# Patient Record
Sex: MALE | Race: WHITE | NOT HISPANIC OR LATINO | Employment: OTHER | URBAN - METROPOLITAN AREA
[De-identification: names, ages, dates, MRNs, and addresses within clinical notes are randomized per-mention and may not be internally consistent; named-entity substitution may affect disease eponyms.]

---

## 2017-01-10 ENCOUNTER — ALLSCRIPTS OFFICE VISIT (OUTPATIENT)
Dept: OTHER | Facility: OTHER | Age: 82
End: 2017-01-10

## 2017-01-10 DIAGNOSIS — M54.16 RADICULOPATHY OF LUMBAR REGION: ICD-10-CM

## 2017-01-12 ENCOUNTER — APPOINTMENT (OUTPATIENT)
Dept: PHYSICAL THERAPY | Facility: CLINIC | Age: 82
End: 2017-01-12
Payer: COMMERCIAL

## 2017-01-12 ENCOUNTER — GENERIC CONVERSION - ENCOUNTER (OUTPATIENT)
Dept: OTHER | Facility: OTHER | Age: 82
End: 2017-01-12

## 2017-01-12 DIAGNOSIS — M54.16 RADICULOPATHY OF LUMBAR REGION: ICD-10-CM

## 2017-01-12 PROCEDURE — G8978 MOBILITY CURRENT STATUS: HCPCS

## 2017-01-12 PROCEDURE — 97161 PT EVAL LOW COMPLEX 20 MIN: CPT

## 2017-01-12 PROCEDURE — G8979 MOBILITY GOAL STATUS: HCPCS

## 2017-01-17 ENCOUNTER — APPOINTMENT (OUTPATIENT)
Dept: PHYSICAL THERAPY | Facility: CLINIC | Age: 82
End: 2017-01-17
Payer: COMMERCIAL

## 2017-01-17 PROCEDURE — 97110 THERAPEUTIC EXERCISES: CPT

## 2017-01-17 PROCEDURE — 97140 MANUAL THERAPY 1/> REGIONS: CPT

## 2017-01-17 PROCEDURE — G8979 MOBILITY GOAL STATUS: HCPCS

## 2017-01-19 ENCOUNTER — APPOINTMENT (OUTPATIENT)
Dept: PHYSICAL THERAPY | Facility: CLINIC | Age: 82
End: 2017-01-19
Payer: COMMERCIAL

## 2017-01-19 PROCEDURE — 97140 MANUAL THERAPY 1/> REGIONS: CPT

## 2017-01-19 PROCEDURE — 97110 THERAPEUTIC EXERCISES: CPT

## 2017-03-10 ENCOUNTER — GENERIC CONVERSION - ENCOUNTER (OUTPATIENT)
Dept: OTHER | Facility: OTHER | Age: 82
End: 2017-03-10

## 2017-04-03 ENCOUNTER — GENERIC CONVERSION - ENCOUNTER (OUTPATIENT)
Dept: OTHER | Facility: OTHER | Age: 82
End: 2017-04-03

## 2017-04-07 ENCOUNTER — GENERIC CONVERSION - ENCOUNTER (OUTPATIENT)
Dept: OTHER | Facility: OTHER | Age: 82
End: 2017-04-07

## 2017-04-07 ENCOUNTER — TRANSCRIBE ORDERS (OUTPATIENT)
Dept: ADMINISTRATIVE | Facility: HOSPITAL | Age: 82
End: 2017-04-07

## 2017-04-07 DIAGNOSIS — K91.5 POSTCHOLECYSTECTOMY SYNDROME: Primary | ICD-10-CM

## 2017-04-19 ENCOUNTER — HOSPITAL ENCOUNTER (OUTPATIENT)
Dept: RADIOLOGY | Facility: HOSPITAL | Age: 82
Discharge: HOME | End: 2017-04-19
Attending: INTERNAL MEDICINE
Payer: COMMERCIAL

## 2017-04-19 DIAGNOSIS — K91.5 POSTCHOLECYSTECTOMY SYNDROME: ICD-10-CM

## 2017-04-19 PROCEDURE — 76705 ECHO EXAM OF ABDOMEN: CPT

## 2017-04-20 ENCOUNTER — HOSPITAL ENCOUNTER (OUTPATIENT)
Dept: NON INVASIVE DIAGNOSTICS | Facility: CLINIC | Age: 82
Discharge: HOME | End: 2017-04-20
Payer: COMMERCIAL

## 2017-04-20 DIAGNOSIS — K91.5 POSTCHOLECYSTECTOMY SYNDROME: ICD-10-CM

## 2017-04-20 PROCEDURE — 93975 VASCULAR STUDY: CPT

## 2017-04-25 ENCOUNTER — ALLSCRIPTS OFFICE VISIT (OUTPATIENT)
Dept: OTHER | Facility: OTHER | Age: 82
End: 2017-04-25

## 2017-05-19 ENCOUNTER — GENERIC CONVERSION - ENCOUNTER (OUTPATIENT)
Dept: OTHER | Facility: OTHER | Age: 82
End: 2017-05-19

## 2017-07-25 ENCOUNTER — ALLSCRIPTS OFFICE VISIT (OUTPATIENT)
Dept: OTHER | Facility: OTHER | Age: 82
End: 2017-07-25

## 2017-08-18 ENCOUNTER — GENERIC CONVERSION - ENCOUNTER (OUTPATIENT)
Dept: OTHER | Facility: OTHER | Age: 82
End: 2017-08-18

## 2017-10-02 ENCOUNTER — GENERIC CONVERSION - ENCOUNTER (OUTPATIENT)
Dept: OTHER | Facility: OTHER | Age: 82
End: 2017-10-02

## 2017-10-24 ENCOUNTER — ALLSCRIPTS OFFICE VISIT (OUTPATIENT)
Dept: OTHER | Facility: OTHER | Age: 82
End: 2017-10-24

## 2017-10-25 NOTE — PROGRESS NOTES
Assessment  1  Primary osteoarthritis of left knee (715 16) (M17 12)    Plan  Primary osteoarthritis of left knee    · Follow-up visit in 3 months Evaluation and Treatment  Follow-up  Status: Hold For -  Scheduling  Requested for: [id number]    Discussion/Summary    80-year-old male here for routine follow-up for left knee osteoarthritis, doing well   using knee brace on an as-needed basis for symptom reliefwithout assistive device as toleratedon an as-needed basis, will make appt for 3 months in case  Chief Complaint  1  Knee Pain    History of Present Illness  HPI: 80-year-old male with history of left knee osteoarthritis, previously seen in clinic  Approximately 6 months ago he was aspirated and injected for left knee pain  Since that time he has been doing well in ambulation without assistive device  He was seen 3 months prior clinic and given hinged knee brace for support  Uses the knee brace on a as needed basis and is tolerating well  He reports no major issues or concerns today  Active Problems  1  Allergic rhinitis (477 9) (J30 9)   2  Anemia (285 9) (D64 9)   3  Benign essential hypertension (401 1) (I10)   4  Cataract (366 9) (H26 9)   5  Diarrhea (787 91) (R19 7)   6  Esophageal reflux (530 81) (K21 9)   7  Glaucoma (365 9) (H40 9)   8  Gluteal pain (729 1) (M79 1)   9  Hamstring strain (843 8) (S76 319A)   10  Headache (784 0) (R51)   11  Hiatal hernia (553 3) (K44 9)   12  Hyperlipidemia (272 4) (E78 5)   13  Hypertension (401 9) (I10)   14  Joint pain, knee (719 46) (M25 569)   15  Knee effusion, left (719 06) (M25 462)   16  Left lumbar radiculopathy (724 4) (M54 16)   17  Need for prophylactic vaccination and inoculation against influenza (V04 81) (Z23)   18  Neuralgia (729 2) (M79 2)   19  Peripheral vascular disease (443 9) (I73 9)   20  Preoperative evaluation of a medical condition to rule out surgical contraindications (TAR    required) (V72 83) (Z01 818)   21   Primary osteoarthritis of left knee (715 16) (M17 12)   22  Shingles (053 9) (B02 9)   23  Upper respiratory infection (465 9) (J06 9)    Past Medical History   · History of Carpal tunnel syndrome, unspecified laterality (354 0) (G56 00)   · History of Carpal tunnel syndrome, unspecified laterality (354 0) (G56 00)   · History of anemia (V12 3) (Z86 2)   · History of gastroesophageal reflux (GERD) (V12 79) (Z87 19)   · History of glaucoma (V12 49) (Z86 69)   · History of glaucoma (V12 49) (Z86 69)   · History of hypertension (V12 59) (Z86 79)   · History of peripheral vascular disease (V12 59) (Z86 79)   · History of Intervertebral Disc Degeneration (722 6)   · History of Palpitations (785 1) (R00 2)    The active problems and past medical history were reviewed and updated today  Surgical History   · History of Cataract Surgery   · History of Hernia Repair   · History of Neuroplasty Decompression Median Nerve At Carpal Tunnel    The surgical history was reviewed and updated today  Family History  Mother    · Family history of Glaucoma  Father    · Family history of Cancer   · Family history of cardiac disorder (V17 49) (Z82 49)   · Family history of Glaucoma  Family History    · Family history of Alzheimer's disease (V17 2) (Z82 0)   · Family history of myocardial infarction (V17 3) (Z82 49)    The family history was reviewed and updated today  Social History   · Alcohol Use (History)   · 4 BEERS QD   · Denied: History of Caffeine use   · Former smoker (V15 82) (W44 571)   · History of Former smoker (V15 82) (Y96 484)   · Marital History - Currently    · No drug use   · Occupation: Retired   · Sleep Pattern 'Morning Person'  The social history was reviewed and updated today  Current Meds   1  Align Oral Capsule Recorded   2  AmLODIPine Besylate 10 MG Oral Tablet; take 1 tablet every day; Therapy: 18RKD6510 to (Evaluate:90Ryd0619)  Requested for: 18Fey1659; Last   Rx:20Usp6744 Ordered   3   Aspirin 81 MG TABS; TAKE 1 TABLET DAILY; Therapy: (Elissa Camejo) to Recorded   4  Azopt 1 % Ophthalmic Suspension Recorded   5  BL Vitamin E 400 UNIT CAPS; Take 1 tablet daily; Therapy: (Recorded:26Dmp5791) to Recorded   6  Centrum Silver Oral Tablet; TAKE 1 TABLET DAILY; Therapy: (Recorded:21Jul2015) to Recorded   7  Co Q 10 100 MG Oral Capsule; Take as directed Recorded   8  Fish Oil CAPS; TAKE TWO TABLETS DAILY; Therapy: (Elissa Camejo) to Recorded   9  Garlic Oil 7868 MG Oral Capsule; Take 1 tablet daily; Therapy: (Recorded:32Unm8708) to Recorded   10  Lumigan 0 01 % Ophthalmic Solution; INSTILL 1 DROP INTO BOTH EYES ONCE DAILY    IN THE EVENING Recorded   11  Melatonin 3 MG Oral Capsule; Therapy: (Recorded:34Ubz6392) to Recorded   12  Metoprolol Succinate ER 25 MG Oral Tablet Extended Release 24 Hour; Take 1 tablet    daily Recorded   13  Omeprazole 20 MG Oral Capsule Delayed Release; TAKE ONE CAPSULE BY MOUTH    EVERY OTHER DAY; Therapy: 00SAJ5521 to (Evaluate:16Mar2017)  Requested for: 21Mar2016; Last    Rx:21Mar2016 Ordered   14  Vitamin B-12 1000 MCG Oral Tablet; 1 TAB DAILY; Therapy: (Recorded:57Jzu2718) to Recorded   15  Vitamin D3 2000 UNIT Oral Capsule; take 1 capsule daily; Therapy: (Recorded:08Upj4182) to Recorded    The medication list was reviewed and updated today  Allergies  1  Lipitor TABS   2   Zetia TABS    Vitals  Signs   Heart Rate: 64  Respiration: 18  Systolic: 185  Diastolic: 79  Weight: 846 lb 6 08 oz    Physical Exam    Constitutional - General appearance: Normal    Musculoskeletal - Gait and station: Normal -- Digits and nails: Normal -- Muscle strength/tone: Normal    Cardiovascular - Pulses: Normal -- Examination of extremities for edema and/or varicosities: Normal    Skin - Skin and subcutaneous tissue: Normal    Neurologic - Sensation: Normal    Psychiatric - Orientation to person, place, and time: Normal -- Mood and affect: Normal    Eyes   Conjunctiva and lids: Normal     Pupils and irises: Normal     Free Text - Left knee with no major effusion, no warmth or erythemato varus and valgus stress, able to perform knee flexion to approximately 130Â°, extension full to nearly 0Â°joint line tenderness a  Results/Data    Views: Previous left knee x-rays reviewed, show degenerative joint changes and osteophyte formation of left knee, worse on medial compartment of the left knee  Findings:      Attending Note  59-year-old male who presents at this time secondary to left knee DJD  Patient told well  No new swelling  Patient has been wearing a knee brace which has given him comfort  No numbness, tingling, fevers, chills  Plan is as follows:    Weightbearing as tolerated  Hinged knee brace for comfort  Follow-up in 3 months on as-needed basis  Patient seen and examined  Agree with resident assessment and plan  Discussed treatment plan with patient, and patient  is in agreement with treatment plan  Thank you        Future Appointments    Date/Time Provider Specialty Site   01/23/2018 10:10 AM STEPHY Richardson  Orthopedic Surgery [address]     Signatures   Electronically signed by :  Veronique Nam MD; Oct 24 2017 11:32AM EST                       (Author)    Electronically signed by : STEPHY Marvin ; Oct 24 2017  4:03PM EST                       (Author)

## 2017-10-31 ENCOUNTER — GENERIC CONVERSION - ENCOUNTER (OUTPATIENT)
Dept: OTHER | Facility: OTHER | Age: 82
End: 2017-10-31

## 2017-10-31 ENCOUNTER — ALLSCRIPTS OFFICE VISIT (OUTPATIENT)
Dept: OTHER | Facility: OTHER | Age: 82
End: 2017-10-31

## 2017-11-01 ENCOUNTER — LAB CONVERSION - ENCOUNTER (OUTPATIENT)
Dept: OTHER | Facility: OTHER | Age: 82
End: 2017-11-01

## 2017-11-01 LAB
A/G RATIO (HISTORICAL): 1.4 (CALC) (ref 1–2.5)
ALBUMIN SERPL BCP-MCNC: 4.1 G/DL (ref 3.6–5.1)
ALP SERPL-CCNC: 45 U/L (ref 40–115)
ALT SERPL W P-5'-P-CCNC: 17 U/L (ref 9–46)
AST SERPL W P-5'-P-CCNC: 18 U/L (ref 10–35)
BASOPHILS # BLD AUTO: 0.5 %
BASOPHILS # BLD AUTO: 30 CELLS/UL (ref 0–200)
BILIRUB SERPL-MCNC: 0.5 MG/DL (ref 0.2–1.2)
BUN SERPL-MCNC: 19 MG/DL (ref 7–25)
BUN/CREA RATIO (HISTORICAL): 17 (CALC) (ref 6–22)
C-REACTIVE PROTEIN (HISTORICAL): 1 MG/L
CALCIUM SERPL-MCNC: 9.7 MG/DL (ref 8.6–10.3)
CHLORIDE SERPL-SCNC: 103 MMOL/L (ref 98–110)
CO2 SERPL-SCNC: 28 MMOL/L (ref 20–31)
CREAT SERPL-MCNC: 1.14 MG/DL (ref 0.7–1.11)
DEPRECATED RDW RBC AUTO: 12.4 % (ref 11–15)
EGFR AFRICAN AMERICAN (HISTORICAL): 67 ML/MIN/1.73M2
EGFR-AMERICAN CALC (HISTORICAL): 58 ML/MIN/1.73M2
EOSINOPHIL # BLD AUTO: 192 CELLS/UL (ref 15–500)
EOSINOPHIL # BLD AUTO: 3.2 %
ERYTHROCYTE SEDIMENTATION RATE (HISTORICAL): 33 MM/H
GAMMA GLOBULIN (HISTORICAL): 2.9 G/DL (CALC) (ref 1.9–3.7)
GLUCOSE (HISTORICAL): 97 MG/DL (ref 65–99)
HCT VFR BLD AUTO: 35.5 % (ref 38.5–50)
HGB BLD-MCNC: 12 G/DL (ref 13.2–17.1)
LYME 18 KD IGG (HISTORICAL): REACTIVE
LYME 23 KD IGG (HISTORICAL): ABNORMAL
LYME 23 KD IGM (HISTORICAL): ABNORMAL
LYME 28 KD IGG (HISTORICAL): ABNORMAL
LYME 30 KD IGG (HISTORICAL): ABNORMAL
LYME 39 KD IGG (HISTORICAL): ABNORMAL
LYME 39 KD IGM (HISTORICAL): ABNORMAL
LYME 41 KD IGG (HISTORICAL): REACTIVE
LYME 41 KD IGM (HISTORICAL): ABNORMAL
LYME 45 KD IGG (HISTORICAL): REACTIVE
LYME 58 KD IGG (HISTORICAL): ABNORMAL
LYME 66 KD IGG (HISTORICAL): ABNORMAL
LYME 93 KD IGG (HISTORICAL): ABNORMAL
LYME IGG (HISTORICAL): NEGATIVE
LYME IGM (HISTORICAL): NEGATIVE
LYMPHOCYTES # BLD AUTO: 1134 CELLS/UL (ref 850–3900)
LYMPHOCYTES # BLD AUTO: 18.9 %
MCH RBC QN AUTO: 32.4 PG (ref 27–33)
MCHC RBC AUTO-ENTMCNC: 33.8 G/DL (ref 32–36)
MCV RBC AUTO: 95.9 FL (ref 80–100)
MONOCYTES # BLD AUTO: 630 CELLS/UL (ref 200–950)
MONOCYTES (HISTORICAL): 10.5 %
NEUTROPHILS # BLD AUTO: 4014 CELLS/UL (ref 1500–7800)
NEUTROPHILS # BLD AUTO: 66.9 %
PLATELET # BLD AUTO: 223 THOUSAND/UL (ref 140–400)
PMV BLD AUTO: 11.6 FL (ref 7.5–12.5)
POTASSIUM SERPL-SCNC: 4.6 MMOL/L (ref 3.5–5.3)
RBC # BLD AUTO: 3.7 MILLION/UL (ref 4.2–5.8)
SODIUM SERPL-SCNC: 138 MMOL/L (ref 135–146)
TOTAL PROTEIN (HISTORICAL): 7 G/DL (ref 6.1–8.1)
TSH SERPL DL<=0.05 MIU/L-ACNC: 2.34 MIU/L (ref 0.4–4.5)
WBC # BLD AUTO: 6 THOUSAND/UL (ref 3.8–10.8)

## 2017-11-02 ENCOUNTER — GENERIC CONVERSION - ENCOUNTER (OUTPATIENT)
Dept: OTHER | Facility: OTHER | Age: 82
End: 2017-11-02

## 2017-12-05 ENCOUNTER — GENERIC CONVERSION - ENCOUNTER (OUTPATIENT)
Dept: FAMILY MEDICINE CLINIC | Facility: CLINIC | Age: 82
End: 2017-12-05

## 2017-12-22 ENCOUNTER — GENERIC CONVERSION - ENCOUNTER (OUTPATIENT)
Dept: FAMILY MEDICINE CLINIC | Facility: CLINIC | Age: 82
End: 2017-12-22

## 2018-01-05 ENCOUNTER — APPOINTMENT (EMERGENCY)
Dept: RADIOLOGY | Facility: HOSPITAL | Age: 83
End: 2018-01-05
Payer: COMMERCIAL

## 2018-01-05 ENCOUNTER — HOSPITAL ENCOUNTER (EMERGENCY)
Facility: HOSPITAL | Age: 83
Discharge: HOME | End: 2018-01-05
Attending: EMERGENCY MEDICINE | Admitting: EMERGENCY MEDICINE
Payer: COMMERCIAL

## 2018-01-05 VITALS
HEIGHT: 68 IN | WEIGHT: 175 LBS | DIASTOLIC BLOOD PRESSURE: 63 MMHG | SYSTOLIC BLOOD PRESSURE: 139 MMHG | RESPIRATION RATE: 12 BRPM | BODY MASS INDEX: 26.52 KG/M2 | HEART RATE: 78 BPM | TEMPERATURE: 96.1 F | OXYGEN SATURATION: 96 %

## 2018-01-05 DIAGNOSIS — K52.9 GASTROENTERITIS: Primary | ICD-10-CM

## 2018-01-05 LAB
ALBUMIN SERPL BCP-MCNC: 3.4 G/DL (ref 3.5–5)
ALP SERPL-CCNC: 43 U/L (ref 46–116)
ALT SERPL W P-5'-P-CCNC: 24 U/L (ref 12–78)
ANION GAP SERPL CALCULATED.3IONS-SCNC: 9 MMOL/L (ref 4–13)
AST SERPL W P-5'-P-CCNC: 19 U/L (ref 5–45)
ATRIAL RATE: 62 BPM
BACTERIA UR QL AUTO: ABNORMAL /HPF
BASOPHILS # BLD AUTO: 0.02 THOUSANDS/ΜL (ref 0–0.1)
BASOPHILS NFR BLD AUTO: 0 % (ref 0–1)
BILIRUB SERPL-MCNC: 0.34 MG/DL (ref 0.2–1)
BILIRUB UR QL STRIP: NEGATIVE
BUN SERPL-MCNC: 22 MG/DL (ref 5–25)
CALCIUM SERPL-MCNC: 8.6 MG/DL (ref 8.3–10.1)
CHLORIDE SERPL-SCNC: 106 MMOL/L (ref 100–108)
CLARITY UR: CLEAR
CLARITY, POC: CLEAR
CO2 SERPL-SCNC: 25 MMOL/L (ref 21–32)
COLOR UR: YELLOW
COLOR, POC: YELLOW
CREAT SERPL-MCNC: 1.05 MG/DL (ref 0.6–1.3)
EOSINOPHIL # BLD AUTO: 0.23 THOUSAND/ΜL (ref 0–0.61)
EOSINOPHIL NFR BLD AUTO: 2 % (ref 0–6)
ERYTHROCYTE [DISTWIDTH] IN BLOOD BY AUTOMATED COUNT: 13.1 % (ref 11.6–15.1)
GFR SERPL CREATININE-BSD FRML MDRD: 64 ML/MIN/1.73SQ M
GLUCOSE SERPL-MCNC: 93 MG/DL (ref 65–140)
GLUCOSE UR STRIP-MCNC: NEGATIVE MG/DL
HCT VFR BLD AUTO: 35.3 % (ref 36.5–49.3)
HGB BLD-MCNC: 11.9 G/DL (ref 12–17)
HGB UR QL STRIP.AUTO: NEGATIVE
HYALINE CASTS #/AREA URNS LPF: ABNORMAL /LPF
KETONES UR STRIP-MCNC: ABNORMAL MG/DL
LEUKOCYTE ESTERASE UR QL STRIP: NEGATIVE
LIPASE SERPL-CCNC: 208 U/L (ref 73–393)
LYMPHOCYTES # BLD AUTO: 1.07 THOUSANDS/ΜL (ref 0.6–4.47)
LYMPHOCYTES NFR BLD AUTO: 7 % (ref 14–44)
MCH RBC QN AUTO: 32.7 PG (ref 26.8–34.3)
MCHC RBC AUTO-ENTMCNC: 33.7 G/DL (ref 31.4–37.4)
MCV RBC AUTO: 97 FL (ref 82–98)
MONOCYTES # BLD AUTO: 1.09 THOUSAND/ΜL (ref 0.17–1.22)
MONOCYTES NFR BLD AUTO: 7 % (ref 4–12)
NEUTROPHILS # BLD AUTO: 13.07 THOUSANDS/ΜL (ref 1.85–7.62)
NEUTS SEG NFR BLD AUTO: 84 % (ref 43–75)
NITRITE UR QL STRIP: NEGATIVE
NON-SQ EPI CELLS URNS QL MICRO: ABNORMAL /HPF
NRBC BLD AUTO-RTO: 0 /100 WBCS
P AXIS: 51 DEGREES
PH UR STRIP.AUTO: 5.5 [PH] (ref 4.5–8)
PLATELET # BLD AUTO: 214 THOUSANDS/UL (ref 149–390)
PMV BLD AUTO: 10.9 FL (ref 8.9–12.7)
POTASSIUM SERPL-SCNC: 3.9 MMOL/L (ref 3.5–5.3)
PR INTERVAL: 216 MS
PROT SERPL-MCNC: 7 G/DL (ref 6.4–8.2)
PROT UR STRIP-MCNC: ABNORMAL MG/DL
QRS AXIS: 9 DEGREES
QRSD INTERVAL: 86 MS
QT INTERVAL: 400 MS
QTC INTERVAL: 406 MS
RBC # BLD AUTO: 3.64 MILLION/UL (ref 3.88–5.62)
RBC #/AREA URNS AUTO: ABNORMAL /HPF
SODIUM SERPL-SCNC: 140 MMOL/L (ref 136–145)
SP GR UR STRIP.AUTO: 1.01 (ref 1–1.03)
T WAVE AXIS: 41 DEGREES
UROBILINOGEN UR QL STRIP.AUTO: 0.2 E.U./DL
VENTRICULAR RATE: 62 BPM
WBC # BLD AUTO: 15.54 THOUSAND/UL (ref 4.31–10.16)
WBC #/AREA URNS AUTO: ABNORMAL /HPF

## 2018-01-05 PROCEDURE — 85025 COMPLETE CBC W/AUTO DIFF WBC: CPT | Performed by: EMERGENCY MEDICINE

## 2018-01-05 PROCEDURE — 80053 COMPREHEN METABOLIC PANEL: CPT | Performed by: EMERGENCY MEDICINE

## 2018-01-05 PROCEDURE — 96361 HYDRATE IV INFUSION ADD-ON: CPT

## 2018-01-05 PROCEDURE — 93005 ELECTROCARDIOGRAM TRACING: CPT

## 2018-01-05 PROCEDURE — 99285 EMERGENCY DEPT VISIT HI MDM: CPT

## 2018-01-05 PROCEDURE — 83690 ASSAY OF LIPASE: CPT | Performed by: EMERGENCY MEDICINE

## 2018-01-05 PROCEDURE — 74177 CT ABD & PELVIS W/CONTRAST: CPT

## 2018-01-05 PROCEDURE — 36415 COLL VENOUS BLD VENIPUNCTURE: CPT | Performed by: EMERGENCY MEDICINE

## 2018-01-05 PROCEDURE — 81002 URINALYSIS NONAUTO W/O SCOPE: CPT | Performed by: EMERGENCY MEDICINE

## 2018-01-05 PROCEDURE — 81001 URINALYSIS AUTO W/SCOPE: CPT

## 2018-01-05 PROCEDURE — 96360 HYDRATION IV INFUSION INIT: CPT

## 2018-01-05 RX ORDER — BRINZOLAMIDE 10 MG/ML
1 SUSPENSION/ DROPS OPHTHALMIC 2 TIMES DAILY
COMMUNITY
End: 2019-03-21

## 2018-01-05 RX ORDER — LATANOPROST 50 UG/ML
1 SOLUTION/ DROPS OPHTHALMIC
COMMUNITY

## 2018-01-05 RX ORDER — ACETAMINOPHEN 325 MG/1
650 TABLET ORAL ONCE
Status: COMPLETED | OUTPATIENT
Start: 2018-01-05 | End: 2018-01-05

## 2018-01-05 RX ADMIN — IOHEXOL 100 ML: 350 INJECTION, SOLUTION INTRAVENOUS at 20:24

## 2018-01-05 RX ADMIN — ACETAMINOPHEN 650 MG: 325 TABLET, FILM COATED ORAL at 20:01

## 2018-01-05 RX ADMIN — SODIUM CHLORIDE 1000 ML: 0.9 INJECTION, SOLUTION INTRAVENOUS at 20:02

## 2018-01-06 NOTE — ED PROVIDER NOTES
History  Chief Complaint   Patient presents with    Weakness - Generalized     Patient reports generalized weakness, nausea, vomited, and diarrhea x1    80year-old male presenting from home for evaluation of nausea and diarrhea  Patient reports that symptoms started just prior to arrival   He reports that he had just prepare dinner and had not yet eaten  He reports that the last time he ate was some walnuts for lunch  He reports he did not have much of an appetite for lunch  He reports that he suddenly felt nauseous and had 1 episode of vomiting and approximately 3 episodes of nonbloody loose stools  Patient denies any associated abdominal pain  Nobody else sick at home  No recent travel or antibiotic use  Patient denies any fevers, chills, lightheadedness, dizziness, headache, CP, SOB, urinary complaints  Only abdominal surgery was a cholecystectomy  A/P:  63-year-old male with vomiting and diarrhea, will check lab work to assess for metabolic derangements/liver function/renal function, CTA/P to assess for intra-abdominal pathology, UA to rule UTI, patient declines any pain or nausea medications at this time            Prior to Admission Medications   Prescriptions Last Dose Informant Patient Reported? Taking? Garlic 8284 MG CAPS   Yes Yes   Sig: Take 1 tablet by mouth daily  Vitamin D, Cholecalciferol, 1000 UNITS CAPS   Yes Yes   Sig: Take 1,000 tablets by mouth daily  amLODIPine (NORVASC) 10 mg tablet   Yes Yes   Sig: Take 10 mg by mouth  aspirin 81 MG tablet   Yes Yes   Sig: Take 81 mg by mouth daily  brinzolamide (AZOPT) 1 % ophthalmic suspension   Yes Yes   Sig: Administer 1 drop to the right eye 2 (two) times a day   co-enzyme Q-10 30 MG capsule   Yes Yes   Sig: Take 100 mg by mouth daily     fish oil 1,000 mg   Yes Yes   Sig: Take 1,000 mg by mouth    latanoprost (XALATAN) 0 005 % ophthalmic solution   Yes Yes   Sig: Administer 1 drop to both eyes daily at bedtime   metoprolol succinate (TOPROL-XL) 25 mg 24 hr tablet   Yes Yes   Sig: Take 25 mg by mouth    multivitamin-iron-minerals-folic acid (CENTRUM) chewable tablet   Yes Yes   Sig: Chew 1 tablet daily  vitamin E, tocopherol, 400 units capsule   Yes Yes   Sig: Take 400 Units by mouth daily  Facility-Administered Medications: None       Past Medical History:   Diagnosis Date    Hypertension        Past Surgical History:   Procedure Laterality Date    LA LAP,CHOLECYSTECTOMY N/A 9/14/2016    Procedure: LAPAROSCOPIC CHOLECYSTECTOMY ;  Surgeon: Yris Moser MD;  Location: [facility];  Service: General       History reviewed  No pertinent family history  I have reviewed and agree with the history as documented  Social History   Substance Use Topics    Smoking status: Never Smoker    Smokeless tobacco: Never Used    Alcohol use Yes      Comment: occasional        Review of Systems   Constitutional: Negative for chills and fever  HENT: Negative for ear pain, rhinorrhea and sore throat  Respiratory: Negative for cough and shortness of breath  Cardiovascular: Negative for chest pain and leg swelling  Gastrointestinal: Positive for diarrhea and nausea  Negative for abdominal pain, constipation and vomiting  Genitourinary: Negative for dysuria, frequency, hematuria and urgency  Musculoskeletal: Negative for back pain, myalgias and neck pain  Skin: Negative for color change and rash  Allergic/Immunologic: Negative for immunocompromised state  Neurological: Negative for dizziness, weakness, light-headedness, numbness and headaches  Hematological: Negative for adenopathy  Does not bruise/bleed easily  Psychiatric/Behavioral: Negative for agitation and confusion  All other systems reviewed and are negative        Physical Exam  ED Triage Vitals   Temperature Pulse Respirations Blood Pressure SpO2   01/05/18 1828 01/05/18 1821 01/05/18 1821 01/05/18 1821 01/05/18 1821   (!) 96 1 °F (35 6 °C) 61 18 123/77 100 %      Temp Source Heart Rate Source Patient Position - Orthostatic VS BP Location FiO2 (%)   01/05/18 1821 01/05/18 1821 01/05/18 1821 01/05/18 1821 --   Oral Monitor Lying Right arm       Pain Score       01/05/18 1821       No Pain           Orthostatic Vital Signs  Vitals:    01/05/18 1821 01/05/18 2005 01/05/18 2030 01/05/18 2130   BP: 123/77 143/62 135/63 139/63   Pulse: 61 71 78 78   Patient Position - Orthostatic VS: Lying Sitting         Physical Exam   Constitutional: He is oriented to person, place, and time  He appears well-developed and well-nourished  HENT:   Head: Normocephalic and atraumatic  Nose: Nose normal    Mouth/Throat: Oropharynx is clear and moist    Eyes: Conjunctivae and EOM are normal    Neck: Normal range of motion  Neck supple  Cardiovascular: Normal rate, regular rhythm, normal heart sounds and intact distal pulses  Pulmonary/Chest: Effort normal and breath sounds normal  No stridor  No respiratory distress  He has no wheezes  He has no rales  He exhibits no tenderness  Abdominal: Soft  He exhibits no distension  There is no tenderness  There is no rebound and no guarding  No back/CVA ttp   Musculoskeletal: He exhibits no edema or deformity  Neurological: He is alert and oriented to person, place, and time  He exhibits normal muscle tone  Coordination normal    Skin: Skin is warm and dry  No rash noted  Psychiatric: He has a normal mood and affect  Judgment and thought content normal    Nursing note and vitals reviewed        ED Medications  Medications   sodium chloride 0 9 % bolus 1,000 mL (0 mL Intravenous Stopped 1/5/18 2146)   acetaminophen (TYLENOL) tablet 650 mg (650 mg Oral Given 1/5/18 2001)   iohexol (OMNIPAQUE) 350 MG/ML injection (MULTI-DOSE) 100 mL (100 mL Intravenous Given 1/5/18 2024)       Diagnostic Studies  Results Reviewed     Procedure Component Value Units Date/Time    Urine Microscopic [[id number]]  (Abnormal) Collected:  01/05/18 2144    Lab Status:  Final result Specimen:  Urine from Urine, Clean Catch Updated:  01/05/18 2158     RBC, UA None Seen /hpf      WBC, UA None Seen /hpf      Epithelial Cells None Seen /hpf      Bacteria, UA None Seen /hpf      Hyaline Casts, UA 5-10 (A) /lpf     POCT urinalysis dipstick [[id number]]  (Normal) Resulted:  01/05/18 2143    Lab Status:  Final result Updated:  01/05/18 2143     Color, UA yellow     Clarity, UA clear    ED Urine Macroscopic [[id number]]  (Abnormal) Collected:  01/05/18 2144    Lab Status:  Final result Specimen:  Urine Updated:  01/05/18 2142     Color, UA Yellow     Clarity, UA Clear     pH, UA 5 5     Leukocytes, UA Negative     Nitrite, UA Negative     Protein, UA Trace (A) mg/dl      Glucose, UA Negative mg/dl      Ketones, UA Trace (A) mg/dl      Urobilinogen, UA 0 2 E U /dl      Bilirubin, UA Negative     Blood, UA Negative     Specific Gravity, UA 1 010    Narrative:       CLINITEK RESULT    Comprehensive metabolic panel [[id number]]  (Abnormal) Collected:  01/05/18 1856    Lab Status:  Final result Specimen:  Blood from Arm, Right Updated:  01/05/18 1934     Sodium 140 mmol/L      Potassium 3 9 mmol/L      Chloride 106 mmol/L      CO2 25 mmol/L      Anion Gap 9 mmol/L      BUN 22 mg/dL      Creatinine 1 05 mg/dL      Glucose 93 mg/dL      Calcium 8 6 mg/dL      AST 19 U/L      ALT 24 U/L      Alkaline Phosphatase 43 (L) U/L      Total Protein 7 0 g/dL      Albumin 3 4 (L) g/dL      Total Bilirubin 0 34 mg/dL      eGFR 64 ml/min/1 73sq m     Narrative:         National Kidney Disease Education Program recommendations are as follows:  GFR calculation is accurate only with a steady state creatinine  Chronic Kidney disease less than 60 ml/min/1 73 sq  meters  Kidney failure less than 15 ml/min/1 73 sq  meters      Lipase [[id number]]  (Normal) Collected:  01/05/18 1856    Lab Status:  Final result Specimen:  Blood from Arm, Right Updated:  01/05/18 1934     Lipase 208 u/L     CBC and differential [[id number]] (Abnormal) Collected:  01/05/18 1856    Lab Status:  Final result Specimen:  Blood from Arm, Right Updated:  01/05/18 1909     WBC 15 54 (H) Thousand/uL      RBC 3 64 (L) Million/uL      Hemoglobin 11 9 (L) g/dL      Hematocrit 35 3 (L) %      MCV 97 fL      MCH 32 7 pg      MCHC 33 7 g/dL      RDW 13 1 %      MPV 10 9 fL      Platelets 989 Thousands/uL      nRBC 0 /100 WBCs      Neutrophils Relative 84 (H) %      Lymphocytes Relative 7 (L) %      Monocytes Relative 7 %      Eosinophils Relative 2 %      Basophils Relative 0 %      Neutrophils Absolute 13 07 (H) Thousands/µL      Lymphocytes Absolute 1 07 Thousands/µL      Monocytes Absolute 1 09 Thousand/µL      Eosinophils Absolute 0 23 Thousand/µL      Basophils Absolute 0 02 Thousands/µL                  CT abdomen pelvis with contrast   ED Interpretation by Simone Weldon DO (01/05 2046)   CT ABDOMEN AND PELVIS WITH IV CONTRAST      INDICATION:  Abdominal pain      COMPARISON: Ultrasound dated April 19, 2017      TECHNIQUE:  CT examination of the abdomen and pelvis was performed      Reformatted images were created in axial, sagittal, and coronal planes         Radiation dose length product (DLP) for this visit:  450 4 mGy-cm    This examination, like all CT scans performed in the [facility], was performed utilizing techniques to minimize radiation dose exposure, including the use of iterative    reconstruction and automated exposure control  IV Contrast:  100 mL of iohexol (OMNIPAQUE)  350       Enteric Contrast:  Enteric contrast was not administered  FINDINGS:      ABDOMEN      LOWER CHEST:  Atelectatic changes both lung bases  No consolidation or significant effusion  Mild cardiomegaly  Coronary atherosclerosis  Aortic atherosclerosis  LIVER/BILIARY TREE:  Unremarkable  GALLBLADDER:  Removed      SPLEEN:  Unremarkable  PANCREAS:  Unremarkable  ADRENAL GLANDS:  Unremarkable        KIDNEYS/URETERS:  1 2 cm left renal cyst       STOMACH AND BOWEL:  Limited evaluation of GI tract without oral contrast  Mild distal esophagitis  Stomach unremarkable  Limited evaluation of GI tract without oral contrast  Small bowel appears average caliber  Duodenal diverticulum noted  Large bowel    is mostly collapsed  Colonic diverticulosis without CT evidence of colitis or diverticulitis  APPENDIX:  No findings to suggest appendicitis  ABDOMINOPELVIC CAVITY:  No ascites or free intraperitoneal air  No lymphadenopathy  VESSELS:  Unremarkable for patient's age  PELVIS      REPRODUCTIVE ORGANS:  Unremarkable for patient's age  URINARY BLADDER:  Unremarkable  ABDOMINAL WALL/INGUINAL REGIONS:  Tiny fat-containing umbilical hernia  OSSEOUS STRUCTURES:  Multilevel degenerative disc disease throughout the lumbar spine  Multilevel neural foraminal narrowing due to osteophytes and facet hypertrophy  Impression       Limited evaluation of GI tract without oral contrast  Suspect mild distal esophagitis  No explanation for patient's symptoms  Final Result by Mia Lundberg DO (01/05 2043)   Limited evaluation of GI tract without oral contrast  Suspect mild distal esophagitis  No explanation for patient's symptoms  Workstation performed: [id number]               Procedures  ECG 12 Lead Documentation  Date/Time: 1/5/2018 7:46 PM  Performed by: Catalina Kraus  Authorized by: Johnny Segal     Patient location:  ED  Previous ECG:     Previous ECG:  Compared to current    Comparison ECG info:  9/2/16  Rate:     ECG rate:  62  Rhythm:     Rhythm: sinus rhythm    Ectopy:     Ectopy: none    QRS:     QRS axis:  Normal  Conduction:     Conduction: normal    ST segments:     ST segments:  Non-specific  T waves:     T waves: flattening      Flattening:  V2 and aVL          Phone Consults  ED Phone Contact    ED Course  ED Course as of Jan 06 1912 Fri Jan 05, 2018   2141 Pt sleeping comfortably   He did not have any episodes of vomiting and diarrhea during ED stay  Discussed results with family, strict return precautions discussed            Identification of Seniors at [facility] Most Recent Value   (ISAR) Identification of Seniors at Risk   Before the illness or injury that brought you to the Emergency, did you need someone to help you on a regular basis? 1 Filed at: 01/05/2018 1828   In the last 24 hours, have you needed more help than usual?  0 Filed at: 01/05/2018 4017   Have you been hospitalized for one or more nights during the past 6 months? 0 Filed at: 01/05/2018 1828   In general, do you see well?  0 Filed at: 01/05/2018 1828   In general, do you have serious problems with your memory? 0 Filed at: 01/05/2018 1828   Do you take more than three different medications every day? 1 Filed at: 01/05/2018 1828   ISAR Score  2 Filed at: 01/05/2018 1828                          MDM  Number of Diagnoses or Management Options  Gastroenteritis:   Diagnosis management comments: 79 yo M with episode of vomiting and diarrhea, no recurrent episodes during ED course  Unremarkable ED workup  Discharged to home with strict return precautions, PCP f/u  Amount and/or Complexity of Data Reviewed  Clinical lab tests: ordered and reviewed  Tests in the radiology section of CPT®: ordered and reviewed  Tests in the medicine section of CPT®: ordered and reviewed      CritCare Time    Disposition  Final diagnoses:   Gastroenteritis     Time reflects when diagnosis was documented in both MDM as applicable and the Disposition within this note     Time User Action Codes Description Comment    1/5/2018  9:13 PM Brii CAIN Add [K52 9] Gastroenteritis       ED Disposition     ED Disposition Condition Comment    Discharge  Qasim Mullins discharge to home/self care      Condition at discharge: Stable        Follow-up Information     Follow up With Specialties Details Why Emanuel Pedroza MD Family Medicine [address]  [phone]          Return to ED if you have any new/worsening symptoms        Discharge Medication List as of 1/5/2018  9:14 PM      CONTINUE these medications which have NOT CHANGED    Details   amLODIPine (NORVASC) 10 mg tablet Take 10 mg by mouth , Until Discontinued, Historical Med      aspirin 81 MG tablet Take 81 mg by mouth daily  , Until Discontinued, Historical Med      brinzolamide (AZOPT) 1 % ophthalmic suspension Administer 1 drop to the right eye 2 (two) times a day, Historical Med      co-enzyme Q-10 30 MG capsule Take 100 mg by mouth daily  , Until Discontinued, Historical Med      fish oil 1,000 mg Take 1,000 mg by mouth , Until Discontinued, Historical Med      Garlic 0858 MG CAPS Take 1 tablet by mouth daily  , Until Discontinued, Historical Med      latanoprost (XALATAN) 0 005 % ophthalmic solution Administer 1 drop to both eyes daily at bedtime, Historical Med      metoprolol succinate (TOPROL-XL) 25 mg 24 hr tablet Take 25 mg by mouth , Until Discontinued, Historical Med      multivitamin-iron-minerals-folic acid (CENTRUM) chewable tablet Chew 1 tablet daily  , Until Discontinued, Historical Med      Vitamin D, Cholecalciferol, 1000 UNITS CAPS Take 1,000 tablets by mouth daily  , Until Discontinued, Historical Med      vitamin E, tocopherol, 400 units capsule Take 400 Units by mouth daily  , Until Discontinued, Historical Med           No discharge procedures on file  ED Provider  Attending physically available and evaluated Shanthi Reddy I managed the patient along with the ED Attending      Electronically Signed by         Cole Cosby DO  Resident  01/06/18 8376

## 2018-01-06 NOTE — DISCHARGE INSTRUCTIONS
Enteritis   WHAT YOU NEED TO KNOW:   Enteritis is inflammation of the small intestine  It may be caused by eating foods or drinking liquids contaminated with a virus, bacteria, or parasites  It may also be caused by certain medicines, damage from radiation, and medical conditions such as Crohn disease  DISCHARGE INSTRUCTIONS:   Seek care immediately if:   · You cannot stop vomiting  · You have not urinated for 12 hours  Contact your healthcare provider if:   · You have a fever over 101 5  · You have blood or mucus in your bowel movements  · You continue to vomit or have diarrhea for more than 3 days, even after treatment  · You have a dry mouth and eyes, you are urinating less than usual, and you feel dizzy when you stand up  · Your mouth or eyes are dry  You are not urinating as much or as often  · You are losing weight without trying  · You have questions or concerns about your condition or care  Medicines:   · Medicines  may be given to fight an infection caused by bacteria or a parasite  You may also need medicines to slow or stop your diarrhea or vomiting  Do not take these medicines unless your healthcare provider say it is okay  Other medicines may be needed to treat medical conditions that are causing enteritis  · Take your medicine as directed  Contact your healthcare provider if you think your medicine is not helping or if you have side effects  Tell him of her if you are allergic to any medicine  Keep a list of the medicines, vitamins, and herbs you take  Include the amounts, and when and why you take them  Bring the list or the pill bottles to follow-up visits  Carry your medicine list with you in case of an emergency  Manage enteritis:   · Eat foods that help to decrease symptoms  Limit or avoid foods and liquids that are high in sugar, fat, and fiber to help relieve diarrhea  It may be helpful to avoid lactose   Lactose is a type of sugar that is found in milk products  You may be able to tolerate soups, broths, well-cooked vegetables, canned fruit, and baked or broiled meats  Ask your dietitian or healthcare provider if you should follow a special diet  You may need to avoid other foods if you have certain medical conditions such as celiac disease  · Drink liquids as directed  Ask how much liquid to drink each day and which liquids are best for you  It is important to prevent or treat dehydration  Even if you have been vomiting, suck on ice chips or take small sips of clear liquids often  Slowly increase the amount of clear liquids you drink  If you become dehydrated, you may need IV liquids  · Drink an oral rehydration solution (ORS) as directed  An ORS contains water, salts, and sugar that are needed to replace lost body fluids  Ask what kind of ORS to use, how much to drink, and where to get it  Prevent enteritis:  Enteritis that is caused by bacteria, parasites, or viruses can be prevented  The following may help to prevent this type of enteritis:  · Wash your hands often  Use soap and water  Wash your hands after you use the bathroom, change a child's diapers, or sneeze  Wash your hands before you prepare or eat food  · Clean surfaces and do laundry often  Wash your clothes and towels separately from the rest of the laundry  Clean surfaces in your home with antibacterial  or bleach  · Clean food thoroughly and cook safely  Wash raw vegetables before you cook  Cook meat, fish, and eggs fully  Do not use the same dishes for raw meat as you do for other foods  Refrigerate any leftover food immediately  · Be aware when you camp or travel  Drink only clean water  Do not drink from rivers or lakes unless you purify or boil the water first  When you travel, drink bottled water and do not add ice  Do not eat fruit that has not been peeled  Do not eat raw fish or meat that is not fully cooked    Follow up with your healthcare provider as directed:  Write down your questions so you remember to ask them during your visits  © 2017 [address] Information is for End User's use only and may not be sold, redistributed or otherwise used for commercial purposes  All illustrations and images included in CareNotes® are the copyrighted property of A D [employer] M [employer] Inc  or Avi Jensen  The above information is an  only  It is not intended as medical advice for individual conditions or treatments  Talk to your doctor, nurse or pharmacist before following any medical regimen to see if it is safe and effective for you

## 2018-01-06 NOTE — ED ATTENDING ATTESTATION
Janet Maldonado MD, saw and evaluated the patient  I have discussed the patient with the resident/non-physician practitioner and agree with the resident's/non-physician practitioner's findings, Plan of Care, and MDM as documented in the resident's/non-physician practitioner's note, except where noted  All available labs and Radiology studies were reviewed  At this point I agree with the current assessment done in the Emergency Department  I have conducted an independent evaluation of this patient a history and physical is as follows:      Critical Care Time  CritCare Time    Procedures     81 yo male with n/v/d started around dinner time  Pt appeared diaphoretic and blacked out per family  Pt ate walnuts at lunch  No fever, no cp, no sob, no urinary complaints  pmh choley, htn  Pt currently feels better  Vss, afebrile, lungs cta, rrr, abdomen soft nontender, no neuro deficits  Cardiac workup, ct a/p, urine, ivf

## 2018-01-06 NOTE — ED NOTES
Encouraged the pt to void to expedite dispo  Pt and family aware        Mammie Numbers, RN  01/05/18 2183

## 2018-01-08 ENCOUNTER — GENERIC CONVERSION - ENCOUNTER (OUTPATIENT)
Dept: OTHER | Facility: OTHER | Age: 83
End: 2018-01-08

## 2018-01-11 NOTE — RESULT NOTES
Message  Spoke with patient's daughterElvis  Blood work stable  Lyme negative  He continues with a dull headache  She will monitor for a few more days since stopping melatonin  She will also check with ophthalmology regarding if headaches may be caused by eye drops  If headaches persist she will follow up with neurology  Blood pressures have been better, 127/73 this morning  114 systolic yesterday  She will keep BP log and send it in 2 weeks  She will call with any questions or concerns        Signatures   Electronically signed by : MARGARITA Tomas; Nov 2 2017 10:33AM EST                       (Author)

## 2018-01-13 VITALS
SYSTOLIC BLOOD PRESSURE: 171 MMHG | HEART RATE: 65 BPM | RESPIRATION RATE: 20 BRPM | WEIGHT: 170 LBS | DIASTOLIC BLOOD PRESSURE: 63 MMHG | BODY MASS INDEX: 25.85 KG/M2

## 2018-01-13 VITALS
DIASTOLIC BLOOD PRESSURE: 79 MMHG | WEIGHT: 171.38 LBS | BODY MASS INDEX: 26.06 KG/M2 | HEART RATE: 64 BPM | RESPIRATION RATE: 18 BRPM | SYSTOLIC BLOOD PRESSURE: 149 MMHG

## 2018-01-14 VITALS
DIASTOLIC BLOOD PRESSURE: 71 MMHG | BODY MASS INDEX: 26.99 KG/M2 | HEART RATE: 76 BPM | RESPIRATION RATE: 18 BRPM | WEIGHT: 177.5 LBS | SYSTOLIC BLOOD PRESSURE: 137 MMHG

## 2018-01-15 VITALS
HEART RATE: 67 BPM | RESPIRATION RATE: 18 BRPM | DIASTOLIC BLOOD PRESSURE: 71 MMHG | BODY MASS INDEX: 25.89 KG/M2 | WEIGHT: 170.25 LBS | SYSTOLIC BLOOD PRESSURE: 139 MMHG

## 2018-01-22 VITALS
RESPIRATION RATE: 16 BRPM | HEART RATE: 74 BPM | DIASTOLIC BLOOD PRESSURE: 60 MMHG | SYSTOLIC BLOOD PRESSURE: 140 MMHG | TEMPERATURE: 96.3 F | HEIGHT: 66 IN | BODY MASS INDEX: 27.32 KG/M2 | WEIGHT: 170 LBS

## 2018-01-23 ENCOUNTER — ALLSCRIPTS OFFICE VISIT (OUTPATIENT)
Dept: OTHER | Facility: OTHER | Age: 83
End: 2018-01-23

## 2018-01-24 VITALS
RESPIRATION RATE: 16 BRPM | SYSTOLIC BLOOD PRESSURE: 120 MMHG | DIASTOLIC BLOOD PRESSURE: 80 MMHG | HEIGHT: 66 IN | HEART RATE: 60 BPM | TEMPERATURE: 96.9 F | BODY MASS INDEX: 27.72 KG/M2 | WEIGHT: 172.5 LBS

## 2018-01-24 NOTE — PROGRESS NOTES
Assessment   1  Joint pain, knee (719 46) (M25 569)  2  Primary osteoarthritis of left knee (715 16) (M17 12)1      1 Amended By: Mary Ann Dempsey; Jan 23 2018 5:08 PM EST    Plan  Joint pain, knee    · Follow-up visit in 4 Months Evaluation and Treatment  Follow-up  Status: Hold For -  Scheduling  Requested for: [id number]    Discussion/Summary   Left knee DJD with resolved symptoms  Plan is as follows:    Weightbearing as tolerated    Hinged knee brace    Continue VMO strengthening exercises    Follow-up p r n  all in 4 months    Discussed treatment plan with patient and patient's daughter and both in agreement with treatment plan  Thank you        Chief Complaint   1  Knee Pain    History of Present Illness  HPI: [de-identified] year old male presents at this time for follow-up  Patient has been treated for his left knee DJD with injections and VMO strengthening  Patient states that he is asymptomatic  He is accompanied by his daughter  Denies any numbness tingling fevers chills  He would like new hinged knee brace since this does give him support1        1 Amended By: Mary Ann Dempsey; Jan 23 2018 5:06 PM EST    Review of Systems    ROS reviewed  1        1 Amended By: Mary Ann Dempsey; Jan 23 2018 5:06 PM EST    Active Problems   1  Allergic rhinitis (477 9) (J30 9)  2  Anemia (285 9) (D64 9)  3  Benign essential hypertension (401 1) (I10)  4  Esophageal reflux (530 81) (K21 9)  5  Glaucoma (365 9) (H40 9)  6  Headache (784 0) (R51)  7  Hiatal hernia (553 3) (K44 9)  8  Hyperlipidemia (272 4) (E78 5)  9  Left lumbar radiculopathy (724 4) (M54 16)  10  Neuralgia (729 2) (M79 2)  11  Peripheral vascular disease (443 9) (I73 9)  12   Primary osteoarthritis of left knee (715 16) (M17 12)    Past Medical History    · History of Carpal tunnel syndrome, unspecified laterality (354 0) (G56 00)   · History of Carpal tunnel syndrome, unspecified laterality (354 0) (G56 00)   · History of Gluteal pain (729 1) (M79 1)   · History of Hamstring strain (843 8) (S76 319A)   · History of anemia (V12 3) (Z86 2)   · History of cataract (V12 49) (Z86 69)   · History of diarrhea (V12 79) (I73 284)   · History of gastroesophageal reflux (GERD) (V12 79) (Z87 19)   · History of glaucoma (V12 49) (Z86 69)   · History of glaucoma (V12 49) (Z86 69)   · History of herpes zoster (V12 09) (Z86 19)   · History of hypertension (V12 59) (Z86 79)   · History of hypertension (V12 59) (Z86 79)   · History of influenza vaccination (V49 89) (Z92 29)   · History of peripheral vascular disease (V12 59) (Z86 79)   · History of upper respiratory infection (V12 09) (Z87 09)   · History of Intervertebral Disc Degeneration (722 6)   · History of Knee effusion, left (719 06) (M25 462)   · History of Palpitations (785 1) (R00 2)   · History of Preoperative evaluation of a medical condition to rule out surgical  contraindications (TAR required) (V72 83) (Z01 818)    The active problems and past medical history were reviewed and updated today  1        1 Amended By: Mary Ann Dempsey; Jan 23 2018 5:07 PM EST    Surgical History    · History of Cataract Surgery   · History of Hernia Repair   · History of Neuroplasty Decompression Median Nerve At Carpal Tunnel    The surgical history was reviewed and updated today  1        1 Amended By: Mary Ann Dempsey; Jan 23 2018 5:07 PM EST    Family History  Mother    · Family history of Glaucoma  Father    · Family history of Cancer   · Family history of cardiac disorder (V17 49) (Z82 49)   · Family history of Glaucoma  Family History    · Family history of Alzheimer's disease (V17 2) (Z82 0)   · Family history of myocardial infarction (V17 3) (Z82 49)    The family history was reviewed and updated today   1        1 Amended By: Mary Ann Dempsey; Jan 23 2018 5:07 PM EST    Social History    · Alcohol Use (History)   · 4 BEERS QD   · Denied: History of Caffeine use   · Former smoker (V15 82) (Z87 891)   · History of Former smoker (V15 82) (Z87 891)   · Marital History - Currently    · No drug use   · Occupation: Retired   · Sleep Pattern 'Morning Person'  The social history was reviewed and updated today  1        1 Amended By: Kathryn Mcmanus; Jan 23 2018 5:07 PM EST    Current Meds  1  Acetaminophen PM Ex St  MG Oral Tablet; Therapy: 14NVR1855 to Recorded  2  Align Oral Capsule; Take one cap by mouth daily; Therapy: (Recorded:31Oct2017) to Recorded  3  AmLODIPine Besylate 10 MG Oral Tablet; take 1 tablet every day; Therapy: 79XTN5616 to (Evaluate:27Jan2018)  Requested for: 54Jid9236; Last   Rx:52Kur7150 Ordered  4  Aspirin 81 MG TABS; TAKE 1 TABLET DAILY; Therapy: (Recorded:10Wlk6403) to Recorded  5  Azopt 1 % Ophthalmic Suspension Recorded  6  BL Vitamin E 400 UNIT CAPS; Take 1 tablet daily; Therapy: (Recorded:97Dpt5725) to Recorded  7  Centrum Silver Oral Tablet; TAKE 1 TABLET DAILY; Therapy: (Recorded:67Cjo5735) to Recorded  8  Co Q 10 100 MG Oral Capsule; Take as directed Recorded  9  Fish Oil CAPS; TAKE TWO TABLETS DAILY; Therapy: (Recorded:54Tsa4284) to Recorded  10  Garlic Oil 1793 MG Oral Capsule; Take 1 tablet daily; Therapy: (Recorded:93Kgm0662) to Recorded  11  Lumigan 0 01 % Ophthalmic Solution; INSTILL 1 DROP INTO BOTH EYES ONCE DAILY    IN THE EVENING Recorded  12  Metoprolol Succinate ER 25 MG Oral Tablet Extended Release 24 Hour; Take 1 tablet    daily Recorded  13  Vitamin B-12 1000 MCG Oral Tablet; 1 TAB DAILY; Therapy: (Recorded:46Rmx7022) to Recorded  14  Vitamin D3 2000 UNIT Oral Capsule; take 1 capsule daily; Therapy: (Recorded:63Wjy9236) to Recorded    The medication list was reviewed and updated today  1        1 Amended By: Kathryn Mcmanus; Jan 23 2018 5:07 PM EST    Allergies   1  Lipitor TABS  2   Zetia TABS    Vitals  Signs    Heart Rate: 63  Respiration: 18  Systolic: 593  Diastolic: 79  Weight: 623 lb 2 08 oz    Physical Exam   Bilateral knees: No abrasions, no open wounds, no medial lateral joint line tenderness, negative patellar grind test, full range of motion, stable to coronal sagittal plane stress, neurologically and vascularly intact distally     Constitutional -1  General appearance: Normal1   Musculoskeletal -1  Gait and station: Normal1   Digits and nails: Normal1   Muscle strength/tone: Normal1   Lower extremity compartments: Normal1   Cardiovascular -1  Pulses: Normal1   Examination of extremities for edema and/or varicosities: Normal1   Skin -1  Skin and subcutaneous tissue: Normal1   Neurologic -1  Sensation: Normal1   Psychiatric -1  Orientation to person, place, and time: Normal1    Mood and affect: Normal1         1 Amended By: Jayce Valdes; Jan 23 2018 5:08 PM EST    Signatures   Electronically signed by : STEPHY Zelaya ; Jan 23 2018  5:09PM EST                       (Author)

## 2018-01-25 ENCOUNTER — TELEPHONE (OUTPATIENT)
Dept: OBGYN CLINIC | Facility: HOSPITAL | Age: 83
End: 2018-01-25

## 2018-01-25 NOTE — TELEPHONE ENCOUNTER
Dr Keven Kaur patient  Rigoberto Dao calling to find out if she can come in and  a new knee brace for the patient, size medium   Call back number [phone]

## 2018-02-10 DIAGNOSIS — I10 ESSENTIAL HYPERTENSION: Primary | ICD-10-CM

## 2018-02-12 RX ORDER — AMLODIPINE BESYLATE 10 MG/1
TABLET ORAL
Qty: 30 TABLET | Refills: 5 | Status: SHIPPED | OUTPATIENT
Start: 2018-02-12 | End: 2019-08-30 | Stop reason: ALTCHOICE

## 2018-02-21 RX ORDER — OCTISALATE, AVOBENZONE, HOMOSALATE, AND OCTOCRYLENE 29.4; 29.4; 49; 25.48 MG/ML; MG/ML; MG/ML; MG/ML
LOTION TOPICAL
COMMUNITY
End: 2018-09-18

## 2018-02-21 RX ORDER — BRINZOLAMIDE 10 MG/ML
SUSPENSION/ DROPS OPHTHALMIC
COMMUNITY
End: 2018-03-12

## 2018-02-21 RX ORDER — BRIMONIDINE TARTRATE 0.1 %
DROPS OPHTHALMIC (EYE)
Refills: 3 | COMMUNITY
Start: 2017-12-12 | End: 2018-03-12

## 2018-02-21 RX ORDER — ACETAMINOPHEN 160 MG
1 TABLET,DISINTEGRATING ORAL DAILY
COMMUNITY
End: 2019-04-11 | Stop reason: HOSPADM

## 2018-02-21 RX ORDER — METOPROLOL SUCCINATE 25 MG/1
1 TABLET, EXTENDED RELEASE ORAL DAILY
COMMUNITY
End: 2018-03-12

## 2018-02-21 RX ORDER — VITAMIN E 268 MG
1 CAPSULE ORAL DAILY
COMMUNITY
End: 2018-03-19

## 2018-02-21 RX ORDER — ACETAMINOPHEN,DIPHENHYDRAMINE HCL 500; 25 MG/1; MG/1
TABLET, FILM COATED ORAL
COMMUNITY
Start: 2018-01-09 | End: 2019-04-11 | Stop reason: HOSPADM

## 2018-02-21 RX ORDER — LANOLIN ALCOHOL/MO/W.PET/CERES
1 CREAM (GRAM) TOPICAL DAILY
COMMUNITY
End: 2018-08-31

## 2018-02-21 RX ORDER — AMLODIPINE BESYLATE 10 MG/1
1 TABLET ORAL DAILY
COMMUNITY
Start: 2011-05-12 | End: 2018-03-12

## 2018-02-21 RX ORDER — OMEPRAZOLE 20 MG/1
1 CAPSULE, DELAYED RELEASE ORAL EVERY OTHER DAY
COMMUNITY
Start: 2013-05-16 | End: 2019-08-30 | Stop reason: ALTCHOICE

## 2018-02-23 ENCOUNTER — OFFICE VISIT (OUTPATIENT)
Dept: FAMILY MEDICINE CLINIC | Facility: CLINIC | Age: 83
End: 2018-02-23
Payer: COMMERCIAL

## 2018-02-23 VITALS
RESPIRATION RATE: 16 BRPM | DIASTOLIC BLOOD PRESSURE: 70 MMHG | HEART RATE: 70 BPM | SYSTOLIC BLOOD PRESSURE: 136 MMHG | BODY MASS INDEX: 27.64 KG/M2 | WEIGHT: 172 LBS | HEIGHT: 66 IN | TEMPERATURE: 96.9 F

## 2018-02-23 DIAGNOSIS — Z01.818 PREOP EXAMINATION: Primary | ICD-10-CM

## 2018-02-23 DIAGNOSIS — Z01.818 PREOPERATIVE EVALUATION OF A MEDICAL CONDITION TO RULE OUT SURGICAL CONTRAINDICATIONS (TAR REQUIRED): ICD-10-CM

## 2018-02-23 PROBLEM — M54.16 LEFT LUMBAR RADICULOPATHY: Status: ACTIVE | Noted: 2017-01-10

## 2018-02-23 LAB
SL AMB  POCT GLUCOSE, UA: NEGATIVE
SL AMB LEUKOCYTE ESTERASE,UA: NEGATIVE
SL AMB POCT BILIRUBIN,UA: NEGATIVE
SL AMB POCT BLOOD,UA: NEGATIVE
SL AMB POCT CLARITY,UA: NORMAL
SL AMB POCT COLOR,UA: YELLOW
SL AMB POCT KETONES,UA: NEGATIVE
SL AMB POCT NITRITE,UA: NEGATIVE
SL AMB POCT PH,UA: 5
SL AMB POCT SPECIFIC GRAVITY,UA: 1.01
SL AMB POCT URINE PROTEIN: NEGATIVE
SL AMB POCT UROBILINOGEN: NEGATIVE

## 2018-02-23 PROCEDURE — 81002 URINALYSIS NONAUTO W/O SCOPE: CPT | Performed by: FAMILY MEDICINE

## 2018-02-23 PROCEDURE — G0439 PPPS, SUBSEQ VISIT: HCPCS | Performed by: FAMILY MEDICINE

## 2018-02-23 PROCEDURE — 99213 OFFICE O/P EST LOW 20 MIN: CPT | Performed by: FAMILY MEDICINE

## 2018-02-23 NOTE — PROGRESS NOTES
FAMILY PRACTICE OFFICE VISIT       NAME: Sanchez Foster  AGE: 80 y o  SEX: male       : 7/15/1931        MRN: [id number]    DATE: 2018  TIME: 10:44 AM    Assessment and Plan     Problem List Items Addressed This Visit     Preoperative evaluation of a medical condition to rule out surgical contraindications (TAR required)      Other Visit Diagnoses     Preop examination    -  Primary    Relevant Orders    POCT urine dip (Completed)        Patient appears to be in stable health  He will be medically cleared for upcoming left sided carpal tunnel syndrome surgery as described  There are no Patient Instructions on file for this visit  Chief Complaint     Chief Complaint   Patient presents with    Medicare Wellness Visit     initial    Follow-up       History of Present Illness     Patient is accompanied by his daughter-in-law who states patient is scheduled for carpal tunnel syndrome surgery Thursday of next week by Dr Billie Gonzalez  He denies any recent illness  He still sees his cardiologist and is due to have cardiac testing and carotid artery testing in 2018 by Dr Marietta Dobbs  His daughter-in-law states that he had recent lipid profile performed by Cardiology as well as a recent EKG performed in their office in 2018  He still sees his gastroenterologist Livan Pan who performed a CMP and a CBC in 2017  Review of Systems   Review of Systems   Constitutional: Negative  HENT: Negative  Respiratory: Negative  Cardiovascular: Negative  Gastrointestinal: Negative  Genitourinary: Negative      Musculoskeletal:        As per HPI       Active Problem List     Patient Active Problem List   Diagnosis    Cholecystitis with cholelithiasis    Allergic rhinitis    Anemia    Benign essential hypertension    Esophageal reflux    Glaucoma    Hiatal hernia    Hyperlipidemia    Joint pain, knee    Left lumbar radiculopathy    Peripheral vascular disease ([address] )    Preoperative evaluation of a medical condition to rule out surgical contraindications (TAR required)       Past Medical History:  Past Medical History:   Diagnosis Date    Anemia     Carpal tunnel syndrome, unspecified upper limb     Cataract     last assessed: 8/18/2012    GERD (gastroesophageal reflux disease)     Glaucoma     Hypertension     last assessed: 8/23/2012    Intervertebral disc disease     PVD (peripheral vascular disease) (HCC)        Past Surgical History:  Past Surgical History:   Procedure Laterality Date    CATARACT EXTRACTION      HERNIA REPAIR      NEUROPLASTY / TRANSPOSITION MEDIAN NERVE AT CARPAL TUNNEL      VT LAP,CHOLECYSTECTOMY N/A 9/14/2016    Procedure: LAPAROSCOPIC CHOLECYSTECTOMY ;  Surgeon: Carlos Manuel Martins MD;  Location: [facility];  Service: General       Family History:  Family History   Problem Relation Age of Onset    Glaucoma Mother     Cancer Father     Heart disease Father    Earna Leona Glaucoma Father     Alzheimer's disease Family     Heart attack Family        Social History:  Social History     Social History    Marital status: /Civil Union     Spouse name: N/A    Number of children: N/A    Years of education: N/A     Occupational History    retired      Social History Main Topics    Smoking status: Never Smoker    Smokeless tobacco: Never Used    Alcohol use Yes      Comment: occasional  4 beers a daily    Drug use: No    Sexual activity: Not on file     Other Topics Concern    Not on file     Social History Narrative    Morning person       Objective     Vitals:    02/23/18 0952   BP: 136/70   Pulse: 70   Resp: 16   Temp: (!) 96 9 °F (36 1 °C)     Wt Readings from Last 3 Encounters:   02/23/18 78 kg (172 lb)   01/08/18 78 2 kg (172 lb 8 oz)   01/05/18 79 4 kg (175 lb)       Physical Exam   Constitutional: He is oriented to person, place, and time  He appears well-developed and well-nourished     HENT:   Right Ear: External ear normal    Left Ear: External ear normal    Nose: Nose normal    Mouth/Throat: Oropharynx is clear and moist    Tympanic membranes normal  Pharynx clear   Eyes: Conjunctivae and EOM are normal  Pupils are equal, round, and reactive to light  Neck: Normal range of motion  Neck supple  No thyromegaly present  Cardiovascular: Normal rate, regular rhythm and normal heart sounds  No murmur heard  Pulmonary/Chest: Effort normal and breath sounds normal    Abdominal: Soft  Bowel sounds are normal  There is no tenderness  NO hepatospenomegaly   Musculoskeletal: Normal range of motion  He exhibits no edema  Lymphadenopathy:     He has no cervical adenopathy  Neurological: He is alert and oriented to person, place, and time  Skin:   NO RASHES   Psychiatric: He has a normal mood and affect  Nursing note and vitals reviewed        Pertinent Laboratory/Diagnostic Studies:  Lab Results   Component Value Date    GLUCOSE 93 01/05/2018    BUN 22 01/05/2018    CREATININE 1 05 01/05/2018    CALCIUM 8 6 01/05/2018     01/05/2018    K 3 9 01/05/2018    CO2 25 01/05/2018     01/05/2018     Lab Results   Component Value Date    ALT 24 01/05/2018    AST 19 01/05/2018    ALKPHOS 43 (L) 01/05/2018    BILITOT 0 34 01/05/2018       Lab Results   Component Value Date    WBC 15 54 (H) 01/05/2018    HGB 11 9 (L) 01/05/2018    HCT 35 3 (L) 01/05/2018    MCV 97 01/05/2018     01/05/2018       No results found for: TSH    No results found for: CHOL  No results found for: TRIG  No results found for: HDL  No results found for: LDLCALC  No results found for: HGBA1C    Results for orders placed or performed in visit on 02/23/18   POCT urine dip   Result Value Ref Range    LEUKOCYTE ESTERASE,UA negative      NITRITE,UA negative     SL AMB POCT UROBILINOGEN negative     SL AMB POCT URINE PROTEIN negative      PH,UA 5 0      BLOOD,UA negative      SPECIFIC GRAVITY,UA 1 015      KETONES,UA negative      BILIRUBIN,UA negative     GLUCOSE, UA negative      COLOR,UA yellow      CLARITY,UA transparent        Orders Placed This Encounter   Procedures    POCT urine dip       ALLERGIES:  Allergies   Allergen Reactions    Atorvastatin Other (See Comments)    Zetia [Ezetimibe] Other (See Comments)     myalgia       Current Medications     Current Outpatient Prescriptions   Medication Sig Dispense Refill    amLODIPine (NORVASC) 10 mg tablet Take 1 tablet by mouth daily      diphenhydrAMINE-acetaminophen (ACETAMINOPHEN PM EX ST)  MG TABS Take by mouth      omeprazole (PriLOSEC) 20 mg delayed release capsule Take 1 capsule by mouth every other day      ALPHAGAN P 0 1 % INSTILL 1 DROP INTO RIGHT EYE 2 TIMES DAILY  3    amLODIPine (NORVASC) 10 mg tablet TAKE 1 TABLET EVERY DAY 30 tablet 5    aspirin 81 MG tablet Take 81 mg by mouth daily   aspirin 81 MG tablet Take 1 tablet by mouth daily      bimatoprost (LUMIGAN) 0 01 % ophthalmic drops Apply 1 drop to eye Daily      brinzolamide (AZOPT) 1 % ophthalmic suspension Administer 1 drop to the right eye 2 (two) times a day      brinzolamide (AZOPT) 1 % ophthalmic suspension Apply to eye      Cholecalciferol (VITAMIN D3) 2000 units capsule Take 1 capsule by mouth daily      co-enzyme Q-10 30 MG capsule Take 100 mg by mouth daily   Coenzyme Q10 (CO Q 10) 100 MG CAPS Take by mouth      cyanocobalamin (VITAMIN B-12) 1,000 mcg tablet Take 1 tablet by mouth daily      fish oil 1,000 mg Take 1,000 mg by mouth   Garlic 1086 MG CAPS Take 1 tablet by mouth daily   Garlic Oil 1634 MG CAPS Take 1 tablet by mouth daily      latanoprost (XALATAN) 0 005 % ophthalmic solution Administer 1 drop to both eyes daily at bedtime      Melatonin 3 MG CAPS Take 1 capsule by mouth      metoprolol succinate (TOPROL-XL) 25 mg 24 hr tablet Take 25 mg by mouth        metoprolol succinate (TOPROL-XL) 25 mg 24 hr tablet Take 1 tablet by mouth daily      Multiple Vitamins-Minerals (CENTRUM SILVER 50+MEN PO) Take 1 tablet by mouth daily      multivitamin-iron-minerals-folic acid (CENTRUM) chewable tablet Chew 1 tablet daily   Omega-3 Fatty Acids (FISH OIL) 645 MG CAPS Take 2 tablets by mouth daily      Probiotic Product (ALIGN) 4 MG CAPS Take by mouth      Vitamin D, Cholecalciferol, 1000 UNITS CAPS Take 1,000 tablets by mouth daily   vitamin E, tocopherol, (RA VITAMIN E BLEND) 400 units capsule Take 1 tablet by mouth daily      vitamin E, tocopherol, 400 units capsule Take 400 Units by mouth daily  No current facility-administered medications for this visit            Health Maintenance     Health Maintenance   Topic Date Due    SLP PLAN OF CARE  07/15/1931    Depression Screening PHQ-9  07/15/1943    Fall Risk  07/15/1996    PNEUMOCOCCAL POLYSACCHARIDE VACCINE AGE 65 AND OVER  07/15/1996    GLAUCOMA SCREENING 67+ YR  07/15/1998    DTaP,Tdap,and Td Vaccines (2 - Tdap) 11/29/2003    INFLUENZA VACCINE  09/01/2017     Immunization History   Administered Date(s) Administered    Influenza Split High Dose Preservative Free IM 09/23/2014, 12/17/2015    Influenza TIV (IM) 10/24/2003, 11/08/2005, 11/13/2007, 10/16/2008, 12/16/2009, 11/10/2010, 11/18/2011, 09/24/2012, 09/25/2013    Td (adult), adsorbed 81/16/9314       Gui Davalos MD

## 2018-02-23 NOTE — PROGRESS NOTES
HPI:  Chris Michaels is a 80 y o  male here for his Initial Wellness Visit  MMSE 22  The patient is accompanied by his daughter along  We did discuss the lack of pneumonia vaccine documentation  The patient does not recall ever having pneumonia vaccination  However, they would like to postpone any vaccinations at this time until after he receives his carpal tunnel surgery    Patient Active Problem List   Diagnosis    Cholecystitis with cholelithiasis    Allergic rhinitis    Anemia    Benign essential hypertension    Esophageal reflux    Glaucoma    Hiatal hernia    Hyperlipidemia    Joint pain, knee    Left lumbar radiculopathy    Peripheral vascular disease ([address] )    Preoperative evaluation of a medical condition to rule out surgical contraindications (TAR required)     Past Medical History:   Diagnosis Date    Anemia     Carpal tunnel syndrome, unspecified upper limb     Cataract     last assessed: 8/18/2012    GERD (gastroesophageal reflux disease)     Glaucoma     Hypertension     last assessed: 8/23/2012    Intervertebral disc disease     [facility]VD [facility]peripheral vascular disease) ([facility])      Past Surgical History:   Procedure Laterality Date    CATARACT EXTRACTION      HERNIA REPAIR      NEUROPLASTY / TRANSPOSITION MEDIAN NERVE AT CARPAL TUNNEL      NC LAP,CHOLECYSTECTOMY N/A 9/14/2016    Procedure: LAPAROSCOPIC CHOLECYSTECTOMY ;  Surgeon: Daria Wright MD;  Location: [facility];  Service: General     Family History   Problem Relation Age of Onset    Glaucoma Mother     Cancer Father     Heart disease Father    Patrick Hernandez Glaucoma Father     Alzheimer's disease Family     Heart attack Family      History   Smoking Status    Never Smoker   Smokeless Tobacco    Never Used     History   Alcohol Use    Yes     Comment: occasional  4 beers a daily      History   Drug Use No     /70 (BP Location: Left arm, Patient Position: Sitting, Cuff Size: Large)   Pulse 70   Temp (!) 96 9 °F (36 1 °C) (Tympanic)   Resp 16   Ht 5' 5 5" (1 664 m)   Wt 78 kg (172 lb)   BMI 28 19 kg/m²       Current Outpatient Prescriptions   Medication Sig Dispense Refill    amLODIPine (NORVASC) 10 mg tablet Take 1 tablet by mouth daily      diphenhydrAMINE-acetaminophen (ACETAMINOPHEN PM EX ST)  MG TABS Take by mouth      omeprazole (PriLOSEC) 20 mg delayed release capsule Take 1 capsule by mouth every other day      ALPHAGAN P 0 1 % INSTILL 1 DROP INTO RIGHT EYE 2 TIMES DAILY  3    amLODIPine (NORVASC) 10 mg tablet TAKE 1 TABLET EVERY DAY 30 tablet 5    aspirin 81 MG tablet Take 81 mg by mouth daily   aspirin 81 MG tablet Take 1 tablet by mouth daily      bimatoprost (LUMIGAN) 0 01 % ophthalmic drops Apply 1 drop to eye Daily      brinzolamide (AZOPT) 1 % ophthalmic suspension Administer 1 drop to the right eye 2 (two) times a day      brinzolamide (AZOPT) 1 % ophthalmic suspension Apply to eye      Cholecalciferol (VITAMIN D3) 2000 units capsule Take 1 capsule by mouth daily      co-enzyme Q-10 30 MG capsule Take 100 mg by mouth daily   Coenzyme Q10 (CO Q 10) 100 MG CAPS Take by mouth      cyanocobalamin (VITAMIN B-12) 1,000 mcg tablet Take 1 tablet by mouth daily      fish oil 1,000 mg Take 1,000 mg by mouth   Garlic 0781 MG CAPS Take 1 tablet by mouth daily   Garlic Oil 9271 MG CAPS Take 1 tablet by mouth daily      latanoprost (XALATAN) 0 005 % ophthalmic solution Administer 1 drop to both eyes daily at bedtime      Melatonin 3 MG CAPS Take 1 capsule by mouth      metoprolol succinate (TOPROL-XL) 25 mg 24 hr tablet Take 25 mg by mouth   metoprolol succinate (TOPROL-XL) 25 mg 24 hr tablet Take 1 tablet by mouth daily      Multiple Vitamins-Minerals (CENTRUM SILVER 50+MEN PO) Take 1 tablet by mouth daily      multivitamin-iron-minerals-folic acid (CENTRUM) chewable tablet Chew 1 tablet daily        Omega-3 Fatty Acids (FISH OIL) 645 MG CAPS Take 2 tablets by mouth daily      Probiotic Product (ALIGN) 4 MG CAPS Take by mouth      Vitamin D, Cholecalciferol, 1000 UNITS CAPS Take 1,000 tablets by mouth daily   vitamin E, tocopherol, (RA VITAMIN E BLEND) 400 units capsule Take 1 tablet by mouth daily      vitamin E, tocopherol, 400 units capsule Take 400 Units by mouth daily  No current facility-administered medications for this visit  Allergies   Allergen Reactions    Atorvastatin Other (See Comments)    Zetia [Ezetimibe] Other (See Comments)     myalgia     Immunization History   Administered Date(s) Administered    Influenza Split High Dose Preservative Free IM 09/23/2014, 12/17/2015    Influenza TIV (IM) 10/24/2003, 11/08/2005, 11/13/2007, 10/16/2008, 12/16/2009, 11/10/2010, 11/18/2011, 09/24/2012, 09/25/2013    Td (adult), adsorbed 11/01/2003       Patient Care Team:  Molly Albright MD as PCP - MD Carlito August MD Kaleta Rhein, MD    Medicare Screening Tests and Risk Assessments:  AWV Clinical     ISAR:   Previous hospitalizations?:  Yes   How many hospitalizations have you had in the last year?:  1-2   Additional Comments:  stomach virus January 5th, 2018       Once in a Lifetime Medicare Screening:   EKG performed?:  No    AAA screening performed? (if performed, please add date to Health Maintenance):  No   Date performed:  2/23/18 Results:  neg       Medicare Screening Tests and Risk Assessment:   AAA Risk Assessment    Age over 72 (males only):  Yes    Osteoporosis Risk Assessment    :  Yes    Age over 48:   Yes     Alcohol use:  Yes   HIV Risk Assessment    None indicated:  Yes        Drug and Alcohol Use:   Tobacco use    Cigarettes:  former smoker    Quit date:  1/1/1975   Smokeless:  never used smokeless tobacco    Tobacco use duration    Tobacco Cessation Readiness    Alcohol use    Alcohol use:  occasional use    Concern about alcohol use:  No    Alcohol Treatment Readiness   Illicit Drug Use    Drug use:  never Drug type:  no sedative use       Diet & Exercise:   Diet   What is your diet?:  Regular   How many servings a day of the following:   Fruits and Vegetables:  0 Meat:  1-2   Whole Grains:  2 Simple Carbs:  1   Dairy:  2 Soda:  0   Coffee:  2 Tea:  0   Exercise    Do you currently exercise?:  yes    Frequency:  daily    Type of exercise:  walking       Cognitive Impairment Screening:   Depression screening preformed:  Yes     PHQ-9 Depression scale score:  1   Depression screening results:  no significant symptoms   Cognitive Impairment Screening    Do you have difficulty learning or retaining new information?:  Yes Do you have difficulty handling new tasks?:  Yes   Do you have difficulty with reasoning?:  No Do you have difficulty with spatial ability and orientation?:  No   Do you have difficulty with language?:  No Do you have difficulty with behavior?:  No       Functional Ability/Level of Safety:   Hearing    Hearing difficulties:  Yes Bilateral:  significantly decreased   Hearing aid:  No    Hearing Impairment Assessment    Hearing status:  Hard of hearing   Current Activities    Status:  unlimited ADL's, unlimited IADL's, unlimited social activities, unlimited driving   Help needed with the folllowing:    Using the phone:  No Transportation:  No   Shopping:  No Preparing Meals:  No   Doing Housework:  No Doing Laundry:  No   Managing Medications:  No Managing Money:  No   ADL    Feeding:  Independant   Oral hygiene and Facial grooming:  Independant   Bathing:  Independant   Upper Body Dressing:  Independant   Lower Body Dressing:  Independant   Toileting:  Independant   Bed Mobility:  Independant   Fall Risk   Have you fallen in the last 12 months?:  No    Do you have any chronic conditions that may contribute to a fall?:  Neuopathy    Injury History   Polypharmacy:  Yes Antidepressant Use:  No   Sedative Use:  No Antihypertensive Use:  Yes   Previous Fall:  No    Deconditioning:  No Visual Impairment:  No Cogitive Impairment:  Yes Mmobility Impairment:  No   Postural Hypotension:  No Urinary Incontinence:  No       Home Safety:   Are there hazards in your environment?:  No   Home Safety Risk Factors   Unfamilar with surroundings:  No Uneven floors:  No   Stairs or handrail saftey risk:  No Loose rugs:  No   Household clutter:  No Poor household lighting:  No   No grab bars in bathroom:  No Further evaluation needed:  No       Advanced Directives:   Advanced Directives    Living Will:  Yes Durable POA for healthcare: Yes   Advanced directive:  Yes    Patient's End of Life Decisions        Urinary Incontinence:   Do you have urinary incontinence?:  No        Glaucoma:            Provider Screening     Preventative Screening/Counseling:   Cardiovascular Screening/Counseling:   (Labs Q5 years, EKG optional one-time)         Diabetes Screening/Counseling:   (2 tests/year if Pre-Diabetes or 1 test/year if no Diabetes)         Colorectal Cancer Screening/Counseling:   (FOBT Q1 yr; Flex Sig Q4 yrs or Q10 yrs after Screening Colonoscopy; Screening Colonoscpy Q2 yrs High Risk or Q10 yrs Low Risk; Barium Enema Q2 yrs High Risk or Q4 yrs Low Risk)         Prostate Cancer Screening/Counseling:   (Annual)          Breast Cancer Screening/Counseling:   (Baseline Age 28 - 43; Annual Age 36+)         Cervical Cancer Screening/Counseling:   (Annual for High Risk or Childbearing Age with Abnormal Pap in Last 3 yrs; Every 2 all others)         Osteoporosis Screening/Counseling:   (Every 2 Yrs if at risk or more if medically necessary)         AAA Screening/Counseling:   (Once per Lifetime with risk factors)     Age over 72 (males only):  Yes          Glaucoma Screening/Counseling:   (Annual)         HIV Screening/Counseling:   (Voluntary; Once annually for high risk OR 3 times for Pregnancy at diagnosis of IUP; 3rd trimester; and at Labor         Hepatitis C Screening:             Immunizations:        Other Preventative Couseling (Non-Medicare Wellness Visit Required):       Referrals (Non-Medicare Wellness Visit Required):       Medical Equipment/Suppliers:           No exam data present    Physical Exam :  Physical Exam    Reviewed Updated [facility] Prior Wellness Visits:   Last Medicare wellness visit information was reviewed, patient interviewed , no change since last AWVno  Last Medicare wellness visit information was reviewed, patient interviewed and updates made to the record today no    Assessment and Plan:  1  Preop examination  POCT urine dip   2   Preoperative evaluation of a medical condition to rule out surgical contraindications (TAR required)         Health Maintenance Due   Topic Date Due    SLP PLAN OF CARE  [birth year]31    Depression Screening PHQ-9  07/15/1943    Fall Risk  07/15/1996    PNEUMOCOCCAL POLYSACCHARIDE VACCINE AGE 65 AND OVER  07/15/1996    GLAUCOMA SCREENING 67+ YR  07/15/1998    DTaP,Tdap,and Td Vaccines (2 - Tdap) 11/29/2003    INFLUENZA VACCINE  09/01/2017

## 2018-03-12 ENCOUNTER — APPOINTMENT (EMERGENCY)
Dept: RADIOLOGY | Facility: HOSPITAL | Age: 83
End: 2018-03-12
Payer: COMMERCIAL

## 2018-03-12 ENCOUNTER — HOSPITAL ENCOUNTER (OUTPATIENT)
Facility: HOSPITAL | Age: 83
Setting detail: OBSERVATION
Discharge: HOME | End: 2018-03-13
Attending: EMERGENCY MEDICINE | Admitting: HOSPITALIST
Payer: COMMERCIAL

## 2018-03-12 DIAGNOSIS — R55 SYNCOPE: Primary | ICD-10-CM

## 2018-03-12 DIAGNOSIS — R61 DIAPHORESIS: ICD-10-CM

## 2018-03-12 PROBLEM — S09.90XA HEAD INJURY DUE TO TRAUMA: Status: ACTIVE | Noted: 2018-03-12

## 2018-03-12 PROBLEM — N17.9 AKI (ACUTE KIDNEY INJURY) (HCC): Status: ACTIVE | Noted: 2018-03-12

## 2018-03-12 PROBLEM — I48.0 PAF (PAROXYSMAL ATRIAL FIBRILLATION) (HCC): Chronic | Status: ACTIVE | Noted: 2018-03-12

## 2018-03-12 PROBLEM — I48.0 PAF (PAROXYSMAL ATRIAL FIBRILLATION) (HCC): Status: ACTIVE | Noted: 2018-03-12

## 2018-03-12 LAB
ALBUMIN SERPL BCP-MCNC: 3.1 G/DL (ref 3.5–5)
ALP SERPL-CCNC: 46 U/L (ref 46–116)
ALT SERPL W P-5'-P-CCNC: 21 U/L (ref 12–78)
ANION GAP SERPL CALCULATED.3IONS-SCNC: 8 MMOL/L (ref 4–13)
AST SERPL W P-5'-P-CCNC: 22 U/L (ref 5–45)
ATRIAL RATE: 72 BPM
BASOPHILS # BLD AUTO: 0.02 THOUSANDS/ΜL (ref 0–0.1)
BASOPHILS NFR BLD AUTO: 0 % (ref 0–1)
BILIRUB SERPL-MCNC: 0.43 MG/DL (ref 0.2–1)
BUN SERPL-MCNC: 18 MG/DL (ref 5–25)
CALCIUM SERPL-MCNC: 8.5 MG/DL (ref 8.3–10.1)
CHLORIDE SERPL-SCNC: 102 MMOL/L (ref 100–108)
CO2 SERPL-SCNC: 27 MMOL/L (ref 21–32)
CREAT SERPL-MCNC: 1.39 MG/DL (ref 0.6–1.3)
EOSINOPHIL # BLD AUTO: 0.32 THOUSAND/ΜL (ref 0–0.61)
EOSINOPHIL NFR BLD AUTO: 4 % (ref 0–6)
ERYTHROCYTE [DISTWIDTH] IN BLOOD BY AUTOMATED COUNT: 12.8 % (ref 11.6–15.1)
GFR SERPL CREATININE-BSD FRML MDRD: 46 ML/MIN/1.73SQ M
GLUCOSE SERPL-MCNC: 130 MG/DL (ref 65–140)
HCT VFR BLD AUTO: 32.7 % (ref 36.5–49.3)
HGB BLD-MCNC: 11.2 G/DL (ref 12–17)
LYMPHOCYTES # BLD AUTO: 1.85 THOUSANDS/ΜL (ref 0.6–4.47)
LYMPHOCYTES NFR BLD AUTO: 25 % (ref 14–44)
MCH RBC QN AUTO: 32.7 PG (ref 26.8–34.3)
MCHC RBC AUTO-ENTMCNC: 34.3 G/DL (ref 31.4–37.4)
MCV RBC AUTO: 96 FL (ref 82–98)
MONOCYTES # BLD AUTO: 0.62 THOUSAND/ΜL (ref 0.17–1.22)
MONOCYTES NFR BLD AUTO: 8 % (ref 4–12)
NEUTROPHILS # BLD AUTO: 4.73 THOUSANDS/ΜL (ref 1.85–7.62)
NEUTS SEG NFR BLD AUTO: 63 % (ref 43–75)
NRBC BLD AUTO-RTO: 0 /100 WBCS
P AXIS: 65 DEGREES
PLATELET # BLD AUTO: 175 THOUSANDS/UL (ref 149–390)
PMV BLD AUTO: 11.6 FL (ref 8.9–12.7)
POTASSIUM SERPL-SCNC: 3.9 MMOL/L (ref 3.5–5.3)
PR INTERVAL: 248 MS
PROT SERPL-MCNC: 6.7 G/DL (ref 6.4–8.2)
QRS AXIS: 11 DEGREES
QRSD INTERVAL: 72 MS
QT INTERVAL: 382 MS
QTC INTERVAL: 406 MS
RBC # BLD AUTO: 3.42 MILLION/UL (ref 3.88–5.62)
SODIUM SERPL-SCNC: 137 MMOL/L (ref 136–145)
T WAVE AXIS: 15 DEGREES
TROPONIN I SERPL-MCNC: <0.02 NG/ML
VENTRICULAR RATE: 68 BPM
WBC # BLD AUTO: 7.56 THOUSAND/UL (ref 4.31–10.16)

## 2018-03-12 PROCEDURE — 36415 COLL VENOUS BLD VENIPUNCTURE: CPT

## 2018-03-12 PROCEDURE — 85049 AUTOMATED PLATELET COUNT: CPT | Performed by: HOSPITALIST

## 2018-03-12 PROCEDURE — 71046 X-RAY EXAM CHEST 2 VIEWS: CPT

## 2018-03-12 PROCEDURE — 99285 EMERGENCY DEPT VISIT HI MDM: CPT

## 2018-03-12 PROCEDURE — 84484 ASSAY OF TROPONIN QUANT: CPT | Performed by: HOSPITALIST

## 2018-03-12 PROCEDURE — 99220 PR INITIAL OBSERVATION CARE/DAY 70 MINUTES: CPT | Performed by: HOSPITALIST

## 2018-03-12 PROCEDURE — 85025 COMPLETE CBC W/AUTO DIFF WBC: CPT | Performed by: EMERGENCY MEDICINE

## 2018-03-12 PROCEDURE — 84484 ASSAY OF TROPONIN QUANT: CPT | Performed by: EMERGENCY MEDICINE

## 2018-03-12 PROCEDURE — 93005 ELECTROCARDIOGRAM TRACING: CPT

## 2018-03-12 PROCEDURE — 73502 X-RAY EXAM HIP UNI 2-3 VIEWS: CPT

## 2018-03-12 PROCEDURE — 90471 IMMUNIZATION ADMIN: CPT

## 2018-03-12 PROCEDURE — 80053 COMPREHEN METABOLIC PANEL: CPT | Performed by: EMERGENCY MEDICINE

## 2018-03-12 PROCEDURE — 93010 ELECTROCARDIOGRAM REPORT: CPT | Performed by: INTERNAL MEDICINE

## 2018-03-12 PROCEDURE — 70450 CT HEAD/BRAIN W/O DYE: CPT

## 2018-03-12 PROCEDURE — 90715 TDAP VACCINE 7 YRS/> IM: CPT | Performed by: EMERGENCY MEDICINE

## 2018-03-12 RX ORDER — ONDANSETRON 2 MG/ML
4 INJECTION INTRAMUSCULAR; INTRAVENOUS EVERY 6 HOURS PRN
Status: DISCONTINUED | OUTPATIENT
Start: 2018-03-12 | End: 2018-03-13 | Stop reason: HOSPADM

## 2018-03-12 RX ORDER — SODIUM CHLORIDE 9 MG/ML
75 INJECTION, SOLUTION INTRAVENOUS CONTINUOUS
Status: DISCONTINUED | OUTPATIENT
Start: 2018-03-12 | End: 2018-03-13

## 2018-03-12 RX ORDER — AMLODIPINE BESYLATE 10 MG/1
10 TABLET ORAL DAILY
Status: DISCONTINUED | OUTPATIENT
Start: 2018-03-13 | End: 2018-03-13 | Stop reason: HOSPADM

## 2018-03-12 RX ORDER — PANTOPRAZOLE SODIUM 20 MG/1
20 TABLET, DELAYED RELEASE ORAL
Status: DISCONTINUED | OUTPATIENT
Start: 2018-03-13 | End: 2018-03-13 | Stop reason: HOSPADM

## 2018-03-12 RX ORDER — SACCHAROMYCES BOULARDII 250 MG
250 CAPSULE ORAL 2 TIMES DAILY
Status: DISCONTINUED | OUTPATIENT
Start: 2018-03-13 | End: 2018-03-13 | Stop reason: HOSPADM

## 2018-03-12 RX ORDER — CHLORAL HYDRATE 500 MG
1000 CAPSULE ORAL DAILY
Status: DISCONTINUED | OUTPATIENT
Start: 2018-03-13 | End: 2018-03-13 | Stop reason: HOSPADM

## 2018-03-12 RX ORDER — CHOLECALCIFEROL (VITAMIN D3) 125 MCG
100 CAPSULE ORAL DAILY
Status: DISCONTINUED | OUTPATIENT
Start: 2018-03-13 | End: 2018-03-13 | Stop reason: HOSPADM

## 2018-03-12 RX ORDER — ASPIRIN 81 MG/1
81 TABLET, CHEWABLE ORAL DAILY
Status: DISCONTINUED | OUTPATIENT
Start: 2018-03-13 | End: 2018-03-13 | Stop reason: HOSPADM

## 2018-03-12 RX ORDER — LIDOCAINE HYDROCHLORIDE AND EPINEPHRINE 10; 10 MG/ML; UG/ML
5 INJECTION, SOLUTION INFILTRATION; PERINEURAL ONCE
Status: COMPLETED | OUTPATIENT
Start: 2018-03-12 | End: 2018-03-12

## 2018-03-12 RX ORDER — VITAMIN E 268 MG
400 CAPSULE ORAL DAILY
Status: DISCONTINUED | OUTPATIENT
Start: 2018-03-13 | End: 2018-03-13 | Stop reason: HOSPADM

## 2018-03-12 RX ORDER — SENNOSIDES 8.6 MG
1 TABLET ORAL DAILY
Status: DISCONTINUED | OUTPATIENT
Start: 2018-03-13 | End: 2018-03-13 | Stop reason: HOSPADM

## 2018-03-12 RX ORDER — MELATONIN
1000 DAILY
Status: DISCONTINUED | OUTPATIENT
Start: 2018-03-13 | End: 2018-03-13 | Stop reason: HOSPADM

## 2018-03-12 RX ORDER — LANOLIN ALCOHOL/MO/W.PET/CERES
3 CREAM (GRAM) TOPICAL
Status: DISCONTINUED | OUTPATIENT
Start: 2018-03-12 | End: 2018-03-13 | Stop reason: HOSPADM

## 2018-03-12 RX ORDER — ACETAMINOPHEN 325 MG/1
650 TABLET ORAL ONCE
Status: COMPLETED | OUTPATIENT
Start: 2018-03-12 | End: 2018-03-12

## 2018-03-12 RX ORDER — LATANOPROST 50 UG/ML
1 SOLUTION/ DROPS OPHTHALMIC
Status: DISCONTINUED | OUTPATIENT
Start: 2018-03-12 | End: 2018-03-13 | Stop reason: HOSPADM

## 2018-03-12 RX ORDER — ACETAMINOPHEN 325 MG/1
650 TABLET ORAL EVERY 6 HOURS PRN
Status: DISCONTINUED | OUTPATIENT
Start: 2018-03-12 | End: 2018-03-13 | Stop reason: HOSPADM

## 2018-03-12 RX ORDER — DOCUSATE SODIUM 100 MG/1
100 CAPSULE, LIQUID FILLED ORAL 2 TIMES DAILY
Status: DISCONTINUED | OUTPATIENT
Start: 2018-03-13 | End: 2018-03-13 | Stop reason: HOSPADM

## 2018-03-12 RX ORDER — CHOLECALCIFEROL (VITAMIN D3) 125 MCG
1000 CAPSULE ORAL DAILY
Status: DISCONTINUED | OUTPATIENT
Start: 2018-03-13 | End: 2018-03-13 | Stop reason: HOSPADM

## 2018-03-12 RX ORDER — METOPROLOL SUCCINATE 25 MG/1
25 TABLET, EXTENDED RELEASE ORAL DAILY
Status: DISCONTINUED | OUTPATIENT
Start: 2018-03-13 | End: 2018-03-13 | Stop reason: HOSPADM

## 2018-03-12 RX ORDER — BRINZOLAMIDE 10 MG/ML
1 SUSPENSION/ DROPS OPHTHALMIC 2 TIMES DAILY
Status: DISCONTINUED | OUTPATIENT
Start: 2018-03-13 | End: 2018-03-13 | Stop reason: HOSPADM

## 2018-03-12 RX ADMIN — TETANUS TOXOID, REDUCED DIPHTHERIA TOXOID AND ACELLULAR PERTUSSIS VACCINE, ADSORBED 0.5 ML: 5; 2.5; 8; 8; 2.5 SUSPENSION INTRAMUSCULAR at 22:00

## 2018-03-12 RX ADMIN — LIDOCAINE HYDROCHLORIDE,EPINEPHRINE BITARTRATE 5 ML: 10; .01 INJECTION, SOLUTION INFILTRATION; PERINEURAL at 21:23

## 2018-03-12 RX ADMIN — ACETAMINOPHEN 650 MG: 325 TABLET, FILM COATED ORAL at 21:23

## 2018-03-13 ENCOUNTER — APPOINTMENT (OUTPATIENT)
Dept: NON INVASIVE DIAGNOSTICS | Facility: HOSPITAL | Age: 83
End: 2018-03-13
Payer: COMMERCIAL

## 2018-03-13 VITALS
TEMPERATURE: 97.8 F | SYSTOLIC BLOOD PRESSURE: 124 MMHG | OXYGEN SATURATION: 95 % | BODY MASS INDEX: 26.07 KG/M2 | HEIGHT: 68 IN | HEART RATE: 68 BPM | RESPIRATION RATE: 18 BRPM | WEIGHT: 172 LBS | DIASTOLIC BLOOD PRESSURE: 63 MMHG

## 2018-03-13 LAB
ANION GAP SERPL CALCULATED.3IONS-SCNC: 7 MMOL/L (ref 4–13)
BUN SERPL-MCNC: 18 MG/DL (ref 5–25)
CALCIUM SERPL-MCNC: 8.1 MG/DL (ref 8.3–10.1)
CHLORIDE SERPL-SCNC: 106 MMOL/L (ref 100–108)
CO2 SERPL-SCNC: 24 MMOL/L (ref 21–32)
CREAT SERPL-MCNC: 1.14 MG/DL (ref 0.6–1.3)
GFR SERPL CREATININE-BSD FRML MDRD: 58 ML/MIN/1.73SQ M
GLUCOSE SERPL-MCNC: 90 MG/DL (ref 65–140)
PLATELET # BLD AUTO: 187 THOUSANDS/UL (ref 149–390)
PMV BLD AUTO: 11.3 FL (ref 8.9–12.7)
POTASSIUM SERPL-SCNC: 4.1 MMOL/L (ref 3.5–5.3)
SODIUM SERPL-SCNC: 137 MMOL/L (ref 136–145)
TROPONIN I SERPL-MCNC: <0.02 NG/ML
TROPONIN I SERPL-MCNC: <0.02 NG/ML

## 2018-03-13 PROCEDURE — 80048 BASIC METABOLIC PNL TOTAL CA: CPT | Performed by: HOSPITALIST

## 2018-03-13 PROCEDURE — 93880 EXTRACRANIAL BILAT STUDY: CPT | Performed by: SURGERY

## 2018-03-13 PROCEDURE — 93306 TTE W/DOPPLER COMPLETE: CPT

## 2018-03-13 PROCEDURE — 84484 ASSAY OF TROPONIN QUANT: CPT | Performed by: HOSPITALIST

## 2018-03-13 PROCEDURE — 93880 EXTRACRANIAL BILAT STUDY: CPT

## 2018-03-13 PROCEDURE — 99217 PR OBSERVATION CARE DISCHARGE MANAGEMENT: CPT | Performed by: NURSE PRACTITIONER

## 2018-03-13 PROCEDURE — 93306 TTE W/DOPPLER COMPLETE: CPT | Performed by: INTERNAL MEDICINE

## 2018-03-13 RX ORDER — METOPROLOL SUCCINATE 25 MG/1
TABLET, EXTENDED RELEASE ORAL
Status: DISCONTINUED
Start: 2018-03-13 | End: 2018-03-13 | Stop reason: HOSPADM

## 2018-03-13 RX ORDER — PANTOPRAZOLE SODIUM 20 MG/1
TABLET, DELAYED RELEASE ORAL
Status: DISCONTINUED
Start: 2018-03-13 | End: 2018-03-13 | Stop reason: HOSPADM

## 2018-03-13 RX ORDER — ASPIRIN 81 MG/1
TABLET ORAL
Status: DISCONTINUED
Start: 2018-03-13 | End: 2018-03-13 | Stop reason: WASHOUT

## 2018-03-13 RX ORDER — ASPIRIN 81 MG/1
TABLET, CHEWABLE ORAL
Status: DISCONTINUED
Start: 2018-03-13 | End: 2018-03-13 | Stop reason: HOSPADM

## 2018-03-13 RX ORDER — CHLORAL HYDRATE 500 MG
CAPSULE ORAL
Status: DISCONTINUED
Start: 2018-03-13 | End: 2018-03-13 | Stop reason: HOSPADM

## 2018-03-13 RX ORDER — AMLODIPINE BESYLATE 10 MG/1
TABLET ORAL
Status: DISCONTINUED
Start: 2018-03-13 | End: 2018-03-13 | Stop reason: HOSPADM

## 2018-03-13 RX ORDER — DOCUSATE SODIUM 100 MG/1
CAPSULE, LIQUID FILLED ORAL
Status: DISCONTINUED
Start: 2018-03-13 | End: 2018-03-13 | Stop reason: HOSPADM

## 2018-03-13 RX ORDER — SENNOSIDES 8.6 MG
TABLET ORAL
Status: DISCONTINUED
Start: 2018-03-13 | End: 2018-03-13 | Stop reason: HOSPADM

## 2018-03-13 RX ORDER — MELATONIN
Status: DISCONTINUED
Start: 2018-03-13 | End: 2018-03-13 | Stop reason: HOSPADM

## 2018-03-13 RX ADMIN — VITAMIN E CAP 400 UNIT 400 UNITS: 400 CAP at 09:07

## 2018-03-13 RX ADMIN — ENOXAPARIN SODIUM 40 MG: 40 INJECTION SUBCUTANEOUS at 09:08

## 2018-03-13 RX ADMIN — BRINZOLAMIDE 1 DROP: 10 SUSPENSION/ DROPS OPHTHALMIC at 08:58

## 2018-03-13 RX ADMIN — CYANOCOBALAMIN TAB 500 MCG 1000 MCG: 500 TAB at 09:07

## 2018-03-13 RX ADMIN — AMLODIPINE BESYLATE 10 MG: 10 TABLET ORAL at 09:07

## 2018-03-13 RX ADMIN — Medication 250 MG: at 09:07

## 2018-03-13 RX ADMIN — Medication 1 TABLET: at 09:07

## 2018-03-13 RX ADMIN — SODIUM CHLORIDE 75 ML/HR: 0.9 INJECTION, SOLUTION INTRAVENOUS at 00:41

## 2018-03-13 RX ADMIN — PANTOPRAZOLE SODIUM 20 MG: 20 TABLET, DELAYED RELEASE ORAL at 05:40

## 2018-03-13 RX ADMIN — LATANOPROST 1 DROP: 50 SOLUTION OPHTHALMIC at 00:39

## 2018-03-13 RX ADMIN — VITAMIN D, TAB 1000IU (100/BT) 1000 UNITS: 25 TAB at 09:07

## 2018-03-13 RX ADMIN — MELATONIN TAB 3 MG 3 MG: 3 TAB at 00:40

## 2018-03-13 RX ADMIN — Medication 1000 MG: at 09:07

## 2018-03-13 RX ADMIN — METOPROLOL SUCCINATE 25 MG: 25 TABLET, EXTENDED RELEASE ORAL at 09:07

## 2018-03-13 RX ADMIN — Medication 100 MG: at 09:07

## 2018-03-13 RX ADMIN — SENNOSIDES 8.6 MG: 8.6 TABLET, FILM COATED ORAL at 09:07

## 2018-03-13 RX ADMIN — DOCUSATE SODIUM 100 MG: 100 CAPSULE, LIQUID FILLED ORAL at 09:07

## 2018-03-13 RX ADMIN — ASPIRIN 81 MG 81 MG: 81 TABLET ORAL at 09:09

## 2018-03-13 NOTE — ED PROVIDER NOTES
History  Chief Complaint   Patient presents with    Syncope     Per EMS, patient was on his way to use the bathroom when he became dizzy, lightheaded, and diaphoretic  Patient does not remember falling, but was found on the floor by wife  Positive head strike with small lac noted on the back of head  Bleeding currently controlled at this time  59-year-old gentleman with history of AFib on 81 mg aspirin presents to the emergency department after a syncopal episode at home  Patient was getting up to go to the bathroom after feeling diaphoretic on the couch, took approximately 2 steps when his vision went black and he lost consciousness waking up on the ground  Patient's family states he struck the back of his head most likely on the floor as he has a large approximately 6 cm laceration of the occiput  Patient denies any prodrome of symptoms and felt his normal self before becoming lightheaded, he does not remember the event  Currently patient is not complaining of any lightheadedness and states he has a mild headache from the fall but otherwise feels fine  Patient denies any focal neurologic deficits denies any recent illnesses, fevers, chills, chest change, shortness of breath, nausea or vomiting  Of note, patient had a similar episode in January when he had a gastroenteritis and was severely dehydrated however he did not fall at that time and did not lose consciousness  Patient began complaining of sciatic nerve pain to family, reassess patient and had anterior hip pain to deep palpation, will also obtain hip x-rays to evaluate  History provided by:  Patient, spouse and relative   used: No    Syncope   Episode history:  Single  Most recent episode:   Today  Progression:  Resolved  Chronicity:  New  Context: standing up    Witnessed: no    Relieved by:  Lying down  Worsened by:  Posture  Associated symptoms: headaches    Associated symptoms: no chest pain, no fever, no nausea, no shortness of breath, no vomiting and no weakness        Prior to Admission Medications   Prescriptions Last Dose Informant Patient Reported? Taking? Cholecalciferol (VITAMIN D3) 2000 units capsule   Yes Yes   Sig: Take 1 capsule by mouth daily   Coenzyme Q10 (CO Q 10) 100 MG CAPS   Yes Yes   Sig: Take by mouth   Garlic 7634 MG CAPS   Yes Yes   Sig: Take 1 tablet by mouth daily  Melatonin 3 MG CAPS   Yes No   Sig: Take 1 capsule by mouth   Omega-3 Fatty Acids (FISH OIL) 645 MG CAPS   Yes Yes   Sig: Take 2 tablets by mouth daily   Probiotic Product (ALIGN) 4 MG CAPS   Yes Yes   Sig: Take by mouth   Vitamin D, Cholecalciferol, 1000 UNITS CAPS   Yes Yes   Sig: Take 1,000 tablets by mouth daily  amLODIPine (NORVASC) 10 mg tablet   No Yes   Sig: TAKE 1 TABLET EVERY DAY   aspirin 81 MG tablet   Yes Yes   Sig: Take 81 mg by mouth daily  brinzolamide (AZOPT) 1 % ophthalmic suspension   Yes Yes   Sig: Administer 1 drop to the right eye 2 (two) times a day   co-enzyme Q-10 30 MG capsule   Yes Yes   Sig: Take 100 mg by mouth daily  cyanocobalamin (VITAMIN B-12) 1,000 mcg tablet   Yes Yes   Sig: Take 1 tablet by mouth daily   diphenhydrAMINE-acetaminophen (ACETAMINOPHEN PM EX ST)  MG TABS   Yes Yes   Sig: Take by mouth   fish oil 1,000 mg   Yes Yes   Sig: Take 1,000 mg by mouth    latanoprost (XALATAN) 0 005 % ophthalmic solution   Yes Yes   Sig: Administer 1 drop to both eyes daily at bedtime   metoprolol succinate (TOPROL-XL) 25 mg 24 hr tablet   Yes Yes   Sig: Take 25 mg by mouth    multivitamin-iron-minerals-folic acid (CENTRUM) chewable tablet   Yes Yes   Sig: Chew 1 tablet daily  omeprazole (PriLOSEC) 20 mg delayed release capsule   Yes Yes   Sig: Take 1 capsule by mouth every other day   vitamin E, tocopherol, (RA VITAMIN E BLEND) 400 units capsule   Yes Yes   Sig: Take 1 tablet by mouth daily   vitamin E, tocopherol, 400 units capsule   Yes Yes   Sig: Take 400 Units by mouth daily  Facility-Administered Medications: None       Past Medical History:   Diagnosis Date    A-fib ([facility] )     Anemia     Carpal tunnel syndrome, unspecified upper limb     Cataract     last assessed: 8/18/2012    GERD (gastroesophageal reflux disease)     Glaucoma     Hypertension     last assessed: 8/23/2012    Intervertebral disc disease     PVD (peripheral vascular disease) ([facility] )        Past Surgical History:   Procedure Laterality Date    CATARACT EXTRACTION      HERNIA REPAIR      NEUROPLASTY / TRANSPOSITION MEDIAN NERVE AT CARPAL TUNNEL      [place] LAP,CHOLECYSTECTOMY N/A 9/14/2016    Procedure: LAPAROSCOPIC CHOLECYSTECTOMY ;  Surgeon: Ana María Sebastian MD;  Location: [facility];  Service: General       Family History   Problem Relation Age of Onset    Glaucoma Mother     Cancer Father     Heart disease Father     Glaucoma Father     Alzheimer's disease Family     Heart attack Family      I have reviewed and agree with the history as documented  Social History   Substance Use Topics    Smoking status: Never Smoker    Smokeless tobacco: Never Used    Alcohol use Yes      Comment: occasional  4 beers a daily        Review of Systems   Constitutional: Negative for chills, fatigue and fever  HENT: Negative for sore throat  Eyes: Negative for visual disturbance  Respiratory: Negative for shortness of breath  Cardiovascular: Positive for syncope  Negative for chest pain  Gastrointestinal: Negative for abdominal pain, blood in stool, constipation, diarrhea, nausea and vomiting  Genitourinary: Negative for difficulty urinating, dysuria and hematuria  Musculoskeletal: Negative for arthralgias  Skin: Positive for wound  Negative for rash  Neurological: Positive for headaches  Negative for syncope and weakness  Hematological: Negative for adenopathy  Psychiatric/Behavioral: Negative for agitation and behavioral problems  All other systems reviewed and are negative        Physical Exam  ED Triage Vitals [03/12/18 2015]   Temperature Pulse Respirations Blood Pressure SpO2   97 8 °F (36 6 °C) 74 18 122/60 98 %      Temp Source Heart Rate Source Patient Position - Orthostatic VS BP Location FiO2 (%)   Tympanic Monitor Lying Right arm --      Pain Score       5           Orthostatic Vital Signs  Vitals:    03/12/18 2015 03/12/18 2045 03/12/18 2130 03/12/18 2255   BP: 122/60 130/63 136/63 121/57   Pulse: 74 72 78 79   Patient Position - Orthostatic VS: Lying Lying Sitting        Physical Exam   Constitutional: He is oriented to person, place, and time  He appears well-developed and well-nourished  HENT:   Head: Normocephalic and atraumatic  Eyes: Conjunctivae and EOM are normal  No scleral icterus  Neck: Normal range of motion  Neck supple  Cardiovascular: Normal rate and regular rhythm  No murmur heard  Pulmonary/Chest: Effort normal and breath sounds normal    Abdominal: Soft  Bowel sounds are normal  There is no tenderness  Musculoskeletal: Normal range of motion  Neurological: He is alert and oriented to person, place, and time  Skin: Skin is warm and dry  Psychiatric: He has a normal mood and affect  His behavior is normal    Nursing note and vitals reviewed        ED Medications  Medications   amLODIPine (NORVASC) tablet 10 mg (not administered)   aspirin chewable tablet 81 mg (not administered)   brinzolamide (AZOPT) 1 % ophthalmic suspension 1 drop (not administered)   cholecalciferol (VITAMIN D3) tablet 1,000 Units (not administered)   co-enzyme Q-10 capsule 100 mg (not administered)   cyanocobalamin (VITAMIN B-12) tablet 1,000 mcg (not administered)   fish oil capsule 1,000 mg (not administered)   latanoprost (XALATAN) 0 005 % ophthalmic solution 1 drop (not administered)   melatonin tablet 3 mg (not administered)   metoprolol succinate (TOPROL-XL) 24 hr tablet 25 mg (not administered)   multivitamin-minerals (CENTRUM) tablet 1 tablet (not administered)   pantoprazole (PROTONIX) EC tablet 20 mg (not administered)   saccharomyces boulardii (FLORASTOR) capsule 250 mg (not administered)   vitamin E (tocopherol) capsule 400 Units (not administered)   sodium chloride 0 9 % infusion (not administered)   docusate sodium (COLACE) capsule 100 mg (not administered)   senna (SENOKOT) tablet 8 6 mg (not administered)   ondansetron (ZOFRAN) injection 4 mg (not administered)   enoxaparin (LOVENOX) subcutaneous injection 40 mg (not administered)   acetaminophen (TYLENOL) tablet 650 mg (not administered)   lidocaine-epinephrine (XYLOCAINE/EPINEPHRINE) 1 %-1:100,000 injection 5 mL (5 mL Infiltration Given 3/12/18 2123)   acetaminophen (TYLENOL) tablet 650 mg (650 mg Oral Given 3/12/18 2123)   tetanus-diphtheria-acellular pertussis (BOOSTRIX) IM injection 0 5 mL (0 5 mL Intramuscular Given 3/12/18 2200)       Diagnostic Studies  Results Reviewed     Procedure Component Value Units Date/Time    Troponin I [[id number]]     Lab Status:  No result Specimen:  Blood     Platelet count [[id number]]     Lab Status:  No result Specimen:  Blood     Troponin I [[id number]]  (Normal) Collected:  03/12/18 2021    Lab Status:  Final result Specimen:  Blood from Arm, Right Updated:  03/12/18 2052     Troponin I <0 02 ng/mL     Narrative:         [employer] Chemistry analyzer 99% cutoff is > 0 04 ng/mL in network labs    o cTnI 99% cutoff is useful only when applied to patients in the clinical setting of myocardial ischemia  o cTnI 99% cutoff should be interpreted in the context of clinical history, ECG findings and possibly cardiac imaging to establish correct diagnosis  o cTnI 99% cutoff may be suggestive but clearly not indicative of a coronary event without the clinical setting of myocardial ischemia      Comprehensive metabolic panel [[id number]]  (Abnormal) Collected:  03/12/18 2021    Lab Status:  Final result Specimen:  Blood from Arm, Right Updated:  03/12/18 2051     Sodium 137 mmol/L      Potassium 3 9 mmol/L Chloride 102 mmol/L      CO2 27 mmol/L      Anion Gap 8 mmol/L      BUN 18 mg/dL      Creatinine 1 39 (H) mg/dL      Glucose 130 mg/dL      Calcium 8 5 mg/dL      AST 22 U/L      ALT 21 U/L      Alkaline Phosphatase 46 U/L      Total Protein 6 7 g/dL      Albumin 3 1 (L) g/dL      Total Bilirubin 0 43 mg/dL      eGFR 46 ml/min/1 73sq m     Narrative:         National Kidney Disease Education Program recommendations are as follows:  GFR calculation is accurate only with a steady state creatinine  Chronic Kidney disease less than 60 ml/min/1 73 sq  meters  Kidney failure less than 15 ml/min/1 73 sq  meters  CBC and differential [[id number]]  (Abnormal) Collected:  03/12/18 2021    Lab Status:  Final result Specimen:  Blood from Arm, Right Updated:  03/12/18 2035     WBC 7 56 Thousand/uL      RBC 3 42 (L) Million/uL      Hemoglobin 11 2 (L) g/dL      Hematocrit 32 7 (L) %      MCV 96 fL      MCH 32 7 pg      MCHC 34 3 g/dL      RDW 12 8 %      MPV 11 6 fL      Platelets 912 Thousands/uL      nRBC 0 /100 WBCs      Neutrophils Relative 63 %      Lymphocytes Relative 25 %      Monocytes Relative 8 %      Eosinophils Relative 4 %      Basophils Relative 0 %      Neutrophils Absolute 4 73 Thousands/µL      Lymphocytes Absolute 1 85 Thousands/µL      Monocytes Absolute 0 62 Thousand/µL      Eosinophils Absolute 0 32 Thousand/µL      Basophils Absolute 0 02 Thousands/µL                  XR hip/pelv 2-3 vws left if performed   ED Interpretation by Sonia Givens MD (03/12 2131)   Normal hip x-ray by my read  Final Result by Patricia Chávez MD (03/12 2154)      No acute osseous abnormality  Workstation performed: [id number]         X-ray chest 2 views   Final Result by Patricia Chávez MD (03/12 2153)      No acute cardiopulmonary disease  Workstation performed: [id number]         CT head without contrast   Final Result by Sophia Perez MD (03/12 2128)      No acute intracranial abnormality  Workstation performed: [id number]               Procedures  ECG 12 Lead Documentation  Date/Time: 3/12/2018 10:51 PM  Performed by: Deni Bal  Authorized by: Jimbo Carlson     ECG reviewed by me, the ED Provider: yes    Patient location:  ED  Previous ECG:     Previous ECG:  Compared to current    Similarity:  No change    Comparison to cardiac monitor: Yes    Interpretation:     Interpretation: abnormal    Rate:     ECG rate:  68    ECG rate assessment: normal    Rhythm:     Rhythm: sinus rhythm    Ectopy:     Ectopy: PVCs      PVCs:  Infrequent  QRS:     QRS axis:  Normal  Conduction:     Conduction: normal    ST segments:     ST segments:  Normal  T waves:     T waves: normal            Phone Consults  ED Phone Contact    ED Course  ED Course as of Mar 12 2336   Mon Mar 12, 2018   2230 Creatinine: (!) 1 39       Identification of Seniors at [facility] Recent Value   (ISAR) Identification of Seniors at Risk   Before the illness or injury that brought you to the Emergency, did you need someone to help you on a regular basis? 0 Filed at: 03/12/2018 2020   In the last 24 hours, have you needed more help than usual?  No data   Have you been hospitalized for one or more nights during the past 6 months? 1 Filed at: 03/12/2018 2020   In general, do you see well?  0 Filed at: 03/12/2018 2020   In general, do you have serious problems with your memory? 0 Filed at: 03/12/2018 2020   Do you take more than three different medications every day? 1 Filed at: 03/12/2018 2020   ISAR Score  2 Filed at: 03/12/2018 2020                          MDM  Number of Diagnoses or Management Options  Diaphoresis: new and requires workup  Syncope: new and requires workup  Diagnosis management comments: 14-year-old male presents after syncopal episode at home, will order cardiac workup including CBC, CMP, troponin, EKG, chest x-ray, CT head  Will staple patient's scalp laceration    Will admit patient to  IM for further workup of his initial diaphoresis prior to syncope  Amount and/or Complexity of Data Reviewed  Clinical lab tests: ordered and reviewed  Tests in the radiology section of CPT®: ordered and reviewed  Tests in the medicine section of CPT®: ordered and reviewed  Obtain history from someone other than the patient: yes  Review and summarize past medical records: yes  Independent visualization of images, tracings, or specimens: yes      CritCare Time    Disposition  Final diagnoses:   Syncope   Diaphoresis     Time reflects when diagnosis was documented in both MDM as applicable and the Disposition within this note     Time User Action Codes Description Comment    3/12/2018 10:36 PM Johnanna Dubin Add [R55] Syncope     3/12/2018 10:36 PM Jose, [address] [place] [address]       ED Disposition     ED Disposition Condition Comment    Admit  Case was discussed with Dr Augusto Lacey and the patient's admission status was agreed to be Admission Status: observation status to the service of Dr Augusto Lacey   Follow-up Information    None       Current Discharge Medication List      CONTINUE these medications which have NOT CHANGED    Details   amLODIPine (NORVASC) 10 mg tablet TAKE 1 TABLET EVERY DAY  Qty: 30 tablet, Refills: 5    Associated Diagnoses: Essential hypertension      aspirin 81 MG tablet Take 81 mg by mouth daily  brinzolamide (AZOPT) 1 % ophthalmic suspension Administer 1 drop to the right eye 2 (two) times a day      !! Cholecalciferol (VITAMIN D3) 2000 units capsule Take 1 capsule by mouth daily      !! co-enzyme Q-10 30 MG capsule Take 100 mg by mouth daily  !! Coenzyme Q10 (CO Q 10) 100 MG CAPS Take by mouth      cyanocobalamin (VITAMIN B-12) 1,000 mcg tablet Take 1 tablet by mouth daily      diphenhydrAMINE-acetaminophen (ACETAMINOPHEN PM EX ST)  MG TABS Take by mouth      !! fish oil 1,000 mg Take 1,000 mg by mouth        Garlic 5505 MG CAPS Take 1 tablet by mouth daily  latanoprost (XALATAN) 0 005 % ophthalmic solution Administer 1 drop to both eyes daily at bedtime      metoprolol succinate (TOPROL-XL) 25 mg 24 hr tablet Take 25 mg by mouth       multivitamin-iron-minerals-folic acid (CENTRUM) chewable tablet Chew 1 tablet daily  !! Omega-3 Fatty Acids (FISH OIL) 645 MG CAPS Take 2 tablets by mouth daily      omeprazole (PriLOSEC) 20 mg delayed release capsule Take 1 capsule by mouth every other day      Probiotic Product (ALIGN) 4 MG CAPS Take by mouth      !! Vitamin D, Cholecalciferol, 1000 UNITS CAPS Take 1,000 tablets by mouth daily  !! vitamin E, tocopherol, (RA VITAMIN E BLEND) 400 units capsule Take 1 tablet by mouth daily      !! vitamin E, tocopherol, 400 units capsule Take 400 Units by mouth daily  Melatonin 3 MG CAPS Take 1 capsule by mouth       !! - Potential duplicate medications found  Please discuss with provider  No discharge procedures on file  ED Provider  Attending physically available and evaluated Nataliia Haney I managed the patient along with the ED Attending      Electronically Signed by         Stephanie Varner MD  03/12/18 2247

## 2018-03-13 NOTE — ASSESSMENT & PLAN NOTE
With preceding diaphoresis occurred upon standing and walking, most likely vasovagal, orthostatic  Will rule out arrhythmia given history of AFib  Check orthostatic vital signs  Provide gentle IV hydration overnight  Obtain echocardiogram  Fall precaution  PT OT evaluation

## 2018-03-13 NOTE — CASE MANAGEMENT
Thank you,  [address] [facility] in the [facility] by [facility] ER for 2017  Network Utilization Review Department  Phone: [phone]; Fax [phone]  ATTENTION: The Network Utilization Review Department is now centralized for our 7 Facilities  Make a note that we have a new phone and fax numbers for our Department  Please call with any questions or concerns to [phone] and carefully follow the prompts so that you are directed to the right person  All voicemails are confidential  Fax any determinations, approvals, denials, and requests for initial or continue stay review clinical to [phone]  Due to HIGH CALL volume, it would be easier if you could please send faxed requests to expedite your requests and in part, help us provide discharge notifications faster     ==========================================================================    Initial Clinical Review    Admission: Date/Time/Statement: 03/12/2018 @ 2231  Orders Placed This Encounter   Procedures    Place in Observation (expected length of stay for this patient is less than two midnights)     tele     Standing Status:   Standing     Number of Occurrences:   1     Order Specific Question:   Admitting Physician     Answer:   Dami Whitten     Order Specific Question:   Level of Care     Answer:   Med Surg [16]         ED: Date/Time/Mode of Arrival:   ED Arrival Information     Expected Arrival Acuity Means of Arrival Escorted By Service Admission Type    - 3/12/2018 20:11 Emergent Ambulance [address] Complaint    SYNCOPE          Chief Complaint:   Chief Complaint   Patient presents with    Syncope     Per EMS, patient was on his way to use the bathroom when he became dizzy, lightheaded, and diaphoretic  Patient does not remember falling, but was found on the floor by wife   Positive head strike with small lac noted on the back of head   Bleeding currently controlled at this time  History of Illness:   Carlos Campos is a 80 y o   pleasant male  with history of PAF on NOLAN, hypertension who presented for evaluation of syncopal episode tonight  As per patient he was sitting on the  felt extreme sweating, black spots in the eyes, stood up and on his way to the bathroom lost consciousness striking his head against ground,  he was found by the wife on the floor  Upon arrival at patient found approximately 6 cm laceration occipital area CT of the head was negative for acute intracranial pathology the imaging study did show bone fracture    Laceration was repaired by ER fellow and patient admitted on an observation basis for syncope workup    ED Vital Signs:   ED Triage Vitals [03/12/18 2015]   Temperature Pulse Respirations Blood Pressure SpO2   97 8 °F (36 6 °C) 74 18 122/60 98 %      Temp Source Heart Rate Source Patient Position - Orthostatic VS BP Location FiO2 (%)   Tympanic Monitor Lying Right arm --      Pain Score       5        Wt Readings from Last 1 Encounters:   03/12/18 78 kg (172 lb)       Abnormal Labs/Diagnostic Test Results:   WBC 7 56  Hgl 11 2 / Hct 32 7  Platelets 334    SODIUM mmol/L 137   POTASSIUM mmol/L 3 9   CHLORIDE mmol/L 102   CO2 mmol/L 27   BUN mg/dL 18   CREATININE mg/dL 1 39*   CALCIUM mg/dL 8 5   TOTAL PROTEIN g/dL 6 7   BILIRUBIN TOTAL mg/dL 0 43   ALK PHOS U/L 46   ALT U/L 21   AST U/L 22   GLUCOSE RANDOM mg/dL 130     Chest X: no acute cardiopulmonary disease    CT head:   No acute intracranial abnormality          ECG:  Sinus 1st degree AV block    ED Treatment:   Medication Administration from 03/12/2018 2011 to 03/12/2018 9741    Date/Time Order Dose Route Action Action by Comments   03/12/2018 2123 lidocaine-epinephrine (XYLOCAINE/EPINEPHRINE) 1 %-1:100,000 injection 5 mL 5 mL Infiltration Given Patt Shah RN Given to Dr Leighton Zepeda for administration   03/12/2018 2123 acetaminophen (TYLENOL) tablet 650 mg 650 mg Oral Given Nasrin Wray RN    03/12/2018 2200 tetanus-diphtheria-acellular pertussis (BOOSTRIX) IM injection 0 5 mL 0 5 mL Intramuscular Given Nasrin Wray RN           Past Medical/Surgical History:    Active Ambulatory Problems     Diagnosis Date Noted    Cholecystitis with cholelithiasis 09/14/2016    Allergic rhinitis 05/30/2013    Anemia 08/18/2012    Benign essential hypertension 08/09/2012    Esophageal reflux 08/18/2012    Glaucoma 08/18/2012    Hiatal hernia 08/18/2012    Hyperlipidemia 08/18/2012    Joint pain, knee 09/23/2014    Left lumbar radiculopathy 01/10/2017    Peripheral vascular disease ([place] [address] ) 08/18/2012    Preoperative evaluation of a medical condition to rule out surgical contraindications (TAR required) 02/23/2018     Past Medical History:   Diagnosis Date    A-fib ([place] [address] )     Anemia     Carpal tunnel syndrome, unspecified upper limb     Cataract     GERD (gastroesophageal reflux disease)     Glaucoma     Hypertension     Intervertebral disc disease     PVD (peripheral vascular disease) ([facility])        Admitting Diagnosis: Diaphoresis [R61]  Syncope [R55]    Age/Sex: 80 y o  male    Assessment/Plan:   * Syncope   Assessment & Plan     With preceding diaphoresis occurred upon standing and walking, most likely vasovagal, orthostatic  Will rule out arrhythmia given history of AFib  Check orthostatic vital signs  Provide gentle IV hydration overnight  Obtain echocardiogram  Fall precaution  PT OT evaluation             Head injury due to trauma   Assessment & Plan     6 cm occipital laceration repair by ER fellow  Hemoglobin stable  Management as above          Benign essential hypertension   Assessment & Plan     Monitor blood pressure ,continue home Rx          FRANCO (acute kidney injury) ([place] [address] )   Assessment & Plan     Provide IV fluids, repeat in a m    avoid renal culprits          PAF (paroxysmal atrial fibrillation) ([facility])   Assessment & Plan     Continue aspirin and heart rate control Rx                VTE Prophylaxis: Enoxaparin (Lovenox)  / lyndon sequential compression device   Anticipated Length of Stay:  Patient will be admitted on an Observation basis with an anticipated length of stay of  <2 midnights     Justification for Hospital Stay:  Syncope workup, IV hydration       Admission Orders:  Scheduled Meds:   Current Facility-Administered Medications:  acetaminophen 650 mg Oral Q6H PRN   amLODIPine 10 mg Oral Daily   aspirin 81 mg Oral Daily   brinzolamide 1 drop Right Eye BID   cholecalciferol 1,000 Units Oral Daily   co-enzyme Q-10 100 mg Oral Daily   cyanocobalamin 1,000 mcg Oral Daily   docusate sodium 100 mg Oral BID   enoxaparin 40 mg Subcutaneous Daily   fish oil 1,000 mg Oral Daily   latanoprost 1 drop Both Eyes HS   melatonin 3 mg Oral HS   metoprolol succinate 25 mg Oral Daily   multivitamin-minerals 1 tablet Oral Daily   ondansetron 4 mg Intravenous Q6H PRN   pantoprazole 20 mg Oral Early Morning   saccharomyces boulardii 250 mg Oral BID   senna 1 tablet Oral Daily   vitamin E (tocopherol) 400 Units Oral Daily     Fall precautions  Up with assistance  ECHO: pending  Carotid study: pending  TELM    03/13/18 0936  Discharge patient Once     Discharge Disposition: Home/Self Care    Expected Discharge Time: Morning    Expected Discharge Date: 03/13/18

## 2018-03-13 NOTE — ASSESSMENT & PLAN NOTE
· Pre renal causes and likely the reason for his syncope  · Creatinine is back to baseline after IV fluids

## 2018-03-13 NOTE — H&P
H&P- Sanchez Foster 7/15/1931, 80 y o  male MRN: [id number]    Unit/Bed#: 2 213-02 Encounter: [id number]    Primary Care Provider: Tulio Sanchez MD   Date and time admitted to hospital: 3/12/2018  8:12 PM        * Syncope   Assessment & Plan    With preceding diaphoresis occurred upon standing and walking, most likely vasovagal, orthostatic  Will rule out arrhythmia given history of AFib  Check orthostatic vital signs  Provide gentle IV hydration overnight  Obtain echocardiogram  Fall precaution  PT OT evaluation          Head injury due to trauma   Assessment & Plan    6 cm occipital laceration repair by ER fellow  Hemoglobin stable  Management as above        Benign essential hypertension   Assessment & Plan    Monitor blood pressure ,continue home Rx        FRANCO (acute kidney injury) ([address] )   Assessment & Plan    Provide IV fluids, repeat in a m    avoid renal culprits        PAF (paroxysmal atrial fibrillation) ([facility])   Assessment & Plan    Continue aspirin and heart rate control Rx            VTE Prophylaxis: Enoxaparin (Lovenox)  / sequential compression device   Code Status: full  POLST: There is no POLST form on file for this patient (pre-hospital)  Anticipated Length of Stay:  Patient will be admitted on an Observation basis with an anticipated length of stay of  <2 midnights  Justification for Hospital Stay:  Syncope workup, IV hydration    Total Time for Visit, including Counseling / Coordination of Care: 45 minutes  Greater than 50% of this total time spent on direct patient counseling and coordination of care  Chief Complaint:   Syncopal episode    History of Present Illness:    Sanchez Foster is a 80 y o   pleasant male  with history of PAF on NOLAN, hypertension who presented for evaluation of syncopal episode tonight    As per patient he was sitting on the  felt extreme sweating, black spots in the eyes, stood up and on his way to the bathroom lost consciousness striking his head against ground,  he was found by the wife on the floor  Upon arrival at patient found approximately 6 cm laceration occipital area CT of the head was negative for acute intracranial pathology the imaging study did show bone fracture  Laceration was repaired by ER fellow and patient admitted on an observation basis for syncope workup  Upon my assessment patient complained of being thirsty he denies any other associated symptoms besides the 1st time syncope  No chest pain shortness of breath headache nausea vomiting diarrhea abdominal pain continued to neurologic deficit fever chills  He also denies seizure activity, urinary or bowel incontinence post LOC episodes  1st troponin negative EKG showed sinus rhythm with first-degree AV block  Hemoglobin stable, slightly elevated creatinine compared to the baseline noted  Review of Systems:    Review of Systems   Constitutional: Positive for diaphoresis  Neurological: Positive for syncope  Past Medical and Surgical History:     Past Medical History:   Diagnosis Date    A[facility]fib ([facility] )     Anemia     Carpal tunnel syndrome, unspecified upper limb     Cataract     last assessed: 8/18/2012    GERD (gastroesophageal reflux disease)     Glaucoma     Hypertension     last assessed: 8/23/2012    Intervertebral disc disease     PVD (peripheral vascular disease) ([facility] )        Past Surgical History:   Procedure Laterality Date    CATARACT EXTRACTION      HERNIA REPAIR      NEUROPLASTY / TRANSPOSITION MEDIAN NERVE AT CARPAL TUNNEL      ND LAP,CHOLECYSTECTOMY N/A 9/14/2016    Procedure: LAPAROSCOPIC CHOLECYSTECTOMY ;  Surgeon: Ana María Sebastian MD;  Location: [facility];  Service: General       Meds/Allergies:    Prior to Admission medications    Medication Sig Start Date End Date Taking? Authorizing Provider   amLODIPine (NORVASC) 10 mg tablet TAKE 1 TABLET EVERY DAY 1/22/72  Yes Tulio Sanchez MD   aspirin 81 MG tablet Take 81 mg by mouth daily     Yes Historical Provider, MD   brinzolamide (AZOPT) 1 % ophthalmic suspension Administer 1 drop to the right eye 2 (two) times a day   Yes Historical Provider, MD   Cholecalciferol (VITAMIN D3) 2000 units capsule Take 1 capsule by mouth daily   Yes Historical Provider, MD   co-enzyme Q-10 30 MG capsule Take 100 mg by mouth daily  Yes Historical Provider, MD   Coenzyme Q10 (CO Q 10) 100 MG CAPS Take by mouth   Yes Historical Provider, MD   cyanocobalamin (VITAMIN B-12) 1,000 mcg tablet Take 1 tablet by mouth daily   Yes Historical Provider, MD   diphenhydrAMINE-acetaminophen (ACETAMINOPHEN PM EX ST)  MG TABS Take by mouth 1/9/18  Yes Historical Provider, MD   fish oil 1,000 mg Take 1,000 mg by mouth  Yes Historical Provider, MD   Garlic 2034 MG CAPS Take 1 tablet by mouth daily  Yes Historical Provider, MD   latanoprost (XALATAN) 0 005 % ophthalmic solution Administer 1 drop to both eyes daily at bedtime   Yes Historical Provider, MD   metoprolol succinate (TOPROL-XL) 25 mg 24 hr tablet Take 25 mg by mouth  Yes Historical Provider, MD   multivitamin-iron-minerals-folic acid (CENTRUM) chewable tablet Chew 1 tablet daily  Yes Historical Provider, MD   Omega-3 Fatty Acids (FISH OIL) 645 MG CAPS Take 2 tablets by mouth daily   Yes Historical Provider, MD   omeprazole (PriLOSEC) 20 mg delayed release capsule Take 1 capsule by mouth every other day 5/16/13  Yes Historical Provider, MD   Probiotic Product (ALIGN) 4 MG CAPS Take by mouth   Yes Historical Provider, MD   Vitamin D, Cholecalciferol, 1000 UNITS CAPS Take 1,000 tablets by mouth daily  Yes Historical Provider, MD   vitamin E, tocopherol, (RA VITAMIN E BLEND) 400 units capsule Take 1 tablet by mouth daily   Yes Historical Provider, MD   vitamin E, tocopherol, 400 units capsule Take 400 Units by mouth daily     Yes Historical Provider, MD   Melatonin 3 MG CAPS Take 1 capsule by mouth    Historical Provider, MD   ALPHAGAN P 0 1 % INSTILL 1 DROP INTO RIGHT EYE 2 TIMES DAILY 12/12/17 3/12/18  Historical Provider, MD   amLODIPine (NORVASC) 10 mg tablet Take 1 tablet by mouth daily 5/12/11 3/12/18  Historical Provider, MD   aspirin 81 MG tablet Take 1 tablet by mouth daily  3/12/18  Historical Provider, MD   bimatoprost (LUMIGAN) 0 01 % ophthalmic drops Apply 1 drop to eye Daily  3/12/18  Historical Provider, MD   brinzolamide (AZOPT) 1 % ophthalmic suspension Apply to eye  3/12/18  Historical Provider, MD   Garlic Oil 3496 MG CAPS Take 1 tablet by mouth daily  3/12/18  Historical Provider, MD   metoprolol succinate (TOPROL-XL) 25 mg 24 hr tablet Take 1 tablet by mouth daily  3/12/18  Historical Provider, MD   Multiple Vitamins-Minerals (CENTRUM SILVER 50+MEN PO) Take 1 tablet by mouth daily  3/12/18  Historical Provider, MD     I have reviewed home medications with a medical source (PCP, Pharmacy, other)  Allergies: Allergies   Allergen Reactions    Atorvastatin Other (See Comments)    Zetia [Ezetimibe] Other (See Comments)     myalgia       Social History:     Marital Status: /Civil Union   Occupation: none  Patient Pre-hospital Living Situation: home  Patient Pre-hospital Level of Mobility: reg  Patient Pre-hospital Diet Restrictions: reg  Substance Use History:   History   Alcohol Use    Yes     Comment: occasional  4 beers a daily     History   Smoking Status    Former Smoker   Smokeless Tobacco    Never Used     Comment: quit 30 years ago      History   Drug Use No       Family History:    non-contributory    Physical Exam:     Vitals:   Blood Pressure: 159/73 (03/12/18 2302)  Pulse: 79 (03/12/18 2300)  Temperature: 97 7 °F (36 5 °C) (03/12/18 2300)  Temp Source: Oral (03/12/18 2300)  Respirations: 18 (03/12/18 2300)  Height: 5' 8" (172 7 cm) (03/12/18 2300)  Weight - Scale: 78 kg (172 lb) (03/12/18 2300)  SpO2: 95 % (03/12/18 2300)    Physical Exam   Constitutional: He appears well-nourished     HENT:   Approximately 6 cm repaired laceration on the occipital area  Dry oral mucosa    Eyes: Pupils are equal, round, and reactive to light  Neck: Normal range of motion  Cardiovascular: Regular rhythm  Pulmonary/Chest: No respiratory distress  He has no wheezes  Abdominal: Soft  Musculoskeletal: He exhibits no edema  Neurological: He is alert  No cranial nerve deficit  Skin: Skin is warm  Psychiatric: He has a normal mood and affect  Additional Data:     Lab Results: I have personally reviewed pertinent reports  Results from last 7 days  Lab Units 03/12/18  2349 03/12/18 2021   WBC Thousand/uL  --  7 56   HEMOGLOBIN g/dL  --  11 2*   HEMATOCRIT %  --  32 7*   PLATELETS Thousands/uL 187 175   NEUTROS PCT %  --  63   LYMPHS PCT %  --  25   MONOS PCT %  --  8   EOS PCT %  --  4       Results from last 7 days  Lab Units 03/12/18 2021   SODIUM mmol/L 137   POTASSIUM mmol/L 3 9   CHLORIDE mmol/L 102   CO2 mmol/L 27   BUN mg/dL 18   CREATININE mg/dL 1 39*   CALCIUM mg/dL 8 5   TOTAL PROTEIN g/dL 6 7   BILIRUBIN TOTAL mg/dL 0 43   ALK PHOS U/L 46   ALT U/L 21   AST U/L 22   GLUCOSE RANDOM mg/dL 130           Imaging: I have personally reviewed pertinent reports  XR hip/pelv 2-3 vws left if performed   ED Interpretation by Mirza Carpenter MD (03/12 2131)   Normal hip x-ray by my read  Final Result by Mariia Doll MD (03/12 2154)      No acute osseous abnormality  Workstation performed: [id number]         X-ray chest 2 views   Final Result by Mariia Doll MD (03/12 2153)      No acute cardiopulmonary disease  Workstation performed: [id number]         CT head without contrast   Final Result by Adelina Leigh MD (03/12 2128)      No acute intracranial abnormality                    Workstation performed: [id number]             EKG, Pathology, and Other Studies Reviewed on Admission:   · EKG:  Sinus first-degree AV block    Allscripts / Epic Records Reviewed: Yes     ** Please Note: This note has been constructed using a voice recognition system   **

## 2018-03-13 NOTE — ASSESSMENT & PLAN NOTE
· Patient states he had similar episodes in the past from dehydration in this felt the same  · Orthostatics negative  · Carotid and echocardiogram could have been performed as an outpatient however are both being done at this time  · Would advise the patient follow up with his primary care provider to discuss these results

## 2018-03-13 NOTE — ED ATTENDING ATTESTATION
Sally Pacheco MD, saw and evaluated the patient  I have discussed the patient with the resident/non-physician practitioner and agree with the resident's/non-physician practitioner's findings, Plan of Care, and MDM as documented in the resident's/non-physician practitioner's note, except where noted  All available labs and Radiology studies were reviewed  At this point I agree with the current assessment done in the Emergency Department  I have conducted an independent evaluation of this patient including a focused history and a physical exam     66-year-old male presenting to the emergency department for evaluation after syncopal event and head injury  Patient had just gone to a standing position had taken several steps when he became lightheaded, fell backwards struck his head  No significant prolonged loss of consciousness, and no current complaints  Patient has a history of atrial fibrillation but does not take anticoagulation  The patient is resting comfortably on a stretcher in no acute respiratory distress  The patient appears nontoxic  HEENT reveals moist mucous membranes  Head is significant for 6 centimeter posterior occipital laceration  Conjunctiva and sclera are normal  Neck is nontender and supple with full range of motion to flexion, extension, lateral rotation  No meningismus appreciated  No masses are appreciated  Lungs are clear to auscultation bilaterally without any wheezes, rales or rhonchi  Heart is irregularly irregular without any murmurs, rubs or gallops  Abdomen is soft and nontender without any rebound or guarding  Extremities appear grossly normal without any significant arthropathy  Patient is awake, alert, and oriented x3  The patient has normal interaction  Motor is 5 out of 5  CT head for trauma  Cardiac evaluation for syncope  Admission for syncope      Labs Reviewed   COMPREHENSIVE METABOLIC PANEL - Abnormal        Result Value Ref Range Status    Sodium 137 136 - 145 mmol/L Final    Potassium 3 9  3 5 - 5 3 mmol/L Final    Chloride 102  100 - 108 mmol/L Final    CO2 27  21 - 32 mmol/L Final    Anion Gap 8  4 - 13 mmol/L Final    BUN 18  5 - 25 mg/dL Final    Creatinine 1 39 (*) 0 60 - 1 30 mg/dL Final    Comment: Standardized to IDMS reference method    Glucose 130  65 - 140 mg/dL Final    Comment:   If the patient is fasting, the ADA then defines impaired fasting glucose as > 100 mg/dL and diabetes as > or equal to 123 mg/dL  Specimen collection should occur prior to Sulfasalazine administration due to the potential for falsely depressed results  Specimen collection should occur prior to Sulfapyridine administration due to the potential for falsely elevated results  Calcium 8 5  8 3 - 10 1 mg/dL Final    AST 22  5 - 45 U/L Final    Comment:   Specimen collection should occur prior to Sulfasalazine administration due to the potential for falsely depressed results  ALT 21  12 - 78 U/L Final    Comment:   Specimen collection should occur prior to Sulfasalazine and/or Sulfapyridine administration due to the potential for falsely depressed results  Alkaline Phosphatase 46  46 - 116 U/L Final    Total Protein 6 7  6 4 - 8 2 g/dL Final    Albumin 3 1 (*) 3 5 - 5 0 g/dL Final    Total Bilirubin 0 43  0 20 - 1 00 mg/dL Final    eGFR 46  ml/min/1 73sq m Final    Narrative:     National Kidney Disease Education Program recommendations are as follows:  GFR calculation is accurate only with a steady state creatinine  Chronic Kidney disease less than 60 ml/min/1 73 sq  meters  Kidney failure less than 15 ml/min/1 73 sq  meters     CBC AND DIFFERENTIAL - Abnormal     WBC 7 56  4 31 - 10 16 Thousand/uL Final    RBC 3 42 (*) 3 88 - 5 62 Million/uL Final    Hemoglobin 11 2 (*) 12 0 - 17 0 g/dL Final    Hematocrit 32 7 (*) 36 5 - 49 3 % Final    MCV 96  82 - 98 fL Final    MCH 32 7  26 8 - 34 3 pg Final    MCHC 34 3  31 4 - 37 4 g/dL Final    RDW 12 8  11 6 - 15 1 % Final MPV 11 6  8 9 - 12 7 fL Final    Platelets 564  060 - 390 Thousands/uL Final    nRBC 0  /100 WBCs Final    Neutrophils Relative 63  43 - 75 % Final    Lymphocytes Relative 25  14 - 44 % Final    Monocytes Relative 8  4 - 12 % Final    Eosinophils Relative 4  0 - 6 % Final    Basophils Relative 0  0 - 1 % Final    Neutrophils Absolute 4 73  1 85 - 7 62 Thousands/µL Final    Lymphocytes Absolute 1 85  0 60 - 4 47 Thousands/µL Final    Monocytes Absolute 0 62  0 17 - 1 22 Thousand/µL Final    Eosinophils Absolute 0 32  0 00 - 0 61 Thousand/µL Final    Basophils Absolute 0 02  0 00 - 0 10 Thousands/µL Final   TROPONIN I - Normal    Troponin I <0 02  <=0 04 ng/mL Final    Narrative:     [employer] Chemistry analyzer 99% cutoff is > 0 04 ng/mL in network labs    o cTnI 99% cutoff is useful only when applied to patients in the clinical setting of myocardial ischemia  o cTnI 99% cutoff should be interpreted in the context of clinical history, ECG findings and possibly cardiac imaging to establish correct diagnosis  o cTnI 99% cutoff may be suggestive but clearly not indicative of a coronary event without the clinical setting of myocardial ischemia  LIGHT BLUE TOP     XR hip/pelv 2-3 vws left if performed   ED Interpretation   Normal hip x-ray by my read  CT head without contrast   Final Result      No acute intracranial abnormality  Workstation performed: [id number]         X-ray chest 2 views    (Results Pending)     Last tetanus from his primary care physician's note was November of 2003  Boostrix administration tonight  Laceration repair with staples

## 2018-03-13 NOTE — ED NOTES
Dr Hargrove Public at the bedside for patient evaluation at this time     Roselia Torres RN  03/12/18 2029

## 2018-03-13 NOTE — SOCIAL WORK
CM met with patient, wife and 2 daughters, explained CM role with introduction  Patient lives with wife in [facility] with 0STE and full flight of stairs up to second floor bedroom, where patient resides  Patient independent PTA, including driving  And walking long distances daily  Patient has a walker, ambulates without a device  PCPis DR Bebe Miller  Patient has  Prescription plan and uses [facility] in [place]  Patient has a living will, unsure of location of paperwork  Patient for discharge home today, is anxious to leave  Patient denies any MH or drug and alcohol treatment  Emergency contacts are patient's wife and daughters Vamshi Sequeira, wife [phone], Mateo Johan, daughter [phone], Jackelyn Oppenheim, daughter [phone]  Patient cleared for discharge, no discharge needs at this time

## 2018-03-13 NOTE — DISCHARGE SUMMARY
Discharge- Cresencio Espino 7/15/1931, 80 y o  male MRN: [id number]    Unit/Bed#: [facility]ony [facility] 213-02 Encounter: [id number]    Primary Care Provider: Rigo Whipple MD   Date and time admitted to hospital: 3/12/2018  8:12 PM        * Syncope   Assessment & Plan    · Patient states he had similar episodes in the past from dehydration in this felt the same  · Orthostatics negative  · Carotid and echocardiogram could have been performed as an outpatient however are both being done at this time  · Would advise the patient follow up with his primary care provider to discuss these results  Head injury due to trauma   Assessment & Plan    · Sutured by the emergency department  · Patient should follow up with Fiona Daley within 1 week to have the sutures removed  FRANCO (acute kidney injury) [facility])   Assessment & Plan    · Pre renal causes and likely the reason for his syncope  · Creatinine is back to baseline after IV fluids  PAF (paroxysmal atrial fibrillation) ([facility])   Assessment & Plan    · Continue aspirin and heart rate control Rx  · Follow-up as an outpatient                 Resolved Problems  Date Reviewed: 3/13/2018    None          Consultations During Hospital Stay:  · none    Procedures Performed:     · Carotid dopplers: pending at discharge   · Echocardiogram: results pending at the time time of discharge     Significant Findings / Test Results:     · none    Incidental Findings:   · none     Test Results Pending at Discharge (will require follow up): · See above      Outpatient Tests Requested:  · none    Complications:  None    Reason for Admission:  Syncope    Hospital Course:     Cresencio Espino is a 80 y o  male patient who originally presented to the hospital on 3/12/2018 due to passing out while sitting on the couch  Patient states that he had a sudden onset of feeling dizzy and sweaty and then went to get up to go the bathroom and passed out    He did hit his head and his wife found him   He was brought into the hospital   He had a near syncopal episode approximately 1-2 months ago and is being followed by Cardiology  His workup in the hospital has not showed any etiology for the syncope other than dehydration  The patient admits that he only drinks ice tea and no water  This could be part of why he had those symptoms  Carotid Dopplers and echocardiogram were done and are being performed this morning and Dr Mariana Cook will follow up on those results  Please see above list of diagnoses and related plan for additional information  Condition at Discharge: stable     Discharge Day Visit / Exam:     Subjective:  Offers no complaints of chest pain or shortness of breath  Wants to go home  No nausea vomiting diarrhea constipation  Has been out of bed with help and no dizziness  No overnight events reported by the nurse  Vitals: Blood Pressure: 124/63 (03/13/18 0807)  Pulse: 68 (03/13/18 0807)  Temperature: 97 8 °F (36 6 °C) (03/13/18 0807)  Temp Source: Oral (03/13/18 0807)  Respirations: 18 (03/13/18 0807)  Height: 5' 8" (172 7 cm) (03/12/18 2300)  Weight - Scale: 78 kg (172 lb) (03/12/18 2300)  SpO2: 95 % (03/13/18 0807)  Exam:   Physical Exam   Constitutional: He is oriented to person, place, and time  He appears well-nourished  No distress  HENT:   Head: Normocephalic  Eyes: Pupils are equal, round, and reactive to light  Neck: Normal range of motion  Neck supple  Cardiovascular: Normal rate, regular rhythm and normal heart sounds  Pulmonary/Chest: Effort normal and breath sounds normal  No respiratory distress  Abdominal: Soft  Bowel sounds are normal    Musculoskeletal: Normal range of motion  He exhibits no edema  Neurological: He is alert and oriented to person, place, and time  Skin: Skin is warm and dry  Psychiatric: He has a normal mood and affect  His behavior is normal  Thought content normal    Nursing note reviewed         Discussion with Family:  Discussed with daughter in detail and hand off given to Dr pollock patient's cardiologist    Discharge instructions/Information to patient and family:   See after visit summary for information provided to patient and family  Provisions for Follow-Up Care:  See after visit summary for information related to follow-up care and any pertinent home health orders  Disposition:     Home    For Discharges to [address] [facility]:   · Not Applicable to this Patient - Not Applicable to this Patient    Planned Readmission:  Not anticipated     Discharge Statement:  I spent 40 minutes discharging the patient  This time was spent on the day of discharge  I had direct contact with the patient on the day of discharge  Greater than 50% of the total time was spent examining patient, answering all patient questions, arranging and discussing plan of care with patient as well as directly providing post-discharge instructions  Additional time then spent on discharge activities  Discharge Medications:  See after visit summary for reconciled discharge medications provided to patient and family        ** Please Note: This note has been constructed using a voice recognition system **

## 2018-03-13 NOTE — ASSESSMENT & PLAN NOTE
· Sutured by the emergency department  · Patient should follow up with Gustavo Ruiz within 1 week to have the sutures removed

## 2018-03-14 ENCOUNTER — TRANSITIONAL CARE MANAGEMENT (OUTPATIENT)
Dept: FAMILY MEDICINE CLINIC | Facility: CLINIC | Age: 83
End: 2018-03-14

## 2018-03-16 NOTE — ED PROCEDURE NOTE
Procedure  Lac Repair  Date/Time: 3/12/2018 2:00 PM  Performed by: Omer Mathews  Authorized by: Naomi Schmidt   Consent: Verbal consent obtained    Consent given by: patient  Patient understanding: patient states understanding of the procedure being performed  Patient consent: the patient's understanding of the procedure matches consent given  Procedure consent: procedure consent matches procedure scheduled  Relevant documents: relevant documents present and verified  Test results: test results available and properly labeled  Site marked: the operative site was marked  Patient identity confirmed: verbally with patient  Body area: head/neck  Location details: scalp  Foreign bodies: no foreign bodies  Tendon involvement: none  Nerve involvement: none  Vascular damage: no  Anesthesia: local infiltration    Anesthesia:  Local Anesthetic: lidocaine 1% with epinephrine    Sedation:  Patient sedated: no    Wound Dehiscence:  Superficial Wound Dehiscence: simple closure      Procedure Details:  Irrigation solution: saline  Irrigation method: jet lavage  Amount of cleaning: standard  Debridement: none  Skin closure: staples  Number of sutures: 5  Technique: simple  Approximation: close  Approximation difficulty: simple  Patient tolerance: Patient tolerated the procedure well with no immediate complications                       Magaly Saleem MD  03/16/18 [address] [place], [place] [address]tex Gunderson MD  03/20/18 [id number]

## 2018-03-19 ENCOUNTER — OFFICE VISIT (OUTPATIENT)
Dept: FAMILY MEDICINE CLINIC | Facility: CLINIC | Age: 83
End: 2018-03-19
Payer: COMMERCIAL

## 2018-03-19 VITALS
HEIGHT: 68 IN | WEIGHT: 173.6 LBS | TEMPERATURE: 96.9 F | SYSTOLIC BLOOD PRESSURE: 120 MMHG | DIASTOLIC BLOOD PRESSURE: 70 MMHG | HEART RATE: 60 BPM | RESPIRATION RATE: 14 BRPM | BODY MASS INDEX: 26.31 KG/M2

## 2018-03-19 DIAGNOSIS — I10 BENIGN ESSENTIAL HYPERTENSION: Chronic | ICD-10-CM

## 2018-03-19 DIAGNOSIS — R55 SYNCOPE, UNSPECIFIED SYNCOPE TYPE: Primary | ICD-10-CM

## 2018-03-19 DIAGNOSIS — S01.01XA LACERATION OF SCALP, INITIAL ENCOUNTER: ICD-10-CM

## 2018-03-19 PROCEDURE — 99496 TRANSJ CARE MGMT HIGH F2F 7D: CPT | Performed by: FAMILY MEDICINE

## 2018-03-19 NOTE — ASSESSMENT & PLAN NOTE
Hypertension  Patient blood pressure is stable at this time and he will continue with current regimen of medication    He will follow up with his cardiologist next week

## 2018-03-19 NOTE — PROGRESS NOTES
FAMILY PRACTI OFFICE VISIT       NAME: Samanta Benavides  AGE: 80 y o  SEX: male       : 7/15/1931        MRN: [id number]    DATE: 3/19/2018  TIME: 11:49 AM    Assessment and Plan     Problem List Items Addressed This Visit     Benign essential hypertension (Chronic)     Hypertension  Patient blood pressure is stable at this time and he will continue with current regimen of medication  He will follow up with his cardiologist next week         Syncope - Primary     Syncope  Patient appears to be back to his baseline status  He will make a conscious effort to stay well hydrated and decrease his caffeinated beverages  Patient will call if he has recurrence of syncope         Scalp laceration        5 staples were removed from the patient's scalp laceration  He will keep the area clean and dry  No further treatment needed  There are no Patient Instructions on file for this visit  Chief Complaint     Chief Complaint   Patient presents with    Transition of Care Management     Patient is here for a followup from the hospital for syncope and dehydration  History of Present Illness     Patient states he feels he is back to his baseline  He denies any chest pain or shortness of breath  He did receive a laceration on his head that required 5 staples to close laceration  He did receive a tetanus booster shot during hospitalization  Patient scheduled see his cardiologist next week  He has been trying to increase his water intake up to 4 bottles per day as well as decreasing his previous consumption of caffeinated ice tea and 1 beer per day  I reviewed his echocardiogram and carotid Doppler study from recent hospitalization  Samanta Benavides is a 80 y o  male patient who originally presented to the hospital on 3/12/2018 due to passing out while sitting on the couch  Patient states that he had a sudden onset of feeling dizzy and sweaty and then went to get up to go the bathroom and passed out    He did hit his head and his wife found him  He was brought into the hospital   He had a near syncopal episode approximately 1-2 months ago and is being followed by Cardiology  His workup in the hospital has not showed any etiology for the syncope other than dehydration  The patient admits that he only drinks ice tea and no water  This could be part of why he had those symptoms  Carotid Dopplers and echocardiogram were done and are being performed this morning and Dr Vicky Al will follow up on those results        Review of Systems   Review of Systems   Constitutional: Negative  HENT: Negative  Respiratory: Negative  Cardiovascular: Negative  Gastrointestinal: Negative  Genitourinary: Negative  Musculoskeletal: Negative  Skin:        As per HPI   Neurological: Negative  Psychiatric/Behavioral: Negative          Active Problem List     Patient Active Problem List   Diagnosis    Cholecystitis with cholelithiasis    Allergic rhinitis    Anemia    Benign essential hypertension    Esophageal reflux    Glaucoma    Hiatal hernia    Hyperlipidemia    Joint pain, knee    Left lumbar radiculopathy    Peripheral vascular disease ([facility] )    Preoperative evaluation of a medical condition to rule out surgical contraindications (TAR required)    Syncope    Head injury due to trauma    FRANCO (acute kidney injury) ([facility]    PAF (paroxysmal atrial fibrillation) ([facility] )    Scalp laceration       Past Medical History:  Past Medical History:   Diagnosis Date    A-fib ([facility]     Anemia     Carpal tunnel syndrome, unspecified upper limb     Cataract     last assessed: 8/18/2012    GERD (gastroesophageal reflux disease)     Glaucoma     Hypertension     last assessed: 8/23/2012    Intervertebral disc disease     PVD (peripheral vascular disease) ([facility] )        Past Surgical History:  Past Surgical History:   Procedure Laterality Date    CATARACT EXTRACTION      HERNIA REPAIR      NEUROPLASTY / TRANSPOSITION MEDIAN NERVE AT CARPAL TUNNEL      MO LAP,CHOLECYSTECTOMY N/A 9/14/2016    Procedure: LAPAROSCOPIC CHOLECYSTECTOMY ;  Surgeon: Deann Xavier MD;  Location: [facility];  Service: General       Family History:  Family History   Problem Relation Age of Onset    Glaucoma Mother     Cancer Father     Heart disease Father    [employer] Glaucoma Father     Alzheimer's disease Family     Heart attack Family        Social History:  Social History     Social History    Marital status: /Civil Union     Spouse name: N/A    Number of children: N/A    Years of education: N/A     Occupational History    retired      Social History Main Topics    Smoking status: Former Smoker    Smokeless tobacco: Never Used      Comment: quit 30 years ago     Alcohol use Yes      Comment: occasional  4 beers a daily    Drug use: No    Sexual activity: Not on file     Other Topics Concern    Not on file     Social History Narrative    Morning person       Objective     Vitals:    03/19/18 0921   BP: 120/70   Pulse:    Resp:    Temp:      Wt Readings from Last 3 Encounters:   03/19/18 78 7 kg (173 lb 9 6 oz)   03/12/18 78 kg (172 lb)   02/23/18 78 kg (172 lb)       Physical Exam   Constitutional: He is oriented to person, place, and time  No distress  HENT:   Mouth/Throat: Oropharynx is clear and moist  No oropharyngeal exudate  Tympanic membranes within normal limits bilaterally   Neck:   No carotid bruits   Cardiovascular:   Regular rate and rhythm with 1/6 RAMYA along L sternal border   Pulmonary/Chest:   Lungs are clear to auscultation without wheezes,rales, or rhonchi   Abdominal:   Abdomen is soft, nontender with positive bowel sounds  There is no rebound or guarding  No masses palpated   Musculoskeletal: He exhibits no edema  Lymphadenopathy:     He has no cervical adenopathy  Neurological: He is alert and oriented to person, place, and time  No cranial nerve deficit     Skin:   The patient has a linear laceration on top of his scalp that has 5 staples in place  There is no discharge or obvious signs of infection   Psychiatric: He has a normal mood and affect   His behavior is normal  Judgment and thought content normal        Pertinent Laboratory/Diagnostic Studies:  Lab Results   Component Value Date    GLUCOSE 90 03/13/2018    BUN 18 03/13/2018    CREATININE 1 14 03/13/2018    CALCIUM 8 1 (L) 03/13/2018     03/13/2018    K 4 1 03/13/2018    CO2 24 03/13/2018     03/13/2018     Lab Results   Component Value Date    ALT 21 03/12/2018    AST 22 03/12/2018    ALKPHOS 46 03/12/2018    BILITOT 0 43 03/12/2018       Lab Results   Component Value Date    WBC 7 56 03/12/2018    HGB 11 2 (L) 03/12/2018    HCT 32 7 (L) 03/12/2018    MCV 96 03/12/2018     03/12/2018       No results found for: TSH    No results found for: CHOL  No results found for: TRIG  No results found for: HDL  No results found for: LDLCALC  No results found for: HGBA1C    Results for orders placed or performed during the hospital encounter of 03/12/18   Comprehensive metabolic panel   Result Value Ref Range    Sodium 137 136 - 145 mmol/L    Potassium 3 9 3 5 - 5 3 mmol/L    Chloride 102 100 - 108 mmol/L    CO2 27 21 - 32 mmol/L    Anion Gap 8 4 - 13 mmol/L    BUN 18 5 - 25 mg/dL    Creatinine 1 39 (H) 0 60 - 1 30 mg/dL    Glucose 130 65 - 140 mg/dL    Calcium 8 5 8 3 - 10 1 mg/dL    AST 22 5 - 45 U/L    ALT 21 12 - 78 U/L    Alkaline Phosphatase 46 46 - 116 U/L    Total Protein 6 7 6 4 - 8 2 g/dL    Albumin 3 1 (L) 3 5 - 5 0 g/dL    Total Bilirubin 0 43 0 20 - 1 00 mg/dL    eGFR 46 ml/min/1 73sq m   CBC and differential   Result Value Ref Range    WBC 7 56 4 31 - 10 16 Thousand/uL    RBC 3 42 (L) 3 88 - 5 62 Million/uL    Hemoglobin 11 2 (L) 12 0 - 17 0 g/dL    Hematocrit 32 7 (L) 36 5 - 49 3 %    MCV 96 82 - 98 fL    MCH 32 7 26 8 - 34 3 pg    MCHC 34 3 31 4 - 37 4 g/dL    RDW 12 8 11 6 - 15 1 %    MPV 11 6 8 9 - 12 7 fL Platelets 630 343 - 799 Thousands/uL    nRBC 0 /100 WBCs    Neutrophils Relative 63 43 - 75 %    Lymphocytes Relative 25 14 - 44 %    Monocytes Relative 8 4 - 12 %    Eosinophils Relative 4 0 - 6 %    Basophils Relative 0 0 - 1 %    Neutrophils Absolute 4 73 1 85 - 7 62 Thousands/µL    Lymphocytes Absolute 1 85 0 60 - 4 47 Thousands/µL    Monocytes Absolute 0 62 0 17 - 1 22 Thousand/µL    Eosinophils Absolute 0 32 0 00 - 0 61 Thousand/µL    Basophils Absolute 0 02 0 00 - 0 10 Thousands/µL   Troponin I   Result Value Ref Range    Troponin I <0 02 <=0 04 ng/mL   Troponin I   Result Value Ref Range    Troponin I <0 02 <=0 04 ng/mL   Platelet count   Result Value Ref Range    Platelets 315 433 - 964 Thousands/uL    MPV 11 3 8 9 - 12 7 fL   Troponin I   Result Value Ref Range    Troponin I <0 02 <=0 04 ng/mL   Basic metabolic panel   Result Value Ref Range    Sodium 137 136 - 145 mmol/L    Potassium 4 1 3 5 - 5 3 mmol/L    Chloride 106 100 - 108 mmol/L    CO2 24 21 - 32 mmol/L    Anion Gap 7 4 - 13 mmol/L    BUN 18 5 - 25 mg/dL    Creatinine 1 14 0 60 - 1 30 mg/dL    Glucose 90 65 - 140 mg/dL    Calcium 8 1 (L) 8 3 - 10 1 mg/dL    eGFR 58 ml/min/1 73sq m   ECG 12 lead   Result Value Ref Range    Ventricular Rate 68 BPM    Atrial Rate 72 BPM    AK Interval 248 ms    QRSD Interval 72 ms    QT Interval 382 ms    QTC Interval 406 ms    P Axis 65 degrees    QRS Axis 11 degrees    T Wave Axis 15 degrees       No orders of the defined types were placed in this encounter  ALLERGIES:  Allergies   Allergen Reactions    Atorvastatin Other (See Comments)    Zetia [Ezetimibe] Other (See Comments)     myalgia       Current Medications     Current Outpatient Prescriptions   Medication Sig Dispense Refill    amLODIPine (NORVASC) 10 mg tablet TAKE 1 TABLET EVERY DAY 30 tablet 5    aspirin 81 MG tablet Take 81 mg by mouth daily        brinzolamide (AZOPT) 1 % ophthalmic suspension Administer 1 drop to the right eye 2 (two) times a day      Cholecalciferol (VITAMIN D3) 2000 units capsule Take 1 capsule by mouth daily      Coenzyme Q10 (CO Q 10) 100 MG CAPS Take by mouth      cyanocobalamin (VITAMIN B-12) 1,000 mcg tablet Take 1 tablet by mouth daily      diphenhydrAMINE-acetaminophen (ACETAMINOPHEN PM EX ST)  MG TABS Take by mouth      fish oil 1,000 mg Take 1,000 mg by mouth   Garlic 3757 MG CAPS Take 1 tablet by mouth daily   latanoprost (XALATAN) 0 005 % ophthalmic solution Administer 1 drop to both eyes daily at bedtime      Melatonin 3 MG CAPS Take 1 capsule by mouth      metoprolol succinate (TOPROL-XL) 25 mg 24 hr tablet Take 25 mg by mouth   multivitamin-iron-minerals-folic acid (CENTRUM) chewable tablet Chew 1 tablet daily   Omega-3 Fatty Acids (FISH OIL) 645 MG CAPS Take 2 tablets by mouth daily      omeprazole (PriLOSEC) 20 mg delayed release capsule Take 1 capsule by mouth every other day      Probiotic Product (ALIGN) 4 MG CAPS Take by mouth      Vitamin D, Cholecalciferol, 1000 UNITS CAPS Take 1,000 tablets by mouth daily   vitamin E, tocopherol, 400 units capsule Take 400 Units by mouth daily  No current facility-administered medications for this visit            Health Maintenance     Health Maintenance   Topic Date Due    SLP PLAN OF CARE  07/15/1931    Depression Screening PHQ-9  07/15/1943    Fall Risk  07/15/1996    PNEUMOCOCCAL POLYSACCHARIDE VACCINE AGE 72 AND OVER  07/15/1996    GLAUCOMA SCREENING 67+ YR  07/15/1998    INFLUENZA VACCINE  09/01/2017    DTaP,Tdap,and Td Vaccines (2 - Td) 03/12/2028     Immunization History   Administered Date(s) Administered    Influenza 09/23/2014    Influenza Split High Dose Preservative Free IM 09/23/2014, 12/17/2015    Influenza TIV (IM) 10/24/2003, 11/08/2005, 11/13/2007, 10/16/2008, 12/16/2009, 11/10/2010, 11/18/2011, 09/24/2012, 09/25/2013    Td (adult), adsorbed 11/01/2003    Tdap 45/94/3932       Lola Smith MD

## 2018-03-19 NOTE — ASSESSMENT & PLAN NOTE
Syncope  Patient appears to be back to his baseline status  He will make a conscious effort to stay well hydrated and decrease his caffeinated beverages    Patient will call if he has recurrence of syncope

## 2018-08-31 ENCOUNTER — HOSPITAL ENCOUNTER (OUTPATIENT)
Dept: RADIOLOGY | Facility: HOSPITAL | Age: 83
Discharge: HOME | End: 2018-08-31
Payer: COMMERCIAL

## 2018-08-31 ENCOUNTER — TRANSCRIBE ORDERS (OUTPATIENT)
Dept: RADIOLOGY | Facility: HOSPITAL | Age: 83
End: 2018-08-31

## 2018-08-31 ENCOUNTER — OFFICE VISIT (OUTPATIENT)
Dept: FAMILY MEDICINE CLINIC | Facility: CLINIC | Age: 83
End: 2018-08-31
Payer: COMMERCIAL

## 2018-08-31 VITALS
HEART RATE: 60 BPM | HEIGHT: 68 IN | TEMPERATURE: 97.2 F | BODY MASS INDEX: 25.91 KG/M2 | WEIGHT: 171 LBS | SYSTOLIC BLOOD PRESSURE: 160 MMHG | RESPIRATION RATE: 16 BRPM | DIASTOLIC BLOOD PRESSURE: 90 MMHG

## 2018-08-31 DIAGNOSIS — M25.531 RIGHT WRIST PAIN: ICD-10-CM

## 2018-08-31 DIAGNOSIS — R60.9 PERIPHERAL EDEMA: ICD-10-CM

## 2018-08-31 DIAGNOSIS — I10 BENIGN ESSENTIAL HYPERTENSION: Chronic | ICD-10-CM

## 2018-08-31 DIAGNOSIS — M25.531 RIGHT WRIST PAIN: Primary | ICD-10-CM

## 2018-08-31 PROBLEM — R60.0 PERIPHERAL EDEMA: Status: ACTIVE | Noted: 2018-08-31

## 2018-08-31 PROCEDURE — 3725F SCREEN DEPRESSION PERFORMED: CPT | Performed by: FAMILY MEDICINE

## 2018-08-31 PROCEDURE — 73110 X-RAY EXAM OF WRIST: CPT

## 2018-08-31 PROCEDURE — 99214 OFFICE O/P EST MOD 30 MIN: CPT | Performed by: FAMILY MEDICINE

## 2018-08-31 RX ORDER — BRIMONIDINE TARTRATE 0.1 %
DROPS OPHTHALMIC (EYE)
COMMUNITY
Start: 2018-08-19 | End: 2018-11-28

## 2018-08-31 NOTE — PROGRESS NOTES
FAMILY PRACTICE OFFICE VISIT       NAME: Malorie Choi  AGE: 80 y o  SEX: male       : 7/15/1931        MRN: [id number]    DATE: 2018  TIME: 5:21 PM    Assessment and Plan     Problem List Items Addressed This Visit     Benign essential hypertension (Chronic)    Right wrist pain - Primary    Relevant Orders    XR wrist 3+ vw right    Peripheral edema       Patient will drop off his blood pressure machine so that we may verify that it is functioning properly  He will continue with current regimen of medications and follow up with his cardiologist in the next 10 days  Patient is aware that if headache should worsen at any point he may go to emergency room for further evaluation  The patient's peripheral edema may be related to increasing mid ED or possibly intake of sodium in his foods  Amlodipine could also be another factor causing symptoms however since his cardiopulmonary exam was essentially normal he will wait to get opinion from his cardiologist to see if he needs adjustment in medications  He was given a prescription to obtain x-ray of his right wrist for further evaluation  He may take Tylenol or Advil as needed and may apply warm compresses to the affected area at for 15-20 minutes daily as needed      There are no Patient Instructions on file for this visit  Chief Complaint     Chief Complaint   Patient presents with    Swelling     Patient is here for swelling in his right wrist x 1 month   Headache     Patient is here for a headache x 1 day   Claudication     Patient states when he walks if feels a little spongie  History of Present Illness     Patient is accompanied by his daughter  He states he has had 2 week history of right wrist discomfort  He denies any acute trauma  Symptoms are worse with riding his lawn tractor due to the vibrations of tractor  He also reports unrelated increase in leg swelling around his ankles    He denies any chest pain or shortness of breath  Home blood pressures appear to be stable in the 120 over 70s range however in the office he is currently running a systolic of 754  He does complain of mild headache  Patient has a history of some episodic headaches that have been evaluated with no etiology  He currently takes amlodipine 5 mg b i d  as well as his metoprolol 25 mg daily in the morning  He is scheduled to see his cardiologist in the next 10 days as well as his orthopedist for chronic left knee pain in the next 2 weeks        Review of Systems   Review of Systems   Constitutional: Negative  HENT: Negative  Respiratory: Negative  Cardiovascular: Negative  Gastrointestinal: Negative  Musculoskeletal:        As per HPI   Neurological: Positive for headaches  Psychiatric/Behavioral: Negative          Active Problem List     Patient Active Problem List   Diagnosis    Cholecystitis with cholelithiasis    Allergic rhinitis    Anemia    Benign essential hypertension    Esophageal reflux    Glaucoma    Hiatal hernia    Hyperlipidemia    Joint pain, knee    Left lumbar radiculopathy    [facility]    Preoperative evaluation of a medical condition to rule out surgical contraindications (TAR required)    Syncope    Head injury due to trauma    FRANCO [facility]acute kidney injury) [facility]    [facility]    Scalp laceration    Right wrist pain    Peripheral edema       Past Medical History:  Past Medical History:   Diagnosis Date    [facility]     Anemia     Carpal tunnel syndrome, unspecified upper limb     Cataract     last assessed: 8/18/2012    GERD (gastroesophageal reflux disease)     Glaucoma     Hypertension     last assessed: 8/23/2012    Intervertebral disc disease     PVD [facility]peripheral vascular disease) [facility]        Past Surgical History:  Past Surgical History:   Procedure Laterality Date    CATARACT EXTRACTION      HERNIA REPAIR      NEUROPLASTY / TRANSPOSITION MEDIAN NERVE AT CARPAL TUNNEL      [place] LAP,CHOLECYSTECTOMY N/A 9/14/2016    Procedure: LAPAROSCOPIC CHOLECYSTECTOMY ;  Surgeon: Su Roca MD;  Location: [facility];  Service: General       Family History:  Family History   Problem Relation Age of Onset    Glaucoma Mother     Cancer Father     Heart disease Father    [de-identified] Glaucoma Father     Alzheimer's disease Family     Heart attack Family        Social History:  Social History     Social History    Marital status: /Civil Union     Spouse name: N/A    Number of children: N/A    Years of education: N/A     Occupational History    retired      Social History Main Topics    Smoking status: Former Smoker    Smokeless tobacco: Never Used      Comment: quit 30 years ago     Alcohol use Yes      Comment: occasional  4 beers a daily    Drug use: No    Sexual activity: Not on file     Other Topics Concern    Not on file     Social History Narrative    Morning person       Objective     Vitals:    08/31/18 1720   BP: 160/90   Pulse:    Resp:    Temp:      Wt Readings from Last 3 Encounters:   08/31/18 77 6 kg (171 lb)   03/19/18 78 7 kg (173 lb 9 6 oz)   03/12/18 78 kg (172 lb)       Physical Exam   Constitutional: He is oriented to person, place, and time  No distress  Neck:   No carotid bruits   Cardiovascular:   Regular rate and rhythm with no murmurs   Pulmonary/Chest:   Lungs are clear to auscultation without wheezes,rales, or rhonchi   Musculoskeletal:   Trace pedal edema bilaterally  Negative Homans sign  Patient has enlargement of joint of his right wrist however there is no noted swelling  He has no tenderness to palpation but does have increased discomfort along his forearm with extremes of flexion and extension at his wrist   Muscle strength 5/5  Plus two radial pulse  Lymphadenopathy:     He has no cervical adenopathy  Neurological: He is alert and oriented to person, place, and time  No cranial nerve deficit  Pertinent Laboratory/Diagnostic Studies:  Lab Results   Component Value Date    BUN 18 03/13/2018    CREATININE 1 14 03/13/2018    CALCIUM 8 1 (L) 03/13/2018     03/13/2018    K 4 1 03/13/2018    CO2 24 03/13/2018     03/13/2018     Lab Results   Component Value Date    ALT 21 03/12/2018    AST 22 03/12/2018    ALKPHOS 46 03/12/2018    BILITOT 0 5 10/31/2017       Lab Results   Component Value Date    WBC 7 56 03/12/2018    HGB 11 2 (L) 03/12/2018    HCT 32 7 (L) 03/12/2018    MCV 96 03/12/2018     03/12/2018       No results found for: TSH    No results found for: CHOL  No results found for: TRIG  No results found for: HDL  No results found for: LDLCALC  No results found for: HGBA1C    Results for orders placed or performed during the hospital encounter of 03/12/18   Comprehensive metabolic panel   Result Value Ref Range    Sodium 137 136 - 145 mmol/L    Potassium 3 9 3 5 - 5 3 mmol/L    Chloride 102 100 - 108 mmol/L    CO2 27 21 - 32 mmol/L    ANION GAP 8 4 - 13 mmol/L    BUN 18 5 - 25 mg/dL    Creatinine 1 39 (H) 0 60 - 1 30 mg/dL    Glucose 130 65 - 140 mg/dL    Calcium 8 5 8 3 - 10 1 mg/dL    AST 22 5 - 45 U/L    ALT 21 12 - 78 U/L    Alkaline Phosphatase 46 46 - 116 U/L    Total Protein 6 7 6 4 - 8 2 g/dL    Albumin 3 1 (L) 3 5 - 5 0 g/dL    Total Bilirubin 0 43 0 20 - 1 00 mg/dL    eGFR 46 ml/min/1 73sq m   CBC and differential   Result Value Ref Range    WBC 7 56 4 31 - 10 16 Thousand/uL    RBC 3 42 (L) 3 88 - 5 62 Million/uL    Hemoglobin 11 2 (L) 12 0 - 17 0 g/dL    Hematocrit 32 7 (L) 36 5 - 49 3 %    MCV 96 82 - 98 fL    MCH 32 7 26 8 - 34 3 pg    MCHC 34 3 31 4 - 37 4 g/dL    RDW 12 8 11 6 - 15 1 %    MPV 11 6 8 9 - 12 7 fL    Platelets 733 114 - 472 Thousands/uL    nRBC 0 /100 WBCs    Neutrophils Relative 63 43 - 75 %    Lymphocytes Relative 25 14 - 44 %    Monocytes Relative 8 4 - 12 %    Eosinophils Relative 4 0 - 6 %    Basophils Relative 0 0 - 1 %    Neutrophils Absolute 4  73 1 85 - 7 62 Thousands/µL    Lymphocytes Absolute 1 85 0 60 - 4 47 Thousands/µL    Monocytes Absolute 0 62 0 17 - 1 22 Thousand/µL    Eosinophils Absolute 0 32 0 00 - 0 61 Thousand/µL    Basophils Absolute 0 02 0 00 - 0 10 Thousands/µL   Troponin I   Result Value Ref Range    Troponin I <0 02 <=0 04 ng/mL   Troponin I   Result Value Ref Range    Troponin I <0 02 <=0 04 ng/mL   Platelet count   Result Value Ref Range    Platelets 670 855 - 988 Thousands/uL    MPV 11 3 8 9 - 12 7 fL   Troponin I   Result Value Ref Range    Troponin I <0 02 <=0 04 ng/mL   Basic metabolic panel   Result Value Ref Range    Sodium 137 136 - 145 mmol/L    Potassium 4 1 3 5 - 5 3 mmol/L    Chloride 106 100 - 108 mmol/L    CO2 24 21 - 32 mmol/L    ANION GAP 7 4 - 13 mmol/L    BUN 18 5 - 25 mg/dL    Creatinine 1 14 0 60 - 1 30 mg/dL    Glucose 90 65 - 140 mg/dL    Calcium 8 1 (L) 8 3 - 10 1 mg/dL    eGFR 58 ml/min/1 73sq m   ECG 12 lead   Result Value Ref Range    Ventricular Rate 68 BPM    Atrial Rate 72 BPM    CT Interval 248 ms    QRSD Interval 72 ms    QT Interval 382 ms    QTC Interval 406 ms    P Axis 65 degrees    QRS Axis 11 degrees    T Wave Axis 15 degrees       Orders Placed This Encounter   Procedures    XR wrist 3+ vw right       ALLERGIES:  Allergies   Allergen Reactions    Atorvastatin Other (See Comments)    Zetia [Ezetimibe] Other (See Comments)     myalgia       Current Medications     Current Outpatient Prescriptions   Medication Sig Dispense Refill    ALPHAGAN P 0 1 %       amLODIPine (NORVASC) 10 mg tablet TAKE 1 TABLET EVERY DAY 30 tablet 5    aspirin 81 MG tablet Take 81 mg by mouth daily        brinzolamide (AZOPT) 1 % ophthalmic suspension Administer 1 drop to the right eye 2 (two) times a day      Cholecalciferol (VITAMIN D3) 2000 units capsule Take 1 capsule by mouth daily      Coenzyme Q10 (CO Q 10) 100 MG CAPS Take by mouth      diphenhydrAMINE-acetaminophen (ACETAMINOPHEN PM EX ST)  MG TABS Take by mouth      fish oil 1,000 mg Take 1,000 mg by mouth   Garlic 7283 MG CAPS Take 1 tablet by mouth daily   latanoprost (XALATAN) 0 005 % ophthalmic solution Administer 1 drop to both eyes daily at bedtime      metoprolol succinate (TOPROL-XL) 25 mg 24 hr tablet Take 25 mg by mouth   multivitamin-iron-minerals-folic acid (CENTRUM) chewable tablet Chew 1 tablet daily   Omega-3 Fatty Acids (FISH OIL) 645 MG CAPS Take 2 tablets by mouth daily      omeprazole (PriLOSEC) 20 mg delayed release capsule Take 1 capsule by mouth every other day      Probiotic Product (ALIGN) 4 MG CAPS Take by mouth      Vitamin D, Cholecalciferol, 1000 UNITS CAPS Take 1,000 tablets by mouth daily   vitamin E, tocopherol, 400 units capsule Take 400 Units by mouth daily  No current facility-administered medications for this visit            Health Maintenance     Health Maintenance   Topic Date Due    Depression Screening PHQ  07/15/1931    SLP PLAN OF CARE  07/15/1931    Pneumococcal PPSV23/PCV13 65+ Years / Low and Medium Risk (1 of 2 - PCV13) 07/15/1996    INFLUENZA VACCINE  09/01/2018    Fall Risk  02/23/2019    DTaP,Tdap,and Td Vaccines (2 - Td) 03/12/2028     Immunization History   Administered Date(s) Administered    Influenza 09/23/2014    Influenza Split High Dose Preservative Free IM 09/23/2014, 12/17/2015    Influenza TIV (IM) 10/24/2003, 11/08/2005, 11/13/2007, 10/16/2008, 12/16/2009, 11/10/2010, 11/18/2011, 09/24/2012, 09/25/2013    Td (adult), adsorbed 11/01/2003    Tdap 90/28/6549       Payal Bedolla MD

## 2018-09-05 ENCOUNTER — TELEPHONE (OUTPATIENT)
Dept: FAMILY MEDICINE CLINIC | Facility: CLINIC | Age: 83
End: 2018-09-05

## 2018-09-05 NOTE — TELEPHONE ENCOUNTER
----- Message from Maryana Reyes MD sent at 5/5/9954 12:20 PM EDT -----  Please call patient's daughter, Viki Murrell  Patient's x-ray shows severe arthritis of right wrist however no fractures were noted  More than likely will have often on discomfort due to arthritis  He may apply warm compresses to the wrist if needed for 15-20 minutes 2-3 times daily    If discomfort becomes a more severe or more persistent than we would recommend seeing an orthopedist

## 2018-09-18 ENCOUNTER — OFFICE VISIT (OUTPATIENT)
Dept: OBGYN CLINIC | Facility: HOSPITAL | Age: 83
End: 2018-09-18
Payer: COMMERCIAL

## 2018-09-18 VITALS
HEIGHT: 68 IN | DIASTOLIC BLOOD PRESSURE: 70 MMHG | SYSTOLIC BLOOD PRESSURE: 160 MMHG | WEIGHT: 172.2 LBS | HEART RATE: 73 BPM | BODY MASS INDEX: 26.1 KG/M2

## 2018-09-18 DIAGNOSIS — M18.11 ARTHRITIS OF CARPOMETACARPAL (CMC) JOINT OF RIGHT THUMB: Primary | ICD-10-CM

## 2018-09-18 PROCEDURE — 99213 OFFICE O/P EST LOW 20 MIN: CPT | Performed by: ORTHOPAEDIC SURGERY

## 2018-09-18 PROCEDURE — 20600 DRAIN/INJ JOINT/BURSA W/O US: CPT | Performed by: ORTHOPAEDIC SURGERY

## 2018-09-18 RX ORDER — METHYLPREDNISOLONE ACETATE 40 MG/ML
1 INJECTION, SUSPENSION INTRA-ARTICULAR; INTRALESIONAL; INTRAMUSCULAR; SOFT TISSUE
Status: COMPLETED | OUTPATIENT
Start: 2018-09-18 | End: 2018-09-18

## 2018-09-18 RX ORDER — BUPIVACAINE HYDROCHLORIDE 2.5 MG/ML
1 INJECTION, SOLUTION EPIDURAL; INFILTRATION; INTRACAUDAL
Status: COMPLETED | OUTPATIENT
Start: 2018-09-18 | End: 2018-09-18

## 2018-09-18 RX ADMIN — METHYLPREDNISOLONE ACETATE 1 ML: 40 INJECTION, SUSPENSION INTRA-ARTICULAR; INTRALESIONAL; INTRAMUSCULAR; SOFT TISSUE at 11:55

## 2018-09-18 RX ADMIN — BUPIVACAINE HYDROCHLORIDE 1 ML: 2.5 INJECTION, SOLUTION EPIDURAL; INFILTRATION; INTRACAUDAL at 11:55

## 2018-09-18 NOTE — PROGRESS NOTES
ASSESSMENT/PLAN:    Assessment:   Right thumb CMC joint arthritis  Plan: We weightbearing as tolerated right hand  Comfort cool brace to be worn at at night and as needed  Cortisone injection of the right thumb CMC joint as outlined above  Follow-up p r n  General Discussions:     Aia 16 Arthritis: The anatomy and physiology of carpometacarpal joint arthritis was discussed with the patient today in the office  Deterioration of the articular cartilage eventually leads to hypermobility at the thumb Aia 16 joint, resulting in joint subluxation, osteophyte formation, cystic changes within the trapezium and base of the first metacarpal, as well as subchondral sclerosis  Eventually, pain, limited mobility, and compensatory hyperextension at the metacarpophalangeal joint may develop  While normal activity and usage of the thumb joint may provide a painful experience to the patient, this typically does not result in damage to the thumb or hand  Treatment options include resting thumb spica splints to decreased joint edema, pain, and inflammation  Therapy exercises to strengthen the thenar musculature may relieve pain, but do not alter the overall continued development of osteoarthritis  Oral medications, topical medications, corticosteroid injections may decrease pain and increase overall function  Eventually, approximately 5% of patients may require surgical intervention  _____________________________________________________  CHIEF COMPLAINT:  Chief Complaint   Patient presents with    Right Wrist - Follow-up         SUBJECTIVE:  George Quiroga is a 80y o  year old male who presents 1 month history of base of thumb pain  Pain is not associated with any numbness or tingling  Pain is made worse with activity including mowing the lawn  Pain does radiate proximally to the level of the wrist  No previous injury to the right wrist   He is right-hand dominant     Patient has not had any previous injections to the thumb CMC joint  Pain is worse at night compared to the day  PAST MEDICAL HISTORY:  Past Medical History:   Diagnosis Date    [facility] )     Anemia     Carpal tunnel syndrome, unspecified upper limb     Cataract     last assessed: 8/18/2012    GERD (gastroesophageal reflux disease)     Glaucoma     Hypertension     last assessed: 8/23/2012    Intervertebral disc disease     PVD (peripheral vascular disease) ([facility] )        PAST SURGICAL HISTORY:  Past Surgical History:   Procedure Laterality Date    CATARACT EXTRACTION      HERNIA REPAIR      NEUROPLASTY / TRANSPOSITION MEDIAN NERVE AT CARPAL TUNNEL      [place] LAP,CHOLECYSTECTOMY N/A 9/14/2016    Procedure: LAPAROSCOPIC CHOLECYSTECTOMY ;  Surgeon: Su Roca MD;  Location: [facility];  Service: General       FAMILY HISTORY:  Family History   Problem Relation Age of Onset    Glaucoma Mother     Cancer Father     Heart disease Father    Elwyn Serge Glaucoma Father     Alzheimer's disease Family     Heart attack Family        SOCIAL HISTORY:  Social History   Substance Use Topics    Smoking status: Former Smoker    Smokeless tobacco: Never Used      Comment: quit 30 years ago     Alcohol use Yes      Comment: occasional  4 beers a daily       MEDICATIONS:    Current Outpatient Prescriptions:     ALPHAGAN P 0 1 %, , Disp: , Rfl:     amLODIPine (NORVASC) 10 mg tablet, TAKE 1 TABLET EVERY DAY, Disp: 30 tablet, Rfl: 5    aspirin 81 MG tablet, Take 81 mg by mouth daily  , Disp: , Rfl:     brinzolamide (AZOPT) 1 % ophthalmic suspension, Administer 1 drop to the right eye 2 (two) times a day, Disp: , Rfl:     Cholecalciferol (VITAMIN D3) 2000 units capsule, Take 1 capsule by mouth daily, Disp: , Rfl:     Coenzyme Q10 (CO Q 10) 100 MG CAPS, Take by mouth, Disp: , Rfl:     diphenhydrAMINE-acetaminophen (ACETAMINOPHEN PM EX ST)  MG TABS, Take by mouth, Disp: , Rfl:     fish oil 1,000 mg, Take 1,000 mg by mouth , Disp: , Rfl:    Garlic 1598 MG CAPS, Take 1 tablet by mouth daily  , Disp: , Rfl:     latanoprost (XALATAN) 0 005 % ophthalmic solution, Administer 1 drop to both eyes daily at bedtime, Disp: , Rfl:     metoprolol succinate (TOPROL-XL) 25 mg 24 hr tablet, Take 25 mg by mouth , Disp: , Rfl:     multivitamin-iron-minerals-folic acid (CENTRUM) chewable tablet, Chew 1 tablet daily  , Disp: , Rfl:     Omega-3 Fatty Acids (FISH OIL) 645 MG CAPS, Take 2 tablets by mouth daily, Disp: , Rfl:     omeprazole (PriLOSEC) 20 mg delayed release capsule, Take 1 capsule by mouth every other day, Disp: , Rfl:     Vitamin D, Cholecalciferol, 1000 UNITS CAPS, Take 1,000 tablets by mouth daily  , Disp: , Rfl:     vitamin E, tocopherol, 400 units capsule, Take 400 Units by mouth daily  , Disp: , Rfl:     ALLERGIES:  Allergies   Allergen Reactions    Atorvastatin Other (See Comments)    Zetia [Ezetimibe] Other (See Comments)     myalgia       REVIEW OF SYSTEMS:  Pertinent items are noted in HPI  A comprehensive review of systems was negative      LABS:  HgA1c: No results found for: HGBA1C  BMP:   Lab Results   Component Value Date    CALCIUM 8 1 (L) 03/13/2018     03/13/2018    K 4 1 03/13/2018    CO2 24 03/13/2018     03/13/2018    BUN 18 03/13/2018    CREATININE 1 14 03/13/2018       _____________________________________________________  PHYSICAL EXAMINATION:  General: well developed and well nourished, alert, oriented times 3 and appears comfortable  Psychiatric: Normal  HEENT: Trachea Midline, No torticollis  Cardiovascular: No discernable arrhythmia  Pulmonary: No wheezing or stridor  Skin: No masses, erthema, lacerations, fluctation, ulcerations  Neurovascular: Sensation Intact to the Median, Ulnar, Radial Nerve, Motor Intact to the Median, Ulnar, Radial Nerve and Pulses Intact    MUSCULOSKELETAL EXAMINATION:  Right hand:  Skin is intact  Negative Tinel's test  No tenderness to palpation over the 1st dorsal extensor compartment  Tenderness to palpation over the thumb CMC joint  Positive grind test   Motor and sensation is intact distally  Extremity is warm well perfused  Left hand:  Volar wrist incision is well healed with no surrounding erythema  Negative Tinel's test  No tenderness to palpation over the Aia 16 joint  Motor and sensation intact distally  Extremity is warm well perfused  ____________________________________________________    STUDIES REVIEWED:  X-rays right wrist reviewed personally today in PACS revealing right thumb CMC joint arthritis and radiocarpal arthritis      PROCEDURES PERFORMED:  Small joint arthrocentesis  Date/Time: 9/18/2018 11:55 AM  Consent given by: patient  Site marked: site marked  Supporting Documentation  Indications: pain   Procedure Details  Location: thumb - R thumb CMC  Needle size: 25 G  Ultrasound guidance: no  Approach: volar  Medications administered: 1 mL bupivacaine (PF) 0 25 %; 1 mL methylPREDNISolone acetate 40 mg/mL    Patient tolerance: patient tolerated the procedure well with no immediate complications  Dressing:  Sterile dressing applied

## 2018-11-28 ENCOUNTER — OFFICE VISIT (OUTPATIENT)
Dept: FAMILY MEDICINE CLINIC | Facility: CLINIC | Age: 83
End: 2018-11-28
Payer: COMMERCIAL

## 2018-11-28 VITALS
BODY MASS INDEX: 26.1 KG/M2 | TEMPERATURE: 96.5 F | RESPIRATION RATE: 16 BRPM | DIASTOLIC BLOOD PRESSURE: 62 MMHG | HEIGHT: 68 IN | WEIGHT: 172.2 LBS | SYSTOLIC BLOOD PRESSURE: 130 MMHG | HEART RATE: 76 BPM

## 2018-11-28 DIAGNOSIS — J06.9 UPPER RESPIRATORY TRACT INFECTION, UNSPECIFIED TYPE: Primary | ICD-10-CM

## 2018-11-28 DIAGNOSIS — R20.2 PARESTHESIA: ICD-10-CM

## 2018-11-28 PROCEDURE — 1036F TOBACCO NON-USER: CPT | Performed by: FAMILY MEDICINE

## 2018-11-28 PROCEDURE — 99213 OFFICE O/P EST LOW 20 MIN: CPT | Performed by: FAMILY MEDICINE

## 2018-11-28 PROCEDURE — 1160F RVW MEDS BY RX/DR IN RCRD: CPT | Performed by: FAMILY MEDICINE

## 2018-11-28 RX ORDER — AZITHROMYCIN 250 MG/1
TABLET, FILM COATED ORAL
Qty: 6 TABLET | Refills: 0 | Status: SHIPPED | OUTPATIENT
Start: 2018-11-28 | End: 2018-12-02

## 2018-11-28 NOTE — PROGRESS NOTES
FAMILY PRACTICE OFFICE VISIT       NAME: Junie Centeno  AGE: 80 y o  SEX: male       : 7/15/1931        MRN: [id number]    DATE: 2018  TIME: 11:50 AM    Assessment and Plan     Problem List Items Addressed This Visit     Paresthesia    Relevant Orders    CBC    Comprehensive metabolic panel    TSH, 3rd generation    Hemoglobin A1C    Vitamin D 25 hydroxy    Vitamin B12    Upper respiratory tract infection - Primary    Relevant Medications    azithromycin (ZITHROMAX) 250 mg tablet       Patient given prescription for Zithromax Z-Silas to take as directed for 5 days for suspected upper respiratory infection  Patient will call if symptoms persist after medication completed    I had a long discussion with the patient and his daughter regarding possible peripheral neuropathy  We will check blood work as ordered  Patient does take a multivitamin as well as vitamin-D over-the-counter  Patient's symptoms do not warrant trial of medication at this time      There are no Patient Instructions on file for this visit  Chief Complaint     Chief Complaint   Patient presents with    Cough    Sore Throat    Foot Injury     Pt states that when he walks he feels like he is walking on water        History of Present Illness     Patient describes 4 day history of scratchy throat and feeling mildly fatigued  He denies any fevers or significant coughing  He has not taken any over-the-counter medication    Patient also reports unrelated several months to years history of intermittent bilateral heel paresthesia  He describes feeling his heels spongy or walking on pillows  He denies any sharp pains  Review of Systems   Review of Systems   Constitutional: Positive for fatigue  Negative for fever  HENT: Positive for congestion and sore throat  Respiratory: Negative  Cardiovascular: Negative  Gastrointestinal: Negative  Genitourinary: Negative  Musculoskeletal: Negative  Skin: Negative  Active Problem List     Patient Active Problem List   Diagnosis    Cholecystitis with cholelithiasis    Allergic rhinitis    Anemia    Benign essential hypertension    Esophageal reflux    Glaucoma    Hiatal hernia    Hyperlipidemia    Joint pain, knee    Left lumbar radiculopathy    Peripheral vascular disease ([place] [address] )    Preoperative evaluation of a medical condition to rule out surgical contraindications (TAR required)    Syncope    Head injury due to trauma    [facility] )    [facility]ox[facility] fibrillation[facility]    Scalp laceration    Right wrist pain    Peripheral edema    Arthritis of carpometacarpal (CMC) joint of right thumb    Paresthesia    Upper respiratory tract infection       Past Medical History:  Past Medical History:   Diagnosis Date    A-fib ([place] [address] )     Anemia     Carpal tunnel syndrome, unspecified upper limb     Cataract     last assessed: 8/18/2012    GERD (gastroesophageal reflux disease)     Glaucoma     Hypertension     last assessed: 8/23/2012    Intervertebral disc disease     PVD (peripheral vascular disease) ([place] [address] )        Past Surgical History:  Past Surgical History:   Procedure Laterality Date    CATARACT EXTRACTION      HERNIA REPAIR      NEUROPLASTY / TRANSPOSITION MEDIAN NERVE AT CARPAL TUNNEL      [place] LAP,CHOLECYSTECTOMY N/A 9/14/2016    Procedure: LAPAROSCOPIC CHOLECYSTECTOMY ;  Surgeon: Norma Chappell MD;  Location: [facility];  Service: General       Family History:  Family History   Problem Relation Age of Onset    Glaucoma Mother     Cancer Father     Heart disease Father    Rojelio Gelacio Glaucoma Father     Alzheimer's disease Family     Heart attack Family        Social History:  Social History     Social History    Marital status: /Civil Union     Spouse name: N/A    Number of children: N/A    Years of education: N/A     Occupational History    retired      Social History Main Topics    Smoking status: Former Smoker    Smokeless tobacco: Never Used      Comment: quit 30 years ago     Alcohol use Yes      Comment: occasional  4 beers a daily    Drug use: No    Sexual activity: Not on file     Other Topics Concern    Not on file     Social History Narrative    Morning person       Objective     Vitals:    11/28/18 1119   BP: 130/62   Pulse: 76   Resp: 16   Temp: (!) 96 5 °F (35 8 °C)     Wt Readings from Last 3 Encounters:   11/28/18 78 1 kg (172 lb 3 2 oz)   09/18/18 78 1 kg (172 lb 3 2 oz)   08/31/18 77 6 kg (171 lb)       Physical Exam   Constitutional: He is oriented to person, place, and time  No distress  HENT:   Mouth/Throat: Oropharynx is clear and moist  No oropharyngeal exudate  Cardiovascular: Normal rate, regular rhythm and normal heart sounds  No murmur heard  Pulmonary/Chest: Effort normal and breath sounds normal  No respiratory distress  He has no wheezes  He has no rales  Musculoskeletal: He exhibits no edema  Pt with + DP/PT pulses, chronic onychomycosis, -edema, no tenderness to palpation of foot/heel  Sensation intact to light touch   Lymphadenopathy:     He has no cervical adenopathy  Neurological: He is alert and oriented to person, place, and time  No cranial nerve deficit         Pertinent Laboratory/Diagnostic Studies:  Lab Results   Component Value Date    BUN 18 03/13/2018    CREATININE 1 14 03/13/2018    CALCIUM 8 1 (L) 03/13/2018     10/31/2017    K 4 1 03/13/2018    CO2 24 03/13/2018     03/13/2018     Lab Results   Component Value Date    ALT 21 03/12/2018    AST 22 03/12/2018    ALKPHOS 46 03/12/2018    BILITOT 0 5 10/31/2017       Lab Results   Component Value Date    WBC 7 56 03/12/2018    HGB 11 2 (L) 03/12/2018    HCT 32 7 (L) 03/12/2018    MCV 96 03/12/2018     03/12/2018       No results found for: TSH    No results found for: CHOL  No results found for: TRIG  No results found for: HDL  No results found for: LDLCALC  No results found for: HGBA1C    Results for orders placed or performed during the hospital encounter of 03/12/18   Comprehensive metabolic panel   Result Value Ref Range    Sodium 137 136 - 145 mmol/L    Potassium 3 9 3 5 - 5 3 mmol/L    Chloride 102 100 - 108 mmol/L    CO2 27 21 - 32 mmol/L    ANION GAP 8 4 - 13 mmol/L    BUN 18 5 - 25 mg/dL    Creatinine 1 39 (H) 0 60 - 1 30 mg/dL    Glucose 130 65 - 140 mg/dL    Calcium 8 5 8 3 - 10 1 mg/dL    AST 22 5 - 45 U/L    ALT 21 12 - 78 U/L    Alkaline Phosphatase 46 46 - 116 U/L    Total Protein 6 7 6 4 - 8 2 g/dL    Albumin 3 1 (L) 3 5 - 5 0 g/dL    Total Bilirubin 0 43 0 20 - 1 00 mg/dL    eGFR 46 ml/min/1 73sq m   CBC and differential   Result Value Ref Range    WBC 7 56 4 31 - 10 16 Thousand/uL    RBC 3 42 (L) 3 88 - 5 62 Million/uL    Hemoglobin 11 2 (L) 12 0 - 17 0 g/dL    Hematocrit 32 7 (L) 36 5 - 49 3 %    MCV 96 82 - 98 fL    MCH 32 7 26 8 - 34 3 pg    MCHC 34 3 31 4 - 37 4 g/dL    RDW 12 8 11 6 - 15 1 %    MPV 11 6 8 9 - 12 7 fL    Platelets 785 330 - 222 Thousands/uL    nRBC 0 /100 WBCs    Neutrophils Relative 63 43 - 75 %    Lymphocytes Relative 25 14 - 44 %    Monocytes Relative 8 4 - 12 %    Eosinophils Relative 4 0 - 6 %    Basophils Relative 0 0 - 1 %    Neutrophils Absolute 4 73 1 85 - 7 62 Thousands/µL    Lymphocytes Absolute 1 85 0 60 - 4 47 Thousands/µL    Monocytes Absolute 0 62 0 17 - 1 22 Thousand/µL    Eosinophils Absolute 0 32 0 00 - 0 61 Thousand/µL    Basophils Absolute 0 02 0 00 - 0 10 Thousands/µL   Troponin I   Result Value Ref Range    Troponin I <0 02 <=0 04 ng/mL   Troponin I   Result Value Ref Range    Troponin I <0 02 <=0 04 ng/mL   Platelet count   Result Value Ref Range    Platelets 548 218 - 596 Thousands/uL    MPV 11 3 8 9 - 12 7 fL   Troponin I   Result Value Ref Range    Troponin I <0 02 <=0 04 ng/mL   Basic metabolic panel   Result Value Ref Range    Sodium 137 136 - 145 mmol/L    Potassium 4 1 3 5 - 5 3 mmol/L    Chloride 106 100 - 108 mmol/L CO2 24 21 - 32 mmol/L    ANION GAP 7 4 - 13 mmol/L    BUN 18 5 - 25 mg/dL    Creatinine 1 14 0 60 - 1 30 mg/dL    Glucose 90 65 - 140 mg/dL    Calcium 8 1 (L) 8 3 - 10 1 mg/dL    eGFR 58 ml/min/1 73sq m   ECG 12 lead   Result Value Ref Range    Ventricular Rate 68 BPM    Atrial Rate 72 BPM    SC Interval 248 ms    QRSD Interval 72 ms    QT Interval 382 ms    QTC Interval 406 ms    P Axis 65 degrees    QRS Axis 11 degrees    T Wave Axis 15 degrees       Orders Placed This Encounter   Procedures    CBC    Comprehensive metabolic panel    TSH, 3rd generation    Hemoglobin A1C    Vitamin D 25 hydroxy    Vitamin B12       ALLERGIES:  Allergies   Allergen Reactions    Atorvastatin Other (See Comments)    Zetia [Ezetimibe] Other (See Comments)     myalgia       Current Medications     Current Outpatient Prescriptions   Medication Sig Dispense Refill    amLODIPine (NORVASC) 10 mg tablet TAKE 1 TABLET EVERY DAY 30 tablet 5    aspirin 81 MG tablet Take 81 mg by mouth daily   brinzolamide (AZOPT) 1 % ophthalmic suspension Administer 1 drop to the right eye 2 (two) times a day      Cholecalciferol (VITAMIN D3) 2000 units capsule Take 1 capsule by mouth daily      Coenzyme Q10 (CO Q 10) 100 MG CAPS Take by mouth      diphenhydrAMINE-acetaminophen (ACETAMINOPHEN PM EX ST)  MG TABS Take by mouth      fish oil 1,000 mg Take 1,000 mg by mouth   Garlic 4330 MG CAPS Take 1 tablet by mouth daily   latanoprost (XALATAN) 0 005 % ophthalmic solution Administer 1 drop to both eyes daily at bedtime      metoprolol succinate (TOPROL-XL) 25 mg 24 hr tablet Take 25 mg by mouth daily        multivitamin-iron-minerals-folic acid (CENTRUM) chewable tablet Chew 1 tablet daily   omeprazole (PriLOSEC) 20 mg delayed release capsule Take 1 capsule by mouth as needed        vitamin E, tocopherol, 400 units capsule Take 400 Units by mouth daily        azithromycin (ZITHROMAX) 250 mg tablet Take 2 tablets today then 1 tablet daily x 4 days 6 tablet 0     No current facility-administered medications for this visit            Health Maintenance     Health Maintenance   Topic Date Due    SLP PLAN OF CARE  07/15/1931    Pneumococcal PPSV23/PCV13 65+ Years / Low and Medium Risk (1 of 2 - PCV13) 07/15/1996    INFLUENZA VACCINE  07/01/2018    Fall Risk  02/23/2019    Depression Screening PHQ  08/31/2019    DTaP,Tdap,and Td Vaccines (2 - Td) 03/12/2028     Immunization History   Administered Date(s) Administered    Influenza 09/23/2014    Influenza Split High Dose Preservative Free IM 09/23/2014, 12/17/2015    Influenza TIV (IM) 10/24/2003, 11/08/2005, 11/13/2007, 10/16/2008, 12/16/2009, 11/10/2010, 11/18/2011, 09/24/2012, 09/25/2013    Td (adult), adsorbed 11/01/2003    Tdap 02/85/5995       Danielle Morris MD

## 2018-12-04 ENCOUNTER — TELEPHONE (OUTPATIENT)
Dept: FAMILY MEDICINE CLINIC | Facility: CLINIC | Age: 83
End: 2018-12-04

## 2018-12-04 LAB
25(OH)D3 SERPL-MCNC: 34 NG/ML (ref 30–100)
ALBUMIN SERPL-MCNC: 4 G/DL (ref 3.6–5.1)
ALBUMIN/GLOB SERPL: 1.6 (CALC) (ref 1–2.5)
ALP SERPL-CCNC: 43 U/L (ref 40–115)
ALT SERPL-CCNC: 13 U/L (ref 9–46)
AST SERPL-CCNC: 18 U/L (ref 10–35)
BASOPHILS # BLD AUTO: 32 CELLS/UL (ref 0–200)
BASOPHILS NFR BLD AUTO: 0.6 %
BILIRUB SERPL-MCNC: 0.5 MG/DL (ref 0.2–1.2)
BUN SERPL-MCNC: 19 MG/DL (ref 7–25)
BUN/CREAT SERPL: 15 (CALC) (ref 6–22)
CALCIUM SERPL-MCNC: 9.1 MG/DL (ref 8.6–10.3)
CHLORIDE SERPL-SCNC: 104 MMOL/L (ref 98–110)
CO2 SERPL-SCNC: 27 MMOL/L (ref 20–32)
CREAT SERPL-MCNC: 1.3 MG/DL (ref 0.7–1.11)
EOSINOPHIL # BLD AUTO: 339 CELLS/UL (ref 15–500)
EOSINOPHIL NFR BLD AUTO: 6.4 %
ERYTHROCYTE [DISTWIDTH] IN BLOOD BY AUTOMATED COUNT: 12.5 % (ref 11–15)
GLOBULIN SER CALC-MCNC: 2.5 G/DL (CALC) (ref 1.9–3.7)
GLUCOSE SERPL-MCNC: 94 MG/DL (ref 65–99)
HBA1C MFR BLD: 5.4 % OF TOTAL HGB
HCT VFR BLD AUTO: 33.7 % (ref 38.5–50)
HGB BLD-MCNC: 11.4 G/DL (ref 13.2–17.1)
LYMPHOCYTES # BLD AUTO: 1452 CELLS/UL (ref 850–3900)
LYMPHOCYTES NFR BLD AUTO: 27.4 %
MCH RBC QN AUTO: 32.7 PG (ref 27–33)
MCHC RBC AUTO-ENTMCNC: 33.8 G/DL (ref 32–36)
MCV RBC AUTO: 96.6 FL (ref 80–100)
MONOCYTES # BLD AUTO: 477 CELLS/UL (ref 200–950)
MONOCYTES NFR BLD AUTO: 9 %
NEUTROPHILS # BLD AUTO: 3000 CELLS/UL (ref 1500–7800)
NEUTROPHILS NFR BLD AUTO: 56.6 %
PLATELET # BLD AUTO: 226 THOUSAND/UL (ref 140–400)
PMV BLD REES-ECKER: 11.6 FL (ref 7.5–12.5)
POTASSIUM SERPL-SCNC: 4.5 MMOL/L (ref 3.5–5.3)
PROT SERPL-MCNC: 6.5 G/DL (ref 6.1–8.1)
RBC # BLD AUTO: 3.49 MILLION/UL (ref 4.2–5.8)
SL AMB EGFR AFRICAN AMERICAN: 57 ML/MIN/1.73M2
SL AMB EGFR NON AFRICAN AMERICAN: 49 ML/MIN/1.73M2
SODIUM SERPL-SCNC: 137 MMOL/L (ref 135–146)
TSH SERPL-ACNC: 3.89 MIU/L (ref 0.4–4.5)
VIT B12 SERPL-MCNC: 467 PG/ML (ref 200–1100)
WBC # BLD AUTO: 5.3 THOUSAND/UL (ref 3.8–10.8)

## 2018-12-04 NOTE — TELEPHONE ENCOUNTER
----- Message from Lester Devlin MD sent at 97/7/5035 12:12 PM EST -----  All recent bw stable for pt with no significant changes from previous bw

## 2019-03-21 ENCOUNTER — OFFICE VISIT (OUTPATIENT)
Dept: OBGYN CLINIC | Facility: HOSPITAL | Age: 84
End: 2019-03-21
Payer: COMMERCIAL

## 2019-03-21 VITALS
DIASTOLIC BLOOD PRESSURE: 73 MMHG | HEART RATE: 51 BPM | WEIGHT: 173 LBS | SYSTOLIC BLOOD PRESSURE: 143 MMHG | BODY MASS INDEX: 26.3 KG/M2

## 2019-03-21 DIAGNOSIS — R22.32 MASS OF HAND, LEFT: ICD-10-CM

## 2019-03-21 DIAGNOSIS — M18.11 ARTHRITIS OF CARPOMETACARPAL (CMC) JOINT OF RIGHT THUMB: ICD-10-CM

## 2019-03-21 DIAGNOSIS — R22.32 MASS OF LEFT HAND: Primary | ICD-10-CM

## 2019-03-21 PROCEDURE — 99213 OFFICE O/P EST LOW 20 MIN: CPT | Performed by: ORTHOPAEDIC SURGERY

## 2019-03-21 PROCEDURE — 20600 DRAIN/INJ JOINT/BURSA W/O US: CPT | Performed by: ORTHOPAEDIC SURGERY

## 2019-03-21 RX ORDER — BUPIVACAINE HYDROCHLORIDE 2.5 MG/ML
0.5 INJECTION, SOLUTION INFILTRATION; PERINEURAL
Status: COMPLETED | OUTPATIENT
Start: 2019-03-21 | End: 2019-03-21

## 2019-03-21 RX ORDER — METHYLPREDNISOLONE ACETATE 40 MG/ML
0.5 INJECTION, SUSPENSION INTRA-ARTICULAR; INTRALESIONAL; INTRAMUSCULAR; SOFT TISSUE
Status: COMPLETED | OUTPATIENT
Start: 2019-03-21 | End: 2019-03-21

## 2019-03-21 RX ORDER — DORZOLAMIDE HYDROCHLORIDE AND TIMOLOL MALEATE 20; 5 MG/ML; MG/ML
1 SOLUTION/ DROPS OPHTHALMIC 2 TIMES DAILY
COMMUNITY

## 2019-03-21 RX ADMIN — BUPIVACAINE HYDROCHLORIDE 0.5 ML: 2.5 INJECTION, SOLUTION INFILTRATION; PERINEURAL at 15:34

## 2019-03-21 RX ADMIN — METHYLPREDNISOLONE ACETATE 0.5 ML: 40 INJECTION, SUSPENSION INTRA-ARTICULAR; INTRALESIONAL; INTRAMUSCULAR; SOFT TISSUE at 15:34

## 2019-03-21 NOTE — PROGRESS NOTES
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    Plan:     Cortisone injection administered today to right thumb CMC joint for symptom relief    Will order MRI left hand with contrast to evaluate left hand mass for possible AV malformation    Discussed treatment plan with patient and patient's daughter and both on agreement treatment plan  Thank you    _____________________________________________________  CHIEF COMPLAINT:  Chief Complaint   Patient presents with    Right Wrist - Follow-up         SUBJECTIVE:  Sarath Yo is a 80y o  year old male who presents for follow up regarding ongoing right thumb CMC arthritis as well as evaluation for a left hand mass on the dorsum of his left hand  The right thumb CMC joint was injected most recently at his prior visit 3 months ago and received temporizing relief from that  His pain in the right thumb is related mostly activity  The left hand mass enlarges with increased activity of the left hand as well occasionally becomes painful enlarged  Currently, is not excessively swollen or tender however there is venous vasculature surrounding the region  Denies any numbness or tingling    PAST MEDICAL HISTORY:  Past Medical History:   Diagnosis Date    [facility] )     Anemia     Carpal tunnel syndrome, unspecified upper limb     Cataract     last assessed: 8/18/2012    GERD (gastroesophageal reflux disease)     Glaucoma     Hypertension     last assessed: 8/23/2012    Intervertebral disc disease     PVD (peripheral vascular disease) ([facility] )        PAST SURGICAL HISTORY:  Past Surgical History:   Procedure Laterality Date    CATARACT EXTRACTION      HERNIA REPAIR      NEUROPLASTY / TRANSPOSITION MEDIAN NERVE AT CARPAL TUNNEL      [place] LAP,CHOLECYSTECTOMY N/A 9/14/2016    Procedure: LAPAROSCOPIC CHOLECYSTECTOMY ;  Surgeon: Joseluis Sparrow MD;  Location[facility];  Service: General       FAMILY HISTORY:  Family History   Problem Relation Age of Onset    Glaucoma Mother     Cancer Father     Heart disease Father    [facility] Glaucoma Father     Alzheimer's disease Family     Heart attack Family        SOCIAL HISTORY:  Social History     Tobacco Use    Smoking status: Former Smoker    Smokeless tobacco: Never Used    Tobacco comment: quit 30 years ago    Substance Use Topics    Alcohol use: Yes     Comment: occasional  4 beers a daily    Drug use: No       MEDICATIONS:    Current Outpatient Medications:     amLODIPine (NORVASC) 10 mg tablet, TAKE 1 TABLET EVERY DAY, Disp: 30 tablet, Rfl: 5    aspirin 81 MG tablet, Take 81 mg by mouth daily  , Disp: , Rfl:     Cholecalciferol (VITAMIN D3) 2000 units capsule, Take 1 capsule by mouth daily, Disp: , Rfl:     Coenzyme Q10 (CO Q 10) 100 MG CAPS, Take by mouth, Disp: , Rfl:     diphenhydrAMINE-acetaminophen (ACETAMINOPHEN PM EX ST)  MG TABS, Take by mouth, Disp: , Rfl:     dorzolamide-timolol (COSOPT) 22 3-6 8 MG/ML ophthalmic solution, 1 drop 2 (two) times a day, Disp: , Rfl:     fish oil 1,000 mg, Take 1,000 mg by mouth , Disp: , Rfl:     Garlic 4876 MG CAPS, Take 1 tablet by mouth daily  , Disp: , Rfl:     latanoprost (XALATAN) 0 005 % ophthalmic solution, Administer 1 drop to both eyes daily at bedtime, Disp: , Rfl:     metoprolol succinate (TOPROL-XL) 25 mg 24 hr tablet, Take 25 mg by mouth daily  , Disp: , Rfl:     multivitamin-iron-minerals-folic acid (CENTRUM) chewable tablet, Chew 1 tablet daily  , Disp: , Rfl:     vitamin E, tocopherol, 400 units capsule, Take 400 Units by mouth daily  , Disp: , Rfl:     omeprazole (PriLOSEC) 20 mg delayed release capsule, Take 1 capsule by mouth as needed  , Disp: , Rfl:     ALLERGIES:  Allergies   Allergen Reactions    Atorvastatin Other (See Comments)    Zetia [Ezetimibe] Other (See Comments)     myalgia       REVIEW OF SYSTEMS:  Pertinent items are noted in HPI    A comprehensive review of systems was negative      LABS:  HgA1c:   Lab Results   Component Value Date    HGBA1C 5 4 12/03/2018     BMP:   Lab Results   Component Value Date    CALCIUM 9 1 12/03/2018     10/31/2017    K 4 5 12/03/2018    CO2 27 12/03/2018     12/03/2018    BUN 19 12/03/2018    CREATININE 1 14 03/13/2018           _____________________________________________________  PHYSICAL EXAMINATION:  General: well developed and well nourished, alert, oriented times 3 and appears comfortable  Psychiatric: Normal  HEENT: Trachea Midline, No torticollis  Cardiovascular: No discernable arrhythmia  Pulmonary: No wheezing or stridor  Skin: No masses, erthema, lacerations, fluctation, ulcerations      MUSCULOSKELETAL EXAMINATION:  Left hand:  -mass approximately 2 cm x 1 cm present on dorsum of left hand, distal to the wrist flexion crease, venous vasculature surrounding, nontender to palpation, slightly mobile without discrete fluid pocket  -motor and sensory grossly intact  -skin fully intact, no evidence of drainage, erythema, fluctuance    Right hand:  -tenderness elicited over CMC joint, positive grind  -no significant hyper extension at thumb IP joint  -normal  strength  -motor sensory grossly intact  -digits warm well perfused    _____________________________________________________  STUDIES REVIEWED:  No studies for review      PROCEDURES PERFORMED:  Hand/upper extremity injection  Date/Time: 3/21/2019 3:34 PM  Site marked: site marked  Timeout: Immediately prior to procedure a time out was called to verify the correct patient, procedure, equipment, support staff and site/side marked as required   Supporting Documentation  Indications: pain   Procedure Details  Condition comment: Thumb CMC joint   Needle size: 25 G  Approach: dorsal  Medications administered: 0 5 mL bupivacaine 0 25 %; 0 5 mL methylPREDNISolone acetate 40 mg/mL    Patient tolerance: patient tolerated the procedure well with no immediate complications  Dressing: Sterile dressing applied

## 2019-04-03 ENCOUNTER — TELEPHONE (OUTPATIENT)
Dept: OBGYN CLINIC | Facility: HOSPITAL | Age: 84
End: 2019-04-03

## 2019-04-05 ENCOUNTER — OFFICE VISIT (OUTPATIENT)
Dept: FAMILY MEDICINE CLINIC | Facility: CLINIC | Age: 84
End: 2019-04-05
Payer: COMMERCIAL

## 2019-04-05 ENCOUNTER — HOSPITAL ENCOUNTER (INPATIENT)
Facility: HOSPITAL | Age: 84
LOS: 5 days | Discharge: HOME | DRG: 872 | End: 2019-04-11
Attending: EMERGENCY MEDICINE | Admitting: HOSPITALIST
Payer: COMMERCIAL

## 2019-04-05 ENCOUNTER — APPOINTMENT (EMERGENCY)
Dept: RADIOLOGY | Facility: HOSPITAL | Age: 84
DRG: 872 | End: 2019-04-05
Payer: COMMERCIAL

## 2019-04-05 VITALS
HEIGHT: 68 IN | HEART RATE: 80 BPM | BODY MASS INDEX: 26.22 KG/M2 | SYSTOLIC BLOOD PRESSURE: 150 MMHG | TEMPERATURE: 101.7 F | WEIGHT: 173 LBS | DIASTOLIC BLOOD PRESSURE: 90 MMHG | RESPIRATION RATE: 16 BRPM

## 2019-04-05 DIAGNOSIS — R50.9 FEVER, UNSPECIFIED FEVER CAUSE: Primary | ICD-10-CM

## 2019-04-05 DIAGNOSIS — A41.9 SEPSIS (HCC): Primary | ICD-10-CM

## 2019-04-05 DIAGNOSIS — Z28.21 VACCINATION REFUSED BY PATIENT: ICD-10-CM

## 2019-04-05 DIAGNOSIS — R63.0 DECREASED APPETITE: ICD-10-CM

## 2019-04-05 DIAGNOSIS — K52.9 ENTERITIS: ICD-10-CM

## 2019-04-05 DIAGNOSIS — R50.9 FEVER: ICD-10-CM

## 2019-04-05 DIAGNOSIS — R25.1 SHAKING: ICD-10-CM

## 2019-04-05 DIAGNOSIS — E87.1 HYPONATREMIA: ICD-10-CM

## 2019-04-05 DIAGNOSIS — R26.89 BALANCE PROBLEM: ICD-10-CM

## 2019-04-05 PROBLEM — R94.5 ABNORMAL LIVER FUNCTION: Status: ACTIVE | Noted: 2019-04-05

## 2019-04-05 PROBLEM — E86.0 DEHYDRATION: Status: ACTIVE | Noted: 2019-04-05

## 2019-04-05 LAB
ALBUMIN SERPL BCP-MCNC: 3.3 G/DL (ref 3.5–5)
ALP SERPL-CCNC: 93 U/L (ref 46–116)
ALT SERPL W P-5'-P-CCNC: 168 U/L (ref 12–78)
ANION GAP SERPL CALCULATED.3IONS-SCNC: 5 MMOL/L (ref 4–13)
AST SERPL W P-5'-P-CCNC: 184 U/L (ref 5–45)
BACTERIA UR QL AUTO: ABNORMAL /HPF
BASOPHILS # BLD AUTO: 0.01 THOUSANDS/ΜL (ref 0–0.1)
BASOPHILS NFR BLD AUTO: 0 % (ref 0–1)
BILIRUB SERPL-MCNC: 0.73 MG/DL (ref 0.2–1)
BILIRUB UR QL STRIP: NEGATIVE
BUN SERPL-MCNC: 20 MG/DL (ref 5–25)
CALCIUM SERPL-MCNC: 8.7 MG/DL (ref 8.3–10.1)
CHLORIDE SERPL-SCNC: 98 MMOL/L (ref 100–108)
CLARITY UR: CLEAR
CO2 SERPL-SCNC: 25 MMOL/L (ref 21–32)
COLOR UR: YELLOW
COLOR, POC: NORMAL
CREAT SERPL-MCNC: 1.33 MG/DL (ref 0.6–1.3)
EOSINOPHIL # BLD AUTO: 0.03 THOUSAND/ΜL (ref 0–0.61)
EOSINOPHIL NFR BLD AUTO: 1 % (ref 0–6)
ERYTHROCYTE [DISTWIDTH] IN BLOOD BY AUTOMATED COUNT: 12.3 % (ref 11.6–15.1)
GFR SERPL CREATININE-BSD FRML MDRD: 48 ML/MIN/1.73SQ M
GLUCOSE SERPL-MCNC: 103 MG/DL (ref 65–140)
GLUCOSE UR STRIP-MCNC: NEGATIVE MG/DL
HCT VFR BLD AUTO: 34.1 % (ref 36.5–49.3)
HGB BLD-MCNC: 11.4 G/DL (ref 12–17)
HGB UR QL STRIP.AUTO: ABNORMAL
HYALINE CASTS #/AREA URNS LPF: ABNORMAL /LPF
IMM GRANULOCYTES # BLD AUTO: 0.05 THOUSAND/UL (ref 0–0.2)
IMM GRANULOCYTES NFR BLD AUTO: 1 % (ref 0–2)
KETONES UR STRIP-MCNC: NEGATIVE MG/DL
L PNEUMO1 AG UR QL IA.RAPID: NEGATIVE
LACTATE SERPL-SCNC: 1.2 MMOL/L (ref 0.5–2)
LEUKOCYTE ESTERASE UR QL STRIP: NEGATIVE
LIPASE SERPL-CCNC: 229 U/L (ref 73–393)
LYMPHOCYTES # BLD AUTO: 0.4 THOUSANDS/ΜL (ref 0.6–4.47)
LYMPHOCYTES NFR BLD AUTO: 9 % (ref 14–44)
MCH RBC QN AUTO: 32.9 PG (ref 26.8–34.3)
MCHC RBC AUTO-ENTMCNC: 33.4 G/DL (ref 31.4–37.4)
MCV RBC AUTO: 99 FL (ref 82–98)
MONOCYTES # BLD AUTO: 0.38 THOUSAND/ΜL (ref 0.17–1.22)
MONOCYTES NFR BLD AUTO: 8 % (ref 4–12)
NEUTROPHILS # BLD AUTO: 3.64 THOUSANDS/ΜL (ref 1.85–7.62)
NEUTS SEG NFR BLD AUTO: 81 % (ref 43–75)
NITRITE UR QL STRIP: NEGATIVE
NON-SQ EPI CELLS URNS QL MICRO: ABNORMAL /HPF
NRBC BLD AUTO-RTO: 0 /100 WBCS
OSMOLALITY UR: 557 MMOL/KG
PH UR STRIP.AUTO: 5.5 [PH] (ref 4.5–8)
PLATELET # BLD AUTO: 138 THOUSANDS/UL (ref 149–390)
PMV BLD AUTO: 11.9 FL (ref 8.9–12.7)
POTASSIUM SERPL-SCNC: 5.2 MMOL/L (ref 3.5–5.3)
PROT SERPL-MCNC: 7.7 G/DL (ref 6.4–8.2)
PROT UR STRIP-MCNC: ABNORMAL MG/DL
RBC # BLD AUTO: 3.46 MILLION/UL (ref 3.88–5.62)
RBC #/AREA URNS AUTO: ABNORMAL /HPF
SODIUM 24H UR-SCNC: 116 MOL/L
SODIUM SERPL-SCNC: 128 MMOL/L (ref 136–145)
SP GR UR STRIP.AUTO: 1.02 (ref 1–1.03)
UROBILINOGEN UR QL STRIP.AUTO: 0.2 E.U./DL
WBC # BLD AUTO: 4.51 THOUSAND/UL (ref 4.31–10.16)
WBC #/AREA URNS AUTO: ABNORMAL /HPF

## 2019-04-05 PROCEDURE — 74177 CT ABD & PELVIS W/CONTRAST: CPT

## 2019-04-05 PROCEDURE — 85025 COMPLETE CBC W/AUTO DIFF WBC: CPT | Performed by: EMERGENCY MEDICINE

## 2019-04-05 PROCEDURE — 83935 ASSAY OF URINE OSMOLALITY: CPT | Performed by: INTERNAL MEDICINE

## 2019-04-05 PROCEDURE — 99285 EMERGENCY DEPT VISIT HI MDM: CPT

## 2019-04-05 PROCEDURE — 83605 ASSAY OF LACTIC ACID: CPT | Performed by: EMERGENCY MEDICINE

## 2019-04-05 PROCEDURE — 80053 COMPREHEN METABOLIC PANEL: CPT | Performed by: EMERGENCY MEDICINE

## 2019-04-05 PROCEDURE — 71046 X-RAY EXAM CHEST 2 VIEWS: CPT

## 2019-04-05 PROCEDURE — 96361 HYDRATE IV INFUSION ADD-ON: CPT

## 2019-04-05 PROCEDURE — 99285 EMERGENCY DEPT VISIT HI MDM: CPT | Performed by: EMERGENCY MEDICINE

## 2019-04-05 PROCEDURE — 1101F PT FALLS ASSESS-DOCD LE1/YR: CPT | Performed by: FAMILY MEDICINE

## 2019-04-05 PROCEDURE — 83930 ASSAY OF BLOOD OSMOLALITY: CPT | Performed by: INTERNAL MEDICINE

## 2019-04-05 PROCEDURE — 93005 ELECTROCARDIOGRAM TRACING: CPT

## 2019-04-05 PROCEDURE — 84300 ASSAY OF URINE SODIUM: CPT | Performed by: INTERNAL MEDICINE

## 2019-04-05 PROCEDURE — 83690 ASSAY OF LIPASE: CPT | Performed by: EMERGENCY MEDICINE

## 2019-04-05 PROCEDURE — 1111F DSCHRG MED/CURRENT MED MERGE: CPT | Performed by: FAMILY MEDICINE

## 2019-04-05 PROCEDURE — 87449 NOS EACH ORGANISM AG IA: CPT | Performed by: EMERGENCY MEDICINE

## 2019-04-05 PROCEDURE — 87040 BLOOD CULTURE FOR BACTERIA: CPT | Performed by: EMERGENCY MEDICINE

## 2019-04-05 PROCEDURE — 99213 OFFICE O/P EST LOW 20 MIN: CPT | Performed by: FAMILY MEDICINE

## 2019-04-05 PROCEDURE — 81001 URINALYSIS AUTO W/SCOPE: CPT

## 2019-04-05 PROCEDURE — 36415 COLL VENOUS BLD VENIPUNCTURE: CPT | Performed by: EMERGENCY MEDICINE

## 2019-04-05 PROCEDURE — 99220 PR INITIAL OBSERVATION CARE/DAY 70 MINUTES: CPT | Performed by: INTERNAL MEDICINE

## 2019-04-05 PROCEDURE — 96374 THER/PROPH/DIAG INJ IV PUSH: CPT

## 2019-04-05 RX ORDER — DORZOLAMIDE HYDROCHLORIDE AND TIMOLOL MALEATE 20; 5 MG/ML; MG/ML
1 SOLUTION/ DROPS OPHTHALMIC 2 TIMES DAILY
Status: DISCONTINUED | OUTPATIENT
Start: 2019-04-06 | End: 2019-04-11 | Stop reason: HOSPADM

## 2019-04-05 RX ORDER — DORZOLAMIDE HCL 20 MG/ML
1 SOLUTION/ DROPS OPHTHALMIC 2 TIMES DAILY
COMMUNITY
End: 2019-04-15

## 2019-04-05 RX ORDER — ONDANSETRON 2 MG/ML
4 INJECTION INTRAMUSCULAR; INTRAVENOUS ONCE
Status: COMPLETED | OUTPATIENT
Start: 2019-04-05 | End: 2019-04-05

## 2019-04-05 RX ORDER — AMLODIPINE BESYLATE 5 MG/1
5 TABLET ORAL DAILY
COMMUNITY
End: 2019-04-11 | Stop reason: HOSPADM

## 2019-04-05 RX ORDER — AMLODIPINE BESYLATE 5 MG/1
5 TABLET ORAL DAILY
Status: DISCONTINUED | OUTPATIENT
Start: 2019-04-06 | End: 2019-04-11 | Stop reason: HOSPADM

## 2019-04-05 RX ORDER — ASPIRIN 81 MG/1
81 TABLET, CHEWABLE ORAL DAILY
Status: DISCONTINUED | OUTPATIENT
Start: 2019-04-06 | End: 2019-04-11 | Stop reason: HOSPADM

## 2019-04-05 RX ORDER — METOPROLOL SUCCINATE 25 MG/1
25 TABLET, EXTENDED RELEASE ORAL DAILY
Status: DISCONTINUED | OUTPATIENT
Start: 2019-04-06 | End: 2019-04-11 | Stop reason: HOSPADM

## 2019-04-05 RX ORDER — ACETAMINOPHEN 325 MG/1
650 TABLET ORAL ONCE
Status: COMPLETED | OUTPATIENT
Start: 2019-04-05 | End: 2019-04-05

## 2019-04-05 RX ORDER — LATANOPROST 50 UG/ML
1 SOLUTION/ DROPS OPHTHALMIC
Status: DISCONTINUED | OUTPATIENT
Start: 2019-04-05 | End: 2019-04-11 | Stop reason: HOSPADM

## 2019-04-05 RX ORDER — PANTOPRAZOLE SODIUM 20 MG/1
20 TABLET, DELAYED RELEASE ORAL
Status: DISCONTINUED | OUTPATIENT
Start: 2019-04-06 | End: 2019-04-11 | Stop reason: HOSPADM

## 2019-04-05 RX ORDER — SODIUM CHLORIDE 9 MG/ML
75 INJECTION, SOLUTION INTRAVENOUS CONTINUOUS
Status: DISCONTINUED | OUTPATIENT
Start: 2019-04-05 | End: 2019-04-07

## 2019-04-05 RX ORDER — ONDANSETRON 2 MG/ML
4 INJECTION INTRAMUSCULAR; INTRAVENOUS EVERY 6 HOURS PRN
Status: DISCONTINUED | OUTPATIENT
Start: 2019-04-05 | End: 2019-04-11 | Stop reason: HOSPADM

## 2019-04-05 RX ADMIN — IODIXANOL 100 ML: 320 INJECTION, SOLUTION INTRAVASCULAR at 17:57

## 2019-04-05 RX ADMIN — LATANOPROST 1 DROP: 50 SOLUTION OPHTHALMIC at 22:28

## 2019-04-05 RX ADMIN — SODIUM CHLORIDE 1000 ML: 0.9 INJECTION, SOLUTION INTRAVENOUS at 17:00

## 2019-04-05 RX ADMIN — ONDANSETRON 4 MG: 2 INJECTION INTRAMUSCULAR; INTRAVENOUS at 17:36

## 2019-04-05 RX ADMIN — CEFTRIAXONE SODIUM 1000 MG: 10 INJECTION, POWDER, FOR SOLUTION INTRAVENOUS at 20:02

## 2019-04-05 RX ADMIN — SODIUM CHLORIDE 75 ML/HR: 0.9 INJECTION, SOLUTION INTRAVENOUS at 20:41

## 2019-04-05 RX ADMIN — ACETAMINOPHEN 650 MG: 325 TABLET ORAL at 18:13

## 2019-04-06 PROBLEM — A41.9 SEPSIS (HCC): Status: ACTIVE | Noted: 2019-04-06

## 2019-04-06 LAB
ALBUMIN SERPL BCP-MCNC: 2.7 G/DL (ref 3.5–5)
ALP SERPL-CCNC: 72 U/L (ref 46–116)
ALT SERPL W P-5'-P-CCNC: 148 U/L (ref 12–78)
ANION GAP SERPL CALCULATED.3IONS-SCNC: 5 MMOL/L (ref 4–13)
AST SERPL W P-5'-P-CCNC: 128 U/L (ref 5–45)
BASOPHILS # BLD AUTO: 0.01 THOUSANDS/ΜL (ref 0–0.1)
BASOPHILS NFR BLD AUTO: 0 % (ref 0–1)
BILIRUB SERPL-MCNC: 0.39 MG/DL (ref 0.2–1)
BUN SERPL-MCNC: 22 MG/DL (ref 5–25)
CALCIUM SERPL-MCNC: 7.3 MG/DL (ref 8.3–10.1)
CHLORIDE SERPL-SCNC: 103 MMOL/L (ref 100–108)
CO2 SERPL-SCNC: 24 MMOL/L (ref 21–32)
CREAT SERPL-MCNC: 1.31 MG/DL (ref 0.6–1.3)
EOSINOPHIL # BLD AUTO: 0.01 THOUSAND/ΜL (ref 0–0.61)
EOSINOPHIL NFR BLD AUTO: 0 % (ref 0–6)
ERYTHROCYTE [DISTWIDTH] IN BLOOD BY AUTOMATED COUNT: 12.3 % (ref 11.6–15.1)
GFR SERPL CREATININE-BSD FRML MDRD: 49 ML/MIN/1.73SQ M
GLUCOSE SERPL-MCNC: 109 MG/DL (ref 65–140)
HBV SURFACE AG SER QL: NORMAL
HCT VFR BLD AUTO: 27.6 % (ref 36.5–49.3)
HCV AB SER QL: NORMAL
HGB BLD-MCNC: 9 G/DL (ref 12–17)
IMM GRANULOCYTES # BLD AUTO: 0.03 THOUSAND/UL (ref 0–0.2)
IMM GRANULOCYTES NFR BLD AUTO: 1 % (ref 0–2)
LACTATE SERPL-SCNC: 1.5 MMOL/L (ref 0.5–2)
LACTATE SERPL-SCNC: 3.3 MMOL/L (ref 0.5–2)
LYMPHOCYTES # BLD AUTO: 0.29 THOUSANDS/ΜL (ref 0.6–4.47)
LYMPHOCYTES NFR BLD AUTO: 7 % (ref 14–44)
MCH RBC QN AUTO: 32.1 PG (ref 26.8–34.3)
MCHC RBC AUTO-ENTMCNC: 32.6 G/DL (ref 31.4–37.4)
MCV RBC AUTO: 99 FL (ref 82–98)
MONOCYTES # BLD AUTO: 0.41 THOUSAND/ΜL (ref 0.17–1.22)
MONOCYTES NFR BLD AUTO: 9 % (ref 4–12)
NEUTROPHILS # BLD AUTO: 3.63 THOUSANDS/ΜL (ref 1.85–7.62)
NEUTS SEG NFR BLD AUTO: 83 % (ref 43–75)
NRBC BLD AUTO-RTO: 0 /100 WBCS
OSMOLALITY UR/SERPL-RTO: 281 MMOL/KG (ref 282–298)
PLATELET # BLD AUTO: 112 THOUSANDS/UL (ref 149–390)
PMV BLD AUTO: 11.5 FL (ref 8.9–12.7)
POTASSIUM SERPL-SCNC: 4.2 MMOL/L (ref 3.5–5.3)
PROCALCITONIN SERPL-MCNC: 7.42 NG/ML
PROT SERPL-MCNC: 5.9 G/DL (ref 6.4–8.2)
RBC # BLD AUTO: 2.8 MILLION/UL (ref 3.88–5.62)
SODIUM SERPL-SCNC: 132 MMOL/L (ref 136–145)
WBC # BLD AUTO: 4.38 THOUSAND/UL (ref 4.31–10.16)

## 2019-04-06 PROCEDURE — 80053 COMPREHEN METABOLIC PANEL: CPT | Performed by: INTERNAL MEDICINE

## 2019-04-06 PROCEDURE — 86803 HEPATITIS C AB TEST: CPT | Performed by: INTERNAL MEDICINE

## 2019-04-06 PROCEDURE — 85025 COMPLETE CBC W/AUTO DIFF WBC: CPT | Performed by: INTERNAL MEDICINE

## 2019-04-06 PROCEDURE — 83605 ASSAY OF LACTIC ACID: CPT | Performed by: HOSPITALIST

## 2019-04-06 PROCEDURE — 84145 PROCALCITONIN (PCT): CPT | Performed by: HOSPITALIST

## 2019-04-06 PROCEDURE — 87340 HEPATITIS B SURFACE AG IA: CPT | Performed by: INTERNAL MEDICINE

## 2019-04-06 PROCEDURE — 99232 SBSQ HOSP IP/OBS MODERATE 35: CPT | Performed by: HOSPITALIST

## 2019-04-06 RX ORDER — ACETAMINOPHEN 325 MG/1
650 TABLET ORAL ONCE
Status: DISCONTINUED | OUTPATIENT
Start: 2019-04-06 | End: 2019-04-11 | Stop reason: HOSPADM

## 2019-04-06 RX ORDER — ACETAMINOPHEN 325 MG/1
650 TABLET ORAL EVERY 6 HOURS PRN
Status: DISCONTINUED | OUTPATIENT
Start: 2019-04-06 | End: 2019-04-11 | Stop reason: HOSPADM

## 2019-04-06 RX ORDER — LANOLIN ALCOHOL/MO/W.PET/CERES
3 CREAM (GRAM) TOPICAL
Status: DISCONTINUED | OUTPATIENT
Start: 2019-04-06 | End: 2019-04-11 | Stop reason: HOSPADM

## 2019-04-06 RX ORDER — ACETAMINOPHEN 650 MG/1
650 SUPPOSITORY RECTAL ONCE
Status: COMPLETED | OUTPATIENT
Start: 2019-04-06 | End: 2019-04-06

## 2019-04-06 RX ADMIN — SODIUM CHLORIDE 500 ML: 0.9 INJECTION, SOLUTION INTRAVENOUS at 01:05

## 2019-04-06 RX ADMIN — AMPICILLIN AND SULBACTAM 3 G: 100; 50 INJECTION, POWDER, FOR SOLUTION INTRAVENOUS at 23:43

## 2019-04-06 RX ADMIN — DORZOLAMIDE HYDROCHLORIDE AND TIMOLOL MALEATE 1 DROP: 20; 5 SOLUTION/ DROPS OPHTHALMIC at 08:11

## 2019-04-06 RX ADMIN — ASPIRIN 81 MG 81 MG: 81 TABLET ORAL at 08:11

## 2019-04-06 RX ADMIN — AMPICILLIN AND SULBACTAM 3 G: 100; 50 INJECTION, POWDER, FOR SOLUTION INTRAVENOUS at 17:58

## 2019-04-06 RX ADMIN — PANTOPRAZOLE SODIUM 20 MG: 20 TABLET, DELAYED RELEASE ORAL at 05:37

## 2019-04-06 RX ADMIN — ACETAMINOPHEN 650 MG: 325 TABLET ORAL at 11:52

## 2019-04-06 RX ADMIN — AMPICILLIN SODIUM AND SULBACTAM SODIUM 1.5 G: 1; .5 INJECTION, POWDER, FOR SOLUTION INTRAMUSCULAR; INTRAVENOUS at 13:01

## 2019-04-06 RX ADMIN — ENOXAPARIN SODIUM 30 MG: 30 INJECTION SUBCUTANEOUS at 08:11

## 2019-04-06 RX ADMIN — SODIUM CHLORIDE 1000 ML: 0.9 INJECTION, SOLUTION INTRAVENOUS at 13:30

## 2019-04-06 RX ADMIN — MELATONIN 3 MG: at 23:58

## 2019-04-06 RX ADMIN — METOPROLOL SUCCINATE 25 MG: 25 TABLET, EXTENDED RELEASE ORAL at 08:10

## 2019-04-06 RX ADMIN — ACETAMINOPHEN 650 MG: 650 SUPPOSITORY RECTAL at 01:13

## 2019-04-06 RX ADMIN — ACETAMINOPHEN 650 MG: 325 TABLET ORAL at 23:58

## 2019-04-06 RX ADMIN — SODIUM CHLORIDE 75 ML/HR: 0.9 INJECTION, SOLUTION INTRAVENOUS at 17:10

## 2019-04-06 RX ADMIN — LATANOPROST 1 DROP: 50 SOLUTION OPHTHALMIC at 21:07

## 2019-04-06 RX ADMIN — ACETAMINOPHEN 650 MG: 325 TABLET ORAL at 17:58

## 2019-04-06 RX ADMIN — ONDANSETRON 4 MG: 2 INJECTION INTRAMUSCULAR; INTRAVENOUS at 01:05

## 2019-04-06 RX ADMIN — AMLODIPINE BESYLATE 5 MG: 5 TABLET ORAL at 08:11

## 2019-04-06 RX ADMIN — DORZOLAMIDE HYDROCHLORIDE AND TIMOLOL MALEATE 1 DROP: 20; 5 SOLUTION/ DROPS OPHTHALMIC at 17:58

## 2019-04-07 ENCOUNTER — APPOINTMENT (INPATIENT)
Dept: RADIOLOGY | Facility: HOSPITAL | Age: 84
DRG: 872 | End: 2019-04-07
Payer: COMMERCIAL

## 2019-04-07 LAB
ALBUMIN SERPL BCP-MCNC: 2.4 G/DL (ref 3.5–5)
ALP SERPL-CCNC: 91 U/L (ref 46–116)
ALT SERPL W P-5'-P-CCNC: 127 U/L (ref 12–78)
ANION GAP SERPL CALCULATED.3IONS-SCNC: 4 MMOL/L (ref 4–13)
AST SERPL W P-5'-P-CCNC: 96 U/L (ref 5–45)
BASOPHILS # BLD AUTO: 0.01 THOUSANDS/ΜL (ref 0–0.1)
BASOPHILS NFR BLD AUTO: 0 % (ref 0–1)
BILIRUB SERPL-MCNC: 0.43 MG/DL (ref 0.2–1)
BUN SERPL-MCNC: 20 MG/DL (ref 5–25)
CALCIUM SERPL-MCNC: 7.2 MG/DL (ref 8.3–10.1)
CHLORIDE SERPL-SCNC: 108 MMOL/L (ref 100–108)
CO2 SERPL-SCNC: 23 MMOL/L (ref 21–32)
CREAT SERPL-MCNC: 1.31 MG/DL (ref 0.6–1.3)
EOSINOPHIL # BLD AUTO: 0.04 THOUSAND/ΜL (ref 0–0.61)
EOSINOPHIL NFR BLD AUTO: 1 % (ref 0–6)
ERYTHROCYTE [DISTWIDTH] IN BLOOD BY AUTOMATED COUNT: 12.7 % (ref 11.6–15.1)
FLUAV AG SPEC QL: NOT DETECTED
FLUBV AG SPEC QL: NOT DETECTED
GFR SERPL CREATININE-BSD FRML MDRD: 49 ML/MIN/1.73SQ M
GLUCOSE SERPL-MCNC: 104 MG/DL (ref 65–140)
HCT VFR BLD AUTO: 26.9 % (ref 36.5–49.3)
HGB BLD-MCNC: 8.7 G/DL (ref 12–17)
IMM GRANULOCYTES # BLD AUTO: 0.02 THOUSAND/UL (ref 0–0.2)
IMM GRANULOCYTES NFR BLD AUTO: 1 % (ref 0–2)
LYMPHOCYTES # BLD AUTO: 0.5 THOUSANDS/ΜL (ref 0.6–4.47)
LYMPHOCYTES NFR BLD AUTO: 15 % (ref 14–44)
MCH RBC QN AUTO: 32.3 PG (ref 26.8–34.3)
MCHC RBC AUTO-ENTMCNC: 32.3 G/DL (ref 31.4–37.4)
MCV RBC AUTO: 100 FL (ref 82–98)
MONOCYTES # BLD AUTO: 0.35 THOUSAND/ΜL (ref 0.17–1.22)
MONOCYTES NFR BLD AUTO: 11 % (ref 4–12)
NEUTROPHILS # BLD AUTO: 2.39 THOUSANDS/ΜL (ref 1.85–7.62)
NEUTS SEG NFR BLD AUTO: 72 % (ref 43–75)
NRBC BLD AUTO-RTO: 0 /100 WBCS
PLATELET # BLD AUTO: 103 THOUSANDS/UL (ref 149–390)
PMV BLD AUTO: 12.2 FL (ref 8.9–12.7)
POTASSIUM SERPL-SCNC: 4.1 MMOL/L (ref 3.5–5.3)
PROCALCITONIN SERPL-MCNC: 7.43 NG/ML
PROT SERPL-MCNC: 5.7 G/DL (ref 6.4–8.2)
RBC # BLD AUTO: 2.69 MILLION/UL (ref 3.88–5.62)
RSV B RNA SPEC QL NAA+PROBE: NOT DETECTED
SODIUM SERPL-SCNC: 135 MMOL/L (ref 136–145)
WBC # BLD AUTO: 3.31 THOUSAND/UL (ref 4.31–10.16)

## 2019-04-07 PROCEDURE — 84145 PROCALCITONIN (PCT): CPT | Performed by: HOSPITALIST

## 2019-04-07 PROCEDURE — 71045 X-RAY EXAM CHEST 1 VIEW: CPT

## 2019-04-07 PROCEDURE — 80053 COMPREHEN METABOLIC PANEL: CPT | Performed by: HOSPITALIST

## 2019-04-07 PROCEDURE — 99232 SBSQ HOSP IP/OBS MODERATE 35: CPT | Performed by: HOSPITALIST

## 2019-04-07 PROCEDURE — 87631 RESP VIRUS 3-5 TARGETS: CPT | Performed by: INTERNAL MEDICINE

## 2019-04-07 PROCEDURE — 85025 COMPLETE CBC W/AUTO DIFF WBC: CPT | Performed by: HOSPITALIST

## 2019-04-07 PROCEDURE — 99223 1ST HOSP IP/OBS HIGH 75: CPT | Performed by: INTERNAL MEDICINE

## 2019-04-07 PROCEDURE — 76705 ECHO EXAM OF ABDOMEN: CPT

## 2019-04-07 RX ORDER — DOCUSATE SODIUM 100 MG/1
100 CAPSULE, LIQUID FILLED ORAL 2 TIMES DAILY
Status: DISCONTINUED | OUTPATIENT
Start: 2019-04-07 | End: 2019-04-11

## 2019-04-07 RX ORDER — DIPHENHYDRAMINE HCL 25 MG
12.5 TABLET ORAL
Status: DISCONTINUED | OUTPATIENT
Start: 2019-04-07 | End: 2019-04-11 | Stop reason: HOSPADM

## 2019-04-07 RX ORDER — ACETAMINOPHEN 650 MG/1
650 SUPPOSITORY RECTAL ONCE
Status: COMPLETED | OUTPATIENT
Start: 2019-04-07 | End: 2019-04-07

## 2019-04-07 RX ADMIN — SODIUM CHLORIDE 75 ML/HR: 0.9 INJECTION, SOLUTION INTRAVENOUS at 05:41

## 2019-04-07 RX ADMIN — PANTOPRAZOLE SODIUM 20 MG: 20 TABLET, DELAYED RELEASE ORAL at 05:40

## 2019-04-07 RX ADMIN — ACETAMINOPHEN 650 MG: 325 TABLET ORAL at 12:28

## 2019-04-07 RX ADMIN — METOPROLOL SUCCINATE 25 MG: 25 TABLET, EXTENDED RELEASE ORAL at 08:58

## 2019-04-07 RX ADMIN — ACETAMINOPHEN 650 MG: 325 TABLET ORAL at 23:26

## 2019-04-07 RX ADMIN — LATANOPROST 1 DROP: 50 SOLUTION OPHTHALMIC at 21:36

## 2019-04-07 RX ADMIN — ASPIRIN 81 MG 81 MG: 81 TABLET ORAL at 08:58

## 2019-04-07 RX ADMIN — ACETAMINOPHEN 650 MG: 650 SUPPOSITORY RECTAL at 14:23

## 2019-04-07 RX ADMIN — AMLODIPINE BESYLATE 5 MG: 5 TABLET ORAL at 08:58

## 2019-04-07 RX ADMIN — DOCUSATE SODIUM 100 MG: 100 CAPSULE, LIQUID FILLED ORAL at 17:20

## 2019-04-07 RX ADMIN — DORZOLAMIDE HYDROCHLORIDE AND TIMOLOL MALEATE 1 DROP: 20; 5 SOLUTION/ DROPS OPHTHALMIC at 08:59

## 2019-04-07 RX ADMIN — ENOXAPARIN SODIUM 30 MG: 30 INJECTION SUBCUTANEOUS at 08:58

## 2019-04-07 RX ADMIN — DORZOLAMIDE HYDROCHLORIDE AND TIMOLOL MALEATE 1 DROP: 20; 5 SOLUTION/ DROPS OPHTHALMIC at 17:20

## 2019-04-07 RX ADMIN — MELATONIN 3 MG: at 21:36

## 2019-04-07 RX ADMIN — AMPICILLIN AND SULBACTAM 3 G: 100; 50 INJECTION, POWDER, FOR SOLUTION INTRAVENOUS at 05:40

## 2019-04-07 RX ADMIN — ONDANSETRON 4 MG: 2 INJECTION INTRAMUSCULAR; INTRAVENOUS at 17:20

## 2019-04-08 LAB
% PARASITEMIA: 0
ALBUMIN SERPL BCP-MCNC: 2.5 G/DL (ref 3.5–5)
ALP SERPL-CCNC: 98 U/L (ref 46–116)
ALT SERPL W P-5'-P-CCNC: 103 U/L (ref 12–78)
ANION GAP SERPL CALCULATED.3IONS-SCNC: 4 MMOL/L (ref 4–13)
AST SERPL W P-5'-P-CCNC: 58 U/L (ref 5–45)
BASOPHILS # BLD AUTO: 0.02 THOUSANDS/ΜL (ref 0–0.1)
BASOPHILS NFR BLD AUTO: 1 % (ref 0–1)
BILIRUB SERPL-MCNC: 0.58 MG/DL (ref 0.2–1)
BUN SERPL-MCNC: 17 MG/DL (ref 5–25)
CALCIUM SERPL-MCNC: 7.7 MG/DL (ref 8.3–10.1)
CHLORIDE SERPL-SCNC: 107 MMOL/L (ref 100–108)
CO2 SERPL-SCNC: 24 MMOL/L (ref 21–32)
CREAT SERPL-MCNC: 1.25 MG/DL (ref 0.6–1.3)
EOSINOPHIL # BLD AUTO: 0.05 THOUSAND/ΜL (ref 0–0.61)
EOSINOPHIL NFR BLD AUTO: 1 % (ref 0–6)
ERYTHROCYTE [DISTWIDTH] IN BLOOD BY AUTOMATED COUNT: 12.6 % (ref 11.6–15.1)
GFR SERPL CREATININE-BSD FRML MDRD: 51 ML/MIN/1.73SQ M
GLUCOSE SERPL-MCNC: 104 MG/DL (ref 65–140)
HCT VFR BLD AUTO: 28.2 % (ref 36.5–49.3)
HGB BLD-MCNC: 9 G/DL (ref 12–17)
IMM GRANULOCYTES # BLD AUTO: 0.03 THOUSAND/UL (ref 0–0.2)
IMM GRANULOCYTES NFR BLD AUTO: 1 % (ref 0–2)
LYMPHOCYTES # BLD AUTO: 0.56 THOUSANDS/ΜL (ref 0.6–4.47)
LYMPHOCYTES NFR BLD AUTO: 13 % (ref 14–44)
MCH RBC QN AUTO: 31.8 PG (ref 26.8–34.3)
MCHC RBC AUTO-ENTMCNC: 31.9 G/DL (ref 31.4–37.4)
MCV RBC AUTO: 100 FL (ref 82–98)
MONOCYTES # BLD AUTO: 0.48 THOUSAND/ΜL (ref 0.17–1.22)
MONOCYTES NFR BLD AUTO: 11 % (ref 4–12)
NEUTROPHILS # BLD AUTO: 3.29 THOUSANDS/ΜL (ref 1.85–7.62)
NEUTS SEG NFR BLD AUTO: 73 % (ref 43–75)
NRBC BLD AUTO-RTO: 0 /100 WBCS
PARASITE BLD: NO
PATHOLOGIST INTERPRETATION: NORMAL
PLATELET # BLD AUTO: 113 THOUSANDS/UL (ref 149–390)
PMV BLD AUTO: 11.9 FL (ref 8.9–12.7)
POTASSIUM SERPL-SCNC: 3.9 MMOL/L (ref 3.5–5.3)
PROCALCITONIN SERPL-MCNC: 5.34 NG/ML
PROT SERPL-MCNC: 5.9 G/DL (ref 6.4–8.2)
RBC # BLD AUTO: 2.83 MILLION/UL (ref 3.88–5.62)
SODIUM SERPL-SCNC: 135 MMOL/L (ref 136–145)
WBC # BLD AUTO: 4.43 THOUSAND/UL (ref 4.31–10.16)

## 2019-04-08 PROCEDURE — G8979 MOBILITY GOAL STATUS: HCPCS

## 2019-04-08 PROCEDURE — 85025 COMPLETE CBC W/AUTO DIFF WBC: CPT | Performed by: HOSPITALIST

## 2019-04-08 PROCEDURE — 99232 SBSQ HOSP IP/OBS MODERATE 35: CPT | Performed by: INTERNAL MEDICINE

## 2019-04-08 PROCEDURE — 84145 PROCALCITONIN (PCT): CPT | Performed by: HOSPITALIST

## 2019-04-08 PROCEDURE — 99232 SBSQ HOSP IP/OBS MODERATE 35: CPT | Performed by: FAMILY MEDICINE

## 2019-04-08 PROCEDURE — 87633 RESP VIRUS 12-25 TARGETS: CPT | Performed by: INTERNAL MEDICINE

## 2019-04-08 PROCEDURE — 97163 PT EVAL HIGH COMPLEX 45 MIN: CPT

## 2019-04-08 PROCEDURE — G8987 SELF CARE CURRENT STATUS: HCPCS

## 2019-04-08 PROCEDURE — 97167 OT EVAL HIGH COMPLEX 60 MIN: CPT

## 2019-04-08 PROCEDURE — 80053 COMPREHEN METABOLIC PANEL: CPT | Performed by: HOSPITALIST

## 2019-04-08 PROCEDURE — 87207 SMEAR SPECIAL STAIN: CPT | Performed by: INTERNAL MEDICINE

## 2019-04-08 PROCEDURE — G8978 MOBILITY CURRENT STATUS: HCPCS

## 2019-04-08 PROCEDURE — G8988 SELF CARE GOAL STATUS: HCPCS

## 2019-04-08 RX ADMIN — DOCUSATE SODIUM 100 MG: 100 CAPSULE, LIQUID FILLED ORAL at 17:57

## 2019-04-08 RX ADMIN — ACETAMINOPHEN 650 MG: 325 TABLET ORAL at 17:57

## 2019-04-08 RX ADMIN — MELATONIN 3 MG: at 21:38

## 2019-04-08 RX ADMIN — ENOXAPARIN SODIUM 30 MG: 30 INJECTION SUBCUTANEOUS at 08:37

## 2019-04-08 RX ADMIN — LATANOPROST 1 DROP: 50 SOLUTION OPHTHALMIC at 21:39

## 2019-04-08 RX ADMIN — DIPHENHYDRAMINE HCL 12.5 MG: 25 TABLET ORAL at 21:38

## 2019-04-08 RX ADMIN — ACETAMINOPHEN 650 MG: 325 TABLET ORAL at 08:37

## 2019-04-08 RX ADMIN — AMLODIPINE BESYLATE 5 MG: 5 TABLET ORAL at 08:37

## 2019-04-08 RX ADMIN — METOPROLOL SUCCINATE 25 MG: 25 TABLET, EXTENDED RELEASE ORAL at 08:37

## 2019-04-08 RX ADMIN — DORZOLAMIDE HYDROCHLORIDE AND TIMOLOL MALEATE 1 DROP: 20; 5 SOLUTION/ DROPS OPHTHALMIC at 17:57

## 2019-04-08 RX ADMIN — PANTOPRAZOLE SODIUM 20 MG: 20 TABLET, DELAYED RELEASE ORAL at 07:00

## 2019-04-08 RX ADMIN — ASPIRIN 81 MG 81 MG: 81 TABLET ORAL at 08:37

## 2019-04-08 RX ADMIN — DOCUSATE SODIUM 100 MG: 100 CAPSULE, LIQUID FILLED ORAL at 08:37

## 2019-04-08 RX ADMIN — DORZOLAMIDE HYDROCHLORIDE AND TIMOLOL MALEATE 1 DROP: 20; 5 SOLUTION/ DROPS OPHTHALMIC at 08:38

## 2019-04-09 ENCOUNTER — TELEPHONE (OUTPATIENT)
Dept: OBGYN CLINIC | Facility: HOSPITAL | Age: 84
End: 2019-04-09

## 2019-04-09 LAB
ADENOVIRUS: NOT DETECTED
ALBUMIN SERPL BCP-MCNC: 2.4 G/DL (ref 3.5–5)
ALP SERPL-CCNC: 105 U/L (ref 46–116)
ALT SERPL W P-5'-P-CCNC: 83 U/L (ref 12–78)
ANION GAP SERPL CALCULATED.3IONS-SCNC: 3 MMOL/L (ref 4–13)
AST SERPL W P-5'-P-CCNC: 39 U/L (ref 5–45)
ATRIAL RATE: 81 BPM
BASOPHILS # BLD AUTO: 0.01 THOUSANDS/ΜL (ref 0–0.1)
BASOPHILS NFR BLD AUTO: 0 % (ref 0–1)
BILIRUB SERPL-MCNC: 0.59 MG/DL (ref 0.2–1)
BUN SERPL-MCNC: 16 MG/DL (ref 5–25)
C PNEUM DNA SPEC QL NAA+PROBE: NOT DETECTED
CALCIUM SERPL-MCNC: 7.9 MG/DL (ref 8.3–10.1)
CHLORIDE SERPL-SCNC: 106 MMOL/L (ref 100–108)
CO2 SERPL-SCNC: 25 MMOL/L (ref 21–32)
CREAT SERPL-MCNC: 1.12 MG/DL (ref 0.6–1.3)
EOSINOPHIL # BLD AUTO: 0.14 THOUSAND/ΜL (ref 0–0.61)
EOSINOPHIL NFR BLD AUTO: 3 % (ref 0–6)
ERYTHROCYTE [DISTWIDTH] IN BLOOD BY AUTOMATED COUNT: 12.6 % (ref 11.6–15.1)
FLUAV H1 RNA SPEC QL NAA+PROBE: NOT DETECTED
FLUAV H3 RNA SPEC QL NAA+PROBE: NOT DETECTED
FLUAV RNA SPEC QL NAA+PROBE: NOT DETECTED
FLUBV RNA SPEC QL NAA+PROBE: NOT DETECTED
GFR SERPL CREATININE-BSD FRML MDRD: 59 ML/MIN/1.73SQ M
GLUCOSE SERPL-MCNC: 93 MG/DL (ref 65–140)
HBOV DNA SPEC QL NAA+PROBE: NOT DETECTED
HCOV 229E RNA SPEC QL NAA+PROBE: NOT DETECTED
HCOV HKU1 RNA SPEC QL NAA+PROBE: NOT DETECTED
HCOV NL63 RNA SPEC QL NAA+PROBE: NOT DETECTED
HCOV OC43 RNA SPEC QL NAA+PROBE: NOT DETECTED
HCT VFR BLD AUTO: 28.2 % (ref 36.5–49.3)
HGB BLD-MCNC: 9.1 G/DL (ref 12–17)
HPIV1 RNA SPEC QL NAA+PROBE: NOT DETECTED
HPIV2 RNA SPEC QL NAA+PROBE: NOT DETECTED
HPIV3 RNA SPEC QL NAA+PROBE: NOT DETECTED
HPIV4 RNA SPEC QL NAA+PROBE: NOT DETECTED
IMM GRANULOCYTES # BLD AUTO: 0.01 THOUSAND/UL (ref 0–0.2)
IMM GRANULOCYTES NFR BLD AUTO: 0 % (ref 0–2)
LYMPHOCYTES # BLD AUTO: 0.55 THOUSANDS/ΜL (ref 0.6–4.47)
LYMPHOCYTES NFR BLD AUTO: 11 % (ref 14–44)
M PNEUMO DNA SPEC QL NAA+PROBE: NOT DETECTED
MCH RBC QN AUTO: 31.9 PG (ref 26.8–34.3)
MCHC RBC AUTO-ENTMCNC: 32.3 G/DL (ref 31.4–37.4)
MCV RBC AUTO: 99 FL (ref 82–98)
METAPNEUMOVIRUS: NOT DETECTED
MONOCYTES # BLD AUTO: 0.48 THOUSAND/ΜL (ref 0.17–1.22)
MONOCYTES NFR BLD AUTO: 10 % (ref 4–12)
NEUTROPHILS # BLD AUTO: 3.64 THOUSANDS/ΜL (ref 1.85–7.62)
NEUTS SEG NFR BLD AUTO: 76 % (ref 43–75)
NRBC BLD AUTO-RTO: 0 /100 WBCS
P AXIS: 48 DEGREES
PLATELET # BLD AUTO: 122 THOUSANDS/UL (ref 149–390)
PMV BLD AUTO: 11.8 FL (ref 8.9–12.7)
POTASSIUM SERPL-SCNC: 3.8 MMOL/L (ref 3.5–5.3)
PR INTERVAL: 200 MS
PROCALCITONIN SERPL-MCNC: 3.22 NG/ML
PROT SERPL-MCNC: 5.9 G/DL (ref 6.4–8.2)
QRS AXIS: 7 DEGREES
QRSD INTERVAL: 88 MS
QT INTERVAL: 350 MS
QTC INTERVAL: 406 MS
RBC # BLD AUTO: 2.85 MILLION/UL (ref 3.88–5.62)
RHINOVIRUS RNA SPEC QL NAA+PROBE: NOT DETECTED
RSV A RNA SPEC QL NAA+PROBE: NOT DETECTED
RSV B RNA SPEC QL NAA+PROBE: NOT DETECTED
SODIUM SERPL-SCNC: 134 MMOL/L (ref 136–145)
T WAVE AXIS: 60 DEGREES
VENTRICULAR RATE: 81 BPM
WBC # BLD AUTO: 4.83 THOUSAND/UL (ref 4.31–10.16)

## 2019-04-09 PROCEDURE — 99232 SBSQ HOSP IP/OBS MODERATE 35: CPT | Performed by: INTERNAL MEDICINE

## 2019-04-09 PROCEDURE — 97530 THERAPEUTIC ACTIVITIES: CPT

## 2019-04-09 PROCEDURE — 84145 PROCALCITONIN (PCT): CPT | Performed by: INTERNAL MEDICINE

## 2019-04-09 PROCEDURE — 93010 ELECTROCARDIOGRAM REPORT: CPT | Performed by: INTERNAL MEDICINE

## 2019-04-09 PROCEDURE — 85025 COMPLETE CBC W/AUTO DIFF WBC: CPT | Performed by: INTERNAL MEDICINE

## 2019-04-09 PROCEDURE — 97116 GAIT TRAINING THERAPY: CPT

## 2019-04-09 PROCEDURE — 80053 COMPREHEN METABOLIC PANEL: CPT | Performed by: INTERNAL MEDICINE

## 2019-04-09 RX ORDER — ACETAMINOPHEN 325 MG/1
325 TABLET ORAL EVERY 8 HOURS SCHEDULED
Status: DISCONTINUED | OUTPATIENT
Start: 2019-04-09 | End: 2019-04-11 | Stop reason: HOSPADM

## 2019-04-09 RX ADMIN — ENOXAPARIN SODIUM 30 MG: 30 INJECTION SUBCUTANEOUS at 08:03

## 2019-04-09 RX ADMIN — DOCUSATE SODIUM 100 MG: 100 CAPSULE, LIQUID FILLED ORAL at 08:02

## 2019-04-09 RX ADMIN — DORZOLAMIDE HYDROCHLORIDE AND TIMOLOL MALEATE 1 DROP: 20; 5 SOLUTION/ DROPS OPHTHALMIC at 08:04

## 2019-04-09 RX ADMIN — ASPIRIN 81 MG 81 MG: 81 TABLET ORAL at 08:03

## 2019-04-09 RX ADMIN — AMLODIPINE BESYLATE 5 MG: 5 TABLET ORAL at 08:03

## 2019-04-09 RX ADMIN — ACETAMINOPHEN 325 MG: 325 TABLET ORAL at 16:21

## 2019-04-09 RX ADMIN — ACETAMINOPHEN 325 MG: 325 TABLET ORAL at 21:44

## 2019-04-09 RX ADMIN — METOPROLOL SUCCINATE 25 MG: 25 TABLET, EXTENDED RELEASE ORAL at 08:02

## 2019-04-09 RX ADMIN — LATANOPROST 1 DROP: 50 SOLUTION OPHTHALMIC at 21:45

## 2019-04-09 RX ADMIN — ACETAMINOPHEN 650 MG: 325 TABLET ORAL at 16:21

## 2019-04-09 RX ADMIN — DOCUSATE SODIUM 100 MG: 100 CAPSULE, LIQUID FILLED ORAL at 17:32

## 2019-04-09 RX ADMIN — DORZOLAMIDE HYDROCHLORIDE AND TIMOLOL MALEATE 1 DROP: 20; 5 SOLUTION/ DROPS OPHTHALMIC at 17:32

## 2019-04-09 RX ADMIN — MELATONIN 3 MG: at 21:44

## 2019-04-09 RX ADMIN — PANTOPRAZOLE SODIUM 20 MG: 20 TABLET, DELAYED RELEASE ORAL at 05:15

## 2019-04-10 LAB
BACTERIA BLD CULT: NORMAL
BACTERIA BLD CULT: NORMAL
PROCALCITONIN SERPL-MCNC: 2.13 NG/ML

## 2019-04-10 PROCEDURE — 97535 SELF CARE MNGMENT TRAINING: CPT

## 2019-04-10 PROCEDURE — 84145 PROCALCITONIN (PCT): CPT | Performed by: INTERNAL MEDICINE

## 2019-04-10 PROCEDURE — 99232 SBSQ HOSP IP/OBS MODERATE 35: CPT | Performed by: INTERNAL MEDICINE

## 2019-04-10 RX ADMIN — LATANOPROST 1 DROP: 50 SOLUTION OPHTHALMIC at 22:42

## 2019-04-10 RX ADMIN — DORZOLAMIDE HYDROCHLORIDE AND TIMOLOL MALEATE 1 DROP: 20; 5 SOLUTION/ DROPS OPHTHALMIC at 08:54

## 2019-04-10 RX ADMIN — METOPROLOL SUCCINATE 25 MG: 25 TABLET, EXTENDED RELEASE ORAL at 08:52

## 2019-04-10 RX ADMIN — ASPIRIN 81 MG 81 MG: 81 TABLET ORAL at 08:52

## 2019-04-10 RX ADMIN — PANTOPRAZOLE SODIUM 20 MG: 20 TABLET, DELAYED RELEASE ORAL at 05:27

## 2019-04-10 RX ADMIN — ACETAMINOPHEN 650 MG: 325 TABLET ORAL at 19:26

## 2019-04-10 RX ADMIN — ACETAMINOPHEN 325 MG: 325 TABLET ORAL at 13:52

## 2019-04-10 RX ADMIN — ACETAMINOPHEN 325 MG: 325 TABLET ORAL at 05:27

## 2019-04-10 RX ADMIN — ENOXAPARIN SODIUM 30 MG: 30 INJECTION SUBCUTANEOUS at 08:52

## 2019-04-10 RX ADMIN — DORZOLAMIDE HYDROCHLORIDE AND TIMOLOL MALEATE 1 DROP: 20; 5 SOLUTION/ DROPS OPHTHALMIC at 17:07

## 2019-04-10 RX ADMIN — MELATONIN 3 MG: at 22:42

## 2019-04-10 RX ADMIN — AMLODIPINE BESYLATE 5 MG: 5 TABLET ORAL at 08:52

## 2019-04-10 RX ADMIN — ACETAMINOPHEN 325 MG: 325 TABLET ORAL at 22:42

## 2019-04-11 VITALS
SYSTOLIC BLOOD PRESSURE: 129 MMHG | HEIGHT: 68 IN | RESPIRATION RATE: 18 BRPM | WEIGHT: 189.6 LBS | HEART RATE: 99 BPM | BODY MASS INDEX: 28.73 KG/M2 | DIASTOLIC BLOOD PRESSURE: 71 MMHG | OXYGEN SATURATION: 96 % | TEMPERATURE: 98.6 F

## 2019-04-11 LAB
ANION GAP SERPL CALCULATED.3IONS-SCNC: 3 MMOL/L (ref 4–13)
BUN SERPL-MCNC: 17 MG/DL (ref 5–25)
CALCIUM SERPL-MCNC: 8.2 MG/DL (ref 8.3–10.1)
CHLORIDE SERPL-SCNC: 108 MMOL/L (ref 100–108)
CO2 SERPL-SCNC: 27 MMOL/L (ref 21–32)
CREAT SERPL-MCNC: 1.17 MG/DL (ref 0.6–1.3)
GFR SERPL CREATININE-BSD FRML MDRD: 56 ML/MIN/1.73SQ M
GLUCOSE SERPL-MCNC: 113 MG/DL (ref 65–140)
POTASSIUM SERPL-SCNC: 3.8 MMOL/L (ref 3.5–5.3)
PROCALCITONIN SERPL-MCNC: 1.25 NG/ML
SODIUM SERPL-SCNC: 138 MMOL/L (ref 136–145)

## 2019-04-11 PROCEDURE — 84145 PROCALCITONIN (PCT): CPT | Performed by: INTERNAL MEDICINE

## 2019-04-11 PROCEDURE — 80048 BASIC METABOLIC PNL TOTAL CA: CPT | Performed by: INTERNAL MEDICINE

## 2019-04-11 PROCEDURE — 99232 SBSQ HOSP IP/OBS MODERATE 35: CPT | Performed by: INTERNAL MEDICINE

## 2019-04-11 PROCEDURE — 99238 HOSP IP/OBS DSCHRG MGMT 30/<: CPT | Performed by: INTERNAL MEDICINE

## 2019-04-11 RX ORDER — LANOLIN ALCOHOL/MO/W.PET/CERES
3 CREAM (GRAM) TOPICAL
Qty: 30 TABLET | Refills: 0 | Status: SHIPPED | OUTPATIENT
Start: 2019-04-11 | End: 2019-04-15

## 2019-04-11 RX ADMIN — DORZOLAMIDE HYDROCHLORIDE AND TIMOLOL MALEATE 1 DROP: 20; 5 SOLUTION/ DROPS OPHTHALMIC at 08:05

## 2019-04-11 RX ADMIN — ACETAMINOPHEN 325 MG: 325 TABLET ORAL at 06:06

## 2019-04-11 RX ADMIN — METOPROLOL SUCCINATE 25 MG: 25 TABLET, EXTENDED RELEASE ORAL at 08:05

## 2019-04-11 RX ADMIN — ASPIRIN 81 MG 81 MG: 81 TABLET ORAL at 08:05

## 2019-04-11 RX ADMIN — PANTOPRAZOLE SODIUM 20 MG: 20 TABLET, DELAYED RELEASE ORAL at 06:07

## 2019-04-11 RX ADMIN — ENOXAPARIN SODIUM 30 MG: 30 INJECTION SUBCUTANEOUS at 08:05

## 2019-04-11 RX ADMIN — AMLODIPINE BESYLATE 5 MG: 5 TABLET ORAL at 08:05

## 2019-04-12 ENCOUNTER — TRANSITIONAL CARE MANAGEMENT (OUTPATIENT)
Dept: FAMILY MEDICINE CLINIC | Facility: CLINIC | Age: 84
End: 2019-04-12

## 2019-04-15 ENCOUNTER — OFFICE VISIT (OUTPATIENT)
Dept: FAMILY MEDICINE CLINIC | Facility: CLINIC | Age: 84
End: 2019-04-15
Payer: COMMERCIAL

## 2019-04-15 VITALS
SYSTOLIC BLOOD PRESSURE: 126 MMHG | RESPIRATION RATE: 14 BRPM | TEMPERATURE: 99.1 F | DIASTOLIC BLOOD PRESSURE: 68 MMHG | HEIGHT: 68 IN | WEIGHT: 175.2 LBS | BODY MASS INDEX: 26.55 KG/M2 | HEART RATE: 96 BPM

## 2019-04-15 DIAGNOSIS — D64.9 ANEMIA, UNSPECIFIED TYPE: ICD-10-CM

## 2019-04-15 DIAGNOSIS — I10 BENIGN ESSENTIAL HYPERTENSION: Chronic | ICD-10-CM

## 2019-04-15 DIAGNOSIS — R60.9 PERIPHERAL EDEMA: ICD-10-CM

## 2019-04-15 DIAGNOSIS — M25.50 ARTHRALGIA, UNSPECIFIED JOINT: Primary | ICD-10-CM

## 2019-04-15 DIAGNOSIS — A41.9 SEPSIS, DUE TO UNSPECIFIED ORGANISM: ICD-10-CM

## 2019-04-15 DIAGNOSIS — F41.9 ANXIETY: ICD-10-CM

## 2019-04-15 DIAGNOSIS — K21.9 GASTROESOPHAGEAL REFLUX DISEASE WITHOUT ESOPHAGITIS: ICD-10-CM

## 2019-04-15 DIAGNOSIS — R53.83 OTHER FATIGUE: ICD-10-CM

## 2019-04-15 DIAGNOSIS — G47.09 OTHER INSOMNIA: ICD-10-CM

## 2019-04-15 PROBLEM — J06.9 UPPER RESPIRATORY TRACT INFECTION: Status: RESOLVED | Noted: 2018-11-28 | Resolved: 2019-04-15

## 2019-04-15 PROCEDURE — 99496 TRANSJ CARE MGMT HIGH F2F 7D: CPT | Performed by: FAMILY MEDICINE

## 2019-04-15 RX ORDER — LORAZEPAM 0.5 MG/1
0.5 TABLET ORAL EVERY 8 HOURS PRN
Qty: 30 TABLET | Refills: 2 | Status: SHIPPED | OUTPATIENT
Start: 2019-04-15 | End: 2019-07-12

## 2019-04-15 RX ORDER — GABAPENTIN 100 MG/1
CAPSULE ORAL
Qty: 90 CAPSULE | Refills: 3 | Status: SHIPPED | OUTPATIENT
Start: 2019-04-15 | End: 2019-07-06 | Stop reason: SDUPTHER

## 2019-04-23 ENCOUNTER — TELEPHONE (OUTPATIENT)
Dept: OBGYN CLINIC | Facility: HOSPITAL | Age: 84
End: 2019-04-23

## 2019-04-24 ENCOUNTER — OFFICE VISIT (OUTPATIENT)
Dept: FAMILY MEDICINE CLINIC | Facility: CLINIC | Age: 84
End: 2019-04-24
Payer: COMMERCIAL

## 2019-04-24 VITALS
TEMPERATURE: 97 F | HEART RATE: 80 BPM | WEIGHT: 175.2 LBS | DIASTOLIC BLOOD PRESSURE: 70 MMHG | SYSTOLIC BLOOD PRESSURE: 130 MMHG | HEIGHT: 68 IN | BODY MASS INDEX: 26.55 KG/M2 | RESPIRATION RATE: 16 BRPM

## 2019-04-24 DIAGNOSIS — M17.12 PRIMARY OSTEOARTHRITIS OF LEFT KNEE: Primary | ICD-10-CM

## 2019-04-24 DIAGNOSIS — G47.09 OTHER INSOMNIA: ICD-10-CM

## 2019-04-24 DIAGNOSIS — D64.9 ANEMIA, UNSPECIFIED TYPE: ICD-10-CM

## 2019-04-24 DIAGNOSIS — I10 BENIGN ESSENTIAL HYPERTENSION: Primary | Chronic | ICD-10-CM

## 2019-04-24 DIAGNOSIS — F41.9 ANXIETY: ICD-10-CM

## 2019-04-24 PROBLEM — K52.9 ENTERITIS: Status: RESOLVED | Noted: 2019-04-05 | Resolved: 2019-04-24

## 2019-04-24 PROBLEM — G56.00 CARPAL TUNNEL SYNDROME: Status: ACTIVE | Noted: 2019-04-24

## 2019-04-24 PROBLEM — S01.01XA SCALP LACERATION: Status: RESOLVED | Noted: 2018-03-19 | Resolved: 2019-04-24

## 2019-04-24 PROCEDURE — 99214 OFFICE O/P EST MOD 30 MIN: CPT | Performed by: FAMILY MEDICINE

## 2019-04-25 LAB
ALBUMIN SERPL-MCNC: 3.5 G/DL (ref 3.6–5.1)
ALBUMIN/GLOB SERPL: 1.3 (CALC) (ref 1–2.5)
ALP SERPL-CCNC: 72 U/L (ref 40–115)
ALT SERPL-CCNC: 14 U/L (ref 9–46)
AST SERPL-CCNC: 11 U/L (ref 10–35)
BASOPHILS # BLD AUTO: 29 CELLS/UL (ref 0–200)
BASOPHILS NFR BLD AUTO: 0.5 %
BILIRUB SERPL-MCNC: 0.3 MG/DL (ref 0.2–1.2)
BUN SERPL-MCNC: 19 MG/DL (ref 7–25)
BUN/CREAT SERPL: 17 (CALC) (ref 6–22)
CALCIUM SERPL-MCNC: 8.8 MG/DL (ref 8.6–10.3)
CHLORIDE SERPL-SCNC: 105 MMOL/L (ref 98–110)
CHOLEST SERPL-MCNC: 177 MG/DL
CHOLEST/HDLC SERPL: 3.5 (CALC)
CO2 SERPL-SCNC: 27 MMOL/L (ref 20–32)
CREAT SERPL-MCNC: 1.13 MG/DL (ref 0.7–1.11)
EOSINOPHIL # BLD AUTO: 191 CELLS/UL (ref 15–500)
EOSINOPHIL NFR BLD AUTO: 3.3 %
ERYTHROCYTE [DISTWIDTH] IN BLOOD BY AUTOMATED COUNT: 12.2 % (ref 11–15)
GLOBULIN SER CALC-MCNC: 2.8 G/DL (CALC) (ref 1.9–3.7)
GLUCOSE SERPL-MCNC: 89 MG/DL (ref 65–99)
HCT VFR BLD AUTO: 30.3 % (ref 38.5–50)
HDLC SERPL-MCNC: 50 MG/DL
HGB BLD-MCNC: 9.9 G/DL (ref 13.2–17.1)
LDLC SERPL CALC-MCNC: 108 MG/DL (CALC)
LYMPHOCYTES # BLD AUTO: 1392 CELLS/UL (ref 850–3900)
LYMPHOCYTES NFR BLD AUTO: 24 %
MCH RBC QN AUTO: 32 PG (ref 27–33)
MCHC RBC AUTO-ENTMCNC: 32.7 G/DL (ref 32–36)
MCV RBC AUTO: 98.1 FL (ref 80–100)
MONOCYTES # BLD AUTO: 644 CELLS/UL (ref 200–950)
MONOCYTES NFR BLD AUTO: 11.1 %
NEUTROPHILS # BLD AUTO: 3544 CELLS/UL (ref 1500–7800)
NEUTROPHILS NFR BLD AUTO: 61.1 %
NONHDLC SERPL-MCNC: 127 MG/DL (CALC)
PLATELET # BLD AUTO: 383 THOUSAND/UL (ref 140–400)
PMV BLD REES-ECKER: 11.2 FL (ref 7.5–12.5)
POTASSIUM SERPL-SCNC: 4.6 MMOL/L (ref 3.5–5.3)
PROT SERPL-MCNC: 6.3 G/DL (ref 6.1–8.1)
RBC # BLD AUTO: 3.09 MILLION/UL (ref 4.2–5.8)
SL AMB EGFR AFRICAN AMERICAN: 67 ML/MIN/1.73M2
SL AMB EGFR NON AFRICAN AMERICAN: 58 ML/MIN/1.73M2
SODIUM SERPL-SCNC: 136 MMOL/L (ref 135–146)
TRIGL SERPL-MCNC: 94 MG/DL
TSH SERPL-ACNC: 3.6 MIU/L (ref 0.4–4.5)
WBC # BLD AUTO: 5.8 THOUSAND/UL (ref 3.8–10.8)

## 2019-04-29 ENCOUNTER — TELEPHONE (OUTPATIENT)
Dept: FAMILY MEDICINE CLINIC | Facility: CLINIC | Age: 84
End: 2019-04-29

## 2019-04-29 DIAGNOSIS — D64.9 ANEMIA, UNSPECIFIED TYPE: Primary | ICD-10-CM

## 2019-05-01 LAB
FERRITIN SERPL-MCNC: 58 NG/ML (ref 20–380)
FOLATE SERPL-MCNC: >24 NG/ML
IRON SERPL-MCNC: 109 MCG/DL (ref 50–180)
VIT B12 SERPL-MCNC: 504 PG/ML (ref 200–1100)

## 2019-05-03 ENCOUNTER — TELEPHONE (OUTPATIENT)
Dept: FAMILY MEDICINE CLINIC | Facility: CLINIC | Age: 84
End: 2019-05-03

## 2019-05-13 ENCOUNTER — TELEPHONE (OUTPATIENT)
Dept: OBGYN CLINIC | Facility: HOSPITAL | Age: 84
End: 2019-05-13

## 2019-06-04 ENCOUNTER — TELEPHONE (OUTPATIENT)
Dept: FAMILY MEDICINE CLINIC | Facility: CLINIC | Age: 84
End: 2019-06-04

## 2019-06-05 DIAGNOSIS — D64.9 ANEMIA, UNSPECIFIED TYPE: Primary | ICD-10-CM

## 2019-06-07 LAB
BASOPHILS # BLD AUTO: 29 CELLS/UL (ref 0–200)
BASOPHILS NFR BLD AUTO: 0.5 %
EOSINOPHIL # BLD AUTO: 485 CELLS/UL (ref 15–500)
EOSINOPHIL NFR BLD AUTO: 8.5 %
ERYTHROCYTE [DISTWIDTH] IN BLOOD BY AUTOMATED COUNT: 12.9 % (ref 11–15)
HCT VFR BLD AUTO: 33.7 % (ref 38.5–50)
HGB BLD-MCNC: 11.1 G/DL (ref 13.2–17.1)
LYMPHOCYTES # BLD AUTO: 1060 CELLS/UL (ref 850–3900)
LYMPHOCYTES NFR BLD AUTO: 18.6 %
MCH RBC QN AUTO: 31.8 PG (ref 27–33)
MCHC RBC AUTO-ENTMCNC: 32.9 G/DL (ref 32–36)
MCV RBC AUTO: 96.6 FL (ref 80–100)
MONOCYTES # BLD AUTO: 684 CELLS/UL (ref 200–950)
MONOCYTES NFR BLD AUTO: 12 %
NEUTROPHILS # BLD AUTO: 3443 CELLS/UL (ref 1500–7800)
NEUTROPHILS NFR BLD AUTO: 60.4 %
PLATELET # BLD AUTO: 231 THOUSAND/UL (ref 140–400)
PMV BLD REES-ECKER: 11.5 FL (ref 7.5–12.5)
RBC # BLD AUTO: 3.49 MILLION/UL (ref 4.2–5.8)
WBC # BLD AUTO: 5.7 THOUSAND/UL (ref 3.8–10.8)

## 2019-06-11 ENCOUNTER — OFFICE VISIT (OUTPATIENT)
Dept: OBGYN CLINIC | Facility: HOSPITAL | Age: 84
End: 2019-06-11
Payer: COMMERCIAL

## 2019-06-11 ENCOUNTER — HOSPITAL ENCOUNTER (OUTPATIENT)
Dept: RADIOLOGY | Facility: HOSPITAL | Age: 84
Discharge: HOME | End: 2019-06-11
Attending: ORTHOPAEDIC SURGERY
Payer: COMMERCIAL

## 2019-06-11 VITALS
DIASTOLIC BLOOD PRESSURE: 87 MMHG | WEIGHT: 174 LBS | BODY MASS INDEX: 26.46 KG/M2 | SYSTOLIC BLOOD PRESSURE: 151 MMHG | HEART RATE: 87 BPM

## 2019-06-11 DIAGNOSIS — M25.511 ACUTE PAIN OF RIGHT SHOULDER: ICD-10-CM

## 2019-06-11 DIAGNOSIS — M25.511 ACUTE PAIN OF RIGHT SHOULDER: Primary | ICD-10-CM

## 2019-06-11 DIAGNOSIS — B02.29 POST HERPETIC NEURALGIA: ICD-10-CM

## 2019-06-11 PROCEDURE — 73030 X-RAY EXAM OF SHOULDER: CPT

## 2019-06-11 PROCEDURE — 99213 OFFICE O/P EST LOW 20 MIN: CPT | Performed by: ORTHOPAEDIC SURGERY

## 2019-06-14 ENCOUNTER — TELEPHONE (OUTPATIENT)
Dept: FAMILY MEDICINE CLINIC | Facility: CLINIC | Age: 84
End: 2019-06-14

## 2019-07-06 DIAGNOSIS — M25.50 ARTHRALGIA, UNSPECIFIED JOINT: ICD-10-CM

## 2019-07-08 RX ORDER — GABAPENTIN 100 MG/1
CAPSULE ORAL
Qty: 270 CAPSULE | Refills: 1 | Status: SHIPPED | OUTPATIENT
Start: 2019-07-08 | End: 2019-07-12

## 2019-07-11 RX ORDER — LANOLIN ALCOHOL/MO/W.PET/CERES
CREAM (GRAM) TOPICAL
COMMUNITY
Start: 2012-11-12 | End: 2019-08-30 | Stop reason: ALTCHOICE

## 2019-07-12 ENCOUNTER — OFFICE VISIT (OUTPATIENT)
Dept: FAMILY MEDICINE CLINIC | Facility: CLINIC | Age: 84
End: 2019-07-12
Payer: COMMERCIAL

## 2019-07-12 ENCOUNTER — TELEPHONE (OUTPATIENT)
Dept: FAMILY MEDICINE CLINIC | Facility: CLINIC | Age: 84
End: 2019-07-12

## 2019-07-12 VITALS
HEART RATE: 86 BPM | BODY MASS INDEX: 26.13 KG/M2 | OXYGEN SATURATION: 97 % | TEMPERATURE: 97.2 F | HEIGHT: 68 IN | SYSTOLIC BLOOD PRESSURE: 100 MMHG | DIASTOLIC BLOOD PRESSURE: 60 MMHG | WEIGHT: 172.4 LBS | RESPIRATION RATE: 16 BRPM

## 2019-07-12 DIAGNOSIS — I10 ESSENTIAL HYPERTENSION: ICD-10-CM

## 2019-07-12 DIAGNOSIS — R06.02 SOB (SHORTNESS OF BREATH): Primary | ICD-10-CM

## 2019-07-12 DIAGNOSIS — R06.02 SHORTNESS OF BREATH: ICD-10-CM

## 2019-07-12 PROCEDURE — 1125F AMNT PAIN NOTED PAIN PRSNT: CPT | Performed by: FAMILY MEDICINE

## 2019-07-12 PROCEDURE — 1036F TOBACCO NON-USER: CPT | Performed by: FAMILY MEDICINE

## 2019-07-12 PROCEDURE — 1160F RVW MEDS BY RX/DR IN RCRD: CPT | Performed by: FAMILY MEDICINE

## 2019-07-12 PROCEDURE — G0439 PPPS, SUBSEQ VISIT: HCPCS | Performed by: FAMILY MEDICINE

## 2019-07-12 PROCEDURE — 99214 OFFICE O/P EST MOD 30 MIN: CPT | Performed by: FAMILY MEDICINE

## 2019-07-12 PROCEDURE — 1170F FXNL STATUS ASSESSED: CPT | Performed by: FAMILY MEDICINE

## 2019-07-12 RX ORDER — BUDESONIDE AND FORMOTEROL FUMARATE DIHYDRATE 160; 4.5 UG/1; UG/1
2 AEROSOL RESPIRATORY (INHALATION) 2 TIMES DAILY
Qty: 1 INHALER | Refills: 2 | Status: SHIPPED | OUTPATIENT
Start: 2019-07-12 | End: 2019-08-03 | Stop reason: SDUPTHER

## 2019-07-12 NOTE — ASSESSMENT & PLAN NOTE
Shortness of breath  Had a long discussion with the patient and his daughter regarding etiology of shortness of breath  He was given an inhaler for Dulera 100/5 mcg to use 2 inhalations twice daily for possible reactive airway disease  He will also obtain pulmonary function testing for evaluation  Patient did have chest x-ray in April 2019    If lung testing is within normal limits patient will contact his cardiologist to consider cardiac stress test for further investigation

## 2019-07-12 NOTE — PATIENT INSTRUCTIONS
Obesity   AMBULATORY CARE:   Obesity  is when your body mass index (BMI) is greater than 30  Your healthcare provider will use your height and weight to measure your BMI  The risks of obesity include  many health problems, such as injuries or physical disability  You may need tests to check for the following:  · Diabetes     · High blood pressure or high cholesterol     · Heart disease     · Gallbladder or liver disease     · Cancer of the colon, breast, prostate, liver, or kidney     · Sleep apnea     · Arthritis or gout  Seek care immediately if:   · You have a severe headache, confusion, or difficulty speaking  · You have weakness on one side of your body  · You have chest pain, sweating, or shortness of breath  Contact your healthcare provider if:   · You have symptoms of gallbladder or liver disease, such as pain in your upper abdomen  · You have knee or hip pain and discomfort while walking  · You have symptoms of diabetes, such as intense hunger and thirst, and frequent urination  · You have symptoms of sleep apnea, such as snoring or daytime sleepiness  · You have questions or concerns about your condition or care  Treatment for obesity  focuses on helping you lose weight to improve your health  Even a small decrease in BMI can reduce the risk for many health problems  Your healthcare provider will help you set a weight-loss goal   · Lifestyle changes  are the first step in treating obesity  These include making healthy food choices and getting regular physical activity  Your healthcare provider may suggest a weight-loss program that involves coaching, education, and therapy  · Medicine  may help you lose weight when it is used with a healthy diet and physical activity  · Surgery  can help you lose weight if you are very obese and have other health problems  There are several types of weight-loss surgery  Ask your healthcare provider for more information    Be successful losing weight:   · Set small, realistic goals  An example of a small goal is to walk for 20 minutes 5 days a week  Anther goal is to lose 5% of your body weight  · Tell friends, family members, and coworkers about your goals  and ask for their support  Ask a friend to lose weight with you, or join a weight-loss support group  · Identify foods or triggers that may cause you to overeat , and find ways to avoid them  Remove tempting high-calorie foods from your home and workplace  Place a bowl of fresh fruit on your kitchen counter  If stress causes you to eat, then find other ways to cope with stress  · Keep a diary to track what you eat and drink  Also write down how many minutes of physical activity you do each day  Weigh yourself once a week and record it in your diary  Eating changes: You will need to eat 500 to 1,000 fewer calories each day than you currently eat to lose 1 to 2 pounds a week  The following changes will help you cut calories:  · Eat smaller portions  Use small plates, no larger than 9 inches in diameter  Fill your plate half full of fruits and vegetables  Measure your food using measuring cups until you know what a serving size looks like  · Eat 3 meals and 1 or 2 snacks each day  Plan your meals in advance  [place] and eat at home most of the time  Eat slowly  · Eat fruits and vegetables at every meal   They are low in calories and high in fiber, which makes you feel full  Do not add butter, margarine, or cream sauce to vegetables  Use herbs to season steamed vegetables  · Eat less fat and fewer fried foods  Eat more baked or grilled chicken and fish  These protein sources are lower in calories and fat than red meat  Limit fast food  Dress your salads with olive oil and vinegar instead of bottled dressing  · Limit the amount of sugar you eat  Do not drink sugary beverages  Limit alcohol  Activity changes:  Physical activity is good for your body in many ways   It helps you burn calories and build strong muscles  It decreases stress and depression, and improves your mood  It can also help you sleep better  Talk to your healthcare provider before you begin an exercise program   · Exercise for at least 30 minutes 5 days a week  Start slowly  Set aside time each day for physical activity that you enjoy and that is convenient for you  It is best to do both weight training and an activity that increases your heart rate, such as walking, bicycling, or swimming  · Find ways to be more active  Do yard work and housecleaning  Walk up the stairs instead of using elevators  Spend your leisure time going to events that require walking, such as outdoor festivals or fairs  This extra physical activity can help you lose weight and keep it off  Follow up with your healthcare provider as directed: You may need to meet with a dietitian  Write down your questions so you remember to ask them during your visits  © 2017 [address] Information is for End User's use only and may not be sold, redistributed or otherwise used for commercial purposes  All illustrations and images included in CareNotes® are the copyrighted property of [employer]  or Avi Jensen  The above information is an  only  It is not intended as medical advice for individual conditions or treatments  Talk to your doctor, nurse or pharmacist before following any medical regimen to see if it is safe and effective for you  Urinary Incontinence   WHAT YOU NEED TO KNOW:   What is urinary incontinence? Urinary incontinence (UI) is when you lose control of your bladder  What causes UI? UI occurs because your bladder cannot store or empty urine properly  The following are the most common types of UI:  · Stress incontinence  is when you leak urine due to increased bladder pressure  This may happen when you cough, sneeze, or exercise       · Urge incontinence  is when you feel the need to urinate right away and leak urine accidentally  · Mixed incontinence  is when you have both stress and urge UI  What are the signs and symptoms of UI?   · You feel like your bladder does not empty completely when you urinate  · You urinate often and need to urinate immediately  · You leak urine when you sleep, or you wake up with the urge to urinate  · You leak urine when you cough, sneeze, exercise, or laugh  How is UI diagnosed? Your healthcare provider will ask how often you leak urine and whether you have stress or urge symptoms  Tell him which medicines you take, how often you urinate, and how much liquid you drink each day  You may need any of the following tests:  · Urine tests  may show infection or kidney function  · A pelvic exam  may be done to check for blockages  A pelvic exam will also show if your bladder, uterus, or other organs have moved out of place  · An x-ray, ultrasound, or CT  may show problems with parts of your urinary system  You may be given contrast liquid to help your organs show up better in the pictures  Tell the healthcare provider if you have ever had an allergic reaction to contrast liquid  Do not enter the MRI room with anything metal  Metal can cause serious injury  Tell the healthcare provider if you have any metal in or on your body  · A bladder scan  will show how much urine is left in your bladder after you urinate  You will be asked to urinate and then healthcare providers will use a small ultrasound machine to check the urine left in your bladder  · Cystometry  is used to check the function of your urinary system  Your healthcare provider checks the pressure in your bladder while filling it with fluid  Your bladder pressure may also be tested when your bladder is full and while you urinate  How is UI treated? · Medicines  can help strengthen your bladder control      · Electrical stimulation  is used to send a small amount of electrical energy to your pelvic floor muscles  This helps control your bladder function  Electrodes may be placed outside your body or in your rectum  For women, the electrodes may be placed in the vagina  · A bulking agent  may be injected into the wall of your urethra to make it thicker  This helps keep your urethra closed and decreases urine leakage  · Devices  such as a clamp, pessary, or tampon may help stop urine leaks  Ask your healthcare provider for more information about these and other devices  · Surgery  may be needed if other treatments do not work  Several types of surgery can help improve your bladder control  Ask your healthcare provider for more information about the surgery you may need  How can I manage my symptoms? · Do pelvic muscle exercises often  Your pelvic muscles help you stop urinating  Squeeze these muscles tight for 5 seconds, then relax for 5 seconds  Gradually work up to squeezing for 10 seconds  Do 3 sets of 15 repetitions a day, or as directed  This will help strengthen your pelvic muscles and improve bladder control  · A catheter  may be used to help empty your bladder  A catheter is a tiny, plastic tube that is put into your bladder to drain your urine  Your healthcare provider may tell you to use a catheter to prevent your bladder from getting too full and leaking urine  · Keep a UI record  Write down how often you leak urine and how much you leak  Make a note of what you were doing when you leaked urine  · Train your bladder  Go to the bathroom at set times, such as every 2 hours, even if you do not feel the urge to go  You can also try to hold your urine when you feel the urge to go  For example, hold your urine for 5 minutes when you feel the urge to go  As that becomes easier, hold your urine for 10 minutes  · Drink liquids as directed  Ask your healthcare provider how much liquid to drink each day and which liquids are best for you   You may need to limit the amount of liquid you drink to help control your urine leakage  Limit or do not have drinks that contain caffeine or alcohol  Do not drink any liquid right before you go to bed  · Prevent constipation  Eat a variety of high-fiber foods  Good examples are high-fiber cereals, beans, vegetables, and whole-grain breads  Prune juice may help make your bowel movement softer  Walking is the best way to trigger your intestines to have a bowel movement  · Exercise regularly and maintain a healthy weight  Ask your healthcare provider how much you should weigh and about the best exercise plan for you  Weight loss and exercise will decrease pressure on your bladder and help you control your leakage  Ask him to help you create a weight loss plan if you are overweight  When should I seek immediate care? · You have severe pain  · You are confused or cannot think clearly  When should I contact my healthcare provider? · You have a fever  · You see blood in your urine  · You have pain when you urinate  · You have new or worse pain, even after treatment  · Your mouth feels dry or you have vision changes  · Your urine is cloudy or smells bad  · You have questions or concerns about your condition or care  CARE AGREEMENT:   You have the right to help plan your care  Learn about your health condition and how it may be treated  Discuss treatment options with your caregivers to decide what care you want to receive  You always have the right to refuse treatment  The above information is an  only  It is not intended as medical advice for individual conditions or treatments  Talk to your doctor, nurse or pharmacist before following any medical regimen to see if it is safe and effective for you  © 2017 [address] Information is for End User's use only and may not be sold, redistributed or otherwise used for commercial purposes   All illustrations and images included in CareNotes® are the copyrighted property of [employer]  or Avi Jensen  Cigarette Smoking and Your Health   AMBULATORY CARE:   Risks to your health if you smoke:  Nicotine and other chemicals found in tobacco damage every cell in your body  Even if you are a light smoker, you have an increased risk for cancer, heart disease, and lung disease  If you are pregnant or have diabetes, smoking increases your risk for complications  Benefits to your health if you stop smoking:   · You decrease respiratory symptoms such as coughing, wheezing, and shortness of breath  · You reduce your risk for cancers of the lung, mouth, throat, kidney, bladder, pancreas, stomach, and cervix  If you already have cancer, you increase the benefits of chemotherapy  You also reduce your risk for cancer returning or a second cancer from developing  · You reduce your risk for heart disease, blood clots, heart attack, and stroke  · You reduce your risk for lung infections, and diseases such as pneumonia, asthma, chronic bronchitis, and emphysema  · Your circulation improves  More oxygen can be delivered to your body  If you have diabetes, you lower your risk for complications, such as kidney, artery, and eye diseases  You also lower your risk for nerve damage  Nerve damage can lead to amputations, poor vision, and blindness  · You improve your body's ability to heal and to fight infections  Benefits to the health of others if you stop smoking:  Tobacco is harmful to nonsmokers who breathe in your secondhand smoke  The following are ways the health of others around you may improve when you stop smoking:  · You lower the risks for lung cancer and heart disease in nonsmoking adults  · If you are pregnant, you lower the risk for miscarriage, early delivery, low birth weight, and stillbirth  You also lower your baby's risk for SIDS, obesity, developmental delay, and neurobehavioral problems, such as ADHD  · If you have children, you lower their risk for ear infections, colds, pneumonia, bronchitis, and asthma  For more information and support to stop smoking:   · Smokefree  gov  Phone: 9- 301 - 675-3991  Web Address: www smokefr[employer]  Follow up with your healthcare provider as directed:  Write down your questions so you remember to ask them during your visits  © 2017 [address] Information is for End User's use only and may not be sold, redistributed or otherwise used for commercial purposes  All illustrations and images included in CareNotes® are the copyrighted property of [employer]  or Avi Jensen  The above information is an  only  It is not intended as medical advice for individual conditions or treatments  Talk to your doctor, nurse or pharmacist before following any medical regimen to see if it is safe and effective for you  Fall Prevention   WHAT YOU NEED TO KNOW:   What is fall prevention? Fall prevention includes ways to make your home and other areas safer  It also includes ways you can move more carefully to prevent a fall  What increases my risk for falls? · Lack of vitamin D    · Not getting enough sleep each night    · Trouble walking or keeping your balance, or foot problems    · Health conditions that cause changes in your blood pressure, vision, or muscle strength and coordination    · Medicines that make you dizzy, weak, or sleepy    · Problems seeing clearly    · Shoes that have high heels or are not supportive    · Tripping hazards, such as items left on the floor, no handrails on the stairs, or broken steps  How can I help protect myself from falls? · Stand or sit up slowly  This may help you keep your balance and prevent falls  If you need to get up during the night, sit up first  Be sure you are fully awake before you stand  Turn on the light before you start walking  Go slowly in case you are still sleepy   Make sure you will not trip over any pets sleeping in the bedroom  · Use assistive devices as directed  Your healthcare provider may suggest that you use a cane or walker to help you keep your balance  You may need to have grab bars put in your bathroom near the toilet or in the shower  · Wear shoes that fit well and have soles that   Wear shoes both inside and outside  Use slippers with good   Do not wear shoes with high heels  · Wear a personal alarm  This is a device that allows you to call 911 if you fall and need help  Ask your healthcare provider for more information  · Stay active  Exercise can help strengthen your muscles and improve your balance  Your healthcare provider may recommend water aerobics or walking  He or she may also recommend physical therapy to improve your coordination  Never start an exercise program without talking to your healthcare provider first      · Manage medical conditions  Keep all appointments with your healthcare providers  Visit your eye doctor as directed  How can I make my home safer? · Add items to prevent falls in the bathroom  Put nonslip strips on your bath or shower floor to prevent you from slipping  Use a bath mat if you do not have carpet in the bathroom  This will prevent you from falling when you step out of the bath or shower  Use a shower seat so you do not need to stand while you shower  Sit on the toilet or a chair in your bathroom to dry yourself and put on clothing  This will prevent you from losing your balance from drying or dressing yourself while you are standing  · Keep paths clear  Remove books, shoes, and other objects from walkways and stairs  Place cords for telephones and lamps out of the way so that you do not need to walk over them  Tape them down if you cannot move them  Remove small rugs  If you cannot remove a rug, secure it with double-sided tape  This will prevent you from tripping  · Install bright lights in your home  Use night lights to help light paths to the bathroom or kitchen  Always turn on the light before you start walking  · Keep items you use often on shelves within reach  Do not use a step stool to help you reach an item  · Paint or place reflective tape on the edges of your stairs  This will help you see the stairs better  Call 911 or have someone else call if:   · You have fallen and are unconscious  · You have fallen and cannot move part of your body  Contact your healthcare provider if:   · You have fallen and have pain or a headache  · You have questions or concerns about your condition or care  CARE AGREEMENT:   You have the right to help plan your care  Learn about your health condition and how it may be treated  Discuss treatment options with your caregivers to decide what care you want to receive  You always have the right to refuse treatment  The above information is an  only  It is not intended as medical advice for individual conditions or treatments  Talk to your doctor, nurse or pharmacist before following any medical regimen to see if it is safe and effective for you  © 2017 [address] [facility] Information is for End User's use only and may not be sold, redistributed or otherwise used for commercial purposes  All illustrations and images included in CareNotes® are the copyrighted property of [employer]  or Avi Jensen  Advance Directives   WHAT YOU NEED TO KNOW:   What are advance directives? Advance directives are legal documents that state your wishes and plans for medical care  These plans are made ahead of time in case you lose your ability to make decisions for yourself  Advance directives can apply to any medical decision, such as the treatments you want, and if you want to donate organs  What are the types of advance directives? There are many types of advance directives, and each state has rules about how to use them   You may choose a combination of any of the following:  · Living will: This is a written record of the treatment you want  You can also choose which treatments you do not want, which to limit, and which to stop at a certain time  This includes surgery, medicine, IV fluid, and tube feedings  · Durable power of  for healthcare [facility]): This is a written record that states who you want to make healthcare choices for you when you are unable to make them for yourself  This person, called a proxy, is usually a family member or a friend  You may choose more than 1 proxy  · Do not resuscitate (DNR) order:  A DNR order is used in case your heart stops beating or you stop breathing  It is a request not to have certain forms of treatment, such as CPR  A DNR order may be included in other types of advance directives  · Medical directive: This covers the care that you want if you are in a coma, near death, or unable to make decisions for yourself  You can list the treatments you want for each condition  Treatment may include pain medicine, surgery, blood transfusions, dialysis, IV or tube feedings, and a ventilator (breathing machine)  · Values history: This document has questions about your views, beliefs, and how you feel and think about life  This information can help others choose the care that you would choose  Why are advance directives important? An advance directive helps you control your care  Although spoken wishes may be used, it is better to have your wishes written down  Spoken wishes can be misunderstood, or not followed  Treatments may be given even if you do not want them  An advance directive may make it easier for your family to make difficult choices about your care  How do I decide what to put in my advance directives? · Make informed decisions:  Make sure you fully understand treatments or care you may receive   Think about the benefits and problems your decisions could cause for you or your family  Talk to healthcare providers if you have concerns or questions before you write down your wishes  You may also want to talk with your Zoroastrianism or , or a   Check your state laws to make sure that what you put in your advance directive is legal      · Sign all forms:  Sign and date your advance directive when you have finished  You may also need 2 witnesses to sign the forms  Witnesses cannot be your doctor or his staff, your spouse, heirs or beneficiaries, people you owe money to, or your chosen proxy  Talk to your family, proxy, and healthcare providers about your advance directive  Give each person a copy, and keep one for yourself in a place you can get to easily  Do not keep it hidden or locked away  · Review and revise your plans: You can revise your advance directive at any time, as long as you are able to make decisions  Review your plan every year, and when there are changes in your life, or your health  When you make changes, let your family, proxy, and healthcare providers know  Give each a new copy  Where can I find more information? · [employer]  [address] [place] , [address]  Phone: 2- 678 - [phone]  Phone: 3- 930 - [phone]  Web Address: http://www  aafp org  · [address] [facility])  [address] [facility], [address]  Phone: 6- [phone]  Phone: [phone]  Web Address: Dejon perez  [facility] AGREEMENT:   You have the right to help plan your care  To help with this plan, you must learn about your health condition and treatment options  You must also learn about advance directives and how they are used  Work with your healthcare providers to decide what care will be used to treat you  You always have the right to refuse treatment  The above information is an  only   It is not intended as medical advice for individual conditions or treatments  Talk to your doctor, nurse or pharmacist before following any medical regimen to see if it is safe and effective for you  © 2017 [address] Information is for End User's use only and may not be sold, redistributed or otherwise used for commercial purposes  All illustrations and images included in CareNotes® are the copyrighted property of [employer]  or Avi Jensen

## 2019-07-12 NOTE — PROGRESS NOTES
Assessment and Plan:     Problem List Items Addressed This Visit        Cardiovascular and Mediastinum    HTN (hypertension)       Other    Shortness of breath     Shortness of breath  Had a long discussion with the patient and his daughter regarding etiology of shortness of breath  He was given an inhaler for Dulera 100/5 mcg to use 2 inhalations twice daily for possible reactive airway disease  He will also obtain pulmonary function testing for evaluation  Patient did have chest x-ray in April 2019    If lung testing is within normal limits patient will contact his cardiologist to consider cardiac stress test for further investigation           Other Visit Diagnoses     SOB (shortness of breath)    -  Primary    Relevant Medications    mometasone-formoterol (DULERA) 100-5 MCG/ACT inhaler    Other Relevant Orders    Complete pulmonary function test         History of Present Illness:     Patient presents for Medicare Annual Wellness visit    Patient Care Team:  Kvng Hylton MD as PCP - MD Faisal Corrales MD Cheryl Bush, MD     Problem List:     Patient Active Problem List   Diagnosis    Cholecystitis with cholelithiasis    Allergic rhinitis    Anemia    Benign essential hypertension    Esophageal reflux    Glaucoma    Hiatal hernia    Hyperlipidemia    Joint pain, knee    Left lumbar radiculopathy    Peripheral vascular disease ([address]    Preoperative evaluation of a medical condition to rule out surgical contraindications (TAR required)    Syncope    Head injury due to trauma    FRANCO (acute kidney injury) ([address]    PAF (paroxysmal atrial fibrillation) ([address]    Right wrist pain    Peripheral edema    Arthritis of carpometacarpal (CMC) joint of right thumb    Paresthesia    Dehydration    Hyponatremia    Abnormal liver function    Sepsis ([address] )    Other insomnia    Anxiety    Carpal tunnel syndrome    S/P ablation of ventricular arrhythmia    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[facility]    HTN (hypertension)    Shortness of breath      Past Medical and Surgical History:     Past Medical History:   Diagnosis Date    A-fib ([facility]     Anemia     Carpal tunnel syndrome, unspecified upper limb     Cataract     last assessed: 8/18/2012    GERD (gastroesophageal reflux disease)     Glaucoma     Hypertension     last assessed: 8/23/2012    Intervertebral disc disease     PVD (peripheral vascular disease) (HCC)      Past Surgical History:   Procedure Laterality Date    CATARACT EXTRACTION      HERNIA REPAIR      NEUROPLASTY / TRANSPOSITION MEDIAN NERVE AT CARPAL TUNNEL      [place] LAP,CHOLECYSTECTOMY N/A 9/14/2016    Procedure: LAPAROSCOPIC CHOLECYSTECTOMY ;  Surgeon: Jeremie Carrasco MD;  Location: [facility];  Service: General      Family History:     Family History   Problem Relation Age of Onset    Glaucoma Mother     Cancer Father     Heart disease Father    Ayah Luz Glaucoma Father     Alzheimer's disease Family     Heart attack Family       Social History:     Social History     Tobacco Use   Smoking Status Former Smoker   Smokeless Tobacco Never Used   Tobacco Comment    quit 30 years ago      Social History     Substance and Sexual Activity   Alcohol Use Not Currently     Social History     Substance and Sexual Activity   Drug Use No      Medications and Allergies:     Current Outpatient Medications   Medication Sig Dispense Refill    amLODIPine (NORVASC) 10 mg tablet TAKE 1 TABLET EVERY DAY 30 tablet 5    aspirin (ASPIRIN 81) 81 mg chewable tablet aspirin 81 mg tablet   Take by oral route   aspirin 81 MG tablet Take 81 mg by mouth daily        cholecalciferol (VITAMIN D3) 1,000 units tablet Take 2,000 Units by mouth daily       Coenzyme Q10 (CO Q 10) 100 MG CAPS Take 1 capsule by mouth daily      cyanocobalamin (VITAMIN B-12) 1,000 mcg tablet Take by mouth      dorzolamide-timolol (COSOPT) 22 3-6 8 MG/ML ophthalmic solution 1 drop 2 (two) times a day      fish oil 1,000 mg Take 1,000 mg by mouth   Garlic 1274 MG CAPS Take 1 tablet by mouth daily   latanoprost (XALATAN) 0 005 % ophthalmic solution Administer 1 drop to both eyes daily at bedtime      metoprolol succinate (TOPROL-XL) 25 mg 24 hr tablet Take 25 mg by mouth daily        multivitamin-iron-minerals-folic acid (CENTRUM) chewable tablet Chew 1 tablet daily   Omega-3 Fatty Acids (FISH OIL) 1,000 mg Fish Oil      omeprazole (PriLOSEC) 20 mg delayed release capsule Take 1 capsule by mouth every other day       Probiotic Product (ALIGN PO) Align      vitamin E, tocopherol, 400 units capsule Take 400 Units by mouth daily   mometasone-formoterol (DULERA) 100-5 MCG/ACT inhaler Inhale 2 puffs 2 (two) times a day Rinse mouth after use  1 Inhaler 1     No current facility-administered medications for this visit  Allergies   Allergen Reactions    Atorvastatin GI Intolerance    Zetia [Ezetimibe] Other (See Comments)     myalgia      Immunizations:     Immunization History   Administered Date(s) Administered    INFLUENZA 09/23/2014    Influenza Split High Dose Preservative Free IM 09/23/2014, 12/17/2015    Influenza TIV (IM) 10/24/2003, 11/08/2005, 11/13/2007, 10/16/2008, 12/16/2009, 11/10/2010, 11/18/2011, 09/24/2012, 09/25/2013    Td (adult), adsorbed 11/01/2003    Tdap 03/12/2018      Medicare Screening Tests and Risk Assessments:     Warden Cardoza is here for his Subsequent Wellness visit  Health Risk Assessment:  Patient rates overall health as fair  Patient feels that their physical health rating is Slightly worse  Eyesight was rated as Same  Hearing was rated as Same  Patient feels that their emotional and mental health rating is Same  Pain experienced by patient in the last 7 days has been Some  Patient's pain rating has been 2/10  Emotional/Mental Health:  Patient has been feeling nervous/anxious      PHQ-9 Depression Screening:    Frequency of the following problems over the past two weeks:      1  Little interest or pleasure in doing things: 1 - several days      2  Feeling down, depressed, or hopeless: 3 - nearly every day      3  Trouble falling or staying asleep, or sleeping too much: 3 - nearly every day      4  Feeling tired or having little energy: 3 - nearly every day      5  Poor appetite or overeatin - not at all      6  Feeling bad about yourself - or that you are a failure or have let yourself or your family down: 0 - not at all      7  Trouble concentrating on things, such as reading the newspaper or watching television: 0 - not at all      8  Moving or speaking so slowly that other people could have noticed  Or the opposite - being so fidgety or restless that you have been moving around a lot more than usual: 0 - not at all      9  Thoughts that you would be better off dead, or of hurting yourself in some way: 0 - not at all  PHQ-2 Score: 4  PHQ-9 Score: 10    Broken Bones/Falls: Fall Risk Assessment:    In the past year, patient has experienced: No history of falling in past year          Bladder/Bowel:  Patient has not leaked urine accidently in the last six months  Patient reports no loss of bowel control  Immunizations:  Patient has not had a flu vaccination within the last year  Patient has not received a pneumonia shot  Patient has not received a shingles shot  Patient has received tetanus/diphtheria shot  Date of tetanus/diphtheria shot: 3/12/2018    Home Safety:  Patient does not have trouble with stairs inside or outside of their home  Patient currently reports that there are no safety hazards present in home, working smoke alarms, working carbon monoxide detectors        Preventative Screenings:   prostate cancer screen performed, 2009  colon cancer screen completed, 2010  cholesterol screen completed, 2019  glaucoma eye exam completed,     Nutrition:  Current diet: Regular, No Added Salt and Frequent junk food with servings of the following:    Medications:  Patient is currently taking over-the-counter supplements  List of OTC medications includes: Vitamin E, Probiotic, Fish Oil, MVI  Patient is able to manage medications  Lifestyle Choices:  Patient reports no tobacco use  Patient has smoked or used tobacco in the past   Patient has stopped his tobacco use  Patient reports alcohol use  Alcohol use per week: 1-2  Patient drives a vehicle  Patient wears seat belt  Current level of exercise of physical activity described by patient as: active  Activities of Daily Living:  Can get out of bed by his or her self, able to dress self, able to make own meals, able to do own shopping, able to bathe self, can do own laundry/housekeeping, can manage own money, pay bills and track expenses    Previous Hospitalizations:  Hospitalization or ED visit in past 12 months  Number of hospitalizations within the last year: 1-2        Advanced Directives:  Patient has decided on a power of   Patient has spoken to designated power of   Patient has completed advanced directive          Preventative Screening/Counseling:      Cardiovascular:      General: Screening Current          Diabetes:      General: Screening Current          Colorectal Cancer:      General: Screening Not Indicated          Prostate Cancer:      General: Screening Not Indicated          Advanced Directives:   Patient has living will for healthcare,     Immunizations:      Influenza: Risks & Benefits Discussed and Influenza UTD This Year      Shingrix: Patient Declines

## 2019-07-12 NOTE — TELEPHONE ENCOUNTER
Patient's RX for Jewell Jakob is not covered by his insurance, there are 2 different inhalers that are covered by insurance:    Symbicort or Advair    Please send one to [facility] in [place]

## 2019-07-23 ENCOUNTER — HOSPITAL ENCOUNTER (OUTPATIENT)
Dept: PULMONOLOGY | Facility: HOSPITAL | Age: 84
Discharge: HOME | End: 2019-07-23
Payer: COMMERCIAL

## 2019-07-23 DIAGNOSIS — R06.02 SOB (SHORTNESS OF BREATH): ICD-10-CM

## 2019-07-23 PROCEDURE — 94729 DIFFUSING CAPACITY: CPT

## 2019-07-23 PROCEDURE — 94729 DIFFUSING CAPACITY: CPT | Performed by: INTERNAL MEDICINE

## 2019-07-23 PROCEDURE — 94726 PLETHYSMOGRAPHY LUNG VOLUMES: CPT

## 2019-07-23 PROCEDURE — 94060 EVALUATION OF WHEEZING: CPT

## 2019-07-23 PROCEDURE — 94726 PLETHYSMOGRAPHY LUNG VOLUMES: CPT | Performed by: INTERNAL MEDICINE

## 2019-07-23 PROCEDURE — 94060 EVALUATION OF WHEEZING: CPT | Performed by: INTERNAL MEDICINE

## 2019-07-23 PROCEDURE — 94760 N-INVAS EAR/PLS OXIMETRY 1: CPT

## 2019-07-23 RX ORDER — ALBUTEROL SULFATE 2.5 MG/3ML
2.5 SOLUTION RESPIRATORY (INHALATION) ONCE
Status: COMPLETED | OUTPATIENT
Start: 2019-07-23 | End: 2019-07-23

## 2019-07-23 RX ADMIN — ALBUTEROL SULFATE 2.5 MG: 2.5 SOLUTION RESPIRATORY (INHALATION) at 08:55

## 2019-08-03 DIAGNOSIS — R06.02 SOB (SHORTNESS OF BREATH): ICD-10-CM

## 2019-08-05 ENCOUNTER — TELEPHONE (OUTPATIENT)
Dept: FAMILY MEDICINE CLINIC | Facility: CLINIC | Age: 84
End: 2019-08-05

## 2019-08-05 RX ORDER — BUDESONIDE AND FORMOTEROL FUMARATE DIHYDRATE 160; 4.5 UG/1; UG/1
AEROSOL RESPIRATORY (INHALATION)
Qty: 10.2 INHALER | Refills: 2 | Status: SHIPPED | OUTPATIENT
Start: 2019-08-05 | End: 2019-08-30 | Stop reason: SINTOL

## 2019-08-05 NOTE — TELEPHONE ENCOUNTER
Patient's daughter is calling to get the results of his pulmonary function test that was done @ 2 weeks ago  States she spoke to someone last week who said someone would be calling but they still haven't heard from anyone yet

## 2019-08-06 ENCOUNTER — TELEPHONE (OUTPATIENT)
Dept: FAMILY MEDICINE CLINIC | Facility: CLINIC | Age: 84
End: 2019-08-06

## 2019-08-06 NOTE — TELEPHONE ENCOUNTER
----- Message from Maryana Reyes MD sent at 9/2/5020  8:35 PM EDT -----  Please call patient's daughter  Pulmonary function testing was unremarkable    If patient does not feel improvement with Symbicort inhaler he does not need to continue using at this time

## 2019-08-30 ENCOUNTER — TELEPHONE (OUTPATIENT)
Dept: CARDIOLOGY CLINIC | Facility: CLINIC | Age: 84
End: 2019-08-30

## 2019-08-30 ENCOUNTER — CONSULT (OUTPATIENT)
Dept: CARDIOLOGY CLINIC | Facility: CLINIC | Age: 84
End: 2019-08-30
Payer: COMMERCIAL

## 2019-08-30 VITALS
SYSTOLIC BLOOD PRESSURE: 140 MMHG | HEART RATE: 76 BPM | HEIGHT: 68 IN | WEIGHT: 171 LBS | DIASTOLIC BLOOD PRESSURE: 68 MMHG | BODY MASS INDEX: 25.91 KG/M2

## 2019-08-30 DIAGNOSIS — I48.0 PAROXYSMAL ATRIAL FIBRILLATION (HCC): Primary | ICD-10-CM

## 2019-08-30 DIAGNOSIS — I48.91 ATRIAL FIBRILLATION, UNSPECIFIED TYPE (HCC): Primary | ICD-10-CM

## 2019-08-30 PROCEDURE — 99204 OFFICE O/P NEW MOD 45 MIN: CPT | Performed by: INTERNAL MEDICINE

## 2019-08-30 NOTE — TELEPHONE ENCOUNTER
Pt is scheduled on 09/26/19 for a ADRIANNE/CV with [facility] at [facility]  Pt was in office for instructions  Can I get pre auth please and thank you

## 2019-08-30 NOTE — PROGRESS NOTES
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 Arrington in 2013, with recovered EF who was recently diagnosed with atrial fibrillation  He was hospitalized in April 2019 for fever and leukocytosis thought to be from a viral infection  Ever since the hospitalization he has been feeling fatigued  He was seen by his cardiologist Dr Kellie Lawson who performed pharmacologic nuclear stress test showing no ischemia and EF of 51%  However during the stress test he was noted to be in atrial fibrillation  Last echocardiogram in March 2019 showed normal LV systolic function, mild diastolic dysfunction, mild aortic and mitral sclerosis  Left atrium was mildly enlarged at 4 5 cm  He continues to feel fatigue and dyspnea on exertion  He denies ever having any cardioversion for his atrial ablation  He otherwise has been taking his Eliquis and metoprolol regularly  He otherwise denies any chest pain, lightheadedness, dizziness, swelling in lower extremities, orthopnea, PND or syncope episode      Past Medical History:  Past Medical History:   Diagnosis Date    [facility]     Anemia     Carpal tunnel syndrome, unspecified upper limb     Cataract     last assessed: 8/18/2012    GERD (gastroesophageal reflux disease)     Glaucoma     Hypertension     last assessed: 8/23/2012    Intervertebral disc disease     PVD [facility]peripheral vascular disease) ([facility])        Medications:      Current Outpatient Medications:     cholecalciferol (VITAMIN D3) 1,000 units tablet, Take 2,000 Units by mouth daily , Disp: , Rfl:     dorzolamide-timolol (COSOPT) 22 3-6 8 MG/ML ophthalmic solution, 1 drop 2 (two) times a day, Disp: , Rfl:     fish oil 1,000 mg, Take 1,000 mg by mouth , Disp: , Rfl:     Garlic 6269 MG CAPS, Take 1 tablet by mouth daily  , Disp: , Rfl:     latanoprost (XALATAN) 0 005 % ophthalmic solution, Administer 1 drop to both eyes daily at bedtime, Disp: , Rfl:     metoprolol succinate (TOPROL-XL) 25 mg 24 hr tablet, Take 25 mg by mouth daily  , Disp: , Rfl:     multivitamin-iron-minerals-folic acid (CENTRUM) chewable tablet, Chew 1 tablet daily  , Disp: , Rfl:     Omega-3 Fatty Acids (FISH OIL) 1,000 mg, Fish Oil, Disp: , Rfl:     Probiotic Product (ALIGN PO), Align, Disp: , Rfl:     vitamin E, tocopherol, 400 units capsule, Take 400 Units by mouth daily  , Disp: , Rfl:      Family History   Problem Relation Age of Onset    Glaucoma Mother     Cancer Father     Heart disease Father     Glaucoma Father     Alzheimer's disease Family     Heart attack Family      Social History     Socioeconomic History    Marital status: /Civil Union     Spouse name: Not on file    Number of children: Not on file    Years of education: Not on file    Highest education level: Not on file   Occupational History    Occupation: retired   Social Needs    Financial resource strain: Not on file    Food insecurity:     Worry: Not on file     Inability: Not on file   Classana needs:     Medical: Not on file     Non-medical: Not on file   Tobacco Use    Smoking status: Former Smoker    Smokeless tobacco: Never Used    Tobacco comment: quit 30 years ago    Substance and Sexual Activity    Alcohol use: Not Currently    Drug use: No    Sexual activity: Not on file   Lifestyle    Physical activity:     Days per week: Not on file     Minutes per session: Not on file    Stress: Not on file   Relationships    Social connections:     Talks on phone: Not on file     Gets together: Not on file     Attends [employer] service: Not on file     Active member of club or organization: Not on file Attends meetings of clubs or organizations: Not on file     Relationship status: Not on file    Intimate partner violence:     Fear of current or ex partner: Not on file     Emotionally abused: Not on file     Physically abused: Not on file     Forced sexual activity: Not on file   Other Topics Concern    Not on file   Social History Narrative    Morning person     Social History     Tobacco Use   Smoking Status Former Smoker   Smokeless Tobacco Never Used   Tobacco Comment    quit 30 years ago      Social History     Substance and Sexual Activity   Alcohol Use Not Currently       ROS     Objective:     Vitals: Blood pressure 140/68, height 5' 8" (1 727 m), weight 77 6 kg (171 lb)  , Body mass index is 26 kg/m²  ,        Physical Exam:    GEN: Mahesh Chamberlain appears well, alert and oriented x 3, pleasant and cooperative   HEENT: pupils equal, round, and reactive to light; extraocular muscles intact  NECK: supple, no carotid bruits   HEART:  Irregularly irregular rhythm, normal S1 and S2, no murmurs, clicks, gallops or rubs   LUNGS: clear to auscultation bilaterally; no wheezes, rales, or rhonchi   ABDOMEN: normal bowel sounds, soft, no tenderness, no distention  EXTREMITIES: peripheral pulses normal; no clubbing, cyanosis, or edema  NEURO: no focal findings   SKIN: normal without suspicious lesions on exposed skin      Labs & Results:  Below is the patient's most recent value for Albumin, ALT, AST, BUN, Calcium, Chloride, Cholesterol, CO2, Creatinine, GFR, Glucose, HDL, Hematocrit, Hemoglobin, Hemoglobin A1C, LDL, Magnesium, Phosphorus, Platelets, Potassium, PSA, Sodium, Triglycerides, and WBC     Lab Results   Component Value Date    ALT 14 04/24/2019    AST 11 04/24/2019    BUN 19 04/24/2019    CALCIUM 8 8 04/24/2019     04/24/2019    CO2 27 04/24/2019    CREATININE 1 13 (H) 04/24/2019    HDL 50 04/24/2019    HCT 33 7 (L) 06/07/2019    HGB 11 1 (L) 06/07/2019    HGBA1C 5 4 12/03/2018     06/07/2019 K 4 6 2019    PSA 1 5 2009     10/31/2017    TRIG 94 2019    WBC 5 7 2019     Note: for a comprehensive list of the patient's lab results, access the Results Review activity  Cardiac testing:     I personally reviewed the ECG performed in the clinic on 19  It reveals atrial fibrillation with ventricular rate of 76 beats per minute, occasional PVC  QRS of 88 millisecond  Echocardiograms:  Results for orders placed during the hospital encounter of 18   Echo complete with contrast if indicated    Narrative [address]  [facility], [address] [facility]  [phone]    Transthoracic Echocardiogram  2D, M-mode, Doppler, and Color Doppler    Study date:  13-Mar-2018    Patient: Molly John  MR number: [id number]  Account number: [de-identified]  : 15-Jul-1931  Age: 80 years  Gender: Male  Status: Outpatient  Location: Bedside  Height: 68 in  Weight: 171 6 lb  BP: 124/ 63 mmHg    Indications: Syncope    Diagnoses: R55  - Syncope and collapse    Sonographer:  [address]  Primary Physician:  Adi Bernstein MD  Referring Physician:  Rosalio Dewitt MD  Group:  [facility]  Interpreting Physician:  Romayne Holter, MD    SUMMARY    LEFT VENTRICLE:  Systolic function was normal  Ejection fraction was estimated to be 60 %  There were no regional wall motion abnormalities  Doppler parameters were consistent with abnormal left ventricular relaxation (grade 1 diastolic dysfunction)  MITRAL VALVE:  There was mild regurgitation  TRICUSPID VALVE:  There was mild regurgitation  PULMONIC VALVE:  There was mild regurgitation  HISTORY: PRIOR HISTORY: PAF, HTN, syncope, FRANCO, HLD, anemia    PROCEDURE: The procedure was performed at the bedside  This was a routine study  The transthoracic approach was used  The study included complete 2D imaging, M-mode, complete spectral Doppler, and color Doppler   The heart rate was 65 bpm,  at the start of the study  Image quality was adequate  LEFT VENTRICLE: Size was normal  Systolic function was normal  Ejection fraction was estimated to be 60 %  There were no regional wall motion abnormalities  Wall thickness was normal  DOPPLER: There was an increased relative contribution  of atrial contraction to ventricular filling  Doppler parameters were consistent with abnormal left ventricular relaxation (grade 1 diastolic dysfunction)  VENTRICULAR SEPTUM: There was sigmoid septal appearance  RIGHT VENTRICLE: The size was normal  Systolic function was normal  Wall thickness was normal     LEFT ATRIUM: Size was normal     RIGHT ATRIUM: Size was normal     MITRAL VALVE: Valve structure was normal  There was normal leaflet separation  DOPPLER: The transmitral velocity was within the normal range  There was no evidence for stenosis  There was mild regurgitation  AORTIC VALVE: The valve was trileaflet  Leaflets exhibited normal thickness and normal cuspal separation  DOPPLER: Transaortic velocity was within the normal range  There was no evidence for stenosis  There was trace regurgitation  TRICUSPID VALVE: The valve structure was normal  There was normal leaflet separation  DOPPLER: The transtricuspid velocity was within the normal range  There was no evidence for stenosis  There was mild regurgitation  Pulmonary artery  systolic pressure was within the normal range  Estimated peak PA pressure was 29 mmHg  PULMONIC VALVE: Leaflets exhibited normal thickness, no calcification, and normal cuspal separation  DOPPLER: The transpulmonic velocity was within the normal range  There was mild regurgitation  PERICARDIUM: There was no pericardial effusion  The pericardium was normal in appearance  AORTA: The root exhibited normal size  SYSTEMIC VEINS: IVC: The inferior vena cava was normal in size   Respirophasic changes were normal     SYSTEM MEASUREMENT TABLES    2D  %FS: 29 95 %  Ao Diam: 3 63 cm  EF(Teich): 57 29 %  IVSd: 1 38 cm  LA Diam: 3 46 cm  LVEF MOD A4C: 57 23 %  LVIDd: 4 49 cm  LVIDs: 3 15 cm  LVPWd: 1 08 cm    CW  TR Vmax: 2 53 m/s  TR maxP 68 mmHg    PW  E/E': 10 61  MV E/A Ratio: 0 66    [facility]    Prepared and electronically signed by    Maikol Giron MD  Signed 13-Mar-2018 12:06:25       No results found for this or any previous visit  Catheterizations:   No results found for this or any previous visit  Stress Tests:   pharmacologic stress test  Showed no ischemia  EF of 51    Holter monitor:   101  Sinus rhythm with first-degree AV delay  Heart rate 54 beats to 121 beats  SVT of 5 beats at 107 beats per minute  PVC burden 0 2%    ASSESSMENT:  1  Atrial fibrillation  -diagnosed at recent stress test  -symptomatic with fatigue and dyspnea on exertion  2  Hypertension  3  Peripheral vascular disease  4  PVC induced cardiomyopathy s/p PVC ablation by Dr Cathi almonte at [address] [place] [address] in     SUMMARY:  In summary, Mr Dayday Tate is a  80 y o    man with history of GERD, hypertension, peripheral vascular disease, 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 by Dr Cathi Gay in , with recovered EF who was recently diagnosed with atrial fibrillation  He has been having symptomatic atrial fibrillation rhythm past several months  He has not be admitted cardioverted because it was just recently diagnosed at a stress test   He is currently rate controlled  He is on metoprolol however the dose was reduced in March due to bradycardia  We discussed the management option for atrial fibrillation in detail including AV raffi blocking agents, antiarrhythmic medications, and ablation therapy  The risks and benefits of each options were discussed in detail with the patient and his daughter    After answering all the questions they decided to proceed with cardioversion  As this is his 1st episode of atrial fibrillation and onset within past several months, pursuing ADRIANNE/cardioversion to restore normal rhythm is a reasonable option  If the normal rhythm does not hold then will attempt to add antiarrhythmic therapy  He has structurally normal heart  He is a candidate for flecainide  However office will make arrangement for him to get ADRIANNE/cardioversion  In the meantime he will continue his anticoagulation and beta-blocker  He is welcome to call our office for any questions  Thank you for allowing me to participate in the care of this patient  Please do not hesitate to contact me for any questions  PLAN:  1  Schedule ADRIANNE/cardioversion  2  Continue beta-blocker and the coagulation

## 2019-08-31 LAB
ALBUMIN SERPL-MCNC: 4 G/DL (ref 3.6–5.1)
ALBUMIN/GLOB SERPL: 1.4 (CALC) (ref 1–2.5)
ALP SERPL-CCNC: 40 U/L (ref 40–115)
ALT SERPL-CCNC: 19 U/L (ref 9–46)
AST SERPL-CCNC: 21 U/L (ref 10–35)
BASOPHILS # BLD AUTO: 29 CELLS/UL (ref 0–200)
BASOPHILS NFR BLD AUTO: 0.5 %
BILIRUB SERPL-MCNC: 0.8 MG/DL (ref 0.2–1.2)
BUN SERPL-MCNC: 23 MG/DL (ref 7–25)
BUN/CREAT SERPL: 18 (CALC) (ref 6–22)
CALCIUM SERPL-MCNC: 9.3 MG/DL (ref 8.6–10.3)
CHLORIDE SERPL-SCNC: 105 MMOL/L (ref 98–110)
CO2 SERPL-SCNC: 24 MMOL/L (ref 20–32)
CREAT SERPL-MCNC: 1.28 MG/DL (ref 0.7–1.11)
EOSINOPHIL # BLD AUTO: 274 CELLS/UL (ref 15–500)
EOSINOPHIL NFR BLD AUTO: 4.8 %
ERYTHROCYTE [DISTWIDTH] IN BLOOD BY AUTOMATED COUNT: 12.9 % (ref 11–15)
GLOBULIN SER CALC-MCNC: 2.8 G/DL (CALC) (ref 1.9–3.7)
GLUCOSE SERPL-MCNC: 91 MG/DL (ref 65–139)
HCT VFR BLD AUTO: 37 % (ref 38.5–50)
HGB BLD-MCNC: 12 G/DL (ref 13.2–17.1)
LYMPHOCYTES # BLD AUTO: 1340 CELLS/UL (ref 850–3900)
LYMPHOCYTES NFR BLD AUTO: 23.5 %
MCH RBC QN AUTO: 31.5 PG (ref 27–33)
MCHC RBC AUTO-ENTMCNC: 32.4 G/DL (ref 32–36)
MCV RBC AUTO: 97.1 FL (ref 80–100)
MONOCYTES # BLD AUTO: 621 CELLS/UL (ref 200–950)
MONOCYTES NFR BLD AUTO: 10.9 %
NEUTROPHILS # BLD AUTO: 3437 CELLS/UL (ref 1500–7800)
NEUTROPHILS NFR BLD AUTO: 60.3 %
PLATELET # BLD AUTO: 185 THOUSAND/UL (ref 140–400)
PMV BLD REES-ECKER: 12.1 FL (ref 7.5–12.5)
POTASSIUM SERPL-SCNC: 4.4 MMOL/L (ref 3.5–5.3)
PROT SERPL-MCNC: 6.8 G/DL (ref 6.1–8.1)
RBC # BLD AUTO: 3.81 MILLION/UL (ref 4.2–5.8)
SL AMB EGFR AFRICAN AMERICAN: 58 ML/MIN/1.73M2
SL AMB EGFR NON AFRICAN AMERICAN: 50 ML/MIN/1.73M2
SODIUM SERPL-SCNC: 137 MMOL/L (ref 135–146)
WBC # BLD AUTO: 5.7 THOUSAND/UL (ref 3.8–10.8)

## 2019-09-09 DIAGNOSIS — I48.0 PAROXYSMAL ATRIAL FIBRILLATION (HCC): Primary | ICD-10-CM

## 2019-09-10 NOTE — TELEPHONE ENCOUNTER
He does not need auth for his ADRIANNE [id number] or Cardioversion [id number] on 9/26/19 at [facility] at [place]  Ref# [id number]

## 2019-09-11 ENCOUNTER — TELEPHONE (OUTPATIENT)
Dept: CARDIOLOGY CLINIC | Facility: CLINIC | Age: 84
End: 2019-09-11

## 2019-09-11 NOTE — TELEPHONE ENCOUNTER
Pt asking if safe from a cardiac perspective to take 2 Tylenol PM or do you have any other recommendations for something OTC for insomnia  Recommended discussing with PCP, but wanted your input d/t his recent cardiac history  Stated Dr Chloe Glass prescribed Celexa 10mg, but told family it will take 3 weeks before its full effect

## 2019-09-13 NOTE — TELEPHONE ENCOUNTER
Called pt's daughter, informed celexa can be started after CV  Pt hasn't been sleeping so I think she wanted to start it sooner but didn't know if it would affect the CV

## 2019-09-13 NOTE — TELEPHONE ENCOUNTER
Ok  There is interaction with blood thinner - makes it effect stronger  That does not mean they cannot take it  But have to be cautious about falling and trauma  If they want to take it then its fine  If they want to try Tylenol PM, it is also fine

## 2019-09-13 NOTE — TELEPHONE ENCOUNTER
Pt's daughter Vance Has called, stated that she has not started her father on the celexa yet because she wanted to make sure that there were no side effects with the blood thinner  She wanted to know if the celexa would interfere or if there were side effects to be concerned of with the cardioversion he is scheduled for on 09/26

## 2019-09-17 NOTE — TELEPHONE ENCOUNTER
Called pt's daughter, informed her as per Dr Rere France can make the effects of the blood thinner stronger so he would need to be more cautious with falls and trauma  Daughter has been holding off on the celexa and tried having her dad take 2 tylenol PM in the evening which seems to helping

## 2019-09-29 ENCOUNTER — TELEPHONE (OUTPATIENT)
Dept: INPATIENT UNIT | Facility: HOSPITAL | Age: 84
End: 2019-09-29

## 2019-09-30 ENCOUNTER — ANESTHESIA (OUTPATIENT)
Dept: NON INVASIVE DIAGNOSTICS | Facility: HOSPITAL | Age: 84
End: 2019-09-30
Payer: COMMERCIAL

## 2019-09-30 ENCOUNTER — HOSPITAL ENCOUNTER (OUTPATIENT)
Dept: NON INVASIVE DIAGNOSTICS | Facility: HOSPITAL | Age: 84
Discharge: HOME | End: 2019-09-30
Attending: INTERNAL MEDICINE | Admitting: INTERNAL MEDICINE
Payer: COMMERCIAL

## 2019-09-30 ENCOUNTER — APPOINTMENT (OUTPATIENT)
Dept: NON INVASIVE DIAGNOSTICS | Facility: HOSPITAL | Age: 84
End: 2019-09-30
Payer: COMMERCIAL

## 2019-09-30 ENCOUNTER — ANESTHESIA EVENT (OUTPATIENT)
Dept: NON INVASIVE DIAGNOSTICS | Facility: HOSPITAL | Age: 84
End: 2019-09-30
Payer: COMMERCIAL

## 2019-09-30 ENCOUNTER — HOSPITAL ENCOUNTER (OUTPATIENT)
Dept: NON INVASIVE DIAGNOSTICS | Facility: HOSPITAL | Age: 84
Discharge: HOME | End: 2019-09-30
Attending: INTERNAL MEDICINE
Payer: COMMERCIAL

## 2019-09-30 VITALS
HEIGHT: 68 IN | RESPIRATION RATE: 18 BRPM | OXYGEN SATURATION: 98 % | WEIGHT: 170 LBS | HEART RATE: 57 BPM | BODY MASS INDEX: 25.76 KG/M2 | SYSTOLIC BLOOD PRESSURE: 138 MMHG | DIASTOLIC BLOOD PRESSURE: 71 MMHG

## 2019-09-30 VITALS
SYSTOLIC BLOOD PRESSURE: 125 MMHG | DIASTOLIC BLOOD PRESSURE: 59 MMHG | OXYGEN SATURATION: 99 % | HEART RATE: 55 BPM | RESPIRATION RATE: 18 BRPM

## 2019-09-30 DIAGNOSIS — I48.0 PAROXYSMAL ATRIAL FIBRILLATION (HCC): ICD-10-CM

## 2019-09-30 LAB
ALBUMIN SERPL BCP-MCNC: 3.4 G/DL (ref 3.5–5)
ALP SERPL-CCNC: 42 U/L (ref 46–116)
ALT SERPL W P-5'-P-CCNC: 21 U/L (ref 12–78)
ANION GAP SERPL CALCULATED.3IONS-SCNC: 4 MMOL/L (ref 4–13)
APTT PPP: 32 SECONDS (ref 23–37)
AST SERPL W P-5'-P-CCNC: 17 U/L (ref 5–45)
ATRIAL RATE: 100 BPM
ATRIAL RATE: 62 BPM
ATRIAL RATE: 64 BPM
BASOPHILS # BLD AUTO: 0.03 THOUSANDS/ΜL (ref 0–0.1)
BASOPHILS NFR BLD AUTO: 1 % (ref 0–1)
BILIRUB SERPL-MCNC: 0.5 MG/DL (ref 0.2–1)
BUN SERPL-MCNC: 23 MG/DL (ref 5–25)
CALCIUM SERPL-MCNC: 8.7 MG/DL (ref 8.3–10.1)
CHLORIDE SERPL-SCNC: 109 MMOL/L (ref 100–108)
CO2 SERPL-SCNC: 26 MMOL/L (ref 21–32)
CREAT SERPL-MCNC: 1.29 MG/DL (ref 0.6–1.3)
EOSINOPHIL # BLD AUTO: 0.25 THOUSAND/ΜL (ref 0–0.61)
EOSINOPHIL NFR BLD AUTO: 5 % (ref 0–6)
ERYTHROCYTE [DISTWIDTH] IN BLOOD BY AUTOMATED COUNT: 13.5 % (ref 11.6–15.1)
GFR SERPL CREATININE-BSD FRML MDRD: 49 ML/MIN/1.73SQ M
GLUCOSE P FAST SERPL-MCNC: 93 MG/DL (ref 65–99)
GLUCOSE SERPL-MCNC: 93 MG/DL (ref 65–140)
HCT VFR BLD AUTO: 35.5 % (ref 36.5–49.3)
HGB BLD-MCNC: 11.4 G/DL (ref 12–17)
IMM GRANULOCYTES # BLD AUTO: 0.01 THOUSAND/UL (ref 0–0.2)
IMM GRANULOCYTES NFR BLD AUTO: 0 % (ref 0–2)
INR PPP: 1.24 (ref 0.84–1.19)
LYMPHOCYTES # BLD AUTO: 1.22 THOUSANDS/ΜL (ref 0.6–4.47)
LYMPHOCYTES NFR BLD AUTO: 22 % (ref 14–44)
MAGNESIUM SERPL-MCNC: 2.1 MG/DL (ref 1.6–2.6)
MCH RBC QN AUTO: 31.8 PG (ref 26.8–34.3)
MCHC RBC AUTO-ENTMCNC: 32.1 G/DL (ref 31.4–37.4)
MCV RBC AUTO: 99 FL (ref 82–98)
MONOCYTES # BLD AUTO: 0.54 THOUSAND/ΜL (ref 0.17–1.22)
MONOCYTES NFR BLD AUTO: 10 % (ref 4–12)
NEUTROPHILS # BLD AUTO: 3.39 THOUSANDS/ΜL (ref 1.85–7.62)
NEUTS SEG NFR BLD AUTO: 62 % (ref 43–75)
NRBC BLD AUTO-RTO: 0 /100 WBCS
P AXIS: 54 DEGREES
P AXIS: 58 DEGREES
PLATELET # BLD AUTO: 191 THOUSANDS/UL (ref 149–390)
PMV BLD AUTO: 11.7 FL (ref 8.9–12.7)
POTASSIUM SERPL-SCNC: 4.3 MMOL/L (ref 3.5–5.3)
PR INTERVAL: 244 MS
PR INTERVAL: 248 MS
PROT SERPL-MCNC: 7.1 G/DL (ref 6.4–8.2)
PROTHROMBIN TIME: 15.2 SECONDS (ref 11.6–14.5)
QRS AXIS: -5 DEGREES
QRS AXIS: -7 DEGREES
QRS AXIS: 1 DEGREES
QRSD INTERVAL: 90 MS
QT INTERVAL: 360 MS
QT INTERVAL: 416 MS
QT INTERVAL: 418 MS
QTC INTERVAL: 415 MS
QTC INTERVAL: 422 MS
QTC INTERVAL: 431 MS
RBC # BLD AUTO: 3.59 MILLION/UL (ref 3.88–5.62)
SODIUM SERPL-SCNC: 139 MMOL/L (ref 136–145)
T WAVE AXIS: 12 DEGREES
T WAVE AXIS: 12 DEGREES
T WAVE AXIS: 27 DEGREES
VENTRICULAR RATE: 62 BPM
VENTRICULAR RATE: 64 BPM
VENTRICULAR RATE: 80 BPM
WBC # BLD AUTO: 5.44 THOUSAND/UL (ref 4.31–10.16)

## 2019-09-30 PROCEDURE — 92960 CARDIOVERSION ELECTRIC EXT: CPT | Performed by: INTERNAL MEDICINE

## 2019-09-30 PROCEDURE — 93320 DOPPLER ECHO COMPLETE: CPT | Performed by: INTERNAL MEDICINE

## 2019-09-30 PROCEDURE — 85730 THROMBOPLASTIN TIME PARTIAL: CPT | Performed by: INTERNAL MEDICINE

## 2019-09-30 PROCEDURE — 93005 ELECTROCARDIOGRAM TRACING: CPT

## 2019-09-30 PROCEDURE — 93010 ELECTROCARDIOGRAM REPORT: CPT | Performed by: INTERNAL MEDICINE

## 2019-09-30 PROCEDURE — 93312 ECHO TRANSESOPHAGEAL: CPT | Performed by: INTERNAL MEDICINE

## 2019-09-30 PROCEDURE — 92960 CARDIOVERSION ELECTRIC EXT: CPT

## 2019-09-30 PROCEDURE — 85610 PROTHROMBIN TIME: CPT | Performed by: INTERNAL MEDICINE

## 2019-09-30 PROCEDURE — 80053 COMPREHEN METABOLIC PANEL: CPT | Performed by: INTERNAL MEDICINE

## 2019-09-30 PROCEDURE — 93312 ECHO TRANSESOPHAGEAL: CPT

## 2019-09-30 PROCEDURE — 85025 COMPLETE CBC W/AUTO DIFF WBC: CPT | Performed by: INTERNAL MEDICINE

## 2019-09-30 PROCEDURE — NC001 PR NO CHARGE: Performed by: INTERNAL MEDICINE

## 2019-09-30 PROCEDURE — 83735 ASSAY OF MAGNESIUM: CPT | Performed by: INTERNAL MEDICINE

## 2019-09-30 PROCEDURE — 93325 DOPPLER ECHO COLOR FLOW MAPG: CPT | Performed by: INTERNAL MEDICINE

## 2019-09-30 RX ORDER — LIDOCAINE HYDROCHLORIDE 10 MG/ML
INJECTION, SOLUTION EPIDURAL; INFILTRATION; INTRACAUDAL; PERINEURAL AS NEEDED
Status: DISCONTINUED | OUTPATIENT
Start: 2019-09-30 | End: 2019-09-30 | Stop reason: SURG

## 2019-09-30 RX ORDER — SODIUM CHLORIDE 9 MG/ML
INJECTION, SOLUTION INTRAVENOUS CONTINUOUS PRN
Status: DISCONTINUED | OUTPATIENT
Start: 2019-09-30 | End: 2019-09-30 | Stop reason: SURG

## 2019-09-30 RX ORDER — PROPOFOL 10 MG/ML
INJECTION, EMULSION INTRAVENOUS AS NEEDED
Status: DISCONTINUED | OUTPATIENT
Start: 2019-09-30 | End: 2019-09-30 | Stop reason: SURG

## 2019-09-30 RX ADMIN — PROPOFOL 30 MG: 10 INJECTION, EMULSION INTRAVENOUS at 11:56

## 2019-09-30 RX ADMIN — PHENYLEPHRINE HYDROCHLORIDE 200 MCG: 10 INJECTION INTRAVENOUS at 11:55

## 2019-09-30 RX ADMIN — SODIUM CHLORIDE: 0.9 INJECTION, SOLUTION INTRAVENOUS at 11:27

## 2019-09-30 RX ADMIN — PROPOFOL 30 MG: 10 INJECTION, EMULSION INTRAVENOUS at 11:59

## 2019-09-30 RX ADMIN — PROPOFOL 30 MG: 10 INJECTION, EMULSION INTRAVENOUS at 11:44

## 2019-09-30 RX ADMIN — LIDOCAINE HYDROCHLORIDE 50 MG: 10 INJECTION, SOLUTION EPIDURAL; INFILTRATION; INTRACAUDAL; PERINEURAL at 11:41

## 2019-09-30 RX ADMIN — PHENYLEPHRINE HYDROCHLORIDE 100 MCG: 10 INJECTION INTRAVENOUS at 11:50

## 2019-09-30 RX ADMIN — PROPOFOL 70 MG: 10 INJECTION, EMULSION INTRAVENOUS at 11:41

## 2019-09-30 RX ADMIN — PROPOFOL 30 MG: 10 INJECTION, EMULSION INTRAVENOUS at 11:52

## 2019-09-30 RX ADMIN — PROPOFOL 30 MG: 10 INJECTION, EMULSION INTRAVENOUS at 11:47

## 2019-09-30 NOTE — H&P
H&P Exam - Cardiology   Aleksandr Ruth 80 y o  male MRN: [id number]  Unit/Bed#: [facility] 05-01 Encounter: [id number]    Assessment/Plan     Assessment/plan:    1  Atrial fibrillation     -plan for ADRIANNE/DCCV today   -patient informed of risks and benefits of procedure and wishes to proceed  -patient denies any missed doses of Eliquis and took his AM dose today    History of Present Illness   HPI:  Aleksandr Ruth is a 80 y o  male who presents today for planned ADRIANNE/DCCV in the setting of recently diagnosed atrial fibrillation  He follows in the outpatient setting with Dr Emilie Thomason and Dr Rosana Mcdonald who saw the patient and recommended ADRIANNE/DCCV for treatment of his atrial fibrillation    -The patient has no complaints today  Review of Systems   Constitutional: Negative for diaphoresis, fever and unexpected weight change  HENT: Negative for trouble swallowing and voice change  1 cracked tooth on right upper side  Eyes: Negative for pain and redness  Respiratory: Negative for chest tightness and shortness of breath  Cardiovascular: Negative for chest pain, palpitations and leg swelling  Gastrointestinal: Negative for abdominal pain, anal bleeding, blood in stool, constipation, diarrhea and nausea  Genitourinary: Negative for dysuria  Musculoskeletal: Negative for neck pain and neck stiffness  Skin: Negative for rash  Neurological: Negative for dizziness, syncope, light-headedness and headaches  Psychiatric/Behavioral: Negative for agitation and confusion  All other systems reviewed and are negative        Historical Information   Past Medical History:   Diagnosis Date    A-fib ([place] [address] )     Anemia     Carpal tunnel syndrome, unspecified upper limb     Cataract     last assessed: 8/18/2012    GERD (gastroesophageal reflux disease)     Glaucoma     Hypertension     last assessed: 8/23/2012    Intervertebral disc disease     PVD [facility]peripheral vascular disease) ([facility] )      Past Surgical History: Procedure Laterality Date    CATARACT EXTRACTION      HERNIA REPAIR      NEUROPLASTY / TRANSPOSITION MEDIAN NERVE AT CARPAL TUNNEL      UT LAP,CHOLECYSTECTOMY N/A 9/14/2016    Procedure: LAPAROSCOPIC CHOLECYSTECTOMY ;  Surgeon: Koby Paniagua MD;  Location: [facility];  Service: General     Social History   Social History     Substance and Sexual Activity   Alcohol Use Not Currently     Social History     Substance and Sexual Activity   Drug Use No     Social History     Tobacco Use   Smoking Status Former Smoker   Smokeless Tobacco Never Used   Tobacco Comment    quit 30 years ago      Family History:   Family History   Problem Relation Age of Onset    Glaucoma Mother     Cancer Father     Heart disease Father    [facility] Glaucoma Father     Alzheimer's disease Family     Heart attack Family        Meds/Allergies   all medications and allergies reviewed  Allergies   Allergen Reactions    Atorvastatin GI Intolerance    Zetia [Ezetimibe] Other (See Comments)     myalgia       Objective   Vitals: Blood pressure 159/76, pulse 77, resp  rate 18, height 5' 8" (1 727 m), weight 77 1 kg (170 lb), SpO2 98 %  Orthostatic Blood Pressures      Most Recent Value   Blood Pressure  159/76 filed at 09/30/2019 0940   Patient Position - Orthostatic VS  Lying filed at 09/30/2019 0940          No intake or output data in the 24 hours ending 09/30/19 1207    Invasive Devices     Peripheral Intravenous Line            Peripheral IV 09/30/19 Left Forearm less than 1 day                Physical Exam   Constitutional: He is oriented to person, place, and time  He appears well-developed and well-nourished  No distress  HENT:   Head: Normocephalic and atraumatic    1 cracked tooth right upper maxilla prior to procedure   Eyes: Right eye exhibits no discharge  Left eye exhibits no discharge  Neck: No JVD present  No tracheal deviation present  Cardiovascular: Exam reveals no friction rub     Irregularly irregular rate and rhythm Pulmonary/Chest: Effort normal and breath sounds normal  No respiratory distress  He has no wheezes  Abdominal: Soft  Bowel sounds are normal  He exhibits no mass  There is no tenderness  Musculoskeletal: He exhibits edema (trace B/L lower extremity edema)  He exhibits no tenderness  Neurological: He is alert and oriented to person, place, and time  Skin: Skin is warm and dry  He is not diaphoretic  Psychiatric: He has a normal mood and affect  Lab Results: I have personally reviewed pertinent lab results  Imaging: I have personally reviewed pertinent reports      EKG: rate controlled atrial fibrillation  VTE Prophylaxis: Eliquis    Code Status: Full Code  Advance Directive and Living Will:      Power of :    POLST:

## 2019-09-30 NOTE — ANESTHESIA POSTPROCEDURE EVALUATION
Post-Op Assessment Note    CV Status:  Stable  Pain Score: 0    Pain management: adequate     Mental Status:  Alert and awake   Hydration Status:  Euvolemic   PONV Controlled:  Controlled   Airway Patency:  Patent   Post Op Vitals Reviewed: Yes      Staff: CRNA           BP   122/63   Temp      Pulse  59   Resp   16   SpO2   99

## 2019-09-30 NOTE — ANESTHESIA POSTPROCEDURE EVALUATION
Post-Op Assessment Note    CV Status:  Stable  Pain Score: 0    Pain management: adequate     Mental Status:  Lethargic   Hydration Status:  Euvolemic   PONV Controlled:  Controlled   Airway Patency:  Patent   Post Op Vitals Reviewed:  Yes              BP   122/63   Temp      Pulse 59   Resp   16   SpO2   99

## 2019-09-30 NOTE — DISCHARGE INSTRUCTIONS
Transesophageal Echocardiogram   WHAT YOU NEED TO KNOW:   A transesophageal echocardiogram (ADRIANNE) is a procedure used to check for problems with your heart  It will also show any problems in the blood vessels near your heart  Sound waves are sent to the heart through a tube inserted into your throat  The sound waves show the structure and function of your heart through pictures on a monitor  DISCHARGE INSTRUCTIONS:   Rest:  Rest until you are fully awake  You may be sleepy for a while after your procedure  Do not eat or drink until you are able to swallow normally:  Start with soft foods, such as oatmeal, yogurt, or applesauce  Once you can eat soft foods easily, you may begin to eat solid foods  Follow up with your healthcare provider as directed:  Write down your questions so you remember to ask them during your visits  Contact your healthcare provider if:   · You have a fever or chills  · You taste blood in your mouth  · You have a severe sore throat or difficulty swallowing  · You have questions or concerns about your condition or care  Seek care immediately or call 911 if:   · You have any of the following signs of a heart attack:      ¨ Squeezing, pressure, or pain in your chest that lasts longer than 5 minutes or returns    ¨ Discomfort or pain in your back, neck, jaw, stomach, or arm     ¨ Trouble breathing    ¨ Nausea or vomiting    ¨ Lightheadedness or a sudden cold sweat, especially with chest pain or trouble breathing    © 2017 [address] [place] [address] Street is for End User's use only and may not be sold, redistributed or otherwise used for commercial purposes  All illustrations and images included in CareNotes® are the copyrighted property of [employer]  or Avi Jensen  The above information is an  only  It is not intended as medical advice for individual conditions or treatments   Talk to your doctor, nurse or pharmacist before following any medical regimen to see if it is safe and effective for you  Cardioversion   WHAT YOU SHOULD KNOW:   Cardioversion is a procedure to correct arrhythmias, which is when your heart beats too fast or irregularly  Arrhythmias may prevent your body from getting the blood and oxygen it needs  Cardioversion delivers a shock of electricity to your heart to help it return to its normal rhythm  INSTRUCTIONS:   Medicines:   · Anticoagulants    are a type of blood thinner medicine that helps prevent clots  Clots can cause strokes, heart attacks, and death  These medicines may cause you to bleed or bruise more easily  ¨ Watch for bleeding from your gums or nose  Watch for blood in your urine and bowel movements  Use a soft washcloth and a soft toothbrush  If you shave, use an electric razor  Avoid activities that can cause bruising or bleeding  ¨ Tell your healthcare provider about all medicines you take because many medicines cannot be used with anticoagulants  Do not start or stop any medicines unless your healthcare provider tells you to  Tell your dentist and other healthcare providers that you take anticoagulants  Wear a bracelet or necklace that says you take this medicine  ¨ You will need regular blood tests so your healthcare provider can decide how much medicine you need  Take anticoagulants exactly as directed  Tell your healthcare provider right away if you forget to take the medicine, or if you take too much  ¨ If you take warfarin, some foods can change how your blood clots  Do not make major changes to your diet while you take warfarin  Warfarin works best when you eat about the same amount of vitamin K every day  Vitamin K is found in green leafy vegetables, broccoli, grapes, and other foods  Ask for more information about what to eat when you take warfarin  · Heart medicine: This medicine is given to strengthen or regulate your heartbeat  · Take your medicine as directed    Call your healthcare provider if you think your medicine is not helping or if you have side effects  Tell him if you are allergic to any medicine  Keep a list of the medicines, vitamins, and herbs you take  Include the amounts, and when and why you take them  Bring the list or the pill bottles to follow-up visits  Carry your medicine list with you in case of an emergency  Follow up with your cardiologist as directed:  Write down your questions so you remember to ask them during your visits  Contact your cardiologist if:   · You have a fever  · You have new or worsening weakness or tiredness  · You have questions or concerns about your condition or care  Return to the emergency department if:   · You feel like your heart is fluttering or jumping in your chest     · You feel lightheaded or you have fainted  · You have chest pain when you take a deep breath or cough  You may cough up blood  · You have discomfort in your chest that feels like squeezing, pressure, fullness, or pain  · You have pain or discomfort in your back, neck, jaw, stomach, or arm  · You have weakness or numbness in part of your body  · You have sudden trouble breathing  · You become confused or have difficulty speaking  · You have dizziness, a severe headache, or vision loss  © 2014 [address] is for End User's use only and may not be sold, redistributed or otherwise used for commercial purposes  All illustrations and images included in CareNotes® are the copyrighted property of A D A M [employer] Inc  or Avi Jensen  The above information is an  only  It is not intended as medical advice for individual conditions or treatments  Talk to your doctor, nurse or pharmacist before following any medical regimen to see if it is safe and effective for you

## 2019-09-30 NOTE — ANESTHESIA PREPROCEDURE EVALUATION
Review of Systems/Medical History  Patient summary reviewed  Chart reviewed  No history of anesthetic complications     Cardiovascular  EKG reviewed, Exercise tolerance (METS): <4,  Hyperlipidemia, Hypertension controlled, Dysrhythmias (will have cardioversion for AFib; patient on Eliquis) , atrial fibrillation,   Comment: HPL   PVD  Carotid stenosis (R  Internal carotid < 50%, L  Internal carotid 50-69%)    ,  Pulmonary  Shortness of breath (Does not require any oxygen; secondary to URI),        GI/Hepatic    GERD well controlled,  Hiatal hernia,        Negative  ROS        Endo/Other  Negative endo/other ROS   Comment: Cataract  Glaucoma    GYN       Hematology   Musculoskeletal    Arthritis     Neurology  Negative neurology ROS   Headaches,    Psychology           Physical Exam    Airway    Mallampati score: I  TM Distance: >3 FB  Neck ROM: full     Dental   Comment: Chipped right upper side tooth, as marked,     Cardiovascular  Rhythm: regular, Rate: normal, Cardiovascular exam normal    Pulmonary  Pulmonary exam normal Breath sounds clear to auscultation,     Other Findings        Anesthesia Plan  ASA Score- 3     Anesthesia Type- IV sedation with anesthesia with ASA Monitors  Additional Monitors:   Airway Plan:         Plan Factors-    Induction- intravenous  Postoperative Plan-     Informed Consent- Anesthetic plan and risks discussed with patient  I personally reviewed this patient with the CRNA  Discussed and agreed on the Anesthesia Plan with the CRNA  Jeff Vinson

## 2019-09-30 NOTE — PROGRESS NOTES
Patient s/p ADRIANNE/CV  Pads placed A&L  200J provided  Patient converted to sinus rhythm  12 lead EKG included in patient's chart  Plan to call report to accepting RN and have transport escort patient back to room

## 2019-10-24 ENCOUNTER — OFFICE VISIT (OUTPATIENT)
Dept: CARDIOLOGY CLINIC | Facility: CLINIC | Age: 84
End: 2019-10-24
Payer: COMMERCIAL

## 2019-10-24 VITALS
BODY MASS INDEX: 26.27 KG/M2 | HEART RATE: 60 BPM | HEIGHT: 68 IN | DIASTOLIC BLOOD PRESSURE: 72 MMHG | WEIGHT: 173.3 LBS | SYSTOLIC BLOOD PRESSURE: 128 MMHG

## 2019-10-24 DIAGNOSIS — I48.0 PAF (PAROXYSMAL ATRIAL FIBRILLATION) (HCC): Primary | Chronic | ICD-10-CM

## 2019-10-24 PROCEDURE — 93000 ELECTROCARDIOGRAM COMPLETE: CPT | Performed by: INTERNAL MEDICINE

## 2019-10-24 PROCEDURE — 99203 OFFICE O/P NEW LOW 30 MIN: CPT | Performed by: INTERNAL MEDICINE

## 2019-10-24 RX ORDER — ACETAMINOPHEN,DIPHENHYDRAMINE HCL 500; 25 MG/1; MG/1
2 TABLET, FILM COATED ORAL
COMMUNITY

## 2019-10-24 RX ORDER — AMLODIPINE BESYLATE 2.5 MG/1
2.5 TABLET ORAL 2 TIMES DAILY
COMMUNITY

## 2019-10-24 NOTE — PROGRESS NOTES
Cardiology Follow Up    Jose Nichols  7/15/1931  [id number]  [address] [facility]  9 [facility] [id number] [phone]    Interval History:   He now presents for follow-up visit after having cardioversion done on September 30, 2019  He has been having infrequent episodes of fluttering  He denies any other significant symptoms  His daughter reports that patient was having high blood pressure and he was started on amlodipine 5 mg daily  However she gave him only as needed basis  Several days ago he had an episode of headache and his blood pressure was elevated to 182/102  She gave him amlodipine 5 mg which brought the pressure down to 169/104  She has been giving him amlodipine 5 mg daily for the past 3 days  Today's blood pressure was 128/72 which she states is too low for him  He is also taking Tylenol p m  For sleep but still has sleepless nights  He is otherwise has been taking his Eliquis and metoprolol regularly  He otherwise denies any chest pain, lightheadedness, dizziness, swelling in lower extremities, orthopnea, PND or syncope episode  HPI:  Mr Jose Nichols is a 80 y o    man with history of GERD, hypertension, peripheral vascular disease, 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, with recovered EF who was recently diagnosed with atrial fibrillation  He was hospitalized in April 2019 for fever and leukocytosis thought to be from a viral infection  Ever since the hospitalization he had been feeling fatigued  He was seen by his cardiologist Dr Joseph Johnson who performed pharmacologic nuclear stress test showing no ischemia and EF of 51%  However during the stress test he was noted to be in atrial fibrillation  Last echocardiogram in March 2019 showed normal LV systolic function, mild diastolic dysfunction, mild aortic and mitral sclerosis    Left atrium was mildly enlarged at 4 5 cm  He continued to feel fatigue and dyspnea on exertion  He denied ever having any cardioversion for his atrial ablation    After seen in the office on 8/30/2019, he opted to have ADRIANNE/cardioversion         Patient Active Problem List   Diagnosis    Cholecystitis with cholelithiasis    Allergic rhinitis    Anemia    Benign essential hypertension    Esophageal reflux    Glaucoma    Hiatal hernia    Hyperlipidemia    Joint pain, knee    Left lumbar radiculopathy    Peripheral vascular disease ([facility] )    Preoperative evaluation of a medical condition to rule out surgical contraindications (TAR required)    Syncope    Head injury due to trauma    [facility]    PAF (paroxysmal atrial fibrillation) (HCC)    Right wrist pain    Peripheral edema    Arthritis of carpometacarpal (CMC) joint of right thumb    Paresthesia    Dehydration    Hyponatremia    Abnormal liver function    Sepsis ([facility] )    Other insomnia    Anxiety    Carpal tunnel syndrome    S/P ablation of ventricular arrhythmia    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[facility] )    HTN (hypertension)    Shortness of breath     Past Medical History:   Diagnosis Date    A-fib ([place] [address] )     Anemia     Carpal tunnel syndrome, unspecified upper limb     Cataract     last assessed: 8/18/2012    GERD (gastroesophageal reflux disease)     Glaucoma     Hypertension     last assessed: 8/23/2012    Intervertebral disc disease     PVD (peripheral vascular disease) ([place] [address] )      Social History     Socioeconomic History    Marital status: /Civil Union     Spouse name: Not on file    Number of children: Not on file    Years of education: Not on file    Highest education level: Not on file   Occupational History    Occupation: retired   Social Needs    Financial resource strain: Not on file    Food insecurity:     Worry: Not on file     Inability: Not on file   [employer]set [employer] needs:     Medical: Not on file     Non-medical: Not on file   Tobacco Use    Smoking status: Former Smoker    Smokeless tobacco: Never Used    Tobacco comment: quit 30 years ago    Substance and Sexual Activity    Alcohol use: Not Currently    Drug use: No    Sexual activity: Not on file   Lifestyle    Physical activity:     Days per week: Not on file     Minutes per session: Not on file    Stress: Not on file   Relationships    Social connections:     Talks on phone: Not on file     Gets together: Not on file     Attends Cheondo[employer] service: Not on file     Active member of club or organization: Not on file     Attends meetings of clubs or organizations: Not on file     Relationship status: Not on file    Intimate partner violence:     Fear of current or ex partner: Not on file     Emotionally abused: Not on file     Physically abused: Not on file     Forced sexual activity: Not on file   Other Topics Concern    Not on file   Social History Narrative    Morning person      Family History   Problem Relation Age of Onset    Glaucoma Mother     Cancer Father     Heart disease Father     Glaucoma Father     Alzheimer's disease Family     Heart attack Family      Past Surgical History:   Procedure Laterality Date    CATARACT EXTRACTION      HERNIA REPAIR      NEUROPLASTY / TRANSPOSITION MEDIAN NERVE AT CARPAL TUNNEL      MD LAP,CHOLECYSTECTOMY N/A 9/14/2016    Procedure: LAPAROSCOPIC CHOLECYSTECTOMY ;  Surgeon: Soha Mccabe MD;  Location: [facility];  Service: General       Current Outpatient Medications:     apixaban (ELIQUIS) 2 5 mg, Take by mouth 2 (two) times a day, Disp: , Rfl:     cholecalciferol (VITAMIN D3) 1,000 units tablet, Take 2,000 Units by mouth daily , Disp: , Rfl:     dorzolamide-timolol (COSOPT) 22 3-6 8 MG/ML ophthalmic solution, 1 drop 2 (two) times a day, Disp: , Rfl:     fish oil 1,000 mg, Take 1,000 mg by mouth , Disp: , Rfl:     Garlic 3614 MG CAPS, Take 1 tablet by mouth daily  , Disp: , Rfl:    latanoprost (XALATAN) 0 005 % ophthalmic solution, Administer 1 drop to both eyes daily at bedtime, Disp: , Rfl:     metoprolol succinate (TOPROL-XL) 25 mg 24 hr tablet, Take 25 mg by mouth daily  , Disp: , Rfl:     multivitamin-iron-minerals-folic acid (CENTRUM) chewable tablet, Chew 1 tablet daily  , Disp: , Rfl:     Omega-3 Fatty Acids (FISH OIL) 1,000 mg, Fish Oil, Disp: , Rfl:     Probiotic Product (ALIGN PO), Align, Disp: , Rfl:     vitamin E, tocopherol, 400 units capsule, Take 400 Units by mouth daily  , Disp: , Rfl:   Allergies   Allergen Reactions    Atorvastatin GI Intolerance    Zetia [Ezetimibe] Other (See Comments)     myalgia       Labs:  Lab Results   Component Value Date     10/31/2017    K 4 3 09/30/2019    K 4 4 08/30/2019    K 4 6 04/24/2019    K 3 8 04/11/2019    K 3 8 04/09/2019    K 4 5 12/03/2018    CO2 26 09/30/2019    CO2 24 08/30/2019    CO2 27 04/24/2019    CO2 27 04/11/2019    CO2 25 04/09/2019    CO2 27 12/03/2018    BUN 23 09/30/2019    BUN 23 08/30/2019    BUN 19 04/24/2019    BUN 17 04/11/2019    BUN 16 04/09/2019    BUN 19 12/03/2018    CREATININE 1 29 09/30/2019    CREATININE 1 28 (H) 08/30/2019    CREATININE 1 13 (H) 04/24/2019    CREATININE 1 17 04/11/2019    CREATININE 1 12 04/09/2019    CREATININE 1 14 (H) 10/31/2017    CALCIUM 8 7 09/30/2019    CALCIUM 9 3 08/30/2019    CALCIUM 8 8 04/24/2019    CALCIUM 8 2 (L) 04/11/2019    CALCIUM 7 9 (L) 04/09/2019    CALCIUM 9 1 12/03/2018     Lab Results   Component Value Date    TROPONINI <0 02 03/13/2018    TROPONINI <0 02 03/12/2018    TROPONINI <0 02 03/12/2018     Lab Results   Component Value Date    WBC 5 44 09/30/2019    WBC 5 7 08/30/2019    WBC 5 7 06/07/2019    WBC 5 8 04/24/2019    WBC 4 83 04/09/2019    WBC 4 43 04/08/2019    WBC 6 0 10/31/2017    WBC 54 10/28/2014    HGB 11 4 (L) 09/30/2019    HGB 12 0 (L) 08/30/2019    HGB 11 1 (L) 06/07/2019    HGB 9 9 (L) 04/24/2019    HGB 9 1 (L) 04/09/2019    HGB 9 0 (L) 04/08/2019    HGB 12 0 (L) 10/31/2017    HCT 35 5 (L) 09/30/2019    HCT 37 0 (L) 08/30/2019    HCT 33 7 (L) 06/07/2019    HCT 30 3 (L) 04/24/2019    HCT 28 2 (L) 04/09/2019    HCT 28 2 (L) 04/08/2019    HCT 35 5 (L) 10/31/2017    MCV 99 (H) 09/30/2019    MCV 97 1 08/30/2019    MCV 96 6 06/07/2019    MCV 98 1 04/24/2019    MCV 99 (H) 04/09/2019     (H) 04/08/2019    MCV 95 9 10/31/2017     09/30/2019     08/30/2019     06/07/2019     04/24/2019     (L) 04/09/2019     (L) 04/08/2019     10/31/2017     Lab Results   Component Value Date    TRIG 94 04/24/2019    HDL 50 04/24/2019     Imaging: No results found  Review of Systems:  Review of Systems   Constitutional: Negative for chills and fatigue  HENT: Negative  Eyes: Negative  Respiratory: Negative  Cardiovascular: Negative for chest pain, palpitations and leg swelling  Gastrointestinal: Negative  Endocrine: Negative  Genitourinary: Negative  Musculoskeletal: Positive for arthralgias  Allergic/Immunologic: Negative  Neurological: Positive for headaches  Psychiatric/Behavioral: Negative  Physical Exam:  Physical Exam   Constitutional: He is oriented to person, place, and time  He appears well-developed and well-nourished  No distress  HENT:   Head: Normocephalic and atraumatic  Mouth/Throat: No oropharyngeal exudate  Eyes: Pupils are equal, round, and reactive to light  EOM are normal  No scleral icterus  Neck: Normal range of motion  Neck supple  No JVD present  Cardiovascular: Normal rate and regular rhythm  Exam reveals no gallop and no friction rub  No murmur heard  Pulmonary/Chest: Effort normal and breath sounds normal  No stridor  No respiratory distress  He has no wheezes  Abdominal: Soft  Bowel sounds are normal  He exhibits no distension  Musculoskeletal: Normal range of motion  He exhibits no edema     Neurological: He is alert and oriented to person, place, and time  Skin: Skin is warm and dry  No rash noted  ECG performed in the office revealed sinus rhythm at 60 beats per minute  First-degree AV delay at 226 millisecond    Discussion/Summary:    1  Paroxysmal atrial fibrillation  -patient is status post cardioversion on 09/30/2019  -he is still maintaining sinus rhythm on metoprolol  -will continue metoprolol on Eliquis for now  -if he continues to have fluttering episodes then will plan for adding antiarrhythmic therapy as well as monitoring with the loop monitor    2  Hypertension  -in the office visit today his blood pressure was 128/72  -per daughter this is too low for him, normally he runs higher  -will reduce amlodipine dose to 2 5 mg daily  -continue metoprolol    3  Insomnia  -Tylenol p m  As not been helping  -patient is still having sleepless nights  - can start Celexa, though will have increased risk of bleeding which was informed to the daughter  -recommended she make sure patient does not have bleeding episodes as it may take longer to achieve hemostasis    4   Arthritis  -continue applying gel for the arthritis pain in the knees    Follow-up in 6 months

## 2019-10-24 NOTE — PATIENT INSTRUCTIONS
Take amlodipine 2 5 mg daily to prevent low blood pressures  If feels fluttering frequently then can do monitor

## 2019-12-29 ENCOUNTER — HOSPITAL ENCOUNTER (EMERGENCY)
Facility: HOSPITAL | Age: 84
Discharge: HOME | End: 2019-12-29
Attending: EMERGENCY MEDICINE | Admitting: EMERGENCY MEDICINE
Payer: COMMERCIAL

## 2019-12-29 ENCOUNTER — APPOINTMENT (EMERGENCY)
Dept: RADIOLOGY | Facility: HOSPITAL | Age: 84
End: 2019-12-29
Payer: COMMERCIAL

## 2019-12-29 VITALS
HEART RATE: 62 BPM | RESPIRATION RATE: 17 BRPM | DIASTOLIC BLOOD PRESSURE: 77 MMHG | BODY MASS INDEX: 27.94 KG/M2 | HEIGHT: 67 IN | SYSTOLIC BLOOD PRESSURE: 166 MMHG | OXYGEN SATURATION: 96 % | WEIGHT: 178 LBS | TEMPERATURE: 97.4 F

## 2019-12-29 DIAGNOSIS — R51.9 FRONTAL HEADACHE: ICD-10-CM

## 2019-12-29 DIAGNOSIS — I10 HIGH BLOOD PRESSURE: ICD-10-CM

## 2019-12-29 DIAGNOSIS — R42 LIGHTHEADEDNESS: Primary | ICD-10-CM

## 2019-12-29 LAB
ALBUMIN SERPL BCP-MCNC: 3.4 G/DL (ref 3.5–5)
ALP SERPL-CCNC: 46 U/L (ref 46–116)
ALT SERPL W P-5'-P-CCNC: 25 U/L (ref 12–78)
ANION GAP SERPL CALCULATED.3IONS-SCNC: 2 MMOL/L (ref 4–13)
AST SERPL W P-5'-P-CCNC: 16 U/L (ref 5–45)
ATRIAL RATE: 131 BPM
BASOPHILS # BLD AUTO: 0.03 THOUSANDS/ΜL (ref 0–0.1)
BASOPHILS NFR BLD AUTO: 1 % (ref 0–1)
BILIRUB SERPL-MCNC: 0.34 MG/DL (ref 0.2–1)
BUN SERPL-MCNC: 16 MG/DL (ref 5–25)
CALCIUM SERPL-MCNC: 9.5 MG/DL (ref 8.3–10.1)
CHLORIDE SERPL-SCNC: 108 MMOL/L (ref 100–108)
CO2 SERPL-SCNC: 29 MMOL/L (ref 21–32)
CREAT SERPL-MCNC: 1.3 MG/DL (ref 0.6–1.3)
EOSINOPHIL # BLD AUTO: 0.35 THOUSAND/ΜL (ref 0–0.61)
EOSINOPHIL NFR BLD AUTO: 7 % (ref 0–6)
ERYTHROCYTE [DISTWIDTH] IN BLOOD BY AUTOMATED COUNT: 12.5 % (ref 11.6–15.1)
GFR SERPL CREATININE-BSD FRML MDRD: 49 ML/MIN/1.73SQ M
GLUCOSE SERPL-MCNC: 99 MG/DL (ref 65–140)
HCT VFR BLD AUTO: 34.6 % (ref 36.5–49.3)
HGB BLD-MCNC: 11.7 G/DL (ref 12–17)
IMM GRANULOCYTES # BLD AUTO: 0.01 THOUSAND/UL (ref 0–0.2)
IMM GRANULOCYTES NFR BLD AUTO: 0 % (ref 0–2)
LYMPHOCYTES # BLD AUTO: 1.24 THOUSANDS/ΜL (ref 0.6–4.47)
LYMPHOCYTES NFR BLD AUTO: 24 % (ref 14–44)
MCH RBC QN AUTO: 33.1 PG (ref 26.8–34.3)
MCHC RBC AUTO-ENTMCNC: 33.8 G/DL (ref 31.4–37.4)
MCV RBC AUTO: 98 FL (ref 82–98)
MONOCYTES # BLD AUTO: 0.6 THOUSAND/ΜL (ref 0.17–1.22)
MONOCYTES NFR BLD AUTO: 11 % (ref 4–12)
NEUTROPHILS # BLD AUTO: 3.02 THOUSANDS/ΜL (ref 1.85–7.62)
NEUTS SEG NFR BLD AUTO: 57 % (ref 43–75)
NRBC BLD AUTO-RTO: 0 /100 WBCS
P AXIS: 92 DEGREES
PLATELET # BLD AUTO: 209 THOUSANDS/UL (ref 149–390)
PMV BLD AUTO: 11.3 FL (ref 8.9–12.7)
POTASSIUM SERPL-SCNC: 4.2 MMOL/L (ref 3.5–5.3)
PROT SERPL-MCNC: 7.4 G/DL (ref 6.4–8.2)
QRS AXIS: 29 DEGREES
QRSD INTERVAL: 86 MS
QT INTERVAL: 382 MS
QTC INTERVAL: 424 MS
RBC # BLD AUTO: 3.53 MILLION/UL (ref 3.88–5.62)
SODIUM SERPL-SCNC: 139 MMOL/L (ref 136–145)
T WAVE AXIS: 50 DEGREES
TROPONIN I SERPL-MCNC: <0.02 NG/ML
VENTRICULAR RATE: 74 BPM
WBC # BLD AUTO: 5.25 THOUSAND/UL (ref 4.31–10.16)

## 2019-12-29 PROCEDURE — 80053 COMPREHEN METABOLIC PANEL: CPT | Performed by: EMERGENCY MEDICINE

## 2019-12-29 PROCEDURE — 93010 ELECTROCARDIOGRAM REPORT: CPT | Performed by: INTERNAL MEDICINE

## 2019-12-29 PROCEDURE — 99285 EMERGENCY DEPT VISIT HI MDM: CPT | Performed by: EMERGENCY MEDICINE

## 2019-12-29 PROCEDURE — 84484 ASSAY OF TROPONIN QUANT: CPT | Performed by: EMERGENCY MEDICINE

## 2019-12-29 PROCEDURE — 85025 COMPLETE CBC W/AUTO DIFF WBC: CPT | Performed by: EMERGENCY MEDICINE

## 2019-12-29 PROCEDURE — 93005 ELECTROCARDIOGRAM TRACING: CPT

## 2019-12-29 PROCEDURE — 36415 COLL VENOUS BLD VENIPUNCTURE: CPT

## 2019-12-29 PROCEDURE — 99284 EMERGENCY DEPT VISIT MOD MDM: CPT

## 2019-12-29 PROCEDURE — 71046 X-RAY EXAM CHEST 2 VIEWS: CPT

## 2019-12-29 RX ORDER — ACETAMINOPHEN 325 MG/1
975 TABLET ORAL ONCE
Status: COMPLETED | OUTPATIENT
Start: 2019-12-29 | End: 2019-12-29

## 2019-12-29 RX ADMIN — ACETAMINOPHEN 975 MG: 325 TABLET ORAL at 08:23

## 2019-12-29 NOTE — ED ATTENDING ATTESTATION
Sahra Kramer MD, saw and evaluated the patient  All available labs and X-rays were ordered by me or the resident and have been reviewed by myself  I discussed the patient with the resident / non-physician and agree with the resident's / non-physician practitioner's findings and plan as documented in the resident's / non-physician practicitioner's note, except where noted  At this point, I agree with the current assessment done in the ED  I was present during key portions of all procedures performed unless otherwise stated  Chief Complaint   Patient presents with    Dizziness     Patient reports headache and dizziness this morning, has been hypertensive this past week  This is an 80-year-old male presenting for evaluation of primarily headache dizziness this morning  The patient has been under lot of stress lately, his wife  1 week ago  He lives at home  His 2 daughters are staying with him  She did 1 daughter has been noticing that the patient has been increasingly hypertensive last few days and so has been intermittently dosing an extra dose of 2 5 mg of amlodipine  Of note a couple months ago the patient's blood pressure was noted to be in the 100 and so his amlodipine with have down from 5 mg to 2 5  Anyways the patient has been doing okay despite this  Today about half an hour prior to arrival, he was about to go to [facility], was in the kitchen, he all of sudden felt very lightheaded, not vertiginous, he felt like is going to fall over  He sat down  He did notice a very mild frontal headache similar to what he has had in the past that he thought was related to his blood pressure  The headache is significantly improved, the lightheadedness completely resolved after 15 minutes    There were no associated stroke symptoms including change in speech difficulty expressing himself, no ambulatory dysfunction however the patient was assisted to the bathroom not because he was unsteady but because the daughter was concerned he might fall over  No bowel or bladder incontinence  No sensory deficits  No trouble expressing himself  No focal deficits at all  PMH:  - HTN  - Anemia   - Carpal tunnel syndrome  - Cataract  - PVD  - AF (s/p cardioversion)  - PVCs (s/p ablation)  PSH:  - ablation  - Altagracia  - Hernia repair  Former smoker  No alcohol/drugs  PE:  Vitals:    12/29/19 0706 12/29/19 0715 12/29/19 0730 12/29/19 0800   BP: (!) 199/82 (!) 199/82 (!) 175/81 166/77   BP Location: Right arm      Pulse: 66 70 68 62   Resp: 18 (!) 24 19 17   Temp: (!) 97 4 °F (36 3 °C)      TempSrc: Oral      SpO2: 98% 98% 98% 96%   Weight: 80 7 kg (178 lb)      Height: 5' 7" (1 702 m)      General: VSS, NAD, awake, alert  Well-nourished, well-developed  Appears stated age  Speaking normally in full sentences  Head: Normocephalic, atraumatic, nontender  Eyes: PERRL, EOM-I  No diplopia  No hyphema  No subconjunctival hemorrhages  Symmetrical lids  ENT: Atraumatic external nose and ears  MMM  No malocclusion  No stridor  Normal phonation  No drooling  Normal swallowing  Neck: Symmetric, trachea midline  No JVD  CV: RRR  +S1/S2  No murmurs or gallops  Peripheral pulses +2 throughout  No chest wall tenderness  Lungs:   Unlabored No retractions  CTAB, lungs sounds equal bilateral    No tachypnea  Abd: +BS, soft, NT/ND    MSK:   FROM   Back:   No rashes  Skin: Dry, intact  Neuro: AAOx3, GCS 15, CN II-XII grossly intact  Motor grossly intact  Normal finger to nose  Ambulates normally  EHL/FHL/PF/DF/KF/KE/HF/HE 5/5  No saddle anesthesia  M/U/R/A nerve sensation bilateral intact and equal    strength 5/5  Finger abduction/adduction/opposition intact  Suppination/Pronation intact without reproduction of pain  Good capillary refill  2+ radial pulses, bilaterally equal    BICEPS/TRICEPS 5/5  Shoulder abduction/adduction 5/5  No pronator drift  No aphasia/dysarthria  CN 2-12 intact  Normal finger to nose  No nystagmus  No facial droop  Psychiatric/Behavioral: Appropriate mood and affect   Exam: deferred  A:  - HTN  - LH, resolved  - HA, resolved  P:  - Cardiac workup  - Dispo  - 13 point ROS was performed and all are normal unless stated in the history above  - Nursing note reviewed  Vitals reviewed  - Orders placed by myself and/or advanced practitioner / resident     - Previous chart was reviewed  - No language barrier    - History obtained from patient, daughters x2  - There are no limitations to the history obtained  - Critical care time: Not applicable for this patient  Code Status: Prior  Advance Directive and Living Will:      Power of :    POLST:      Final Diagnosis:  1  Lightheadedness    2  High blood pressure    3  Frontal headache           Medications   acetaminophen (TYLENOL) tablet 975 mg (975 mg Oral Given 12/29/19 0823)     XR chest 2 views   ED Interpretation   No consolidation, effusion, or pneumothorax  Final Result      No acute cardiopulmonary disease              Workstation performed: [id number]           Orders Placed This Encounter   Procedures    ED ECG Documentation Only    XR chest 2 views    CBC and differential    Comprehensive metabolic panel    Troponin I    Continuous cardiac monitoring    Continuous pulse oximetry    EKG RESULTS    ECG 12 lead    ECG 12 lead     Labs Reviewed   CBC AND DIFFERENTIAL - Abnormal       Result Value Ref Range Status    WBC 5 25  4 31 - 10 16 Thousand/uL Final    RBC 3 53 (*) 3 88 - 5 62 Million/uL Final    Hemoglobin 11 7 (*) 12 0 - 17 0 g/dL Final    Hematocrit 34 6 (*) 36 5 - 49 3 % Final    MCV 98  82 - 98 fL Final    MCH 33 1  26 8 - 34 3 pg Final    MCHC 33 8  31 4 - 37 4 g/dL Final    RDW 12 5  11 6 - 15 1 % Final    MPV 11 3  8 9 - 12 7 fL Final    Platelets 095  369 - 390 Thousands/uL Final    nRBC 0  /100 WBCs Final    Neutrophils Relative 57  43 - 75 % Final    Immat GRANS % 0  0 - 2 % Final    Lymphocytes Relative 24  14 - 44 % Final    Monocytes Relative 11  4 - 12 % Final    Eosinophils Relative 7 (*) 0 - 6 % Final    Basophils Relative 1  0 - 1 % Final    Neutrophils Absolute 3 02  1 85 - 7 62 Thousands/µL Final    Immature Grans Absolute 0 01  0 00 - 0 20 Thousand/uL Final    Lymphocytes Absolute 1 24  0 60 - 4 47 Thousands/µL Final    Monocytes Absolute 0 60  0 17 - 1 22 Thousand/µL Final    Eosinophils Absolute 0 35  0 00 - 0 61 Thousand/µL Final    Basophils Absolute 0 03  0 00 - 0 10 Thousands/µL Final   COMPREHENSIVE METABOLIC PANEL - Abnormal    Sodium 139  136 - 145 mmol/L Final    Potassium 4 2  3 5 - 5 3 mmol/L Final    Chloride 108  100 - 108 mmol/L Final    CO2 29  21 - 32 mmol/L Final    ANION GAP 2 (*) 4 - 13 mmol/L Final    BUN 16  5 - 25 mg/dL Final    Creatinine 1 30  0 60 - 1 30 mg/dL Final    Comment: Standardized to IDMS reference method    Glucose 99  65 - 140 mg/dL Final    Comment:   If the patient is fasting, the ADA then defines impaired fasting glucose as > 100 mg/dL and diabetes as > or equal to 123 mg/dL  Specimen collection should occur prior to Sulfasalazine administration due to the potential for falsely depressed results  Specimen collection should occur prior to Sulfapyridine administration due to the potential for falsely elevated results  Calcium 9 5  8 3 - 10 1 mg/dL Final    AST 16  5 - 45 U/L Final    Comment:   Specimen collection should occur prior to Sulfasalazine administration due to the potential for falsely depressed results  ALT 25  12 - 78 U/L Final    Comment:   Specimen collection should occur prior to Sulfasalazine and/or Sulfapyridine administration due to the potential for falsely depressed results       Alkaline Phosphatase 46  46 - 116 U/L Final    Total Protein 7 4  6 4 - 8 2 g/dL Final    Albumin 3 4 (*) 3 5 - 5 0 g/dL Final    Total Bilirubin 0 34  0 20 - 1 00 mg/dL Final    eGFR 49  ml/min/1 73sq m Final    Narrative: [facility] guidelines for Chronic Kidney Disease (CKD):     Stage 1 with normal or high GFR (GFR > 90 mL/min/1 73 square meters)    Stage 2 Mild CKD (GFR = 60-89 mL/min/1 73 square meters)    Stage 3A Moderate CKD (GFR = 45-59 mL/min/1 73 square meters)    Stage 3B Moderate CKD (GFR = 30-44 mL/min/1 73 square meters)    Stage 4 Severe CKD (GFR = 15-29 mL/min/1 73 square meters)    Stage 5 End Stage CKD (GFR <15 mL/min/1 73 square meters)  Note: GFR calculation is accurate only with a steady state creatinine   TROPONIN I - Normal    Troponin I <0 02  <=0 04 ng/mL Final    Comment:   [employer] Chemistry analyzer 99% cutoff is > 0 04 ng/mL in network labs     o cTnI 99% cutoff is useful only when applied to patients in the clinical setting of myocardial ischemia   o cTnI 99% cutoff should be interpreted in the context of clinical history, ECG findings and possibly cardiac imaging to establish correct diagnosis  o cTnI 99% cutoff may be suggestive but clearly not indicative of a coronary event without the clinical setting of myocardial ischemia  Time reflects when diagnosis was documented in both MDM as applicable and the Disposition within this note     Time User Action Codes Description Comment    12/29/2019  8:18 AM Ap Cosier Add [R42] Dizziness     12/29/2019  8:18 AM Ap Cosier Remove [R42] Dizziness     12/29/2019  8:18 AM Ap Cosier Add [R42] Lightheadedness     12/29/2019  8:18 AM Ap Cosier Add [I10] High blood pressure     12/29/2019  8:56 AM Ap Cosier Add [R51] Frontal headache       ED Disposition     ED Disposition Condition Date/Time Comment    Discharge Good Sun Dec 29, 2019  8:18 AM Zachary Escobar discharge to home/self care  Follow-up Information     Follow up With Specialties Details Why Contact Info Additional Information    Pradip Pulliam MD Family Medicine Call in 1 day Please follow up with your PCP within 1 week  [address]       Raya Damon MD Cardiology Call in 1 day Please follow up with your Cardiologist regarding your blood pressure  [address] [facility] [address] [facility] Emergency Department Emergency Medicine Go to  If symptoms worsen  [address] [place]  [phone]  ED, [address], [address]   [phone]        Discharge Medication List as of 12/29/2019  8:20 AM      CONTINUE these medications which have NOT CHANGED    Details   amLODIPine (NORVASC) 2 5 mg tablet Take 2 5 mg by mouth as needed (as directed according to BP) , Historical Med      apixaban (ELIQUIS) 2 5 mg Take by mouth 2 (two) times a day, Historical Med      cholecalciferol (VITAMIN D3) 1,000 units tablet Take 2,000 Units by mouth daily , Historical Med      co-enzyme Q-10 30 MG capsule Take 30 mg by mouth daily, Historical Med      diphenhydrAMINE-acetaminophen (TYLENOL PM)  MG TABS Take 2 tablets by mouth daily at bedtime as needed for sleep, Historical Med      dorzolamide-timolol (COSOPT) 22 3-6 8 MG/ML ophthalmic solution 1 drop 2 (two) times a day, Historical Med      !! fish oil 1,000 mg Take 1,000 mg by mouth , Historical Med      Garlic 1122 MG CAPS Take 1 tablet by mouth daily  , Historical Med      latanoprost (XALATAN) 0 005 % ophthalmic solution Administer 1 drop to both eyes daily at bedtime, Historical Med      metoprolol succinate (TOPROL-XL) 25 mg 24 hr tablet Take 25 mg by mouth daily  , Historical Med      multivitamin-iron-minerals-folic acid (CENTRUM) chewable tablet Chew 1 tablet daily  , Historical Med      !! Omega-3 Fatty Acids (FISH OIL) 1,000 mg Fish Oil, Historical Med      Probiotic Product (ALIGN PO) Align, Historical Med      vitamin E, tocopherol, 400 units capsule Take 400 Units by mouth daily  , Historical Med       !! - Potential duplicate medications found   Please discuss with provider  No discharge procedures on file  Prior to Admission Medications   Prescriptions Last Dose Informant Patient Reported? Taking? Garlic 8514 MG CAPS  Family Member Yes No   Sig: Take 1 tablet by mouth daily  Omega-3 Fatty Acids (FISH OIL) 1,000 mg  Family Member Yes No   Sig: Fish Oil   Probiotic Product (ALIGN PO)  Family Member Yes No   Sig: Align   amLODIPine (NORVASC) 2 5 mg tablet  Family Member Yes Yes   Sig: Take 2 5 mg by mouth as needed (as directed according to BP)    apixaban (ELIQUIS) 2 5 mg  Family Member Yes Yes   Sig: Take by mouth 2 (two) times a day   cholecalciferol (VITAMIN D3) 1,000 units tablet  Family Member Yes No   Sig: Take 2,000 Units by mouth daily    co-enzyme Q-10 30 MG capsule  Family Member Yes No   Sig: Take 30 mg by mouth daily   diphenhydrAMINE-acetaminophen (TYLENOL PM)  MG TABS  Family Member Yes No   Sig: Take 2 tablets by mouth daily at bedtime as needed for sleep   dorzolamide-timolol (COSOPT) 22 3-6 8 MG/ML ophthalmic solution  Family Member Yes No   Si drop 2 (two) times a day   fish oil 1,000 mg  Family Member Yes No   Sig: Take 1,000 mg by mouth    latanoprost (XALATAN) 0 005 % ophthalmic solution  Family Member Yes No   Sig: Administer 1 drop to both eyes daily at bedtime   metoprolol succinate (TOPROL-XL) 25 mg 24 hr tablet  Family Member Yes Yes   Sig: Take 25 mg by mouth daily     multivitamin-iron-minerals-folic acid (CENTRUM) chewable tablet  Family Member Yes No   Sig: Chew 1 tablet daily  vitamin E, tocopherol, 400 units capsule  Family Member Yes No   Sig: Take 400 Units by mouth daily  Facility-Administered Medications: None       Portions of the record may have been created with voice recognition software  Occasional wrong word or "sound a like" substitutions may have occurred due to the inherent limitations of voice recognition software   Read the chart carefully and recognize, using context, where substitutions have occurred      Electronically signed by:  Naman Holley

## 2019-12-29 NOTE — ED PROVIDER NOTES
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 and recovery of ejection fraction, recent diagnosis of atrial fibrillation status post cardioversion on 2019, hypertension, peripheral vascular disease, and GERD who is presenting with  Per recent Cardiology note from , the patient had been doing well  Patient was initially on amlodipine at 5 mg daily but this was reduced by the cardiologist to 2 5 mg daily  He is also on metoprolol  His daughter has intermittently been giving him 1 extra 2 5 mg of amlodipine over the past several days due to elevated blood pressures at home  Per patient, he woke up this morning to go to [employer] with his daughters  He then was standing at the kitchen counter and felt lightheaded  He denies any vertiginous component to this  The patient sat down and the lightheaded sensation resolved after about 15 minutes  Patient is not lightheaded at this time  He also started with a mild frontal headache around the same time  The headache is resolving  Per patient's daughter, the patient typically will get frontal headaches when he has elevated blood pressures  The patient did take his usual daily blood pressure medications today  Otherwise, the patient has no complaints  During the episode of lightheadedness and at this time, the patient denies any diaphoresis, vision changes, speech difficulty, focal numbness or weakness, neck pain or neck stiffness, chest pain or pressure, shortness of breath, palpitations, nausea, vomiting, abdominal pain, and urinary symptoms  Per patient's daughters, the patient's wife  approximately 1 week ago  Her  was just yesterday            Prior to Admission Medications   Prescriptions Last Dose Informant Patient Reported? Taking? Garlic 5945 MG CAPS  Family Member Yes No   Sig: Take 1 tablet by mouth daily  Omega-3 Fatty Acids (FISH OIL) 1,000 mg  Family Member Yes No   Sig: Fish Oil   Probiotic Product (ALIGN PO)  Family Member Yes No   Sig: Align   amLODIPine (NORVASC) 2 5 mg tablet  Family Member Yes Yes   Sig: Take 2 5 mg by mouth as needed (as directed according to BP)    apixaban (ELIQUIS) 2 5 mg  Family Member Yes Yes   Sig: Take by mouth 2 (two) times a day   cholecalciferol (VITAMIN D3) 1,000 units tablet  Family Member Yes No   Sig: Take 2,000 Units by mouth daily    co-enzyme Q-10 30 MG capsule  Family Member Yes No   Sig: Take 30 mg by mouth daily   diphenhydrAMINE-acetaminophen (TYLENOL PM)  MG TABS  Family Member Yes No   Sig: Take 2 tablets by mouth daily at bedtime as needed for sleep   dorzolamide-timolol (COSOPT) 22 3-6 8 MG/ML ophthalmic solution  Family Member Yes No   Si drop 2 (two) times a day   fish oil 1,000 mg  Family Member Yes No   Sig: Take 1,000 mg by mouth    latanoprost (XALATAN) 0 005 % ophthalmic solution  Family Member Yes No   Sig: Administer 1 drop to both eyes daily at bedtime   metoprolol succinate (TOPROL-XL) 25 mg 24 hr tablet  Family Member Yes Yes   Sig: Take 25 mg by mouth daily     multivitamin-iron-minerals-folic acid (CENTRUM) chewable tablet  Family Member Yes No   Sig: Chew 1 tablet daily  vitamin E, tocopherol, 400 units capsule  Family Member Yes No   Sig: Take 400 Units by mouth daily        Facility-Administered Medications: None       Past Medical History:   Diagnosis Date    A-fib ([facility] )     Anemia     Carpal tunnel syndrome, unspecified upper limb     Cataract     last assessed: 2012    GERD (gastroesophageal reflux disease)     Glaucoma     Hypertension     last assessed: 2012    Intervertebral disc disease     PVD (peripheral vascular disease) ([facility] )        Past Surgical History:   Procedure Laterality Date    CATARACT EXTRACTION      HERNIA REPAIR      NEUROPLASTY / TRANSPOSITION MEDIAN NERVE AT CARPAL TUNNEL      TX LAP,CHOLECYSTECTOMY N/A 9/14/2016    Procedure: LAPAROSCOPIC CHOLECYSTECTOMY ;  Surgeon: Deidre Cespedes MD;  Location: [facility];  Service: General       Family History   Problem Relation Age of Onset    Glaucoma Mother     Cancer Father     Heart disease Father     Glaucoma Father     Alzheimer's disease Family     Heart attack Family      I have reviewed and agree with the history as documented  Social History     Tobacco Use    Smoking status: Former Smoker    Smokeless tobacco: Never Used    Tobacco comment: quit 30 years ago    Substance Use Topics    Alcohol use: Not Currently    Drug use: No        Review of Systems   Constitutional: Negative for diaphoresis, fever and unexpected weight change  HENT: Negative for congestion, rhinorrhea and sore throat  Eyes: Negative for pain, discharge and visual disturbance  Respiratory: Negative for cough, shortness of breath and wheezing  Cardiovascular: Negative for chest pain, palpitations and leg swelling  Gastrointestinal: Negative for abdominal pain, blood in stool, constipation, diarrhea, nausea and vomiting  Genitourinary: Negative for dysuria, flank pain and hematuria  Musculoskeletal: Negative for arthralgias and myalgias  Skin: Negative for rash and wound  Allergic/Immunologic: Negative for environmental allergies and food allergies  Neurological: Positive for light-headedness (resolved) and headaches (frontal)  Negative for dizziness, seizures, weakness and numbness  Hematological: Negative for adenopathy  Psychiatric/Behavioral: Negative for confusion and hallucinations         Physical Exam  ED Triage Vitals [12/29/19 0706]   Temperature Pulse Respirations Blood Pressure SpO2   (!) 97 4 °F (36 3 °C) 66 18 (!) 199/82 98 %      Temp Source Heart Rate Source Patient Position - Orthostatic VS BP Location FiO2 (%) Oral Monitor Lying Right arm --      Pain Score       5             Orthostatic Vital Signs  Vitals:    12/29/19 0706 12/29/19 0715 12/29/19 0730 12/29/19 0800   BP: (!) 199/82 (!) 199/82 (!) 175/81 166/77   Pulse: 66 70 68 62   Patient Position - Orthostatic VS: Lying          Physical Exam   Constitutional: He is oriented to person, place, and time  He appears well-developed and well-nourished  HENT:   Head: Normocephalic and atraumatic  Right Ear: External ear normal    Left Ear: External ear normal    Nose: Nose normal    Eyes: Pupils are equal, round, and reactive to light  EOM are normal    Neck: Normal range of motion  Neck supple  Cardiovascular: Normal rate, regular rhythm and normal heart sounds  No murmur heard  Pulmonary/Chest: Effort normal and breath sounds normal  No respiratory distress  He has no wheezes  He has no rales  Abdominal: Soft  Bowel sounds are normal  He exhibits no distension  There is no tenderness  There is no guarding  Musculoskeletal: Normal range of motion  He exhibits no edema or deformity  Neurological: He is alert and oriented to person, place, and time  Patient is alert and oriented to time, person, place, and situation  Speech is fluent with no aphasia or dysarthria  CN II-XII are intact  Strength is 5/5 in the upper and lower extremities bilaterally  Sensation grossly intact  No dysmetria on finger to nose testing  No pronator drift  Gait is steady, no gait ataxia  Skin: Skin is warm and dry  Capillary refill takes less than 2 seconds  He is not diaphoretic  Psychiatric: He has a normal mood and affect  His behavior is normal    Nursing note and vitals reviewed        ED Medications  Medications   acetaminophen (TYLENOL) tablet 975 mg (975 mg Oral Given 12/29/19 0823)       Diagnostic Studies  Results Reviewed     Procedure Component Value Units Date/Time    CBC and differential [[id number]]  (Abnormal) Collected:  12/29/19 0710    Lab Status:  Final result Specimen:  Blood from Arm, Left Updated:  12/29/19 0737     WBC 5 25 Thousand/uL      RBC 3 53 Million/uL      Hemoglobin 11 7 g/dL      Hematocrit 34 6 %      MCV 98 fL      MCH 33 1 pg      MCHC 33 8 g/dL      RDW 12 5 %      MPV 11 3 fL      Platelets 871 Thousands/uL      nRBC 0 /100 WBCs      Neutrophils Relative 57 %      Immat GRANS % 0 %      Lymphocytes Relative 24 %      Monocytes Relative 11 %      Eosinophils Relative 7 %      Basophils Relative 1 %      Neutrophils Absolute 3 02 Thousands/µL      Immature Grans Absolute 0 01 Thousand/uL      Lymphocytes Absolute 1 24 Thousands/µL      Monocytes Absolute 0 60 Thousand/µL      Eosinophils Absolute 0 35 Thousand/µL      Basophils Absolute 0 03 Thousands/µL     Troponin I [[id number]]  (Normal) Collected:  12/29/19 0710    Lab Status:  Final result Specimen:  Blood from Arm, Left Updated:  12/29/19 0737     Troponin I <0 02 ng/mL     Comprehensive metabolic panel [[id number]]  (Abnormal) Collected:  12/29/19 0710    Lab Status:  Final result Specimen:  Blood from Arm, Left Updated:  12/29/19 0736     Sodium 139 mmol/L      Potassium 4 2 mmol/L      Chloride 108 mmol/L      CO2 29 mmol/L      ANION GAP 2 mmol/L      BUN 16 mg/dL      Creatinine 1 30 mg/dL      Glucose 99 mg/dL      Calcium 9 5 mg/dL      AST 16 U/L      ALT 25 U/L      Alkaline Phosphatase 46 U/L      Total Protein 7 4 g/dL      Albumin 3 4 g/dL      Total Bilirubin 0 34 mg/dL      eGFR 49 ml/min/1 73sq m     Narrative:       [facility] guidelines for Chronic Kidney Disease (CKD):     Stage 1 with normal or high GFR (GFR > 90 mL/min/1 73 square meters)    Stage 2 Mild CKD (GFR = 60-89 mL/min/1 73 square meters)    Stage 3A Moderate CKD (GFR = 45-59 mL/min/1 73 square meters)    Stage 3B Moderate CKD (GFR = 30-44 mL/min/1 73 square meters)    Stage 4 Severe CKD (GFR = 15-29 mL/min/1 73 square meters)    Stage 5 End Stage CKD (GFR <15 mL/min/1 73 square meters)  Note: GFR calculation is accurate only with a steady state creatinine                 XR chest 2 views   ED Interpretation by Michael Leonard MD (12/29 0818)   No consolidation, effusion, or pneumothorax  Procedures  ECG 12 Lead Documentation Only  Date/Time: 12/29/2019 7:27 AM  Performed by: Michael Leonard MD  Authorized by: Michael Leonard MD     Patient location:  ED  Previous ECG:     Previous ECG:  Compared to current    Comparison ECG info:  September 30, 2019    Similarity:  No change    Comparison to cardiac monitor: Yes    Interpretation:     Interpretation: normal    Rate:     ECG rate:  74    ECG rate assessment: normal    Rhythm:     Rhythm: sinus rhythm    Ectopy:     Ectopy: none    QRS:     QRS axis:  Normal    QRS intervals:  Normal  Conduction:     Conduction: normal    ST segments:     ST segments:  Normal  T waves:     T waves: normal            ED Course  ED Course as of Dec 29 1814   Sun Dec 29, 2019   0742 Hemoglobin near recent baseline  Hemoglobin(!): 11 7   0742 Troponin I: <0 02   0743 Creatinine near baseline  Creatinine: 1 30   0816 Patient completely asymptomatic  Blood pressure is trending down  I updated patient and his daughter is at bedside regarding lab results  Patient and daughters comfortable with discharge at this time  Identification of Seniors at Risk      Most Recent Value   (ISAR) Identification of Seniors at Risk   Before the illness or injury that brought you to the Emergency, did you need someone to help you on a regular basis? 1 Filed at: 12/29/2019 7466   In the last 24 hours, have you needed more help than usual?  0 Filed at: 12/29/2019 6937   Have you been hospitalized for one or more nights during the past 6 months? 0 Filed at: 12/29/2019 1835   In general, do you see well?  0 Filed at: 12/29/2019 2607   In general, do you have serious problems with your memory?   0 Filed at: 12/29/2019 5124   Do you take more than three different medications every day? 1 Filed at: 12/29/2019 8608   ISAR Score  2 Filed at: 12/29/2019 0708                          MDM  Number of Diagnoses or Management Options  Frontal headache: minor  High blood pressure: established and worsening  Lightheadedness: new and requires workup  Diagnosis management comments:     As above, patient presented with transient lightheadedness which had resolved prior to arrival   He also noted mild frontal headache  Per his daughter, the patient typically has frontal headaches when his blood pressure is elevated  Patient had been having elevated blood pressures over the past week  His daughter had intermittently been giving him 5 mg of amlodipine per day as opposed to 2 5 mg per day  Patient had previously been on 5 mg per day but this was decreased due to his blood pressure running on the lower side  On examination, the patient was well appearing  He had elevated blood pressure which improved without intervention  Neurologic examination was nonfocal in the patient had a normal, steady gait with no ataxia or unsteadiness  Cardiac workup was ordered and was unremarkable  EKG did not demonstrate any evidence of ischemia or arrhythmia  Troponin was negative  Labs were at baseline for patient  Patient remained asymptomatic apart from mild frontal headache which was treated with Tylenol  Advised PCP follow-up  Discussed the results of diagnostic testing ordered during ER evaluation with the patient  I reviewed instructions for discharge as well as return precautions  Patient verbalized understanding of test results, discharge instructions, plan for follow-up, and return precautions         Amount and/or Complexity of Data Reviewed  Clinical lab tests: ordered and reviewed  Tests in the radiology section of CPT®: ordered and reviewed  Decide to obtain previous medical records or to obtain history from someone other than the patient: yes  Obtain history from someone other than the patient: yes  Review and summarize past medical records: yes  Independent visualization of images, tracings, or specimens: yes    Risk of Complications, Morbidity, and/or Mortality  Presenting problems: moderate  Diagnostic procedures: minimal  Management options: minimal    Patient Progress  Patient progress: improved        Disposition  Final diagnoses:   Lightheadedness   High blood pressure   Frontal headache     Time reflects when diagnosis was documented in both MDM as applicable and the Disposition within this note     Time User Action Codes Description Comment    12/29/2019  8:18 AM Charles Pal Add [R42] Dizziness     12/29/2019  8:18 AM Charles Pal Remove [R42] Dizziness     12/29/2019  8:18 AM Charles Pal Add [R42] Lightheadedness     12/29/2019  8:18 AM Charles Pal Add [I10] High blood pressure     12/29/2019  8:56 AM Charles Pal Add [R51] Frontal headache       ED Disposition     ED Disposition Condition Date/Time Comment    Discharge Good Sun Dec 29, 2019  8:18 AM Jessica De Los Santos discharge to home/self care  Follow-up Information     Follow up With Specialties Details Why Contact Info Additional Information    Chio Salgado MD Family Medicine Call in 1 day Please follow up with your PCP within 1 week  [facility]       Darya Rodríguez MD Cardiology Call in 1 day Please follow up with your Cardiologist regarding your blood pressure  [address] [facility] [address] [place] [address] Emergency Department Emergency Medicine Go to  If symptoms worsen   [address] [place] [address] ED, [address], [address] [phone]          Discharge Medication List as of 12/29/2019  8:20 AM      CONTINUE these medications which have NOT CHANGED    Details   amLODIPine (NORVASC) 2 5 mg tablet Take 2 5 mg by mouth as needed (as directed according to BP) , Historical Med      apixaban (ELIQUIS) 2 5 mg Take by mouth 2 (two) times a day, Historical Med      metoprolol succinate (TOPROL-XL) 25 mg 24 hr tablet Take 25 mg by mouth daily  , Historical Med      cholecalciferol (VITAMIN D3) 1,000 units tablet Take 2,000 Units by mouth daily , Historical Med      co-enzyme Q-10 30 MG capsule Take 30 mg by mouth daily, Historical Med      diphenhydrAMINE-acetaminophen (TYLENOL PM)  MG TABS Take 2 tablets by mouth daily at bedtime as needed for sleep, Historical Med      dorzolamide-timolol (COSOPT) 22 3-6 8 MG/ML ophthalmic solution 1 drop 2 (two) times a day, Historical Med      !! fish oil 1,000 mg Take 1,000 mg by mouth , Historical Med      Garlic 8495 MG CAPS Take 1 tablet by mouth daily  , Historical Med      latanoprost (XALATAN) 0 005 % ophthalmic solution Administer 1 drop to both eyes daily at bedtime, Historical Med      multivitamin-iron-minerals-folic acid (CENTRUM) chewable tablet Chew 1 tablet daily  , Historical Med      !! Omega-3 Fatty Acids (FISH OIL) 1,000 mg Fish Oil, Historical Med      Probiotic Product (ALIGN PO) Align, Historical Med      vitamin E, tocopherol, 400 units capsule Take 400 Units by mouth daily  , Historical Med       !! - Potential duplicate medications found  Please discuss with provider  No discharge procedures on file  ED Provider  Attending physically available and evaluated Aleksandr Loge  I managed the patient along with the ED Attending      Electronically Signed by         Lulu Teran MD  12/29/19 8404

## 2019-12-29 NOTE — DISCHARGE INSTRUCTIONS
Please call your PCP and Cardiologist   Follow up with your PCP within 1 week for a recheck of symptoms  If you have persistently elevated blood pressures, please call your Cardiologist for advice regarding your blood pressure medications  Return to the ER with recurrent lightheadedness, severe headache, new weakness or numbness, chest pain, shortness of breath, nausea, vomiting, abdominal pain, or any other concerning symptoms

## 2019-12-31 ENCOUNTER — TELEPHONE (OUTPATIENT)
Dept: FAMILY MEDICINE CLINIC | Facility: CLINIC | Age: 84
End: 2019-12-31

## 2019-12-31 NOTE — TELEPHONE ENCOUNTER
I don't see a problem with doing that for his blood pressure but should also get ok from cardiology whenever he is available

## 2019-12-31 NOTE — TELEPHONE ENCOUNTER
Patient's daughter [place] Parul called stating patient's cardiologist from [facility] Dr Maliha Martinez office told her to contact us due to the doctor not being in the office and wants to know if giving 2 5 mg Amlodipine extra is okay when he get's a headache in front of his head, when he experiences this she checks his blood pressure and it's high so this is when she gives the extra medication to him  Please advise Bambi Teran at [phone]

## 2020-01-02 NOTE — TELEPHONE ENCOUNTER
Patient's daughter [facility] is aware  She also would like to know if pt  Can be prescribed something to calm him because his wife just past on 12/22/19  She asks that it would be nothing to make him unsteady

## 2020-01-03 DIAGNOSIS — F41.9 ANXIETY: Primary | ICD-10-CM

## 2020-01-03 RX ORDER — ALPRAZOLAM 0.25 MG/1
TABLET ORAL
Qty: 30 TABLET | Refills: 1 | Status: SHIPPED | OUTPATIENT
Start: 2020-01-03 | End: 2020-01-06 | Stop reason: SINTOL

## 2020-01-03 NOTE — TELEPHONE ENCOUNTER
Please call daughter  I will send in for med however, anything that will calm him may also cause some drowsiness

## 2020-01-06 ENCOUNTER — OFFICE VISIT (OUTPATIENT)
Dept: FAMILY MEDICINE CLINIC | Facility: CLINIC | Age: 85
End: 2020-01-06
Payer: COMMERCIAL

## 2020-01-06 VITALS
BODY MASS INDEX: 27.72 KG/M2 | HEIGHT: 67 IN | WEIGHT: 176.6 LBS | SYSTOLIC BLOOD PRESSURE: 144 MMHG | OXYGEN SATURATION: 96 % | RESPIRATION RATE: 15 BRPM | DIASTOLIC BLOOD PRESSURE: 70 MMHG | HEART RATE: 59 BPM | TEMPERATURE: 97.5 F

## 2020-01-06 DIAGNOSIS — F43.21 GRIEVING: Primary | ICD-10-CM

## 2020-01-06 DIAGNOSIS — K52.9 GASTROENTERITIS: ICD-10-CM

## 2020-01-06 DIAGNOSIS — R51.9 FRONTAL HEADACHE: ICD-10-CM

## 2020-01-06 PROCEDURE — 1160F RVW MEDS BY RX/DR IN RCRD: CPT | Performed by: NURSE PRACTITIONER

## 2020-01-06 PROCEDURE — 99214 OFFICE O/P EST MOD 30 MIN: CPT | Performed by: NURSE PRACTITIONER

## 2020-01-06 NOTE — PROGRESS NOTES
FAMILY PRACTICE OFFICE VISIT       NAME: Rosie Jade  AGE: 80 y o  SEX: male       : 7/15/1931        MRN: [id number]      Assessment and Plan     Problem List Items Addressed This Visit     None      Visit Diagnoses     Grieving    -  Primary    Gastroenteritis        Frontal headache              1  Grieving     2  Gastroenteritis     3  Frontal headache       This 70-year-old male presents today for diarrhea over the past 2 days, and frontal headaches over the past 2 weeks  He is grieving the loss of his wife, who  2 weeks ago  His wife was sick with pancreatic cancer for 9 months, but she  suddenly at home  He is accompanied by his daughter today, who reports prior to the death of his wife, he was doing very well  Has had 2-3 episodes of diarrhea yesterday and today  Denies abdominal pain, fevers, chills, hematochezia or melena  No change in appetite  Eating his regular diet  Abdominal exam is unremarkable  Suspect a viral gastroenteritis  May trial Imodium  Push fluids  Badger diet  If diarrhea is persisting greater than 1 week, he is instructed to call  Mild right-sided intermittent frontal headache, with no other associated symptoms, except feeling foggy, not clear thinking  Headaches resolve with 325 mg of Tylenol  He has had headaches in the past   He was evaluated in the emergency room on  for headaches and lightheadedness  Lightheadedness has completely resolved  Blood work, ECG, chest x-ray in the emergency room were unremarkable  Neurological exam is unremarkable today  Suspect headaches caused by stress due to the loss of his wife recently  He will continue to use Tylenol 325 mg as needed for headaches  Advised patient and daughter to call or go to the emergency room for any increase in severity/intensity of headaches  Also advised to call if headaches are increasing in frequency or persistent    Reviewed with patient and his daughter max dose of Tylenol in 24 hours is 3000 mg  Recommend pushing fluids, drinking at least 60 glasses of water daily  Stay well hydrated  Chief Complaint     Chief Complaint   Patient presents with    Diarrhea    Headache       History of Present Illness     Jose Antonio Bunch is an 80-year-old male presenting today for headache and diarrhea  His wife  2 weeks ago  He is accompanied by his daughter, who states prior to this he was doing well  For the past 2 weeks has had a very mild headache, over his right eye and forehead region  Headaches are intermittent, come and go  Headache is resolved with 325 mg of Tylenol  He feels a little bit foggy, with not clear thinking  He is sleeping well, about 8 hours per night  Sometimes takes Tylenol p m  Wakes up 2-3 times per night to void  Has not been crying  There has been a lot of stress since the death of his wife  Two daughters are helping to take care of everything  His daughter notes, he does not talk about his feelings at all  He has been prescribed Xanax, however they have not picked this up from the pharmacy  He was evaluated in the emergency room on  for lightheadedness  States this has resolved, and has not returned since his emergency room visit  Over the past 2 days had 2-3 episodes of diarrhea each day  Denies abdominal pain  No change in appetite  Has been eating regular diet  Diarrhea is not always associated with food  He has not had to wake up to have a bowel movement  No one else in his family has been sick  Denies fevers or chills  Denies hematochezia or melena  No mucus in the stool  Has not been out to eat recently  Is not eating anything different than usual   Typically struggles with constipation, needing to eat a lot of fresh fruits, figs, and prunes to keep bowel movements regular  Typically only drinks about 3 glasses of water daily                            Review of Systems   Review of Systems   Constitutional: Negative  HENT: Negative  Eyes: Negative for visual disturbance  Respiratory: Negative  Cardiovascular: Negative  Gastrointestinal: Positive for diarrhea  Negative for abdominal distention, abdominal pain, anal bleeding, blood in stool, constipation, nausea, rectal pain and vomiting  Musculoskeletal: Negative for myalgias  Neurological: Positive for headaches  Negative for dizziness, tremors, seizures, syncope, speech difficulty, weakness, light-headedness and numbness         Active Problem List     Patient Active Problem List   Diagnosis    Cholecystitis with cholelithiasis    Allergic rhinitis    Anemia    Benign essential hypertension    Esophageal reflux    Glaucoma    Hiatal hernia    Hyperlipidemia    Joint pain, knee    Left lumbar radiculopathy    Peripheral vascular disease ([place] [address] )    Preoperative evaluation of a medical condition to rule out surgical contraindications (TAR required)    Syncope    Head injury due to trauma    FRANCO (acute kidney injury) ([place] [address] )    PAF (paroxysmal atrial fibrillation) (HCC)    Right wrist pain    Peripheral edema    Arthritis of carpometacarpal (CMC) joint of right thumb    Paresthesia    Dehydration    Hyponatremia    Abnormal liver function    Sepsis (HCC)    Other insomnia    Anxiety    Carpal tunnel syndrome    S/P ablation of ventricular arrhythmia    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[place] [address] )    HTN (hypertension)    Shortness of breath       Past Medical History:  Past Medical History:   Diagnosis Date    A-fib ([place] [address] )     Anemia     Carpal tunnel syndrome, unspecified upper limb     Cataract     last assessed: 8/18/2012    GERD (gastroesophageal reflux disease)     Glaucoma     Hypertension     last assessed: 8/23/2012    Intervertebral disc disease     PVD (peripheral vascular disease) (HCC)        Past Surgical History:  Past Surgical History:   Procedure Laterality Date    CATARACT EXTRACTION      HERNIA REPAIR      NEUROPLASTY / TRANSPOSITION MEDIAN NERVE AT CARPAL TUNNEL      [place] LAP,CHOLECYSTECTOMY N/A 9/14/2016    Procedure: LAPAROSCOPIC CHOLECYSTECTOMY ;  Surgeon: Delfin Martinez MD;  Location: [facility];  Service: General       Family History:  Family History   Problem Relation Age of Onset    Glaucoma Mother     Cancer Father     Heart disease Father    Anish Hose Glaucoma Father     Alzheimer's disease Family     Heart attack Family        Social History:  Social History     Socioeconomic History    Marital status: /Civil Union     Spouse name: Not on file    Number of children: Not on file    Years of education: Not on file    Highest education level: Not on file   Occupational History    Occupation: retired   Social Needs    Financial resource strain: Not on file    Food insecurity:     Worry: Not on file     Inability: Not on file   i[employer] needs:     Medical: Not on file     Non-medical: Not on file   Tobacco Use    Smoking status: Former Smoker    Smokeless tobacco: Never Used    Tobacco comment: quit 30 years ago    Substance and Sexual Activity    Alcohol use: Not Currently    Drug use: No    Sexual activity: Not on file   Lifestyle    Physical activity:     Days per week: Not on file     Minutes per session: Not on file    Stress: Not on file   Relationships    Social connections:     Talks on phone: Not on file     Gets together: Not on file     Attends Yarsani service: Not on file     Active member of club or organization: Not on file     Attends meetings of clubs or organizations: Not on file     Relationship status: Not on file    Intimate partner violence:     Fear of current or ex partner: Not on file     Emotionally abused: Not on file     Physically abused: Not on file     Forced sexual activity: Not on file   Other Topics Concern    Not on file   Social History Narrative    Morning person       I have reviewed the patient's medical history in detail; there are no changes to the history as noted in the electronic medical record  Objective     Vitals:    01/06/20 1115   BP: 144/70   BP Location: Right arm   Patient Position: Sitting   Cuff Size: Standard   Pulse: 59   Resp: 15   Temp: 97 5 °F (36 4 °C)   TempSrc: Tympanic   SpO2: 96%   Weight: 80 1 kg (176 lb 9 6 oz)   Height: 5' 7" (1 702 m)     Wt Readings from Last 3 Encounters:   01/06/20 80 1 kg (176 lb 9 6 oz)   12/29/19 80 7 kg (178 lb)   10/24/19 78 6 kg (173 lb 4 8 oz)     Physical Exam   Constitutional: He is oriented to person, place, and time  He appears well-developed and well-nourished  No distress  HENT:   Head: Normocephalic and atraumatic  Right Ear: Tympanic membrane normal    Left Ear: Tympanic membrane normal    Nose: Nose normal    Mouth/Throat: Uvula is midline and oropharynx is clear and moist    Eyes: Pupils are equal, round, and reactive to light  Conjunctivae and EOM are normal    Neck: Normal range of motion  Neck supple  Carotid bruit is not present  No thyromegaly present  Cardiovascular: Normal rate, regular rhythm and normal heart sounds  Pulmonary/Chest: Effort normal and breath sounds normal    Abdominal: Soft  Bowel sounds are normal  He exhibits no distension  There is no tenderness  Musculoskeletal: He exhibits no edema  Lymphadenopathy:     He has no cervical adenopathy  Neurological: He is alert and oriented to person, place, and time  No cranial nerve deficit  Skin: Skin is warm and dry  No rash noted  Psychiatric: He has a normal mood and affect  Nursing note and vitals reviewed           ALLERGIES:  Allergies   Allergen Reactions    Atorvastatin GI Intolerance    Zetia [Ezetimibe] Other (See Comments)     myalgia       Current Medications     Current Outpatient Medications   Medication Sig Dispense Refill    amLODIPine (NORVASC) 2 5 mg tablet Take 2 5 mg by mouth as needed (as directed according to BP)       apixaban (ELIQUIS) 2 5 mg Take by mouth 2 (two) times a day  cholecalciferol (VITAMIN D3) 1,000 units tablet Take 2,000 Units by mouth daily       co-enzyme Q-10 30 MG capsule Take 30 mg by mouth daily      diphenhydrAMINE-acetaminophen (TYLENOL PM)  MG TABS Take 2 tablets by mouth daily at bedtime as needed for sleep      dorzolamide-timolol (COSOPT) 22 3-6 8 MG/ML ophthalmic solution 1 drop 2 (two) times a day      Garlic 4075 MG CAPS Take 1 tablet by mouth daily   latanoprost (XALATAN) 0 005 % ophthalmic solution Administer 1 drop to both eyes daily at bedtime      metoprolol succinate (TOPROL-XL) 25 mg 24 hr tablet Take 25 mg by mouth daily        multivitamin-iron-minerals-folic acid (CENTRUM) chewable tablet Chew 1 tablet daily  No current facility-administered medications for this visit            Health Maintenance     Health Maintenance   Topic Date Due    SLP PLAN OF CARE  07/15/1931    BMI: Followup Plan  07/15/1949    Pneumococcal Vaccine: 65+ Years (1 of 2 - PCV13) 07/15/1996    Influenza Vaccine  04/05/2020 (Originally 7/1/2019)    Fall Risk  07/12/2020    Depression Screening PHQ  07/12/2020    Medicare Annual Wellness Visit (AWV)  07/12/2020    BMI: Adult  01/06/2021    DTaP,Tdap,and Td Vaccines (2 - Td) 03/12/2028    Hepatitis C Screening  Completed    Pneumococcal Vaccine: Pediatrics (0 to 5 Years) and At-Risk Patients (6 to 59 Years)  Aged Out    HIB Vaccine  Aged Out    Hepatitis B Vaccine  Aged Out    IPV Vaccine  Aged Out    Hepatitis A Vaccine  Aged Out    Meningococcal ACWY Vaccine  Aged Out    HPV Vaccine  Aged [employer] History   Administered Date(s) Administered    INFLUENZA 09/23/2014    Influenza Split High Dose Preservative Free IM 09/23/2014, 12/17/2015    Influenza TIV (IM) 10/24/2003, 11/08/2005, 11/13/2007, 10/16/2008, 12/16/2009, 11/10/2010, 11/18/2011, 09/24/2012, 09/25/2013    Td (adult), adsorbed 11/01/2003    Tdap 03/12/2018       MARGARITA Carlson

## 2020-02-13 ENCOUNTER — OFFICE VISIT (OUTPATIENT)
Dept: FAMILY MEDICINE CLINIC | Facility: CLINIC | Age: 85
End: 2020-02-13
Payer: COMMERCIAL

## 2020-02-13 VITALS
OXYGEN SATURATION: 95 % | SYSTOLIC BLOOD PRESSURE: 150 MMHG | TEMPERATURE: 97.2 F | BODY MASS INDEX: 28.88 KG/M2 | HEIGHT: 67 IN | WEIGHT: 184 LBS | RESPIRATION RATE: 18 BRPM | DIASTOLIC BLOOD PRESSURE: 70 MMHG | HEART RATE: 64 BPM

## 2020-02-13 DIAGNOSIS — J11.1 INFLUENZA: Primary | ICD-10-CM

## 2020-02-13 PROCEDURE — 3078F DIAST BP <80 MM HG: CPT | Performed by: FAMILY MEDICINE

## 2020-02-13 PROCEDURE — 3077F SYST BP >= 140 MM HG: CPT | Performed by: FAMILY MEDICINE

## 2020-02-13 PROCEDURE — 3008F BODY MASS INDEX DOCD: CPT | Performed by: FAMILY MEDICINE

## 2020-02-13 PROCEDURE — 1036F TOBACCO NON-USER: CPT | Performed by: FAMILY MEDICINE

## 2020-02-13 PROCEDURE — 1160F RVW MEDS BY RX/DR IN RCRD: CPT | Performed by: FAMILY MEDICINE

## 2020-02-13 PROCEDURE — 99213 OFFICE O/P EST LOW 20 MIN: CPT | Performed by: FAMILY MEDICINE

## 2020-02-13 PROCEDURE — 87631 RESP VIRUS 3-5 TARGETS: CPT | Performed by: FAMILY MEDICINE

## 2020-02-13 RX ORDER — OSELTAMIVIR PHOSPHATE 75 MG/1
75 CAPSULE ORAL EVERY 12 HOURS SCHEDULED
Qty: 10 CAPSULE | Refills: 0 | Status: SHIPPED | OUTPATIENT
Start: 2020-02-13 | End: 2020-02-18

## 2020-02-13 RX ORDER — OMEPRAZOLE 20 MG/1
20 CAPSULE, DELAYED RELEASE ORAL DAILY
COMMUNITY
End: 2021-10-06

## 2020-02-13 NOTE — PROGRESS NOTES
FAMILY PRACTICE OFFICE VISIT       NAME: Elissa Leavitt  AGE: 80 y o  SEX: male       : 7/15/1931        MRN: [id number]    DATE: 2020  TIME: 11:03 AM    Assessment and Plan     Problem List Items Addressed This Visit        Respiratory    Influenza - Primary     Influenza  Patient with flu type symptoms  He was given prescription for Tamiflu 75 mg 1 b i d  He will also have a nasal swab for flu test   We will make further recommendations pending results of test   Patient may also use over-the-counter Tylenol and Robitussin DM  as needed         Relevant Medications    oseltamivir (TAMIFLU) 75 mg capsule    Other Relevant Orders    Influenza A/B and RSV PCR              Chief Complaint     Chief Complaint   Patient presents with    Cold Like Symptoms     cough sore, throat, achey x 2 days  possible flu exposure       History of Present Illness     Patient states symptoms began yesterday with dry cough, body aches, and scratchy throat  He denies any documented fevers  He does not obtain an annual flu vaccine  His son along did have a flu approximately 2 weeks ago  Review of Systems   Review of Systems   Constitutional: Positive for fatigue  HENT: Positive for congestion and sore throat  Respiratory: Positive for cough  Cardiovascular: Negative  Gastrointestinal: Negative  Genitourinary: Negative  Musculoskeletal: Positive for arthralgias  Neurological: Negative  Psychiatric/Behavioral: Negative          Active Problem List     Patient Active Problem List   Diagnosis    Cholecystitis with cholelithiasis    Allergic rhinitis    Anemia    Benign essential hypertension    Esophageal reflux    Glaucoma    Hiatal hernia    Hyperlipidemia    Joint pain, knee    Left lumbar radiculopathy    Peripheral vascular disease ([address] )    Preoperative evaluation of a medical condition to rule out surgical contraindications (TAR required)    Syncope    Head injury due to trauma  FRANCO (acute kidney injury) ([facility] )    PAF (paroxysmal atrial fibrillation) (HCC)    Right wrist pain    Peripheral edema    Arthritis of carpometacarpal (CMC) joint of right thumb    Paresthesia    Dehydration    Hyponatremia    Abnormal liver function    Sepsis (HCC)    Other insomnia    Anxiety    Carpal tunnel syndrome    S/P ablation of ventricular arrhythmia    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[place] [address] )    HTN (hypertension)    Shortness of breath    Influenza       Past Medical History:  Past Medical History:   Diagnosis Date    A-fib ([place] [address] )     Anemia     Carpal tunnel syndrome, unspecified upper limb     Cataract     last assessed: 8/18/2012    GERD (gastroesophageal reflux disease)     Glaucoma     Hypertension     last assessed: 8/23/2012    Intervertebral disc disease     PVD (peripheral vascular disease) ([facility])        Past Surgical History:  Past Surgical History:   Procedure Laterality Date    CATARACT EXTRACTION      HERNIA REPAIR      NEUROPLASTY / TRANSPOSITION MEDIAN NERVE AT CARPAL TUNNEL      [place] LAP,CHOLECYSTECTOMY N/A 9/14/2016    Procedure: LAPAROSCOPIC CHOLECYSTECTOMY ;  Surgeon: Sally Razo MD;  [facility];  Service: General       Family History:  Family History   Problem Relation Age of Onset    Glaucoma Mother     Cancer Father     Heart disease Father    Kell Lima Glaucoma Father     Alzheimer's disease Family     Heart attack Family        Social History:  Social History     Socioeconomic History    Marital status: /Civil Union     Spouse name: Not on file    Number of children: Not on file    Years of education: Not on file    Highest education level: Not on file   Occupational History    Occupation: retired   Social Needs    Financial resource strain: Not on file    Food insecurity:     Worry: Not on file     Inability: Not on file   [place] needs:     Medical: Not on file     Non-medical: Not on file   Tobacco Use    Smoking status: Former Smoker    Smokeless tobacco: Never Used    Tobacco comment: quit 30 years ago    Substance and Sexual Activity    Alcohol use: Not Currently    Drug use: No    Sexual activity: Not on file   Lifestyle    Physical activity:     Days per week: Not on file     Minutes per session: Not on file    Stress: Not on file   Relationships    Social connections:     Talks on phone: Not on file     Gets together: Not on file     Attends Scientologist service: Not on file     Active member of club or organization: Not on file     Attends meetings of clubs or organizations: Not on file     Relationship status: Not on file    Intimate partner violence:     Fear of current or ex partner: Not on file     Emotionally abused: Not on file     Physically abused: Not on file     Forced sexual activity: Not on file   Other Topics Concern    Not on file   Social History Narrative    Morning person       Objective     Vitals:    02/13/20 1031   BP: 150/70   Pulse: 64   Resp: 18   Temp: (!) 97 2 °F (36 2 °C)   SpO2: 95%     Wt Readings from Last 3 Encounters:   02/13/20 83 5 kg (184 lb)   01/06/20 80 1 kg (176 lb 9 6 oz)   12/29/19 80 7 kg (178 lb)       Physical Exam   Constitutional: He is oriented to person, place, and time  No distress  HENT:   Right Ear: External ear normal    Left Ear: External ear normal    Mouth/Throat: Oropharynx is clear and moist  No oropharyngeal exudate  Eyes: Pupils are equal, round, and reactive to light  Conjunctivae and EOM are normal  Right eye exhibits no discharge  Left eye exhibits no discharge  Cardiovascular: Normal rate, regular rhythm and normal heart sounds  No murmur heard  Pulmonary/Chest: Effort normal and breath sounds normal  No respiratory distress  He has no wheezes  He has no rales  Musculoskeletal: He exhibits no edema  Lymphadenopathy:     He has no cervical adenopathy  Neurological: He is alert and oriented to person, place, and time  No cranial nerve deficit  Skin: Skin is dry  No rash noted  No erythema  No pallor  Psychiatric: He has a normal mood and affect   His behavior is normal  Judgment and thought content normal        Pertinent Laboratory/Diagnostic Studies:  Lab Results   Component Value Date    BUN 16 12/29/2019    CREATININE 1 30 12/29/2019    CALCIUM 9 5 12/29/2019     10/31/2017    K 4 2 12/29/2019    CO2 29 12/29/2019     12/29/2019     Lab Results   Component Value Date    ALT 25 12/29/2019    AST 16 12/29/2019    ALKPHOS 46 12/29/2019    BILITOT 0 5 10/31/2017       Lab Results   Component Value Date    WBC 5 25 12/29/2019    HGB 11 7 (L) 12/29/2019    HCT 34 6 (L) 12/29/2019    MCV 98 12/29/2019     12/29/2019       Lab Results   Component Value Date    TSH 3 60 04/24/2019       No results found for: CHOL  Lab Results   Component Value Date    TRIG 94 04/24/2019     Lab Results   Component Value Date    HDL 50 04/24/2019     No results found for: [facility]  Lab Results   Component Value Date    HGBA1C 5 4 12/03/2018       Results for orders placed or performed during the hospital encounter of 12/29/19   CBC and differential   Result Value Ref Range    WBC 5 25 4 31 - 10 16 Thousand/uL    RBC 3 53 (L) 3 88 - 5 62 Million/uL    Hemoglobin 11 7 (L) 12 0 - 17 0 g/dL    Hematocrit 34 6 (L) 36 5 - 49 3 %    MCV 98 82 - 98 fL    MCH 33 1 26 8 - 34 3 pg    MCHC 33 8 31 4 - 37 4 g/dL    RDW 12 5 11 6 - 15 1 %    MPV 11 3 8 9 - 12 7 fL    Platelets 127 359 - 497 Thousands/uL    nRBC 0 /100 WBCs    Neutrophils Relative 57 43 - 75 %    Immat GRANS % 0 0 - 2 %    Lymphocytes Relative 24 14 - 44 %    Monocytes Relative 11 4 - 12 %    Eosinophils Relative 7 (H) 0 - 6 %    Basophils Relative 1 0 - 1 %    Neutrophils Absolute 3 02 1 85 - 7 62 Thousands/µL    Immature Grans Absolute 0 01 0 00 - 0 20 Thousand/uL    Lymphocytes Absolute 1 24 0 60 - 4 47 Thousands/µL    Monocytes Absolute 0 60 0 17 - 1 22 Thousand/µL    Eosinophils Absolute 0 35 0 00 - 0 61 Thousand/µL    Basophils Absolute 0 03 0 00 - 0 10 Thousands/µL   Comprehensive metabolic panel   Result Value Ref Range    Sodium 139 136 - 145 mmol/L    Potassium 4 2 3 5 - 5 3 mmol/L    Chloride 108 100 - 108 mmol/L    CO2 29 21 - 32 mmol/L    ANION GAP 2 (L) 4 - 13 mmol/L    BUN 16 5 - 25 mg/dL    Creatinine 1 30 0 60 - 1 30 mg/dL    Glucose 99 65 - 140 mg/dL    Calcium 9 5 8 3 - 10 1 mg/dL    AST 16 5 - 45 U/L    ALT 25 12 - 78 U/L    Alkaline Phosphatase 46 46 - 116 U/L    Total Protein 7 4 6 4 - 8 2 g/dL    Albumin 3 4 (L) 3 5 - 5 0 g/dL    Total Bilirubin 0 34 0 20 - 1 00 mg/dL    eGFR 49 ml/min/1 73sq m   Troponin I   Result Value Ref Range    Troponin I <0 02 <=0 04 ng/mL   ECG 12 lead   Result Value Ref Range    Ventricular Rate 74 BPM    Atrial Rate 131 BPM    DE Interval  ms    QRSD Interval 86 ms    QT Interval 382 ms    QTC Interval 424 ms    P Axis 92 degrees    QRS Axis 29 degrees    T Wave Axis 50 degrees       Orders Placed This Encounter   Procedures    Influenza A/B and RSV PCR       ALLERGIES:  Allergies   Allergen Reactions    Atorvastatin GI Intolerance    Zetia [Ezetimibe] Other (See Comments)     myalgia       Current Medications     Current Outpatient Medications   Medication Sig Dispense Refill    amLODIPine (NORVASC) 2 5 mg tablet Take 2 5 mg by mouth as needed (as directed according to BP)       apixaban (ELIQUIS) 2 5 mg Take by mouth 2 (two) times a day      cholecalciferol (VITAMIN D3) 1,000 units tablet Take 2,000 Units by mouth daily       co-enzyme Q-10 30 MG capsule Take 30 mg by mouth daily      diphenhydrAMINE-acetaminophen (TYLENOL PM)  MG TABS Take 2 tablets by mouth daily at bedtime as needed for sleep      dorzolamide-timolol (COSOPT) 22 3-6 8 MG/ML ophthalmic solution 1 drop 2 (two) times a day      latanoprost (XALATAN) 0 005 % ophthalmic solution Administer 1 drop to both eyes daily at bedtime      metoprolol succinate (TOPROL-XL) 25 mg 24 hr tablet Take 25 mg by mouth daily        multivitamin-iron-minerals-folic acid (CENTRUM) chewable tablet Chew 1 tablet daily   omeprazole (PriLOSEC) 20 mg delayed release capsule Take 20 mg by mouth daily      oseltamivir (TAMIFLU) 75 mg capsule Take 1 capsule (75 mg total) by mouth every 12 (twelve) hours for 5 days 10 capsule 0     No current facility-administered medications for this visit            Health Maintenance     Health Maintenance   Topic Date Due    SLP PLAN OF CARE  07/15/1931    BMI: Followup Plan  07/15/1949    Pneumococcal Vaccine: 65+ Years (1 of 2 - PCV13) 07/15/1996    CRC Screening: Colonoscopy  12/08/2015    Influenza Vaccine  04/05/2020 (Originally 7/1/2019)    Fall Risk  07/12/2020    Depression Screening PHQ  07/12/2020    Medicare Annual Wellness Visit (AWV)  07/12/2020    BMI: Adult  02/13/2021    DTaP,Tdap,and Td Vaccines (2 - Td) 03/12/2028    Hepatitis C Screening  Completed    Pneumococcal Vaccine: Pediatrics (0 to 5 Years) and At-Risk Patients (6 to 59 Years)  Aged Out    HIB Vaccine  Aged Out    Hepatitis B Vaccine  Aged Out    IPV Vaccine  Aged Out    Hepatitis A Vaccine  Aged Out    Meningococcal ACWY Vaccine  Aged Out    HPV Vaccine  Aged [employer] History   Administered Date(s) Administered    INFLUENZA 09/23/2014    Influenza Split High Dose Preservative Free IM 09/23/2014, 12/17/2015    Influenza TIV (IM) 10/24/2003, 11/08/2005, 11/13/2007, 10/16/2008, 12/16/2009, 11/10/2010, 11/18/2011, 09/24/2012, 09/25/2013    Td (adult), adsorbed 11/01/2003    Tdap 34/69/9545       Aiden Smith MD

## 2020-02-13 NOTE — ASSESSMENT & PLAN NOTE
Influenza  Patient with flu type symptoms  He was given prescription for Tamiflu 75 mg 1 b i d    He will also have a nasal swab for flu test   We will make further recommendations pending results of test   Patient may also use over-the-counter Tylenol and Robitussin DM  as needed

## 2020-02-14 LAB
FLUAV RNA NPH QL NAA+PROBE: NORMAL
FLUBV RNA NPH QL NAA+PROBE: NORMAL
RSV RNA NPH QL NAA+PROBE: NORMAL

## 2020-02-17 ENCOUNTER — TELEPHONE (OUTPATIENT)
Dept: FAMILY MEDICINE CLINIC | Facility: CLINIC | Age: 85
End: 2020-02-17

## 2020-02-17 NOTE — TELEPHONE ENCOUNTER
----- Message from Hernan Camacho MD sent at 3/33/7775  6:57 AM EST -----  Had please call daughter  Patient's flu test was negative  If symptoms are persisting at this time we may consider adding different medication

## 2020-02-18 DIAGNOSIS — R06.02 SOB (SHORTNESS OF BREATH): Primary | ICD-10-CM

## 2020-02-18 RX ORDER — AZITHROMYCIN 250 MG/1
TABLET, FILM COATED ORAL
Qty: 6 TABLET | Refills: 0 | Status: SHIPPED | OUTPATIENT
Start: 2020-02-18 | End: 2020-02-23

## 2020-02-18 NOTE — TELEPHONE ENCOUNTER
Pt's daughter called in stating they are on their second bottle of Robitussin,and it is not helping, flu test was negative, but patient is still having the same symptoms he was seen for last week, she would like to know if there is another medication that he can try   Please call back @ [phone]

## 2020-03-03 ENCOUNTER — OFFICE VISIT (OUTPATIENT)
Dept: CARDIOLOGY CLINIC | Facility: CLINIC | Age: 85
End: 2020-03-03
Payer: COMMERCIAL

## 2020-03-03 VITALS
HEART RATE: 67 BPM | WEIGHT: 182 LBS | BODY MASS INDEX: 28.56 KG/M2 | HEIGHT: 67 IN | SYSTOLIC BLOOD PRESSURE: 188 MMHG | DIASTOLIC BLOOD PRESSURE: 90 MMHG

## 2020-03-03 DIAGNOSIS — I48.0 PAF (PAROXYSMAL ATRIAL FIBRILLATION) (HCC): Primary | Chronic | ICD-10-CM

## 2020-03-03 PROCEDURE — 93000 ELECTROCARDIOGRAM COMPLETE: CPT | Performed by: INTERNAL MEDICINE

## 2020-03-03 PROCEDURE — 99203 OFFICE O/P NEW LOW 30 MIN: CPT | Performed by: INTERNAL MEDICINE

## 2020-03-03 NOTE — PATIENT INSTRUCTIONS
1  Take amlodipine 5 mg daily     2  Check BP if you are still having headaches  3  Call us if readings are still elevated  Follow-up in one year

## 2020-03-03 NOTE — PROGRESS NOTES
Cardiology Follow Up    Stephanie Dent  7/15/1931  [id number]  [address] [facility] [place] [address] [address] [phone]    Interval history:   His wife passed away in Dec 2019  He had an ER visit for frontal headache and lightheadedness  This was thought to be from grieving  No changes were made in the medications  Daughter called Dr Niko Pak office who recommended 5 mg amlodipine dose  But afterwards BP was lower in 120s  Patient was asymptomatic, however the dose was reduced to 2 5 mg daily  He has been taking Tylenol PM and has not started Celexa  His BP remains elevated with amlodipine 2 5 mg dose  He otherwise denies any chest pain, lightheadedness, dizziness, swelling in lower extremities, orthopnea, PND or syncope episode  Previous History:     Mr Stephanie Dent is a 80 y o    man with history of GERD, hypertension, peripheral vascular disease, 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 by Dr Pravin Lockwood in 2013, with recovered EF who was recently diagnosed with atrial fibrillation  He was hospitalized in April 2019 for fever and leukocytosis thought to be from a viral infection  Ever since the hospitalization he had been feeling fatigued  He was seen by his cardiologist Dr Zbigniew Coppola who performed pharmacologic nuclear stress test showing no ischemia and EF of 51%  However during the stress test he was noted to be in atrial fibrillation  Last echocardiogram in March 2019 showed normal LV systolic function, mild diastolic dysfunction, mild aortic and mitral sclerosis  Left atrium was mildly enlarged at 4 5 cm  After seen in the office on 8/30/2019, he opted to have ADRIANNE/cardioversion       He presented for follow-up visit after having cardioversion done on September 30, 2019  He had been having infrequent episodes of fluttering  He denied any other significant symptoms    His daughter reported that patient was having high blood pressure and he was started on amlodipine 5 mg daily  However she gave him only as needed basis  Several days prior to the appointment, he had an episode of headache and his blood pressure was elevated to 182/102  She gave him amlodipine 5 mg which brought the pressure down to 169/104  She had been giving him amlodipine 5 mg daily for the past 3 days  Office blood pressure was 128/72 which she stated was too low for him  Amlodopine was therefore reduced to 2 5 mg daily  He was taking Tylenol p m  For sleep but still has sleepless nights  He was cleared to start Celexa       Patient Active Problem List   Diagnosis    Cholecystitis with cholelithiasis    Allergic rhinitis    Anemia    Benign essential hypertension    Esophageal reflux    Glaucoma    Hiatal hernia    Hyperlipidemia    Joint pain, knee    Left lumbar radiculopathy    Peripheral vascular disease [facility]    Preoperative evaluation of a medical condition to rule out surgical contraindications (TAR required)    Syncope    Head injury due to trauma    FRANCO (acute kidney injury) ([facility] [address] )    PAF (paroxysmal atrial fibrillation) (HCC)    Right wrist pain    Peripheral edema    Arthritis of carpometacarpal (CMC) joint of right thumb    Paresthesia    Dehydration    Hyponatremia    Abnormal liver function    Sepsis (HCC)    Other insomnia    Anxiety    Carpal tunnel syndrome    S/P ablation of ventricular arrhythmia    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[address] )    HTN (hypertension)    Shortness of breath    Influenza     Past Medical History:   Diagnosis Date    A-fib ([facility] )     Anemia     Carpal tunnel syndrome, unspecified upper limb     Cataract     last assessed: 8/18/2012    GERD (gastroesophageal reflux disease)     Glaucoma     Hypertension     last assessed: 8/23/2012    Intervertebral disc disease     PVD (peripheral vascular disease) ([facility] [address] )      Social History Socioeconomic History    Marital status: /Civil Union     Spouse name: Not on file    Number of children: Not on file    Years of education: Not on file    Highest education level: Not on file   Occupational History    Occupation: retired   Social Needs    Financial resource strain: Not on file    Food insecurity:     Worry: Not on file     Inability: Not on file   [employer] needs:     Medical: Not on file     Non-medical: Not on file   Tobacco Use    Smoking status: Former Smoker    Smokeless tobacco: Never Used    Tobacco comment: quit 30 years ago    Substance and Sexual Activity    Alcohol use: Not Currently    Drug use: No    Sexual activity: Not on file   Lifestyle    Physical activity:     Days per week: Not on file     Minutes per session: Not on file    Stress: Not on file   Relationships    Social connections:     Talks on phone: Not on file     Gets together: Not on file     Attends Yarsani service: Not on file     Active member of club or organization: Not on file     Attends meetings of clubs or organizations: Not on file     Relationship status: Not on file    Intimate partner violence:     Fear of current or ex partner: Not on file     Emotionally abused: Not on file     Physically abused: Not on file     Forced sexual activity: Not on file   Other Topics Concern    Not on file   Social History Narrative    Morning person      Family History   Problem Relation Age of Onset    Glaucoma Mother     Cancer Father     Heart disease Father     Glaucoma Father     Alzheimer's disease Family     Heart attack Family      Past Surgical History:   Procedure Laterality Date    CATARACT EXTRACTION      HERNIA REPAIR      NEUROPLASTY / TRANSPOSITION MEDIAN NERVE AT CARPAL TUNNEL      [place] LAP,CHOLECYSTECTOMY N/A 9/14/2016    Procedure: LAPAROSCOPIC CHOLECYSTECTOMY ;  Surgeon: Wilbur Herrera MD;  [facility];  Service: General       Current Outpatient Medications:   amLODIPine (NORVASC) 2 5 mg tablet, Take 2 5 mg by mouth as needed (as directed according to BP) , Disp: , Rfl:     apixaban (ELIQUIS) 2 5 mg, Take by mouth 2 (two) times a day, Disp: , Rfl:     cholecalciferol (VITAMIN D3) 1,000 units tablet, Take 2,000 Units by mouth daily , Disp: , Rfl:     co-enzyme Q-10 30 MG capsule, Take 30 mg by mouth daily, Disp: , Rfl:     diphenhydrAMINE-acetaminophen (TYLENOL PM)  MG TABS, Take 2 tablets by mouth daily at bedtime as needed for sleep, Disp: , Rfl:     dorzolamide-timolol (COSOPT) 22 3-6 8 MG/ML ophthalmic solution, 1 drop 2 (two) times a day, Disp: , Rfl:     latanoprost (XALATAN) 0 005 % ophthalmic solution, Administer 1 drop to both eyes daily at bedtime, Disp: , Rfl:     metoprolol succinate (TOPROL-XL) 25 mg 24 hr tablet, Take 25 mg by mouth daily  , Disp: , Rfl:     multivitamin-iron-minerals-folic acid (CENTRUM) chewable tablet, Chew 1 tablet daily  , Disp: , Rfl:     omeprazole (PriLOSEC) 20 mg delayed release capsule, Take 20 mg by mouth daily, Disp: , Rfl:   Allergies   Allergen Reactions    Atorvastatin GI Intolerance    Zetia [Ezetimibe] Other (See Comments)     myalgia       Labs:  Lab Results   Component Value Date     10/31/2017    K 4 2 12/29/2019    K 4 3 09/30/2019    K 4 4 08/30/2019    K 4 6 04/24/2019    K 3 8 04/11/2019    K 4 5 12/03/2018    CO2 29 12/29/2019    CO2 26 09/30/2019    CO2 24 08/30/2019    CO2 27 04/24/2019    CO2 27 04/11/2019    CO2 27 12/03/2018    BUN 16 12/29/2019    BUN 23 09/30/2019    BUN 23 08/30/2019    BUN 19 04/24/2019    BUN 17 04/11/2019    BUN 19 12/03/2018    CREATININE 1 30 12/29/2019    CREATININE 1 29 09/30/2019    CREATININE 1 28 (H) 08/30/2019    CREATININE 1 13 (H) 04/24/2019    CREATININE 1 17 04/11/2019    CREATININE 1 14 (H) 10/31/2017    CALCIUM 9 5 12/29/2019    CALCIUM 8 7 09/30/2019    CALCIUM 9 3 08/30/2019    CALCIUM 8 8 04/24/2019    CALCIUM 8 2 (L) 04/11/2019    CALCIUM 9 1 12/03/2018     Lab Results   Component Value Date    TROPONINI <0 02 12/29/2019    TROPONINI <0 02 03/13/2018    TROPONINI <0 02 03/12/2018     Lab Results   Component Value Date    WBC 5 25 12/29/2019    WBC 5 44 09/30/2019    WBC 5 7 08/30/2019    WBC 5 7 06/07/2019    WBC 5 8 04/24/2019    WBC 4 83 04/09/2019    WBC 6 0 10/31/2017    WBC 54 10/28/2014    HGB 11 7 (L) 12/29/2019    HGB 11 4 (L) 09/30/2019    HGB 12 0 (L) 08/30/2019    HGB 11 1 (L) 06/07/2019    HGB 9 9 (L) 04/24/2019    HGB 9 1 (L) 04/09/2019    HGB 12 0 (L) 10/31/2017    HCT 34 6 (L) 12/29/2019    HCT 35 5 (L) 09/30/2019    HCT 37 0 (L) 08/30/2019    HCT 33 7 (L) 06/07/2019    HCT 30 3 (L) 04/24/2019    HCT 28 2 (L) 04/09/2019    HCT 35 5 (L) 10/31/2017    MCV 98 12/29/2019    MCV 99 (H) 09/30/2019    MCV 97 1 08/30/2019    MCV 96 6 06/07/2019    MCV 98 1 04/24/2019    MCV 99 (H) 04/09/2019    MCV 95 9 10/31/2017     12/29/2019     09/30/2019     08/30/2019     06/07/2019     04/24/2019     (L) 04/09/2019     10/31/2017     Lab Results   Component Value Date    TRIG 94 04/24/2019    HDL 50 04/24/2019     Imaging: No results found  Review of Systems:  Review of Systems   Constitutional: Negative for chills and fatigue  HENT: Negative  Eyes: Negative  Respiratory: Negative  Cardiovascular: Negative for chest pain, palpitations and leg swelling  Gastrointestinal: Negative  Endocrine: Negative  Genitourinary: Negative  Musculoskeletal: Positive for arthralgias  Allergic/Immunologic: Negative  Neurological: Positive for headaches  Psychiatric/Behavioral: Negative  Physical Exam:  Physical Exam   Constitutional: He is oriented to person, place, and time  He appears well-developed and well-nourished  No distress  HENT:   Head: Normocephalic and atraumatic  Mouth/Throat: No oropharyngeal exudate  Eyes: Pupils are equal, round, and reactive to light   EOM are normal  No scleral icterus  Neck: Normal range of motion  Neck supple  No JVD present  Cardiovascular: Normal rate and regular rhythm  Exam reveals no gallop and no friction rub  No murmur heard  Pulmonary/Chest: Effort normal and breath sounds normal  No stridor  No respiratory distress  He has no wheezes  Abdominal: Soft  Bowel sounds are normal  He exhibits no distension  Musculoskeletal: Normal range of motion  He exhibits no edema  Neurological: He is alert and oriented to person, place, and time  Skin: Skin is warm and dry  No rash noted  ECG performed in the office revealed sinus rhythm at 67 beats per minute  First-degree AV delay at 220 millisecond, frequent PACs  Discussion/Summary:    1  Paroxysmal atrial fibrillation  -patient is status post cardioversion on 09/30/2019  -he is still maintaining sinus rhythm on metoprolol  -will continue metoprolol on Eliquis for now  -if he continues to have fluttering episodes then will plan for adding antiarrhythmic therapy as well as monitoring with the loop monitor    2  Hypertension  -in the office visit today his blood pressure was 188/90  -he reports frontal headache  -continue metoprolol  -will increase amlodipine to 5 mg daily   -If BP normalizes and no symptoms, then continue 5 mg dose  -If BP normalizes and has headache, then will need neuro/optho evaluation     3  Insomnia  -Tylenol p m  Is helping him sleep  - Not started on Celexa  4  Arthritis  -continue applying gel for the arthritis pain in the knees    Follow-up in 12 months or sooner if needed

## 2020-03-04 ENCOUNTER — APPOINTMENT (EMERGENCY)
Dept: RADIOLOGY | Facility: HOSPITAL | Age: 85
End: 2020-03-04
Payer: COMMERCIAL

## 2020-03-04 ENCOUNTER — HOSPITAL ENCOUNTER (EMERGENCY)
Facility: HOSPITAL | Age: 85
Discharge: HOME | End: 2020-03-04
Attending: EMERGENCY MEDICINE
Payer: COMMERCIAL

## 2020-03-04 VITALS
SYSTOLIC BLOOD PRESSURE: 184 MMHG | HEIGHT: 68 IN | OXYGEN SATURATION: 97 % | RESPIRATION RATE: 16 BRPM | HEART RATE: 66 BPM | DIASTOLIC BLOOD PRESSURE: 87 MMHG | TEMPERATURE: 97.9 F | WEIGHT: 182 LBS | BODY MASS INDEX: 27.58 KG/M2

## 2020-03-04 DIAGNOSIS — I10 ESSENTIAL HYPERTENSION: Primary | ICD-10-CM

## 2020-03-04 DIAGNOSIS — R51.9 HEADACHE: ICD-10-CM

## 2020-03-04 LAB
ATRIAL RATE: 66 BPM
P AXIS: 74 DEGREES
PR INTERVAL: 224 MS
QRS AXIS: 6 DEGREES
QRSD INTERVAL: 88 MS
QT INTERVAL: 384 MS
QTC INTERVAL: 402 MS
T WAVE AXIS: 56 DEGREES
VENTRICULAR RATE: 66 BPM

## 2020-03-04 PROCEDURE — 70450 CT HEAD/BRAIN W/O DYE: CPT

## 2020-03-04 PROCEDURE — 93005 ELECTROCARDIOGRAM TRACING: CPT

## 2020-03-04 PROCEDURE — 99284 EMERGENCY DEPT VISIT MOD MDM: CPT

## 2020-03-04 PROCEDURE — 99284 EMERGENCY DEPT VISIT MOD MDM: CPT | Performed by: EMERGENCY MEDICINE

## 2020-03-04 PROCEDURE — 93010 ELECTROCARDIOGRAM REPORT: CPT | Performed by: INTERNAL MEDICINE

## 2020-03-04 RX ORDER — ACETAMINOPHEN 325 MG/1
975 TABLET ORAL ONCE
Status: COMPLETED | OUTPATIENT
Start: 2020-03-04 | End: 2020-03-04

## 2020-03-04 RX ADMIN — ACETAMINOPHEN 975 MG: 325 TABLET ORAL at 10:17

## 2020-03-04 NOTE — ED PROVIDER NOTES
History  Chief Complaint   Patient presents with    High Blood Pressure     daughter reports pt had a BP of 195/95 last night with headache starting about a week ago  reports giving pt extra dose of amlodapine  also c/o left eye pain started with the headache     80year-old male history of proximal AFib on metoprolol and Eliquis, GERD, hypertension, glaucoma presenting with 3 day history of headache  Patient stated that he has all his cardiologist a month ago and his amlodipine was reduced from 5 mg to 0 5 mg daily  Patient reports that he has had a continuous aching headache behind both his eyes for the past 3 days  Not maximal in onset  Patient stated he has not taken any medications for headache  Patient states he has been taking his blood pressure is noted to be systolics of 039R to 988Q  Patient's daughter states she has been giving him increased doses of the amlodipine with 5 mg 2 days ago and 7 5 mg yesterday  They saw the cardiologist yesterday who recommended continuing with the 5 mg of amlodipine  Patient denies any symptoms  He denies any focal deficits, vision changes, dizziness or lightheadedness, chest pain, shortness of breath, abdominal pain, nausea vomiting, fever/chills, or any bladder or bowel changes  Prior to Admission Medications   Prescriptions Last Dose Informant Patient Reported? Taking?    Garlic 5943 MG CAPS  Family Member Yes No   Sig: Take 1,000 mg by mouth   amLODIPine (NORVASC) 2 5 mg tablet  Family Member Yes No   Sig: Take 2 5 mg by mouth as needed (as directed according to BP)    apixaban (ELIQUIS) 2 5 mg  Family Member Yes No   Sig: Take by mouth 2 (two) times a day   cholecalciferol (VITAMIN D3) 1,000 units tablet  Family Member Yes No   Sig: Take 2,000 Units by mouth daily    co-enzyme Q-10 30 MG capsule  Family Member Yes No   Sig: Take 30 mg by mouth daily   diphenhydrAMINE-acetaminophen (TYLENOL PM)  MG TABS  Family Member Yes No   Sig: Take 2 tablets by mouth daily at bedtime as needed for sleep   dorzolamide-timolol (COSOPT) 22 3-6 8 MG/ML ophthalmic solution  Family Member Yes No   Si drop 2 (two) times a day   latanoprost (XALATAN) 0 005 % ophthalmic solution  Family Member Yes No   Sig: Administer 1 drop to both eyes daily at bedtime   metoprolol succinate (TOPROL-XL) 25 mg 24 hr tablet  Family Member Yes No   Sig: Take 25 mg by mouth daily     multivitamin-iron-minerals-folic acid (CENTRUM) chewable tablet  Family Member Yes No   Sig: Chew 1 tablet daily  omeprazole (PriLOSEC) 20 mg delayed release capsule  Family Member Yes No   Sig: Take 20 mg by mouth daily      Facility-Administered Medications: None       Past Medical History:   Diagnosis Date    [facility] ([facility] )     Anemia     Carpal tunnel syndrome, unspecified upper limb     Cataract     last assessed: 2012    GERD (gastroesophageal reflux disease)     Glaucoma     Hypertension     last assessed: 2012    Intervertebral disc disease     PVD (peripheral vascular disease) ([facility] )        Past Surgical History:   Procedure Laterality Date    CATARACT EXTRACTION      HERNIA REPAIR      NEUROPLASTY / TRANSPOSITION MEDIAN NERVE AT CARPAL TUNNEL      [place] LAP,CHOLECYSTECTOMY N/A 2016    Procedure: LAPAROSCOPIC CHOLECYSTECTOMY ;  Surgeon: Christiane Carty MD;  [facility];  Service: General       Family History   Problem Relation Age of Onset    Glaucoma Mother     Cancer Father     Heart disease Father     Glaucoma Father     Alzheimer's disease Family     Heart attack Family      I have reviewed and agree with the history as documented      E-Cigarette/Vaping     E-Cigarette/Vaping Substances     Social History     Tobacco Use    Smoking status: Former Smoker    Smokeless tobacco: Never Used    Tobacco comment: quit 30 years ago    Substance Use Topics    Alcohol use: Not Currently    Drug use: No        Review of Systems   Constitutional: Negative for appetite change, chills, diaphoresis, fever and unexpected weight change  HENT: Negative for congestion and rhinorrhea  Eyes: Negative for photophobia and visual disturbance  Respiratory: Negative for cough, chest tightness and shortness of breath  Cardiovascular: Negative for chest pain, palpitations and leg swelling  Gastrointestinal: Negative for abdominal distention, abdominal pain, blood in stool, constipation, diarrhea, nausea and vomiting  Genitourinary: Negative for dysuria and hematuria  Musculoskeletal: Negative for back pain, joint swelling, neck pain and neck stiffness  Skin: Negative for color change, pallor, rash and wound  Neurological: Positive for headaches  Negative for dizziness, syncope, weakness and light-headedness  Psychiatric/Behavioral: Negative for agitation  All other systems reviewed and are negative  Physical Exam  ED Triage Vitals [03/04/20 0834]   Temperature Pulse Respirations Blood Pressure SpO2   97 9 °F (36 6 °C) 71 18 153/70 97 %      Temp Source Heart Rate Source Patient Position - Orthostatic VS BP Location FiO2 (%)   Oral Monitor Sitting Left arm --      Pain Score       6             Orthostatic Vital Signs  Vitals:    03/04/20 0834 03/04/20 1030   BP: 153/70 (!) 184/87   Pulse: 71 66   Patient Position - Orthostatic VS: Sitting        Physical Exam   Constitutional: He is oriented to person, place, and time  He appears well-developed and well-nourished  HENT:   Head: Normocephalic and atraumatic  Nose: Nose normal    Mouth/Throat: Oropharynx is clear and moist    Eyes: Pupils are equal, round, and reactive to light  EOM are normal  Right eye exhibits no discharge  Left eye exhibits no discharge  No vision changes   Neck: Normal range of motion  Neck supple  No JVD present  No tracheal deviation present  Cardiovascular: Normal rate, regular rhythm, normal heart sounds and intact distal pulses  Exam reveals no gallop and no friction rub     No murmur heard   Pulmonary/Chest: Effort normal and breath sounds normal  No stridor  No respiratory distress  He has no wheezes  He has no rales  He exhibits no tenderness  Abdominal: Soft  Bowel sounds are normal  He exhibits no distension  There is no tenderness  There is no rebound and no guarding  Musculoskeletal: Normal range of motion  He exhibits no edema, tenderness or deformity  Lymphadenopathy:     He has no cervical adenopathy  Neurological: He is alert and oriented to person, place, and time  No cranial nerve deficit or sensory deficit  He exhibits normal muscle tone  Coordination normal    CN 2-12 intact  Strength 5/5 throughout  Sensation intact throughout  No cerebellar signs  Skin: Skin is warm and dry  No rash noted  He is not diaphoretic  No erythema  No pallor  Psychiatric: He has a normal mood and affect  His behavior is normal  Thought content normal    Nursing note and vitals reviewed  ED Medications  Medications   acetaminophen (TYLENOL) tablet 975 mg (975 mg Oral Given 3/4/20 1017)       Diagnostic Studies  Results Reviewed     None                 CT head without contrast   Final Result by Bello Parker MD (03/04 1021)      No acute intracranial abnormality  Workstation performed: [id number]               Procedures  Procedures      ED Course           Identification of Seniors at Risk      Most Recent Value   (ISAR) Identification of Seniors at Risk   Before the illness or injury that brought you to the Emergency, did you need someone to help you on a regular basis? 0 Filed at: 03/04/2020 0851   In the last 24 hours, have you needed more help than usual?  0 Filed at: 03/04/2020 1788   Have you been hospitalized for one or more nights during the past 6 months? 1 Filed at: 03/04/2020 0851   In general, do you see well? 1 Filed at: 03/04/2020 0851   In general, do you have serious problems with your memory?   0 Filed at: 03/04/2020 2752   Do you take more than three different medications every day? 1 Filed at: 03/04/2020 0851   ISAR Score  3 Filed at: 03/04/2020 2134                          [facility]  Number of Diagnoses or Management Options  Essential hypertension:   Headache:   Diagnosis management comments: 80-year-old male history of proximal AFib on metoprolol and Eliquis, GERD, hypertension, glaucoma presenting with 3 day history of headache  Patient without concerning signs or symptoms including shortness of breath, chest pain, focal deficit, encephalopathy  Headache without visual changes and not maximal onset and not worst headache of his life, reassuring  Blood pressure increased likely related to blood pressure medication change  Plan to give medication for headache  Headache improved  Shared decision making with treating blood pressure acutely in terms of risks and benefits, will not give acute blood pressure lowering medications at this time  CT due to blood thinner in setting of several day headache  CT head negative  Headache improved after pain medication  Recommended outpatient follow-up with ophthalmology  Give instructions return precautions  Patient advised follow-up primary care provider and ophthalmology  Patient discharged         Amount and/or Complexity of Data Reviewed  Clinical lab tests: ordered and reviewed  Tests in the radiology section of CPT®: ordered and reviewed  Tests in the medicine section of CPT®: ordered and reviewed  Review and summarize past medical records: yes  Independent visualization of images, tracings, or specimens: yes    Risk of Complications, Morbidity, and/or Mortality  Presenting problems: low  Diagnostic procedures: minimal    Patient Progress  Patient progress: improved        Disposition  Final diagnoses:   Headache   Essential hypertension     Time reflects when diagnosis was documented in both MDM as applicable and the Disposition within this note     Time User Action Codes Description Comment    3/4/2020 10:36 AM Nahomi Riser Add [R51] Headache     3/4/2020 10:37 AM Nahomi Riser Add [I10] Essential hypertension     3/4/2020 10:37 AM Nahomi Riser Modify [R51] Headache     3/4/2020 10:37 AM Nahomi Riser Modify [I10] Essential hypertension       ED Disposition     ED Disposition Condition Date/Time Comment    Discharge Stable Wed Mar 4, 2020 10:36 AM Elissa Leavitt discharge to home/self care              Follow-up Information     Follow up With Specialties Details Why Contact Info Additional Information    Luz Elena Zimmer MD Family Medicine Schedule an appointment as soon as possible for a visit   [address] [address] [address] Emergency Department Emergency Medicine Go to  If symptoms worsen [address] [facility] ED, [address] [place] [address], [address]   [phone]          Discharge Medication List as of 3/4/2020 10:39 AM      CONTINUE these medications which have NOT CHANGED    Details   amLODIPine (NORVASC) 2 5 mg tablet Take 2 5 mg by mouth as needed (as directed according to BP) , Historical Med      apixaban (ELIQUIS) 2 5 mg Take by mouth 2 (two) times a day, Historical Med      cholecalciferol (VITAMIN D3) 1,000 units tablet Take 2,000 Units by mouth daily , Historical Med      co-enzyme Q-10 30 MG capsule Take 30 mg by mouth daily, Historical Med      diphenhydrAMINE-acetaminophen (TYLENOL PM)  MG TABS Take 2 tablets by mouth daily at bedtime as needed for sleep, Historical Med      dorzolamide-timolol (COSOPT) 22 3-6 8 MG/ML ophthalmic solution 1 drop 2 (two) times a day, Historical Med      Garlic 4964 MG CAPS Take 1,000 mg by mouth, Historical Med      latanoprost (XALATAN) 0 005 % ophthalmic solution Administer 1 drop to both eyes daily at bedtime, Historical Med      metoprolol succinate (TOPROL-XL) 25 mg 24 hr tablet Take 25 mg by mouth daily  , Historical Med      multivitamin-iron-minerals-folic acid (CENTRUM) chewable tablet Chew 1 tablet daily  , Historical Med      omeprazole (PriLOSEC) 20 mg delayed release capsule Take 20 mg by mouth daily, Historical Med           No discharge procedures on file  PDMP Review     None           ED Provider  Attending physically available and evaluated [place] I managed the patient along with the ED Attending      Electronically Signed by         Anju Bernal MD  03/10/20 9334

## 2020-03-04 NOTE — DISCHARGE INSTRUCTIONS
You came to emergency room today with headache and high blood pressure  We treated your headache and got a CT scan which was negative  Recommend following up with an ophthalmologist or optometrist   Please return for simply worsening headache, chest pain, shortness of breath, focal weakness, or any other concerning signs or symptoms  Please follow-up with primary care provider  Please continue taking the 5 mg amlodipine daily as prescribed  Please refer to the following documents for additional instructions and return precautions

## 2020-03-04 NOTE — ED ATTENDING ATTESTATION
3/4/2020  Eric OLIVARES DO, saw and evaluated the patient  I have discussed the patient with the resident/non-physician practitioner and agree with the resident's/non-physician practitioner's findings, Plan of Care, and MDM as documented in the resident's/non-physician practitioner's note, except where noted  All available labs and Radiology studies were reviewed  I was present for key portions of any procedure(s) performed by the resident/non-physician practitioner and I was immediately available to provide assistance  At this point I agree with the current assessment done in the Emergency Department  I have conducted an independent evaluation of this patient a history and physical is as follows:    Patient is an 66-year-old male with a history of paroxysmal atrial fibrillation on Eliquis, accompanied by his daughter  Three days ago he had the gradual onset of a mild frontal headache, now slightly worse  Tried taking some Tylenol which was not very helpful  Still no nausea, no vomiting, no diarrhea, no constipation, no blurred vision, not maximal in onset, no one else sick with similar headaches at home, no recent head or neck infections  He does have a history of hypertension, yesterday he was seen at his cardiologist's office, his Norvasc was increased to 5 mg daily  His daughter was concerned today because his blood pressure continues to be elevated and she was worried that he may have a stroke  He has no blurred vision or double vision, does have a history of glaucoma, was scheduled to see Ophthalmology today but the daughter canceled that appointment to come to the ED instead  He said he has no loss of vision, no blurred vision, the pain is behind the eyes and not really involving the eyes, although his eyes do feel bit more irritated after he puts in the eyedrops  No trauma      General:  Patient is well-appearing  Head:  Atraumatic  Eyes: PERRL, EOMI, no APD, no visual field defects  Right eye: Lids & lashes unremarkable, conjunctiva pink, sclera white, cornea not cloudy or steamy, no hyphema or hypopyon  Left eye: Lids & lashes unremarkable, conjunctiva pink, sclera white, cornea not cloudy or steamy, no hyphema or hypopyon  ENT:  Mucous membranes moist  Neck:  Supple  Cardiac S1-S2 present  Lungs clear to auscultation bilaterally  Abdomen:  Soft and nontender bowel sounds are normal  Extremities:  Atraumatic, range of motion normal  Neurologic:  Awake, fluent speech, normal comprehension, No deficit on finger to nose testing, no pronator drift, cranial nerves II through XII are intact, no facial droop, no slurred speech, normal sensation, strength 5/5 in b/l upper & lower extremities Oriented to person place time and event  Skin:  Pink warm and dry  Psychiatric:  Alert, pleasant, cooperative        ED Course  ED Course as of Mar 04 0947   Wed Mar 04, 2020   0929 Blood Pressure: 153/70     EKG performed by nursing staff prior to my assessment, interpreted by me, sinus rhythm, rate of 66, no ischemic or infarct changes, no ST segment elevation or depression, first-degree AV block, no acute change from December 29, 2019    Decision was made to image the patient's head given the fact that he is on anticoagulation  I do not have any concerns for subarachnoid hemorrhage, meningitis, temporal arteritis, acute angle closure glaucoma or carbon monoxide exposure  CT head without contrast   Final Result      No acute intracranial abnormality  Workstation performed: [id number]             While the patient has been hypertensive in the emergency department, there is no evidence of hypertensive emergency, no evidence of end-organ damage on exam or per history and I do not believe the patient requires emergency lowering of their blood pressure   I believe that it is reasonable for the patient to be discharged and have outpatient follow up for reassessment of their blood pressure, and if it is still elevated, for additional decisions regarding their management to be made by their physician  Supportive care, importance of follow-up and return precautions were discussed with patient and daughter, who expressed understanding            Critical Care Time  Procedures

## 2020-04-15 ENCOUNTER — TELEMEDICINE (OUTPATIENT)
Dept: GASTROENTEROLOGY | Facility: MEDICAL CENTER | Age: 85
End: 2020-04-15
Payer: COMMERCIAL

## 2020-04-15 DIAGNOSIS — K21.9 GASTROESOPHAGEAL REFLUX DISEASE, ESOPHAGITIS PRESENCE NOT SPECIFIED: Primary | ICD-10-CM

## 2020-04-15 PROCEDURE — G2012 BRIEF CHECK IN BY MD/QHP: HCPCS | Performed by: INTERNAL MEDICINE

## 2020-05-14 RX ORDER — INDAPAMIDE 1.25 MG/1
1.25 TABLET, FILM COATED ORAL EVERY MORNING
COMMUNITY
Start: 2020-03-31 | End: 2022-05-19

## 2020-05-14 RX ORDER — BISOPROLOL FUMARATE 5 MG/1
2.5 TABLET ORAL
COMMUNITY
Start: 2020-03-31

## 2020-05-15 ENCOUNTER — APPOINTMENT (OUTPATIENT)
Dept: RADIOLOGY | Facility: CLINIC | Age: 85
End: 2020-05-15
Payer: COMMERCIAL

## 2020-05-15 ENCOUNTER — OFFICE VISIT (OUTPATIENT)
Dept: FAMILY MEDICINE CLINIC | Facility: CLINIC | Age: 85
End: 2020-05-15
Payer: COMMERCIAL

## 2020-05-15 VITALS
WEIGHT: 182 LBS | BODY MASS INDEX: 27.58 KG/M2 | TEMPERATURE: 96.3 F | SYSTOLIC BLOOD PRESSURE: 136 MMHG | RESPIRATION RATE: 15 BRPM | DIASTOLIC BLOOD PRESSURE: 82 MMHG | HEIGHT: 68 IN

## 2020-05-15 DIAGNOSIS — M79.672 PAIN IN BOTH FEET: Primary | ICD-10-CM

## 2020-05-15 DIAGNOSIS — M79.671 PAIN IN BOTH FEET: Primary | ICD-10-CM

## 2020-05-15 DIAGNOSIS — M79.671 PAIN IN BOTH FEET: ICD-10-CM

## 2020-05-15 DIAGNOSIS — M79.672 PAIN IN BOTH FEET: ICD-10-CM

## 2020-05-15 PROCEDURE — 73630 X-RAY EXAM OF FOOT: CPT

## 2020-05-15 PROCEDURE — 3075F SYST BP GE 130 - 139MM HG: CPT | Performed by: FAMILY MEDICINE

## 2020-05-15 PROCEDURE — 3008F BODY MASS INDEX DOCD: CPT | Performed by: FAMILY MEDICINE

## 2020-05-15 PROCEDURE — 3079F DIAST BP 80-89 MM HG: CPT | Performed by: FAMILY MEDICINE

## 2020-05-15 PROCEDURE — 1160F RVW MEDS BY RX/DR IN RCRD: CPT | Performed by: FAMILY MEDICINE

## 2020-05-15 PROCEDURE — 99213 OFFICE O/P EST LOW 20 MIN: CPT | Performed by: FAMILY MEDICINE

## 2020-05-15 PROCEDURE — 1036F TOBACCO NON-USER: CPT | Performed by: FAMILY MEDICINE

## 2020-05-18 ENCOUNTER — TELEPHONE (OUTPATIENT)
Dept: FAMILY MEDICINE CLINIC | Facility: CLINIC | Age: 85
End: 2020-05-18

## 2020-05-18 DIAGNOSIS — M79.671 PAIN IN BOTH FEET: Primary | ICD-10-CM

## 2020-05-18 DIAGNOSIS — M79.672 PAIN IN BOTH FEET: Primary | ICD-10-CM

## 2020-05-18 RX ORDER — GABAPENTIN 100 MG/1
100 CAPSULE ORAL 3 TIMES DAILY
Qty: 90 CAPSULE | Refills: 1 | Status: SHIPPED | OUTPATIENT
Start: 2020-05-18 | End: 2020-06-10

## 2020-05-26 ENCOUNTER — TELEPHONE (OUTPATIENT)
Dept: FAMILY MEDICINE CLINIC | Facility: CLINIC | Age: 85
End: 2020-05-26

## 2020-06-04 ENCOUNTER — TELEPHONE (OUTPATIENT)
Dept: OBGYN CLINIC | Facility: HOSPITAL | Age: 85
End: 2020-06-04

## 2020-06-04 DIAGNOSIS — M17.12 PRIMARY OSTEOARTHRITIS OF LEFT KNEE: Primary | ICD-10-CM

## 2020-06-10 DIAGNOSIS — M79.672 PAIN IN BOTH FEET: ICD-10-CM

## 2020-06-10 DIAGNOSIS — M79.671 PAIN IN BOTH FEET: ICD-10-CM

## 2020-06-10 RX ORDER — GABAPENTIN 100 MG/1
CAPSULE ORAL
Qty: 90 CAPSULE | Refills: 1 | Status: SHIPPED | OUTPATIENT
Start: 2020-06-10 | End: 2020-06-12

## 2020-06-12 DIAGNOSIS — M79.671 PAIN IN BOTH FEET: ICD-10-CM

## 2020-06-12 DIAGNOSIS — M79.672 PAIN IN BOTH FEET: ICD-10-CM

## 2020-06-12 RX ORDER — GABAPENTIN 100 MG/1
CAPSULE ORAL
Qty: 270 CAPSULE | Refills: 1 | Status: SHIPPED | OUTPATIENT
Start: 2020-06-12 | End: 2020-06-12 | Stop reason: SDUPTHER

## 2020-06-12 RX ORDER — GABAPENTIN 100 MG/1
100 CAPSULE ORAL 3 TIMES DAILY
Qty: 270 CAPSULE | Refills: 1 | Status: SHIPPED | OUTPATIENT
Start: 2020-06-12 | End: 2021-02-18

## 2020-06-15 ENCOUNTER — TELEPHONE (OUTPATIENT)
Dept: FAMILY MEDICINE CLINIC | Facility: CLINIC | Age: 85
End: 2020-06-15

## 2020-06-16 ENCOUNTER — TELEPHONE (OUTPATIENT)
Dept: FAMILY MEDICINE CLINIC | Facility: CLINIC | Age: 85
End: 2020-06-16

## 2020-06-29 ENCOUNTER — TELEPHONE (OUTPATIENT)
Dept: FAMILY MEDICINE CLINIC | Facility: CLINIC | Age: 85
End: 2020-06-29

## 2020-07-01 ENCOUNTER — TELEPHONE (OUTPATIENT)
Dept: UROLOGY | Facility: MEDICAL CENTER | Age: 85
End: 2020-07-01

## 2020-07-01 DIAGNOSIS — R30.0 DYSURIA: Primary | ICD-10-CM

## 2020-07-01 RX ORDER — SULFAMETHOXAZOLE AND TRIMETHOPRIM 800; 160 MG/1; MG/1
1 TABLET ORAL EVERY 12 HOURS SCHEDULED
Qty: 28 TABLET | Refills: 0 | Status: SHIPPED | OUTPATIENT
Start: 2020-07-01 | End: 2020-07-15

## 2020-07-01 NOTE — TELEPHONE ENCOUNTER
What is the reason for the patients appointment?uti - pt daughter requesting appt        Do we accept the patient's insurance or is the patient Self-Pay? Medicare      Has the patient had any previous urologist(s)?no    Have patient records been requested? no     Has the patient had any outside testing done?       What is the patients location preference for an office visit? [place]        Does the patient have a personal history of cancer? no

## 2020-07-02 ENCOUNTER — TELEMEDICINE (OUTPATIENT)
Dept: UROLOGY | Facility: CLINIC | Age: 85
End: 2020-07-02
Payer: COMMERCIAL

## 2020-07-02 DIAGNOSIS — R30.0 DYSURIA: Primary | ICD-10-CM

## 2020-07-02 PROCEDURE — 1160F RVW MEDS BY RX/DR IN RCRD: CPT | Performed by: PHYSICIAN ASSISTANT

## 2020-07-02 PROCEDURE — 99203 OFFICE O/P NEW LOW 30 MIN: CPT | Performed by: PHYSICIAN ASSISTANT

## 2020-07-02 NOTE — PROGRESS NOTES
Virtual Regular Visit      Assessment/Plan:    Problem List Items Addressed This Visit     None               Reason for visit is   Chief Complaint   Patient presents with    Virtual Regular Visit        Encounter provider Lan Goldman PA-C    Provider located at [address] [place] [address] [phone] [address]      Recent Visits  No visits were found meeting these conditions  Showing recent visits within past 7 days and meeting all other requirements     Future Appointments  No visits were found meeting these conditions  Showing future appointments within next 150 days and meeting all other requirements        The patient was identified by name and date of birth  Adriana Wilkinson was informed that this is a telemedicine visit and that the visit is being conducted through [employer] and patient was informed that this is not a secure, HIPAA-complaint platform  He agrees to proceed     My office door was closed  No one else was in the room  He acknowledged consent and understanding of privacy and security of the video platform  The patient has agreed to participate and understands they can discontinue the visit at any time  Patient is aware this is a billable service  Subjective  Adriana Wilkinson is a 80 y o  male [facility]  Urine testing was performed by his primary care provider on 06/15/2020 which was negative for any abnormalities  The patient reports that a couple of weeks ago he had an onset of burning, primarily overnight  He has some mild burning throughout the day but nothing compares to his episodes of nocturia  He reports that he typically has 3 episodes of nocturia during the night  He does not suffer from frequency or urgency during the day  He does report a weak urinary stream and does not necessarily feel empty after urinating    He does not strain to urinate but does find that sometimes he has a delayed urinary stream   He reports that he is not circumcised and has no difficulty retracting his foreskin  He has not noticed any irritation of his penis  He reports that he drinks about 50 oz of water during the day  He otherwise drinks some all min milk in his coffee  He denies any issues with constipation or diarrhea  He does not eat spicy or acidic foods  He reports that he drinks water up until about 5:00 p m  He denies any pain his scrotum or perineum  He is not diabetic        HPI     Past Medical History:   Diagnosis Date    A-fib ([facility] )     Anemia     Carpal tunnel syndrome, unspecified upper limb     Cataract     last assessed: 8/18/2012    GERD (gastroesophageal reflux disease)     Glaucoma     Hypertension     last assessed: 8/23/2012    Intervertebral disc disease     PVD (peripheral vascular disease) ([place] [id number] )        Past Surgical History:   Procedure Laterality Date    CATARACT EXTRACTION      HERNIA REPAIR      NEUROPLASTY / TRANSPOSITION MEDIAN NERVE AT CARPAL TUNNEL      [place] LAP,CHOLECYSTECTOMY N/A 9/14/2016    Procedure: LAPAROSCOPIC CHOLECYSTECTOMY ;  Surgeon: Swapna Beatty MD;  Location: [facility] OR;  Service: General       Current Outpatient Medications   Medication Sig Dispense Refill    amLODIPine (NORVASC) 2 5 mg tablet Take 2 5 mg by mouth 2 (two) times a day       apixaban (ELIQUIS) 2 5 mg Take by mouth 2 (two) times a day      bisoprolol (ZEBETA) 5 mg tablet TAKE 0 5 TABLET EVERY MORNING      cholecalciferol (VITAMIN D3) 1,000 units tablet Take 2,000 Units by mouth daily       co-enzyme Q-10 30 MG capsule Take 30 mg by mouth daily      diphenhydrAMINE-acetaminophen (TYLENOL PM)  MG TABS Take 2 tablets by mouth daily at bedtime as needed for sleep      dorzolamide-timolol (COSOPT) 22 3-6 8 MG/ML ophthalmic solution 1 drop 2 (two) times a day      gabapentin (NEURONTIN) 100 mg capsule Take 1 capsule (100 mg total) by mouth 3 (three) times a day 270 capsule 1    Garlic 9479 MG CAPS Take 1,000 mg by mouth      indapamide (LOZOL) 1 25 mg tablet Take 1 25 mg by mouth every morning      latanoprost (XALATAN) 0 005 % ophthalmic solution Administer 1 drop to both eyes daily at bedtime      metoprolol succinate (TOPROL-XL) 25 mg 24 hr tablet Take 25 mg by mouth daily        multivitamin-iron-minerals-folic acid (CENTRUM) chewable tablet Chew 1 tablet daily   omeprazole (PriLOSEC) 20 mg delayed release capsule Take 20 mg by mouth daily      sulfamethoxazole-trimethoprim (BACTRIM DS) 800-160 mg per tablet Take 1 tablet by mouth every 12 (twelve) hours for 14 days 28 tablet 0     No current facility-administered medications for this visit  Allergies   Allergen Reactions    Atorvastatin GI Intolerance    Zetia [Ezetimibe] Other (See Comments)     myalgia       Review of Systems   Constitutional: Negative for chills and fever  HENT: Negative  Eyes: Negative  Respiratory: Negative for cough and shortness of breath  Cardiovascular: Negative for chest pain  Gastrointestinal: Negative for constipation and diarrhea  Endocrine: Negative  Genitourinary: Positive for difficulty urinating and dysuria  Negative for enuresis, flank pain, frequency, hematuria and urgency  Musculoskeletal: Negative  Skin: Negative  Video Exam    There were no vitals filed for this visit  Physical Exam   Constitutional: He is oriented to person, place, and time  He appears well-developed and well-nourished  No distress  HENT:   Head: Normocephalic and atraumatic  Right Ear: External ear normal    Left Ear: External ear normal    Nose: Nose normal    Eyes: Right eye exhibits no discharge  Left eye exhibits no discharge  No scleral icterus  Pulmonary/Chest: Effort normal    Neurological: He is alert and oriented to person, place, and time  Skin: No rash noted  He is not diaphoretic  No erythema  No pallor  Psychiatric: He has a normal mood and affect  His behavior is normal  Judgment and thought content normal         As a result of this visit, I have not referred the patient for further respiratory evaluation  1  Dysuria overnight  - Patient has dysuria primarily overnight  - Recent urine testing normal   - Recommended increased hydration, avoidance of bladder irritants  - He doesn't feel that he is emptying his bladder completely  - I recommend that he is seen in the office within the next couple of weeks for exam or penis and prostate as well as a PVR  I spent 10 minutes directly with the patient during this visit      Skinny acknowledges that he has consented to an online visit or consultation  He understands that the online visit is based solely on information provided by him, and that, in the absence of a face-to-face physical evaluation by the physician, the diagnosis he receives is both limited and provisional in terms of accuracy and completeness  This is not intended to replace a full medical face-to-face evaluation by the physician  Magda Evans understands and accepts these terms

## 2020-07-16 ENCOUNTER — TELEPHONE (OUTPATIENT)
Dept: FAMILY MEDICINE CLINIC | Facility: CLINIC | Age: 85
End: 2020-07-16

## 2020-07-16 NOTE — TELEPHONE ENCOUNTER
Patient's daughter called regarding the antibiotic that was given to him for his UTI was not agreeing with patient so he stopped taking it and he felt better  Can you please prescribe another medication for patient's UTI and send to [facility] in [place]  Please notify Dara Bishop at [phone]

## 2020-07-16 NOTE — TELEPHONE ENCOUNTER
Please call patient's daughter  Since urine sample we tested was normal I would hold off on any other antibiotics at this time    He should follow through with testing that was recommended by his urologist and they will decide if he would require any antibiotics in the future

## 2020-08-03 NOTE — PROGRESS NOTES
Assessment and plan:       1  BPH with lower urinary tract symptoms  - Was encouraged to continue to hydrate aggressively with water as he has been doing   - I have initiated him on finasteride and discussed dosing and side effects   - Prostate examination today was benign   - He will follow up in 6 months for symptom reassessment  Reed Kaplan PA-C      Chief Complaint     Chief Complaint   Patient presents with    Difficulty Urinating         History of Present Illness     Nevin Wagner is a 80 y o  male patient recently seen in consultation through telemedicine visit for dysuria  Prior to his consultation the patient had urine testing ordered by his primary care provider which was negative for any abnormalities  At the time of his consultation he reported onset of burning, primarily with his episodes of nocturia  He did report 3 episodes of nocturia nightly  He also reported the feeling of incomplete bladder emptying  At his consultation he was recommended to increase his hydration and avoid bladder irritants  He reports that he continues to have occasional dysuria but it has primarily resolved  His AUA symptom score today is 21  And he shows incomplete bladder emptying with a PVR of 94  Laboratory     Lab Results   Component Value Date    CREATININE 1 30 12/29/2019       Lab Results   Component Value Date    PSA 1 5 05/07/2009    PSA 2 2 05/05/2008    PSA 2 0 12/18/2006       Recent Results (from the past 1 hour(s))   POCT Measure PVR    Collection Time: 08/06/20  9:38 AM   Result Value Ref Range    POST-VOID RESIDUAL VOLUME, ML POC 94 mL         Review of Systems     Review of Systems   Constitutional: Negative for chills and fever  HENT: Negative  Eyes: Negative  Respiratory: Negative for cough and shortness of breath  Cardiovascular: Negative for chest pain  Gastrointestinal: Negative for constipation, diarrhea, nausea and vomiting  Endocrine: Negative  Genitourinary: Negative for difficulty urinating, dysuria, enuresis, flank pain, frequency, hematuria and urgency  Musculoskeletal: Negative  Skin: Negative  AUA SYMPTOM SCORE      Most Recent Value   AUA SYMPTOM SCORE   How often have you had a sensation of not emptying your bladder completely after you finished urinating? 5   How often have you had to urinate again less than two hours after you finished urinating? 5   How often have you found you stopped and started again several times when you urinate? 3   How often have you found it difficult to postpone urination? 1   How often have you had a weak urinary stream?  3   How often have you had to push or strain to begin urination? 0   How many times did you most typically get up to urinate from the time you went to bed at night until the time you got up in the morning? 4   Quality of Life: If you were to spend the rest of your life with your urinary condition just the way it is now, how would you feel about that?  3   AUA SYMPTOM SCORE  21                  Allergies     Allergies   Allergen Reactions    Atorvastatin GI Intolerance    Zetia [Ezetimibe] Other (See Comments)     myalgia       Physical Exam     Physical Exam  Vitals signs and nursing note reviewed  Constitutional:       General: He is not in acute distress  Appearance: He is well-developed  He is not diaphoretic  HENT:      Head: Normocephalic and atraumatic  Right Ear: External ear normal       Left Ear: External ear normal       Nose: Nose normal    Eyes:      General: No scleral icterus  Right eye: No discharge  Left eye: No discharge  Cardiovascular:      Rate and Rhythm: Normal rate  Pulmonary:      Effort: Pulmonary effort is normal    Genitourinary:     Comments: Digital rectal exam reveals an approximately 35-40 g prostate that is smooth, nontender, and without nodules    It is asymmetric with the left being larger than the right   Musculoskeletal:      Comments: Ambulates with a cane for assistance   Skin:     General: Skin is warm and dry  Neurological:      Mental Status: He is alert and oriented to person, place, and time  Psychiatric:         Behavior: Behavior normal          Thought Content: Thought content normal          Judgment: Judgment normal            Vital Signs     Vitals:    08/06/20 0932   BP: 126/70   BP Location: Left arm   Patient Position: Sitting   Cuff Size: Adult   Temp: 98 2 °F (36 8 °C)   Weight: 84 9 kg (187 lb 3 2 oz)   Height: 5' 8" (1 727 m)         Current Medications       Current Outpatient Medications:     amLODIPine (NORVASC) 2 5 mg tablet, Take 2 5 mg by mouth 2 (two) times a day , Disp: , Rfl:     apixaban (ELIQUIS) 2 5 mg, Take by mouth 2 (two) times a day, Disp: , Rfl:     bisoprolol (ZEBETA) 5 mg tablet, TAKE 0 5 TABLET EVERY MORNING, Disp: , Rfl:     cholecalciferol (VITAMIN D3) 1,000 units tablet, Take 2,000 Units by mouth daily , Disp: , Rfl:     co-enzyme Q-10 30 MG capsule, Take 30 mg by mouth daily, Disp: , Rfl:     diphenhydrAMINE-acetaminophen (TYLENOL PM)  MG TABS, Take 2 tablets by mouth daily at bedtime as needed for sleep, Disp: , Rfl:     dorzolamide-timolol (COSOPT) 22 3-6 8 MG/ML ophthalmic solution, 1 drop 2 (two) times a day, Disp: , Rfl:     gabapentin (NEURONTIN) 100 mg capsule, Take 1 capsule (100 mg total) by mouth 3 (three) times a day, Disp: 270 capsule, Rfl: 1    Garlic 8121 MG CAPS, Take 1,000 mg by mouth, Disp: , Rfl:     indapamide (LOZOL) 1 25 mg tablet, Take 1 25 mg by mouth every morning, Disp: , Rfl:     latanoprost (XALATAN) 0 005 % ophthalmic solution, Administer 1 drop to both eyes daily at bedtime, Disp: , Rfl:     multivitamin-iron-minerals-folic acid (CENTRUM) chewable tablet, Chew 1 tablet daily  , Disp: , Rfl:     omeprazole (PriLOSEC) 20 mg delayed release capsule, Take 20 mg by mouth daily, Disp: , Rfl:     metoprolol succinate (TOPROL-XL) 25 mg 24 hr tablet, Take 25 mg by mouth daily  , Disp: , Rfl:       Active Problems     Patient Active Problem List   Diagnosis    Cholecystitis with cholelithiasis    Allergic rhinitis    Anemia    Benign essential hypertension    GERD (gastroesophageal reflux disease)    Glaucoma    Hiatal hernia    Hyperlipidemia    Joint pain, knee    Left lumbar radiculopathy    Peripheral vascular disease ([facility] )    Preoperative evaluation of a medical condition to rule out surgical contraindications (TAR required)    Syncope    Head injury due to trauma    FRANCO (acute kidney injury) ([facility] )    PAF (paroxysmal atrial fibrillation) (HCC)    Right wrist pain    Peripheral edema    Arthritis of carpometacarpal (CMC) joint of right thumb    Paresthesia    Dehydration    Hyponatremia    Abnormal liver function    Sepsis (HCC)    Other insomnia    Anxiety    Carpal tunnel syndrome    S/P ablation of ventricular arrhythmia    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[facility] )    HTN (hypertension)    Shortness of breath    Influenza    Pain in both feet         Past Medical History     Past Medical History:   Diagnosis Date    A-fib ([facility] )     Anemia     Carpal tunnel syndrome, unspecified upper limb     Cataract     last assessed: 8/18/2012    GERD (gastroesophageal reflux disease)     Glaucoma     Hypertension     last assessed: 8/23/2012    Intervertebral disc disease     PVD (peripheral vascular disease) ([facility] )          Surgical History     Past Surgical History:   Procedure Laterality Date    CATARACT EXTRACTION      HERNIA REPAIR      NEUROPLASTY / TRANSPOSITION MEDIAN NERVE AT CARPAL TUNNEL      [place] LAP,CHOLECYSTECTOMY N/A 9/14/2016    Procedure: LAPAROSCOPIC CHOLECYSTECTOMY ;  Surgeon: Cristel Hernandez MD;  Location: [facility];  Service: General         Family History     Family History   Problem Relation Age of Onset    Glaucoma Mother     Cancer Father     Heart disease Father     Glaucoma Father     Alzheimer's disease Family     Heart attack Family          Social History     Social History       Radiology

## 2020-08-06 ENCOUNTER — OFFICE VISIT (OUTPATIENT)
Dept: UROLOGY | Facility: CLINIC | Age: 85
End: 2020-08-06
Payer: COMMERCIAL

## 2020-08-06 VITALS
WEIGHT: 187.2 LBS | SYSTOLIC BLOOD PRESSURE: 126 MMHG | TEMPERATURE: 98.2 F | HEIGHT: 68 IN | BODY MASS INDEX: 28.37 KG/M2 | DIASTOLIC BLOOD PRESSURE: 70 MMHG

## 2020-08-06 DIAGNOSIS — R30.0 DYSURIA: Primary | ICD-10-CM

## 2020-08-06 LAB — POST-VOID RESIDUAL VOLUME, ML POC: 94 ML

## 2020-08-06 PROCEDURE — 3074F SYST BP LT 130 MM HG: CPT | Performed by: PHYSICIAN ASSISTANT

## 2020-08-06 PROCEDURE — 1160F RVW MEDS BY RX/DR IN RCRD: CPT | Performed by: PHYSICIAN ASSISTANT

## 2020-08-06 PROCEDURE — 3078F DIAST BP <80 MM HG: CPT | Performed by: PHYSICIAN ASSISTANT

## 2020-08-06 PROCEDURE — 51798 US URINE CAPACITY MEASURE: CPT | Performed by: PHYSICIAN ASSISTANT

## 2020-08-06 PROCEDURE — 3008F BODY MASS INDEX DOCD: CPT | Performed by: PHYSICIAN ASSISTANT

## 2020-08-06 PROCEDURE — 99213 OFFICE O/P EST LOW 20 MIN: CPT | Performed by: PHYSICIAN ASSISTANT

## 2020-08-06 PROCEDURE — 1036F TOBACCO NON-USER: CPT | Performed by: PHYSICIAN ASSISTANT

## 2020-08-06 RX ORDER — FINASTERIDE 5 MG/1
5 TABLET, FILM COATED ORAL DAILY
Qty: 90 TABLET | Refills: 3 | Status: SHIPPED | OUTPATIENT
Start: 2020-08-06 | End: 2021-08-02 | Stop reason: SDUPTHER

## 2020-08-25 ENCOUNTER — OFFICE VISIT (OUTPATIENT)
Dept: FAMILY MEDICINE CLINIC | Facility: CLINIC | Age: 85
End: 2020-08-25
Payer: COMMERCIAL

## 2020-08-25 VITALS
OXYGEN SATURATION: 96 % | WEIGHT: 185 LBS | HEART RATE: 58 BPM | RESPIRATION RATE: 15 BRPM | HEIGHT: 68 IN | SYSTOLIC BLOOD PRESSURE: 148 MMHG | TEMPERATURE: 99.6 F | BODY MASS INDEX: 28.04 KG/M2 | DIASTOLIC BLOOD PRESSURE: 66 MMHG

## 2020-08-25 DIAGNOSIS — I10 BENIGN ESSENTIAL HYPERTENSION: Chronic | ICD-10-CM

## 2020-08-25 DIAGNOSIS — D50.9 IRON DEFICIENCY ANEMIA, UNSPECIFIED IRON DEFICIENCY ANEMIA TYPE: Primary | ICD-10-CM

## 2020-08-25 PROBLEM — S09.90XA HEAD INJURY DUE TO TRAUMA: Status: RESOLVED | Noted: 2018-03-12 | Resolved: 2020-08-25

## 2020-08-25 PROBLEM — A41.9 SEPSIS (HCC): Status: RESOLVED | Noted: 2019-04-06 | Resolved: 2020-08-25

## 2020-08-25 PROBLEM — J11.1 INFLUENZA: Status: RESOLVED | Noted: 2020-02-13 | Resolved: 2020-08-25

## 2020-08-25 PROBLEM — E86.0 DEHYDRATION: Status: RESOLVED | Noted: 2019-04-05 | Resolved: 2020-08-25

## 2020-08-25 PROBLEM — R06.02 SHORTNESS OF BREATH: Status: RESOLVED | Noted: 2019-07-12 | Resolved: 2020-08-25

## 2020-08-25 PROBLEM — Z01.818 PREOPERATIVE EVALUATION OF A MEDICAL CONDITION TO RULE OUT SURGICAL CONTRAINDICATIONS (TAR REQUIRED): Status: RESOLVED | Noted: 2018-02-23 | Resolved: 2020-08-25

## 2020-08-25 PROCEDURE — 1125F AMNT PAIN NOTED PAIN PRSNT: CPT | Performed by: FAMILY MEDICINE

## 2020-08-25 PROCEDURE — 3008F BODY MASS INDEX DOCD: CPT | Performed by: FAMILY MEDICINE

## 2020-08-25 PROCEDURE — 1036F TOBACCO NON-USER: CPT | Performed by: FAMILY MEDICINE

## 2020-08-25 PROCEDURE — 3077F SYST BP >= 140 MM HG: CPT | Performed by: FAMILY MEDICINE

## 2020-08-25 PROCEDURE — 3725F SCREEN DEPRESSION PERFORMED: CPT | Performed by: FAMILY MEDICINE

## 2020-08-25 PROCEDURE — 99213 OFFICE O/P EST LOW 20 MIN: CPT | Performed by: FAMILY MEDICINE

## 2020-08-25 PROCEDURE — G0439 PPPS, SUBSEQ VISIT: HCPCS | Performed by: FAMILY MEDICINE

## 2020-08-25 PROCEDURE — 1170F FXNL STATUS ASSESSED: CPT | Performed by: FAMILY MEDICINE

## 2020-08-25 PROCEDURE — 1160F RVW MEDS BY RX/DR IN RCRD: CPT | Performed by: FAMILY MEDICINE

## 2020-08-25 PROCEDURE — 3078F DIAST BP <80 MM HG: CPT | Performed by: FAMILY MEDICINE

## 2020-08-25 NOTE — ASSESSMENT & PLAN NOTE
Anemia  Patient will obtain blood work as ordered for further evaluation of his anemia  More than likely patient has iron deficiency    We will make further recommendations pending results of test

## 2020-08-25 NOTE — PROGRESS NOTES
Assessment and Plan:     Problem List Items Addressed This Visit        Cardiovascular and Mediastinum    Peripheral vascular disease ([address] )    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[address] )       Other    Anemia - Primary    Relevant Orders    Iron Panel (Includes Ferritin, Iron Sat%, Iron, and TIBC)    Folate           Preventive health issues were discussed with patient, and age appropriate screening tests were ordered as noted in patient's After Visit Summary  Personalized health advice and appropriate referrals for health education or preventive services given if needed, as noted in patient's After Visit Summary       History of Present Illness:     Patient presents for Medicare Annual Wellness visit    Patient Care Team:  Rigo Whipple MD as PCP - MD Sukhdeep Shankar MD Wallene Bossier, MD Ara Deter, MD (Ophthalmology)  Sourav Stevens MD (Gastroenterology)  Rain Barrios MD (Orthopedic Surgery)     Problem List:     Patient Active Problem List   Diagnosis    Cholecystitis with cholelithiasis    Allergic rhinitis    Anemia    Benign essential hypertension    GERD (gastroesophageal reflux disease)    Glaucoma    Hiatal hernia    Hyperlipidemia    Joint pain, knee    Left lumbar radiculopathy    [facility]    Preoperative evaluation of a medical condition to rule out surgical contraindications (TAR required)    Syncope    Head injury due to trauma    FRANCO (acute kidney injury) ([address] )    PAF (paroxysmal atrial fibrillation) ([address] )    Right wrist pain    Peripheral edema    Arthritis of carpometacarpal (CMC) joint of right thumb    Paresthesia    Dehydration    Hyponatremia    Abnormal liver function    Sepsis ([address] )    Other insomnia    Anxiety    Carpal tunnel syndrome    S/P ablation of ventricular arrhythmia    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[address] )    HTN (hypertension)    Shortness of breath    Influenza    Pain in both feet      Past Medical and Surgical History:     Past Medical History:   Diagnosis Date    A[facility]fib ([facility] )     Anemia     Carpal tunnel syndrome, unspecified upper limb     Cataract     last assessed: 8/18/2012    GERD (gastroesophageal reflux disease)     Glaucoma     Hypertension     last assessed: 8/23/2012    Intervertebral disc disease     PVD (peripheral vascular disease) ([facility] )      Past Surgical History:   Procedure Laterality Date    CATARACT EXTRACTION      HERNIA REPAIR      NEUROPLASTY / TRANSPOSITION MEDIAN NERVE AT CARPAL TUNNEL      [place] LAP,CHOLECYSTECTOMY N/A 9/14/2016    Procedure: LAPAROSCOPIC CHOLECYSTECTOMY ;  Surgeon: Nadia Lima MD;  Location:  MAIN OR;  Service: General      Family History:     Family History   Problem Relation Age of Onset    Glaucoma Mother     Cancer Father     Heart disease Father    Vickie Leisure Glaucoma Father     Alzheimer's disease Family     Heart attack Family       Social History:     E-Cigarette/Vaping    E-Cigarette Use Former User      E-Cigarette/Vaping Substances    Nicotine No     THC No     CBD No     Flavoring No     Other No     Unknown No      Social History     Socioeconomic History    Marital status: /Civil Union     Spouse name: None    Number of children: None    Years of education: None    Highest education level: None   Occupational History    Occupation: retired   Social Needs    Financial resource strain: None    Food insecurity     Worry: None     Inability: None    Transportation needs     Medical: None     Non-medical: None   Tobacco Use    Smoking status: Former Smoker    Smokeless tobacco: Never Used    Tobacco comment: quit 30 years ago    Substance and Sexual Activity    Alcohol use: Not Currently    Drug use: No    Sexual activity: None   Lifestyle    Physical activity     Days per week: None     Minutes per session: None    Stress: None   Relationships    Social connections     Talks on phone: None     Gets together: None Attends Restoration service: None     Active member of club or organization: None     Attends meetings of clubs or organizations: None     Relationship status: None    Intimate partner violence     Fear of current or ex partner: None     Emotionally abused: None     Physically abused: None     Forced sexual activity: None   Other Topics Concern    None   Social History Narrative    Morning person      Medications and Allergies:     Current Outpatient Medications   Medication Sig Dispense Refill    amLODIPine (NORVASC) 2 5 mg tablet Take 2 5 mg by mouth 2 (two) times a day       apixaban (ELIQUIS) 2 5 mg Take by mouth 2 (two) times a day      bisoprolol (ZEBETA) 5 mg tablet TAKE 0 5 TABLET EVERY MORNING      cholecalciferol (VITAMIN D3) 1,000 units tablet Take 2,000 Units by mouth daily       co-enzyme Q-10 30 MG capsule Take 30 mg by mouth daily      diphenhydrAMINE-acetaminophen (TYLENOL PM)  MG TABS Take 2 tablets by mouth daily at bedtime as needed for sleep      dorzolamide-timolol (COSOPT) 22 3-6 8 MG/ML ophthalmic solution 1 drop 2 (two) times a day      finasteride (PROSCAR) 5 mg tablet Take 1 tablet (5 mg total) by mouth daily 90 tablet 3    gabapentin (NEURONTIN) 100 mg capsule Take 1 capsule (100 mg total) by mouth 3 (three) times a day 270 capsule 1    Garlic 6250 MG CAPS Take 1,000 mg by mouth      indapamide (LOZOL) 1 25 mg tablet Take 1 25 mg by mouth every morning      latanoprost (XALATAN) 0 005 % ophthalmic solution Administer 1 drop to both eyes daily at bedtime      multivitamin-iron-minerals-folic acid (CENTRUM) chewable tablet Chew 1 tablet daily   omeprazole (PriLOSEC) 20 mg delayed release capsule Take 20 mg by mouth daily       No current facility-administered medications for this visit        Allergies   Allergen Reactions    Atorvastatin GI Intolerance    Zetia [Ezetimibe] Other (See Comments)     myalgia      Immunizations:     Immunization History   Administered Date(s) Administered    INFLUENZA 09/23/2014    Influenza Split High Dose Preservative Free IM 09/23/2014, 12/17/2015    Influenza TIV (IM) 10/24/2003, 11/08/2005, 11/13/2007, 10/16/2008, 12/16/2009, 11/10/2010, 11/18/2011, 09/24/2012, 09/25/2013    Td (adult), adsorbed 11/01/2003    Tdap 03/12/2018      Health Maintenance:         Topic Date Due    Hepatitis C Screening  Completed         Topic Date Due    Pneumococcal Vaccine: 65+ Years (1 of 1 - PPSV23) 07/15/1996    Influenza Vaccine  07/01/2020      Medicare Health Risk Assessment:     /66 (BP Location: Left arm, Patient Position: Sitting, Cuff Size: Standard)   Pulse 58   Temp 99 6 °F (37 6 °C) (Temporal)   Resp 15   Ht 5' 8" (1 727 m)   Wt 83 9 kg (185 lb)   SpO2 96%   BMI 28 13 kg/m²          Health Risk Assessment:   Patient rates overall health as good  Patient feels that their physical health rating is slightly worse  Eyesight was rated as same  Hearing was rated as slightly worse  Patient feels that their emotional and mental health rating is same  Pain experienced in the last 7 days has been some  Depression Screening:   PHQ-2 Score: 0      Fall Risk Screening: In the past year, patient has experienced: no history of falling in past year      Home Safety:  Patient does not have trouble with stairs inside or outside of their home  Patient has working smoke alarms and has working carbon monoxide detector  Home safety hazards include: none  Nutrition:   Current diet is Low Cholesterol and No Added Salt  Medications:   Patient is currently taking over-the-counter supplements  OTC medications include: see medication list  Patient is able to manage medications  Activities of Daily Living (ADLs)/Instrumental Activities of Daily Living (IADLs):   Walk and transfer into and out of bed and chair?: Yes  Dress and groom yourself?: Yes    Bathe or shower yourself?: Yes    Feed yourself?  Yes  Do your laundry/housekeeping?: Yes  Manage your money, pay your bills and track your expenses?: Yes  Make your own meals?: Yes    Do your own shopping?: No    Previous Hospitalizations:   Any hospitalizations or ED visits within the last 12 months?: Yes    How many hospitalizations have you had in the last year?: 1-2    Advance Care Planning:   Living will: Yes    Durable POA for healthcare:  Yes    Advanced directive: Yes      PREVENTIVE SCREENINGS      Cardiovascular Screening:    General: History Lipid Disorder and Screening Current      Diabetes Screening:     General: Screening Current      Colorectal Cancer Screening:     General: Screening Not Indicated      Prostate Cancer Screening:    General: Screening Not Indicated      Abdominal Aortic Aneurysm (AAA) Screening:    Risk factors include: tobacco use        Lung Cancer Screening:     General: Screening Not Indicated      Hepatitis C Screening:    General: Screening Current      Preventive Screening Comments: Patient refused annual flu vaccine      Kelly Ponce MD

## 2020-08-25 NOTE — PATIENT INSTRUCTIONS

## 2020-08-25 NOTE — PROGRESS NOTES
FAMILY PRACTICE OFFICE VISIT       NAME: Magda Evans  AGE: 80 y o  SEX: male       : 7/15/1931        MRN: [id number]    DATE: 2020  TIME: 10:28 AM    Assessment and Plan     Problem List Items Addressed This Visit        Cardiovascular and Mediastinum    Benign essential hypertension (Chronic)     Hypertension  Patient blood pressure is stable at this time he will continue with current regimen of medications            Other    Anemia - Primary     Anemia  Patient will obtain blood work as ordered for further evaluation of his anemia  More than likely patient has iron deficiency  We will make further recommendations pending results of test          Relevant Orders    Iron Panel (Includes Ferritin, Iron Sat%, Iron, and TIBC)    Folate              Chief Complaint     Chief Complaint   Patient presents with    Medicare Wellness Visit     BW results       History of Present Illness     Patient is accompanied by his daughter in the office today  He denies any recent illness  He recently saw his cardiologist Anastasiya Ortiz who had ordered blood test on the patient  Was noted to be slightly anemic at 10 8  Patient denies any constipation or changes in bowel movements  He also states he has been making better dietary choices with eating fruits and vegetables especially for snacks  He has gained 10 lb in the last year  Review of Systems   Review of Systems   Constitutional: Positive for unexpected weight change  Negative for fatigue and fever  Respiratory: Negative  Cardiovascular: Negative  Gastrointestinal: Negative  Musculoskeletal: Negative  Neurological: Negative  Psychiatric/Behavioral: Negative          Active Problem List     Patient Active Problem List   Diagnosis    Cholecystitis with cholelithiasis    Allergic rhinitis    Anemia    Benign essential hypertension    GERD (gastroesophageal reflux disease)    Glaucoma    Hiatal hernia    Hyperlipidemia    Joint pain, knee    Left lumbar radiculopathy    Peripheral vascular disease (HCC)    Syncope    FRANCO (acute kidney injury) (HCC)    PAF (paroxysmal atrial fibrillation) (HCC)    Right wrist pain    Peripheral edema    Arthritis of carpometacarpal (CMC) joint of right thumb    Paresthesia    Hyponatremia    Abnormal liver function    Other insomnia    Anxiety    Carpal tunnel syndrome    S/P ablation of ventricular arrhythmia    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[facility] )    Pain in both feet       Past Medical History:  Past Medical History:   Diagnosis Date    A[facility]fib ([facility] )     Anemia     Carpal tunnel syndrome, unspecified upper limb     Cataract     last assessed: 8/18/2012    GERD (gastroesophageal reflux disease)     Glaucoma     Hypertension     last assessed: 8/23/2012    Intervertebral disc disease     PVD (peripheral vascular disease) ([facility])        Past Surgical History:  Past Surgical History:   Procedure Laterality Date    CATARACT EXTRACTION      HERNIA REPAIR      NEUROPLASTY / TRANSPOSITION MEDIAN NERVE AT CARPAL TUNNEL      [place] LAP,CHOLECYSTECTOMY N/A 9/14/2016    Procedure: LAPAROSCOPIC CHOLECYSTECTOMY ;  Surgeon: Caridad Jacome MD;  Location: [facility];  Service: General       Family History:  Family History   Problem Relation Age of Onset    Glaucoma Mother     Cancer Father     Heart disease Father    Kelley Nine Glaucoma Father     Alzheimer's disease Family     Heart attack Family        Social History:  Social History     Socioeconomic History    Marital status: /Civil Union     Spouse name: Not on file    Number of children: Not on file    Years of education: Not on file    Highest education level: Not on file   Occupational History    Occupation: retired   Social Needs    Financial resource strain: Not on file    Food insecurity     Worry: Not on file     Inability: Not on file   [employer] needs     Medical: Not on file     Non-medical: Not on file   Tobacco Use    Smoking status: Former Smoker    Smokeless tobacco: Never Used    Tobacco comment: quit 30 years ago    Substance and Sexual Activity    Alcohol use: Not Currently    Drug use: No    Sexual activity: Not on file   Lifestyle    Physical activity     Days per week: Not on file     Minutes per session: Not on file    Stress: Not on file   Relationships    Social connections     Talks on phone: Not on file     Gets together: Not on file     Attends Jehovah's witness service: Not on file     Active member of club or organization: Not on file     Attends meetings of clubs or organizations: Not on file     Relationship status: Not on file    Intimate partner violence     Fear of current or ex partner: Not on file     Emotionally abused: Not on file     Physically abused: Not on file     Forced sexual activity: Not on file   Other Topics Concern    Not on file   Social History Narrative    Morning person       Objective     Vitals:    08/25/20 0932   BP: 148/66   Pulse: 58   Resp: 15   Temp: 99 6 °F (37 6 °C)   SpO2: 96%     Wt Readings from Last 3 Encounters:   08/25/20 83 9 kg (185 lb)   08/06/20 84 9 kg (187 lb 3 2 oz)   05/15/20 82 6 kg (182 lb)       Physical Exam  Constitutional:       General: He is not in acute distress  Appearance: Normal appearance  He is not ill-appearing  Cardiovascular:      Comments: Regular rate and rhythm with no murmurs  Pulmonary:      Effort: Pulmonary effort is normal       Comments: Lungs are clear to auscultation without wheezes,rales, or rhonchi  Musculoskeletal:      Right lower leg: No edema  Left lower leg: Edema present  Comments: Trace left lower extremity edema  Neurological:      General: No focal deficit present  Mental Status: He is alert and oriented to person, place, and time  Mental status is at baseline  Psychiatric:         Mood and Affect: Mood normal          Behavior: Behavior normal          Thought Content:  Thought content normal  Judgment: Judgment normal          Pertinent Laboratory/Diagnostic Studies:  Lab Results   Component Value Date    BUN 16 12/29/2019    CREATININE 1 30 12/29/2019    CALCIUM 9 5 12/29/2019     10/31/2017    K 4 2 12/29/2019    CO2 29 12/29/2019     12/29/2019     Lab Results   Component Value Date    ALT 25 12/29/2019    AST 16 12/29/2019    ALKPHOS 46 12/29/2019    BILITOT 0 5 10/31/2017       Lab Results   Component Value Date    WBC 5 25 12/29/2019    HGB 11 7 (L) 12/29/2019    HCT 34 6 (L) 12/29/2019    MCV 98 12/29/2019     12/29/2019       Lab Results   Component Value Date    TSH 3 60 04/24/2019       No results found for: CHOL  Lab Results   Component Value Date    TRIG 94 04/24/2019     Lab Results   Component Value Date    HDL 50 04/24/2019     Lab Results   Component Value Date    LDLCALC 108 (H) 04/24/2019     Lab Results   Component Value Date    HGBA1C 5 4 12/03/2018       Results for orders placed or performed in visit on 08/06/20   POCT Measure PVR   Result Value Ref Range    POST-VOID RESIDUAL VOLUME, ML POC 94 mL       Orders Placed This Encounter   Procedures    Folate       ALLERGIES:  Allergies   Allergen Reactions    Atorvastatin GI Intolerance    Zetia [Ezetimibe] Other (See Comments)     myalgia       Current Medications     Current Outpatient Medications   Medication Sig Dispense Refill    amLODIPine (NORVASC) 2 5 mg tablet Take 2 5 mg by mouth 2 (two) times a day       apixaban (ELIQUIS) 2 5 mg Take by mouth 2 (two) times a day      bisoprolol (ZEBETA) 5 mg tablet TAKE 0 5 TABLET EVERY MORNING      cholecalciferol (VITAMIN D3) 1,000 units tablet Take 2,000 Units by mouth daily       co-enzyme Q-10 30 MG capsule Take 30 mg by mouth daily      diphenhydrAMINE-acetaminophen (TYLENOL PM)  MG TABS Take 2 tablets by mouth daily at bedtime as needed for sleep      dorzolamide-timolol (COSOPT) 22 3-6 8 MG/ML ophthalmic solution 1 drop 2 (two) times a day      finasteride (PROSCAR) 5 mg tablet Take 1 tablet (5 mg total) by mouth daily 90 tablet 3    gabapentin (NEURONTIN) 100 mg capsule Take 1 capsule (100 mg total) by mouth 3 (three) times a day 270 capsule 1    Garlic 7578 MG CAPS Take 1,000 mg by mouth      indapamide (LOZOL) 1 25 mg tablet Take 1 25 mg by mouth every morning      latanoprost (XALATAN) 0 005 % ophthalmic solution Administer 1 drop to both eyes daily at bedtime      multivitamin-iron-minerals-folic acid (CENTRUM) chewable tablet Chew 1 tablet daily   omeprazole (PriLOSEC) 20 mg delayed release capsule Take 20 mg by mouth daily       No current facility-administered medications for this visit            Health Maintenance     Health Maintenance   Topic Date Due    SLP PLAN OF CARE  07/15/1931    BMI: Followup Plan  07/15/1949    Pneumococcal Vaccine: 65+ Years (1 of 1 - PPSV23) 07/15/1996    Influenza Vaccine  07/01/2020    Fall Risk  07/12/2020    Depression Screening PHQ  07/12/2020    Medicare Annual Wellness Visit (AWV)  07/12/2020    BMI: Adult  08/25/2021    DTaP,Tdap,and Td Vaccines (2 - Td) 03/12/2028    Hepatitis C Screening  Completed    HIB Vaccine  Aged Out    Hepatitis B Vaccine  Aged Out    IPV Vaccine  Aged Out    Hepatitis A Vaccine  Aged Out    Meningococcal ACWY Vaccine  Aged Out    HPV Vaccine  Aged Out     Immunization History   Administered Date(s) Administered    INFLUENZA 09/23/2014    Influenza Split High Dose Preservative Free IM 09/23/2014, 12/17/2015    Influenza TIV (IM) 10/24/2003, 11/08/2005, 11/13/2007, 10/16/2008, 12/16/2009, 11/10/2010, 11/18/2011, 09/24/2012, 09/25/2013    Td (adult), adsorbed 11/01/2003    Tdap 73/92/1436       Estephania Hughes MD

## 2020-08-26 ENCOUNTER — TELEPHONE (OUTPATIENT)
Dept: FAMILY MEDICINE CLINIC | Facility: CLINIC | Age: 85
End: 2020-08-26

## 2020-08-26 DIAGNOSIS — D50.9 IRON DEFICIENCY ANEMIA, UNSPECIFIED IRON DEFICIENCY ANEMIA TYPE: Primary | ICD-10-CM

## 2020-08-26 LAB
FERRITIN SERPL-MCNC: 69 NG/ML (ref 24–380)
FOLATE SERPL-MCNC: >24 NG/ML
IRON SATN MFR SERPL: 36 % (CALC) (ref 20–48)
IRON SERPL-MCNC: 115 MCG/DL (ref 50–180)
TIBC SERPL-MCNC: 317 MCG/DL (CALC) (ref 250–425)

## 2020-08-26 NOTE — TELEPHONE ENCOUNTER
----- Message from Marthe Krabbe, MD sent at 7/42/5517 12:30 PM EDT -----  Please call patient's daughter  Blood test for iron levels were normal   I would like patient to perform a FIT  Test on stool to check for any possibility of microscopic bleeding  She may  test in the office and  Return test to our laboratory    I will put the order in epic

## 2020-08-28 ENCOUNTER — APPOINTMENT (OUTPATIENT)
Dept: LAB | Facility: CLINIC | Age: 85
End: 2020-08-28
Payer: COMMERCIAL

## 2020-08-28 DIAGNOSIS — D50.9 IRON DEFICIENCY ANEMIA, UNSPECIFIED IRON DEFICIENCY ANEMIA TYPE: ICD-10-CM

## 2020-08-28 LAB
DATE SPECIMEN #1: NORMAL
DATE SPECIMEN #2: NORMAL
DATE SPECIMEN #3: NORMAL
HEMOCCULT SP1 STL QL: NEGATIVE
HEMOCCULT SP2 STL QL: NEGATIVE
HEMOCCULT SP3 STL QL: NEGATIVE

## 2020-08-28 PROCEDURE — 82270 OCCULT BLOOD FECES: CPT

## 2020-08-29 ENCOUNTER — TRANSCRIBE ORDERS (OUTPATIENT)
Dept: LAB | Facility: HOSPITAL | Age: 85
End: 2020-08-29

## 2020-08-29 DIAGNOSIS — D50.9 IRON DEFICIENCY ANEMIA, UNSPECIFIED IRON DEFICIENCY ANEMIA TYPE: Primary | ICD-10-CM

## 2020-08-31 ENCOUNTER — TELEPHONE (OUTPATIENT)
Dept: FAMILY MEDICINE CLINIC | Facility: CLINIC | Age: 85
End: 2020-08-31

## 2020-08-31 NOTE — TELEPHONE ENCOUNTER
Pt daughter is aware , kelsi pt daughter is asking if there is any recommendation req his anemia if there is anything she  need to do   please advice

## 2020-08-31 NOTE — TELEPHONE ENCOUNTER
----- Message from Andrew Lindsey MD sent at 8/78/8912  5:26 PM EDT -----  Please call patient's daughter    Recent stool test was negative for any bleeding

## 2020-09-02 NOTE — TELEPHONE ENCOUNTER
Patients daughter is asking if this is something you think she should address with hematology or is this fairly normal for a man of his age

## 2020-09-02 NOTE — TELEPHONE ENCOUNTER
Please call daughter  It would be difficult to know for sure if his anemia is due to his age or any other condition  If she would prefer we could wait on seeing any other specialist and repeat test in 6 weeks to see if it goes up or down

## 2020-09-02 NOTE — TELEPHONE ENCOUNTER
Please call patient's daughter  It depends how aggressive they want to be with doing further testing  They could consider seeing [facility] hematologist for evaluation  Please provide phone number and if interested I will place referral in epic

## 2020-09-15 ENCOUNTER — OFFICE VISIT (OUTPATIENT)
Dept: OBGYN CLINIC | Facility: HOSPITAL | Age: 85
End: 2020-09-15
Payer: COMMERCIAL

## 2020-09-15 VITALS
WEIGHT: 188 LBS | SYSTOLIC BLOOD PRESSURE: 161 MMHG | DIASTOLIC BLOOD PRESSURE: 77 MMHG | HEART RATE: 59 BPM | BODY MASS INDEX: 28.59 KG/M2

## 2020-09-15 DIAGNOSIS — M25.531 PAIN IN RIGHT WRIST: Primary | ICD-10-CM

## 2020-09-15 DIAGNOSIS — M25.562 ACUTE PAIN OF LEFT KNEE: ICD-10-CM

## 2020-09-15 PROCEDURE — 99213 OFFICE O/P EST LOW 20 MIN: CPT | Performed by: ORTHOPAEDIC SURGERY

## 2020-09-15 RX ORDER — BEMPEDOIC ACID 180 MG/1
1 TABLET, FILM COATED ORAL
COMMUNITY
Start: 2020-08-28 | End: 2022-06-16 | Stop reason: CLARIF

## 2020-09-15 RX ORDER — INDAPAMIDE 1.25 MG/1
TABLET, FILM COATED ORAL
COMMUNITY
Start: 2020-09-11

## 2020-09-15 NOTE — PROGRESS NOTES
80 y o male presents with his daughter for evaluation of right wrist pain and swelling  Patient states that he has had issues with right wrist pain and swelling for several years  Currently patient is complaining of minimal pain  Patient states that his symptoms are not significantly correlated with activity level  He denies any associated numbness or tingling  He states that over-the-counter analgesics is not significantly altered his swelling  He has attempted immobilization with a wrist brace but states that this made his symptoms worse  Patient has a history of right thumb CMC osteoarthritis which she states is currently asymptomatic  Overall patient states that he is generally able to tolerate the discomfort as wrist but more than anything he would like to know why he continues to have intermittent swelling      Review of Systems  Review of systems negative unless otherwise specified in HPI    Past Medical History  Past Medical History:   Diagnosis Date    [facility] ([facility] )     Anemia     Carpal tunnel syndrome, unspecified upper limb     Cataract     last assessed: 8/18/2012    GERD (gastroesophageal reflux disease)     Glaucoma     Hypertension     last assessed: 8/23/2012    Intervertebral disc disease     PVD (peripheral vascular disease) ([facility] )        Past Surgical History  Past Surgical History:   Procedure Laterality Date    CATARACT EXTRACTION      HERNIA REPAIR      NEUROPLASTY / TRANSPOSITION MEDIAN NERVE AT CARPAL TUNNEL      OR LAP,CHOLECYSTECTOMY N/A 9/14/2016    Procedure: LAPAROSCOPIC CHOLECYSTECTOMY ;  Surgeon: Fco Santiago MD;  [facility];  Service: General       Current Medications  Current Outpatient Medications on File Prior to Visit   Medication Sig Dispense Refill    amLODIPine (NORVASC) 2 5 mg tablet Take 2 5 mg by mouth 2 (two) times a day       apixaban (ELIQUIS) 2 5 mg Take by mouth 2 (two) times a day      bisoprolol (ZEBETA) 5 mg tablet TAKE 0 5 TABLET EVERY MORNING      cholecalciferol (VITAMIN D3) 1,000 units tablet Take 2,000 Units by mouth daily       co-enzyme Q-10 30 MG capsule Take 30 mg by mouth daily      diphenhydrAMINE-acetaminophen (TYLENOL PM)  MG TABS Take 2 tablets by mouth daily at bedtime as needed for sleep      dorzolamide-timolol (COSOPT) 22 3-6 8 MG/ML ophthalmic solution 1 drop 2 (two) times a day      finasteride (PROSCAR) 5 mg tablet Take 1 tablet (5 mg total) by mouth daily 90 tablet 3    gabapentin (NEURONTIN) 100 mg capsule Take 1 capsule (100 mg total) by mouth 3 (three) times a day 270 capsule 1    Garlic 8972 MG CAPS Take 1,000 mg by mouth      indapamide (LOZOL) 1 25 mg tablet Take 1 25 mg by mouth every morning      indapamide (LOZOL) 1 25 mg tablet TAKE 1 TABLET BY MOUTH EVERY DAY IN THE MORNING      latanoprost (XALATAN) 0 005 % ophthalmic solution Administer 1 drop to both eyes daily at bedtime      multivitamin-iron-minerals-folic acid (CENTRUM) chewable tablet Chew 1 tablet daily   Nexletol 180 MG TABS Take 1 tablet by mouth daily at bedtime      omeprazole (PriLOSEC) 20 mg delayed release capsule Take 20 mg by mouth daily       No current facility-administered medications on file prior to visit          Recent Labs [facility])  0   Lab Value Date/Time    HCT 34 6 (L) 12/29/2019 0710    HCT 35 5 (L) 10/31/2017 1011    HGB 11 7 (L) 12/29/2019 0710    HGB 12 0 (L) 10/31/2017 1011    WBC 5 25 12/29/2019 0710    WBC 6 0 10/31/2017 1011    INR 1 24 (H) 09/30/2019 0947    ESR 33 (H) 10/31/2017 1011    CRP 1 0 10/31/2017 1011    HGBA1C 5 4 12/03/2018 0720         Physical exam  General:  Alert orient  HEENT:  Moist mucous membranes, atraumatic, NC  Lungs:  No audible wheeze  Cardio:  No palpable arrhythmia  Abdomen:  Soft, nontender    MSK right upper extremity  Skin intact, no erythema, no ecchymosis  Mild soft tissue swelling over dorsum of right wrist  Minimal tenderness palpation over dorsal radiocarpal joint  Minimal tenderness palpation over the thumb CMC joint, negative thumb CMC grind test  Painless range of motion radiocarpal joint  Reduced flexion-extension arc radiocarpal  No palpable discrete masses  Negative Tinel's over carpal tunnel  Sensation intact to light touch in median, radial, ulnar nerve distributions  Motor intact to ain, PI and, ulnar nerve  Palpable pulses      Procedure  None     Imaging  Previously obtained radiographs of right wrist reviewed with the patient at today's visit  Reveals an narrowing of the radiocarpal joint particularly over scaphoid facet    Significant joint space narrowing and osteophyte formation over thumb CMC joint    80year-old male with persistent right wrist swelling and asymptomatic right thumb CMC arthritis  Weightbearing as tolerated right upper extremity  Continue over-the-counter analgesics as needed for symptomatic relief  Recommended compression with cold compress for persistent swelling  Ordered MRI of right wrist to better delineate the cause of persistent pain and swelling of the right wrist as patient has failed conservative managed  Patient and his daughter will discuss whether not they would like to pursue MRI or continue with conservative manner  Follow up in office as needed

## 2020-09-23 NOTE — ED NOTES
/84  HR 85  Hydralazine 10mg ivp per order for elevated BP. To Xray via [facility] with transport.   Electronically signed by Roxanne Hanks RN on 9/23/2020 at 3:17 PM Dr Kady Fernando at the bedside for lac repair at this time     Cait Rivera, RN  03/12/18 1436

## 2020-10-02 ENCOUNTER — HOSPITAL ENCOUNTER (OUTPATIENT)
Dept: RADIOLOGY | Facility: HOSPITAL | Age: 85
Discharge: HOME | End: 2020-10-02
Payer: COMMERCIAL

## 2020-10-02 DIAGNOSIS — M25.531 PAIN IN RIGHT WRIST: ICD-10-CM

## 2020-10-02 PROCEDURE — 73221 MRI JOINT UPR EXTREM W/O DYE: CPT

## 2020-10-02 PROCEDURE — G1004 CDSM NDSC: HCPCS

## 2020-10-13 ENCOUNTER — OFFICE VISIT (OUTPATIENT)
Dept: OBGYN CLINIC | Facility: HOSPITAL | Age: 85
End: 2020-10-13
Payer: COMMERCIAL

## 2020-10-13 VITALS
HEIGHT: 68 IN | BODY MASS INDEX: 28.34 KG/M2 | SYSTOLIC BLOOD PRESSURE: 146 MMHG | WEIGHT: 187 LBS | DIASTOLIC BLOOD PRESSURE: 81 MMHG | HEART RATE: 54 BPM

## 2020-10-13 DIAGNOSIS — M19.031 PRIMARY OSTEOARTHRITIS OF RIGHT WRIST: Primary | ICD-10-CM

## 2020-10-13 DIAGNOSIS — M65.9 TENOSYNOVITIS OF RIGHT HAND: ICD-10-CM

## 2020-10-13 PROCEDURE — 99213 OFFICE O/P EST LOW 20 MIN: CPT | Performed by: ORTHOPAEDIC SURGERY

## 2020-10-13 PROCEDURE — 1160F RVW MEDS BY RX/DR IN RCRD: CPT | Performed by: ORTHOPAEDIC SURGERY

## 2020-10-13 PROCEDURE — 1036F TOBACCO NON-USER: CPT | Performed by: ORTHOPAEDIC SURGERY

## 2020-10-28 ENCOUNTER — OFFICE VISIT (OUTPATIENT)
Dept: OBGYN CLINIC | Facility: CLINIC | Age: 85
End: 2020-10-28
Payer: COMMERCIAL

## 2020-10-28 VITALS — SYSTOLIC BLOOD PRESSURE: 173 MMHG | DIASTOLIC BLOOD PRESSURE: 70 MMHG | HEART RATE: 61 BPM

## 2020-10-28 DIAGNOSIS — M65.9 TENOSYNOVITIS OF HAND: ICD-10-CM

## 2020-10-28 DIAGNOSIS — M19.131 SLAC (SCAPHOLUNATE ADVANCED COLLAPSE) OF WRIST, RIGHT: Primary | ICD-10-CM

## 2020-10-28 PROCEDURE — 99214 OFFICE O/P EST MOD 30 MIN: CPT | Performed by: SURGERY

## 2020-10-28 PROCEDURE — 20605 DRAIN/INJ JOINT/BURSA W/O US: CPT | Performed by: SURGERY

## 2020-10-28 PROCEDURE — 1160F RVW MEDS BY RX/DR IN RCRD: CPT | Performed by: SURGERY

## 2020-10-28 RX ORDER — ALLOPURINOL 100 MG/1
100 TABLET ORAL DAILY
COMMUNITY
Start: 2020-10-22

## 2020-10-28 RX ORDER — TRIAMCINOLONE ACETONIDE 40 MG/ML
20 INJECTION, SUSPENSION INTRA-ARTICULAR; INTRAMUSCULAR
Status: COMPLETED | OUTPATIENT
Start: 2020-10-28 | End: 2020-10-28

## 2020-10-28 RX ORDER — LIDOCAINE HYDROCHLORIDE 10 MG/ML
0.5 INJECTION, SOLUTION INFILTRATION; PERINEURAL
Status: COMPLETED | OUTPATIENT
Start: 2020-10-28 | End: 2020-10-28

## 2020-10-28 RX ORDER — BUPIVACAINE HYDROCHLORIDE 2.5 MG/ML
0.5 INJECTION, SOLUTION INFILTRATION; PERINEURAL
Status: COMPLETED | OUTPATIENT
Start: 2020-10-28 | End: 2020-10-28

## 2020-10-28 RX ADMIN — BUPIVACAINE HYDROCHLORIDE 0.5 ML: 2.5 INJECTION, SOLUTION INFILTRATION; PERINEURAL at 10:49

## 2020-10-28 RX ADMIN — LIDOCAINE HYDROCHLORIDE 0.5 ML: 10 INJECTION, SOLUTION INFILTRATION; PERINEURAL at 10:49

## 2020-10-28 RX ADMIN — TRIAMCINOLONE ACETONIDE 20 MG: 40 INJECTION, SUSPENSION INTRA-ARTICULAR; INTRAMUSCULAR at 10:49

## 2020-11-05 ENCOUNTER — EVALUATION (OUTPATIENT)
Dept: OCCUPATIONAL THERAPY | Facility: CLINIC | Age: 85
End: 2020-11-05
Payer: COMMERCIAL

## 2020-11-05 DIAGNOSIS — M65.9 TENOSYNOVITIS OF HAND: ICD-10-CM

## 2020-11-05 DIAGNOSIS — M19.131 SLAC (SCAPHOLUNATE ADVANCED COLLAPSE) OF WRIST, RIGHT: ICD-10-CM

## 2020-11-05 PROCEDURE — 97165 OT EVAL LOW COMPLEX 30 MIN: CPT | Performed by: OCCUPATIONAL THERAPIST

## 2020-12-03 DIAGNOSIS — K21.9 GASTROESOPHAGEAL REFLUX DISEASE, UNSPECIFIED WHETHER ESOPHAGITIS PRESENT: Primary | ICD-10-CM

## 2020-12-03 RX ORDER — OMEPRAZOLE 20 MG/1
20 CAPSULE, DELAYED RELEASE ORAL DAILY PRN
Qty: 30 CAPSULE | Refills: 3 | Status: SHIPPED | OUTPATIENT
Start: 2020-12-03 | End: 2021-10-06

## 2021-01-27 DIAGNOSIS — Z23 ENCOUNTER FOR IMMUNIZATION: ICD-10-CM

## 2021-02-05 ENCOUNTER — OFFICE VISIT (OUTPATIENT)
Dept: OBGYN CLINIC | Facility: HOSPITAL | Age: 86
End: 2021-02-05
Payer: COMMERCIAL

## 2021-02-05 VITALS
BODY MASS INDEX: 28.34 KG/M2 | HEART RATE: 62 BPM | DIASTOLIC BLOOD PRESSURE: 80 MMHG | SYSTOLIC BLOOD PRESSURE: 155 MMHG | WEIGHT: 187 LBS | HEIGHT: 68 IN

## 2021-02-05 DIAGNOSIS — M19.131 SLAC (SCAPHOLUNATE ADVANCED COLLAPSE) OF WRIST, RIGHT: Primary | ICD-10-CM

## 2021-02-05 PROCEDURE — 99213 OFFICE O/P EST LOW 20 MIN: CPT | Performed by: SURGERY

## 2021-02-05 PROCEDURE — 20605 DRAIN/INJ JOINT/BURSA W/O US: CPT | Performed by: SURGERY

## 2021-02-05 RX ORDER — BUPIVACAINE HYDROCHLORIDE 2.5 MG/ML
1 INJECTION, SOLUTION INFILTRATION; PERINEURAL
Status: COMPLETED | OUTPATIENT
Start: 2021-02-05 | End: 2021-02-05

## 2021-02-05 RX ORDER — TRIAMCINOLONE ACETONIDE 40 MG/ML
40 INJECTION, SUSPENSION INTRA-ARTICULAR; INTRAMUSCULAR
Status: COMPLETED | OUTPATIENT
Start: 2021-02-05 | End: 2021-02-05

## 2021-02-05 RX ADMIN — TRIAMCINOLONE ACETONIDE 40 MG: 40 INJECTION, SUSPENSION INTRA-ARTICULAR; INTRAMUSCULAR at 14:25

## 2021-02-05 RX ADMIN — BUPIVACAINE HYDROCHLORIDE 1 ML: 2.5 INJECTION, SOLUTION INFILTRATION; PERINEURAL at 14:25

## 2021-02-05 NOTE — PROGRESS NOTES
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 of the 2nd, 3rd and 4th extensor compartment  Now sp CSI at his appointment in October, patient was provided with second CSI today  He may follow up again in 3 months for another injection if needed  The patient verbalized understanding of exam findings and treatment plan  We engaged in the shared decision-making process and treatment options were discussed at length with the patient  Surgical and conservative management discussed today along with risks and benefits  There are no diagnoses linked to this encounter  Follow Up:  No follow-ups on file  To Do Next Visit:  Re-evaluation of current issue    ____________________________________________________________________________________________________________________________________________      CHIEF COMPLAINT:  Chief Complaint   Patient presents with    Right Wrist - Pain, Swelling       SUBJECTIVE:  Luis Cerda is a 80y o  year old RHD male who presents to the office for follow up regarding SLAC wrist and extensor tendonitis  He was provided with a CSI at the last appointment which provided significant relief  He started having similar symptoms over the past few days  No new trauma, patient has not tried any other methods of relief for this issues  I have personally reviewed all the relevant PMH, PSH, SH, FH, Medications and allergies       PAST MEDICAL HISTORY:  Past Medical History:   Diagnosis Date    [facility]fib ([facility] )     Anemia     Carpal tunnel syndrome, unspecified upper limb     Cataract     last assessed: 8/18/2012    GERD (gastroesophageal reflux disease)     Glaucoma     Hypertension     last assessed: 8/23/2012    Intervertebral disc disease     PVD (peripheral vascular disease) ([facility] )        PAST SURGICAL HISTORY:  Past Surgical History:   Procedure Laterality Date    CATARACT EXTRACTION      HERNIA REPAIR      NEUROPLASTY / TRANSPOSITION MEDIAN NERVE AT CARPAL TUNNEL      [place] LAP,CHOLECYSTECTOMY N/A 9/14/2016    Procedure: LAPAROSCOPIC CHOLECYSTECTOMY ;  Surgeon: Delfin Martinez MD;  [facility];  Service: General       FAMILY HISTORY:  Family History   Problem Relation Age of Onset    Glaucoma Mother     Cancer Father     Heart disease Father    Anish Hostg Glaucoma Father     Alzheimer's disease Family     Heart attack Family        SOCIAL HISTORY:  Social History     Tobacco Use    Smoking status: Former Smoker    Smokeless tobacco: Never Used    Tobacco comment: quit 30 years ago    Substance Use Topics    Alcohol use: Not Currently    Drug use: No       MEDICATIONS:    Current Outpatient Medications:     allopurinol (ZYLOPRIM) 100 mg tablet, Take 100 mg by mouth daily, Disp: , Rfl:     amLODIPine (NORVASC) 2 5 mg tablet, Take 2 5 mg by mouth 2 (two) times a day , Disp: , Rfl:     apixaban (ELIQUIS) 2 5 mg, Take by mouth 2 (two) times a day, Disp: , Rfl:     bisoprolol (ZEBETA) 5 mg tablet, TAKE 0 5 TABLET EVERY MORNING, Disp: , Rfl:     cholecalciferol (VITAMIN D3) 1,000 units tablet, Take 2,000 Units by mouth daily , Disp: , Rfl:     co-enzyme Q-10 30 MG capsule, Take 30 mg by mouth daily, Disp: , Rfl:     diphenhydrAMINE-acetaminophen (TYLENOL PM)  MG TABS, Take 2 tablets by mouth daily at bedtime as needed for sleep, Disp: , Rfl:     dorzolamide-timolol (COSOPT) 22 3-6 8 MG/ML ophthalmic solution, 1 drop 2 (two) times a day, Disp: , Rfl:     finasteride (PROSCAR) 5 mg tablet, Take 1 tablet (5 mg total) by mouth daily, Disp: 90 tablet, Rfl: 3    gabapentin (NEURONTIN) 100 mg capsule, Take 1 capsule (100 mg total) by mouth 3 (three) times a day (Patient taking differently: Take 100 mg by mouth 3 (three) times a day Takes 2 times a day), Disp: 270 capsule, Rfl: 1    Garlic 6965 MG CAPS, Take 1,000 mg by mouth, Disp: , Rfl:     indapamide (LOZOL) 1 25 mg tablet, Take 1 25 mg by mouth every morning, Disp: , Rfl:    indapamide (LOZOL) 1 25 mg tablet, TAKE 1 TABLET BY MOUTH EVERY DAY IN THE MORNING, Disp: , Rfl:     latanoprost (XALATAN) 0 005 % ophthalmic solution, Administer 1 drop to both eyes daily at bedtime, Disp: , Rfl:     multivitamin-iron-minerals-folic acid (CENTRUM) chewable tablet, Chew 1 tablet daily  , Disp: , Rfl:     Nexletol 180 MG TABS, Take 1 tablet by mouth daily at bedtime, Disp: , Rfl:     omeprazole (PriLOSEC) 20 mg delayed release capsule, Take 20 mg by mouth daily, Disp: , Rfl:     omeprazole (PriLOSEC) 20 mg delayed release capsule, Take 1 capsule (20 mg total) by mouth daily as needed (acid reflux), Disp: 30 capsule, Rfl: 3    ALLERGIES:  Allergies   Allergen Reactions    Atorvastatin GI Intolerance     Other reaction(s): GI upset    Ezetimibe Other (See Comments)     myalgia  Other reaction(s): Other (See Comments)  Myalgia  Other reaction(s): Myalgia  myalgia       REVIEW OF SYSTEMS:  Review of Systems   Constitutional: Negative for chills, fever and unexpected weight change  HENT: Negative for hearing loss, nosebleeds and sore throat  Eyes: Negative for pain, redness and visual disturbance  Respiratory: Negative for cough, shortness of breath and wheezing  Cardiovascular: Negative for chest pain, palpitations and leg swelling  Gastrointestinal: Negative for abdominal pain, nausea and vomiting  Endocrine: Negative for polydipsia and polyuria  Genitourinary: Negative for difficulty urinating and hematuria  Musculoskeletal: Positive for arthralgias  Negative for joint swelling and myalgias  Skin: Negative for rash and wound  Neurological: Negative for dizziness, numbness and headaches  Psychiatric/Behavioral: Negative for decreased concentration, dysphoric mood and suicidal ideas  The patient is not nervous/anxious          VITALS:  Vitals:    02/05/21 1404   BP: 155/80   Pulse: 62       LABS:  HgA1c:   Lab Results   Component Value Date    HGBA1C 5 4 12/03/2018     BMP: Lab Results   Component Value Date    CALCIUM 9 5 12/29/2019     10/31/2017    K 4 2 12/29/2019    CO2 29 12/29/2019     12/29/2019    BUN 16 12/29/2019    CREATININE 1 30 12/29/2019       _____________________________________________________  PHYSICAL EXAMINATION:  General: well developed and well nourished, alert, oriented times 3 and appears comfortable  Psychiatric: Normal  HEENT: Normocephalic, Atraumatic Trachea Midline, No torticollis  Pulmonary: No audible wheezing or respiratory distress   Cardiovascular: No pitting edema, 2+ radial pulse   Skin: No masses, erythema, lacerations, fluctation, ulcerations  Neurovascular: Sensation Intact to the Median, Ulnar, Radial Nerve, Motor Intact to the Median, Ulnar, Radial Nerve and Pulses Intact  Musculoskeletal: Normal, except as noted in detailed exam and in HPI        MUSCULOSKELETAL EXAMINATION:    Right wrist:     No erythema or ecchymosis   Moderate Edema to 2nd 3rd and 4th dorsal extensor compartment   Radio carpal joint is mildly tender to palpation   2nd, 3rd and 4th dorsal compartments are mildly tender to palpation   improved wrist ROM overall   Some limitation to full MCP extension   Full composite fist   2+ radial pulse     ___________________________________________________  STUDIES REVIEWED:  none           PROCEDURES PERFORMED:  Medium joint arthrocentesis: R radiocarpal  Supporting Documentation  Indications: pain   Procedure Details  Location: wrist - R radiocarpal  Needle size: 25 G  Approach: dorsal  Medications administered: 1 mL bupivacaine 0 25 %; 40 mg triamcinolone acetonide 40 mg/mL             _____________________________________________________      Meredeth Pretty    I,:   am acting as a scribe while in the presence of the attending physician :       I,:   personally performed the services described in this documentation    as scribed in my presence :

## 2021-02-12 ENCOUNTER — OFFICE VISIT (OUTPATIENT)
Dept: UROLOGY | Facility: CLINIC | Age: 86
End: 2021-02-12
Payer: COMMERCIAL

## 2021-02-12 VITALS
DIASTOLIC BLOOD PRESSURE: 84 MMHG | HEART RATE: 63 BPM | SYSTOLIC BLOOD PRESSURE: 142 MMHG | BODY MASS INDEX: 28.34 KG/M2 | WEIGHT: 187 LBS | HEIGHT: 68 IN

## 2021-02-12 DIAGNOSIS — N40.0 BENIGN PROSTATIC HYPERPLASIA, UNSPECIFIED WHETHER LOWER URINARY TRACT SYMPTOMS PRESENT: Primary | ICD-10-CM

## 2021-02-12 LAB

## 2021-02-12 PROCEDURE — 1160F RVW MEDS BY RX/DR IN RCRD: CPT | Performed by: PHYSICIAN ASSISTANT

## 2021-02-12 PROCEDURE — 1036F TOBACCO NON-USER: CPT | Performed by: PHYSICIAN ASSISTANT

## 2021-02-12 PROCEDURE — 51798 US URINE CAPACITY MEASURE: CPT | Performed by: PHYSICIAN ASSISTANT

## 2021-02-12 PROCEDURE — 81002 URINALYSIS NONAUTO W/O SCOPE: CPT | Performed by: PHYSICIAN ASSISTANT

## 2021-02-12 PROCEDURE — 99213 OFFICE O/P EST LOW 20 MIN: CPT | Performed by: PHYSICIAN ASSISTANT

## 2021-02-12 NOTE — ASSESSMENT & PLAN NOTE
- patient currently taking finasteride and has had a improvement in his urinary symptoms  Will continue with finasteride 5 mg p o  daily  - urine dip today was negative for any leukocytes, nitrates, blood  -PVR today was 160 cc, patient states that he feels like he is emptying his bladder, prior bladder scan at last visit was normal   Discussed with patient double voiding at this time  We will  Repeat bladder scan at next appointment in 6 months   - discussed diet to limit bladder irritants

## 2021-02-12 NOTE — PROGRESS NOTES
Assessment/Plan:  Benign prostatic hyperplasia  - patient currently taking finasteride and has had a improvement in his urinary symptoms  Will continue with finasteride 5 mg p o  daily  - urine dip today was negative for any leukocytes, nitrates, blood  -PVR today was 160 cc, patient states that he feels like he is emptying his bladder, prior bladder scan at last visit was normal   Discussed with patient double voiding at this time  We will  Repeat bladder scan at next appointment in 6 months   - discussed diet to limit bladder irritants  Problem List Items Addressed This Visit        Genitourinary    Benign prostatic hyperplasia - Primary     - patient currently taking finasteride and has had a improvement in his urinary symptoms  Will continue with finasteride 5 mg p o  daily  - urine dip today was negative for any leukocytes, nitrates, blood  -PVR today was 160 cc, patient states that he feels like he is emptying his bladder, prior bladder scan at last visit was normal   Discussed with patient double voiding at this time  We will  Repeat bladder scan at next appointment in 6 months   - discussed diet to limit bladder irritants  Relevant Orders    POCT urine dip (Completed)    POCT Measure PVR (Completed)            Subjective:       Patient presented to his appointment with his daughter Viktoria Harden  is 27-year-old male with a history of benign prostatic hypertrophy with lower urinary tract symptoms  Here for 6 month follow-up for for re-evaluation of urinary symptoms  Patient was  Most recently started on finasteride  Prior to initiation of medication patient was primarily experiencing dysuria at nighttime as well as nocturia  Patient currently reports that he is still having nocturia in approximately getting up 3 times a night  However this  Is not bothersome to patient  Patient does hike treat aggressively due to tendency of dehydration    However he stops drinking his water at approximately 4:00 p m  He drinks 1 cup of coffee in the morning  He also  States drinks cranberry juice which helped with his dysuria  Daughter denies  That patient snores  He states that his dysuria that he had has resolved  He denies any urgency, frequency,  Dysuria, hematuria, flank pain or abdominal pain  The following portions of the patient's history were reviewed and updated as appropriate:   He  has a past medical history of A-fib ([facility] ), Anemia, Carpal tunnel syndrome, unspecified upper limb, Cataract, GERD (gastroesophageal reflux disease), Glaucoma, Hypertension, Intervertebral disc disease, and PVD (peripheral vascular disease) ([facility] )  He   Patient Active Problem List    Diagnosis Date Noted    Benign prostatic hyperplasia 02/12/2021    Pain in both feet 05/15/2020    Carpal tunnel syndrome 04/24/2019    Other insomnia 04/15/2019    Anxiety 04/15/2019    Hyponatremia 04/05/2019    Abnormal liver function 04/05/2019    Paresthesia 11/28/2018    Arthritis of carpometacarpal (CMC) joint of right thumb 09/18/2018    Right wrist pain 08/31/2018    Peripheral edema 08/31/2018    Syncope 03/12/2018    FRANCO (acute kidney injury) ([facility] ) 03/12/2018    PAF (paroxysmal atrial fibrillation) ([place] [id number] ) 03/12/2018    Left lumbar radiculopathy 01/10/2017    Cholecystitis with cholelithiasis 09/14/2016    Joint pain, knee 09/23/2014    Allergic rhinitis 05/30/2013    S/P ablation of ventricular arrhythmia 03/04/2013    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[facility] ) 11/12/2012    Anemia 08/18/2012    GERD (gastroesophageal reflux disease) 08/18/2012    Glaucoma 08/18/2012    Hiatal hernia 08/18/2012    Hyperlipidemia 08/18/2012    Peripheral vascular disease ([facility] ) 08/18/2012    Benign essential hypertension 08/09/2012     He  has a past surgical history that includes pr lap,cholecystectomy (N/A, 9/14/2016); Cataract extraction;  Hernia repair; and Neuroplasty / transposition median nerve at carpal tunnel  His family history includes Alzheimer's disease in his family; Cancer in his father; Glaucoma in his father and mother; Heart attack in his family; Heart disease in his father  He  reports that he has quit smoking  He has never used smokeless tobacco  He reports previous alcohol use  He reports that he does not use drugs  Current Outpatient Medications   Medication Sig Dispense Refill    allopurinol (ZYLOPRIM) 100 mg tablet Take 100 mg by mouth daily      amLODIPine (NORVASC) 2 5 mg tablet Take 2 5 mg by mouth 2 (two) times a day       apixaban (ELIQUIS) 2 5 mg Take by mouth 2 (two) times a day      bisoprolol (ZEBETA) 5 mg tablet 2 5 mg       cholecalciferol (VITAMIN D3) 1,000 units tablet Take 2,000 Units by mouth daily       co-enzyme Q-10 30 MG capsule Take 30 mg by mouth daily      diphenhydrAMINE-acetaminophen (TYLENOL PM)  MG TABS Take 2 tablets by mouth daily at bedtime as needed for sleep      dorzolamide-timolol (COSOPT) 22 3-6 8 MG/ML ophthalmic solution 1 drop 2 (two) times a day      finasteride (PROSCAR) 5 mg tablet Take 1 tablet (5 mg total) by mouth daily 90 tablet 3    gabapentin (NEURONTIN) 100 mg capsule Take 1 capsule (100 mg total) by mouth 3 (three) times a day (Patient taking differently: Take 100 mg by mouth 3 (three) times a day Takes 2 times a day) 270 capsule 1    Garlic 4831 MG CAPS Take 1,000 mg by mouth      indapamide (LOZOL) 1 25 mg tablet TAKE 1 TABLET BY MOUTH EVERY DAY IN THE MORNING      latanoprost (XALATAN) 0 005 % ophthalmic solution Administer 1 drop to both eyes daily at bedtime      multivitamin-iron-minerals-folic acid (CENTRUM) chewable tablet Chew 1 tablet daily        omeprazole (PriLOSEC) 20 mg delayed release capsule Take 1 capsule (20 mg total) by mouth daily as needed (acid reflux) 30 capsule 3    indapamide (LOZOL) 1 25 mg tablet Take 1 25 mg by mouth every morning      Nexletol 180 MG TABS Take 1 tablet by mouth daily at bedtime      omeprazole (PriLOSEC) 20 mg delayed release capsule Take 20 mg by mouth daily       No current facility-administered medications for this visit  He is allergic to atorvastatin and ezetimibe       Review of Systems   Constitutional: Negative  Negative for chills and fever  HENT: Negative  Eyes: Negative  Respiratory: Negative  Cardiovascular: Negative  Gastrointestinal: Negative  Endocrine: Negative  Genitourinary: Negative for difficulty urinating, dysuria, frequency, hematuria and urgency  Positive forNocturia   Musculoskeletal: Negative  Skin: Negative  Allergic/Immunologic: Negative  Neurological: Negative  Hematological: Negative  Psychiatric/Behavioral: Negative  Objective:      /84   Pulse 63   Ht 5' 8" (1 727 m)   Wt 84 8 kg (187 lb)   BMI 28 43 kg/m²          Physical Exam  Vitals signs reviewed  Constitutional:       General: He is not in acute distress  Appearance: Normal appearance  He is normal weight  He is not ill-appearing or toxic-appearing  HENT:      Head: Normocephalic and atraumatic  Ears:      Comments: Hard of hearing  Eyes:      General: No scleral icterus  Conjunctiva/sclera: Conjunctivae normal    Cardiovascular:      Rate and Rhythm: Normal rate  Pulses: Normal pulses  Pulmonary:      Effort: Pulmonary effort is normal    Abdominal:      General: There is no distension  Palpations: Abdomen is soft  Tenderness: There is no abdominal tenderness  There is no right CVA tenderness or left CVA tenderness  Musculoskeletal:      Comments: Kyphosis   Skin:     General: Skin is warm and dry  Neurological:      General: No focal deficit present  Mental Status: He is oriented to person, place, and time  Psychiatric:         Mood and Affect: Mood normal          Behavior: Behavior normal          Thought Content:  Thought content normal          Judgment: Judgment normal  Lon Moncada PA-C

## 2021-02-17 DIAGNOSIS — M79.671 PAIN IN BOTH FEET: ICD-10-CM

## 2021-02-17 DIAGNOSIS — M79.672 PAIN IN BOTH FEET: ICD-10-CM

## 2021-02-18 RX ORDER — GABAPENTIN 100 MG/1
CAPSULE ORAL
Qty: 270 CAPSULE | Refills: 1 | Status: SHIPPED | OUTPATIENT
Start: 2021-02-18 | End: 2021-09-20

## 2021-04-07 ENCOUNTER — IMMUNIZATIONS (OUTPATIENT)
Dept: FAMILY MEDICINE CLINIC | Facility: HOSPITAL | Age: 86
End: 2021-04-07

## 2021-04-07 DIAGNOSIS — Z23 ENCOUNTER FOR IMMUNIZATION: Primary | ICD-10-CM

## 2021-04-07 PROCEDURE — 91300 SARS-COV-2 / COVID-19 MRNA VACCINE (PFIZER-BIONTECH) 30 MCG: CPT

## 2021-04-07 PROCEDURE — 0001A SARS-COV-2 / COVID-19 MRNA VACCINE (PFIZER-BIONTECH) 30 MCG: CPT

## 2021-05-01 ENCOUNTER — IMMUNIZATIONS (OUTPATIENT)
Dept: FAMILY MEDICINE CLINIC | Facility: HOSPITAL | Age: 86
End: 2021-05-01

## 2021-05-01 DIAGNOSIS — Z23 ENCOUNTER FOR IMMUNIZATION: Primary | ICD-10-CM

## 2021-05-01 PROCEDURE — 0002A SARS-COV-2 / COVID-19 MRNA VACCINE (PFIZER-BIONTECH) 30 MCG: CPT

## 2021-05-01 PROCEDURE — 91300 SARS-COV-2 / COVID-19 MRNA VACCINE (PFIZER-BIONTECH) 30 MCG: CPT

## 2021-08-02 DIAGNOSIS — R30.0 DYSURIA: ICD-10-CM

## 2021-08-02 RX ORDER — FINASTERIDE 5 MG/1
5 TABLET, FILM COATED ORAL DAILY
Qty: 90 TABLET | Refills: 3 | Status: SHIPPED | OUTPATIENT
Start: 2021-08-02 | End: 2022-07-27 | Stop reason: SDUPTHER

## 2021-08-13 ENCOUNTER — TELEPHONE (OUTPATIENT)
Dept: OBGYN CLINIC | Facility: HOSPITAL | Age: 86
End: 2021-08-13

## 2021-08-13 NOTE — TELEPHONE ENCOUNTER
I called & lvm for Angel Minor to call us back at her earliest convenience so we could get patient rs with Dr Erendira Santiago  Call back number given, please schedule with Dr Erendira Santiago for next available, when they call back

## 2021-09-08 ENCOUNTER — OFFICE VISIT (OUTPATIENT)
Dept: OBGYN CLINIC | Facility: CLINIC | Age: 86
End: 2021-09-08
Payer: COMMERCIAL

## 2021-09-08 VITALS
BODY MASS INDEX: 28.79 KG/M2 | DIASTOLIC BLOOD PRESSURE: 74 MMHG | RESPIRATION RATE: 17 BRPM | SYSTOLIC BLOOD PRESSURE: 176 MMHG | HEIGHT: 68 IN | HEART RATE: 71 BPM | WEIGHT: 190 LBS

## 2021-09-08 DIAGNOSIS — M19.131 SLAC (SCAPHOLUNATE ADVANCED COLLAPSE) OF WRIST, RIGHT: Primary | ICD-10-CM

## 2021-09-08 PROCEDURE — 1036F TOBACCO NON-USER: CPT | Performed by: SURGERY

## 2021-09-08 PROCEDURE — 20605 DRAIN/INJ JOINT/BURSA W/O US: CPT | Performed by: SURGERY

## 2021-09-08 PROCEDURE — 99214 OFFICE O/P EST MOD 30 MIN: CPT | Performed by: SURGERY

## 2021-09-08 PROCEDURE — 1160F RVW MEDS BY RX/DR IN RCRD: CPT | Performed by: SURGERY

## 2021-09-08 RX ORDER — TRIAMCINOLONE ACETONIDE 40 MG/ML
40 INJECTION, SUSPENSION INTRA-ARTICULAR; INTRAMUSCULAR
Status: COMPLETED | OUTPATIENT
Start: 2021-09-08 | End: 2021-09-08

## 2021-09-08 RX ORDER — LIDOCAINE HYDROCHLORIDE 10 MG/ML
0.5 INJECTION, SOLUTION INFILTRATION; PERINEURAL
Status: COMPLETED | OUTPATIENT
Start: 2021-09-08 | End: 2021-09-08

## 2021-09-08 RX ORDER — BUPIVACAINE HYDROCHLORIDE 2.5 MG/ML
0.5 INJECTION, SOLUTION INFILTRATION; PERINEURAL
Status: COMPLETED | OUTPATIENT
Start: 2021-09-08 | End: 2021-09-08

## 2021-09-08 RX ORDER — TERAZOSIN 1 MG/1
2 CAPSULE ORAL
COMMUNITY
Start: 2021-07-05 | End: 2022-06-16 | Stop reason: CLARIF

## 2021-09-08 RX ADMIN — LIDOCAINE HYDROCHLORIDE 0.5 ML: 10 INJECTION, SOLUTION INFILTRATION; PERINEURAL at 13:16

## 2021-09-08 RX ADMIN — TRIAMCINOLONE ACETONIDE 40 MG: 40 INJECTION, SUSPENSION INTRA-ARTICULAR; INTRAMUSCULAR at 13:16

## 2021-09-08 RX ADMIN — BUPIVACAINE HYDROCHLORIDE 0.5 ML: 2.5 INJECTION, SOLUTION INFILTRATION; PERINEURAL at 13:16

## 2021-09-08 NOTE — PROGRESS NOTES
ASSESSMENT/PLAN:      [address] old male here for his Right SLAC wrist  Non operative treatments were discussed with the patient including another cortisone injection into the wrist  Patient wishes to proceed with the injection today, which he tolerated well  We will follow up with the patient as needed  The patient verbalized understanding of exam findings and treatment plan  We engaged in the shared decision-making process and treatment options were discussed at length with the patient  Surgical and conservative management discussed today along with risks and benefits  Diagnoses and all orders for this visit:    Slac (scapholunate advanced collapse) of wrist, right    Other orders  -     terazosin (HYTRIN) 1 mg capsule; Take 1 capsule by mouth daily at bedtime  -     Medium joint arthrocentesis: R radiocarpal        Follow Up:  Return if symptoms worsen or fail to improve  To Do Next Visit:  Re-evaluation of current issue    ____________________________________________________________________________________________________________________________________________      CHIEF COMPLAINT:  Chief Complaint   Patient presents with    Right Wrist - Pain, Follow-up       SUBJECTIVE:  Benson Metzger is a [address]  year old RHD male who presents today for follow-up for his right SLAC wrist and tenosynovitis of the 2nd, 3rd and 4th extensor compartments  At his last visit on 02/05/2021 a radiocarpal injection was administered that gave him significant relief  He notes that it started to bother him last month some time  He is accompanied with a family member today  I have personally reviewed all the relevant PMH, PSH, SH, FH, Medications and allergies       PAST MEDICAL HISTORY:  Past Medical History:   Diagnosis Date    A-fib ([facility] [address] )     Anemia     Carpal tunnel syndrome, unspecified upper limb     Cataract     last assessed: 8/18/2012    GERD (gastroesophageal reflux disease)     Glaucoma     Hypertension     last assessed: 8/23/2012    Intervertebral disc disease     PVD (peripheral vascular disease) ([facility] [address] )        PAST SURGICAL HISTORY:  Past Surgical History:   Procedure Laterality Date    CATARACT EXTRACTION      HERNIA REPAIR      NEUROPLASTY / TRANSPOSITION MEDIAN NERVE AT CARPAL TUNNEL      [place] LAP,CHOLECYSTECTOMY N/A 9/14/2016    Procedure: LAPAROSCOPIC CHOLECYSTECTOMY ;  Surgeon: Burak Almaraz MD;  Location: [facility];  Service: General       FAMILY HISTORY:  Family History   Problem Relation Age of Onset    Glaucoma Mother     Cancer Father     Heart disease Father    Myriam Celis Glaucoma Father     Alzheimer's disease Family     Heart attack Family        SOCIAL HISTORY:  Social History     Tobacco Use    Smoking status: Former Smoker    Smokeless tobacco: Never Used    Tobacco comment: quit 30 years ago    Vaping Use    Vaping Use: Former   Substance Use Topics    Alcohol use: Not Currently    Drug use: No       MEDICATIONS:    Current Outpatient Medications:     allopurinol (ZYLOPRIM) 100 mg tablet, Take 100 mg by mouth daily, Disp: , Rfl:     amLODIPine (NORVASC) 2 5 mg tablet, Take 2 5 mg by mouth 2 (two) times a day , Disp: , Rfl:     apixaban (ELIQUIS) 2 5 mg, Take by mouth 2 (two) times a day, Disp: , Rfl:     bisoprolol (ZEBETA) 5 mg tablet, 2 5 mg , Disp: , Rfl:     cholecalciferol (VITAMIN D3) 1,000 units tablet, Take 2,000 Units by mouth daily , Disp: , Rfl:     co-enzyme Q-10 30 MG capsule, Take 30 mg by mouth daily, Disp: , Rfl:     diphenhydrAMINE-acetaminophen (TYLENOL PM)  MG TABS, Take 2 tablets by mouth daily at bedtime as needed for sleep, Disp: , Rfl:     dorzolamide-timolol (COSOPT) 22 3-6 8 MG/ML ophthalmic solution, 1 drop 2 (two) times a day, Disp: , Rfl:     finasteride (PROSCAR) 5 mg tablet, Take 1 tablet (5 mg total) by mouth daily, Disp: 90 tablet, Rfl: 3    gabapentin (NEURONTIN) 100 mg capsule, TAKE 1 CAPSULE BY MOUTH THREE TIMES A DAY, Disp: 270 capsule, Rfl: 1    Garlic 8537 MG CAPS, Take 1,000 mg by mouth, Disp: , Rfl:     indapamide (LOZOL) 1 25 mg tablet, Take 1 25 mg by mouth every morning, Disp: , Rfl:     indapamide (LOZOL) 1 25 mg tablet, TAKE 1 TABLET BY MOUTH EVERY DAY IN THE MORNING, Disp: , Rfl:     latanoprost (XALATAN) 0 005 % ophthalmic solution, Administer 1 drop to both eyes daily at bedtime, Disp: , Rfl:     multivitamin-iron-minerals-folic acid (CENTRUM) chewable tablet, Chew 1 tablet daily  , Disp: , Rfl:     Nexletol 180 MG TABS, Take 1 tablet by mouth daily at bedtime, Disp: , Rfl:     omeprazole (PriLOSEC) 20 mg delayed release capsule, Take 20 mg by mouth daily, Disp: , Rfl:     omeprazole (PriLOSEC) 20 mg delayed release capsule, Take 1 capsule (20 mg total) by mouth daily as needed (acid reflux), Disp: 30 capsule, Rfl: 3    terazosin (HYTRIN) 1 mg capsule, Take 1 capsule by mouth daily at bedtime, Disp: , Rfl:     ALLERGIES:  Allergies   Allergen Reactions    Atorvastatin GI Intolerance     Other reaction(s): GI upset    Ezetimibe Other (See Comments)     myalgia  Other reaction(s): Other (See Comments)  Myalgia  Other reaction(s): Myalgia  myalgia       REVIEW OF SYSTEMS:  Review of Systems   Constitutional: Negative for chills, fever and unexpected weight change  HENT: Negative for hearing loss, nosebleeds and sore throat  Eyes: Negative for pain, redness and visual disturbance  Respiratory: Negative for cough, shortness of breath and wheezing  Cardiovascular: Negative for chest pain, palpitations and leg swelling  Gastrointestinal: Negative for abdominal pain, nausea and vomiting  Endocrine: Negative for polydipsia and polyuria  Genitourinary: Negative for dysuria and hematuria  Skin: Negative for rash and wound  Neurological: Negative for dizziness, light-headedness and headaches  Psychiatric/Behavioral: Negative for decreased concentration, dysphoric mood and suicidal ideas   The patient is not nervous/anxious  VITALS:  Vitals:    09/08/21 1239   BP: (!) 176/74   Pulse: 71   Resp: 17       LABS:  HgA1c:   Lab Results   Component Value Date    HGBA1C 5 4 12/03/2018     BMP:   Lab Results   Component Value Date    CALCIUM 9 5 12/29/2019     10/31/2017    K 4 2 12/29/2019    CO2 29 12/29/2019     12/29/2019    BUN 16 12/29/2019    CREATININE 1 30 12/29/2019       _____________________________________________________  PHYSICAL EXAMINATION:  General: well developed and well nourished, alert, oriented times 3 and appears comfortable  Psychiatric: Normal  HEENT: Normocephalic, Atraumatic Trachea Midline, No torticollis  Pulmonary: No audible wheezing or respiratory distress   Cardiovascular: No pitting edema, 2+ radial pulse   Abdominal/GI: abdomen non tender, non distended   Skin: No masses, erythema, lacerations, fluctation, ulcerations  Neurovascular: Sensation Intact to the Median, Ulnar, Radial Nerve, Motor Intact to the Median, Ulnar, Radial Nerve and Pulses Intact  Musculoskeletal: Normal, except as noted in detailed exam and in HPI  MUSCULOSKELETAL EXAMINATION:    Right wrist:      No erythema or ecchymosis   Moderate Edema to 2nd 3rd and 4th dorsal extensor compartment   Radio carpal joint is mildly tender to palpation   2nd, 3rd and 4th dorsal compartments are mildly tender to palpation   improved wrist ROM overall   Some limitation to full MCP extension   Full composite fist   2+ radial pulse  ___________________________________________________  STUDIES REVIEWED:  No images reviewed at today's visit        PROCEDURES PERFORMED:  Medium joint arthrocentesis: R radiocarpal  Universal Protocol:  Consent: Verbal consent obtained    Risks and benefits: risks, benefits and alternatives were discussed  Consent given by: patient  Time out: Immediately prior to procedure a "time out" was called to verify the correct patient, procedure, equipment, support staff and site/side marked as required    Timeout called at: 9/8/2021 1:15 PM   Patient understanding: patient states understanding of the procedure being performed  Site marked: the operative site was marked  Patient identity confirmed: verbally with patient    Supporting Documentation  Indications: pain   Procedure Details  Location: wrist - R radiocarpal  Needle size: 25 G  Ultrasound guidance: no  Approach: dorsal  Medications administered: 0 5 mL bupivacaine 0 25 %; 0 5 mL lidocaine 1 %; 40 mg triamcinolone acetonide 40 mg/mL    Patient tolerance: patient tolerated the procedure well with no immediate complications  Dressing:  Sterile dressing applied        _____________________________________________________      Scribe Attestation    I,:  Mag Spaulding am acting as a scribe while in the presence of the attending physician :       I,:  Sharon Garvin MD personally performed the services described in this documentation    as scribed in my presence :

## 2021-09-18 DIAGNOSIS — M79.671 PAIN IN BOTH FEET: ICD-10-CM

## 2021-09-18 DIAGNOSIS — M79.672 PAIN IN BOTH FEET: ICD-10-CM

## 2021-09-20 RX ORDER — GABAPENTIN 100 MG/1
CAPSULE ORAL
Qty: 270 CAPSULE | Refills: 1 | Status: SHIPPED | OUTPATIENT
Start: 2021-09-20 | End: 2022-01-17 | Stop reason: SDUPTHER

## 2021-09-20 NOTE — TELEPHONE ENCOUNTER
Requested Prescriptions     Pending Prescriptions Disp Refills    gabapentin (NEURONTIN) 100 mg capsule [Pharmacy Med Name: GABAPENTIN 100 MG CAPSULE] 270 capsule 1     Sig: TAKE 1 CAPSULE BY MOUTH THREE TIMES A DAY       Please review and advise

## 2021-10-06 ENCOUNTER — OFFICE VISIT (OUTPATIENT)
Dept: GASTROENTEROLOGY | Facility: CLINIC | Age: 86
End: 2021-10-06
Payer: COMMERCIAL

## 2021-10-06 VITALS
SYSTOLIC BLOOD PRESSURE: 120 MMHG | BODY MASS INDEX: 29.1 KG/M2 | TEMPERATURE: 98 F | HEIGHT: 68 IN | DIASTOLIC BLOOD PRESSURE: 80 MMHG | HEART RATE: 68 BPM | OXYGEN SATURATION: 97 % | WEIGHT: 192 LBS

## 2021-10-06 DIAGNOSIS — K21.9 GASTROESOPHAGEAL REFLUX DISEASE, UNSPECIFIED WHETHER ESOPHAGITIS PRESENT: ICD-10-CM

## 2021-10-06 DIAGNOSIS — R11.2 NAUSEA AND VOMITING IN ADULT: ICD-10-CM

## 2021-10-06 DIAGNOSIS — R19.7 DIARRHEA, UNSPECIFIED TYPE: ICD-10-CM

## 2021-10-06 DIAGNOSIS — R10.10 PAIN OF UPPER ABDOMEN: Primary | ICD-10-CM

## 2021-10-06 PROCEDURE — 1160F RVW MEDS BY RX/DR IN RCRD: CPT | Performed by: PHYSICIAN ASSISTANT

## 2021-10-06 PROCEDURE — 1036F TOBACCO NON-USER: CPT | Performed by: PHYSICIAN ASSISTANT

## 2021-10-06 PROCEDURE — 99214 OFFICE O/P EST MOD 30 MIN: CPT | Performed by: PHYSICIAN ASSISTANT

## 2021-10-06 RX ORDER — OMEPRAZOLE 40 MG/1
40 CAPSULE, DELAYED RELEASE ORAL DAILY
Qty: 30 CAPSULE | Refills: 3 | Status: SHIPPED | OUTPATIENT
Start: 2021-10-06 | End: 2022-06-16 | Stop reason: CLARIF

## 2021-10-06 RX ORDER — ACETAMINOPHEN AND CODEINE PHOSPHATE 300; 30 MG/1; MG/1
TABLET ORAL
COMMUNITY
End: 2022-05-20

## 2021-10-06 RX ORDER — ONDANSETRON 4 MG/1
4 TABLET, FILM COATED ORAL EVERY 8 HOURS PRN
Qty: 20 TABLET | Refills: 0 | Status: SHIPPED | OUTPATIENT
Start: 2021-10-06 | End: 2022-03-09 | Stop reason: SDUPTHER

## 2021-10-14 ENCOUNTER — TELEPHONE (OUTPATIENT)
Dept: GASTROENTEROLOGY | Facility: CLINIC | Age: 86
End: 2021-10-14

## 2021-11-30 ENCOUNTER — NURSE TRIAGE (OUTPATIENT)
Dept: OTHER | Facility: OTHER | Age: 86
End: 2021-11-30

## 2021-12-06 ENCOUNTER — TELEPHONE (OUTPATIENT)
Dept: GASTROENTEROLOGY | Facility: CLINIC | Age: 86
End: 2021-12-06

## 2021-12-08 DIAGNOSIS — K21.9 GASTROESOPHAGEAL REFLUX DISEASE, UNSPECIFIED WHETHER ESOPHAGITIS PRESENT: ICD-10-CM

## 2021-12-08 RX ORDER — OMEPRAZOLE 20 MG/1
CAPSULE, DELAYED RELEASE ORAL
Qty: 90 CAPSULE | Refills: 1 | Status: SHIPPED | OUTPATIENT
Start: 2021-12-08 | End: 2022-06-03

## 2021-12-09 ENCOUNTER — HOSPITAL ENCOUNTER (EMERGENCY)
Facility: HOSPITAL | Age: 86
Discharge: HOME | End: 2021-12-09
Attending: EMERGENCY MEDICINE | Admitting: EMERGENCY MEDICINE
Payer: COMMERCIAL

## 2021-12-09 VITALS
DIASTOLIC BLOOD PRESSURE: 72 MMHG | OXYGEN SATURATION: 96 % | BODY MASS INDEX: 29.1 KG/M2 | TEMPERATURE: 97.4 F | WEIGHT: 192 LBS | HEART RATE: 67 BPM | HEIGHT: 68 IN | RESPIRATION RATE: 19 BRPM | SYSTOLIC BLOOD PRESSURE: 166 MMHG

## 2021-12-09 DIAGNOSIS — R19.7 ACUTE DIARRHEA: Primary | ICD-10-CM

## 2021-12-09 DIAGNOSIS — R51.9 HEADACHE: ICD-10-CM

## 2021-12-09 LAB
ALBUMIN SERPL BCP-MCNC: 3.4 G/DL (ref 3.5–5)
ALP SERPL-CCNC: 57 U/L (ref 46–116)
ALT SERPL W P-5'-P-CCNC: 25 U/L (ref 12–78)
ANION GAP SERPL CALCULATED.3IONS-SCNC: 9 MMOL/L (ref 4–13)
AST SERPL W P-5'-P-CCNC: 17 U/L (ref 5–45)
BASOPHILS # BLD AUTO: 0.02 THOUSANDS/ΜL (ref 0–0.1)
BASOPHILS NFR BLD AUTO: 0 % (ref 0–1)
BILIRUB SERPL-MCNC: 0.4 MG/DL (ref 0.2–1)
BUN SERPL-MCNC: 15 MG/DL (ref 5–25)
CALCIUM ALBUM COR SERPL-MCNC: 8.9 MG/DL (ref 8.3–10.1)
CALCIUM SERPL-MCNC: 8.4 MG/DL (ref 8.3–10.1)
CHLORIDE SERPL-SCNC: 97 MMOL/L (ref 100–108)
CO2 SERPL-SCNC: 29 MMOL/L (ref 21–32)
CREAT SERPL-MCNC: 1.2 MG/DL (ref 0.6–1.3)
EOSINOPHIL # BLD AUTO: 0.45 THOUSAND/ΜL (ref 0–0.61)
EOSINOPHIL NFR BLD AUTO: 7 % (ref 0–6)
ERYTHROCYTE [DISTWIDTH] IN BLOOD BY AUTOMATED COUNT: 12.7 % (ref 11.6–15.1)
FLUAV RNA RESP QL NAA+PROBE: NEGATIVE
FLUBV RNA RESP QL NAA+PROBE: NEGATIVE
GFR SERPL CREATININE-BSD FRML MDRD: 53 ML/MIN/1.73SQ M
GLUCOSE SERPL-MCNC: 104 MG/DL (ref 65–140)
HCT VFR BLD AUTO: 31.8 % (ref 36.5–49.3)
HGB BLD-MCNC: 10.6 G/DL (ref 12–17)
IMM GRANULOCYTES # BLD AUTO: 0.02 THOUSAND/UL (ref 0–0.2)
IMM GRANULOCYTES NFR BLD AUTO: 0 % (ref 0–2)
LACTATE SERPL-SCNC: 0.7 MMOL/L (ref 0.5–2)
LIPASE SERPL-CCNC: 140 U/L (ref 73–393)
LYMPHOCYTES # BLD AUTO: 0.6 THOUSANDS/ΜL (ref 0.6–4.47)
LYMPHOCYTES NFR BLD AUTO: 9 % (ref 14–44)
MCH RBC QN AUTO: 32.7 PG (ref 26.8–34.3)
MCHC RBC AUTO-ENTMCNC: 33.3 G/DL (ref 31.4–37.4)
MCV RBC AUTO: 98 FL (ref 82–98)
MONOCYTES # BLD AUTO: 0.74 THOUSAND/ΜL (ref 0.17–1.22)
MONOCYTES NFR BLD AUTO: 11 % (ref 4–12)
NEUTROPHILS # BLD AUTO: 4.65 THOUSANDS/ΜL (ref 1.85–7.62)
NEUTS SEG NFR BLD AUTO: 73 % (ref 43–75)
NRBC BLD AUTO-RTO: 0 /100 WBCS
PLATELET # BLD AUTO: 214 THOUSANDS/UL (ref 149–390)
PMV BLD AUTO: 11.1 FL (ref 8.9–12.7)
POTASSIUM SERPL-SCNC: 3.8 MMOL/L (ref 3.5–5.3)
PROT SERPL-MCNC: 7.5 G/DL (ref 6.4–8.2)
RBC # BLD AUTO: 3.24 MILLION/UL (ref 3.88–5.62)
RSV RNA RESP QL NAA+PROBE: NEGATIVE
SARS-COV-2 RNA RESP QL NAA+PROBE: NEGATIVE
SODIUM SERPL-SCNC: 135 MMOL/L (ref 136–145)
WBC # BLD AUTO: 6.48 THOUSAND/UL (ref 4.31–10.16)

## 2021-12-09 PROCEDURE — 83690 ASSAY OF LIPASE: CPT | Performed by: EMERGENCY MEDICINE

## 2021-12-09 PROCEDURE — 85025 COMPLETE CBC W/AUTO DIFF WBC: CPT | Performed by: EMERGENCY MEDICINE

## 2021-12-09 PROCEDURE — 83605 ASSAY OF LACTIC ACID: CPT | Performed by: EMERGENCY MEDICINE

## 2021-12-09 PROCEDURE — 93005 ELECTROCARDIOGRAM TRACING: CPT

## 2021-12-09 PROCEDURE — 99284 EMERGENCY DEPT VISIT MOD MDM: CPT

## 2021-12-09 PROCEDURE — 0241U HB NFCT DS VIR RESP RNA 4 TRGT: CPT | Performed by: EMERGENCY MEDICINE

## 2021-12-09 PROCEDURE — 80053 COMPREHEN METABOLIC PANEL: CPT | Performed by: EMERGENCY MEDICINE

## 2021-12-09 PROCEDURE — 87505 NFCT AGENT DETECTION GI: CPT | Performed by: EMERGENCY MEDICINE

## 2021-12-09 PROCEDURE — 36415 COLL VENOUS BLD VENIPUNCTURE: CPT | Performed by: EMERGENCY MEDICINE

## 2021-12-09 PROCEDURE — 99284 EMERGENCY DEPT VISIT MOD MDM: CPT | Performed by: EMERGENCY MEDICINE

## 2021-12-09 RX ORDER — ACETAMINOPHEN 325 MG/1
975 TABLET ORAL ONCE
Status: COMPLETED | OUTPATIENT
Start: 2021-12-09 | End: 2021-12-09

## 2021-12-09 RX ADMIN — ACETAMINOPHEN 975 MG: 325 TABLET, FILM COATED ORAL at 18:21

## 2021-12-10 LAB
C DIFF TOX GENS STL QL NAA+PROBE: NEGATIVE
CAMPYLOBACTER DNA SPEC NAA+PROBE: NORMAL
SALMONELLA DNA SPEC QL NAA+PROBE: NORMAL
SHIGA TOXIN STX GENE SPEC NAA+PROBE: NORMAL
SHIGELLA DNA SPEC QL NAA+PROBE: NORMAL

## 2021-12-11 LAB
ATRIAL RATE: 70 BPM
P AXIS: 2 DEGREES
PR INTERVAL: 240 MS
QRS AXIS: -3 DEGREES
QRSD INTERVAL: 90 MS
QT INTERVAL: 376 MS
QTC INTERVAL: 406 MS
T WAVE AXIS: -26 DEGREES
VENTRICULAR RATE: 70 BPM

## 2021-12-11 PROCEDURE — 93010 ELECTROCARDIOGRAM REPORT: CPT | Performed by: INTERNAL MEDICINE

## 2021-12-15 ENCOUNTER — NURSE TRIAGE (OUTPATIENT)
Dept: OTHER | Facility: OTHER | Age: 86
End: 2021-12-15

## 2021-12-27 ENCOUNTER — NURSE TRIAGE (OUTPATIENT)
Dept: OTHER | Facility: OTHER | Age: 86
End: 2021-12-27

## 2022-01-04 ENCOUNTER — TRANSITIONAL CARE MANAGEMENT (OUTPATIENT)
Dept: FAMILY MEDICINE CLINIC | Facility: CLINIC | Age: 87
End: 2022-01-04

## 2022-01-05 ENCOUNTER — RA CDI HCC (OUTPATIENT)
Dept: OTHER | Facility: HOSPITAL | Age: 87
End: 2022-01-05

## 2022-01-05 NOTE — PROGRESS NOTES
[place] [phone]  coding opportunities          Number of diagnosis code(s) already on the problem list added to FYI fla                  Number of suggestions NOT actually used: 4     Patients insurance company: [address]  (Medicare Advantage and Commercial)     Visit status: Patient arrived for their scheduled appointment        [place] [id number]  coding opportunities        Please review/recertify the BPA diagnoses for     Number of diagnosis code(s) already on the problem list added to FYI fla                     Patients insurance company: [address]  (Medicare Advantage and Commercial)

## 2022-01-06 ENCOUNTER — TELEMEDICINE (OUTPATIENT)
Dept: FAMILY MEDICINE CLINIC | Facility: CLINIC | Age: 87
End: 2022-01-06
Payer: COMMERCIAL

## 2022-01-06 VITALS
WEIGHT: 192 LBS | HEIGHT: 68 IN | DIASTOLIC BLOOD PRESSURE: 81 MMHG | HEART RATE: 65 BPM | SYSTOLIC BLOOD PRESSURE: 130 MMHG | TEMPERATURE: 97.4 F | BODY MASS INDEX: 29.1 KG/M2

## 2022-01-06 DIAGNOSIS — I10 BENIGN ESSENTIAL HYPERTENSION: Primary | Chronic | ICD-10-CM

## 2022-01-06 DIAGNOSIS — R26.2 AMBULATORY DYSFUNCTION: ICD-10-CM

## 2022-01-06 DIAGNOSIS — R19.7 DIARRHEA, UNSPECIFIED TYPE: ICD-10-CM

## 2022-01-06 PROCEDURE — 99495 TRANSJ CARE MGMT MOD F2F 14D: CPT | Performed by: FAMILY MEDICINE

## 2022-01-06 RX ORDER — LOPERAMIDE HYDROCHLORIDE 2 MG/1
CAPSULE ORAL
COMMUNITY
Start: 2022-01-03 | End: 2022-01-25 | Stop reason: SDUPTHER

## 2022-01-06 RX ORDER — CYCLOSPORINE 0.5 MG/ML
EMULSION OPHTHALMIC
COMMUNITY
Start: 2021-11-20 | End: 2022-05-20

## 2022-01-06 RX ORDER — AMLODIPINE BESYLATE 5 MG/1
TABLET ORAL
COMMUNITY
Start: 2022-01-03 | End: 2022-06-16 | Stop reason: CLARIF

## 2022-01-06 RX ORDER — DICYCLOMINE HCL 20 MG
TABLET ORAL
COMMUNITY
Start: 2022-01-03 | End: 2022-06-16 | Stop reason: CLARIF

## 2022-01-06 RX ORDER — BRIMONIDINE TARTRATE 2 MG/ML
SOLUTION/ DROPS OPHTHALMIC
COMMUNITY
Start: 2021-11-09

## 2022-01-06 NOTE — ASSESSMENT & PLAN NOTE
Hypertension  Patient blood pressure at home today was 130/81 on current regimen of medications    I would recommend continuing amlodipine 10 mg daily and holding off on any diuretics unless otherwise instructed by his cardiologist

## 2022-01-06 NOTE — ASSESSMENT & PLAN NOTE
Diarrhea  Patient was intolerant of Questran but will start dicyclomine 20 mg 3-4 times daily and follow-up with gastroenterologist tomorrow for further recommendations

## 2022-01-06 NOTE — PROGRESS NOTES
Virtual Regular Visit    Verification of patient location:    Patient is located in the following state in which I hold an active license [place]      Assessment/Plan:    Problem List Items Addressed This Visit        Cardiovascular and Mediastinum    Benign essential hypertension - Primary (Chronic)     Hypertension  Patient blood pressure at home today was 130/81 on current regimen of medications  I would recommend continuing amlodipine 10 mg daily and holding off on any diuretics unless otherwise instructed by his cardiologist          Relevant Medications    amLODIPine (NORVASC) 5 mg tablet       Other    Diarrhea     Diarrhea  Patient was intolerant of Questran but will start dicyclomine 20 mg 3-4 times daily and follow-up with gastroenterologist tomorrow for further recommendations  Ambulatory dysfunction     Ambulatory dysfunction  Patient with deconditioning and ambulatory dysfunction due to recent hospital stay with significant diarrhea which caused overall weakness and would benefit from home physical and occupational therapy                   TCM Call (since 12/26/2021)     Date and time call was made  1/4/2022  1:48 PM    Hospital care reviewed  Records reviewed        Patient was hospitialized at  [facility]        Date of Admission  12/27/21    Date of discharge  01/03/22    Diagnosis  FRANCO, Diarrhea (DNSA: 01/16/22)     Disposition  Home    Were the patients medications reviewed and updated  No    Current Symptoms  --  diarrhea      TCM Call (since 12/26/2021)     Post hospital issues  Reduced activity    Should patient be enrolled in anticoag monitoring? No    Scheduled for follow up?   Yes    Did you obtain your prescribed medications  Yes    Do you need help managing your prescriptions or medications  No    Is transportation to your appointment needed  No    I have advised the patient to call PCP with any new or worsening symptoms  Sher Conrad, [address] [place] [address] Alone    Support System  Family    Are you recieving any outpatient services  No    Are you recieving home care services  No    Interperter language line needed  No    Counseling  Family    Counseling topics  Importance of RX compliance    Comments  Apt scheduled for 1/6th at 9:30 am              Reason for visit is   Chief Complaint   Patient presents with    Transition of Care Management    Diarrhea    Virtual Regular Visit        Encounter provider Sharon Ruiz MD    Provider located at [address] [place] [phone]      Recent Visits  Date Type Provider Dept   01/24/22 Telephone Sharon Ruiz MD [id number] recent visits within past 7 days and meeting all other requirements  Future Appointments  No visits were found meeting these conditions  Showing future appointments within next 150 days and meeting all other requirements       The patient was identified by name and date of birth  Sophia Chávez was informed that this is a telemedicine visit and that the visit is being conducted through [address] [place] [address] [facility] [employer] and patient was informed that this is a secure, HIPAA-compliant platform  He agrees to proceed     My office door was closed  No one else was in the room  He acknowledged consent and understanding of privacy and security of the video platform  The patient has agreed to participate and understands they can discontinue the visit at any time  Patient is aware this is a billable service  Subjective  Sophia Chávez is a 80 y o  male       I reviewed the patient's discharge summary from his latest hospitalization  His daughter is the primary source of information  He continues to have at times to loose bowel movements daily despite use of Imodium q i d   Patient was intolerant to Questran it was recommended from hospital   He is scheduled to have GI visit tomorrow for follow-up office visit    Symptoms are present after patient eats any types of food  He has minimal to no caffeine intake  There is no travel history  Patient had extensive bowel tests that were negative  Patient is consuming 48 oz of water or watered down Gatorade    During hospital stay patient was recommended to discontinue any diuretics and his amlodipine was increased to 10 mg daily  Patient's daughter states he has had increase in peripheral edema but denies any leg pains or shortness of breath  Patient is awaiting call from his cardiologist in regards to follow-up for his medications    I reviewed and agree with his discharge instructions whereby it was recommended for patient to have home physical therapy and occupational therapy due to his deconditioning during hospital stay  Patient is homebound  Diarrhea   Pertinent negatives include no fever          Past Medical History:   Diagnosis Date    [facility]fib ([facility] [address] )     Anemia     Carpal tunnel syndrome, unspecified upper limb     Cataract     last assessed: 8/18/2012    GERD (gastroesophageal reflux disease)     Glaucoma     Hypertension     last assessed: 8/23/2012    Intervertebral disc disease     [facility]per[facility]) ([facility])        Past Surgical History:   Procedure Laterality Date    CATARACT EXTRACTION      HERNIA REPAIR      NEUROPLASTY / TRANSPOSITION MEDIAN NERVE AT CARPAL TUNNEL      [place] LAP,CHOLECYSTECTOMY N/A 9/14/2016    Procedure: LAPAROSCOPIC CHOLECYSTECTOMY ;  Surgeon: Dipika Walden MD;  Location: [facility];  Service: General       Current Outpatient Medications   Medication Sig Dispense Refill    acetaminophen-codeine (TYLENOL with CODEINE #3) 300-30 MG per tablet acetaminophen 300 mg-codeine 30 mg tablet      allopurinol (ZYLOPRIM) 100 mg tablet Take 100 mg by mouth daily      amLODIPine (NORVASC) 5 mg tablet       apixaban (ELIQUIS) 2 5 mg Take by mouth 2 (two) times a day      bisoprolol (ZEBETA) 5 mg tablet 2 5 mg       brimonidine tartrate 0 2 % ophthalmic solution  cholecalciferol (VITAMIN D3) 1,000 units tablet Take 2,000 Units by mouth daily       co-enzyme Q-10 30 MG capsule Take 100 mg by mouth daily        diphenhydrAMINE-acetaminophen (TYLENOL PM)  MG TABS Take 2 tablets by mouth daily at bedtime as needed for sleep      dorzolamide-timolol (COSOPT) 22 3-6 8 MG/ML ophthalmic solution 1 drop 2 (two) times a day      finasteride (PROSCAR) 5 mg tablet Take 1 tablet (5 mg total) by mouth daily 90 tablet 3    Garlic 5420 MG CAPS Take 1,000 mg by mouth      latanoprost (XALATAN) 0 005 % ophthalmic solution Administer 1 drop to both eyes daily at bedtime      multivitamin-iron-minerals-folic acid (CENTRUM) chewable tablet Chew 1 tablet daily        omeprazole (PriLOSEC) 20 mg delayed release capsule TAKE 1 CAPSULE BY MOUTH EVERY DAY AS NEEDED FOR ACID REFLUX 90 capsule 1    ondansetron (ZOFRAN) 4 mg tablet Take 1 tablet (4 mg total) by mouth every 8 (eight) hours as needed for nausea or vomiting 20 tablet 0    Restasis 0 05 % ophthalmic emulsion       terazosin (HYTRIN) 1 mg capsule Take 2 mg by mouth daily at bedtime       amLODIPine (NORVASC) 2 5 mg tablet Take 2 5 mg by mouth 2 (two) times a day  (Patient not taking: Reported on 1/6/2022 )      dicyclomine (BENTYL) 20 mg tablet       gabapentin (NEURONTIN) 100 mg capsule Take 1 capsule (100 mg total) by mouth 2 (two) times a day 60 capsule 5    indapamide (LOZOL) 1 25 mg tablet Take 1 25 mg by mouth every morning (Patient not taking: Reported on 1/6/2022 )      indapamide (LOZOL) 1 25 mg tablet TAKE 1 TABLET BY MOUTH EVERY DAY IN THE MORNING (Patient not taking: Reported on 1/6/2022)      loperamide (IMODIUM) 2 mg capsule Take 1 capsule (2 mg total) by mouth as needed for diarrhea 30 capsule 5    Nexletol 180 MG TABS Take 1 tablet by mouth daily at bedtime         omeprazole (PriLOSEC) 40 MG capsule Take 1 capsule (40 mg total) by mouth daily (Patient not taking: Reported on 1/6/2022 ) 30 capsule 3     No current facility-administered medications for this visit  Allergies   Allergen Reactions    Bempedoic Acid Other (See Comments)     Gout    Atorvastatin GI Intolerance     Other reaction(s): GI upset    Ezetimibe Other (See Comments)     myalgia  Other reaction(s): Other (See Comments)  Myalgia  Other reaction(s): Myalgia  myalgia    Statins Rash       Review of Systems   Constitutional: Positive for fatigue  Negative for fever  Respiratory: Negative  Cardiovascular: Positive for leg swelling  Negative for chest pain and palpitations  Gastrointestinal: Positive for diarrhea  Genitourinary: Negative  Video Exam    Vitals:    01/06/22 0935   BP: 130/81   Pulse: 65   Temp: (!) 97 4 °F (36 3 °C)   Weight: 87 1 kg (192 lb)   Height: 5' 8" (1 727 m)       Physical Exam  Constitutional:       General: He is not in acute distress  Pulmonary:      Effort: No respiratory distress  Neurological:      General: No focal deficit present  Psychiatric:         Mood and Affect: Mood normal           I spent 15 minutes directly with the patient during this visit    VIRTUAL VISIT Tamika Mathis  verbally agrees to participate in [employer]  Pt is aware that [employer] could be limited without vital signs or the ability to perform a full hands-on physical exam  Jose Mullins understands he or the provider may request at any time to terminate the video visit and request the patient to seek care or treatment in person

## 2022-01-07 ENCOUNTER — OFFICE VISIT (OUTPATIENT)
Dept: GASTROENTEROLOGY | Facility: CLINIC | Age: 87
End: 2022-01-07
Payer: COMMERCIAL

## 2022-01-07 VITALS
BODY MASS INDEX: 29.98 KG/M2 | WEIGHT: 197.8 LBS | SYSTOLIC BLOOD PRESSURE: 122 MMHG | TEMPERATURE: 98.8 F | HEIGHT: 68 IN | DIASTOLIC BLOOD PRESSURE: 54 MMHG

## 2022-01-07 DIAGNOSIS — K58.0 IRRITABLE BOWEL SYNDROME WITH DIARRHEA: Primary | ICD-10-CM

## 2022-01-07 PROCEDURE — 1036F TOBACCO NON-USER: CPT | Performed by: PHYSICIAN ASSISTANT

## 2022-01-07 PROCEDURE — 1160F RVW MEDS BY RX/DR IN RCRD: CPT | Performed by: PHYSICIAN ASSISTANT

## 2022-01-07 PROCEDURE — 99214 OFFICE O/P EST MOD 30 MIN: CPT | Performed by: PHYSICIAN ASSISTANT

## 2022-01-07 NOTE — PROGRESS NOTES
[facility]ut[facility] Castellanos [facility] - Outpatient Follow-up Note  Adriana Wilkinson 80 y o  male MRN: [id number]  Encounter: [id number]      Assessment and Plan    1  Abdominal pain  2  Diarrhea  The patient had had ongoing complaints of abdominal pain and diarrhea since September  The symptoms waxed and waned in intensity  Since initially seen he has undergone testing which is all been negative including infectious stool studies, celiac serologies, fecal elastace, thyroid serologies  He is status post cholecystectomy  He has tried Zofran, Bentyl, Imodium, fiber, Questran  He was recently admitted and during that admit he did also have trouble with dysphagia  He underwent upper endoscopy which revealed a tortuous esophagus and a nonobstructing Schatzki's ring which was dilated  Thankfully, the patient is currently not having any complaints of abdominal pain and he is having 1 formed stool daily  - daughter will inquire if duodenal and stomach biopsies were obtained on EGD  - continue omeprazole daily  - continue Zofran, dicyclomine, and imodium as needed  - provided the patient with a low FODMAP diet to help avoid foods that can trigger his symptoms to return  - if the patient's symptoms return and are severe can consider colonoscopy with random colon biopsies however the patient and his daughter would like this to be last resort given his age which I agree with    Follow-up as needed      ______________________________________________________________________    History of Present Illness  Adriana Wilkinson is a 80 y o  male here for follow up evaluation of abdominal pain and diarrhea  The patient was seen in October and at that time he complains of an upset stomach and loose stools 3-4 times per day  He was on omeprazole which he thought was helping him  Since that time he has undergone negative infectious stool studies, celiac serologies, fecal elastace, and thyroid serologies    He is status post cholecystectomy  He has tried Zofran, Bentyl, Imodium, fiber, and Questran  He did have a recent hospitalization secondary to some worsening in his symptoms as well as dysphagia  He did undergo an upper endoscopy which revealed a torturous esophagus and a nonobstructing Schatzki's ring in the lower esophagus which was dilated  There was talk of a colonoscopy verses a sigmoid os copy however neither were performed  Currently he is on omeprazole, Zofran, Bentyl, and Imodium without any complaints of stomach upset  He is also having about 1 formed stool daily  He denies any blood in his stool, weight loss, or nighttime symptoms  Review of Systems   Constitutional: Negative for activity change, appetite change, chills, fatigue, fever and unexpected weight change  Gastrointestinal: Negative for abdominal distention, abdominal pain, anal bleeding, blood in stool, constipation, diarrhea, nausea, rectal pain and vomiting         Past Medical History  Past Medical History:   Diagnosis Date    [facility]fib ([facility] )     Anemia     Carpal tunnel syndrome, unspecified upper limb     Cataract     last assessed: 8/18/2012    GERD (gastroesophageal reflux disease)     Glaucoma     Hypertension     last assessed: 8/23/2012    Intervertebral disc disease     PVD [facility]peripheral vascular disease) ([facility])        Past Social history  Past Surgical History:   Procedure Laterality Date    CATARACT EXTRACTION      HERNIA REPAIR      NEUROPLASTY / TRANSPOSITION MEDIAN NERVE AT CARPAL TUNNEL      [place] LAP,CHOLECYSTECTOMY N/A 9/14/2016    Procedure: LAPAROSCOPIC CHOLECYSTECTOMY ;  Surgeon: Daniel Maravilla MD;  [facility]: [facility];  Service: General     Social History     Socioeconomic History    Marital status: /Civil Union     Spouse name: Not on file    Number of children: Not on file    Years of education: Not on file    Highest education level: Not on file   Occupational History    Occupation: retired   Tobacco Use    Smoking status: Former Smoker    Smokeless tobacco: Never Used    Tobacco comment: quit 30 years ago    Vaping Use    Vaping Use: Former   Substance and Sexual Activity    Alcohol use: Not Currently    Drug use: No    Sexual activity: Not on file   Other Topics Concern    Not on file   Social History Narrative    Morning person     Social Determinants of Health     Financial Resource Strain: Not on file   Food Insecurity: Not on file   Transportation Needs: Not on file   Physical Activity: Not on file   Stress: Not on file   Social Connections: Not on file   Intimate Partner Violence: Not on file   Housing Stability: Not on file     Social History     Substance and Sexual Activity   Alcohol Use Not Currently     Social History     Substance and Sexual Activity   Drug Use No     Social History     Tobacco Use   Smoking Status Former Smoker   Smokeless Tobacco Never Used   Tobacco Comment    quit 30 years ago        Past Family History  Family History   Problem Relation Age of Onset    Glaucoma Mother     Cancer Father     Heart disease Father     Glaucoma Father     Alzheimer's disease Family     Heart attack Family        Current Medications  Current Outpatient Medications   Medication Sig Dispense Refill    acetaminophen-codeine (TYLENOL with CODEINE #3) 300-30 MG per tablet acetaminophen 300 mg-codeine 30 mg tablet      allopurinol (ZYLOPRIM) 100 mg tablet Take 100 mg by mouth daily      amLODIPine (NORVASC) 2 5 mg tablet Take 2 5 mg by mouth 2 (two) times a day  (Patient not taking: Reported on 1/6/2022 )      amLODIPine (NORVASC) 5 mg tablet       apixaban (ELIQUIS) 2 5 mg Take by mouth 2 (two) times a day      bisoprolol (ZEBETA) 5 mg tablet 2 5 mg       brimonidine tartrate 0 2 % ophthalmic solution       cholecalciferol (VITAMIN D3) 1,000 units tablet Take 2,000 Units by mouth daily       co-enzyme Q-10 30 MG capsule Take 100 mg by mouth daily        dicyclomine (BENTYL) 20 mg tablet  diphenhydrAMINE-acetaminophen (TYLENOL PM)  MG TABS Take 2 tablets by mouth daily at bedtime as needed for sleep      dorzolamide-timolol (COSOPT) 22 3-6 8 MG/ML ophthalmic solution 1 drop 2 (two) times a day      finasteride (PROSCAR) 5 mg tablet Take 1 tablet (5 mg total) by mouth daily 90 tablet 3    gabapentin (NEURONTIN) 100 mg capsule TAKE 1 CAPSULE BY MOUTH THREE TIMES A DAY (Patient taking differently: 2 (two) times a day  ) 270 capsule 1    Garlic 8100 MG CAPS Take 1,000 mg by mouth      indapamide (LOZOL) 1 25 mg tablet Take 1 25 mg by mouth every morning (Patient not taking: Reported on 1/6/2022 )      indapamide (LOZOL) 1 25 mg tablet TAKE 1 TABLET BY MOUTH EVERY DAY IN THE MORNING (Patient not taking: Reported on 1/6/2022)      latanoprost (XALATAN) 0 005 % ophthalmic solution Administer 1 drop to both eyes daily at bedtime      loperamide (IMODIUM) 2 mg capsule       multivitamin-iron-minerals-folic acid (CENTRUM) chewable tablet Chew 1 tablet daily   Nexletol 180 MG TABS Take 1 tablet by mouth daily at bedtime (Patient not taking: Reported on 1/6/2022 )      omeprazole (PriLOSEC) 20 mg delayed release capsule TAKE 1 CAPSULE BY MOUTH EVERY DAY AS NEEDED FOR ACID REFLUX 90 capsule 1    omeprazole (PriLOSEC) 40 MG capsule Take 1 capsule (40 mg total) by mouth daily (Patient not taking: Reported on 1/6/2022 ) 30 capsule 3    ondansetron (ZOFRAN) 4 mg tablet Take 1 tablet (4 mg total) by mouth every 8 (eight) hours as needed for nausea or vomiting 20 tablet 0    Restasis 0 05 % ophthalmic emulsion       terazosin (HYTRIN) 1 mg capsule Take 2 mg by mouth daily at bedtime        No current facility-administered medications for this visit  Allergies  Allergies   Allergen Reactions    Bempedoic Acid Other (See Comments)     Gout    Atorvastatin GI Intolerance     Other reaction(s): GI upset    Ezetimibe Other (See Comments)     myalgia  Other reaction(s):  Other (See Comments)  Myalgia  Other reaction(s): Myalgia  myalgia    Statins Rash         The following portions of the patient's history were reviewed and updated as appropriate: allergies, current medications, past medical history, past social history, past surgical history and problem list       Vitals  There were no vitals filed for this visit  Physical Exam  Constitutional   General appearance: Patient is seated and in no acute distress, well appearing and well nourished  Head and Face   Head and face: Normal     Eyes   Conjunctiva and lids: No erythema, swelling or discharge  Anicteric  Ears, Nose, Mouth, and Throat   Hearing: Normal     Neck: Supple, trachea midline  Pulmonary   Respiratory effort: No increased work of breathing or signs of respiratory distress  Lungs: Clear to ascultation, no wheezes, rhonchi, or rales  Cardiovascular   Heart: Regular rate and rhythm, no murmurs gallops or rubs   Examination of extremities for edema and/or varicosities: Normal     Abdomen   Abdomen: Soft, non-tender, no masses, no organomegaly  Normal bowel sounds  Musculoskeletal   Gait and station: Normal     Skin   Skin and subcutaneous tissue: Warm, dry, and intact  No visible jaundice, lesions or rashes  Psychiatric   Judgment and insight: Normal  Recent and remote memory:  Normal  Mood and affect: Normal       Results  No visits with results within 1 Day(s) from this visit     Latest known visit with results is:   Admission on 12/09/2021, Discharged on 12/09/2021   Component Date Value    SARS-CoV-2 12/09/2021 Negative     INFLUENZA A PCR 12/09/2021 Negative     INFLUENZA B PCR 12/09/2021 Negative     RSV PCR 12/09/2021 Negative     WBC 12/09/2021 6 48     RBC 12/09/2021 3 24*    Hemoglobin 12/09/2021 10 6*    Hematocrit 12/09/2021 31 8*    MCV 12/09/2021 98     MCH 12/09/2021 32 7     MCHC 12/09/2021 33 3     RDW 12/09/2021 12 7     MPV 12/09/2021 11 1     Platelets 90/91/0907 214     nRBC 12/09/2021 0     Neutrophils Relative 12/09/2021 73     Immat GRANS % 12/09/2021 0     Lymphocytes Relative 12/09/2021 9*    Monocytes Relative 12/09/2021 11     Eosinophils Relative 12/09/2021 7*    Basophils Relative 12/09/2021 0     Neutrophils Absolute 12/09/2021 4 65     Immature Grans Absolute 12/09/2021 0 02     Lymphocytes Absolute 12/09/2021 0 60     Monocytes Absolute 12/09/2021 0 74     Eosinophils Absolute 12/09/2021 0 45     Basophils Absolute 12/09/2021 0 02     Sodium 12/09/2021 135*    Potassium 12/09/2021 3 8     Chloride 12/09/2021 97*    CO2 12/09/2021 29     ANION GAP 12/09/2021 9     BUN 12/09/2021 15     Creatinine 12/09/2021 1 20     Glucose 12/09/2021 104     Calcium 12/09/2021 8 4     Corrected Calcium 12/09/2021 8 9     AST 12/09/2021 17     ALT 12/09/2021 25     Alkaline Phosphatase 12/09/2021 57     Total Protein 12/09/2021 7 5     Albumin 12/09/2021 3 4*    Total Bilirubin 12/09/2021 0 40     eGFR 12/09/2021 53     Lipase 12/09/2021 140     LACTIC ACID 12/09/2021 0 7     C difficile toxin by PCR 12/09/2021 Negative     Salmonella sp PCR 12/09/2021 None Detected     Shigella sp/Enteroinvasi* 12/09/2021 None Detected     Campylobacter sp (jejuni* 12/09/2021 None Detected     Shiga toxin 1/Shiga toxi* 12/09/2021 None Detected     Ventricular Rate 12/09/2021 70     Atrial Rate 12/09/2021 70     OK Interval 12/09/2021 240     QRSD Interval 12/09/2021 90     QT Interval 12/09/2021 376     QTC Interval 12/09/2021 406     P Axis 12/09/2021 2     QRS Axis 12/09/2021 -3     T Wave Axis 12/09/2021 -26        Radiology Results  No results found  Orders  No orders of the defined types were placed in this encounter

## 2022-01-12 ENCOUNTER — TELEPHONE (OUTPATIENT)
Dept: FAMILY MEDICINE CLINIC | Facility: CLINIC | Age: 87
End: 2022-01-12

## 2022-01-12 NOTE — TELEPHONE ENCOUNTER
Patient's daughter called, stated they would like an order places for home health for patient, they will be using [facility]

## 2022-01-13 NOTE — TELEPHONE ENCOUNTER
Spoke to patient's daughter , stated that he needs to be elevated for in home OT & physical therapy

## 2022-01-13 NOTE — TELEPHONE ENCOUNTER
Do they mean home health aide? What does he need help with at home?  Just FYI for them ,home health aides are not usually covered by any insurances unless there is a need for skilled nursing

## 2022-01-17 DIAGNOSIS — M79.672 PAIN IN BOTH FEET: ICD-10-CM

## 2022-01-17 DIAGNOSIS — M79.671 PAIN IN BOTH FEET: ICD-10-CM

## 2022-01-17 RX ORDER — GABAPENTIN 100 MG/1
100 CAPSULE ORAL 2 TIMES DAILY
Qty: 60 CAPSULE | Refills: 5 | Status: SHIPPED | OUTPATIENT
Start: 2022-01-17 | End: 2022-07-12

## 2022-01-21 DIAGNOSIS — M25.569 ARTHRALGIA OF KNEE, UNSPECIFIED LATERALITY: ICD-10-CM

## 2022-01-21 DIAGNOSIS — M79.671 PAIN IN BOTH FEET: ICD-10-CM

## 2022-01-21 DIAGNOSIS — M79.672 PAIN IN BOTH FEET: ICD-10-CM

## 2022-01-21 DIAGNOSIS — R20.2 PARESTHESIA: ICD-10-CM

## 2022-01-21 DIAGNOSIS — M54.16 LEFT LUMBAR RADICULOPATHY: Primary | ICD-10-CM

## 2022-01-24 ENCOUNTER — TELEPHONE (OUTPATIENT)
Dept: FAMILY MEDICINE CLINIC | Facility: CLINIC | Age: 87
End: 2022-01-24

## 2022-01-24 NOTE — TELEPHONE ENCOUNTER
Dr Peña Kin note from 1/6th does not contain any notes supporting home PT services for pt  Please addend note or document on this task why pt needs home PT  Please see below  Thanks,    ________________________________________  Bradleytosin Carrizales sent to Geeta Jeronimo MA  They will re-eval tomorrow   Also, the visit note from 1/6 does not address the need for P T  - there would need to be documentation with corresponding diagnoses included in the face to face visit note

## 2022-01-26 PROBLEM — R26.2 AMBULATORY DYSFUNCTION: Status: ACTIVE | Noted: 2022-01-26

## 2022-01-26 NOTE — ASSESSMENT & PLAN NOTE
Ambulatory dysfunction    Patient with deconditioning and ambulatory dysfunction due to recent hospital stay with significant diarrhea which caused overall weakness and would benefit from home physical and occupational therapy

## 2022-01-26 NOTE — TELEPHONE ENCOUNTER
Spoke w/ physician  Pt was admitted to [facility] on 12/27/21 through 01/03/2022  Patient did have PT eval while in hospital and was recommended to have outpt PT  Notes are in Encounter tab-date: 12/27/21  Pt was seen on 01/06/22 for TCM  Documentation for PT are in TCM note (telemedicine)- 01/06/2022

## 2022-02-07 DIAGNOSIS — R19.7 DIARRHEA, UNSPECIFIED TYPE: ICD-10-CM

## 2022-02-07 RX ORDER — LOPERAMIDE HYDROCHLORIDE 2 MG/1
2 CAPSULE ORAL AS NEEDED
Qty: 60 CAPSULE | Refills: 5 | Status: SHIPPED | OUTPATIENT
Start: 2022-02-07 | End: 2022-02-08 | Stop reason: SDUPTHER

## 2022-02-08 DIAGNOSIS — R19.7 DIARRHEA, UNSPECIFIED TYPE: ICD-10-CM

## 2022-02-08 RX ORDER — LOPERAMIDE HYDROCHLORIDE 2 MG/1
2 CAPSULE ORAL 2 TIMES DAILY PRN
Qty: 60 CAPSULE | Refills: 5 | Status: SHIPPED | OUTPATIENT
Start: 2022-02-08 | End: 2022-02-28 | Stop reason: SDUPTHER

## 2022-02-28 DIAGNOSIS — R19.7 DIARRHEA, UNSPECIFIED TYPE: Primary | ICD-10-CM

## 2022-02-28 DIAGNOSIS — R19.7 DIARRHEA, UNSPECIFIED TYPE: ICD-10-CM

## 2022-02-28 RX ORDER — LOPERAMIDE HYDROCHLORIDE 2 MG/1
2 CAPSULE ORAL 4 TIMES DAILY PRN
Qty: 60 CAPSULE | Refills: 2 | Status: SHIPPED | OUTPATIENT
Start: 2022-02-28 | End: 2022-04-12 | Stop reason: SDUPTHER

## 2022-02-28 RX ORDER — LOPERAMIDE HYDROCHLORIDE 2 MG/1
2 CAPSULE ORAL 2 TIMES DAILY PRN
Qty: 60 CAPSULE | Refills: 5 | Status: SHIPPED | OUTPATIENT
Start: 2022-02-28 | End: 2022-04-12

## 2022-03-09 DIAGNOSIS — R11.2 NAUSEA AND VOMITING IN ADULT: ICD-10-CM

## 2022-03-09 RX ORDER — ONDANSETRON 4 MG/1
4 TABLET, FILM COATED ORAL EVERY 8 HOURS PRN
Qty: 30 TABLET | Refills: 2 | Status: SHIPPED | OUTPATIENT
Start: 2022-03-09 | End: 2022-07-04

## 2022-03-30 ENCOUNTER — OFFICE VISIT (OUTPATIENT)
Dept: OBGYN CLINIC | Facility: CLINIC | Age: 87
End: 2022-03-30
Payer: COMMERCIAL

## 2022-03-30 VITALS
HEIGHT: 68 IN | SYSTOLIC BLOOD PRESSURE: 135 MMHG | RESPIRATION RATE: 17 BRPM | BODY MASS INDEX: 29.98 KG/M2 | DIASTOLIC BLOOD PRESSURE: 72 MMHG | HEART RATE: 71 BPM | WEIGHT: 197.8 LBS

## 2022-03-30 DIAGNOSIS — M19.131 SLAC (SCAPHOLUNATE ADVANCED COLLAPSE) OF WRIST, RIGHT: Primary | ICD-10-CM

## 2022-03-30 PROCEDURE — 1036F TOBACCO NON-USER: CPT | Performed by: SURGERY

## 2022-03-30 PROCEDURE — 1160F RVW MEDS BY RX/DR IN RCRD: CPT | Performed by: SURGERY

## 2022-03-30 PROCEDURE — 20605 DRAIN/INJ JOINT/BURSA W/O US: CPT | Performed by: SURGERY

## 2022-03-30 PROCEDURE — 99214 OFFICE O/P EST MOD 30 MIN: CPT | Performed by: SURGERY

## 2022-03-30 RX ORDER — BUPIVACAINE HYDROCHLORIDE 2.5 MG/ML
0.5 INJECTION, SOLUTION INFILTRATION; PERINEURAL
Status: COMPLETED | OUTPATIENT
Start: 2022-03-30 | End: 2022-03-30

## 2022-03-30 RX ORDER — LIDOCAINE HYDROCHLORIDE 10 MG/ML
0.5 INJECTION, SOLUTION INFILTRATION; PERINEURAL
Status: COMPLETED | OUTPATIENT
Start: 2022-03-30 | End: 2022-03-30

## 2022-03-30 RX ORDER — TRIAMCINOLONE ACETONIDE 40 MG/ML
20 INJECTION, SUSPENSION INTRA-ARTICULAR; INTRAMUSCULAR
Status: COMPLETED | OUTPATIENT
Start: 2022-03-30 | End: 2022-03-30

## 2022-03-30 RX ADMIN — BUPIVACAINE HYDROCHLORIDE 0.5 ML: 2.5 INJECTION, SOLUTION INFILTRATION; PERINEURAL at 09:46

## 2022-03-30 RX ADMIN — LIDOCAINE HYDROCHLORIDE 0.5 ML: 10 INJECTION, SOLUTION INFILTRATION; PERINEURAL at 09:46

## 2022-03-30 RX ADMIN — TRIAMCINOLONE ACETONIDE 20 MG: 40 INJECTION, SUSPENSION INTRA-ARTICULAR; INTRAMUSCULAR at 09:46

## 2022-03-30 NOTE — PROGRESS NOTES
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  He continues to have significant relief with the cortisone injection and wished to proceed with another today  He tolerated the injection well  Will start to feel better in 7- 10 days  Take some Tylenol as needed for any discomfort  We will follow up as needed  The patient verbalized understanding of exam findings and treatment plan  We engaged in the shared decision-making process and treatment options were discussed at length with the patient  Surgical and conservative management discussed today along with risks and benefits  Diagnoses and all orders for this visit:    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, right    Other orders  -     Medium joint arthrocentesis          Follow Up:  Return if symptoms worsen or fail to improve  To Do Next Visit:  Re-evaluation of current issue    ____________________________________________________________________________________________________________________________________________      CHIEF COMPLAINT:  Chief Complaint   Patient presents with    Right Wrist - Follow-up, Pain       SUBJECTIVE:  Munira Finn is a 80y o  year old RHD male who presents today for follow-up for his right SLAC wrist   Patient continues to treat non operatively with  A cortisone injections  His last injection was performed on 09/08/2021  He is accompanied with his daughter today who notes that he possibly turned wrong in bed causing some increased right wrist pain  He does note that he has pain that goes up into the forearm at times  I have personally reviewed all the relevant PMH, PSH, SH, FH, Medications and allergies       PAST MEDICAL HISTORY:  Past Medical History:   Diagnosis Date    [facility]fib ([facility] )     Anemia     Carpal tunnel syndrome, unspecified upper limb     Cataract     last assessed: 8/18/2012    GERD (gastroesophageal reflux disease)     Glaucoma     Hypertension     last assessed: 8/23/2012    Intervertebral disc disease     [facility] disease) [facility]        PAST SURGICAL HISTORY:  Past Surgical History:   Procedure Laterality Date    CATARACT EXTRACTION      HERNIA REPAIR      NEUROPLASTY / TRANSPOSITION MEDIAN NERVE AT CARPAL TUNNEL      [place] LAP,CHOLECYSTECTOMY N/A 9/14/2016    Procedure: LAPAROSCOPIC CHOLECYSTECTOMY ;  Surgeon: Alejandra Walker MD;  [facility];  Service: General       FAMILY HISTORY:  Family History   Problem Relation Age of Onset    Glaucoma Mother     Cancer Father     Heart disease Father    [facility] Glaucoma Father     Alzheimer's disease Family     Heart attack Family        SOCIAL HISTORY:  Social History     Tobacco Use    Smoking status: Former Smoker    Smokeless tobacco: Never Used    Tobacco comment: quit 30 years ago    Vaping Use    Vaping Use: Former   Substance Use Topics    Alcohol use: Not Currently    Drug use: No       MEDICATIONS:    Current Outpatient Medications:     acetaminophen-codeine (TYLENOL with CODEINE #3) 300-30 MG per tablet, acetaminophen 300 mg-codeine 30 mg tablet, Disp: , Rfl:     allopurinol (ZYLOPRIM) 100 mg tablet, Take 100 mg by mouth daily, Disp: , Rfl:     amLODIPine (NORVASC) 5 mg tablet, , Disp: , Rfl:     apixaban (ELIQUIS) 2 5 mg, Take by mouth 2 (two) times a day, Disp: , Rfl:     bisoprolol (ZEBETA) 5 mg tablet, 2 5 mg , Disp: , Rfl:     brimonidine tartrate 0 2 % ophthalmic solution, , Disp: , Rfl:     cholecalciferol (VITAMIN D3) 1,000 units tablet, Take 2,000 Units by mouth daily , Disp: , Rfl:     co-enzyme Q-10 30 MG capsule, Take 100 mg by mouth daily  , Disp: , Rfl:     dicyclomine (BENTYL) 20 mg tablet, , Disp: , Rfl:     diphenhydrAMINE-acetaminophen (TYLENOL PM)  MG TABS, Take 2 tablets by mouth daily at bedtime as needed for sleep, Disp: , Rfl:     dorzolamide-timolol (COSOPT) 22 3-6 8 MG/ML ophthalmic solution, 1 drop 2 (two) times a day, Disp: , Rfl:    finasteride (PROSCAR) 5 mg tablet, Take 1 tablet (5 mg total) by mouth daily, Disp: 90 tablet, Rfl: 3    gabapentin (NEURONTIN) 100 mg capsule, Take 1 capsule (100 mg total) by mouth 2 (two) times a day, Disp: 60 capsule, Rfl: 5    Garlic 7936 MG CAPS, Take 1,000 mg by mouth, Disp: , Rfl:     latanoprost (XALATAN) 0 005 % ophthalmic solution, Administer 1 drop to both eyes daily at bedtime, Disp: , Rfl:     loperamide (IMODIUM) 2 mg capsule, Take 1 capsule (2 mg total) by mouth 4 (four) times a day as needed for diarrhea, Disp: 60 capsule, Rfl: 2    multivitamin-iron-minerals-folic acid (CENTRUM) chewable tablet, Chew 1 tablet daily  , Disp: , Rfl:     Nexletol 180 MG TABS, Take 1 tablet by mouth daily at bedtime   , Disp: , Rfl:     omeprazole (PriLOSEC) 20 mg delayed release capsule, TAKE 1 CAPSULE BY MOUTH EVERY DAY AS NEEDED FOR ACID REFLUX, Disp: 90 capsule, Rfl: 1    ondansetron (ZOFRAN) 4 mg tablet, Take 1 tablet (4 mg total) by mouth every 8 (eight) hours as needed for nausea or vomiting, Disp: 30 tablet, Rfl: 2    Restasis 0 05 % ophthalmic emulsion, , Disp: , Rfl:     terazosin (HYTRIN) 1 mg capsule, Take 2 mg by mouth daily at bedtime , Disp: , Rfl:     amLODIPine (NORVASC) 2 5 mg tablet, Take 2 5 mg by mouth 2 (two) times a day  (Patient not taking: Reported on 1/6/2022 ), Disp: , Rfl:     colestipol (COLESTID) 1 g tablet, Take 2 tablets (2 g total) by mouth 2 (two) times a day (Patient not taking: Reported on 3/30/2022 ), Disp: 60 tablet, Rfl: 3    indapamide (LOZOL) 1 25 mg tablet, Take 1 25 mg by mouth every morning (Patient not taking: Reported on 1/6/2022 ), Disp: , Rfl:     indapamide (LOZOL) 1 25 mg tablet, TAKE 1 TABLET BY MOUTH EVERY DAY IN THE MORNING (Patient not taking: Reported on 1/6/2022), Disp: , Rfl:     loperamide (IMODIUM) 2 mg capsule, Take 1 capsule (2 mg total) by mouth 2 (two) times a day as needed for diarrhea (Patient not taking: Reported on 3/30/2022 ), Disp: 60 capsule, Rfl: 5    omeprazole (PriLOSEC) 40 MG capsule, Take 1 capsule (40 mg total) by mouth daily (Patient not taking: Reported on 1/6/2022 ), Disp: 30 capsule, Rfl: 3    ALLERGIES:  Allergies   Allergen Reactions    Bempedoic Acid Other (See Comments)     Gout    Atorvastatin GI Intolerance     Other reaction(s): GI upset    Ezetimibe Other (See Comments)     myalgia  Other reaction(s): Other (See Comments)  Myalgia  Other reaction(s): Myalgia  myalgia    Statins Rash       REVIEW OF SYSTEMS:  Review of Systems   Constitutional: Negative for chills, fever and unexpected weight change  HENT: Negative for hearing loss, nosebleeds and sore throat  Eyes: Negative for pain, redness and visual disturbance  Respiratory: Negative for cough, shortness of breath and wheezing  Cardiovascular: Negative for chest pain, palpitations and leg swelling  Gastrointestinal: Negative for abdominal pain, nausea and vomiting  Endocrine: Negative for polydipsia and polyuria  Genitourinary: Negative for dysuria and hematuria  Musculoskeletal: Positive for arthralgias  Skin: Negative for rash and wound  Neurological: Negative for dizziness, light-headedness and headaches  Psychiatric/Behavioral: Negative for decreased concentration, dysphoric mood and suicidal ideas  The patient is not nervous/anxious          VITALS:  Vitals:    03/30/22 0907   BP: 135/72   Pulse: 71   Resp: 17       LABS:  HgA1c:   Lab Results   Component Value Date    HGBA1C 5 4 12/03/2018     BMP:   Lab Results   Component Value Date    CALCIUM 8 4 12/09/2021     10/31/2017    K 3 8 12/09/2021    CO2 29 12/09/2021    CL 97 (L) 12/09/2021    BUN 15 12/09/2021    CREATININE 1 20 12/09/2021       _____________________________________________________  PHYSICAL EXAMINATION:  General: well developed and well nourished, alert, oriented times 3 and appears comfortable  Psychiatric: Normal  HEENT: Normocephalic, Atraumatic Trachea Midline, No torticollis  Pulmonary: No audible wheezing or respiratory distress   Cardiovascular: No pitting edema, 2+ radial pulse   Abdominal/GI: abdomen non tender, non distended   Skin: No masses, erythema, lacerations, fluctation, ulcerations  Neurovascular: Sensation Intact to the Median, Ulnar, Radial Nerve, Motor Intact to the Median, Ulnar, Radial Nerve and Pulses Intact  Musculoskeletal: Normal, except as noted in detailed exam and in HPI  MUSCULOSKELETAL EXAMINATION:  Right wrist:      No erythema or ecchymosis   Moderate Edema to 2nd 3rd and 4th dorsal extensor compartment   Radio carpal joint is mildly tender to palpation   2nd, 3rd and 4th dorsal compartments are mildly tender to palpation   improved wrist ROM overall   Some limitation to full MCP extension   Full composite fist   2+ radial pulse    ___________________________________________________  STUDIES REVIEWED:  No images reviewed at today's visit        PROCEDURES PERFORMED:  Medium joint arthrocentesis: R radiocarpal  Universal Protocol:  Consent: Verbal consent obtained  Risks and benefits: risks, benefits and alternatives were discussed  Consent given by: patient  Time out: Immediately prior to procedure a "time out" was called to verify the correct patient, procedure, equipment, support staff and site/side marked as required    Timeout called at: 3/30/2022 9:44 AM   Patient understanding: patient states understanding of the procedure being performed  Site marked: the operative site was marked  Patient identity confirmed: verbally with patient    Supporting Documentation  Indications: pain   Procedure Details  Location: wrist - R radiocarpal  Preparation: Patient was prepped and draped in the usual sterile fashion  Needle size: 25 G  Ultrasound guidance: no  Approach: dorsal  Medications administered: 0 5 mL bupivacaine 0 25 %; 0 5 mL lidocaine 1 %; 20 mg triamcinolone acetonide 40 mg/mL    Patient tolerance: patient tolerated the procedure well with no immediate complications  Dressing:  Sterile dressing applied        ____________________________________________________      June Rosen    I,:  Anshu Faulkner am acting as a scribe while in the presence of the attending physician :       I,:  Quita Sanders MD personally performed the services described in this documentation    as scribed in my presence :

## 2022-04-12 DIAGNOSIS — R19.7 DIARRHEA, UNSPECIFIED TYPE: ICD-10-CM

## 2022-04-12 RX ORDER — LOPERAMIDE HYDROCHLORIDE 2 MG/1
2 CAPSULE ORAL 4 TIMES DAILY PRN
Qty: 60 CAPSULE | Refills: 5 | Status: SHIPPED | OUTPATIENT
Start: 2022-04-12

## 2022-05-20 ENCOUNTER — OFFICE VISIT (OUTPATIENT)
Dept: FAMILY MEDICINE CLINIC | Facility: CLINIC | Age: 87
End: 2022-05-20
Payer: COMMERCIAL

## 2022-05-20 VITALS
WEIGHT: 196.25 LBS | DIASTOLIC BLOOD PRESSURE: 70 MMHG | BODY MASS INDEX: 29.74 KG/M2 | HEART RATE: 59 BPM | SYSTOLIC BLOOD PRESSURE: 150 MMHG | TEMPERATURE: 98 F | HEIGHT: 68 IN | RESPIRATION RATE: 16 BRPM | OXYGEN SATURATION: 97 %

## 2022-05-20 DIAGNOSIS — J30.9 ALLERGIC RHINITIS, UNSPECIFIED SEASONALITY, UNSPECIFIED TRIGGER: ICD-10-CM

## 2022-05-20 DIAGNOSIS — I10 BENIGN ESSENTIAL HYPERTENSION: Primary | Chronic | ICD-10-CM

## 2022-05-20 DIAGNOSIS — G47.00 INSOMNIA, UNSPECIFIED TYPE: ICD-10-CM

## 2022-05-20 PROCEDURE — 1160F RVW MEDS BY RX/DR IN RCRD: CPT | Performed by: FAMILY MEDICINE

## 2022-05-20 PROCEDURE — G0439 PPPS, SUBSEQ VISIT: HCPCS | Performed by: FAMILY MEDICINE

## 2022-05-20 PROCEDURE — 1125F AMNT PAIN NOTED PAIN PRSNT: CPT | Performed by: FAMILY MEDICINE

## 2022-05-20 PROCEDURE — 99214 OFFICE O/P EST MOD 30 MIN: CPT | Performed by: FAMILY MEDICINE

## 2022-05-20 PROCEDURE — 1036F TOBACCO NON-USER: CPT | Performed by: FAMILY MEDICINE

## 2022-05-20 PROCEDURE — 3288F FALL RISK ASSESSMENT DOCD: CPT | Performed by: FAMILY MEDICINE

## 2022-05-20 PROCEDURE — 1170F FXNL STATUS ASSESSED: CPT | Performed by: FAMILY MEDICINE

## 2022-05-20 RX ORDER — TERAZOSIN 2 MG/1
2 CAPSULE ORAL EVERY EVENING
COMMUNITY
Start: 2022-04-13 | End: 2022-07-20

## 2022-05-20 RX ORDER — TRAZODONE HYDROCHLORIDE 50 MG/1
50 TABLET ORAL
Qty: 30 TABLET | Refills: 5 | Status: SHIPPED | OUTPATIENT
Start: 2022-05-20 | End: 2022-06-16 | Stop reason: CLARIF

## 2022-05-20 NOTE — PROGRESS NOTES
FAMILY PRACTICE OFFICE VISIT       NAME: Drew Reed  AGE: 80 y o  SEX: male       : 7/15/1931        MRN: [id number]    DATE: 2022  TIME: 3:11 PM    Assessment and Plan     Problem List Items Addressed This Visit        Respiratory    Allergic rhinitis     Allergic rhinitis  Patient with history of allergic rhinitis which may be increasing his current cough  If symptoms worsen over the next week he may try over-the-counter Claritin 10 mg once daily for possible allergy symptoms related to his cough              Cardiovascular and Mediastinum    Benign essential hypertension - Primary (Chronic)     Hypertension  Patient blood pressure is stable at this time he will continue current regimen of medications           Relevant Medications    terazosin (HYTRIN) 2 mg capsule       Other    Insomnia     Insomnia  I had a long discussion with the patient and his daughter  We will initiate trazodone 50 mg 1 at bedtime  Patient will call if symptoms persist without improvement over the next 2-3 weeks  We may consider titrating medication however if he develops any unusual side effects such as increased bowel movements we may consider medications such as Remeron           Relevant Medications    traZODone (DESYREL) 50 mg tablet              Chief Complaint     Chief Complaint   Patient presents with    Medicare Wellness Visit    Cough     X days        History of Present Illness     Patient in the office to review chronic medical conditions  He is accompanied by his daughter  They state that earlier this week patient had slight exacerbation of his coughing however of the past 48 hours symptoms have improved  Patient does have chronic intermittent wheezing since his 2019 hospitalization  Patient's biggest concern is of poor sleep throughout the night  He can often fall asleep around 9:00 p m  but often awakens at 1:00 a m  and cannot fall back to sleep    Patient ends up taking short naps throughout the day due to being sleepy  Patient had been trying melatonin which initially helped slightly but patient developed increase in his chronic intermittent diarrhea condition  Patient has been under the care of gastroenterologist with no specific etiology discovered  He does use Imodium as needed  Review of Systems   Review of Systems   Constitutional: Positive for fatigue  Negative for fever  Respiratory: Positive for shortness of breath and wheezing  As per HPI   Cardiovascular: Negative  Gastrointestinal: Negative  Musculoskeletal: Negative  Psychiatric/Behavioral: Positive for sleep disturbance         Active Problem List     Patient Active Problem List   Diagnosis    Cholecystitis with cholelithiasis    Allergic rhinitis    Anemia    Benign essential hypertension    GERD (gastroesophageal reflux disease)    Glaucoma    Hiatal hernia    Hyperlipidemia    Joint pain, knee    Left lumbar radiculopathy    Peripheral vascular disease ([facility] )    Syncope    FRANCO (acute kidney injury) ([facility] )    [facility]    Right wrist pain    Peripheral edema    Arthritis of carpometacarpal (CMC) joint of right thumb    Paresthesia    Hyponatremia    Abnormal liver function    Other insomnia    Anxiety    Carpal tunnel syndrome    S/P ablation of ventricular arrhythmia    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[facility] )    Pain in both feet    Benign prostatic hyperplasia    Diarrhea    Irritable bowel syndrome with diarrhea    Ambulatory dysfunction    Insomnia       Past Medical History:  Past Medical History:   Diagnosis Date    A-fib ([place] [address] )     Anemia     Carpal tunnel syndrome, unspecified upper limb     Cataract     last assessed: 8/18/2012    GERD (gastroesophageal reflux disease)     Glaucoma     Hypertension     last assessed: 8/23/2012    Intervertebral disc disease     PVD (peripheral vascular disease) ([facility] )        Past Surgical History:  Past Surgical History:   Procedure Laterality Date    CATARACT EXTRACTION      HERNIA REPAIR      NEUROPLASTY / TRANSPOSITION MEDIAN NERVE AT CARPAL TUNNEL      NV LAP,CHOLECYSTECTOMY N/A 9/14/2016    Procedure: LAPAROSCOPIC CHOLECYSTECTOMY ;  Surgeon: Eric Koyanagi, MD;  Location: [facility];  Service: General       Family History:  Family History   Problem Relation Age of Onset    Glaucoma Mother     Cancer Father     Heart disease Father    [place] Glaucoma Father     Alzheimer's disease Family     Heart attack Family        Social History:  Social History     Socioeconomic History    Marital status:      Spouse name: Not on file    Number of children: Not on file    Years of education: Not on file    Highest education level: Not on file   Occupational History    Occupation: retired   Tobacco Use    Smoking status: Former Smoker    Smokeless tobacco: Never Used    Tobacco comment: quit 30 years ago    Vaping Use    Vaping Use: Former   Substance and Sexual Activity    Alcohol use: Not Currently    Drug use: No    Sexual activity: Not Currently   Other Topics Concern    Not on file   Social History Narrative    Morning person     Social Determinants of Health     Financial Resource Strain: Not on file   Food Insecurity: Not on file   Transportation Needs: Not on file   Physical Activity: Not on file   Stress: Not on file   Social Connections: Not on file   Intimate Partner Violence: Not on file   Housing Stability: Not on file       Objective     Vitals:    05/20/22 1429   BP: 150/70   Pulse: 59   Resp: 16   Temp: 98 °F (36 7 °C)   SpO2: 97%     Wt Readings from Last 3 Encounters:   05/20/22 89 kg (196 lb 4 oz)   03/30/22 89 7 kg (197 lb 12 8 oz)   01/07/22 89 7 kg (197 lb 12 8 oz)       Physical Exam  Constitutional:       General: He is not in acute distress  Appearance: Normal appearance  He is not ill-appearing     HENT:      Right Ear: Tympanic membrane, ear canal and external ear normal  There is no impacted cerumen  Left Ear: Tympanic membrane, ear canal and external ear normal  There is no impacted cerumen  Eyes:      General:         Right eye: No discharge  Left eye: No discharge  Extraocular Movements: Extraocular movements intact  Conjunctiva/sclera: Conjunctivae normal       Pupils: Pupils are equal, round, and reactive to light  Cardiovascular:      Rate and Rhythm: Normal rate and regular rhythm  Heart sounds: Normal heart sounds  No murmur heard  Pulmonary:      Effort: Pulmonary effort is normal  No respiratory distress  Breath sounds: Wheezing present  No rhonchi or rales  Comments: Patient with a few expiratory wheezes scattered bilaterally  Neurological:      General: No focal deficit present  Mental Status: He is alert and oriented to person, place, and time  Mental status is at baseline  Cranial Nerves: No cranial nerve deficit  Psychiatric:         Mood and Affect: Mood normal          Behavior: Behavior normal          Thought Content:  Thought content normal          Judgment: Judgment normal          Pertinent Laboratory/Diagnostic Studies:  Lab Results   Component Value Date    BUN 15 12/09/2021    CREATININE 1 20 12/09/2021    CALCIUM 8 4 12/09/2021     10/31/2017    K 3 8 12/09/2021    CO2 29 12/09/2021    CL 97 (L) 12/09/2021     Lab Results   Component Value Date    ALT 25 12/09/2021    AST 17 12/09/2021    ALKPHOS 57 12/09/2021    BILITOT 0 5 10/31/2017       Lab Results   Component Value Date    WBC 6 48 12/09/2021    HGB 10 6 (L) 12/09/2021    HCT 31 8 (L) 12/09/2021    MCV 98 12/09/2021     12/09/2021       Lab Results   Component Value Date    TSH 3 60 04/24/2019       No results found for: CHOL  Lab Results   Component Value Date    TRIG 94 04/24/2019     Lab Results   Component Value Date    HDL 50 04/24/2019     Lab Results   Component Value Date    LDLCALC 108 (H) 04/24/2019     Lab Results Component Value Date    HGBA1C 5 4 12/03/2018       Results for orders placed or performed during the hospital encounter of 12/09/21   COVID19, Influenza A/B, RSV PCR, [facility]- 2 hours STAT    Specimen: Nasopharyngeal Swab; Nares   Result Value Ref Range    SARS-CoV-2 Negative Negative    INFLUENZA A PCR Negative Negative    INFLUENZA B PCR Negative Negative    RSV PCR Negative Negative   Clostridium difficile toxin by PCR with EIA    Specimen: Per Rectum; Stool   Result Value Ref Range    C difficile toxin by PCR Negative Negative   Stool Enteric Bacterial Panel by PCR    Specimen: Rectum; Stool   Result Value Ref Range    Salmonella sp PCR None Detected None Detected    Shigella sp/Enteroinvasive E  coli (EIEC) PCR None Detected None Detected    Campylobacter sp (jejuni and coli) PCR None Detected None Detected    Shiga toxin 1/Shiga toxin 2 genes PCR None Detected None Detected   CBC and differential   Result Value Ref Range    WBC 6 48 4 31 - 10 16 Thousand/uL    RBC 3 24 (L) 3 88 - 5 62 Million/uL    Hemoglobin 10 6 (L) 12 0 - 17 0 g/dL    Hematocrit 31 8 (L) 36 5 - 49 3 %    MCV 98 82 - 98 fL    MCH 32 7 26 8 - 34 3 pg    MCHC 33 3 31 4 - 37 4 g/dL    RDW 12 7 11 6 - 15 1 %    MPV 11 1 8 9 - 12 7 fL    Platelets 860 850 - 932 Thousands/uL    nRBC 0 /100 WBCs    Neutrophils Relative 73 43 - 75 %    Immat GRANS % 0 0 - 2 %    Lymphocytes Relative 9 (L) 14 - 44 %    Monocytes Relative 11 4 - 12 %    Eosinophils Relative 7 (H) 0 - 6 %    Basophils Relative 0 0 - 1 %    Neutrophils Absolute 4 65 1 85 - 7 62 Thousands/µL    Immature Grans Absolute 0 02 0 00 - 0 20 Thousand/uL    Lymphocytes Absolute 0 60 0 60 - 4 47 Thousands/µL    Monocytes Absolute 0 74 0 17 - 1 22 Thousand/µL    Eosinophils Absolute 0 45 0 00 - 0 61 Thousand/µL    Basophils Absolute 0 02 0 00 - 0 10 Thousands/µL   Comprehensive metabolic panel   Result Value Ref Range    Sodium 135 (L) 136 - 145 mmol/L    Potassium 3 8 3 5 - 5 3 mmol/L Chloride 97 (L) 100 - 108 mmol/L    CO2 29 21 - 32 mmol/L    ANION GAP 9 4 - 13 mmol/L    BUN 15 5 - 25 mg/dL    Creatinine 1 20 0 60 - 1 30 mg/dL    Glucose 104 65 - 140 mg/dL    Calcium 8 4 8 3 - 10 1 mg/dL    Corrected Calcium 8 9 8 3 - 10 1 mg/dL    AST 17 5 - 45 U/L    ALT 25 12 - 78 U/L    Alkaline Phosphatase 57 46 - 116 U/L    Total Protein 7 5 6 4 - 8 2 g/dL    Albumin 3 4 (L) 3 5 - 5 0 g/dL    Total Bilirubin 0 40 0 20 - 1 00 mg/dL    eGFR 53 ml/min/1 73sq m   Lipase   Result Value Ref Range    Lipase 140 73 - 393 u/L   Lactic acid   Result Value Ref Range    LACTIC ACID 0 7 0 5 - 2 0 mmol/L   ECG 12 lead   Result Value Ref Range    Ventricular Rate 70 BPM    Atrial Rate 70 BPM    SC Interval 240 ms    QRSD Interval 90 ms    QT Interval 376 ms    QTC Interval 406 ms    P Axis 2 degrees    QRS Axis -3 degrees    T Wave Axis -26 degrees       No orders of the defined types were placed in this encounter  ALLERGIES:  Allergies   Allergen Reactions    Bempedoic Acid Other (See Comments)     Gout    Atorvastatin GI Intolerance     Other reaction(s): GI upset    Ezetimibe Other (See Comments)     myalgia  Other reaction(s): Other (See Comments)  Myalgia  Other reaction(s): Myalgia  myalgia    Statins Rash       Current Medications     Current Outpatient Medications   Medication Sig Dispense Refill    allopurinol (ZYLOPRIM) 100 mg tablet Take 100 mg by mouth daily      amLODIPine (NORVASC) 2 5 mg tablet Take 2 5 mg by mouth in the morning and 2 5 mg in the evening        apixaban (ELIQUIS) 2 5 mg Take by mouth 2 (two) times a day      bisoprolol (ZEBETA) 5 mg tablet 2 5 mg       brimonidine tartrate 0 2 % ophthalmic solution       cholecalciferol (VITAMIN D3) 1,000 units tablet Take 2,000 Units by mouth daily       co-enzyme Q-10 30 MG capsule Take 100 mg by mouth daily        dicyclomine (BENTYL) 20 mg tablet       dorzolamide-timolol (COSOPT) 22 3-6 8 MG/ML ophthalmic solution 1 drop 2 (two) times a day      finasteride (PROSCAR) 5 mg tablet Take 1 tablet (5 mg total) by mouth daily 90 tablet 3    gabapentin (NEURONTIN) 100 mg capsule Take 1 capsule (100 mg total) by mouth 2 (two) times a day 60 capsule 5    Garlic 6556 MG CAPS Take 1,000 mg by mouth      indapamide (LOZOL) 1 25 mg tablet TAKE 1 TABLET BY MOUTH EVERY DAY IN THE MORNING      latanoprost (XALATAN) 0 005 % ophthalmic solution Administer 1 drop to both eyes daily at bedtime      loperamide (IMODIUM) 2 mg capsule Take 1 capsule (2 mg total) by mouth 4 (four) times a day as needed for diarrhea 60 capsule 5    multivitamin-iron-minerals-folic acid (CENTRUM) chewable tablet Chew 1 tablet daily   omeprazole (PriLOSEC) 20 mg delayed release capsule TAKE 1 CAPSULE BY MOUTH EVERY DAY AS NEEDED FOR ACID REFLUX 90 capsule 1    ondansetron (ZOFRAN) 4 mg tablet Take 1 tablet (4 mg total) by mouth every 8 (eight) hours as needed for nausea or vomiting 30 tablet 2    terazosin (HYTRIN) 2 mg capsule Take 2 mg by mouth every evening      traZODone (DESYREL) 50 mg tablet Take 1 tablet (50 mg total) by mouth daily at bedtime 30 tablet 5    amLODIPine (NORVASC) 5 mg tablet  (Patient not taking: Reported on 5/20/2022)      diphenhydrAMINE-acetaminophen (TYLENOL PM)  MG TABS Take 2 tablets by mouth daily at bedtime as needed for sleep      Nexletol 180 MG TABS Take 1 tablet by mouth daily at bedtime    (Patient not taking: Reported on 5/20/2022)      omeprazole (PriLOSEC) 40 MG capsule Take 1 capsule (40 mg total) by mouth daily (Patient not taking: No sig reported) 30 capsule 3    terazosin (HYTRIN) 1 mg capsule Take 2 mg by mouth daily at bedtime  (Patient not taking: Reported on 5/20/2022)       No current facility-administered medications for this visit           Health Maintenance     Health Maintenance   Topic Date Due    SLP PLAN OF CARE  Never done    Pneumococcal Vaccine: 65+ Years (1 - PCV) Never done    BMI: Followup Plan Never done    Falls: Plan of Care  Never done    OT PLAN OF CARE  12/05/2020    Medicare Annual Wellness Visit (AWV)  08/25/2021    COVID-19 Vaccine (3 - Booster for Pfizer series) 10/01/2021    Influenza Vaccine (Season Ended) 09/01/2022    Depression Screening  01/06/2023    Fall Risk  05/20/2023    BMI: Adult  05/20/2023    DTaP,Tdap,and Td Vaccines (2 - Td or Tdap) 03/12/2028    Hepatitis C Screening  Completed    HIB Vaccine  Aged Out    Hepatitis B Vaccine  Aged Out    IPV Vaccine  Aged Out    Hepatitis A Vaccine  Aged Out    Meningococcal ACWY Vaccine  Aged Out    HPV Vaccine  Aged Out     Immunization History   Administered Date(s) Administered    COVID-19 PFIZER VACCINE 0 3 ML IM 04/07/2021, 05/01/2021    INFLUENZA 09/23/2014    Influenza Split High Dose Preservative Free IM 09/23/2014, 12/17/2015    Influenza, seasonal, injectable 10/24/2003, 11/08/2005, 11/13/2007, 10/16/2008, 12/16/2009, 11/10/2010, 11/18/2011, 09/24/2012, 09/25/2013    Td (adult), adsorbed 11/01/2003    Tdap 62/21/7695       Chio Salgado MD    I spent 30 minutes with this patient which greater than 50% was spent counseling reviewing chart

## 2022-05-20 NOTE — ASSESSMENT & PLAN NOTE
Insomnia  I had a long discussion with the patient and his daughter  We will initiate trazodone 50 mg 1 at bedtime  Patient will call if symptoms persist without improvement over the next 2-3 weeks    We may consider titrating medication however if he develops any unusual side effects such as increased bowel movements we may consider medications such as Remeron

## 2022-05-20 NOTE — ASSESSMENT & PLAN NOTE
Allergic rhinitis  Patient with history of allergic rhinitis which may be increasing his current cough    If symptoms worsen over the next week he may try over-the-counter Claritin 10 mg once daily for possible allergy symptoms related to his cough

## 2022-05-20 NOTE — PROGRESS NOTES
Assessment and Plan:     Problem List Items Addressed This Visit    None          Preventive health issues were discussed with patient, and age appropriate screening tests were ordered as noted in patient's After Visit Summary  Personalized health advice and appropriate referrals for health education or preventive services given if needed, as noted in patient's After Visit Summary       History of Present Illness:     Patient presents for Medicare Annual Wellness visit    Patient Care Team:  Myrna Garcia MD as PCP - MD Boni Bennett MD Elnora Lace, MD Dodie Raman, MD (Ophthalmology)  Tamy Deleon MD (Gastroenterology)  Stephanie Camejo MD (Orthopedic Surgery)     Problem List:     Patient Active Problem List   Diagnosis    Cholecystitis with cholelithiasis    Allergic rhinitis    Anemia    Benign essential hypertension    GERD (gastroesophageal reflux disease)    Glaucoma    Hiatal hernia    Hyperlipidemia    Joint pain, knee    Left lumbar radiculopathy    Peripheral vascular disease ([address] )    Syncope    FRANCO (acute kidney injury) ([address] )    PAF (paroxysmal atrial fibrillation) (HCC)    Right wrist pain    Peripheral edema    Arthritis of carpometacarpal (CMC) joint of right thumb    Paresthesia    Hyponatremia    Abnormal liver function    Other insomnia    Anxiety    Carpal tunnel syndrome    S/P ablation of ventricular arrhythmia    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[address] )    Pain in both feet    Benign prostatic hyperplasia    Diarrhea    Irritable bowel syndrome with diarrhea    Ambulatory dysfunction      Past Medical and Surgical History:     Past Medical History:   Diagnosis Date    A-fib ([address] )     Anemia     Carpal tunnel syndrome, unspecified upper limb     Cataract     last assessed: 8/18/2012    GERD (gastroesophageal reflux disease)     Glaucoma     Hypertension     last assessed: 8/23/2012    Intervertebral disc disease     PVD (peripheral vascular disease) ([facility]      Past Surgical History:   Procedure Laterality Date    CATARACT EXTRACTION      HERNIA REPAIR      NEUROPLASTY / TRANSPOSITION MEDIAN NERVE AT CARPAL TUNNEL      LA LAP,CHOLECYSTECTOMY N/A 9/14/2016    Procedure: LAPAROSCOPIC CHOLECYSTECTOMY ;  Surgeon: Leonides Morgan MD;  Location:  MAIN OR;  Service: General      Family History:     Family History   Problem Relation Age of Onset    Glaucoma Mother     Cancer Father     Heart disease Father    [de-identified] Glaucoma Father     Alzheimer's disease Family     Heart attack Family       Social History:     Social History     Socioeconomic History    Marital status:       Spouse name: None    Number of children: None    Years of education: None    Highest education level: None   Occupational History    Occupation: retired   Tobacco Use    Smoking status: Former Smoker    Smokeless tobacco: Never Used    Tobacco comment: quit 30 years ago    Vaping Use    Vaping Use: Former   Substance and Sexual Activity    Alcohol use: Not Currently    Drug use: No    Sexual activity: Not Currently   Other Topics Concern    None   Social History Narrative    Morning person     Social Determinants of Health     Financial Resource Strain: Not on file   Food Insecurity: Not on file   Transportation Needs: Not on file   Physical Activity: Not on file   Stress: Not on file   Social Connections: Not on file   Intimate Partner Violence: Not on file   Housing Stability: Not on file      Medications and Allergies:     Current Outpatient Medications   Medication Sig Dispense Refill    omeprazole (PriLOSEC) 20 mg delayed release capsule TAKE 1 CAPSULE BY MOUTH EVERY DAY AS NEEDED FOR ACID REFLUX 90 capsule 1    ondansetron (ZOFRAN) 4 mg tablet Take 1 tablet (4 mg total) by mouth every 8 (eight) hours as needed for nausea or vomiting 30 tablet 2    terazosin (HYTRIN) 2 mg capsule Take 2 mg by mouth every evening      allopurinol (ZYLOPRIM) 100 mg tablet Take 100 mg by mouth daily      amLODIPine (NORVASC) 2 5 mg tablet Take 2 5 mg by mouth 2 (two) times a day  (Patient not taking: Reported on 1/6/2022 )      amLODIPine (NORVASC) 5 mg tablet       apixaban (ELIQUIS) 2 5 mg Take by mouth 2 (two) times a day      bisoprolol (ZEBETA) 5 mg tablet 2 5 mg       brimonidine tartrate 0 2 % ophthalmic solution       cholecalciferol (VITAMIN D3) 1,000 units tablet Take 2,000 Units by mouth daily       co-enzyme Q-10 30 MG capsule Take 100 mg by mouth daily        dicyclomine (BENTYL) 20 mg tablet       diphenhydrAMINE-acetaminophen (TYLENOL PM)  MG TABS Take 2 tablets by mouth daily at bedtime as needed for sleep      dorzolamide-timolol (COSOPT) 22 3-6 8 MG/ML ophthalmic solution 1 drop 2 (two) times a day      finasteride (PROSCAR) 5 mg tablet Take 1 tablet (5 mg total) by mouth daily 90 tablet 3    gabapentin (NEURONTIN) 100 mg capsule Take 1 capsule (100 mg total) by mouth 2 (two) times a day 60 capsule 5    Garlic 9520 MG CAPS Take 1,000 mg by mouth      indapamide (LOZOL) 1 25 mg tablet TAKE 1 TABLET BY MOUTH EVERY DAY IN THE MORNING (Patient not taking: Reported on 1/6/2022)      latanoprost (XALATAN) 0 005 % ophthalmic solution Administer 1 drop to both eyes daily at bedtime      loperamide (IMODIUM) 2 mg capsule Take 1 capsule (2 mg total) by mouth 4 (four) times a day as needed for diarrhea 60 capsule 5    multivitamin-iron-minerals-folic acid (CENTRUM) chewable tablet Chew 1 tablet daily   Nexletol 180 MG TABS Take 1 tablet by mouth daily at bedtime         omeprazole (PriLOSEC) 40 MG capsule Take 1 capsule (40 mg total) by mouth daily (Patient not taking: No sig reported) 30 capsule 3    terazosin (HYTRIN) 1 mg capsule Take 2 mg by mouth daily at bedtime  (Patient not taking: Reported on 5/20/2022)       No current facility-administered medications for this visit       Allergies   Allergen Reactions    Bempedoic Acid Other (See Comments)     Gout    Atorvastatin GI Intolerance     Other reaction(s): GI upset    Ezetimibe Other (See Comments)     myalgia  Other reaction(s): Other (See Comments)  Myalgia  Other reaction(s): Myalgia  myalgia    Statins Rash      Immunizations:     Immunization History   Administered Date(s) Administered    COVID-19 PFIZER VACCINE 0 3 ML IM 04/07/2021, 05/01/2021    INFLUENZA 09/23/2014    Influenza Split High Dose Preservative Free IM 09/23/2014, 12/17/2015    Influenza, seasonal, injectable 10/24/2003, 11/08/2005, 11/13/2007, 10/16/2008, 12/16/2009, 11/10/2010, 11/18/2011, 09/24/2012, 09/25/2013    Td (adult), adsorbed 11/01/2003    Tdap 03/12/2018      Health Maintenance:         Topic Date Due    Hepatitis C Screening  Completed         Topic Date Due    Pneumococcal Vaccine: 65+ Years (1 - PCV) Never done    COVID-19 Vaccine (3 - Booster for Sensipass series) 10/01/2021      Medicare Health Risk Assessment:     There were no vitals taken for this visit  Health Risk Assessment:   Patient rates overall health as good  Patient feels that their physical health rating is same  Patient is very satisfied with their life  Eyesight was rated as same  Hearing was rated as slightly worse  Patient feels that their emotional and mental health rating is same  Patients states they are never, rarely angry  Patient states they are often unusually tired/fatigued  Pain experienced in the last 7 days has been none  Patient states that he has experienced no weight loss or gain in last 6 months  Fall Risk Screening: In the past year, patient has experienced: no history of falling in past year      Home Safety:  Patient does not have trouble with stairs inside or outside of their home  Patient has working smoke alarms and has working carbon monoxide detector  Home safety hazards include: none  Nutrition:   Current diet is Regular, Low Cholesterol, No Added Salt and Limited junk food       Medications: Patient is currently taking over-the-counter supplements  OTC medications include: KNZ45, Garlic, Vitamin D, Centrum Silver  Patient is not able to manage medications  Activities of Daily Living (ADLs)/Instrumental Activities of Daily Living (IADLs):   Walk and transfer into and out of bed and chair?: Yes  Dress and groom yourself?: Yes    Bathe or shower yourself?: Yes    Feed yourself? Yes  Do your laundry/housekeeping?: Yes  Manage your money, pay your bills and track your expenses?: Yes  Make your own meals?: Yes    Do your own shopping?: Yes    Previous Hospitalizations:   Any hospitalizations or ED visits within the last 12 months?: Yes    How many hospitalizations have you had in the last year?: 1-2    Hospitalization Comments: Diarrhea    Advance Care Planning:   Living will: Yes    Durable POA for healthcare: Yes    Advanced directive: Yes      PREVENTIVE SCREENINGS      Cardiovascular Screening:    General: Screening Not Indicated and History Lipid Disorder      Diabetes Screening:     General: Screening Current      Colorectal Cancer Screening:     General: Screening Not Indicated      Prostate Cancer Screening:    General: Screening Not Indicated      Abdominal Aortic Aneurysm (AAA) Screening:    Risk factors include: tobacco use        Lung Cancer Screening:     General: Screening Not Indicated      Hepatitis C Screening:    General: Screening Current    Screening, Brief Intervention, and Referral to Treatment (SBIRT)    Screening  Typical number of drinks in a day: 0  Typical number of drinks in a week: 0  Interpretation: Low risk drinking behavior      AUDIT-C Screenin) How often did you have a drink containing alcohol in the past year? never  2) How many drinks did you have on a typical day when you were drinking in the past year? 0  3) How often did you have 6 or more drinks on one occasion in the past year? never    AUDIT-C Score: 0  Interpretation: Score 0-3 (male): Negative screen for alcohol misuse    Single Item Drug Screening:  How often have you used an illegal drug (including marijuana) or a prescription medication for non-medical reasons in the past year? never    Single Item Drug Screen Score: 0  Interpretation: Negative screen for possible drug use disorder      Luz Elena Zimmer MD

## 2022-05-20 NOTE — PATIENT INSTRUCTIONS

## 2022-06-03 DIAGNOSIS — K21.9 GASTROESOPHAGEAL REFLUX DISEASE, UNSPECIFIED WHETHER ESOPHAGITIS PRESENT: ICD-10-CM

## 2022-06-03 RX ORDER — OMEPRAZOLE 20 MG/1
CAPSULE, DELAYED RELEASE ORAL
Qty: 90 CAPSULE | Refills: 1 | Status: SHIPPED | OUTPATIENT
Start: 2022-06-03 | End: 2022-07-20

## 2022-06-16 ENCOUNTER — OFFICE VISIT (OUTPATIENT)
Dept: GASTROENTEROLOGY | Facility: CLINIC | Age: 87
End: 2022-06-16
Payer: COMMERCIAL

## 2022-06-16 VITALS
TEMPERATURE: 97.5 F | HEIGHT: 68 IN | SYSTOLIC BLOOD PRESSURE: 130 MMHG | BODY MASS INDEX: 29.31 KG/M2 | WEIGHT: 193.4 LBS | DIASTOLIC BLOOD PRESSURE: 72 MMHG

## 2022-06-16 DIAGNOSIS — K21.9 GASTROESOPHAGEAL REFLUX DISEASE WITHOUT ESOPHAGITIS: Primary | ICD-10-CM

## 2022-06-16 DIAGNOSIS — Z90.49 S/P CHOLECYSTECTOMY: ICD-10-CM

## 2022-06-16 DIAGNOSIS — K58.0 IRRITABLE BOWEL SYNDROME WITH DIARRHEA: ICD-10-CM

## 2022-06-16 DIAGNOSIS — R10.13 DYSPEPSIA: ICD-10-CM

## 2022-06-16 PROCEDURE — 1160F RVW MEDS BY RX/DR IN RCRD: CPT | Performed by: INTERNAL MEDICINE

## 2022-06-16 PROCEDURE — 1036F TOBACCO NON-USER: CPT | Performed by: INTERNAL MEDICINE

## 2022-06-16 PROCEDURE — 99213 OFFICE O/P EST LOW 20 MIN: CPT | Performed by: INTERNAL MEDICINE

## 2022-06-16 RX ORDER — OMEPRAZOLE 40 MG/1
40 CAPSULE, DELAYED RELEASE ORAL DAILY
Qty: 30 CAPSULE | Refills: 5 | Status: SHIPPED | OUTPATIENT
Start: 2022-06-16 | End: 2022-07-08

## 2022-06-16 NOTE — PROGRESS NOTES
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    Follow up in a a few months time   ___________________________________    SUBJECTIVE:  80year old male here for follow up of GERD and IBS-diarrhea predominant  Doing overall well still with some heartburn at time and intermittent diarrhea  S/p cholecystectomy about 5 years ago  Has had a lot of GI issues  In November of last year was hospitalized twice due to chronic diarrhea  Has been taking loperamide 4 times daily with but stopped taking this for just a few days and symptoms came right back  Currently with upset stomach after eating  Denies any nausea or vomiting  Denies any GERD  Has tried zofran which has helped his symptoms  Currently taking omeprazole 20 mg daily for years  Tried tums yesterday which helped  REVIEW OF SYSTEMS IS OTHERWISE NEGATIVE        Historical Information   Past Medical History:   Diagnosis Date    [facility] [address] [facility]     Anemia     Carpal tunnel syndrome, unspecified upper limb     Cataract     last assessed: 8/18/2012    GERD (gastroesophageal reflux disease)     Glaucoma     Hypertension     last assessed: 8/23/2012    Intervertebral disc disease     PVD (peripheral vascular disease) (HCC)      Past Surgical History:   Procedure Laterality Date    CATARACT EXTRACTION      HERNIA REPAIR      NEUROPLASTY / TRANSPOSITION MEDIAN NERVE AT CARPAL TUNNEL      AL LAP,CHOLECYSTECTOMY N/A 9/14/2016    Procedure: LAPAROSCOPIC CHOLECYSTECTOMY ;  Surgeon: Shamika Pierson MD;  Location: [facility];  Service: General     Social History Social History     Substance and Sexual Activity   Alcohol Use Not Currently     Social History     Substance and Sexual Activity   Drug Use No     Social History     Tobacco Use   Smoking Status Former Smoker   Smokeless Tobacco Never Used   Tobacco Comment    quit 30 years ago      Family History   Problem Relation Age of Onset    Glaucoma Mother     Cancer Father     Heart disease Father     Glaucoma Father     Alzheimer's disease Family     Heart attack Family        Meds/Allergies       Current Outpatient Medications:     allopurinol (ZYLOPRIM) 100 mg tablet    amLODIPine (NORVASC) 2 5 mg tablet    apixaban (ELIQUIS) 2 5 mg    bisoprolol (ZEBETA) 5 mg tablet    brimonidine tartrate 0 2 % ophthalmic solution    cholecalciferol (VITAMIN D3) 1,000 units tablet    co-enzyme Q-10 30 MG capsule    diphenhydrAMINE-acetaminophen (TYLENOL PM)  MG TABS    dorzolamide-timolol (COSOPT) 22 3-6 8 MG/ML ophthalmic solution    finasteride (PROSCAR) 5 mg tablet    gabapentin (NEURONTIN) 100 mg capsule    Garlic 2151 MG CAPS    indapamide (LOZOL) 1 25 mg tablet    latanoprost (XALATAN) 0 005 % ophthalmic solution    loperamide (IMODIUM) 2 mg capsule    multivitamin-iron-minerals-folic acid (CENTRUM) chewable tablet    omeprazole (PriLOSEC) 20 mg delayed release capsule    ondansetron (ZOFRAN) 4 mg tablet    terazosin (HYTRIN) 2 mg capsule    Allergies   Allergen Reactions    Bempedoic Acid Other (See Comments)     Gout    Atorvastatin GI Intolerance     Other reaction(s): GI upset    Ezetimibe Other (See Comments)     myalgia  Other reaction(s): Other (See Comments)  Myalgia  Other reaction(s): Myalgia  myalgia    Statins Rash           Objective     Blood pressure 130/72, temperature 97 5 °F (36 4 °C), temperature source Tympanic, height 5' 8" (1 727 m), weight 87 7 kg (193 lb 6 4 oz)  Body mass index is 29 41 kg/m²        PHYSICAL EXAM:      General Appearance:   Alert, cooperative, no distress   HEENT:   Normocephalic, atraumatic, anicteric      Neck:  Supple, symmetrical, trachea midline   Lungs:   Clear to auscultation bilaterally; no rales, rhonchi or wheezing; respirations unlabored    Heart[de-identified]   Regular rate and rhythm; no murmur, rub, or gallop  Abdomen:   Soft, non-tender, non-distended; normal bowel sounds; no masses, no organomegaly    Genitalia:   Deferred    Rectal:   Deferred    Extremities:  No cyanosis, clubbing or edema    Pulses:  2+ and symmetric    Skin:  No jaundice, rashes, or lesions    Lymph nodes:  No palpable cervical lymphadenopathy        Lab Results:   No visits with results within 1 Day(s) from this visit     Latest known visit with results is:   Admission on 12/09/2021, Discharged on 12/09/2021   Component Date Value    SARS-CoV-2 12/09/2021 Negative     INFLUENZA A PCR 12/09/2021 Negative     INFLUENZA B PCR 12/09/2021 Negative     RSV PCR 12/09/2021 Negative     WBC 12/09/2021 6 48     RBC 12/09/2021 3 24 (A)    Hemoglobin 12/09/2021 10 6 (A)    Hematocrit 12/09/2021 31 8 (A)    MCV 12/09/2021 98     MCH 12/09/2021 32 7     MCHC 12/09/2021 33 3     RDW 12/09/2021 12 7     MPV 12/09/2021 11 1     Platelets 95/76/2597 214     nRBC 12/09/2021 0     Neutrophils Relative 12/09/2021 73     Immat GRANS % 12/09/2021 0     Lymphocytes Relative 12/09/2021 9 (A)    Monocytes Relative 12/09/2021 11     Eosinophils Relative 12/09/2021 7 (A)    Basophils Relative 12/09/2021 0     Neutrophils Absolute 12/09/2021 4 65     Immature Grans Absolute 12/09/2021 0 02     Lymphocytes Absolute 12/09/2021 0 60     Monocytes Absolute 12/09/2021 0 74     Eosinophils Absolute 12/09/2021 0 45     Basophils Absolute 12/09/2021 0 02     Sodium 12/09/2021 135 (A)    Potassium 12/09/2021 3 8     Chloride 12/09/2021 97 (A)    CO2 12/09/2021 29     ANION GAP 12/09/2021 9     BUN 12/09/2021 15     Creatinine 12/09/2021 1 20     Glucose 12/09/2021 104     Calcium 12/09/2021 8 4     Corrected Calcium 12/09/2021 8 9     AST 12/09/2021 17     ALT 12/09/2021 25     Alkaline Phosphatase 12/09/2021 57     Total Protein 12/09/2021 7 5     Albumin 12/09/2021 3 4 (A)    Total Bilirubin 12/09/2021 0 40     eGFR 12/09/2021 53     Lipase 12/09/2021 140     LACTIC ACID 12/09/2021 0 7     C difficile toxin by PCR 12/09/2021 Negative     Salmonella sp PCR 12/09/2021 None Detected     Shigella sp/Enteroinvasi* 12/09/2021 None Detected     Campylobacter sp (jejuni* 12/09/2021 None Detected     Shiga toxin 1/Shiga toxi* 12/09/2021 None Detected     Ventricular Rate 12/09/2021 70     Atrial Rate 12/09/2021 70     CT Interval 12/09/2021 240     QRSD Interval 12/09/2021 90     QT Interval 12/09/2021 376     QTC Interval 12/09/2021 406     P Axis 12/09/2021 2     QRS Axis 12/09/2021 -3     T Wave Axis 12/09/2021 -26      Radiology Results:   No results found    Answers for HPI/ROS submitted by the patient on 6/15/2022  Chronicity: recurrent  Onset: 1 to 4 weeks ago  Onset quality: undetermined  Frequency: 2 to 4 times per day  Episode duration: 4 hours  Progression since onset: waxing and waning  Pain location: generalized abdominal region  Pain - numeric: 8/10  Pain quality: aching, a sensation of fullness  Radiates to: does not radiate  anorexia: Yes  constipation: Yes  diarrhea: Yes  flatus: Yes  headaches: Yes  Aggravated by: eating  Relieved by: nothing

## 2022-06-17 DIAGNOSIS — K21.9 GASTROESOPHAGEAL REFLUX DISEASE WITHOUT ESOPHAGITIS: ICD-10-CM

## 2022-06-17 NOTE — TELEPHONE ENCOUNTER
Pt's daughter called in stating the pharmacy never received the script for al mag oxide-diphenhydramine-lidocaine viscous (MAGIC MOUTHWASH) 1:1:1 suspension [[id number]], and they can't mix the meds anyway  She's requesting a call back

## 2022-06-17 NOTE — TELEPHONE ENCOUNTER
Pts daughter aware script sent to [facility], provided pharmacy number   Daughter had no further questions

## 2022-06-21 LAB
ALBUMIN SERPL-MCNC: 4.2 G/DL (ref 3.6–5.1)
ALBUMIN/GLOB SERPL: 1.6 (CALC) (ref 1–2.5)
ALP SERPL-CCNC: 48 U/L (ref 35–144)
ALT SERPL-CCNC: 12 U/L (ref 9–46)
AST SERPL-CCNC: 17 U/L (ref 10–35)
BASOPHILS # BLD AUTO: 32 CELLS/UL (ref 0–200)
BASOPHILS NFR BLD AUTO: 0.5 %
BILIRUB SERPL-MCNC: 0.8 MG/DL (ref 0.2–1.2)
BUN SERPL-MCNC: 16 MG/DL (ref 7–25)
BUN/CREAT SERPL: 12 (CALC) (ref 6–22)
CALCIUM SERPL-MCNC: 9.1 MG/DL (ref 8.6–10.3)
CHLORIDE SERPL-SCNC: 95 MMOL/L (ref 98–110)
CO2 SERPL-SCNC: 29 MMOL/L (ref 20–32)
CREAT SERPL-MCNC: 1.31 MG/DL (ref 0.7–1.11)
EOSINOPHIL # BLD AUTO: 460 CELLS/UL (ref 15–500)
EOSINOPHIL NFR BLD AUTO: 7.3 %
ERYTHROCYTE [DISTWIDTH] IN BLOOD BY AUTOMATED COUNT: 13 % (ref 11–15)
GLOBULIN SER CALC-MCNC: 2.6 G/DL (CALC) (ref 1.9–3.7)
GLUCOSE SERPL-MCNC: 93 MG/DL (ref 65–99)
HCT VFR BLD AUTO: 33 % (ref 38.5–50)
HGB BLD-MCNC: 11.1 G/DL (ref 13.2–17.1)
LIPASE SERPL-CCNC: 13 U/L (ref 7–60)
LYMPHOCYTES # BLD AUTO: 1279 CELLS/UL (ref 850–3900)
LYMPHOCYTES NFR BLD AUTO: 20.3 %
MCH RBC QN AUTO: 32.5 PG (ref 27–33)
MCHC RBC AUTO-ENTMCNC: 33.6 G/DL (ref 32–36)
MCV RBC AUTO: 96.5 FL (ref 80–100)
MONOCYTES # BLD AUTO: 655 CELLS/UL (ref 200–950)
MONOCYTES NFR BLD AUTO: 10.4 %
NEUTROPHILS # BLD AUTO: 3875 CELLS/UL (ref 1500–7800)
NEUTROPHILS NFR BLD AUTO: 61.5 %
PLATELET # BLD AUTO: 197 THOUSAND/UL (ref 140–400)
PMV BLD REES-ECKER: 12 FL (ref 7.5–12.5)
POTASSIUM SERPL-SCNC: 4 MMOL/L (ref 3.5–5.3)
PROT SERPL-MCNC: 6.8 G/DL (ref 6.1–8.1)
RBC # BLD AUTO: 3.42 MILLION/UL (ref 4.2–5.8)
SL AMB EGFR AFRICAN AMERICAN: 55 ML/MIN/1.73M2
SL AMB EGFR NON AFRICAN AMERICAN: 48 ML/MIN/1.73M2
SODIUM SERPL-SCNC: 132 MMOL/L (ref 135–146)
WBC # BLD AUTO: 6.3 THOUSAND/UL (ref 3.8–10.8)

## 2022-06-28 ENCOUNTER — TELEPHONE (OUTPATIENT)
Dept: GASTROENTEROLOGY | Facility: CLINIC | Age: 87
End: 2022-06-28

## 2022-06-28 DIAGNOSIS — K21.9 GASTROESOPHAGEAL REFLUX DISEASE WITHOUT ESOPHAGITIS: Primary | ICD-10-CM

## 2022-06-28 DIAGNOSIS — K58.0 IRRITABLE BOWEL SYNDROME WITH DIARRHEA: ICD-10-CM

## 2022-06-28 NOTE — TELEPHONE ENCOUNTER
----- Message from [facility] sent at 6/28/2022 12:49 PM EDT -----  Please call pt's family and get upper gi series completed    Thanks,  Bakari Cantu

## 2022-07-01 ENCOUNTER — TELEPHONE (OUTPATIENT)
Dept: GASTROENTEROLOGY | Facility: CLINIC | Age: 87
End: 2022-07-01

## 2022-07-01 ENCOUNTER — HOSPITAL ENCOUNTER (OUTPATIENT)
Dept: RADIOLOGY | Facility: HOSPITAL | Age: 87
Discharge: HOME | End: 2022-07-01
Payer: COMMERCIAL

## 2022-07-01 DIAGNOSIS — K21.9 GASTROESOPHAGEAL REFLUX DISEASE WITHOUT ESOPHAGITIS: ICD-10-CM

## 2022-07-01 DIAGNOSIS — K58.0 IRRITABLE BOWEL SYNDROME WITH DIARRHEA: ICD-10-CM

## 2022-07-01 PROCEDURE — 74240 X-RAY XM UPR GI TRC 1CNTRST: CPT

## 2022-07-01 NOTE — TELEPHONE ENCOUNTER
Reviewed findings with daughter Donna Valenzuela including the narrowing at the GE junction  She would like to avoid EGD if able  Will review with Dr Carlos Kraft further

## 2022-07-01 NOTE — TELEPHONE ENCOUNTER
Radiology called with significant findings on this patient's upper GI report  [place] text sent to [employer]

## 2022-07-04 ENCOUNTER — APPOINTMENT (EMERGENCY)
Dept: RADIOLOGY | Facility: HOSPITAL | Age: 87
End: 2022-07-04
Payer: COMMERCIAL

## 2022-07-04 ENCOUNTER — HOSPITAL ENCOUNTER (EMERGENCY)
Facility: HOSPITAL | Age: 87
Discharge: HOME | End: 2022-07-04
Attending: EMERGENCY MEDICINE
Payer: COMMERCIAL

## 2022-07-04 ENCOUNTER — APPOINTMENT (EMERGENCY)
Dept: CT IMAGING | Facility: HOSPITAL | Age: 87
End: 2022-07-04
Payer: COMMERCIAL

## 2022-07-04 VITALS
DIASTOLIC BLOOD PRESSURE: 90 MMHG | SYSTOLIC BLOOD PRESSURE: 201 MMHG | HEART RATE: 76 BPM | TEMPERATURE: 98.1 F | RESPIRATION RATE: 16 BRPM | OXYGEN SATURATION: 94 %

## 2022-07-04 DIAGNOSIS — R77.8 ELEVATED TROPONIN: ICD-10-CM

## 2022-07-04 DIAGNOSIS — I10 PRIMARY HYPERTENSION: ICD-10-CM

## 2022-07-04 DIAGNOSIS — R07.9 CHEST PAIN: Primary | ICD-10-CM

## 2022-07-04 DIAGNOSIS — E87.1 HYPONATREMIA: ICD-10-CM

## 2022-07-04 PROBLEM — R51.9 FRONTAL HEADACHE: Status: ACTIVE | Noted: 2022-07-04

## 2022-07-04 PROBLEM — I16.0 HYPERTENSIVE URGENCY: Status: ACTIVE | Noted: 2022-07-04

## 2022-07-04 PROBLEM — N18.31 STAGE 3A CHRONIC KIDNEY DISEASE (HCC): Status: ACTIVE | Noted: 2018-03-12

## 2022-07-04 LAB
2HR DELTA HS TROPONIN: 5 NG/L
ANION GAP SERPL CALCULATED.3IONS-SCNC: 6 MMOL/L (ref 4–13)
BASOPHILS # BLD AUTO: 0.04 THOUSANDS/ΜL (ref 0–0.1)
BASOPHILS NFR BLD AUTO: 1 % (ref 0–1)
BUN SERPL-MCNC: 13 MG/DL (ref 5–25)
CALCIUM SERPL-MCNC: 9 MG/DL (ref 8.3–10.1)
CARDIAC TROPONIN I PNL SERPL HS: 10 NG/L
CARDIAC TROPONIN I PNL SERPL HS: 15 NG/L
CHLORIDE SERPL-SCNC: 93 MMOL/L (ref 100–108)
CO2 SERPL-SCNC: 28 MMOL/L (ref 21–32)
CREAT SERPL-MCNC: 1.31 MG/DL (ref 0.6–1.3)
EOSINOPHIL # BLD AUTO: 0.73 THOUSAND/ΜL (ref 0–0.61)
EOSINOPHIL NFR BLD AUTO: 10 % (ref 0–6)
ERYTHROCYTE [DISTWIDTH] IN BLOOD BY AUTOMATED COUNT: 12 % (ref 11.6–15.1)
GFR SERPL CREATININE-BSD FRML MDRD: 47 ML/MIN/1.73SQ M
GLUCOSE SERPL-MCNC: 106 MG/DL (ref 65–140)
HCT VFR BLD AUTO: 35.7 % (ref 36.5–49.3)
HGB BLD-MCNC: 12.3 G/DL (ref 12–17)
IMM GRANULOCYTES # BLD AUTO: 0.03 THOUSAND/UL (ref 0–0.2)
IMM GRANULOCYTES NFR BLD AUTO: 0 % (ref 0–2)
LYMPHOCYTES # BLD AUTO: 1.56 THOUSANDS/ΜL (ref 0.6–4.47)
LYMPHOCYTES NFR BLD AUTO: 20 % (ref 14–44)
MCH RBC QN AUTO: 33.4 PG (ref 26.8–34.3)
MCHC RBC AUTO-ENTMCNC: 34.5 G/DL (ref 31.4–37.4)
MCV RBC AUTO: 97 FL (ref 82–98)
MONOCYTES # BLD AUTO: 0.62 THOUSAND/ΜL (ref 0.17–1.22)
MONOCYTES NFR BLD AUTO: 8 % (ref 4–12)
NEUTROPHILS # BLD AUTO: 4.72 THOUSANDS/ΜL (ref 1.85–7.62)
NEUTS SEG NFR BLD AUTO: 61 % (ref 43–75)
NRBC BLD AUTO-RTO: 0 /100 WBCS
PLATELET # BLD AUTO: 267 THOUSANDS/UL (ref 149–390)
PMV BLD AUTO: 11.4 FL (ref 8.9–12.7)
POTASSIUM SERPL-SCNC: 3.6 MMOL/L (ref 3.5–5.3)
RBC # BLD AUTO: 3.68 MILLION/UL (ref 3.88–5.62)
SODIUM SERPL-SCNC: 127 MMOL/L (ref 136–145)
WBC # BLD AUTO: 7.7 THOUSAND/UL (ref 4.31–10.16)

## 2022-07-04 PROCEDURE — 70450 CT HEAD/BRAIN W/O DYE: CPT

## 2022-07-04 PROCEDURE — G1004 CDSM NDSC: HCPCS

## 2022-07-04 PROCEDURE — 80048 BASIC METABOLIC PNL TOTAL CA: CPT | Performed by: EMERGENCY MEDICINE

## 2022-07-04 PROCEDURE — 93005 ELECTROCARDIOGRAM TRACING: CPT

## 2022-07-04 PROCEDURE — 99285 EMERGENCY DEPT VISIT HI MDM: CPT | Performed by: EMERGENCY MEDICINE

## 2022-07-04 PROCEDURE — 71045 X-RAY EXAM CHEST 1 VIEW: CPT

## 2022-07-04 PROCEDURE — 99285 EMERGENCY DEPT VISIT HI MDM: CPT

## 2022-07-04 PROCEDURE — 85025 COMPLETE CBC W/AUTO DIFF WBC: CPT | Performed by: EMERGENCY MEDICINE

## 2022-07-04 PROCEDURE — 83690 ASSAY OF LIPASE: CPT | Performed by: EMERGENCY MEDICINE

## 2022-07-04 PROCEDURE — 36415 COLL VENOUS BLD VENIPUNCTURE: CPT | Performed by: EMERGENCY MEDICINE

## 2022-07-04 PROCEDURE — 84484 ASSAY OF TROPONIN QUANT: CPT | Performed by: EMERGENCY MEDICINE

## 2022-07-04 RX ORDER — SODIUM CHLORIDE 9 MG/ML
3 INJECTION INTRAVENOUS
Status: DISCONTINUED | OUTPATIENT
Start: 2022-07-04 | End: 2022-07-05 | Stop reason: HOSPADM

## 2022-07-04 RX ORDER — AMLODIPINE BESYLATE 2.5 MG/1
2.5 TABLET ORAL ONCE
Status: COMPLETED | OUTPATIENT
Start: 2022-07-04 | End: 2022-07-04

## 2022-07-04 RX ORDER — ACETAMINOPHEN 325 MG/1
650 TABLET ORAL ONCE
Status: COMPLETED | OUTPATIENT
Start: 2022-07-04 | End: 2022-07-04

## 2022-07-04 RX ADMIN — ACETAMINOPHEN 650 MG: 325 TABLET, FILM COATED ORAL at 21:44

## 2022-07-04 RX ADMIN — AMLODIPINE BESYLATE 2.5 MG: 2.5 TABLET ORAL at 22:33

## 2022-07-04 NOTE — ASSESSMENT & PLAN NOTE
· Intermittent chest discomfort since 7/3  · Troponin: 10, 2 hour, 4 hour   · EKG: Sinus Rhythm with first degree AV block, 70 BPM   · HTN: 199//95  · Telemetry

## 2022-07-04 NOTE — ASSESSMENT & PLAN NOTE
· BP: 199//86  · Home regimen: amlodipine, bisoprolol, lozol, terazosin   · Hold lozol due to hyponatremia   · ????

## 2022-07-04 NOTE — ASSESSMENT & PLAN NOTE
· Na 127 on admission   · Ongoing diarrhea for 30+ days felt to be due to medications   Per patient it has been improving with medication adjustments   · Hold lozol   · Place on gentle IVF   · Monitor BMP closely

## 2022-07-04 NOTE — ED PROVIDER NOTES
History  Chief Complaint   Patient presents with    Chest Pain     Patient presents to the ER via EMS being hypertension and reporting chest pain that started yesterday  Patient reports heaviness in chest upon arrival      66-year-old male complains of intermittent chest discomfort since yesterday  Complains of frontal headache today without photophobia, change in vision or neurologic changes  On Eliquis  Does not explain his chest discomfort well he is a very poor historian  He has had abdominal distress after eating over the past year with multiple workups  Had diarrhea for 30 days that is improving with medication adjustments  He had a recent barium swallow that showed esophageal dysmotility, corkscrew esophagus, narrowing of the GE junction and rapid emptying of the esophagus  He denies fever, cough, dyspnea, palpitations, diaphoresis, nausea and syncope  His daughter is very attentive and is at the bedside with a notebook documenting daily vital signs, complaints, test results, etc           Prior to Admission Medications   Prescriptions Last Dose Informant Patient Reported? Taking?    Garlic 4987 MG CAPS  Spouse/Significant Other Yes No   Sig: Take 1,000 mg by mouth   al mag oxide-diphenhydramine-lidocaine viscous (MAGIC MOUTHWASH) 1:1:1 suspension   No No   Sig: Swish and swallow 10 mL every 6 (six) hours as needed for mouth pain or discomfort (esophagus pain) Please dispense without benadryl (diphenhydramine)  given his age   allopurinol (ZYLOPRIM) 100 mg tablet  Spouse/Significant Other Yes No   Sig: Take 100 mg by mouth daily   amLODIPine (NORVASC) 2 5 mg tablet  Spouse/Significant Other Yes No   Sig: Take 2 5 mg by mouth 2 (two) times a day   apixaban (ELIQUIS) 2 5 mg  Spouse/Significant Other Yes No   Sig: Take by mouth 2 (two) times a day   bisoprolol (ZEBETA) 5 mg tablet  Spouse/Significant Other Yes No   Si 5 mg    brimonidine tartrate 0 2 % ophthalmic solution  Spouse/Significant Other Yes No   cholecalciferol (VITAMIN D3) 1,000 units tablet  Spouse/Significant Other Yes No   Sig: Take 2,000 Units by mouth daily    co-enzyme Q-10 30 MG capsule  Spouse/Significant Other Yes No   Sig: Take 100 mg by mouth daily     diphenhydrAMINE-acetaminophen (TYLENOL PM)  MG TABS  Spouse/Significant Other Yes No   Sig: Take 2 tablets by mouth daily at bedtime as needed for sleep   dorzolamide-timolol (COSOPT) 22 3-6 8 MG/ML ophthalmic solution  Spouse/Significant Other Yes No   Si drop 2 (two) times a day   finasteride (PROSCAR) 5 mg tablet  Spouse/Significant Other No No   Sig: Take 1 tablet (5 mg total) by mouth daily   gabapentin (NEURONTIN) 100 mg capsule  Spouse/Significant Other No No   Sig: Take 1 capsule (100 mg total) by mouth 2 (two) times a day   indapamide (LOZOL) 1 25 mg tablet  Spouse/Significant Other Yes No   Sig: TAKE 1 TABLET BY MOUTH EVERY DAY IN THE MORNING   latanoprost (XALATAN) 0 005 % ophthalmic solution  Spouse/Significant Other Yes No   Sig: Administer 1 drop to both eyes daily at bedtime   loperamide (IMODIUM) 2 mg capsule  Spouse/Significant Other No No   Sig: Take 1 capsule (2 mg total) by mouth 4 (four) times a day as needed for diarrhea   multivitamin-iron-minerals-folic acid (CENTRUM) chewable tablet  Spouse/Significant Other Yes No   Sig: Chew 1 tablet daily     omeprazole (PriLOSEC) 20 mg delayed release capsule  Spouse/Significant Other No No   Sig: TAKE 1 CAPSULE BY MOUTH EVERY DAY AS NEEDED FOR ACID REFLUX   omeprazole (PriLOSEC) 40 MG capsule   No No   Sig: Take 1 capsule (40 mg total) by mouth daily   terazosin (HYTRIN) 2 mg capsule  Spouse/Significant Other Yes No   Sig: Take 2 mg by mouth every evening      Facility-Administered Medications: None       Past Medical History:   Diagnosis Date    A-fib ([facility] )     Anemia     Carpal tunnel syndrome, unspecified upper limb     Cataract     last assessed: 2012    GERD (gastroesophageal reflux disease)     Glaucoma     Hypertension     last assessed: 8/23/2012    Intervertebral disc disease     PVD (peripheral vascular disease) (HCC)        Past Surgical History:   Procedure Laterality Date    CATARACT EXTRACTION      HERNIA REPAIR      NEUROPLASTY / TRANSPOSITION MEDIAN NERVE AT CARPAL TUNNEL      CO LAP,CHOLECYSTECTOMY N/A 9/14/2016    Procedure: LAPAROSCOPIC CHOLECYSTECTOMY ;  Surgeon: Sulma Reyes MD;  Location: [facility];  Service: General       Family History   Problem Relation Age of Onset    Glaucoma Mother     Cancer Father     Heart disease Father     Glaucoma Father     Alzheimer's disease Family     Heart attack Family      I have reviewed and agree with the history as documented  E-Cigarette/Vaping    E-Cigarette Use Former User      E-Cigarette/Vaping Substances    Nicotine No     THC No     CBD No     Flavoring No     Other No     Unknown No      Social History     Tobacco Use    Smoking status: Former Smoker    Smokeless tobacco: Never Used    Tobacco comment: quit 30 years ago    Vaping Use    Vaping Use: Former   Substance Use Topics    Alcohol use: Not Currently    Drug use: No       Review of Systems   Constitutional: Positive for appetite change  Negative for fatigue and fever  Respiratory: Negative for cough and shortness of breath  Cardiovascular: Positive for chest pain  Negative for palpitations and leg swelling  Gastrointestinal: Positive for abdominal pain, diarrhea and vomiting  Negative for blood in stool and nausea  Genitourinary: Negative for difficulty urinating  Psychiatric/Behavioral: Positive for confusion (Poor short-term memory)  All other systems reviewed and are negative  Physical Exam  Physical Exam  Vitals and nursing note reviewed  Constitutional:       General: He is not in acute distress  Appearance: He is well-developed  He is not ill-appearing or diaphoretic  HENT:      Head: Normocephalic and atraumatic        Right Ear: External ear normal       Left Ear: External ear normal    Eyes:      Conjunctiva/sclera: Conjunctivae normal       Pupils: Pupils are equal, round, and reactive to light  Neck:      Vascular: No JVD  Cardiovascular:      Rate and Rhythm: Normal rate and regular rhythm  Pulses:           Radial pulses are 3+ on the right side and 3+ on the left side  Heart sounds: Normal heart sounds  No murmur heard  Pulmonary:      Effort: Pulmonary effort is normal       Breath sounds: Normal breath sounds  Abdominal:      General: Bowel sounds are normal       Palpations: Abdomen is soft  There is no fluid wave or mass  Tenderness: There is no abdominal tenderness  There is no guarding or rebound  Musculoskeletal:         General: No tenderness  Normal range of motion  Cervical back: Normal range of motion and neck supple  Right lower leg: No tenderness  No edema  Left lower leg: No tenderness  No edema  Lymphadenopathy:      Cervical: No cervical adenopathy  Skin:     General: Skin is warm and dry  Capillary Refill: Capillary refill takes less than 2 seconds  Findings: No rash  Neurological:      General: No focal deficit present  Mental Status: He is alert and oriented to person, place, and time  Cranial Nerves: No cranial nerve deficit  Coordination: Coordination normal       Deep Tendon Reflexes: Reflexes are normal and symmetric     Psychiatric:         Mood and Affect: Mood normal          Behavior: Behavior normal          Vital Signs  ED Triage Vitals [07/04/22 1810]   Temperature Pulse Respirations Blood Pressure SpO2   98 1 °F (36 7 °C) 72 16 (!) 232/95 97 %      Temp Source Heart Rate Source Patient Position - Orthostatic VS BP Location FiO2 (%)   Oral Monitor Sitting Right arm --      Pain Score       3           Vitals:    07/04/22 2100 07/04/22 2200 07/04/22 2233 07/04/22 2300   BP: (!) 180/82 (!) 193/84 (!) 191/78 (!) 201/90   Pulse: 73 75 76   Patient Position - Orthostatic VS: Sitting Sitting  Sitting         Visual Acuity      ED Medications  Medications   sodium chloride (PF) 0 9 % injection 3 mL (has no administration in time range)   acetaminophen (TYLENOL) tablet 650 mg (650 mg Oral Given 7/4/22 2144)   amLODIPine (NORVASC) tablet 2 5 mg (2 5 mg Oral Given 7/4/22 2233)       Diagnostic Studies  Results Reviewed     Procedure Component Value Units Date/Time    HS Troponin I 2hr [[id number]]  (Normal) Collected: 07/04/22 2112    Lab Status: Final result Specimen: Blood from Arm, Right Updated: 07/04/22 2148     hs TnI 2hr 15 ng/L      Delta 2hr hsTnI 5 ng/L     HS Troponin I 4hr [[id number]]     Lab Status: No result Specimen: Blood     Basic metabolic panel [[id number]]  (Abnormal) Collected: 07/04/22 1905    Lab Status: Final result Specimen: Blood from Arm, Right Updated: 07/04/22 1942     Sodium 127 mmol/L      Potassium 3 6 mmol/L      Chloride 93 mmol/L      CO2 28 mmol/L      ANION GAP 6 mmol/L      BUN 13 mg/dL      Creatinine 1 31 mg/dL      Glucose 106 mg/dL      Calcium 9 0 mg/dL      eGFR 47 ml/min/1 73sq m     Narrative:      [place] guidelines for Chronic Kidney Disease (CKD):     Stage 1 with normal or high GFR (GFR > 90 mL/min/1 73 square meters)    Stage 2 Mild CKD (GFR = 60-89 mL/min/1 73 square meters)    Stage 3A Moderate CKD (GFR = 45-59 mL/min/1 73 square meters)    Stage 3B Moderate CKD (GFR = 30-44 mL/min/1 73 square meters)    Stage 4 Severe CKD (GFR = 15-29 mL/min/1 73 square meters)    Stage 5 End Stage CKD (GFR <15 mL/min/1 73 square meters)  Note: GFR calculation is accurate only with a steady state creatinine    HS Troponin 0hr (reflex protocol) [[id number]]  (Normal) Collected: 07/04/22 1905    Lab Status: Final result Specimen: Blood from Arm, Right Updated: 07/04/22 1942     hs TnI 0hr 10 ng/L     CBC and differential [[id number]]  (Abnormal) Collected: 07/04/22 1905    Lab Status: Final result Specimen: Blood from Arm, Right Updated: 07/04/22 1916     WBC 7 70 Thousand/uL      RBC 3 68 Million/uL      Hemoglobin 12 3 g/dL      Hematocrit 35 7 %      MCV 97 fL      MCH 33 4 pg      MCHC 34 5 g/dL      RDW 12 0 %      MPV 11 4 fL      Platelets 298 Thousands/uL      nRBC 0 /100 WBCs      Neutrophils Relative 61 %      Immat GRANS % 0 %      Lymphocytes Relative 20 %      Monocytes Relative 8 %      Eosinophils Relative 10 %      Basophils Relative 1 %      Neutrophils Absolute 4 72 Thousands/µL      Immature Grans Absolute 0 03 Thousand/uL      Lymphocytes Absolute 1 56 Thousands/µL      Monocytes Absolute 0 62 Thousand/µL      Eosinophils Absolute 0 73 Thousand/µL      Basophils Absolute 0 04 Thousands/µL     Lipase [[id number]] Collected: 07/04/22 1905    Lab Status: In process Specimen: Blood from Arm, Right Updated: 07/04/22 1914                 CT head without contrast   Final Result by Mariana Cm MD (07/04 2050)      No acute intracranial abnormality  Workstation performed: [id number]         XR chest 1 view portable   ED Interpretation by Destinee Pérez DO (07/04 2140)   Unremarkable                 Procedures  ECG 12 Lead Documentation Only    Date/Time: 7/4/2022 6:50 PM  Performed by: Destinee Pérez DO  Authorized by: Destinee Pérez DO     ECG reviewed by me, the ED Provider: yes    Patient location:  ED  Previous ECG:     Previous ECG:  Unavailable    Comparison to cardiac monitor: Yes    Rate:     ECG rate assessment: normal    Rhythm:     Rhythm: sinus rhythm and A-V block    Ectopy:     Ectopy: none    QRS:     QRS axis:  Normal    QRS intervals:  Normal  Conduction:     Conduction: abnormal      Abnormal conduction: 1st degree    ST segments:     ST segments:  Normal  T waves:     T waves: normal               ED Course  ED Course as of 07/04/22 2356   Mon Jul 04, 2022   2325 Patient denies chest pain currently  Blood pressure has improved    He does not want to be admitted the hospital and his daughter agrees to take him home  HEART Risk Score    Flowsheet Row Most Recent Value   Heart Score Risk Calculator    History 0 Filed at: 07/04/2022 2355   ECG 0 Filed at: 07/04/2022 2355   Age 2 Filed at: 07/04/2022 2355   Risk Factors 1 Filed at: 07/04/2022 2355   Troponin 0 Filed at: 07/04/2022 2355   HEART Score 3 Filed at: 07/04/2022 2355                        SBIRT 20yo+    Flowsheet Row Most Recent Value   SBIRT (23 yo +)    In order to provide better care to our patients, we are screening all of our patients for alcohol and drug use  Would it be okay to ask you these screening questions? Yes Filed at: 07/04/2022 1950   Initial Alcohol Screen: US AUDIT-C     1  How often do you have a drink containing alcohol? 0 Filed at: 07/04/2022 1950   2  How many drinks containing alcohol do you have on a typical day you are drinking? 0 Filed at: 07/04/2022 1950   3b  FEMALE Any Age, or MALE 65+: How often do you have 4 or more drinks on one occassion? 0 Filed at: 07/04/2022 1950   Audit-C Score 0 Filed at: 07/04/2022 1950   ROSSY: How many times in the past year have you    Used an illegal drug or used a prescription medication for non-medical reasons? Never Filed at: 07/04/2022 1950                    MDM  Number of Diagnoses or Management Options  Chest pain: new and requires workup  Elevated troponin: new and requires workup  Hyponatremia: new and requires workup  Primary hypertension: established and improving  Diagnosis management comments: Very elderly male with a long history of GI issues  He has been undergoing workup for these problems  He notes some chest heaviness intermittently yesterday and today  Also complains of frontal headache  This headache was of gradual onset, is not the worst of his life it is not associated with photophobia, change in vision, neck pain or stiffness or focal neurologic changes    Differential includes chest pain secondary to esophageal dysmotility, GERD, ACS and headache due to excessive hypertension, intracranial hemorrhage  No evidence of recent trauma  No recent change in vision  Patient pain-free for the majority of his stay here  Delta troponin is 5  Heart score of 3  Patient stable for discharge  Patient is not want to be admitted  Amount and/or Complexity of Data Reviewed  Clinical lab tests: ordered and reviewed  Tests in the radiology section of CPT®: ordered and reviewed  Review and summarize past medical records: yes  Independent visualization of images, tracings, or specimens: yes        Disposition  Final diagnoses:   Chest pain   Elevated troponin   Primary hypertension   Hyponatremia     Time reflects when diagnosis was documented in both MDM as applicable and the Disposition within this note     Time User Action Codes Description Comment    7/4/2022 11:27 PM Sneed Abebe Add [R07 9] Chest pain     7/4/2022 11:27 PM Sneed Abebe Add [R77 8] Elevated troponin     7/4/2022 11:28 PM Sneed Abebe Add [I10] Primary hypertension     7/4/2022 11:28 PM Sneed Abebe Add [E87 1] Hyponatremia       ED Disposition     ED Disposition   Discharge    Condition   Stable    Date/Time   Mon Jul 4, 2022 11:27 PM    Comment   Carlos Campos discharge to home/self care                 Follow-up Information     Follow up With Specialties Details Why Contact Angelica Lizarraga MD Family Medicine Call in 1 day  [address]  [phone]            Discharge Medication List as of 7/4/2022 11:30 PM      CONTINUE these medications which have NOT CHANGED    Details   al mag oxide-diphenhydramine-lidocaine viscous (MAGIC MOUTHWASH) 1:1:1 suspension Swish and swallow 10 mL every 6 (six) hours as needed for mouth pain or discomfort (esophagus pain) Please dispense without benadryl (diphenhydramine)  given his age, Starting Fri 6/17/2022, Print      allopurinol (ZYLOPRIM) 100 mg tablet Take 100 mg by mouth daily, Starting Thu 10/22/2020, Historical Med      amLODIPine (NORVASC) 2 5 mg tablet Take 2 5 mg by mouth 2 (two) times a day, Historical Med      apixaban (ELIQUIS) 2 5 mg Take by mouth 2 (two) times a day, Historical Med      bisoprolol (ZEBETA) 5 mg tablet 2 5 mg , Historical Med      brimonidine tartrate 0 2 % ophthalmic solution Starting Tue 11/9/2021, Historical Med      cholecalciferol (VITAMIN D3) 1,000 units tablet Take 2,000 Units by mouth daily , Historical Med      co-enzyme Q-10 30 MG capsule Take 100 mg by mouth daily  , Historical Med      diphenhydrAMINE-acetaminophen (TYLENOL PM)  MG TABS Take 2 tablets by mouth daily at bedtime as needed for sleep, Historical Med      dorzolamide-timolol (COSOPT) 22 3-6 8 MG/ML ophthalmic solution 1 drop 2 (two) times a day, Historical Med      finasteride (PROSCAR) 5 mg tablet Take 1 tablet (5 mg total) by mouth daily, Starting Mon 8/2/2021, Normal      gabapentin (NEURONTIN) 100 mg capsule Take 1 capsule (100 mg total) by mouth 2 (two) times a day, Starting Mon 1/17/2022, Normal      Garlic 2512 MG CAPS Take 1,000 mg by mouth, Historical Med      indapamide (LOZOL) 1 25 mg tablet TAKE 1 TABLET BY MOUTH EVERY DAY IN THE MORNING, Historical Med      latanoprost (XALATAN) 0 005 % ophthalmic solution Administer 1 drop to both eyes daily at bedtime, Historical Med      loperamide (IMODIUM) 2 mg capsule Take 1 capsule (2 mg total) by mouth 4 (four) times a day as needed for diarrhea, Starting Tue 4/12/2022, Normal      multivitamin-iron-minerals-folic acid (CENTRUM) chewable tablet Chew 1 tablet daily  , Historical Med      !! omeprazole (PriLOSEC) 20 mg delayed release capsule TAKE 1 CAPSULE BY MOUTH EVERY DAY AS NEEDED FOR ACID REFLUX, Normal      !! omeprazole (PriLOSEC) 40 MG capsule Take 1 capsule (40 mg total) by mouth daily, Starting u 6/16/2022, Normal      terazosin (HYTRIN) 2 mg capsule Take 2 mg by mouth every evening, Starting Wed 4/13/2022, Historical Med       !! - Potential duplicate medications found  Please discuss with provider  No discharge procedures on file      PDMP Review     None          ED Provider  Electronically Signed by           Haseeb Bronson DO  07/04/22 4880

## 2022-07-04 NOTE — ASSESSMENT & PLAN NOTE
Lab Results   Component Value Date    EGFR 47 07/04/2022    EGFR 53 12/09/2021    EGFR 49 12/29/2019    CREATININE 1 31 (H) 07/04/2022    CREATININE 1 31 (H) 06/20/2022    CREATININE 1 20 12/09/2021   · Cr 1 31 on admission   · Baseline 1 1-1 30   · Continue to monitor

## 2022-07-05 LAB — LIPASE SERPL-CCNC: 88 U/L (ref 73–393)

## 2022-07-05 NOTE — ASSESSMENT & PLAN NOTE
· Frontal HA without vision changes, photophobia, weakness or numbness   · CT head negative   · Neurochecks   · Telemetry   · Neurology

## 2022-07-05 NOTE — DISCHARGE INSTRUCTIONS
Continue present medications  Add salt in your diet this week  Contact your PCP tomorrow for follow-up  Return if worse

## 2022-07-06 LAB
ATRIAL RATE: 73 BPM
P AXIS: 68 DEGREES
PR INTERVAL: 206 MS
QRS AXIS: 15 DEGREES
QRSD INTERVAL: 94 MS
QT INTERVAL: 388 MS
QTC INTERVAL: 427 MS
T WAVE AXIS: 53 DEGREES
VENTRICULAR RATE: 73 BPM

## 2022-07-06 PROCEDURE — 93010 ELECTROCARDIOGRAM REPORT: CPT | Performed by: INTERNAL MEDICINE

## 2022-07-08 DIAGNOSIS — K21.9 GASTROESOPHAGEAL REFLUX DISEASE WITHOUT ESOPHAGITIS: ICD-10-CM

## 2022-07-08 RX ORDER — OMEPRAZOLE 40 MG/1
40 CAPSULE, DELAYED RELEASE ORAL DAILY
Qty: 90 CAPSULE | Refills: 2 | Status: SHIPPED | OUTPATIENT
Start: 2022-07-08 | End: 2022-10-26

## 2022-07-12 DIAGNOSIS — M79.672 PAIN IN BOTH FEET: ICD-10-CM

## 2022-07-12 DIAGNOSIS — M79.671 PAIN IN BOTH FEET: ICD-10-CM

## 2022-07-12 RX ORDER — GABAPENTIN 100 MG/1
CAPSULE ORAL
Qty: 60 CAPSULE | Refills: 5 | Status: SHIPPED | OUTPATIENT
Start: 2022-07-12

## 2022-07-13 DIAGNOSIS — K58.0 IRRITABLE BOWEL SYNDROME WITH DIARRHEA: Primary | ICD-10-CM

## 2022-07-13 RX ORDER — AMITRIPTYLINE HYDROCHLORIDE 10 MG/1
10 TABLET, FILM COATED ORAL
Qty: 30 TABLET | Refills: 4 | Status: SHIPPED | OUTPATIENT
Start: 2022-07-13 | End: 2022-09-21 | Stop reason: SDUPTHER

## 2022-07-16 ENCOUNTER — APPOINTMENT (EMERGENCY)
Dept: CT IMAGING | Facility: HOSPITAL | Age: 87
End: 2022-07-16
Payer: COMMERCIAL

## 2022-07-16 ENCOUNTER — HOSPITAL ENCOUNTER (EMERGENCY)
Facility: HOSPITAL | Age: 87
Discharge: HOME | End: 2022-07-16
Attending: EMERGENCY MEDICINE | Admitting: EMERGENCY MEDICINE
Payer: COMMERCIAL

## 2022-07-16 VITALS
HEART RATE: 68 BPM | TEMPERATURE: 97.6 F | SYSTOLIC BLOOD PRESSURE: 229 MMHG | HEIGHT: 68 IN | BODY MASS INDEX: 28.79 KG/M2 | WEIGHT: 190 LBS | RESPIRATION RATE: 20 BRPM | DIASTOLIC BLOOD PRESSURE: 103 MMHG | OXYGEN SATURATION: 96 %

## 2022-07-16 DIAGNOSIS — K21.9 GASTROESOPHAGEAL REFLUX DISEASE, UNSPECIFIED WHETHER ESOPHAGITIS PRESENT: ICD-10-CM

## 2022-07-16 DIAGNOSIS — K22.4 ESOPHAGEAL SPASM: Primary | ICD-10-CM

## 2022-07-16 DIAGNOSIS — R11.2 NAUSEA AND VOMITING: ICD-10-CM

## 2022-07-16 LAB
2HR DELTA HS TROPONIN: 0 NG/L
ALBUMIN SERPL BCP-MCNC: 3.6 G/DL (ref 3.5–5)
ALP SERPL-CCNC: 62 U/L (ref 46–116)
ALT SERPL W P-5'-P-CCNC: 19 U/L (ref 12–78)
ANION GAP SERPL CALCULATED.3IONS-SCNC: 7 MMOL/L (ref 4–13)
AST SERPL W P-5'-P-CCNC: 32 U/L (ref 5–45)
BASOPHILS # BLD AUTO: 0.03 THOUSANDS/ΜL (ref 0–0.1)
BASOPHILS NFR BLD AUTO: 0 % (ref 0–1)
BILIRUB SERPL-MCNC: 0.7 MG/DL (ref 0.2–1)
BILIRUB UR QL STRIP: NEGATIVE
BUN SERPL-MCNC: 11 MG/DL (ref 5–25)
CALCIUM SERPL-MCNC: 9.1 MG/DL (ref 8.3–10.1)
CARDIAC TROPONIN I PNL SERPL HS: 10 NG/L
CARDIAC TROPONIN I PNL SERPL HS: 10 NG/L
CHLORIDE SERPL-SCNC: 94 MMOL/L (ref 100–108)
CLARITY UR: CLEAR
CO2 SERPL-SCNC: 27 MMOL/L (ref 21–32)
COLOR UR: YELLOW
CREAT SERPL-MCNC: 1.11 MG/DL (ref 0.6–1.3)
EOSINOPHIL # BLD AUTO: 0.65 THOUSAND/ΜL (ref 0–0.61)
EOSINOPHIL NFR BLD AUTO: 8 % (ref 0–6)
ERYTHROCYTE [DISTWIDTH] IN BLOOD BY AUTOMATED COUNT: 12.1 % (ref 11.6–15.1)
GFR SERPL CREATININE-BSD FRML MDRD: 57 ML/MIN/1.73SQ M
GLUCOSE SERPL-MCNC: 101 MG/DL (ref 65–140)
GLUCOSE UR STRIP-MCNC: NEGATIVE MG/DL
HCT VFR BLD AUTO: 34.5 % (ref 36.5–49.3)
HGB BLD-MCNC: 11.8 G/DL (ref 12–17)
HGB UR QL STRIP.AUTO: NEGATIVE
IMM GRANULOCYTES # BLD AUTO: 0.02 THOUSAND/UL (ref 0–0.2)
IMM GRANULOCYTES NFR BLD AUTO: 0 % (ref 0–2)
KETONES UR STRIP-MCNC: NEGATIVE MG/DL
LEUKOCYTE ESTERASE UR QL STRIP: NEGATIVE
LIPASE SERPL-CCNC: 61 U/L (ref 73–393)
LYMPHOCYTES # BLD AUTO: 1.38 THOUSANDS/ΜL (ref 0.6–4.47)
LYMPHOCYTES NFR BLD AUTO: 17 % (ref 14–44)
MCH RBC QN AUTO: 33.1 PG (ref 26.8–34.3)
MCHC RBC AUTO-ENTMCNC: 34.2 G/DL (ref 31.4–37.4)
MCV RBC AUTO: 97 FL (ref 82–98)
MONOCYTES # BLD AUTO: 0.8 THOUSAND/ΜL (ref 0.17–1.22)
MONOCYTES NFR BLD AUTO: 10 % (ref 4–12)
NEUTROPHILS # BLD AUTO: 5.39 THOUSANDS/ΜL (ref 1.85–7.62)
NEUTS SEG NFR BLD AUTO: 65 % (ref 43–75)
NITRITE UR QL STRIP: NEGATIVE
NRBC BLD AUTO-RTO: 0 /100 WBCS
PH UR STRIP.AUTO: 7.5 [PH]
PLATELET # BLD AUTO: 219 THOUSANDS/UL (ref 149–390)
PMV BLD AUTO: 11.5 FL (ref 8.9–12.7)
POTASSIUM SERPL-SCNC: 5 MMOL/L (ref 3.5–5.3)
PROT SERPL-MCNC: 7.7 G/DL (ref 6.4–8.2)
PROT UR STRIP-MCNC: NEGATIVE MG/DL
QRS AXIS: 6 DEGREES
QRSD INTERVAL: 90 MS
QT INTERVAL: 394 MS
QTC INTERVAL: 399 MS
RBC # BLD AUTO: 3.56 MILLION/UL (ref 3.88–5.62)
SODIUM SERPL-SCNC: 128 MMOL/L (ref 136–145)
SP GR UR STRIP.AUTO: 1.01 (ref 1–1.03)
T WAVE AXIS: 22 DEGREES
UROBILINOGEN UR QL STRIP.AUTO: 0.2 E.U./DL
VENTRICULAR RATE: 62 BPM
WBC # BLD AUTO: 8.27 THOUSAND/UL (ref 4.31–10.16)

## 2022-07-16 PROCEDURE — 96375 TX/PRO/DX INJ NEW DRUG ADDON: CPT

## 2022-07-16 PROCEDURE — G1004 CDSM NDSC: HCPCS

## 2022-07-16 PROCEDURE — 74176 CT ABD & PELVIS W/O CONTRAST: CPT

## 2022-07-16 PROCEDURE — 93005 ELECTROCARDIOGRAM TRACING: CPT

## 2022-07-16 PROCEDURE — 84484 ASSAY OF TROPONIN QUANT: CPT | Performed by: EMERGENCY MEDICINE

## 2022-07-16 PROCEDURE — 36415 COLL VENOUS BLD VENIPUNCTURE: CPT

## 2022-07-16 PROCEDURE — 93010 ELECTROCARDIOGRAM REPORT: CPT | Performed by: INTERNAL MEDICINE

## 2022-07-16 PROCEDURE — 99285 EMERGENCY DEPT VISIT HI MDM: CPT | Performed by: EMERGENCY MEDICINE

## 2022-07-16 PROCEDURE — 80053 COMPREHEN METABOLIC PANEL: CPT

## 2022-07-16 PROCEDURE — 99285 EMERGENCY DEPT VISIT HI MDM: CPT

## 2022-07-16 PROCEDURE — 83690 ASSAY OF LIPASE: CPT | Performed by: EMERGENCY MEDICINE

## 2022-07-16 PROCEDURE — 85025 COMPLETE CBC W/AUTO DIFF WBC: CPT | Performed by: EMERGENCY MEDICINE

## 2022-07-16 PROCEDURE — 81003 URINALYSIS AUTO W/O SCOPE: CPT | Performed by: EMERGENCY MEDICINE

## 2022-07-16 PROCEDURE — 96374 THER/PROPH/DIAG INJ IV PUSH: CPT

## 2022-07-16 RX ORDER — ONDANSETRON 2 MG/ML
4 INJECTION INTRAMUSCULAR; INTRAVENOUS ONCE
Status: COMPLETED | OUTPATIENT
Start: 2022-07-16 | End: 2022-07-16

## 2022-07-16 RX ORDER — LORAZEPAM 2 MG/ML
0.5 INJECTION INTRAMUSCULAR ONCE
Status: COMPLETED | OUTPATIENT
Start: 2022-07-16 | End: 2022-07-16

## 2022-07-16 RX ORDER — ONDANSETRON 4 MG/1
4 TABLET, ORALLY DISINTEGRATING ORAL EVERY 6 HOURS PRN
Qty: 20 TABLET | Refills: 0 | Status: SHIPPED | OUTPATIENT
Start: 2022-07-16 | End: 2022-07-20

## 2022-07-16 RX ADMIN — ONDANSETRON 4 MG: 2 INJECTION INTRAMUSCULAR; INTRAVENOUS at 09:57

## 2022-07-16 RX ADMIN — LORAZEPAM 0.5 MG: 2 INJECTION INTRAMUSCULAR; INTRAVENOUS at 08:52

## 2022-07-16 RX ADMIN — SODIUM CHLORIDE 500 ML: 0.9 INJECTION, SOLUTION INTRAVENOUS at 07:53

## 2022-07-16 NOTE — DISCHARGE INSTRUCTIONS
Please increase Prilosec dose to 40 mg daily and use Zofran 4 mg as needed for nausea and vomiting    Please keep diet light and make sure that he can stay hydrated and follow-up with GI on Wednesday as scheduled for possible endoscopy    Please return to care if he develops any new worsening symptoms

## 2022-07-18 NOTE — ED PROVIDER NOTES
History  Chief Complaint   Patient presents with    Abdominal Pain     Patient presents to the ED With c/o abdominal pain x1 hour with vomiting  Patient is a 59-year-old male with history corkscrew esophagus, GERD, hypertension that presents for evaluation of abdominal pain  History provided by both the patient and his daughter who is at the bedside  Patient had a barium swallow to being a month that showed corkscrew esophagus and diffuse esophageal spasm  Patient was seen here about a week ago here in the emergency department with negative workup  Patient says that he has had intermittent epigastric abdominal discomfort  He also gets nauseous and has difficulty eating because of lack of appetite and early satiety  Patient currently denies symptoms  Denies headache nausea vomiting, chest pain dyspnea abdominal pain at this time  Patient has not been taking anything for it his symptoms specifically  Patient noted to have significant hypertension here  Apparently cardiologist has taken him off antihypertensive recently secondary to low blood pressure  Prior to Admission Medications   Prescriptions Last Dose Informant Patient Reported? Taking?    Garlic 9514 MG CAPS  Spouse/Significant Other Yes No   Sig: Take 1,000 mg by mouth   al mag oxide-diphenhydramine-lidocaine viscous (MAGIC MOUTHWASH) 1:1:1 suspension   No No   Sig: Swish and swallow 10 mL every 6 (six) hours as needed for mouth pain or discomfort (esophagus pain) Please dispense without benadryl (diphenhydramine)  given his age   allopurinol (ZYLOPRIM) 100 mg tablet  Spouse/Significant Other Yes No   Sig: Take 100 mg by mouth daily   amLODIPine (NORVASC) 2 5 mg tablet  Spouse/Significant Other Yes No   Sig: Take 2 5 mg by mouth 2 (two) times a day   amitriptyline (ELAVIL) 10 mg tablet   No No   Sig: Take 1 tablet (10 mg total) by mouth daily at bedtime   apixaban (ELIQUIS) 2 5 mg  Spouse/Significant Other Yes No   Sig: Take by mouth 2 (two) times a day   bisoprolol (ZEBETA) 5 mg tablet  Spouse/Significant Other Yes No   Si 5 mg    brimonidine tartrate 0 2 % ophthalmic solution  Spouse/Significant Other Yes No   cholecalciferol (VITAMIN D3) 1,000 units tablet  Spouse/Significant Other Yes No   Sig: Take 2,000 Units by mouth daily    co-enzyme Q-10 30 MG capsule  Spouse/Significant Other Yes No   Sig: Take 100 mg by mouth daily     diphenhydrAMINE-acetaminophen (TYLENOL PM)  MG TABS  Spouse/Significant Other Yes No   Sig: Take 2 tablets by mouth daily at bedtime as needed for sleep   dorzolamide-timolol (COSOPT) 22 3-6 8 MG/ML ophthalmic solution  Spouse/Significant Other Yes No   Si drop 2 (two) times a day   finasteride (PROSCAR) 5 mg tablet  Spouse/Significant Other No No   Sig: Take 1 tablet (5 mg total) by mouth daily   gabapentin (NEURONTIN) 100 mg capsule   No No   Sig: TAKE 1 CAPSULE BY MOUTH TWICE A DAY   indapamide (LOZOL) 1 25 mg tablet  Spouse/Significant Other Yes No   Sig: TAKE 1 TABLET BY MOUTH EVERY DAY IN THE MORNING   latanoprost (XALATAN) 0 005 % ophthalmic solution  Spouse/Significant Other Yes No   Sig: Administer 1 drop to both eyes daily at bedtime   loperamide (IMODIUM) 2 mg capsule  Spouse/Significant Other No No   Sig: Take 1 capsule (2 mg total) by mouth 4 (four) times a day as needed for diarrhea   multivitamin-iron-minerals-folic acid (CENTRUM) chewable tablet  Spouse/Significant Other Yes No   Sig: Chew 1 tablet daily  omeprazole (PriLOSEC) 20 mg delayed release capsule  Spouse/Significant Other No No   Sig: TAKE 1 CAPSULE BY MOUTH EVERY DAY AS NEEDED FOR ACID REFLUX   omeprazole (PriLOSEC) 40 MG capsule   No No   Sig: TAKE 1 CAPSULE (40 MG TOTAL) BY MOUTH DAILY     terazosin (HYTRIN) 2 mg capsule  Spouse/Significant Other Yes No   Sig: Take 2 mg by mouth every evening      Facility-Administered Medications: None       Past Medical History:   Diagnosis Date    [facility]     Anemia     Carpal tunnel syndrome, unspecified upper limb     Cataract     last assessed: 8/18/2012    GERD (gastroesophageal reflux disease)     Glaucoma     Hypertension     last assessed: 8/23/2012    Intervertebral disc disease     PVD [facility]peripheral vascular disease) ([facility] )        Past Surgical History:   Procedure Laterality Date    CATARACT EXTRACTION      HERNIA REPAIR      NEUROPLASTY / TRANSPOSITION MEDIAN NERVE AT CARPAL TUNNEL      [place] LAP,CHOLECYSTECTOMY N/A 9/14/2016    Procedure: LAPAROSCOPIC CHOLECYSTECTOMY ;  Surgeon: Joseluis Sparrow MD;  Location: [facility];  Service: General       Family History   Problem Relation Age of Onset    Glaucoma Mother     Cancer Father     Heart disease Father     Glaucoma Father     Alzheimer's disease Family     Heart attack Family      I have reviewed and agree with the history as documented  E-Cigarette/Vaping    E-Cigarette Use Former User      E-Cigarette/Vaping Substances    Nicotine No     THC No     CBD No     Flavoring No     Other No     Unknown No      Social History     Tobacco Use    Smoking status: Former Smoker    Smokeless tobacco: Never Used    Tobacco comment: quit 30 years ago    Vaping Use    Vaping Use: Former   Substance Use Topics    Alcohol use: Not Currently    Drug use: No       Review of Systems   Constitutional: Negative for fever  HENT: Negative for sore throat  Eyes: Negative for photophobia  Respiratory: Negative for shortness of breath  Cardiovascular: Negative for chest pain  Gastrointestinal: Positive for abdominal pain and nausea  Genitourinary: Negative for dysuria  Musculoskeletal: Negative for back pain  Skin: Negative for rash  Neurological: Negative for light-headedness  Hematological: Negative for adenopathy  Psychiatric/Behavioral: Negative for agitation  All other systems reviewed and are negative  Physical Exam  Physical Exam  Vitals reviewed     Constitutional:       General: He is not in acute distress  Appearance: He is well-developed  HENT:      Head: Normocephalic  Eyes:      Pupils: Pupils are equal, round, and reactive to light  Cardiovascular:      Rate and Rhythm: Normal rate and regular rhythm  Heart sounds: Normal heart sounds  No murmur heard  No friction rub  No gallop  Pulmonary:      Effort: Pulmonary effort is normal       Breath sounds: Normal breath sounds  Abdominal:      General: Bowel sounds are normal  There is no distension  Palpations: Abdomen is soft  Tenderness: There is no abdominal tenderness  There is no guarding  Comments: Abdomen is soft, nondistended, nontender  No rebound tenderness or guarding is noted  No masses palpated  Normal bowel sounds  Musculoskeletal:         General: Normal range of motion  Cervical back: Normal range of motion and neck supple  Skin:     Capillary Refill: Capillary refill takes less than 2 seconds  Neurological:      Mental Status: He is alert and oriented to person, place, and time  Cranial Nerves: No cranial nerve deficit  Sensory: No sensory deficit  Motor: No abnormal muscle tone  Psychiatric:         Behavior: Behavior normal          Thought Content:  Thought content normal          Judgment: Judgment normal          Vital Signs  ED Triage Vitals   Temperature Pulse Respirations Blood Pressure SpO2   07/16/22 0741 07/16/22 0741 07/16/22 0741 07/16/22 0741 07/16/22 0744   97 6 °F (36 4 °C) 61 18 (!) 235/100 97 %      Temp Source Heart Rate Source Patient Position - Orthostatic VS BP Location FiO2 (%)   07/16/22 0741 07/16/22 0741 07/16/22 0741 07/16/22 0741 --   Temporal Monitor Sitting Right arm       Pain Score       07/16/22 0741       4           Vitals:    07/16/22 0930 07/16/22 1000 07/16/22 1015 07/16/22 1045   BP: 168/99 166/89  (!) 229/103   Pulse: 63 63 63 68   Patient Position - Orthostatic VS:  Sitting  Sitting         Visual Acuity      ED Medications  Medications   sodium chloride 0 9 % bolus 500 mL (0 mL Intravenous Stopped 7/16/22 0806)   LORazepam (ATIVAN) injection 0 5 mg (0 5 mg Intravenous Given 7/16/22 0852)   ondansetron (ZOFRAN) injection 4 mg (4 mg Intravenous Given 7/16/22 0957)       Diagnostic Studies  Results Reviewed     Procedure Component Value Units Date/Time    HS Troponin I 2hr [[id number]]  (Normal) Collected: 07/16/22 0944    Lab Status: Final result Specimen: Blood from Arm, Left Updated: 07/16/22 1020     hs TnI 2hr 10 ng/L      Delta 2hr hsTnI 0 ng/L     UA w Reflex to Microscopic w Reflex to Culture [[id number]] Collected: 07/16/22 0851    Lab Status: Final result Specimen: Urine, Clean Catch Updated: 07/16/22 0929     Color, UA Yellow     Clarity, UA Clear     Specific Gravity, UA 1 015     pH, UA 7 5     Leukocytes, UA Negative     Nitrite, UA Negative     Protein, UA Negative mg/dl      Glucose, UA Negative mg/dl      Ketones, UA Negative mg/dl      Urobilinogen, UA 0 2 E U /dl      Bilirubin, UA Negative     Occult Blood, UA Negative    Lipase [[id number]]  (Abnormal) Collected: 07/16/22 0759    Lab Status: Final result Specimen: Blood from Arm, Left Updated: 07/16/22 0835     Lipase 61 u/L     Comprehensive metabolic panel [[id number]]  (Abnormal) Collected: 07/16/22 0747    Lab Status: Final result Specimen: Blood from Arm, Left Updated: 07/16/22 1330     Sodium 128 mmol/L      Potassium 5 0 mmol/L      Chloride 94 mmol/L      CO2 27 mmol/L      ANION GAP 7 mmol/L      BUN 11 mg/dL      Creatinine 1 11 mg/dL      Glucose 101 mg/dL      Calcium 9 1 mg/dL      AST 32 U/L      ALT 19 U/L      Alkaline Phosphatase 62 U/L      Total Protein 7 7 g/dL      Albumin 3 6 g/dL      Total Bilirubin 0 70 mg/dL      eGFR 57 ml/min/1 73sq m     Narrative:      [place] guidelines for Chronic Kidney Disease (CKD):     Stage 1 with normal or high GFR (GFR > 90 mL/min/1 73 square meters)    Stage 2 Mild CKD (GFR = 60-89 mL/min/1 73 square meters)    Stage 3A Moderate CKD (GFR = 45-59 mL/min/1 73 square meters)    Stage 3B Moderate CKD (GFR = 30-44 mL/min/1 73 square meters)    Stage 4 Severe CKD (GFR = 15-29 mL/min/1 73 square meters)    Stage 5 End Stage CKD (GFR <15 mL/min/1 73 square meters)  Note: GFR calculation is accurate only with a steady state creatinine    HS Troponin 0hr (reflex protocol) [[id number]]  (Normal) Collected: 07/16/22 0753    Lab Status: Final result Specimen: Blood from Arm, Left Updated: 07/16/22 0820     hs TnI 0hr 10 ng/L     CBC and differential [[id number]]  (Abnormal) Collected: 07/16/22 0747    Lab Status: Final result Specimen: Blood from Arm, Left Updated: 07/16/22 0759     WBC 8 27 Thousand/uL      RBC 3 56 Million/uL      Hemoglobin 11 8 g/dL      Hematocrit 34 5 %      MCV 97 fL      MCH 33 1 pg      MCHC 34 2 g/dL      RDW 12 1 %      MPV 11 5 fL      Platelets 446 Thousands/uL      nRBC 0 /100 WBCs      Neutrophils Relative 65 %      Immat GRANS % 0 %      Lymphocytes Relative 17 %      Monocytes Relative 10 %      Eosinophils Relative 8 %      Basophils Relative 0 %      Neutrophils Absolute 5 39 Thousands/µL      Immature Grans Absolute 0 02 Thousand/uL      Lymphocytes Absolute 1 38 Thousands/µL      Monocytes Absolute 0 80 Thousand/µL      Eosinophils Absolute 0 65 Thousand/µL      Basophils Absolute 0 03 Thousands/µL                  CT abdomen pelvis wo contrast   Final Result by Ish Ritter MD (07/16 1371)      1  No acute abdominopelvic findings or significant change from 2019       2   Recent upper GI study shows findings of esophageal spasm  Could this account for the patient's current symptoms?       Workstation performed: [id number]                    Procedures  ECG 12 Lead Documentation Only    Date/Time: 7/18/2022 1:08 PM  Performed by: Sylvia Pittman MD  Authorized by: Sylvia Pittman MD     ECG reviewed by me, the ED Provider: yes    Patient location: ED  Previous ECG:     Previous ECG:  Unavailable    Comparison to cardiac monitor: Yes    Interpretation:     Interpretation: normal    Rate:     ECG rate assessment: normal    Rhythm:     Rhythm: sinus rhythm    Ectopy:     Ectopy: none    QRS:     QRS axis:  Normal    QRS intervals:  Normal  Conduction:     Conduction: normal    ST segments:     ST segments:  Normal  T waves:     T waves: normal               ED Course  ED Course as of 07/18/22 1309   Sat Jul 16, 2022   0800 Hemoglobin(!): 11 8  Baseline                                 SBIRT 20yo+    Flowsheet Row Most Recent Value   SBIRT (23 yo +)    In order to provide better care to our patients, we are screening all of our patients for alcohol and drug use  Would it be okay to ask you these screening questions? Yes Filed at: 07/16/2022 0751   Initial Alcohol Screen: US AUDIT-C     1  How often do you have a drink containing alcohol? 0 Filed at: 07/16/2022 0751   2  How many drinks containing alcohol do you have on a typical day you are drinking? 0 Filed at: 07/16/2022 0751   3a  Male UNDER 65: How often do you have five or more drinks on one occasion? 0 Filed at: 07/16/2022 0751   3b  FEMALE Any Age, or MALE 65+: How often do you have 4 or more drinks on one occassion? 0 Filed at: 07/16/2022 0751   Audit-C Score 0 Filed at: 07/16/2022 5759   ROSSY: How many times in the past year have you    Used an illegal drug or used a prescription medication for non-medical reasons? Never Filed at: 07/16/2022 0751                    MDM  Number of Diagnoses or Management Options  Esophageal spasm  Nausea and vomiting  Diagnosis management comments: Patient is a 60-year-old male presents for evaluation of intermittent epigastric abdominal pain and nausea/early satiety  Possibly related to esophageal spasm  Otherwise workup was unremarkable  Discussed with GI, will likely need endoscopy and GI follow-up was discussed with the patient    Asymptomatic hypertension was noted, advised further follow-up with Cardiology  Strict return precautions discussed  Daughter was on board with the plan  Disposition  Final diagnoses:   Esophageal spasm   Nausea and vomiting     Time reflects when diagnosis was documented in both MDM as applicable and the Disposition within this note     Time User Action Codes Description Comment    7/16/2022 11:22 AM Sterling Genin Add [K22 4] Esophageal spasm     7/16/2022 11:22 AM Shugars, General Neighbours Add [R11 2] Nausea and vomiting     7/16/2022 11:22 AM Quiro[place] Se, General Neighbours Add [K21 9] Gastroesophageal reflux disease, unspecified whether esophagitis present     7/16/2022 11:22 AM Shugars, General Neighbours Modify [K21 9] Gastroesophageal reflux disease, unspecified whether esophagitis present     7/16/2022 11:23 AM Shugars, General Neighbours Modify [K21 9] Gastroesophageal reflux disease, unspecified whether esophagitis present       ED Disposition     ED Disposition   Discharge    Condition   Stable    Date/Time   Sat Jul 16, 2022 11:22 AM    Comment   Samanta Benavides discharge to home/self care                 Follow-up Information     Follow up With Specialties Details Why Contact Info Additional Bette Padilla [address] Emergency Department Emergency Medicine  If symptoms worsen [address] [place], [address]  [address] [facility] Emergency Department, [address] [place], [place] [address]    [facility] [address] [place] [address] Gastroenterology Schedule an appointment as soon as possible for a visit   [facility] Raegan Bunch [place] [id number] [phone] [facility] [address] [place] [address] Raegan Bunch [address] [place] [address]  [id number] [phone]          Discharge Medication List as of 7/16/2022 11:27 AM      START taking these medications    Details   ondansetron (Zofran ODT) 4 mg disintegrating tablet Take 1 tablet (4 mg total) by mouth every 6 (six) hours as needed for nausea or vomiting, Starting Sat 7/16/2022, Normal         CONTINUE these medications which have NOT CHANGED    Details   al mag oxide-diphenhydramine-lidocaine viscous (MAGIC MOUTHWASH) 1:1:1 suspension Swish and swallow 10 mL every 6 (six) hours as needed for mouth pain or discomfort (esophagus pain) Please dispense without benadryl (diphenhydramine)  given his age, Starting Fri 6/17/2022, Print      allopurinol (ZYLOPRIM) 100 mg tablet Take 100 mg by mouth daily, Starting Thu 10/22/2020, Historical Med      amitriptyline (ELAVIL) 10 mg tablet Take 1 tablet (10 mg total) by mouth daily at bedtime, Starting Wed 7/13/2022, Normal      amLODIPine (NORVASC) 2 5 mg tablet Take 2 5 mg by mouth 2 (two) times a day, Historical Med      apixaban (ELIQUIS) 2 5 mg Take by mouth 2 (two) times a day, Historical Med      bisoprolol (ZEBETA) 5 mg tablet 2 5 mg , Historical Med      brimonidine tartrate 0 2 % ophthalmic solution Starting Tue 11/9/2021, Historical Med      cholecalciferol (VITAMIN D3) 1,000 units tablet Take 2,000 Units by mouth daily , Historical Med      co-enzyme Q-10 30 MG capsule Take 100 mg by mouth daily  , Historical Med      diphenhydrAMINE-acetaminophen (TYLENOL PM)  MG TABS Take 2 tablets by mouth daily at bedtime as needed for sleep, Historical Med      dorzolamide-timolol (COSOPT) 22 3-6 8 MG/ML ophthalmic solution 1 drop 2 (two) times a day, Historical Med      finasteride (PROSCAR) 5 mg tablet Take 1 tablet (5 mg total) by mouth daily, Starting Mon 8/2/2021, Normal      gabapentin (NEURONTIN) 100 mg capsule TAKE 1 CAPSULE BY MOUTH TWICE A DAY, Normal      Garlic 4799 MG CAPS Take 1,000 mg by mouth, Historical Med      indapamide (LOZOL) 1 25 mg tablet TAKE 1 TABLET BY MOUTH EVERY DAY IN THE MORNING, Historical Med      latanoprost (XALATAN) 0 005 % ophthalmic solution Administer 1 drop to both eyes daily at bedtime, Historical Med loperamide (IMODIUM) 2 mg capsule Take 1 capsule (2 mg total) by mouth 4 (four) times a day as needed for diarrhea, Starting Tue 4/12/2022, Normal      multivitamin-iron-minerals-folic acid (CENTRUM) chewable tablet Chew 1 tablet daily  , Historical Med      !! omeprazole (PriLOSEC) 20 mg delayed release capsule TAKE 1 CAPSULE BY MOUTH EVERY DAY AS NEEDED FOR ACID REFLUX, Normal      !! omeprazole (PriLOSEC) 40 MG capsule TAKE 1 CAPSULE (40 MG TOTAL) BY MOUTH DAILY  , Starting Fri 7/8/2022, Normal      terazosin (HYTRIN) 2 mg capsule Take 2 mg by mouth every evening, Starting Wed 4/13/2022, Historical Med       !! - Potential duplicate medications found  Please discuss with provider  No discharge procedures on file      PDMP Review     None          ED Provider  Electronically Signed by           Tabby Ferreira MD  07/18/22 7141

## 2022-07-20 ENCOUNTER — OFFICE VISIT (OUTPATIENT)
Dept: GASTROENTEROLOGY | Facility: CLINIC | Age: 87
End: 2022-07-20
Payer: COMMERCIAL

## 2022-07-20 VITALS
HEART RATE: 70 BPM | DIASTOLIC BLOOD PRESSURE: 70 MMHG | RESPIRATION RATE: 18 BRPM | SYSTOLIC BLOOD PRESSURE: 130 MMHG | OXYGEN SATURATION: 97 % | TEMPERATURE: 97.4 F | WEIGHT: 184 LBS | HEIGHT: 68 IN | BODY MASS INDEX: 27.89 KG/M2

## 2022-07-20 DIAGNOSIS — K58.0 IRRITABLE BOWEL SYNDROME WITH DIARRHEA: ICD-10-CM

## 2022-07-20 DIAGNOSIS — R13.19 OTHER DYSPHAGIA: ICD-10-CM

## 2022-07-20 DIAGNOSIS — R09.82 POST-NASAL DRIP: ICD-10-CM

## 2022-07-20 DIAGNOSIS — K21.9 GASTROESOPHAGEAL REFLUX DISEASE, UNSPECIFIED WHETHER ESOPHAGITIS PRESENT: Primary | ICD-10-CM

## 2022-07-20 PROCEDURE — 1160F RVW MEDS BY RX/DR IN RCRD: CPT | Performed by: PHYSICIAN ASSISTANT

## 2022-07-20 PROCEDURE — 99214 OFFICE O/P EST MOD 30 MIN: CPT | Performed by: PHYSICIAN ASSISTANT

## 2022-07-20 RX ORDER — FLUTICASONE PROPIONATE 50 MCG
1 SPRAY, SUSPENSION (ML) NASAL DAILY
Qty: 15.8 ML | Refills: 4 | Status: SHIPPED | OUTPATIENT
Start: 2022-07-20

## 2022-07-20 NOTE — PROGRESS NOTES
[facility] - Outpatient Follow-up Note  Sophia Chávez 80 y o  male MRN: [id number]  Encounter: [id number]        Assessment and Plan    1  GERD  Well controlled on omeprazole 40mg once daily  - continue omeprazole 40mg once daily  - diet/lifestyle modification for GERD prevention    2  Dysphagia  The patient has dysphagia and underwent EGD, esophogram with speech, and upper GI series  EGD 12/30/21 revealed a significant tortuous esophagus and non-obstructing schatzkis ring dilation to 45fr  Esophagram with speech 1/1/22 revealed deep penetration with thin consistency, episode of reflux from the esophagus with deep penetration also with thin consistency, and no visualized aspiration  Upper GI series 7/1/22 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  Currently he does well with purred foods but sometimes solids get stuck at his GE junction and he needs to bring them up  - dysphagia likely multifactorial secondary to his GERD, Schatzki's ring, and corkscrew esophagus   - continue purred consistency and while eating patient should sit upright   - consider EGD if food continues to get stuck in epigastric region as patient may need to be dilated again  - continue GERD treatment as above   - flonase for excess mucous     3  IBS  The patient has IBS with stomach upset and diarrhea  Currently his diarrhea is well controlled with a diary free diet and Imodium as needed  He does continue to complain of stomach upset but it unable to provide further detail  After discussion with his cardiologist he was started on amitriptyline a few days ago and states this helps his stomach upset but makes him "woozy"  He has previously tried and failed bentyl, fiber, zofran and Luana Uribe    - continue diary free diet  - continue Imodium as needed  - cut Amitriptyline 10mg tablet in half and take just prior to bed    Follow up 2-3 months ______________________________________________________________________    History of Present Illness  Junie Centeno is a 80 y o  male here for follow up evaluation of IBS with abdominal pain and diarrhea and dysphagia  The patient was seen in October and at that time he complains of an upset stomach and diarrhea  He was eventually diagnosed with IBS and started on amitriptyline a few days ago which already seems to be helping but makes him "woozy"  He also continues on omeprazole and as needed Imodium and is doing well on those  He has also developed dysphagia and has been found ot have a Schatzkis ring which was dilated 12/2021 and corkscrew esophagus  He tolerated purred food but solids often get stuck at his epigastric region  Work up and medications are as below:    Blood and stool tests: negative CBC, CMP, infectious stool studies, celiac serologies, fecal elastace, and thyroid serologies    EGD 12/30/21 with significant tortuous esophagus, non-obstructing schatzkis ring dilation to 45fr    Esophagram with speech 1/1/22: Deep penetration with thin consistency  Episode of reflux from the esophagus with deep penetration also with thin consistency  No visualized aspiration    Upper GI 7/1/22: 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    CT 7/16/22 no acute abdominopelvic findings or significant change from 2019  Medicines tried and failed: Zofran, Bentyl, Imodium, fiber, and Questran      Review of Systems   Constitutional: Negative for activity change, appetite change, chills, fatigue, fever and unexpected weight change  HENT: Positive for trouble swallowing  Gastrointestinal: Positive for abdominal pain  Negative for constipation, diarrhea, nausea and vomiting  Genitourinary: Positive for frequency  Negative for dysuria and hematuria  Musculoskeletal: Negative for arthralgias and myalgias  Neurological: Positive for headaches         Past Medical History  Past Medical History: Diagnosis Date    A-fib [facility])     Anemia     Carpal tunnel syndrome, unspecified upper limb     Cataract     last assessed: 8/18/2012    GERD (gastroesophageal reflux disease)     Glaucoma     Hypertension     last assessed: 8/23/2012    Intervertebral disc disease     [facility]VD [facility]peripheral vascular disease) ([facility])        Past Social history  Past Surgical History:   Procedure Laterality Date    CATARACT EXTRACTION      HERNIA REPAIR      NEUROPLASTY / TRANSPOSITION MEDIAN NERVE AT CARPAL TUNNEL      ND LAP,CHOLECYSTECTOMY N/A 9/14/2016    Procedure: LAPAROSCOPIC CHOLECYSTECTOMY ;  Surgeon: Norma Chappell MD;  Location: [facility];  Service: General     Social History     Socioeconomic History    Marital status:       Spouse name: Not on file    Number of children: Not on file    Years of education: Not on file    Highest education level: Not on file   Occupational History    Occupation: retired   Tobacco Use    Smoking status: Former Smoker    Smokeless tobacco: Never Used    Tobacco comment: quit 30 years ago    Vaping Use    Vaping Use: Former   Substance and Sexual Activity    Alcohol use: Not Currently    Drug use: No    Sexual activity: Not Currently   Other Topics Concern    Not on file   Social History Narrative    Morning person     Social Determinants of Health     Financial Resource Strain: Not on file   Food Insecurity: Not on file   Transportation Needs: Not on file   Physical Activity: Not on file   Stress: Not on file   Social Connections: Not on file   Intimate Partner Violence: Not on file   Housing Stability: Not on file     Social History     Substance and Sexual Activity   Alcohol Use Not Currently     Social History     Substance and Sexual Activity   Drug Use No     Social History     Tobacco Use   Smoking Status Former Smoker   Smokeless Tobacco Never Used   Tobacco Comment    quit 30 years ago        Past Family History  Family History   Problem Relation Age of Onset  Glaucoma Mother     Cancer Father     Heart disease Father     Glaucoma Father     Alzheimer's disease Family     Heart attack Family        Current Medications  Current Outpatient Medications   Medication Sig Dispense Refill    al mag oxide-diphenhydramine-lidocaine viscous (MAGIC MOUTHWASH) 1:1:1 suspension Swish and swallow 10 mL every 6 (six) hours as needed for mouth pain or discomfort (esophagus pain) Please dispense without benadryl (diphenhydramine)  given his age 300 mL 3    allopurinol (ZYLOPRIM) 100 mg tablet Take 100 mg by mouth daily      amitriptyline (ELAVIL) 10 mg tablet Take 1 tablet (10 mg total) by mouth daily at bedtime 30 tablet 4    amLODIPine (NORVASC) 2 5 mg tablet Take 2 5 mg by mouth 2 (two) times a day      apixaban (ELIQUIS) 2 5 mg Take by mouth 2 (two) times a day      bisoprolol (ZEBETA) 5 mg tablet 2 5 mg       brimonidine tartrate 0 2 % ophthalmic solution       cholecalciferol (VITAMIN D3) 1,000 units tablet Take 2,000 Units by mouth daily       co-enzyme Q-10 30 MG capsule Take 100 mg by mouth daily        diphenhydrAMINE-acetaminophen (TYLENOL PM)  MG TABS Take 2 tablets by mouth daily at bedtime as needed for sleep      dorzolamide-timolol (COSOPT) 22 3-6 8 MG/ML ophthalmic solution 1 drop 2 (two) times a day      finasteride (PROSCAR) 5 mg tablet Take 1 tablet (5 mg total) by mouth daily 90 tablet 3    gabapentin (NEURONTIN) 100 mg capsule TAKE 1 CAPSULE BY MOUTH TWICE A DAY 60 capsule 5    Garlic 5552 MG CAPS Take 1,000 mg by mouth      indapamide (LOZOL) 1 25 mg tablet TAKE 1 TABLET BY MOUTH EVERY DAY IN THE MORNING      latanoprost (XALATAN) 0 005 % ophthalmic solution Administer 1 drop to both eyes daily at bedtime      loperamide (IMODIUM) 2 mg capsule Take 1 capsule (2 mg total) by mouth 4 (four) times a day as needed for diarrhea 60 capsule 5    multivitamin-iron-minerals-folic acid (CENTRUM) chewable tablet Chew 1 tablet daily        omeprazole (PriLOSEC) 20 mg delayed release capsule TAKE 1 CAPSULE BY MOUTH EVERY DAY AS NEEDED FOR ACID REFLUX 90 capsule 1    omeprazole (PriLOSEC) 40 MG capsule TAKE 1 CAPSULE (40 MG TOTAL) BY MOUTH DAILY  90 capsule 2    ondansetron (Zofran ODT) 4 mg disintegrating tablet Take 1 tablet (4 mg total) by mouth every 6 (six) hours as needed for nausea or vomiting 20 tablet 0    terazosin (HYTRIN) 2 mg capsule Take 2 mg by mouth every evening       No current facility-administered medications for this visit  Allergies  Allergies   Allergen Reactions    Bempedoic Acid Other (See Comments)     Gout    Atorvastatin GI Intolerance     Other reaction(s): GI upset    Ezetimibe Other (See Comments)     myalgia  Other reaction(s): Other (See Comments)  Myalgia  Other reaction(s): Myalgia  myalgia    Statins Rash         The following portions of the patient's history were reviewed and updated as appropriate: allergies, current medications, past medical history, past social history, past surgical history and problem list       Vitals  There were no vitals filed for this visit  Physical Exam  Constitutional   General appearance: Patient is seated and in no acute distress, well appearing and well nourished  Head and Face   Head and face: Normal     Eyes   Conjunctiva and lids: No erythema, swelling or discharge  Anicteric  Ears, Nose, Mouth, and Throat   Hearing: Normal     Neck: Supple, trachea midline  Pulmonary   Respiratory effort: No increased work of breathing or signs of respiratory distress  Cardiovascular   Examination of extremities for edema and/or varicosities: Normal     Musculoskeletal   Gait and station: Walks with a cane   Skin   Skin and subcutaneous tissue: Warm, dry, and intact  No visible jaundice, lesions or rashes    Psychiatric   Judgment and insight: Normal  Recent and remote memory:  Normal  Mood and affect: Normal      Results  No visits with results within 1 Day(s) from this visit     Latest known visit with results is:   Admission on 07/16/2022, Discharged on 07/16/2022   Component Date Value    Sodium 07/16/2022 128 (A)    Potassium 07/16/2022 5 0     Chloride 07/16/2022 94 (A)    CO2 07/16/2022 27     ANION GAP 07/16/2022 7     BUN 07/16/2022 11     Creatinine 07/16/2022 1 11     Glucose 07/16/2022 101     Calcium 07/16/2022 9 1     AST 07/16/2022 32     ALT 07/16/2022 19     Alkaline Phosphatase 07/16/2022 62     Total Protein 07/16/2022 7 7     Albumin 07/16/2022 3 6     Total Bilirubin 07/16/2022 0 70     eGFR 07/16/2022 57     WBC 07/16/2022 8 27     RBC 07/16/2022 3 56 (A)    Hemoglobin 07/16/2022 11 8 (A)    Hematocrit 07/16/2022 34 5 (A)    MCV 07/16/2022 97     MCH 07/16/2022 33 1     MCHC 07/16/2022 34 2     RDW 07/16/2022 12 1     MPV 07/16/2022 11 5     Platelets 51/55/1110 219     nRBC 07/16/2022 0     Neutrophils Relative 07/16/2022 65     Immat GRANS % 07/16/2022 0     Lymphocytes Relative 07/16/2022 17     Monocytes Relative 07/16/2022 10     Eosinophils Relative 07/16/2022 8 (A)    Basophils Relative 07/16/2022 0     Neutrophils Absolute 07/16/2022 5 39     Immature Grans Absolute 07/16/2022 0 02     Lymphocytes Absolute 07/16/2022 1 38     Monocytes Absolute 07/16/2022 0 80     Eosinophils Absolute 07/16/2022 0 65 (A)    Basophils Absolute 07/16/2022 0 03     Color, UA 07/16/2022 Yellow     Clarity, UA 07/16/2022 Clear     Specific Gravity, UA 07/16/2022 1 015     pH, UA 07/16/2022 7 5     Leukocytes, UA 07/16/2022 Negative     Nitrite, UA 07/16/2022 Negative     Protein, UA 07/16/2022 Negative     Glucose, UA 07/16/2022 Negative     Ketones, UA 07/16/2022 Negative     Urobilinogen, UA 07/16/2022 0 2     Bilirubin, UA 07/16/2022 Negative     Occult Blood, UA 07/16/2022 Negative     Ventricular Rate 07/16/2022 62     QRSD Interval 07/16/2022 90     QT Interval 07/16/2022 394     QTC Interval 07/16/2022 399     QRS Axis 07/16/2022 6     T Wave Axis 07/16/2022 22     hs TnI 0hr 07/16/2022 10     Lipase 07/16/2022 61 (A)    hs TnI 2hr 07/16/2022 10     Delta 2hr hsTnI 07/16/2022 0        Radiology Results  CT abdomen pelvis wo contrast    Result Date: 7/16/2022  Narrative: CT ABDOMEN AND PELVIS WITHOUT IV CONTRAST INDICATION:   Nausea and vomiting  Food intolerance  COMPARISON:  Upper GI series dated 7/1/2022  CT of the abdomen/pelvis dated 4/5/2019 and 1/5/2018  TECHNIQUE:  CT examination of the abdomen and pelvis was performed without intravenous contrast  This examination was performed without intravenous contrast in the context of the critical nationwide Omnipaque shortage  Axial, sagittal, and coronal 2D reformatted images were created from the source data and submitted for interpretation  Radiation dose length product (DLP) for this visit:  506 92 mGy-cm   This examination, like all CT scans performed in the [place], was performed utilizing techniques to minimize radiation dose exposure, including the use of iterative  reconstruction and automated exposure control  Enteric contrast was not administered  FINDINGS: ABDOMEN LOWER CHEST:  Indeterminate for UIP pattern of pulmonary interstitial changes at the lung bases--a chronic finding  No acute findings in the visualized lower chest  LIVER/BILIARY TREE:  Unremarkable  GALLBLADDER:  Gallbladder is surgically absent  SPLEEN:  Unremarkable  PANCREAS:  Mild diffuse involutional  No focal masses or pancreatic ductal dilatation  ADRENAL GLANDS:  Unremarkable  KIDNEYS/URETERS:  Soft tissue density exophytic contour abnormality at the posterior aspect of the mid kidney (4/39; 602/72)  This appeared to the nonenhancing on prior post contrast CTs and is unchanged since 2018  No suspicious contour distorting right  renal masses  No perinephric collections on either side  Calcifications at the right hilum is vascular in nature   No urolithiasis or hydronephrosis  STOMACH AND BOWEL: Small hiatal hernia  No gastric wall thickening  Noninflamed periampullary duodenal diverticulum  No dilated or inflamed loops of small bowel  Colonic diverticula without associated wall thickening or pericolonic inflammation  APPENDIX:  A normal appendix was visualized  ABDOMINOPELVIC CAVITY:  Mild mistiness of the central mesentery without enlarged lymph nodes or other suspicious abnormalities  This is unchanged 2019 confirming benignancy  No follow-up needed  No mesenteric, retroperitoneal, or pelvic sidewall lymphadenopathy  No ascites, encapsulated fluid collection, or free air  VESSELS:  Aortoiliac and branch vessel atherosclerosis without aneurysm  PELVIS REPRODUCTIVE ORGANS:  Prior TURP  URINARY BLADDER:  Unremarkable  ABDOMINAL WALL/INGUINAL REGIONS:  Unremarkable  OSSEOUS STRUCTURES: Moderate multilevel lumbar spondylosis  No acute fracture or destructive osseous lesion  Impression: 1  No acute abdominopelvic findings or significant change from 2019  2   Recent upper GI study shows findings of esophageal spasm  Could this account for the patient's current symptoms? Workstation performed: [id number]     XR chest 1 view portable    Result Date: 7/5/2022  Narrative: CHEST INDICATION:   Chest discomfort  COMPARISON:  Chest x-ray 12/29/2019 EXAM PERFORMED/VIEWS:  XR CHEST PORTABLE FINDINGS: Heart size is not well evaluated on this single portable AP view  The lungs are clear  No pneumothorax or pleural effusion  Osseous structures appear within normal limits for patient age  Impression: No active pulmonary disease  Workstation performed: [id number]     CT head without contrast    Result Date: 7/4/2022  Narrative: CT BRAIN - WITHOUT CONTRAST INDICATION:   Headache, new or worsening (Age >= 50y) Headache, on Eliquis  COMPARISON:  5/4/2020  TECHNIQUE:  CT examination of the brain was performed    In addition to axial images, sagittal and coronal 2D reformatted images were created and submitted for interpretation  Radiation dose length product (DLP) for this visit:  855 48 mGy-cm   This examination, like all CT scans performed in the [facility], was performed utilizing techniques to minimize radiation dose exposure, including the use of iterative  reconstruction and automated exposure control  IMAGE QUALITY:  Diagnostic  FINDINGS: PARENCHYMA: Decreased attenuation is noted in periventricular and subcortical white matter demonstrating an appearance that is statistically most likely to represent mild microangiopathic change; this appearance is similar when compared to most recent prior examination  No CT signs of acute infarction  No intracranial mass, mass effect or midline shift  No acute parenchymal hemorrhage  VENTRICLES AND EXTRA-AXIAL SPACES:  Normal for the patient's age  VISUALIZED ORBITS AND PARANASAL SINUSES:  Unremarkable  CALVARIUM AND EXTRACRANIAL SOFT TISSUES:  Normal      Impression: No acute intracranial abnormality  Workstation performed: [id number]     FL upper GI UGI    Result Date: 7/1/2022  Narrative: UPPER GI SERIES  DOUBLE CONTRAST INDICATION:  K21 9: Gastro-esophageal reflux disease without esophagitis K58 0: Irritable bowel syndrome with diarrhea  COMPARISON:  CT abdomen and pelvis 2019 IMAGES:  33 FLUOROSCOPY TIME:   1 6 minutes TECHNIQUE:  The patient was given effervescent granules and barium  by mouth and images of the esophagus, stomach, and small bowel were obtained  FINDINGS: Laryngeal penetration was noted  There is esophageal dysmotility with corkscrew esophagus and narrowing of the gastroesophageal junction  There is no complete obstruction of the esophagus  Poor coating of the stomach was seen limiting evaluation of the mucosa  The stomach is unremarkable in size  No masses were noted  Contrast empties promptly into the duodenum  The duodenum is normal in caliber    The ligament of Treitz/duodenojejunal junction lies in a normal position  Gastroesophageal reflux was not observed  There is no hiatal hernia  There was very rapid transit of contrast through the stomach into the small bowel  Results were discussed with the patient and his daughter  Impression: Esophageal dysmotility with a corkscrew esophagus and narrowing of the gastroesophageal junction  Consider evaluation with endoscopy  Poor coating of the stomach although no focal abnormality  Rapid transit of the contrast to the small bowel  The study was marked in EPIC for significant notification  Workstation performed: [id number]       Orders  No orders of the defined types were placed in this encounter  Answers for HPI/ROS submitted by the patient on 7/20/2022  Chronicity: recurrent  Onset: more than 1 month ago  Onset quality: gradual  Frequency: daily  Episode duration: 2 hours  Progression since onset: waxing and waning  Pain location: generalized abdominal region  Pain - numeric: 7/10  Pain quality: dull, a sensation of fullness  Radiates to: does not radiate  anorexia: Yes  belching: No  flatus: Yes  hematochezia: No  melena: No  weight loss:  Yes  Aggravated by: eating  Relieved by: recumbency  Diagnostic workup: CT scan, GI consult

## 2022-07-21 ENCOUNTER — TRANSCRIBE ORDERS (OUTPATIENT)
Dept: UROLOGY | Facility: CLINIC | Age: 87
End: 2022-07-21

## 2022-07-21 ENCOUNTER — PATIENT MESSAGE (OUTPATIENT)
Dept: UROLOGY | Facility: CLINIC | Age: 87
End: 2022-07-21

## 2022-07-21 ENCOUNTER — TELEPHONE (OUTPATIENT)
Dept: UROLOGY | Facility: CLINIC | Age: 87
End: 2022-07-21

## 2022-07-21 DIAGNOSIS — R30.0 DYSURIA: Primary | ICD-10-CM

## 2022-07-21 NOTE — TELEPHONE ENCOUNTER
Patricia Avendaño  to [place], [place]     7/21/22 10:27 AM  Bertin Tejedakatycorrine, for my father, Loco Benson  My father saw you in 2020/2021 for burning during urinating  He has been taking the Finasteride (5 mg) since, without issue      He has complained of burning during the start of urination for the past week or so      If an appointment is necessary, I can bring him in; however, he's slowed down quite a bit and  I was wondering if you could order a urine test, or whatever tests may be necessary, and do a virtual visit with him/me      Please let me know your thoughts      Thank you  Bernadette Leyva  for  Corin Jacobo      Me  to Rao Ascencio A    SD      7/21/22 11:18 AM  Good morning,   I can order urine testing to ensure no infection  Orders placed  You can bring patient to a [facility]'s lab, or drop off specimen in sterile cup to lab within 1 hr of collection  He is overdue for a follow up, but we can rule out infection first, and then decide what to do  Our office will then monitor for results    [facility] RN

## 2022-07-26 ENCOUNTER — APPOINTMENT (OUTPATIENT)
Dept: LAB | Facility: CLINIC | Age: 87
End: 2022-07-26
Payer: COMMERCIAL

## 2022-07-26 DIAGNOSIS — R30.0 DYSURIA: ICD-10-CM

## 2022-07-26 LAB
BACTERIA UR QL AUTO: NORMAL /HPF
BILIRUB UR QL STRIP: NEGATIVE
CLARITY UR: CLEAR
COLOR UR: NORMAL
GLUCOSE UR STRIP-MCNC: NEGATIVE MG/DL
HGB UR QL STRIP.AUTO: NEGATIVE
KETONES UR STRIP-MCNC: NEGATIVE MG/DL
LEUKOCYTE ESTERASE UR QL STRIP: NEGATIVE
NITRITE UR QL STRIP: NEGATIVE
NON-SQ EPI CELLS URNS QL MICRO: NORMAL /HPF
PH UR STRIP.AUTO: 7 [PH]
PROT UR STRIP-MCNC: NEGATIVE MG/DL
RBC #/AREA URNS AUTO: NORMAL /HPF
SP GR UR STRIP.AUTO: 1.01 (ref 1–1.03)
UROBILINOGEN UR STRIP-ACNC: <2 MG/DL
WBC #/AREA URNS AUTO: NORMAL /HPF

## 2022-07-26 PROCEDURE — 81001 URINALYSIS AUTO W/SCOPE: CPT

## 2022-07-26 PROCEDURE — 87086 URINE CULTURE/COLONY COUNT: CPT

## 2022-07-27 ENCOUNTER — PATIENT MESSAGE (OUTPATIENT)
Dept: UROLOGY | Facility: CLINIC | Age: 87
End: 2022-07-27

## 2022-07-27 DIAGNOSIS — R30.0 DYSURIA: ICD-10-CM

## 2022-07-27 LAB — BACTERIA UR CULT: NORMAL

## 2022-07-27 RX ORDER — FINASTERIDE 5 MG/1
5 TABLET, FILM COATED ORAL DAILY
Qty: 90 TABLET | Refills: 3 | Status: SHIPPED | OUTPATIENT
Start: 2022-07-27

## 2022-07-27 NOTE — TELEPHONE ENCOUNTER
Me  to Ck Gregory          8:57 AM  Hi Rian Blanton, Your fathers urine testing is normal  Would you like to try and bring him in for a follow up so he can be seen and assessed? This [address] message has not been read

## 2022-09-01 ENCOUNTER — HOSPITAL ENCOUNTER (EMERGENCY)
Facility: HOSPITAL | Age: 87
Discharge: HOME | End: 2022-09-01
Attending: EMERGENCY MEDICINE
Payer: COMMERCIAL

## 2022-09-01 ENCOUNTER — APPOINTMENT (OUTPATIENT)
Dept: RADIOLOGY | Facility: HOSPITAL | Age: 87
End: 2022-09-01
Attending: EMERGENCY MEDICINE
Payer: COMMERCIAL

## 2022-09-01 VITALS
OXYGEN SATURATION: 95 % | HEIGHT: 68 IN | BODY MASS INDEX: 30.31 KG/M2 | HEART RATE: 72 BPM | DIASTOLIC BLOOD PRESSURE: 81 MMHG | RESPIRATION RATE: 16 BRPM | WEIGHT: 200 LBS | TEMPERATURE: 98.2 F | SYSTOLIC BLOOD PRESSURE: 180 MMHG

## 2022-09-01 DIAGNOSIS — R55 NEAR SYNCOPE: Primary | ICD-10-CM

## 2022-09-01 LAB
2HR DELTA HS TROPONIN: 1 NG/L
ALBUMIN SERPL BCP-MCNC: 3.1 G/DL (ref 3.5–5)
ALP SERPL-CCNC: 56 U/L (ref 46–116)
ALT SERPL W P-5'-P-CCNC: 22 U/L (ref 12–78)
ANION GAP SERPL CALCULATED.3IONS-SCNC: 5 MMOL/L (ref 4–13)
AST SERPL W P-5'-P-CCNC: 17 U/L (ref 5–45)
ATRIAL RATE: 81 BPM
BASOPHILS # BLD AUTO: 0.02 THOUSANDS/ΜL (ref 0–0.1)
BASOPHILS NFR BLD AUTO: 0 % (ref 0–1)
BILIRUB SERPL-MCNC: 0.4 MG/DL (ref 0.2–1)
BUN SERPL-MCNC: 14 MG/DL (ref 5–25)
CALCIUM ALBUM COR SERPL-MCNC: 9.9 MG/DL (ref 8.3–10.1)
CALCIUM SERPL-MCNC: 9.2 MG/DL (ref 8.3–10.1)
CARDIAC TROPONIN I PNL SERPL HS: 6 NG/L
CARDIAC TROPONIN I PNL SERPL HS: 7 NG/L
CHLORIDE SERPL-SCNC: 97 MMOL/L (ref 96–108)
CO2 SERPL-SCNC: 31 MMOL/L (ref 21–32)
CREAT SERPL-MCNC: 1.3 MG/DL (ref 0.6–1.3)
EOSINOPHIL # BLD AUTO: 0.5 THOUSAND/ΜL (ref 0–0.61)
EOSINOPHIL NFR BLD AUTO: 7 % (ref 0–6)
ERYTHROCYTE [DISTWIDTH] IN BLOOD BY AUTOMATED COUNT: 12.7 % (ref 11.6–15.1)
GFR SERPL CREATININE-BSD FRML MDRD: 47 ML/MIN/1.73SQ M
GLUCOSE SERPL-MCNC: 113 MG/DL (ref 65–140)
HCT VFR BLD AUTO: 31.4 % (ref 36.5–49.3)
HGB BLD-MCNC: 10.6 G/DL (ref 12–17)
IMM GRANULOCYTES # BLD AUTO: 0.03 THOUSAND/UL (ref 0–0.2)
IMM GRANULOCYTES NFR BLD AUTO: 0 % (ref 0–2)
LYMPHOCYTES # BLD AUTO: 1.14 THOUSANDS/ΜL (ref 0.6–4.47)
LYMPHOCYTES NFR BLD AUTO: 16 % (ref 14–44)
MCH RBC QN AUTO: 33 PG (ref 26.8–34.3)
MCHC RBC AUTO-ENTMCNC: 33.8 G/DL (ref 31.4–37.4)
MCV RBC AUTO: 98 FL (ref 82–98)
MONOCYTES # BLD AUTO: 0.72 THOUSAND/ΜL (ref 0.17–1.22)
MONOCYTES NFR BLD AUTO: 10 % (ref 4–12)
NEUTROPHILS # BLD AUTO: 4.83 THOUSANDS/ΜL (ref 1.85–7.62)
NEUTS SEG NFR BLD AUTO: 67 % (ref 43–75)
NRBC BLD AUTO-RTO: 0 /100 WBCS
PLATELET # BLD AUTO: 189 THOUSANDS/UL (ref 149–390)
PMV BLD AUTO: 11.2 FL (ref 8.9–12.7)
POTASSIUM SERPL-SCNC: 4.1 MMOL/L (ref 3.5–5.3)
PROT SERPL-MCNC: 6.9 G/DL (ref 6.4–8.4)
QRS AXIS: 40 DEGREES
QRSD INTERVAL: 86 MS
QT INTERVAL: 428 MS
QTC INTERVAL: 441 MS
RBC # BLD AUTO: 3.21 MILLION/UL (ref 3.88–5.62)
SODIUM SERPL-SCNC: 133 MMOL/L (ref 135–147)
T WAVE AXIS: 42 DEGREES
VENTRICULAR RATE: 64 BPM
WBC # BLD AUTO: 7.24 THOUSAND/UL (ref 4.31–10.16)

## 2022-09-01 PROCEDURE — 71045 X-RAY EXAM CHEST 1 VIEW: CPT

## 2022-09-01 PROCEDURE — 93010 ELECTROCARDIOGRAM REPORT: CPT | Performed by: INTERNAL MEDICINE

## 2022-09-01 PROCEDURE — 99285 EMERGENCY DEPT VISIT HI MDM: CPT

## 2022-09-01 PROCEDURE — 36415 COLL VENOUS BLD VENIPUNCTURE: CPT | Performed by: EMERGENCY MEDICINE

## 2022-09-01 PROCEDURE — 99285 EMERGENCY DEPT VISIT HI MDM: CPT | Performed by: EMERGENCY MEDICINE

## 2022-09-01 PROCEDURE — 85025 COMPLETE CBC W/AUTO DIFF WBC: CPT | Performed by: EMERGENCY MEDICINE

## 2022-09-01 PROCEDURE — 93005 ELECTROCARDIOGRAM TRACING: CPT

## 2022-09-01 PROCEDURE — 80053 COMPREHEN METABOLIC PANEL: CPT | Performed by: EMERGENCY MEDICINE

## 2022-09-01 PROCEDURE — 84484 ASSAY OF TROPONIN QUANT: CPT | Performed by: EMERGENCY MEDICINE

## 2022-09-01 RX ADMIN — SODIUM CHLORIDE 250 ML: 0.9 INJECTION, SOLUTION INTRAVENOUS at 14:44

## 2022-09-01 NOTE — ED PROVIDER NOTES
History  Chief Complaint   Patient presents with    Hypotension     Hypotension after receiving eye drops to decrease intraocular eye pressure at eye dr Mei Moya near syncope at eye dr as well  Denies n/d/v, dizziness, falls      15-year-old male with history of IBS, hypertension, paroxysmal atrial fibrillation, cardiomyopathy, chronic kidney disease and peripheral vascular disease had a near syncopal episode  This was witnessed by his daughter at home  He felt weak and lightheaded and had darkening of vision  Denies chest pain, palpitations or dyspnea at that time  He does not believe he was fully unconscious  States symptoms lasted 15-20 minutes but he feels back to normal currently  There has been no recent change in diet, activity or medications except that he received several doses of three eye drops at the ophthalmologist's office prior to this episode  He said there were no symptoms at the eye doctor  He was told that he had forgotten to take 1 of those drops at home and his "pressure was elevated"  He was released home and told to continue taking all his drops at home as well  Prior to Admission Medications   Prescriptions Last Dose Informant Patient Reported? Taking?    Garlic 7382 MG CAPS  Child Yes No   Sig: Take 1,000 mg by mouth   al mag oxide-diphenhydramine-lidocaine viscous (MAGIC MOUTHWASH) 1:1:1 suspension  Child No No   Sig: Swish and swallow 10 mL every 6 (six) hours as needed for mouth pain or discomfort (esophagus pain) Please dispense without benadryl (diphenhydramine)  given his age   allopurinol (ZYLOPRIM) 100 mg tablet  Child Yes No   Sig: Take 100 mg by mouth daily   amLODIPine (NORVASC) 2 5 mg tablet  Child Yes No   Sig: Take 2 5 mg by mouth 2 (two) times a day   amitriptyline (ELAVIL) 10 mg tablet  Child No No   Sig: Take 1 tablet (10 mg total) by mouth daily at bedtime   apixaban (ELIQUIS) 2 5 mg  Child Yes No   Sig: Take by mouth 2 (two) times a day   bisoprolol (ZEBETA) 5 mg tablet  Child Yes No   Si 5 mg    brimonidine tartrate 0 2 % ophthalmic solution  Child Yes No   cholecalciferol (VITAMIN D3) 1,000 units tablet  Child Yes No   Sig: Take 2,000 Units by mouth daily    co-enzyme Q-10 30 MG capsule  Child Yes No   Sig: Take 100 mg by mouth daily     diphenhydrAMINE-acetaminophen (TYLENOL PM)  MG TABS  Child Yes No   Sig: Take 2 tablets by mouth daily at bedtime as needed for sleep   dorzolamide-timolol (COSOPT) 22 3-6 8 MG/ML ophthalmic solution  Child Yes No   Si drop 2 (two) times a day   finasteride (PROSCAR) 5 mg tablet   No No   Sig: Take 1 tablet (5 mg total) by mouth daily   fluticasone (FLONASE) 50 mcg/act nasal spray   No No   Si spray into each nostril daily   gabapentin (NEURONTIN) 100 mg capsule  Child No No   Sig: TAKE 1 CAPSULE BY MOUTH TWICE A DAY   indapamide (LOZOL) 1 25 mg tablet  Child Yes No   Sig: TAKE 1 TABLET BY MOUTH EVERY DAY IN THE MORNING   latanoprost (XALATAN) 0 005 % ophthalmic solution  Child Yes No   Sig: Administer 1 drop to both eyes daily at bedtime   loperamide (IMODIUM) 2 mg capsule  Child No No   Sig: Take 1 capsule (2 mg total) by mouth 4 (four) times a day as needed for diarrhea   multivitamin-iron-minerals-folic acid (CENTRUM) chewable tablet  Child Yes No   Sig: Chew 1 tablet daily  omeprazole (PriLOSEC) 40 MG capsule  Child No No   Sig: TAKE 1 CAPSULE (40 MG TOTAL) BY MOUTH DAILY        Facility-Administered Medications: None       Past Medical History:   Diagnosis Date    A[facility]fib ([facility] )     Anemia     Carpal tunnel syndrome, unspecified upper limb     Cataract     last assessed: 2012    GERD (gastroesophageal reflux disease)     Glaucoma     Hypertension     last assessed: 2012    Intervertebral disc disease     PVD (peripheral vascular disease) ([facility] )        Past Surgical History:   Procedure Laterality Date    CATARACT EXTRACTION      HERNIA REPAIR      NEUROPLASTY / TRANSPOSITION MEDIAN NERVE AT CARPAL TUNNEL      AK LAP,CHOLECYSTECTOMY N/A 9/14/2016    Procedure: LAPAROSCOPIC CHOLECYSTECTOMY ;  Surgeon: Nadia Lima MD;  Location: [facility];  Service: General       Family History   Problem Relation Age of Onset    Glaucoma Mother     Cancer Father     Heart disease Father     Glaucoma Father     Alzheimer's disease Family     Heart attack Family      I have reviewed and agree with the history as documented  E-Cigarette/Vaping    E-Cigarette Use Former User      E-Cigarette/Vaping Substances    Nicotine No     THC No     CBD No     Flavoring No     Other No     Unknown No      Social History     Tobacco Use    Smoking status: Former Smoker    Smokeless tobacco: Never Used    Tobacco comment: quit 30 years ago    Vaping Use    Vaping Use: Former   Substance Use Topics    Alcohol use: Not Currently    Drug use: No       Review of Systems   Constitutional: Negative for activity change, appetite change and fever  Respiratory: Negative for cough and shortness of breath  Cardiovascular: Negative for chest pain and palpitations  Gastrointestinal: Negative for abdominal pain, blood in stool, nausea and vomiting  Genitourinary: Negative for difficulty urinating and dysuria  Neurological: Negative for seizures and headaches  All other systems reviewed and are negative  Physical Exam  Physical Exam  Vitals and nursing note reviewed  Constitutional:       General: He is not in acute distress  Appearance: Normal appearance  He is well-developed  He is not ill-appearing or diaphoretic  HENT:      Head: Normocephalic and atraumatic  Right Ear: External ear normal       Left Ear: External ear normal       Nose: Nose normal       Mouth/Throat:      Mouth: Mucous membranes are moist       Pharynx: Oropharynx is clear  Eyes:      General: No scleral icterus       Conjunctiva/sclera: Conjunctivae normal       Pupils: Pupils are equal, round, and reactive to light    Neck:      Vascular: No carotid bruit or JVD  Cardiovascular:      Rate and Rhythm: Normal rate  Rhythm irregular  Pulses: Normal pulses  Heart sounds: Normal heart sounds  No murmur heard  Pulmonary:      Effort: Pulmonary effort is normal       Breath sounds: Normal breath sounds  Abdominal:      General: Bowel sounds are normal       Palpations: Abdomen is soft  There is no mass  Tenderness: There is no abdominal tenderness  There is no guarding or rebound  Musculoskeletal:         General: No tenderness or signs of injury  Normal range of motion  Cervical back: Normal range of motion and neck supple  Right lower leg: No edema  Left lower leg: No edema  Lymphadenopathy:      Cervical: No cervical adenopathy  Skin:     General: Skin is warm and dry  Capillary Refill: Capillary refill takes less than 2 seconds  Coloration: Skin is not pale  Findings: No rash  Neurological:      General: No focal deficit present  Mental Status: He is alert and oriented to person, place, and time  Mental status is at baseline  Cranial Nerves: No cranial nerve deficit  Sensory: No sensory deficit  Motor: No weakness  Coordination: Coordination normal       Deep Tendon Reflexes: Reflexes are normal and symmetric     Psychiatric:         Mood and Affect: Mood normal          Behavior: Behavior normal          Vital Signs  ED Triage Vitals   Temperature Pulse Respirations Blood Pressure SpO2   09/01/22 1630 09/01/22 1414 09/01/22 1414 09/01/22 1414 09/01/22 1414   98 2 °F (36 8 °C) 65 16 142/65 96 %      Temp Source Heart Rate Source Patient Position - Orthostatic VS BP Location FiO2 (%)   09/01/22 1630 09/01/22 1414 09/01/22 1414 09/01/22 1414 --   Temporal Monitor Lying Left arm       Pain Score       09/01/22 1414       No Pain           Vitals:    09/01/22 1600 09/01/22 1630 09/01/22 1646 09/01/22 1730   BP: (!) 181/81 (!) 184/86  (!) 180/81 Pulse: 70 70 75 72   Patient Position - Orthostatic VS: Lying            Visual Acuity      ED Medications  Medications   sodium chloride 0 9 % bolus 250 mL (0 mL Intravenous Stopped 9/1/22 1608)       Diagnostic Studies  Results Reviewed     Procedure Component Value Units Date/Time    HS Troponin I 2hr [[id number]]  (Normal) Collected: 09/01/22 1646    Lab Status: Final result Specimen: Blood from Arm, Left Updated: 09/01/22 1730     hs TnI 2hr 7 ng/L      Delta 2hr hsTnI 1 ng/L     HS Troponin 0hr (reflex protocol) [[id number]]  (Normal) Collected: 09/01/22 1444    Lab Status: Final result Specimen: Blood from Arm, Left Updated: 09/01/22 1539     hs TnI 0hr 6 ng/L     Comprehensive metabolic panel [[id number]]  (Abnormal) Collected: 09/01/22 1444    Lab Status: Final result Specimen: Blood from Arm, Left Updated: 09/01/22 1537     Sodium 133 mmol/L      Potassium 4 1 mmol/L      Chloride 97 mmol/L      CO2 31 mmol/L      ANION GAP 5 mmol/L      BUN 14 mg/dL      Creatinine 1 30 mg/dL      Glucose 113 mg/dL      Calcium 9 2 mg/dL      Corrected Calcium 9 9 mg/dL      AST 17 U/L      ALT 22 U/L      Alkaline Phosphatase 56 U/L      Total Protein 6 9 g/dL      Albumin 3 1 g/dL      Total Bilirubin 0 40 mg/dL      eGFR 47 ml/min/1 73sq m     Narrative:      [place] guidelines for Chronic Kidney Disease (CKD):     Stage 1 with normal or high GFR (GFR > 90 mL/min/1 73 square meters)    Stage 2 Mild CKD (GFR = 60-89 mL/min/1 73 square meters)    Stage 3A Moderate CKD (GFR = 45-59 mL/min/1 73 square meters)    Stage 3B Moderate CKD (GFR = 30-44 mL/min/1 73 square meters)    Stage 4 Severe CKD (GFR = 15-29 mL/min/1 73 square meters)    Stage 5 End Stage CKD (GFR <15 mL/min/1 73 square meters)  Note: GFR calculation is accurate only with a steady state creatinine    CBC and differential [[id number]]  (Abnormal) Collected: 09/01/22 1444    Lab Status: Final result Specimen: Blood from Arm, Left Updated: 09/01/22 1508     WBC 7 24 Thousand/uL      RBC 3 21 Million/uL      Hemoglobin 10 6 g/dL      Hematocrit 31 4 %      MCV 98 fL      MCH 33 0 pg      MCHC 33 8 g/dL      RDW 12 7 %      MPV 11 2 fL      Platelets 858 Thousands/uL      nRBC 0 /100 WBCs      Neutrophils Relative 67 %      Immat GRANS % 0 %      Lymphocytes Relative 16 %      Monocytes Relative 10 %      Eosinophils Relative 7 %      Basophils Relative 0 %      Neutrophils Absolute 4 83 Thousands/µL      Immature Grans Absolute 0 03 Thousand/uL      Lymphocytes Absolute 1 14 Thousands/µL      Monocytes Absolute 0 72 Thousand/µL      Eosinophils Absolute 0 50 Thousand/µL      Basophils Absolute 0 02 Thousands/µL                  XR chest 1 view portable   ED Interpretation by Fili Lord DO (09/01 1520)   Unremarkable      Final Result by Merrick Malhotra DO (09/02 9796)      No acute cardiopulmonary disease  Workstation performed: [id number]                    Procedures  ECG 12 Lead Documentation Only    Date/Time: 9/1/2022 2:45 PM  Performed by: Fili Lord DO  Authorized by: Fili Lord DO     ECG reviewed by me, the ED Provider: yes    Patient location:  ED  Previous ECG:     Previous ECG:  Compared to current    Comparison ECG info:  Atrial fibrillation has replaced sinus rhythm    Similarity:  No change    Comparison to cardiac monitor: Yes    Rate:     ECG rate assessment: normal    Rhythm:     Rhythm: atrial fibrillation    QRS:     QRS axis:  Normal    QRS intervals:  Normal  Conduction:     Conduction: normal    ST segments:     ST segments:  Normal  T waves:     T waves: normal               ED Course  ED Course as of 09/02/22 2015   Thu Sep 01, 2022   1643 Patient asymptomatic here  Family is at his bedside  He ambulated to the bathroom  Will check delta troponin  1652 Signed out to Dr Sanjeev Andrews awaiting delta troponin   Patient appears stable for discharge if delta troponin within range  Likely vasovagal syncope due to multiple doses of ophthalmologic drugs given this morning  Has been stable here  HEART Risk Score    Flowsheet Row Most Recent Value   Heart Score Risk Calculator    History 0 Filed at: 09/01/2022 1643   ECG 0 Filed at: 09/01/2022 1643   Age 2 Filed at: 09/01/2022 1643   Risk Factors 1 Filed at: 09/01/2022 1643   Troponin 0 Filed at: 09/01/2022 1643   HEART Score 3 Filed at: 09/01/2022 1643                        SBIRT 22yo+    Flowsheet Row Most Recent Value   SBIRT (23 yo +)    In order to provide better care to our patients, we are screening all of our patients for alcohol and drug use  Would it be okay to ask you these screening questions? Yes Filed at: 09/01/2022 1418   Initial Alcohol Screen: US AUDIT-C     1  How often do you have a drink containing alcohol? 0 Filed at: 09/01/2022 1418   2  How many drinks containing alcohol do you have on a typical day you are drinking? 0 Filed at: 09/01/2022 1418   3a  Male UNDER 65: How often do you have five or more drinks on one occasion? 0 Filed at: 09/01/2022 1418   3b  FEMALE Any Age, or MALE 65+: How often do you have 4 or more drinks on one occassion? 0 Filed at: 09/01/2022 1418   Audit-C Score 0 Filed at: 09/01/2022 1418   ROSSY: How many times in the past year have you    Used an illegal drug or used a prescription medication for non-medical reasons? Never Filed at: 09/01/2022 1418                    MDM  Number of Diagnoses or Management Options  Near syncope  Diagnosis management comments: Of the man with presyncopal episode after coming back from the ophthalmologist   He received multiple doses of Cosopt and brimonidine tartrate  This may be secondary to those medications  Will check for dehydration, anemia, ACS, dysrhythmia, FRANCO, infection, etc     Labs, EKG x-ray reassuring  Patient remained stable here  Just waiting for delta troponin    If that is within normal range the patient will be safe for discharge  Amount and/or Complexity of Data Reviewed  Clinical lab tests: ordered and reviewed  Tests in the radiology section of CPT®: ordered and reviewed  Review and summarize past medical records: yes  Discuss the patient with other providers: yes  Independent visualization of images, tracings, or specimens: yes        Disposition  Final diagnoses:   Near syncope     Time reflects when diagnosis was documented in both MDM as applicable and the Disposition within this note     Time User Action Codes Description Comment    9/1/2022  5:17 PM Jazzy Garg Add [R55] Near syncope       ED Disposition     ED Disposition   Discharge    Condition   Stable    Date/Time   Thu Sep 1, 2022  5:17 PM    Comment   Derrickasha Demarco discharge to home/self care                 Follow-up Information     Follow up With Specialties Details Why Contact Mir Fall MD Family Medicine Call in 1 day For follow-up [address]  [phone]            Discharge Medication List as of 9/1/2022  5:20 PM      CONTINUE these medications which have NOT CHANGED    Details   al mag oxide-diphenhydramine-lidocaine viscous (MAGIC MOUTHWASH) 1:1:1 suspension Swish and swallow 10 mL every 6 (six) hours as needed for mouth pain or discomfort (esophagus pain) Please dispense without benadryl (diphenhydramine)  given his age, Starting Fri 6/17/2022, Print      allopurinol (ZYLOPRIM) 100 mg tablet Take 100 mg by mouth daily, Starting Thu 10/22/2020, Historical Med      amitriptyline (ELAVIL) 10 mg tablet Take 1 tablet (10 mg total) by mouth daily at bedtime, Starting Wed 7/13/2022, Normal      amLODIPine (NORVASC) 2 5 mg tablet Take 2 5 mg by mouth 2 (two) times a day, Historical Med      apixaban (ELIQUIS) 2 5 mg Take by mouth 2 (two) times a day, Historical Med      bisoprolol (ZEBETA) 5 mg tablet 2 5 mg , Historical Med      brimonidine tartrate 0 2 % ophthalmic solution Starting Tue 11/9/2021, Historical Med cholecalciferol (VITAMIN D3) 1,000 units tablet Take 2,000 Units by mouth daily , Historical Med      co-enzyme Q-10 30 MG capsule Take 100 mg by mouth daily  , Historical Med      diphenhydrAMINE-acetaminophen (TYLENOL PM)  MG TABS Take 2 tablets by mouth daily at bedtime as needed for sleep, Historical Med      dorzolamide-timolol (COSOPT) 22 3-6 8 MG/ML ophthalmic solution 1 drop 2 (two) times a day, Historical Med      finasteride (PROSCAR) 5 mg tablet Take 1 tablet (5 mg total) by mouth daily, Starting Wed 7/27/2022, Normal      fluticasone (FLONASE) 50 mcg/act nasal spray 1 spray into each nostril daily, Starting Wed 7/20/2022, Normal      gabapentin (NEURONTIN) 100 mg capsule TAKE 1 CAPSULE BY MOUTH TWICE A DAY, Normal      Garlic 8065 MG CAPS Take 1,000 mg by mouth, Historical Med      indapamide (LOZOL) 1 25 mg tablet TAKE 1 TABLET BY MOUTH EVERY DAY IN THE MORNING, Historical Med      latanoprost (XALATAN) 0 005 % ophthalmic solution Administer 1 drop to both eyes daily at bedtime, Historical Med      loperamide (IMODIUM) 2 mg capsule Take 1 capsule (2 mg total) by mouth 4 (four) times a day as needed for diarrhea, Starting Tue 4/12/2022, Normal      multivitamin-iron-minerals-folic acid (CENTRUM) chewable tablet Chew 1 tablet daily  , Historical Med      omeprazole (PriLOSEC) 40 MG capsule TAKE 1 CAPSULE (40 MG TOTAL) BY MOUTH DAILY  , Starting Fri 7/8/2022, Normal             No discharge procedures on file      PDMP Review     None          ED Provider  Electronically Signed by           Josafat Mazariegos,   09/02/22 2016

## 2022-09-01 NOTE — DISCHARGE INSTRUCTIONS
It appears that your episode was due to low blood pressure from the multiple doses of the eyedrops  Continue present medications including eyedrops as prescribed  Keep well hydrated  Contact your PCP and/or cardiologist tomorrow for follow-up  Return if not feeling well

## 2022-09-15 ENCOUNTER — TELEPHONE (OUTPATIENT)
Dept: OBGYN CLINIC | Facility: MEDICAL CENTER | Age: 87
End: 2022-09-15

## 2022-09-15 NOTE — TELEPHONE ENCOUNTER
Patient sees Dr Pat Silveira for right wrist pain, he usually gets an injection (cortisone), and is looking for an injection  Sent to hand pool for appointment      Patient: Alcon Centeno    MRN: [id number]    Phone:  [phone], Mitzi Garvin

## 2022-09-21 ENCOUNTER — OFFICE VISIT (OUTPATIENT)
Dept: GASTROENTEROLOGY | Facility: CLINIC | Age: 87
End: 2022-09-21
Payer: COMMERCIAL

## 2022-09-21 VITALS
BODY MASS INDEX: 28.46 KG/M2 | HEIGHT: 68 IN | DIASTOLIC BLOOD PRESSURE: 78 MMHG | TEMPERATURE: 98 F | WEIGHT: 187.8 LBS | SYSTOLIC BLOOD PRESSURE: 142 MMHG

## 2022-09-21 DIAGNOSIS — K22.4 CORKSCREW ESOPHAGUS: ICD-10-CM

## 2022-09-21 DIAGNOSIS — K58.0 IRRITABLE BOWEL SYNDROME WITH DIARRHEA: ICD-10-CM

## 2022-09-21 DIAGNOSIS — K21.9 GASTROESOPHAGEAL REFLUX DISEASE WITHOUT ESOPHAGITIS: Primary | ICD-10-CM

## 2022-09-21 PROCEDURE — 99214 OFFICE O/P EST MOD 30 MIN: CPT | Performed by: PHYSICIAN ASSISTANT

## 2022-09-21 RX ORDER — CYCLOSPORINE 0 G/ML
SOLUTION/ DROPS OPHTHALMIC; TOPICAL
COMMUNITY
Start: 2022-06-22

## 2022-09-21 RX ORDER — TERAZOSIN 2 MG/1
2 CAPSULE ORAL EVERY EVENING
COMMUNITY
Start: 2022-09-11

## 2022-09-21 RX ORDER — AMITRIPTYLINE HYDROCHLORIDE 10 MG/1
20 TABLET, FILM COATED ORAL
Qty: 60 TABLET | Refills: 5 | Status: SHIPPED | OUTPATIENT
Start: 2022-09-21

## 2022-09-21 NOTE — PROGRESS NOTES
[facility] - Outpatient Follow-up Note  Derrick Demarco 80 y o  male MRN: [id number]  Encounter: [id number]      Assessment and Plan    1  GERD  Well controlled on omeprazole 40mg once daily  - continue omeprazole 40mg once daily  - diet/lifestyle modification for GERD prevention     2  Dysphagia  The patient has dysphagia and underwent EGD, esophogram with speech, and upper GI series  EGD 12/30/21 revealed a significant tortuous esophagus and non-obstructing schatzkis ring dilation to 45fr  Esophagram with speech 1/1/22 revealed deep penetration with thin consistency, episode of reflux from the esophagus with deep penetration also with thin consistency, and no visualized aspiration  Upper GI series 7/1/22 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  He also had complaints of a lot of phlegm making his swallowing worse, currently doing well with flonase and salt water gargles  - dysphagia likely multifactorial secondary to his GERD, Schatzki's ring, and corkscrew esophagus   - diet as tolerated  - patient should sit upright while eating and avoid talking while eating   - flonase and salt water gargles for excess mucous     3  IBS  The patient has IBS with stomach upset and diarrhea  Currently his diarrhea is well controlled with a diary free diet and Imodium as needed  He does continue to complain of stomach upset but it unable to provide further detail  After discussion with his cardiologist he was started on amitriptyline a few days ago and states this helps his stomach upset but makes him "woozy"  He has previously tried and failed bentyl, fiber, zofran and [address]    - continue diary free diet  - continue Imodium as needed  - continue 1/2 tablet of Amitriptyline 10mg and take just prior to bed     Follow up 6 months     ______________________________________________________________________    History of Present Illness  Derrick Demarco is a 80 y o  male here for follow up evaluation of IBS with abdominal pain and diarrhea and dysphagia  The patient was seen in October and at that time he complains of an upset stomach and diarrhea  He was eventually diagnosed with IBS and started on amitriptyline and doing well on this  Currently taking 15mg prior to bed  He also takes Imodium as needed  Otherwise his GERD is well controlled on omeprazole 40mg once daily and his dysphagia is not currently bothersome    Blood and stool tests: negative CBC, CMP, infectious stool studies, celiac serologies, fecal elastace, and thyroid serologies     EGD 12/30/21 with significant tortuous esophagus, non-obstructing schatzkis ring dilation to 45fr     Esophagram with speech 1/1/22: Deep penetration with thin consistency  Episode of reflux from the esophagus with deep penetration also with thin consistency  No visualized aspiration     Upper GI 7/1/22: 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     CT 7/16/22 no acute abdominopelvic findings or significant change from 2019      Medicines tried and failed: Zofran, Bentyl, Imodium, fiber, and Questran      Review of Systems      Past Medical History  Past Medical History:   Diagnosis Date    [facility]fib [facility] [address] [facility]     Anemia     Carpal tunnel syndrome, unspecified upper limb     Cataract     last assessed: 8/18/2012    GERD (gastroesophageal reflux disease)     Glaucoma     Hypertension     last assessed: 8/23/2012    Intervertebral disc disease     PVD [facility]peripheral vascular disease) ([facility])        Past Social history  Past Surgical History:   Procedure Laterality Date    CATARACT EXTRACTION      HERNIA REPAIR      NEUROPLASTY / TRANSPOSITION MEDIAN NERVE AT CARPAL TUNNEL      [place] LAP,CHOLECYSTECTOMY N/A 9/14/2016    Procedure: LAPAROSCOPIC CHOLECYSTECTOMY ;  Surgeon: Dipika Walden MD;  [facility];  Service: General     Social History     Socioeconomic History    Marital status:       Spouse name: Not on file    Number of children: Not on file    Years of education: Not on file    Highest education level: Not on file   Occupational History    Occupation: retired   Tobacco Use    Smoking status: Former Smoker    Smokeless tobacco: Never Used    Tobacco comment: quit 30 years ago    Vaping Use    Vaping Use: Former   Substance and Sexual Activity    Alcohol use: Not Currently    Drug use: No    Sexual activity: Not Currently   Other Topics Concern    Not on file   Social History Narrative    Morning person     Social Determinants of Health     Financial Resource Strain: Not on file   Food Insecurity: Not on file   Transportation Needs: Not on file   Physical Activity: Not on file   Stress: Not on file   Social Connections: Not on file   Intimate Partner Violence: Not on file   Housing Stability: Not on file     Social History     Substance and Sexual Activity   Alcohol Use Not Currently     Social History     Substance and Sexual Activity   Drug Use No     Social History     Tobacco Use   Smoking Status Former Smoker   Smokeless Tobacco Never Used   Tobacco Comment    quit 30 years ago        Past Family History  Family History   Problem Relation Age of Onset    Glaucoma Mother     Cancer Father     Heart disease Father     Glaucoma Father     Alzheimer's disease Family     Heart attack Family        Current Medications  Current Outpatient Medications   Medication Sig Dispense Refill    al mag oxide-diphenhydramine-lidocaine viscous (MAGIC MOUTHWASH) 1:1:1 suspension Swish and swallow 10 mL every 6 (six) hours as needed for mouth pain or discomfort (esophagus pain) Please dispense without benadryl (diphenhydramine)  given his age 300 mL 3    allopurinol (ZYLOPRIM) 100 mg tablet Take 100 mg by mouth daily      amitriptyline (ELAVIL) 10 mg tablet Take 1 tablet (10 mg total) by mouth daily at bedtime 30 tablet 4    amLODIPine (NORVASC) 2 5 mg tablet Take 2 5 mg by mouth 2 (two) times a day      apixaban (ELIQUIS) 2 5 mg Take by mouth 2 (two) times a day      bisoprolol (ZEBETA) 5 mg tablet 2 5 mg       brimonidine tartrate 0 2 % ophthalmic solution       cholecalciferol (VITAMIN D3) 1,000 units tablet Take 2,000 Units by mouth daily       co-enzyme Q-10 30 MG capsule Take 100 mg by mouth daily        diphenhydrAMINE-acetaminophen (TYLENOL PM)  MG TABS Take 2 tablets by mouth daily at bedtime as needed for sleep      dorzolamide-timolol (COSOPT) 22 3-6 8 MG/ML ophthalmic solution 1 drop 2 (two) times a day      finasteride (PROSCAR) 5 mg tablet Take 1 tablet (5 mg total) by mouth daily 90 tablet 3    fluticasone (FLONASE) 50 mcg/act nasal spray 1 spray into each nostril daily 15 8 mL 4    gabapentin (NEURONTIN) 100 mg capsule TAKE 1 CAPSULE BY MOUTH TWICE A DAY 60 capsule 5    Garlic 7951 MG CAPS Take 1,000 mg by mouth      indapamide (LOZOL) 1 25 mg tablet TAKE 1 TABLET BY MOUTH EVERY DAY IN THE MORNING      latanoprost (XALATAN) 0 005 % ophthalmic solution Administer 1 drop to both eyes daily at bedtime      loperamide (IMODIUM) 2 mg capsule Take 1 capsule (2 mg total) by mouth 4 (four) times a day as needed for diarrhea 60 capsule 5    multivitamin-iron-minerals-folic acid (CENTRUM) chewable tablet Chew 1 tablet daily   omeprazole (PriLOSEC) 40 MG capsule TAKE 1 CAPSULE (40 MG TOTAL) BY MOUTH DAILY  90 capsule 2     No current facility-administered medications for this visit  Allergies  Allergies   Allergen Reactions    Bempedoic Acid Other (See Comments)     Gout    Atorvastatin GI Intolerance     Other reaction(s): GI upset    Ezetimibe Other (See Comments)     myalgia  Other reaction(s):  Other (See Comments)  Myalgia  Other reaction(s): Myalgia  myalgia    Statins Rash         The following portions of the patient's history were reviewed and updated as appropriate: allergies, current medications, past medical history, past social history, past surgical history and problem list       Vitals  There were no vitals filed for this visit  Physical Exam  Constitutional   General appearance: Patient is seated and in no acute distress, well appearing and well nourished  Head and Face   Head and face: Normal     Eyes   Conjunctiva and lids: No erythema, swelling or discharge  Anicteric  Ears, Nose, Mouth, and Throat   Hearing: Normal     Neck: Supple, trachea midline  Pulmonary   Respiratory effort: No increased work of breathing or signs of respiratory distress  Cardiovascular   Examination of extremities for edema and/or varicosities: Normal     Musculoskeletal   Gait and station: Walks with a cane  Skin   Skin and subcutaneous tissue: Warm, dry, and intact  No visible jaundice, lesions or rashes  Psychiatric   Judgment and insight: Normal  Recent and remote memory:  Normal  Mood and affect: Normal      Results  No visits with results within 1 Day(s) from this visit     Latest known visit with results is:   Admission on 09/01/2022, Discharged on 09/01/2022   Component Date Value    Ventricular Rate 09/01/2022 64     Atrial Rate 09/01/2022 81     QRSD Interval 09/01/2022 86     QT Interval 09/01/2022 428     QTC Interval 09/01/2022 441     QRS Axis 09/01/2022 40     T Wave Axis 09/01/2022 42     WBC 09/01/2022 7 24     RBC 09/01/2022 3 21 (A)    Hemoglobin 09/01/2022 10 6 (A)    Hematocrit 09/01/2022 31 4 (A)    MCV 09/01/2022 98     MCH 09/01/2022 33 0     MCHC 09/01/2022 33 8     RDW 09/01/2022 12 7     MPV 09/01/2022 11 2     Platelets 57/29/3369 189     nRBC 09/01/2022 0     Neutrophils Relative 09/01/2022 67     Immat GRANS % 09/01/2022 0     Lymphocytes Relative 09/01/2022 16     Monocytes Relative 09/01/2022 10     Eosinophils Relative 09/01/2022 7 (A)    Basophils Relative 09/01/2022 0     Neutrophils Absolute 09/01/2022 4 83     Immature Grans Absolute 09/01/2022 0 03     Lymphocytes Absolute 09/01/2022 1 14     Monocytes Absolute 09/01/2022 0 72     Eosinophils Absolute 09/01/2022 0 50     Basophils Absolute 09/01/2022 0 02     Sodium 09/01/2022 133 (A)    Potassium 09/01/2022 4 1     Chloride 09/01/2022 97     CO2 09/01/2022 31     ANION GAP 09/01/2022 5     BUN 09/01/2022 14     Creatinine 09/01/2022 1 30     Glucose 09/01/2022 113     Calcium 09/01/2022 9 2     Corrected Calcium 09/01/2022 9 9     AST 09/01/2022 17     ALT 09/01/2022 22     Alkaline Phosphatase 09/01/2022 56     Total Protein 09/01/2022 6 9     Albumin 09/01/2022 3 1 (A)    Total Bilirubin 09/01/2022 0 40     eGFR 09/01/2022 47     hs TnI 0hr 09/01/2022 6     hs TnI 2hr 09/01/2022 7     Delta 2hr hsTnI 09/01/2022 1        Radiology Results  XR chest 1 view portable    Result Date: 9/2/2022  Narrative: CHEST INDICATION:  Syncope  COMPARISON:  7/4/2022 EXAM PERFORMED/VIEWS:  XR CHEST PORTABLE FINDINGS:  Monitoring leads and clips project over the chest  Cardiomediastinal silhouette appears unremarkable  The lungs are clear  No pneumothorax or pleural effusion  Osseous structures appear within normal limits for patient age  Impression: No acute cardiopulmonary disease  Workstation performed: [id number]       Orders  No orders of the defined types were placed in this encounter  Answers for HPI/ROS submitted by the patient on 9/20/2022  Chronicity: recurrent  Onset: more than 1 month ago  Onset quality: gradual  Frequency: rarely  Progression since onset: resolved  anorexia: No  belching: No  flatus: No  hematochezia: No  melena:  No  weight loss: No  Aggravated by: nothing

## 2022-10-26 DIAGNOSIS — K21.9 GASTROESOPHAGEAL REFLUX DISEASE, UNSPECIFIED WHETHER ESOPHAGITIS PRESENT: Primary | ICD-10-CM

## 2022-10-26 RX ORDER — OMEPRAZOLE 20 MG/1
20 CAPSULE, DELAYED RELEASE ORAL DAILY
Qty: 90 CAPSULE | Refills: 2 | Status: SHIPPED | OUTPATIENT
Start: 2022-10-26

## 2022-10-29 DIAGNOSIS — R19.7 DIARRHEA, UNSPECIFIED TYPE: ICD-10-CM

## 2022-10-29 RX ORDER — LOPERAMIDE HYDROCHLORIDE 2 MG/1
CAPSULE ORAL
Qty: 60 CAPSULE | Refills: 5 | Status: SHIPPED | OUTPATIENT
Start: 2022-10-29

## 2022-11-07 ENCOUNTER — OFFICE VISIT (OUTPATIENT)
Dept: OBGYN CLINIC | Facility: HOSPITAL | Age: 87
End: 2022-11-07

## 2022-11-07 VITALS
WEIGHT: 192 LBS | BODY MASS INDEX: 29.1 KG/M2 | HEART RATE: 81 BPM | SYSTOLIC BLOOD PRESSURE: 177 MMHG | DIASTOLIC BLOOD PRESSURE: 79 MMHG | HEIGHT: 68 IN

## 2022-11-07 DIAGNOSIS — M19.131 SLAC (SCAPHOLUNATE ADVANCED COLLAPSE) OF WRIST, RIGHT: Primary | ICD-10-CM

## 2022-11-07 RX ORDER — BUPIVACAINE HYDROCHLORIDE 2.5 MG/ML
0.5 INJECTION, SOLUTION INFILTRATION; PERINEURAL
Status: COMPLETED | OUTPATIENT
Start: 2022-11-07 | End: 2022-11-07

## 2022-11-07 RX ORDER — LIDOCAINE HYDROCHLORIDE 10 MG/ML
0.5 INJECTION, SOLUTION INFILTRATION; PERINEURAL
Status: COMPLETED | OUTPATIENT
Start: 2022-11-07 | End: 2022-11-07

## 2022-11-07 RX ORDER — TRIAMCINOLONE ACETONIDE 40 MG/ML
40 INJECTION, SUSPENSION INTRA-ARTICULAR; INTRAMUSCULAR
Status: COMPLETED | OUTPATIENT
Start: 2022-11-07 | End: 2022-11-07

## 2022-11-07 RX ORDER — DORZOLAMIDE HYDROCHLORIDE AND TIMOLOL MALEATE PRESERVATIVE FREE 20; 5 MG/ML; MG/ML
SOLUTION/ DROPS OPHTHALMIC
COMMUNITY
Start: 2022-09-27

## 2022-11-07 RX ADMIN — BUPIVACAINE HYDROCHLORIDE 0.5 ML: 2.5 INJECTION, SOLUTION INFILTRATION; PERINEURAL at 12:09

## 2022-11-07 RX ADMIN — TRIAMCINOLONE ACETONIDE 40 MG: 40 INJECTION, SUSPENSION INTRA-ARTICULAR; INTRAMUSCULAR at 12:09

## 2022-11-07 RX ADMIN — LIDOCAINE HYDROCHLORIDE 0.5 ML: 10 INJECTION, SOLUTION INFILTRATION; PERINEURAL at 12:09

## 2022-11-07 NOTE — PROGRESS NOTES
ASSESSMENT/PLAN:    79-year-old RHD male for follow-up of right SLAC wrist   He continues to have significant relief following corticosteroid injections  Patient was offered, and accepted, Kenalog/Marcaine/lidocaine injection(s) to the right radial carpal joint for relief of pain and inflammation  Patient tolerated treatment(s) well  He will be seen in 6 months for re-evaluation and consideration for repeat injections as necessary  Patient expresses understanding and is in agreement with this treatment plan  The patient verbalized understanding of exam findings and treatment plan  We engaged in the shared decision-making process and treatment options were discussed at length with the patient  Surgical and conservative management discussed today along with risks and benefits  Diagnoses and all orders for this visit:    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, right  -     Medium joint arthrocentesis    Other orders  -     Dorzolamide HCl-Timolol Mal PF 2-0 5 % SOLN; ONE DROP INTO EACH EYE TWICE DAILY      Follow Up:  Return in 6 months (on 5/7/2023) for Re-evaluation  To Do Next Visit:  Re-evaluation of current issue    ____________________________________________________________________________________________________________________________________________      CHIEF COMPLAINT:  Chief Complaint   Patient presents with   • Right Wrist - Pain, Follow-up       SUBJECTIVE:  Jessica De Los Santos is a 80y o  year old RHD male who presents for follow-up evaluation of right SLAC wrist   He was last seen in regards to this issue on 3/30/2022, at which time he received a corticosteroid injection  Patient states that he got significant relief following this injection, and his symptoms only returned 3 months ago  He reports generalized soreness, stiffness, and some swelling notable on the dorsal aspect of the wrist   He denies any associated bruising, numbness, tingling, or weakness    He desires a repeat injection today  I have personally reviewed all the relevant PMH, PSH, SH, FH, Medications and allergies       PAST MEDICAL HISTORY:  Past Medical History:   Diagnosis Date   • A-fib [facility])    • Anemia    • Carpal tunnel syndrome, unspecified upper limb    • Cataract     last assessed: 8/18/2012   • GERD (gastroesophageal reflux disease)    • Glaucoma    • Hypertension     last assessed: 8/23/2012   • Intervertebral disc disease    • PVD (peripheral vascular disease) ([facility] [address] )        PAST SURGICAL HISTORY:  Past Surgical History:   Procedure Laterality Date   • CATARACT EXTRACTION     • COLONOSCOPY     • HERNIA REPAIR     • NEUROPLASTY / TRANSPOSITION MEDIAN NERVE AT CARPAL TUNNEL     • [place] LAP,CHOLECYSTECTOMY N/A 09/14/2016    Procedure: LAPAROSCOPIC CHOLECYSTECTOMY ;  Surgeon: Eric Koyanagi, MD;  Location: [facility];  Service: General   • UPPER GASTROINTESTINAL ENDOSCOPY         FAMILY HISTORY:  Family History   Problem Relation Age of Onset   • Glaucoma Mother    • Cancer Father    • Heart disease Father    • Glaucoma Father    • Alzheimer's disease Family    • Heart attack Family        SOCIAL HISTORY:  Social History     Tobacco Use   • Smoking status: Former Smoker   • Smokeless tobacco: Never Used   • Tobacco comment: quit 30 years ago    Vaping Use   • Vaping Use: Former   Substance Use Topics   • Alcohol use: Not Currently   • Drug use: No       MEDICATIONS:    Current Outpatient Medications:   •  allopurinol (ZYLOPRIM) 100 mg tablet, Take 100 mg by mouth daily, Disp: , Rfl:   •  amitriptyline (ELAVIL) 10 mg tablet, Take 2 tablets (20 mg total) by mouth daily at bedtime, Disp: 60 tablet, Rfl: 5  •  amLODIPine (NORVASC) 2 5 mg tablet, Take 2 5 mg by mouth 2 (two) times a day, Disp: , Rfl:   •  apixaban (ELIQUIS) 2 5 mg, Take by mouth 2 (two) times a day, Disp: , Rfl:   •  bisoprolol (ZEBETA) 5 mg tablet, 2 5 mg , Disp: , Rfl:   •  brimonidine tartrate 0 2 % ophthalmic solution, , Disp: , Rfl:   •  Cequa 0 09 % SOLN, INSTILL 1 DROP INTO BOTH EYES TWICE A DAY, Disp: , Rfl:   •  cholecalciferol (VITAMIN D3) 1,000 units tablet, Take 2,000 Units by mouth daily , Disp: , Rfl:   •  co-enzyme Q-10 30 MG capsule, Take 100 mg by mouth daily  , Disp: , Rfl:   •  diphenhydrAMINE-acetaminophen (TYLENOL PM)  MG TABS, Take 2 tablets by mouth daily at bedtime as needed for sleep, Disp: , Rfl:   •  Dorzolamide HCl-Timolol Mal PF 2-0 5 % SOLN, ONE DROP INTO EACH EYE TWICE DAILY, Disp: , Rfl:   •  dorzolamide-timolol (COSOPT) 22 3-6 8 MG/ML ophthalmic solution, 1 drop 2 (two) times a day, Disp: , Rfl:   •  finasteride (PROSCAR) 5 mg tablet, Take 1 tablet (5 mg total) by mouth daily, Disp: 90 tablet, Rfl: 3  •  fluticasone (FLONASE) 50 mcg/act nasal spray, 1 spray into each nostril daily, Disp: 15 8 mL, Rfl: 4  •  gabapentin (NEURONTIN) 100 mg capsule, TAKE 1 CAPSULE BY MOUTH TWICE A DAY, Disp: 60 capsule, Rfl: 5  •  Garlic 8910 MG CAPS, Take 1,000 mg by mouth, Disp: , Rfl:   •  indapamide (LOZOL) 1 25 mg tablet, TAKE 1 TABLET BY MOUTH EVERY DAY IN THE MORNING, Disp: , Rfl:   •  latanoprost (XALATAN) 0 005 % ophthalmic solution, Administer 1 drop to both eyes daily at bedtime, Disp: , Rfl:   •  loperamide (IMODIUM) 2 mg capsule, TAKE 1 CAPSULE BY MOUTH 4 TIMES A DAY AS NEEDED FOR DIARRHEA , Disp: 60 capsule, Rfl: 5  •  multivitamin-iron-minerals-folic acid (CENTRUM) chewable tablet, Chew 1 tablet daily  , Disp: , Rfl:   •  omeprazole (PriLOSEC) 20 mg delayed release capsule, Take 1 capsule (20 mg total) by mouth daily, Disp: 90 capsule, Rfl: 2  •  terazosin (HYTRIN) 2 mg capsule, Take 2 mg by mouth every evening, Disp: , Rfl:     ALLERGIES:  Allergies   Allergen Reactions   • Bempedoic Acid Other (See Comments)     Gout   • Atorvastatin GI Intolerance     Other reaction(s): GI upset   • Ezetimibe Other (See Comments)     myalgia  Other reaction(s):  Other (See Comments)  Myalgia  Other reaction(s): Myalgia  myalgia   • Statins Rash REVIEW OF SYSTEMS:  Review of Systems   Constitutional: Negative for chills, fever and unexpected weight change  HENT: Negative for hearing loss, nosebleeds and sore throat  Eyes: Negative for pain, redness and visual disturbance  Respiratory: Negative for cough, shortness of breath and wheezing  Cardiovascular: Negative for chest pain, palpitations and leg swelling  Gastrointestinal: Negative for abdominal pain, nausea and vomiting  Endocrine: Negative for polydipsia and polyuria  Genitourinary: Negative for dysuria and hematuria  Musculoskeletal:        As noted in HPI   Skin: Negative for rash and wound  Neurological: Negative for dizziness, numbness and headaches  Psychiatric/Behavioral: Negative for decreased concentration and suicidal ideas  The patient is not nervous/anxious  VITALS:  Vitals:    11/07/22 1143   BP: (!) 177/79   Pulse: 81       LABS:  HgA1c:   Lab Results   Component Value Date    HGBA1C 5 4 12/03/2018     BMP:   Lab Results   Component Value Date    CALCIUM 9 2 09/01/2022     10/31/2017    K 4 1 09/01/2022    CO2 31 09/01/2022    CL 97 09/01/2022    BUN 14 09/01/2022    CREATININE 1 30 09/01/2022       _____________________________________________________  PHYSICAL EXAMINATION:  General: well developed and well nourished, alert, oriented times 3 and appears comfortable  Psychiatric: Normal  HEENT: Normocephalic, Atraumatic Trachea Midline, No torticollis  Pulmonary: No audible wheezing or respiratory distress   Cardiovascular: No pitting edema, 2+ radial pulse   Abdominal/GI: abdomen non tender, non distended   Skin: No masses, erythema, lacerations, fluctation, ulcerations  Neurovascular: Sensation Intact to the Median, Ulnar, Radial Nerve, Motor Intact to the Median, Ulnar, Radial Nerve and Pulses Intact  Musculoskeletal: Normal, except as noted in detailed exam and in HPI        MUSCULOSKELETAL EXAMINATION:  Right wrist -   No erythema or ecchymosis   Moderate Edema to 2nd 3rd and 4th dorsal extensor compartment   Radio carpal joint is mildly tender to palpation   2nd, 3rd and 4th dorsal compartments are mildly tender to palpation   Demonstrates limited active wrist extension and flexion  Some limitation to full MCP extension  2+ distal radial pulse with brisk capillary refill to the fingers  Radial, median, and ulnar motor and sensory distributions intact  Sensation to light touch intact distally     ___________________________________________________  STUDIES REVIEWED:  No new imaging reviewed this visit    PROCEDURES PERFORMED:  Medium joint arthrocentesis: R radiocarpal  Universal Protocol:  Consent: Verbal consent obtained    Risks and benefits: risks, benefits and alternatives were discussed  Consent given by: patient  Timeout called at: 11/7/2022 12:04 PM   Patient understanding: patient states understanding of the procedure being performed  Test results: test results available and properly labeled  Site marked: the operative site was marked  Patient identity confirmed: verbally with patient    Supporting Documentation  Indications: pain   Procedure Details  Location: wrist - R radiocarpal  Needle size: 25 G  Ultrasound guidance: no  Approach: dorsal  Medications administered: 0 5 mL bupivacaine 0 25 %; 40 mg triamcinolone acetonide 40 mg/mL; 0 5 mL lidocaine 1 %    Patient tolerance: patient tolerated the procedure well with no immediate complications  Dressing:  Sterile dressing applied        _____________________________________________________      Scribe Attestation    I,:  Sharla Hernandez am acting as a scribe while in the presence of the attending physician :       I,:  Jacquelin Vuong MD personally performed the services described in this documentation    as scribed in my presence :

## 2022-12-28 DIAGNOSIS — M79.672 PAIN IN BOTH FEET: ICD-10-CM

## 2022-12-28 DIAGNOSIS — M79.671 PAIN IN BOTH FEET: ICD-10-CM

## 2022-12-28 RX ORDER — GABAPENTIN 100 MG/1
CAPSULE ORAL
Qty: 60 CAPSULE | Refills: 5 | Status: SHIPPED | OUTPATIENT
Start: 2022-12-28

## 2023-05-21 DIAGNOSIS — R30.0 DYSURIA: ICD-10-CM

## 2023-05-21 DIAGNOSIS — K21.9 GASTROESOPHAGEAL REFLUX DISEASE, UNSPECIFIED WHETHER ESOPHAGITIS PRESENT: ICD-10-CM

## 2023-05-21 RX ORDER — OMEPRAZOLE 20 MG/1
CAPSULE, DELAYED RELEASE ORAL
Qty: 90 CAPSULE | Refills: 2 | Status: SHIPPED | OUTPATIENT
Start: 2023-05-21

## 2023-05-22 RX ORDER — FINASTERIDE 5 MG/1
5 TABLET, FILM COATED ORAL DAILY
Qty: 90 TABLET | Refills: 3 | Status: SHIPPED | OUTPATIENT
Start: 2023-05-22

## 2023-05-23 DIAGNOSIS — K58.0 IRRITABLE BOWEL SYNDROME WITH DIARRHEA: ICD-10-CM

## 2023-05-31 ENCOUNTER — TELEPHONE (OUTPATIENT)
Dept: OBGYN CLINIC | Facility: CLINIC | Age: 88
End: 2023-05-31

## 2023-05-31 RX ORDER — AMITRIPTYLINE HYDROCHLORIDE 10 MG/1
20 TABLET, FILM COATED ORAL
Qty: 180 TABLET | Refills: 2 | Status: SHIPPED | OUTPATIENT
Start: 2023-05-31

## 2023-05-31 NOTE — TELEPHONE ENCOUNTER
Called to reschedule appointment with Dr Salgado on 6/9/23  Patients daughter will call back later today to reschedule this appointment     I do have an available appointment on 6/2/23

## 2023-07-11 DIAGNOSIS — R09.82 POST-NASAL DRIP: ICD-10-CM

## 2023-07-11 RX ORDER — FLUTICASONE PROPIONATE 50 MCG
1 SPRAY, SUSPENSION (ML) NASAL DAILY
Qty: 16 G | Refills: 2 | Status: SHIPPED | OUTPATIENT
Start: 2023-07-11 | End: 2023-08-04

## 2023-07-14 DIAGNOSIS — M79.672 PAIN IN BOTH FEET: ICD-10-CM

## 2023-07-14 DIAGNOSIS — M79.671 PAIN IN BOTH FEET: ICD-10-CM

## 2023-07-17 RX ORDER — GABAPENTIN 100 MG/1
100 CAPSULE ORAL 2 TIMES DAILY
Qty: 60 CAPSULE | Refills: 3 | Status: SHIPPED | OUTPATIENT
Start: 2023-07-17 | End: 2023-07-28

## 2023-07-25 ENCOUNTER — RA CDI HCC (OUTPATIENT)
Dept: OTHER | Facility: HOSPITAL | Age: 88
End: 2023-07-25

## 2023-07-25 NOTE — PROGRESS NOTES
[address] [facility] coding opportunities       Chart reviewed, no opportunity found: [address]        Patients Insurance     Medicare Insurance: [employer]

## 2023-07-28 ENCOUNTER — OFFICE VISIT (OUTPATIENT)
Dept: FAMILY MEDICINE CLINIC | Facility: CLINIC | Age: 88
End: 2023-07-28
Payer: COMMERCIAL

## 2023-07-28 VITALS
DIASTOLIC BLOOD PRESSURE: 86 MMHG | OXYGEN SATURATION: 96 % | HEART RATE: 91 BPM | RESPIRATION RATE: 18 BRPM | BODY MASS INDEX: 30.68 KG/M2 | TEMPERATURE: 98.7 F | SYSTOLIC BLOOD PRESSURE: 144 MMHG | WEIGHT: 201.8 LBS

## 2023-07-28 DIAGNOSIS — M79.672 PAIN IN BOTH FEET: ICD-10-CM

## 2023-07-28 DIAGNOSIS — K58.0 IRRITABLE BOWEL SYNDROME WITH DIARRHEA: ICD-10-CM

## 2023-07-28 DIAGNOSIS — Z00.00 MEDICARE ANNUAL WELLNESS VISIT, SUBSEQUENT: Primary | ICD-10-CM

## 2023-07-28 DIAGNOSIS — L30.9 DERMATITIS: ICD-10-CM

## 2023-07-28 DIAGNOSIS — I10 BENIGN ESSENTIAL HYPERTENSION: Chronic | ICD-10-CM

## 2023-07-28 DIAGNOSIS — M79.671 PAIN IN BOTH FEET: ICD-10-CM

## 2023-07-28 DIAGNOSIS — R79.89 ELEVATED TSH: ICD-10-CM

## 2023-07-28 DIAGNOSIS — G47.09 OTHER INSOMNIA: ICD-10-CM

## 2023-07-28 DIAGNOSIS — I48.0 PAF (PAROXYSMAL ATRIAL FIBRILLATION) (HCC): ICD-10-CM

## 2023-07-28 DIAGNOSIS — N18.31 STAGE 3A CHRONIC KIDNEY DISEASE (HCC): ICD-10-CM

## 2023-07-28 DIAGNOSIS — K21.9 GASTROESOPHAGEAL REFLUX DISEASE WITHOUT ESOPHAGITIS: ICD-10-CM

## 2023-07-28 PROCEDURE — 99214 OFFICE O/P EST MOD 30 MIN: CPT | Performed by: FAMILY MEDICINE

## 2023-07-28 PROCEDURE — G0439 PPPS, SUBSEQ VISIT: HCPCS | Performed by: FAMILY MEDICINE

## 2023-07-28 RX ORDER — AMITRIPTYLINE HYDROCHLORIDE 10 MG/1
TABLET, FILM COATED ORAL
Qty: 45 TABLET | Refills: 5
Start: 2023-07-28

## 2023-07-28 RX ORDER — ACETAMINOPHEN AND CODEINE PHOSPHATE 300; 30 MG/1; MG/1
TABLET ORAL
COMMUNITY

## 2023-07-28 RX ORDER — NETARSUDIL 0.2 MG/ML
SOLUTION/ DROPS OPHTHALMIC; TOPICAL
COMMUNITY
Start: 2023-06-01

## 2023-07-28 RX ORDER — GABAPENTIN 100 MG/1
100 CAPSULE ORAL 3 TIMES DAILY
Qty: 90 CAPSULE | Refills: 3 | Status: SHIPPED | OUTPATIENT
Start: 2023-07-28

## 2023-07-28 RX ORDER — TRIAMCINOLONE ACETONIDE 5 MG/G
CREAM TOPICAL 2 TIMES DAILY
Qty: 30 G | Refills: 2 | Status: SHIPPED | OUTPATIENT
Start: 2023-07-28

## 2023-07-28 RX ORDER — ASPIRIN 81 MG/1
TABLET ORAL
COMMUNITY

## 2023-07-28 RX ORDER — AMLODIPINE BESYLATE 5 MG/1
5 TABLET ORAL DAILY
COMMUNITY
Start: 2023-06-12

## 2023-07-28 NOTE — ASSESSMENT & PLAN NOTE
Hypertension. Patient's daughter manages his blood pressure medication depending on readings that she obtains from checking blood pressure on a regular basis.

## 2023-07-28 NOTE — ASSESSMENT & PLAN NOTE
Elevated TSH. Patient with borderline TSH level. He will recheck TSH in 6 months.   We will make further recommendations pending results of test.  We will hold off on any treatment at this time

## 2023-07-28 NOTE — PROGRESS NOTES
Assessment and Plan:     Problem List Items Addressed This Visit        Digestive    GERD (gastroesophageal reflux disease)     GERD. Patient symptoms well controlled on PPI medication         Irritable bowel syndrome with diarrhea     IBS. Patient feels symptoms are fairly well controlled on amitriptyline 50 mg at bedtime. Patient does experience occasional constipation for which he may use over-the-counter MiraLAX as needed to stay regular with bowel movements         Relevant Medications    amitriptyline (ELAVIL) 10 mg tablet       Cardiovascular and Mediastinum    Benign essential hypertension (Chronic)     Hypertension. Patient's daughter manages his blood pressure medication depending on readings that she obtains from checking blood pressure on a regular basis. PAF (paroxysmal atrial fibrillation) (HCC) (Chronic)     Paroxysmal atrial fibrillation. Patient continues on current dose of Eliquis for chronic condition. Musculoskeletal and Integument    Dermatitis     Dermatitis. Patient given prescription for triamcinolone cream to apply to suspected keratoses of left chest and shoulder area. Patient's daughter will call if symptoms persist with itching or any changes in appearance indicating worsening of rash         Relevant Medications    triamcinolone (KENALOG) 0.5 % cream       Genitourinary    Stage 3a chronic kidney disease (HCC)     Chronic kidney disease. I reviewed patient's latest blood test results which show renal function to be stable at this time for his age            Other    Other insomnia     Insomnia. Patient will increase gabapentin to 100 mg 3 times daily including a bedtime dose in hopes that this would help him sleep more comfortably throughout the night. Pain in both feet     Pain in right foot. 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 We discussed possibility of also having neuropathy since symptoms tend to get worse at bedtime.   He will increase gabapentin to take 100 mg 3 times daily. Relevant Medications    gabapentin (NEURONTIN) 100 mg capsule    Medicare annual wellness visit, subsequent - Primary    Elevated TSH     Elevated TSH. Patient with borderline TSH level. He will recheck TSH in 6 months. We will make further recommendations pending results of test.  We will hold off on any treatment at this time         Relevant Orders    TSH, 3rd generation        Preventive health issues were discussed with patient, and age appropriate screening tests were ordered as noted in patient's After Visit Summary. Personalized health advice and appropriate referrals for health education or preventive services given if needed, as noted in patient's After Visit Summary. History of Present Illness:     Patient presents for a Medicare Wellness Visit    Patient in the office to review chronic medical conditions. He is accompanied by his daughter. Patient denies any recent illness. He does have several issues to discuss. Patient feels at times constipated and is focused on having a bowel movement on a daily basis. He has tried over-the-counter products without relief in symptoms or prune juice which caused upset stomach. Colace over-the-counter was ineffective. Patient denies any melena or hematochezia    Patient has chronic right foot pain especially first MTP joint. Symptoms tend to get worse at night when he is trying to sleep. He denies any recent trauma    Patient blood pressure continues to be labile however daughter checks blood pressure on a regular basis and titrate medication as per cardiologist instructions. Patient has annoying pruritic rash along the left upper chest and shoulder area which he tends to pick at on a regular basis.      Patient Care Team:  Sarah Ortiz MD as PCP - MD Casey Grossman MD Wyline Mallet, MD Torrence Pea, MD (Ophthalmology)  Jaja Eagle MD (Gastroenterology)  Karen Lewis MD (Orthopedic Surgery)     Review of Systems:     Review of Systems   Constitutional: Negative. Respiratory: Negative. Cardiovascular: Negative. Gastrointestinal: Negative. Genitourinary:        Nocturia X 3-4     Musculoskeletal: Positive for arthralgias and gait problem. Skin: Positive for rash. Psychiatric/Behavioral: Positive for sleep disturbance.         Problem List:     Patient Active Problem List   Diagnosis   • Cholecystitis with cholelithiasis   • Allergic rhinitis   • Anemia   • Benign essential hypertension   • GERD (gastroesophageal reflux disease)   • Glaucoma   • Hiatal hernia   • Hyperlipidemia   • Joint pain, knee   • Left lumbar radiculopathy   • Peripheral vascular disease ([address])   • Syncope   • Stage 3a chronic kidney disease ([address])   • PAF (paroxysmal atrial fibrillation) ([address])   • Right wrist pain   • Peripheral edema   • Arthritis of carpometacarpal (CMC) joint of right thumb   • Paresthesia   • Hyponatremia   • Abnormal liver function   • Other insomnia   • Anxiety   • Carpal tunnel syndrome   • S/P ablation of ventricular arrhythmia   • 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[address])   • Pain in both feet   • Benign prostatic hyperplasia   • Diarrhea   • Irritable bowel syndrome with diarrhea   • Ambulatory dysfunction   • Insomnia   • Chest pain   • Hypertensive urgency   • Frontal headache   • Other dysphagia   • [place] esophagus   • Medicare annual wellness visit, subsequent   • Dermatitis   • Elevated TSH      Past Medical and Surgical History:     Past Medical History:   Diagnosis Date   • A-fib ([address])    • Anemia    • Carpal tunnel syndrome, unspecified upper limb    • Cataract     last assessed: 8/18/2012   • GERD (gastroesophageal reflux disease)    • Glaucoma    • Hypertension     last assessed: 8/23/2012   • Intervertebral disc disease    • PVD (peripheral vascular disease) ([address])      Past Surgical History:   Procedure Laterality Date   • CATARACT EXTRACTION     • COLONOSCOPY     • HERNIA REPAIR     • NEUROPLASTY / TRANSPOSITION MEDIAN NERVE AT CARPAL TUNNEL     • OR LAPAROSCOPY SURG CHOLECYSTECTOMY N/A 09/14/2016    Procedure: LAPAROSCOPIC CHOLECYSTECTOMY ;  Surgeon: Jarred Gomez MD;  Location: [facility];  Service: General   • UPPER GASTROINTESTINAL ENDOSCOPY        Family History:     Family History   Problem Relation Age of Onset   • Glaucoma Mother    • Cancer Father    • Heart disease Father    • Glaucoma Father    • Alzheimer's disease Family    • Heart attack Family       Social History:     Social History     Socioeconomic History   • Marital status:      Spouse name: None   • Number of children: None   • Years of education: None   • Highest education level: None   Occupational History   • Occupation: retired   Tobacco Use   • Smoking status: Former   • Smokeless tobacco: Never   • Tobacco comments:     quit 30 years ago    Vaping Use   • Vaping Use: Former   Substance and Sexual Activity   • Alcohol use: Not Currently   • Drug use: No   • Sexual activity: Not Currently   Other Topics Concern   • None   Social History Narrative    Morning person     Social Determinants of Health     Financial Resource Strain: Low Risk  (7/26/2023)    Overall Financial Resource Strain (CARDIA)    • Difficulty of Paying Living Expenses: Not hard at all   Food Insecurity: Not on file   Transportation Needs: No Transportation Needs (7/26/2023)    PRAPARE - Transportation    • Lack of Transportation (Medical): No    • Lack of Transportation (Non-Medical):  No   Physical Activity: Not on file   Stress: Not on file   Social Connections: Not on file   Intimate Partner Violence: Not on file   Housing Stability: Not on file      Medications and Allergies:     Current Outpatient Medications   Medication Sig Dispense Refill   • allopurinol (ZYLOPRIM) 100 mg tablet Take 100 mg by mouth daily     • amitriptyline (ELAVIL) 10 mg tablet 1.5 tabs po q hs 45 tablet 5   • amLODIPine (NORVASC) 2.5 mg tablet Take 2.5 mg by mouth 2 (two) times a day     • apixaban (ELIQUIS) 2.5 mg Take by mouth 2 (two) times a day     • bisoprolol (ZEBETA) 5 mg tablet 2.5 mg      • brimonidine tartrate 0.2 % ophthalmic solution      • Cequa 0.09 % SOLN INSTILL 1 DROP INTO BOTH EYES TWICE A DAY     • cholecalciferol (VITAMIN D3) 1,000 units tablet Take 2,000 Units by mouth daily      • co-enzyme Q-10 30 MG capsule Take 100 mg by mouth daily       • diphenhydrAMINE-acetaminophen (TYLENOL PM)  MG TABS Take 2 tablets by mouth daily at bedtime as needed for sleep     • Dorzolamide HCl-Timolol Mal PF 2-0.5 % SOLN ONE DROP INTO EACH EYE TWICE DAILY     • finasteride (PROSCAR) 5 mg tablet TAKE 1 TABLET (5 MG TOTAL) BY MOUTH DAILY. 90 tablet 3   • fluticasone (FLONASE) 50 mcg/act nasal spray 1 spray into each nostril daily 16 g 2   • gabapentin (NEURONTIN) 100 mg capsule Take 1 capsule (100 mg total) by mouth 3 (three) times a day 90 capsule 3   • Garlic 7041 MG CAPS Take 1,000 mg by mouth     • indapamide (LOZOL) 1.25 mg tablet TAKE 1 TABLET BY MOUTH EVERY DAY IN THE MORNING     • latanoprost (XALATAN) 0.005 % ophthalmic solution Administer 1 drop to both eyes daily at bedtime     • loperamide (IMODIUM) 2 mg capsule TAKE 1 CAPSULE BY MOUTH 4 TIMES A DAY AS NEEDED FOR DIARRHEA. 60 capsule 5   • multivitamin-iron-minerals-folic acid (CENTRUM) chewable tablet Chew 1 tablet daily. • omeprazole (PriLOSEC) 20 mg delayed release capsule TAKE 1 CAPSULE BY MOUTH EVERY DAY (Patient taking differently: Take 40 mg by mouth daily) 90 capsule 2   • triamcinolone (KENALOG) 0.5 % cream Apply topically 2 (two) times a day 30 g 2     No current facility-administered medications for this visit. Allergies   Allergen Reactions   • Bempedoic Acid Other (See Comments)     Gout   • Atorvastatin GI Intolerance     Other reaction(s): GI upset   • Ezetimibe Other (See Comments)     myalgia  Other reaction(s):  Other (See Comments)  Myalgia  Other reaction(s): Myalgia  myalgia   • Statins Rash      Immunizations:     Immunization History   Administered Date(s) Administered   • COVID-19 PFIZER VACCINE 0.3 ML IM 04/07/2021, 05/01/2021   • INFLUENZA 09/23/2014   • Influenza Split High Dose Preservative Free IM 09/23/2014, 12/17/2015   • Influenza, seasonal, injectable 10/24/2003, 11/08/2005, 11/13/2007, 10/16/2008, 12/16/2009, 11/10/2010, 11/18/2011, 09/24/2012, 09/25/2013   • Td (adult), adsorbed 11/01/2003   • Tdap 03/12/2018      Health Maintenance:         Topic Date Due   • Hepatitis C Screening  Completed         Topic Date Due   • Pneumococcal Vaccine: 65+ Years (1 - PCV) Never done   • COVID-19 Vaccine (3 - Pfizer series) 06/26/2021   • Influenza Vaccine (1) 09/01/2023      Medicare Screening Tests and Risk Assessments:     Erik Levin is here for his Subsequent Wellness visit. Health Risk Assessment:   Patient rates overall health as fair. Patient feels that their physical health rating is slightly worse. Patient is satisfied with their life. Eyesight was rated as same. Hearing was rated as slightly worse. Patient feels that their emotional and mental health rating is same. Patients states they are never, rarely angry. Patient states they are sometimes unusually tired/fatigued. Pain experienced in the last 7 days has been some. Patient's pain rating has been 5/10. Patient states that he has experienced weight loss or gain in last 6 months. gained 5 lbs. ..eating and not moving. .. Fall Risk Screening: In the past year, patient has experienced: no history of falling in past year      Home Safety:  Patient does not have trouble with stairs inside or outside of their home. Patient has working smoke alarms and has working carbon monoxide detector. Home safety hazards include: none. Nutrition:   Current diet is No Added Salt. Medications:   Patient is currently taking over-the-counter supplements.  OTC medications include: Centrum Men's Silver, CAU69-775 mg, Garlic-1000 mg, Vitamin D3-2000IU. Patient is able to manage medications. managed by daughter    Activities of Daily Living (ADLs)/Instrumental Activities of Daily Living (IADLs):   Walk and transfer into and out of bed and chair?: Yes  Dress and groom yourself?: Yes    Bathe or shower yourself?: Yes    Feed yourself? Yes  Do your laundry/housekeeping?: No  Manage your money, pay your bills and track your expenses?: No  Make your own meals?: Yes    Do your own shopping?: No    ADL comments: Can do some of these things, but daughter does them for me. If I had to, I could make do my laundry/housekeeping. No longer drive, so daughter brings groceries. Durable Medical Equipment Suppliers  none    Previous Hospitalizations:   Any hospitalizations or ED visits within the last 12 months?: Yes    How many hospitalizations have you had in the last year?: 1-2    Hospitalization Comments: excessive eye drops to lower pressure in eye caused extremely low bp    Advance Care Planning:   Living will: Yes    Durable POA for healthcare: Yes    Advanced directive: Yes      PREVENTIVE SCREENINGS      Cardiovascular Screening:    General: Screening Not Indicated and History Lipid Disorder      Diabetes Screening:     General: Screening Current      Colorectal Cancer Screening:     General: Screening Not Indicated      Prostate Cancer Screening:    General: Screening Not Indicated      Abdominal Aortic Aneurysm (AAA) Screening:    Risk factors include: tobacco use        Lung Cancer Screening:     General: Screening Not Indicated      Hepatitis C Screening:    General: Screening Current    Screening, Brief Intervention, and Referral to Treatment (SBIRT)    Screening  Typical number of drinks in a day: 0  Typical number of drinks in a week: 0  Interpretation: Low risk drinking behavior.     AUDIT-C Screenin) How often did you have a drink containing alcohol in the past year? never  2) How many drinks did you have on a typical day when you were drinking in the past year? 0  3) How often did you have 6 or more drinks on one occasion in the past year? never    AUDIT-C Score: 0  Interpretation: Score 0-3 (male): Negative screen for alcohol misuse    Single Item Drug Screening:  How often have you used an illegal drug (including marijuana) or a prescription medication for non-medical reasons in the past year? never    Single Item Drug Screen Score: 0  Interpretation: Negative screen for possible drug use disorder    No results found. Physical Exam:     /86   Pulse 91   Temp 98.7 °F (37.1 °C)   Resp 18   Wt 91.5 kg (201 lb 12.8 oz)   SpO2 96%   BMI 30.68 kg/m²     Physical Exam  Vitals and nursing note reviewed. Constitutional:       General: He is not in acute distress. Appearance: Normal appearance. He is well-developed. He is not ill-appearing. HENT:      Head: Normocephalic and atraumatic. Eyes:      General:         Right eye: No discharge. Left eye: No discharge. Extraocular Movements: Extraocular movements intact. Conjunctiva/sclera: Conjunctivae normal.      Pupils: Pupils are equal, round, and reactive to light. Neck:      Vascular: No carotid bruit. Cardiovascular:      Rate and Rhythm: Normal rate and regular rhythm. Heart sounds: Normal heart sounds. No murmur heard. Pulmonary:      Effort: Pulmonary effort is normal. No respiratory distress. Breath sounds: Normal breath sounds. No wheezing or rhonchi. Abdominal:      General: Abdomen is flat. Bowel sounds are normal.      Palpations: Abdomen is soft. Tenderness: There is no abdominal tenderness. There is no guarding or rebound. Musculoskeletal:      Right lower leg: Edema present. Left lower leg: Edema present. Comments: Patient with very mild trace edema of dorsum of bilateral feet.   He has chronic changes of first MTP joint consistent with degenerative joint disease of right foot. +2 dorsalis pedis and posterior tibialis pulses   Lymphadenopathy:      Cervical: No cervical adenopathy. Skin:     General: Skin is warm. Capillary Refill: Capillary refill takes less than 2 seconds. Findings: Rash present. Comments: Patient with a few red dry pruritic papular patches of skin on the left chest and shoulder area. Area varying size approximately 1 x 2 cm. No obvious signs of infection   Neurological:      Mental Status: He is alert. Mental status is at baseline. Psychiatric:         Mood and Affect: Mood normal.         Behavior: Behavior normal.         Thought Content:  Thought content normal.         Judgment: Judgment normal.          Machelle Marroquin MD

## 2023-07-28 NOTE — ASSESSMENT & PLAN NOTE
Dermatitis. Patient given prescription for triamcinolone cream to apply to suspected keratoses of left chest and shoulder area.   Patient's daughter will call if symptoms persist with itching or any changes in appearance indicating worsening of rash

## 2023-07-28 NOTE — ASSESSMENT & PLAN NOTE
IBS.  Patient feels symptoms are fairly well controlled on amitriptyline 50 mg at bedtime.   Patient does experience occasional constipation for which he may use over-the-counter MiraLAX as needed to stay regular with bowel movements

## 2023-07-28 NOTE — PATIENT INSTRUCTIONS
Medicare Preventive Visit Patient Instructions  Thank you for completing your Welcome to Medicare Visit or Medicare Annual Wellness Visit today. Your next wellness visit will be due in one year (7/28/2024). The screening/preventive services that you may require over the next 5-10 years are detailed below. Some tests may not apply to you based off risk factors and/or age. Screening tests ordered at today's visit but not completed yet may show as past due. Also, please note that scanned in results may not display below. Preventive Screenings:  Service Recommendations Previous Testing/Comments   Colorectal Cancer Screening  · Colonoscopy    · Fecal Occult Blood Test (FOBT)/Fecal Immunochemical Test (FIT)  · Fecal DNA/Cologuard Test  · Flexible Sigmoidoscopy Age: 43-73 years old   Colonoscopy: every 10 years (May be performed more frequently if at higher risk)  OR  FOBT/FIT: every 1 year  OR  Cologuard: every 3 years  OR  Sigmoidoscopy: every 5 years  Screening may be recommended earlier than age 39 if at higher risk for colorectal cancer. Also, an individualized decision between you and your healthcare provider will decide whether screening between the ages of 77-80 would be appropriate.  Colonoscopy: 12/08/2010  FOBT/FIT: 08/28/2020  Cologuard: Not on file  Sigmoidoscopy: Not on file    Screening Not Indicated     Prostate Cancer Screening Individualized decision between patient and health care provider in men between ages of 53-66   Medicare will cover every 12 months beginning on the day after your 50th birthday PSA: No results in last 5 years     Screening Not Indicated     Hepatitis C Screening Once for adults born between 1945 and 1965  More frequently in patients at high risk for Hepatitis C Hep C Antibody: 04/06/2019    Screening Current   Diabetes Screening 1-2 times per year if you're at risk for diabetes or have pre-diabetes Fasting glucose: 93 mg/dL (9/30/2019)  A1C: 5.4 % of total Hgb (12/3/2018)  Screening Current   Cholesterol Screening Once every 5 years if you don't have a lipid disorder. May order more often based on risk factors. Lipid panel: 04/24/2019  Screening Not Indicated  History Lipid Disorder      Other Preventive Screenings Covered by Medicare:  1. Abdominal Aortic Aneurysm (AAA) Screening: covered once if your at risk. You're considered to be at risk if you have a family history of AAA or a male between the age of 70-76 who smoking at least 100 cigarettes in your lifetime. 2. Lung Cancer Screening: covers low dose CT scan once per year if you meet all of the following conditions: (1) Age 48-67; (2) No signs or symptoms of lung cancer; (3) Current smoker or have quit smoking within the last 15 years; (4) You have a tobacco smoking history of at least 20 pack years (packs per day x number of years you smoked); (5) You get a written order from a healthcare provider. 3. Glaucoma Screening: covered annually if you're considered high risk: (1) You have diabetes OR (2) Family history of glaucoma OR (3)  aged 48 and older OR (3)  American aged 72 and older  3. Osteoporosis Screening: covered every 2 years if you meet one of the following conditions: (1) Have a vertebral abnormality; (2) On glucocorticoid therapy for more than 3 months; (3) Have primary hyperparathyroidism; (4) On osteoporosis medications and need to assess response to drug therapy. 5. HIV Screening: covered annually if you're between the age of 14-79. Also covered annually if you are younger than 13 and older than 72 with risk factors for HIV infection. For pregnant patients, it is covered up to 3 times per pregnancy.     Immunizations:  Immunization Recommendations   Influenza Vaccine Annual influenza vaccination during flu season is recommended for all persons aged >= 6 months who do not have contraindications   Pneumococcal Vaccine   * Pneumococcal conjugate vaccine = PCV13 (Prevnar 13), PCV15 (Vaxneuvance), PCV20 (Prevnar 20)  * Pneumococcal polysaccharide vaccine = PPSV23 (Pneumovax) Adults 2364 years old: 1-3 doses may be recommended based on certain risk factors  Adults 72 years old: 1-2 doses may be recommended based off what pneumonia vaccine you previously received   Hepatitis B Vaccine 3 dose series if at intermediate or high risk (ex: diabetes, end stage renal disease, liver disease)   Tetanus (Td) Vaccine - COST NOT COVERED BY MEDICARE PART B Following completion of primary series, a booster dose should be given every 10 years to maintain immunity against tetanus. Td may also be given as tetanus wound prophylaxis. Tdap Vaccine - COST NOT COVERED BY MEDICARE PART B Recommended at least once for all adults. For pregnant patients, recommended with each pregnancy. Shingles Vaccine (Shingrix) - COST NOT COVERED BY MEDICARE PART B  2 shot series recommended in those aged 48 and above     Health Maintenance Due:      Topic Date Due   • Hepatitis C Screening  Completed     Immunizations Due:      Topic Date Due   • Pneumococcal Vaccine: 65+ Years (1 - PCV) Never done   • COVID-19 Vaccine (3 - Pfizer series) 06/26/2021   • Influenza Vaccine (1) 09/01/2023     Advance Directives   What are advance directives? Advance directives are legal documents that state your wishes and plans for medical care. These plans are made ahead of time in case you lose your ability to make decisions for yourself. Advance directives can apply to any medical decision, such as the treatments you want, and if you want to donate organs. What are the types of advance directives? There are many types of advance directives, and each state has rules about how to use them. You may choose a combination of any of the following:  · Living will: This is a written record of the treatment you want. You can also choose which treatments you do not want, which to limit, and which to stop at a certain time.  This includes surgery, medicine, IV fluid, and tube feedings. · Durable power of  for healthcare [facility]): This is a written record that states who you want to make healthcare choices for you when you are unable to make them for yourself. This person, called a proxy, is usually a family member or a friend. You may choose more than 1 proxy. · Do not resuscitate (DNR) order:  A DNR order is used in case your heart stops beating or you stop breathing. It is a request not to have certain forms of treatment, such as CPR. A DNR order may be included in other types of advance directives. · Medical directive: This covers the care that you want if you are in a coma, near death, or unable to make decisions for yourself. You can list the treatments you want for each condition. Treatment may include pain medicine, surgery, blood transfusions, dialysis, IV or tube feedings, and a ventilator (breathing machine). · Values history: This document has questions about your views, beliefs, and how you feel and think about life. This information can help others choose the care that you would choose. Why are advance directives important? An advance directive helps you control your care. Although spoken wishes may be used, it is better to have your wishes written down. Spoken wishes can be misunderstood, or not followed. Treatments may be given even if you do not want them. An advance directive may make it easier for your family to make difficult choices about your care. Weight Management   Why it is important to manage your weight:  Being overweight increases your risk of health conditions such as heart disease, high blood pressure, type 2 diabetes, and certain types of cancer. It can also increase your risk for osteoarthritis, sleep apnea, and other respiratory problems. Aim for a slow, steady weight loss. Even a small amount of weight loss can lower your risk of health problems.   How to lose weight safely:  A safe and healthy way to lose weight is to eat fewer calories and get regular exercise. You can lose up about 1 pound a week by decreasing the number of calories you eat by 500 calories each day. Healthy meal plan for weight management:  A healthy meal plan includes a variety of foods, contains fewer calories, and helps you stay healthy. A healthy meal plan includes the following:  · Eat whole-grain foods more often. A healthy meal plan should contain fiber. Fiber is the part of grains, fruits, and vegetables that is not broken down by your body. Whole-grain foods are healthy and provide extra fiber in your diet. Some examples of whole-grain foods are whole-wheat breads and pastas, oatmeal, brown rice, and bulgur. · Eat a variety of vegetables every day. Include dark, leafy greens such as spinach, kale, arturo greens, and mustard greens. Eat yellow and orange vegetables such as carrots, sweet potatoes, and winter squash. · Eat a variety of fruits every day. Choose fresh or canned fruit (canned in its own juice or light syrup) instead of juice. Fruit juice has very little or no fiber. · Eat low-fat dairy foods. Drink fat-free (skim) milk or 1% milk. Eat fat-free yogurt and low-fat cottage cheese. Try low-fat cheeses such as mozzarella and other reduced-fat cheeses. · Choose meat and other protein foods that are low in fat. Choose beans or other legumes such as split peas or lentils. Choose fish, skinless poultry (chicken or turkey), or lean cuts of red meat (beef or pork). Before you cook meat or poultry, cut off any visible fat. · Use less fat and oil. Try baking foods instead of frying them. Add less fat, such as margarine, sour cream, regular salad dressing and mayonnaise to foods. Eat fewer high-fat foods. Some examples of high-fat foods include french fries, doughnuts, ice cream, and cakes. · Eat fewer sweets. Limit foods and drinks that are high in sugar.  This includes candy, cookies, regular soda, and sweetened drinks. Exercise:  Exercise at least 30 minutes per day on most days of the week. Some examples of exercise include walking, biking, dancing, and swimming. You can also fit in more physical activity by taking the stairs instead of the elevator or parking farther away from stores. Ask your healthcare provider about the best exercise plan for you. © Copyright [employer] 2018 Information is for End User's use only and may not be sold, redistributed or otherwise used for commercial purposes.  All illustrations and images included in CareNotes® are the copyrighted property of A.D.A.M., Inc. or  [address]

## 2023-07-28 NOTE — ASSESSMENT & PLAN NOTE
Insomnia. Patient will increase gabapentin to 100 mg 3 times daily including a bedtime dose in hopes that this would help him sleep more comfortably throughout the night.

## 2023-07-28 NOTE — ASSESSMENT & PLAN NOTE
Pain in right foot. 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 We discussed possibility of also having neuropathy since symptoms tend to get worse at bedtime. He will increase gabapentin to take 100 mg 3 times daily.

## 2023-07-28 NOTE — ASSESSMENT & PLAN NOTE
Chronic kidney disease.   I reviewed patient's latest blood test results which show renal function to be stable at this time for his age

## 2023-08-04 DIAGNOSIS — R09.82 POST-NASAL DRIP: ICD-10-CM

## 2023-08-04 RX ORDER — FLUTICASONE PROPIONATE 50 MCG
SPRAY, SUSPENSION (ML) NASAL
Qty: 48 ML | Refills: 1 | Status: SHIPPED | OUTPATIENT
Start: 2023-08-04

## 2023-08-05 DIAGNOSIS — M79.672 PAIN IN BOTH FEET: ICD-10-CM

## 2023-08-05 DIAGNOSIS — M79.671 PAIN IN BOTH FEET: ICD-10-CM

## 2023-08-07 RX ORDER — GABAPENTIN 100 MG/1
100 CAPSULE ORAL 3 TIMES DAILY
Qty: 90 CAPSULE | Refills: 0 | OUTPATIENT
Start: 2023-08-07

## 2023-09-06 ENCOUNTER — APPOINTMENT (EMERGENCY)
Dept: RADIOLOGY | Facility: HOSPITAL | Age: 88
DRG: 177 | End: 2023-09-06
Payer: COMMERCIAL

## 2023-09-06 ENCOUNTER — HOSPITAL ENCOUNTER (INPATIENT)
Facility: HOSPITAL | Age: 88
LOS: 3 days | Discharge: HOME HEALTH | DRG: 177 | End: 2023-09-09
Attending: EMERGENCY MEDICINE | Admitting: STUDENT IN AN ORGANIZED HEALTH CARE EDUCATION/TRAINING PROGRAM
Payer: COMMERCIAL

## 2023-09-06 DIAGNOSIS — J96.00 ACUTE RESPIRATORY FAILURE DUE TO COVID-19 (HCC): ICD-10-CM

## 2023-09-06 DIAGNOSIS — R26.2 AMBULATORY DYSFUNCTION: ICD-10-CM

## 2023-09-06 DIAGNOSIS — U07.1 ACUTE RESPIRATORY FAILURE DUE TO COVID-19 (HCC): ICD-10-CM

## 2023-09-06 DIAGNOSIS — U07.1 COVID-19: Primary | ICD-10-CM

## 2023-09-06 DIAGNOSIS — E87.1 HYPONATREMIA: ICD-10-CM

## 2023-09-06 LAB
ALBUMIN SERPL BCP-MCNC: 4.4 G/DL (ref 3.5–5)
ALP SERPL-CCNC: 82 U/L (ref 34–104)
ALT SERPL W P-5'-P-CCNC: 67 U/L (ref 7–52)
ANION GAP SERPL CALCULATED.3IONS-SCNC: 6 MMOL/L
AST SERPL W P-5'-P-CCNC: 76 U/L (ref 13–39)
ATRIAL RATE: 91 BPM
BASOPHILS # BLD AUTO: 0.01 THOUSANDS/ÂΜL (ref 0–0.1)
BASOPHILS NFR BLD AUTO: 0 % (ref 0–1)
BILIRUB SERPL-MCNC: 0.61 MG/DL (ref 0.2–1)
BNP SERPL-MCNC: 368 PG/ML (ref 0–100)
BUN SERPL-MCNC: 15 MG/DL (ref 5–25)
CALCIUM SERPL-MCNC: 9.3 MG/DL (ref 8.4–10.2)
CHLORIDE SERPL-SCNC: 91 MMOL/L (ref 96–108)
CO2 SERPL-SCNC: 29 MMOL/L (ref 21–32)
CREAT SERPL-MCNC: 1.14 MG/DL (ref 0.6–1.3)
D DIMER PPP FEU-MCNC: 1.18 UG/ML FEU
EOSINOPHIL # BLD AUTO: 0.08 THOUSAND/ÂΜL (ref 0–0.61)
EOSINOPHIL NFR BLD AUTO: 2 % (ref 0–6)
ERYTHROCYTE [DISTWIDTH] IN BLOOD BY AUTOMATED COUNT: 12.8 % (ref 11.6–15.1)
FLUAV RNA RESP QL NAA+PROBE: NEGATIVE
FLUBV RNA RESP QL NAA+PROBE: NEGATIVE
GFR SERPL CREATININE-BSD FRML MDRD: 55 ML/MIN/1.73SQ M
GLUCOSE SERPL-MCNC: 102 MG/DL (ref 65–140)
GLUCOSE SERPL-MCNC: 155 MG/DL (ref 65–140)
HCT VFR BLD AUTO: 32.7 % (ref 36.5–49.3)
HGB BLD-MCNC: 10.5 G/DL (ref 12–17)
IMM GRANULOCYTES # BLD AUTO: 0.03 THOUSAND/UL (ref 0–0.2)
IMM GRANULOCYTES NFR BLD AUTO: 1 % (ref 0–2)
LYMPHOCYTES # BLD AUTO: 0.51 THOUSANDS/ÂΜL (ref 0.6–4.47)
LYMPHOCYTES NFR BLD AUTO: 10 % (ref 14–44)
MCH RBC QN AUTO: 31.9 PG (ref 26.8–34.3)
MCHC RBC AUTO-ENTMCNC: 32.1 G/DL (ref 31.4–37.4)
MCV RBC AUTO: 99 FL (ref 82–98)
MONOCYTES # BLD AUTO: 0.76 THOUSAND/ÂΜL (ref 0.17–1.22)
MONOCYTES NFR BLD AUTO: 15 % (ref 4–12)
NEUTROPHILS # BLD AUTO: 3.58 THOUSANDS/ÂΜL (ref 1.85–7.62)
NEUTS SEG NFR BLD AUTO: 72 % (ref 43–75)
NRBC BLD AUTO-RTO: 0 /100 WBCS
PLATELET # BLD AUTO: 164 THOUSANDS/UL (ref 149–390)
PMV BLD AUTO: 11 FL (ref 8.9–12.7)
POTASSIUM SERPL-SCNC: 3.9 MMOL/L (ref 3.5–5.3)
PROCALCITONIN SERPL-MCNC: 0.09 NG/ML
PROT SERPL-MCNC: 8.2 G/DL (ref 6.4–8.4)
QRS AXIS: 53 DEGREES
QRSD INTERVAL: 92 MS
QT INTERVAL: 340 MS
QTC INTERVAL: 425 MS
RBC # BLD AUTO: 3.29 MILLION/UL (ref 3.88–5.62)
RSV RNA RESP QL NAA+PROBE: NEGATIVE
SARS-COV-2 RNA RESP QL NAA+PROBE: POSITIVE
SODIUM SERPL-SCNC: 126 MMOL/L (ref 135–147)
T WAVE AXIS: 30 DEGREES
VENTRICULAR RATE: 94 BPM
WBC # BLD AUTO: 4.97 THOUSAND/UL (ref 4.31–10.16)

## 2023-09-06 PROCEDURE — 99285 EMERGENCY DEPT VISIT HI MDM: CPT | Performed by: EMERGENCY MEDICINE

## 2023-09-06 PROCEDURE — 94640 AIRWAY INHALATION TREATMENT: CPT

## 2023-09-06 PROCEDURE — 71045 X-RAY EXAM CHEST 1 VIEW: CPT

## 2023-09-06 PROCEDURE — 96367 TX/PROPH/DG ADDL SEQ IV INF: CPT

## 2023-09-06 PROCEDURE — 93010 ELECTROCARDIOGRAM REPORT: CPT | Performed by: INTERNAL MEDICINE

## 2023-09-06 PROCEDURE — 99285 EMERGENCY DEPT VISIT HI MDM: CPT

## 2023-09-06 PROCEDURE — 93005 ELECTROCARDIOGRAM TRACING: CPT

## 2023-09-06 PROCEDURE — 96365 THER/PROPH/DIAG IV INF INIT: CPT

## 2023-09-06 PROCEDURE — 83880 ASSAY OF NATRIURETIC PEPTIDE: CPT | Performed by: EMERGENCY MEDICINE

## 2023-09-06 PROCEDURE — 82948 REAGENT STRIP/BLOOD GLUCOSE: CPT

## 2023-09-06 PROCEDURE — 80053 COMPREHEN METABOLIC PANEL: CPT | Performed by: EMERGENCY MEDICINE

## 2023-09-06 PROCEDURE — 85025 COMPLETE CBC W/AUTO DIFF WBC: CPT | Performed by: EMERGENCY MEDICINE

## 2023-09-06 PROCEDURE — 0241U HB NFCT DS VIR RESP RNA 4 TRGT: CPT | Performed by: EMERGENCY MEDICINE

## 2023-09-06 PROCEDURE — 94760 N-INVAS EAR/PLS OXIMETRY 1: CPT

## 2023-09-06 PROCEDURE — 84145 PROCALCITONIN (PCT): CPT | Performed by: EMERGENCY MEDICINE

## 2023-09-06 PROCEDURE — 36415 COLL VENOUS BLD VENIPUNCTURE: CPT | Performed by: EMERGENCY MEDICINE

## 2023-09-06 PROCEDURE — 85379 FIBRIN DEGRADATION QUANT: CPT | Performed by: STUDENT IN AN ORGANIZED HEALTH CARE EDUCATION/TRAINING PROGRAM

## 2023-09-06 PROCEDURE — XW033E5 INTRODUCTION OF REMDESIVIR ANTI-INFECTIVE INTO PERIPHERAL VEIN, PERCUTANEOUS APPROACH, NEW TECHNOLOGY GROUP 5: ICD-10-PCS | Performed by: STUDENT IN AN ORGANIZED HEALTH CARE EDUCATION/TRAINING PROGRAM

## 2023-09-06 PROCEDURE — 99223 1ST HOSP IP/OBS HIGH 75: CPT | Performed by: STUDENT IN AN ORGANIZED HEALTH CARE EDUCATION/TRAINING PROGRAM

## 2023-09-06 PROCEDURE — 94664 DEMO&/EVAL PT USE INHALER: CPT

## 2023-09-06 RX ORDER — FINASTERIDE 5 MG/1
5 TABLET, FILM COATED ORAL DAILY
Status: DISCONTINUED | OUTPATIENT
Start: 2023-09-06 | End: 2023-09-09 | Stop reason: HOSPADM

## 2023-09-06 RX ORDER — DEXAMETHASONE SODIUM PHOSPHATE 10 MG/ML
6 INJECTION, SOLUTION INTRAMUSCULAR; INTRAVENOUS EVERY 24 HOURS
Status: DISCONTINUED | OUTPATIENT
Start: 2023-09-06 | End: 2023-09-06 | Stop reason: SDUPTHER

## 2023-09-06 RX ORDER — AMITRIPTYLINE HYDROCHLORIDE 10 MG/1
10 TABLET, FILM COATED ORAL
Status: DISCONTINUED | OUTPATIENT
Start: 2023-09-06 | End: 2023-09-09 | Stop reason: HOSPADM

## 2023-09-06 RX ORDER — ALPRAZOLAM 0.5 MG/1
0.5 TABLET ORAL 2 TIMES DAILY PRN
Status: DISCONTINUED | OUTPATIENT
Start: 2023-09-06 | End: 2023-09-09 | Stop reason: HOSPADM

## 2023-09-06 RX ORDER — IPRATROPIUM BROMIDE AND ALBUTEROL SULFATE 2.5; .5 MG/3ML; MG/3ML
3 SOLUTION RESPIRATORY (INHALATION)
Status: DISCONTINUED | OUTPATIENT
Start: 2023-09-06 | End: 2023-09-06

## 2023-09-06 RX ORDER — LEVALBUTEROL INHALATION SOLUTION 1.25 MG/3ML
1.25 SOLUTION RESPIRATORY (INHALATION)
Status: DISCONTINUED | OUTPATIENT
Start: 2023-09-06 | End: 2023-09-08

## 2023-09-06 RX ORDER — AMLODIPINE BESYLATE 5 MG/1
5 TABLET ORAL DAILY
Status: DISCONTINUED | OUTPATIENT
Start: 2023-09-06 | End: 2023-09-09 | Stop reason: HOSPADM

## 2023-09-06 RX ORDER — SODIUM CHLORIDE, SODIUM GLUCONATE, SODIUM ACETATE, POTASSIUM CHLORIDE, MAGNESIUM CHLORIDE, SODIUM PHOSPHATE, DIBASIC, AND POTASSIUM PHOSPHATE .53; .5; .37; .037; .03; .012; .00082 G/100ML; G/100ML; G/100ML; G/100ML; G/100ML; G/100ML; G/100ML
75 INJECTION, SOLUTION INTRAVENOUS CONTINUOUS
Status: DISCONTINUED | OUTPATIENT
Start: 2023-09-06 | End: 2023-09-07

## 2023-09-06 RX ORDER — HEPARIN SODIUM 5000 [USP'U]/ML
5000 INJECTION, SOLUTION INTRAVENOUS; SUBCUTANEOUS EVERY 8 HOURS SCHEDULED
Status: DISCONTINUED | OUTPATIENT
Start: 2023-09-06 | End: 2023-09-06

## 2023-09-06 RX ORDER — CHOLECALCIFEROL (VITAMIN D3) 125 MCG
100 CAPSULE ORAL DAILY
Status: DISCONTINUED | OUTPATIENT
Start: 2023-09-06 | End: 2023-09-09 | Stop reason: HOSPADM

## 2023-09-06 RX ORDER — CEFTRIAXONE 1 G/50ML
1000 INJECTION, SOLUTION INTRAVENOUS EVERY 24 HOURS
Status: DISCONTINUED | OUTPATIENT
Start: 2023-09-07 | End: 2023-09-06

## 2023-09-06 RX ORDER — DEXAMETHASONE SODIUM PHOSPHATE 4 MG/ML
6 INJECTION, SOLUTION INTRA-ARTICULAR; INTRALESIONAL; INTRAMUSCULAR; INTRAVENOUS; SOFT TISSUE EVERY 24 HOURS
Status: DISCONTINUED | OUTPATIENT
Start: 2023-09-06 | End: 2023-09-09 | Stop reason: HOSPADM

## 2023-09-06 RX ORDER — METOPROLOL TARTRATE 5 MG/5ML
5 INJECTION INTRAVENOUS EVERY 8 HOURS PRN
Status: DISCONTINUED | OUTPATIENT
Start: 2023-09-06 | End: 2023-09-07

## 2023-09-06 RX ORDER — ALLOPURINOL 100 MG/1
100 TABLET ORAL DAILY
Status: DISCONTINUED | OUTPATIENT
Start: 2023-09-06 | End: 2023-09-09 | Stop reason: HOSPADM

## 2023-09-06 RX ORDER — CEFTRIAXONE 1 G/50ML
1000 INJECTION, SOLUTION INTRAVENOUS EVERY 24 HOURS
Status: DISCONTINUED | OUTPATIENT
Start: 2023-09-07 | End: 2023-09-09 | Stop reason: HOSPADM

## 2023-09-06 RX ORDER — CEFTRIAXONE 1 G/50ML
1000 INJECTION, SOLUTION INTRAVENOUS ONCE
Status: COMPLETED | OUTPATIENT
Start: 2023-09-06 | End: 2023-09-06

## 2023-09-06 RX ORDER — GABAPENTIN 100 MG/1
100 CAPSULE ORAL 3 TIMES DAILY
Status: DISCONTINUED | OUTPATIENT
Start: 2023-09-06 | End: 2023-09-09 | Stop reason: HOSPADM

## 2023-09-06 RX ORDER — INSULIN LISPRO 100 [IU]/ML
1-6 INJECTION, SOLUTION INTRAVENOUS; SUBCUTANEOUS
Status: DISCONTINUED | OUTPATIENT
Start: 2023-09-06 | End: 2023-09-09 | Stop reason: HOSPADM

## 2023-09-06 RX ADMIN — IPRATROPIUM BROMIDE 0.5 MG: 0.5 SOLUTION RESPIRATORY (INHALATION) at 20:31

## 2023-09-06 RX ADMIN — APIXABAN 2.5 MG: 2.5 TABLET, FILM COATED ORAL at 17:30

## 2023-09-06 RX ADMIN — SODIUM CHLORIDE, SODIUM GLUCONATE, SODIUM ACETATE, POTASSIUM CHLORIDE, MAGNESIUM CHLORIDE, SODIUM PHOSPHATE, DIBASIC, AND POTASSIUM PHOSPHATE 75 ML/HR: .53; .5; .37; .037; .03; .012; .00082 INJECTION, SOLUTION INTRAVENOUS at 12:56

## 2023-09-06 RX ADMIN — GABAPENTIN 100 MG: 100 CAPSULE ORAL at 21:12

## 2023-09-06 RX ADMIN — AZITHROMYCIN MONOHYDRATE 500 MG: 500 INJECTION, POWDER, LYOPHILIZED, FOR SOLUTION INTRAVENOUS at 08:40

## 2023-09-06 RX ADMIN — LEVALBUTEROL HYDROCHLORIDE 1.25 MG: 1.25 SOLUTION RESPIRATORY (INHALATION) at 20:31

## 2023-09-06 RX ADMIN — CEFTRIAXONE 1000 MG: 1 INJECTION, SOLUTION INTRAVENOUS at 07:58

## 2023-09-06 RX ADMIN — DEXAMETHASONE SODIUM PHOSPHATE 6 MG: 4 INJECTION, SOLUTION INTRAMUSCULAR; INTRAVENOUS at 12:56

## 2023-09-06 RX ADMIN — GABAPENTIN 100 MG: 100 CAPSULE ORAL at 17:30

## 2023-09-06 RX ADMIN — REMDESIVIR 200 MG: 100 INJECTION, POWDER, LYOPHILIZED, FOR SOLUTION INTRAVENOUS at 12:56

## 2023-09-06 RX ADMIN — AMITRIPTYLINE HYDROCHLORIDE 10 MG: 10 TABLET, FILM COATED ORAL at 21:12

## 2023-09-06 NOTE — CASE MANAGEMENT
Case Management Assessment & Discharge Planning Note    Patient name Rhoda Butt  Location ED 05/ED 05 MRN [id number]  : 7/15/1931 Date 2023       Current Admission Date: 2023  Current Admission Diagnosis:Cough   Patient Active Problem List    Diagnosis Date Noted   • Medicare annual wellness visit, subsequent 2023   • Dermatitis 2023   • Elevated TSH 2023   • [place] esophagus 2022   • Other dysphagia 2022   • Chest pain 2022   • Hypertensive urgency 2022   • Frontal headache 2022   • Insomnia 2022   • Ambulatory dysfunction 2022   • Irritable bowel syndrome with diarrhea 2022   • Diarrhea 2022   • Benign prostatic hyperplasia 2021   • Pain in both feet 05/15/2020   • Carpal tunnel syndrome 2019   • Other insomnia 04/15/2019   • Anxiety 04/15/2019   • Hyponatremia 2019   • Abnormal liver function 2019   • Paresthesia 2018   • Arthritis of carpometacarpal [place]) joint of right thumb 2018   • Right wrist pain 2018   • Peripheral edema 2018   • Syncope 2018   • Stage 3a chronic kidney disease ([address]) 2018   • PAF (paroxysmal atrial fibrillation) ([address]) 2018   • Left lumbar radiculopathy 01/10/2017   • Cholecystitis with cholelithiasis 2016   • Joint pain, knee 2014   • Allergic rhinitis 2013   • S/P ablation of ventricular arrhythmia 2013   • 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    Primary Care Provider: Poornima Ames MD    Primary Insurance: [facility]  Secondary Insurance:     ASSESSMENT:  [address] [facility], [address] - Daughter   Primary Phone: [phone] (Home)               Advance Directives  Does patient have a [address]?: Yes  Does patient have Advance Directives?: Yes  Advance Directives: Living will, Power of  for health care  Primary Contact: Susan Rubalcava         Readmission [facility]  30 Day Readmission: No    Patient Information  Admitted from[de-identified] Home  Mental Status: Alert  During Assessment patient was accompanied by: Daughter  Assessment information provided by[de-identified] Daughter  Primary Caregiver: Self  Support Systems: [id number]: [address] [place] [address] do you live in?: [address] [place] [address] entry access options.  Select all that apply.: No steps to enter home  Type of Current Residence: [facility]  In the last 12 months, was there a time when you were not able to pay the mortgage or rent on time?: No  In the last 12 months, how many places have you lived?: 1  In the last 12 months, was there a time when you did not have a steady place to sleep or slept in a shelter (including now)?: No  Homeless/housing insecurity resource given?: N/A  Living Arrangements: Lives Alone  Is patient a ?: Yes  Is patient active with [facility])?: No    Activities of Daily Living Prior to Admission  Functional Status: Independent  Completes ADLs independently?: Yes  Ambulates independently?: Yes  Does patient use assisted devices?: Yes  Assisted Devices (DME) used: Roberto Singh  Does patient currently own DME?: Yes  What DME does the patient currently own?: Roberto Singh  Does patient have a history of Outpatient Therapy (PT/OT)?: No  Does the patient have a history of Short-Term Rehab?: No  Does patient have a history of HHC?: Yes  Does patient currently have [address] [facility] [address] [facility]?: No         Patient Information Continued  Income Source: Pension/penitentiary  Does patient have prescription coverage?: Yes  Within the past 12 months, you worried that your food would run out before you got the money to buy more.: Never true  Within the past 12 months, the food you bought just didn't last and you didn't have money to get more.: Never true  Food insecurity resource given?: N/A  Does patient receive dialysis treatments?: No  Does patient have a history of substance abuse?: No  Does patient have a history of Mental Health Diagnosis?: No         Means of Transportation  Means of Transport to Appts[de-identified] Family transport  In the past 12 months, has lack of transportation kept you from medical appointments or from getting medications?: No  In the past 12 months, has lack of transportation kept you from meetings, work, or from getting things needed for daily living?: No  Was application for public transport provided?: N/A        DISCHARGE DETAILS:    Discharge planning discussed with[de-identified] patient dtr via phone as pt has [employer]  Freedom of Choice: Yes  Comments - Freedom of Choice: Discussed dc planning and role of CM  CM contacted family/caregiver?: Yes  Were Treatment Team discharge recommendations reviewed with patient/caregiver?: Yes  Did patient/caregiver verbalize understanding of patient care needs?: Yes       Contacts  Patient Contacts:  Jennifer  Relationship to Patient[de-identified] Family  Contact Method: Phone  Phone Number: [phone]  Reason/Outcome: Emergency Contact, Discharge Planning, [address]         Is the patient interested in [address] [facility] [address] [facility] at discharge?: No    DME Referral Provided  Referral made for DME?: No    Other Referral/Resources/Interventions Provided:  Interventions: None Indicated, HHC  Referral Comments: No anticpated needs. Pending symptom progress. No ambualtory concerns at baseline. Discharge Destination Plan[de-identified] Home with [address] [facility], Home  Transport at Discharge : Family                                      Additional Comments: Cm spoke with pts dtr via phone as pt is COVID +. Pts dtr reports that pt resides alone at [address] in [place] in a [place] style home. She states pt lives minutes away from her. PT is never alone at night as his dtr Casey Michele and her spouse stay with pt overnight daily. Dtr also helps pt daily througout the day as well. Pt uses a walker at home. He has a hx of HHC with [facility]. Pt has no Str/pt/ot/mh/da HX. His dtr Casey Michele is POA. Pt sees \[facility]"" family practice for PCP. Pt uses [facility] and his family is his main source of transportation. Per dtr pt has been ambualting well until yesterday.  Cm following for dc planning

## 2023-09-06 NOTE — PLAN OF CARE
Problem: Potential for Falls  Goal: Patient will remain free of falls  Description: INTERVENTIONS:  - Educate patient/family on patient safety including physical limitations  - Instruct patient to call for assistance with activity   - Consult OT/PT to assist with strengthening/mobility   - Keep Call bell within reach  - Keep bed low and locked with side rails adjusted as appropriate  - Keep care items and personal belongings within reach  - Initiate and maintain comfort rounds  - Make Fall Risk Sign visible to staff  - Offer Toileting every 2 Hours, in advance of need  - Initiate/Maintain alarm  - Obtain necessary fall risk management equipment:   - Apply yellow socks and bracelet for high fall risk patients  - Consider moving patient to room near nurses station  9/6/2023 1325 by Ap Pressley RN  Outcome: Progressing  9/6/2023 1322 by Ap Pressley RN  Outcome: Progressing     Problem: MOBILITY - ADULT  Goal: Maintain or return to baseline ADL function  Description: INTERVENTIONS:  -  Assess patient's ability to carry out ADLs; assess patient's baseline for ADL function and identify physical deficits which impact ability to perform ADLs (bathing, care of mouth/teeth, toileting, grooming, dressing, etc.)  - Assess/evaluate cause of self-care deficits   - Assess range of motion  - Assess patient's mobility; develop plan if impaired  - Assess patient's need for assistive devices and provide as appropriate  - Encourage maximum independence but intervene and supervise when necessary  - Involve family in performance of ADLs  - Assess for home care needs following discharge   - Consider OT consult to assist with ADL evaluation and planning for discharge  - Provide patient education as appropriate  9/6/2023 1325 by Ap Pressley RN  Outcome: Progressing  9/6/2023 1322 by Ap Pressley RN  Outcome: Progressing  Goal: Maintains/Returns to pre admission functional level  Description: INTERVENTIONS:  - Perform BMAT or MOVE assessment daily.   - Set and communicate daily mobility goal to care team and patient/family/caregiver. - Collaborate with rehabilitation services on mobility goals if consulted  - Perform Range of Motion 6 times a day. - Reposition patient every 4 hours.   - Dangle patient 4 times a day  - Stand patient 0 times a day  - Ambulate patient 0 times a day  - Out of bed to chair 0 times a day   - Out of bed for meals 0 times a day  - Out of bed for toileting  - Record patient progress and toleration of activity level   9/6/2023 1325 by Alma Guzman RN  Outcome: Progressing  9/6/2023 1322 by Alma Guzman, ILANA  Outcome: Progressing

## 2023-09-06 NOTE — H&P
INTERNAL MEDICINE  ADMISSION H&P     Name: Mercy Novak   Age & Sex: 80 y.o. male   MRN: [id number]  Unit/Bed#: -01   Encounter: [id number]  Primary Care Provider: Oc Ny MD    Code Status: Level 3 - DNAR and DNI  Admission Status: INPATIENT   Disposition: Patient requires Med/Surg with Telemetry     ASSESSMENT/PLAN     Principal Problem:    COVID-19  Active Problems:    Benign essential hypertension    Stage 3a chronic kidney disease (HCC)    PAF (paroxysmal atrial fibrillation) ([address])    Ambulatory dysfunction      * COVID-19  Assessment & Plan  Presents w/ non-productive cough,cough and malaise  Associated with difficulty and increase work of breathing  Initially on room air and escalated to 6L NC  WBC 4.9, Hb 10.5  Covid +  Chest Xray- LLL consolidation  Will continue w/ moderate COVID management   Continue Rocephin / Eliquis   Maintain O2 >92  Follow up on Cx  Monitor       Ambulatory dysfunction  Assessment & Plan  PT/OT    PAF (paroxysmal atrial fibrillation) ([facility])  Assessment & Plan  Continue b-blocker / eliquis     Stage 3a chronic kidney disease ([address])  Assessment & Plan  Lab Results   Component Value Date    EGFR 55 09/06/2023    EGFR 47 09/01/2022    EGFR 57 07/16/2022    CREATININE 1.14 09/06/2023    CREATININE 1.30 09/01/2022    CREATININE 1.11 07/16/2022   Cr stable at 1.11  Avoid NSAIDs, hypotension  monitor    Benign essential hypertension  Assessment & Plan  Stable continue amlodipine and bisoprolol 1.25mg      VTE Pharmacologic Prophylaxis: Reason for no pharmacologic prophylaxis on Eliquis  VTE Mechanical Prophylaxis: sequential compression device    CHIEF COMPLAINT     Chief Complaint   Patient presents with   • Cough     Patient presents to the ED via EMS from home with c/o cough that began this morning       HISTORY OF PRESENT ILLNESS     Patient patient is a 24-year-old male with past medical history of A-fib on Eliquis, hypertension and peripheral vascular disease who presents to the ED with cough. Compliant with Eliquis. Patient is accompanied by her daughter. Endorses patient been having progressively worsening nonproductive cough associated with increased work of breathing. Patient denies any headache, visual disturbances, chest pain, abdominal pain, nausea, vomiting, fever-chills. BP (!) 134/103 (BP Location: Left arm)   Pulse 87   Temp 97.7 °F (36.5 °C) (Temporal)   Resp 16   Ht 5' 8" (1.727 m)   Wt 90.7 kg (200 lb)   SpO2 96%   BMI 30.41 kg/m²     WBC 4.9, Hb 10.5  Covid +    Will admit for moderate covid infection. REVIEW OF SYSTEMS     Review of Systems   Constitutional: Positive for fever. Negative for chills, fatigue and unexpected weight change. HENT: Negative for congestion. Eyes: Negative for visual disturbance. Respiratory: Positive for cough, shortness of breath and wheezing. Negative for chest tightness. Cardiovascular: Negative for chest pain, palpitations and leg swelling. Gastrointestinal: Negative for abdominal pain. Endocrine: Negative for polydipsia and polyuria. Genitourinary: Negative for difficulty urinating and dysuria. Neurological: Positive for weakness. OBJECTIVE     Vitals:    23 1000 23 1100 23 1107 23 1124   BP: (!) 183/86  (!) 134/103    BP Location: Left arm  Left arm    Pulse: 95  87    Resp: (!) 27  16    Temp:   97.7 °F (36.5 °C)    TempSrc:   Temporal    SpO2: 92% (!) 76%  96%   Weight:       Height:          Temperature:   Temp (24hrs), Av °F (36.7 °C), Min:97.7 °F (36.5 °C), Max:98.2 °F (36.8 °C)    Temperature: 97.7 °F (36.5 °C)  Intake & Output:  I/O        07 07 07 07 07 0700    P. O.   120    Total Intake(mL/kg)   120 (1.3)    Urine (mL/kg/hr)   100    Total Output   100    Net   +20               Weights:   IBW (Ideal Body Weight): 68.4 kg    Body mass index is 30.41 kg/m².   Weight (last 2 days)     Date/Time Weight 09/06/23 0710 90.7 (200)        Physical Exam  Constitutional:       General: He is not in acute distress. Appearance: He is not ill-appearing, toxic-appearing or diaphoretic. HENT:      Head: Normocephalic and atraumatic. Cardiovascular:      Rate and Rhythm: Regular rhythm. Tachycardia present. Pulses: Normal pulses. Heart sounds: Normal heart sounds. Pulmonary:      Effort: Respiratory distress present. Breath sounds: Normal breath sounds. Abdominal:      General: Bowel sounds are normal. There is no distension. Palpations: There is no mass. Tenderness: There is no abdominal tenderness. There is no right CVA tenderness, left CVA tenderness or guarding. Hernia: No hernia is present. Musculoskeletal:      Right lower leg: No edema. Left lower leg: No edema. Neurological:      General: No focal deficit present. Mental Status: He is oriented to person, place, and time.    Psychiatric:         Mood and Affect: Mood normal.         Behavior: Behavior normal.       PAST MEDICAL HISTORY     Past Medical History:   Diagnosis Date   • A-fib [facility])    • Anemia    • Carpal tunnel syndrome, unspecified upper limb    • Cataract     last assessed: 8/18/2012   • GERD (gastroesophageal reflux disease)    • Glaucoma    • Hypertension     last assessed: 8/23/2012   • Intervertebral disc disease    • PVD (peripheral vascular disease) ([address])      PAST SURGICAL HISTORY     Past Surgical History:   Procedure Laterality Date   • CATARACT EXTRACTION     • COLONOSCOPY     • HERNIA REPAIR     • NEUROPLASTY / TRANSPOSITION MEDIAN NERVE AT CARPAL TUNNEL     • RI LAPAROSCOPY SURG CHOLECYSTECTOMY N/A 09/14/2016    Procedure: LAPAROSCOPIC CHOLECYSTECTOMY ;  Surgeon: En March MD;  Location: [facility];  Service: General   • UPPER GASTROINTESTINAL ENDOSCOPY       SOCIAL & FAMILY HISTORY     Social History     Substance and Sexual Activity   Alcohol Use Not Currently       Social History Substance and Sexual Activity   Drug Use No     Social History     Tobacco Use   Smoking Status Former   Smokeless Tobacco Never   Tobacco Comments    quit 30 years ago      Family History   Problem Relation Age of Onset   • Glaucoma Mother    • Cancer Father    • Heart disease Father    • Glaucoma Father    • Alzheimer's disease Family    • Heart attack Family      LABORATORY DATA     Labs: I have personally reviewed pertinent reports. Results from last 7 days   Lab Units 09/06/23  0739   WBC Thousand/uL 4.97   HEMOGLOBIN g/dL 10.5*   HEMATOCRIT % 32.7*   PLATELETS Thousands/uL 164   NEUTROS PCT % 72   MONOS PCT % 15*   EOS PCT % 2      Results from last 7 days   Lab Units 09/06/23  0739   POTASSIUM mmol/L 3.9   CHLORIDE mmol/L 91*   CO2 mmol/L 29   BUN mg/dL 15   CREATININE mg/dL 1.14   CALCIUM mg/dL 9.3   ALK PHOS U/L 82   ALT U/L 67*   AST U/L 76*                          Micro:  Lab Results   Component Value Date    BLOODCX No Growth After 5 Days. 04/05/2019    BLOODCX No Growth After 5 Days. 04/05/2019    URINECX No Growth <1000 cfu/mL 07/26/2022     IMAGING & DIAGNOSTIC TESTS     Imaging: I have personally reviewed pertinent reports. XR chest 1 view portable    Result Date: 9/6/2023  Impression: Left basilar airspace disease most compatible with infection. The patient return for a lateral view at 7:40 a.m. the same date. This confirms patchy airspace opacity in the left lower lobe. Workstation performed: [id number]     XR chest 1 view    Result Date: 9/6/2023  Impression: Left basilar airspace disease most compatible with infection. The patient return for a lateral view at 7:40 a.m. the same date. This confirms patchy airspace opacity in the left lower lobe. Workstation performed: [id number]     EKG, Pathology, and Other Studies: I have personally reviewed pertinent reports.      ALLERGIES     Allergies   Allergen Reactions   • Bempedoic Acid Other (See Comments)     Gout   • Atorvastatin GI Intolerance     Other reaction(s): GI upset   • Ezetimibe Other (See Comments)     myalgia  Other reaction(s): Other (See Comments)  Myalgia  Other reaction(s): Myalgia  myalgia   • Statins Rash     MEDICATIONS PRIOR TO ARRIVAL     Prior to Admission medications    Medication Sig Start Date End Date Taking? Authorizing Provider   acetaminophen-codeine (TYLENOL with CODEINE #3) 300-30 MG per tablet Take 1-2 tablet by oral route every 4-6 hours as needed post surgery    Historical Provider, MD   allopurinol (ZYLOPRIM) 100 mg tablet Take 100 mg by mouth daily 10/22/20   Historical Provider, MD   amitriptyline (ELAVIL) 10 mg tablet 1.5 tabs po q hs  Patient taking differently: Take 15 mg by mouth daily at bedtime 1.5 tabs po q hs 9/02/72   Majo Corral MD   amLODIPine (NORVASC) 2.5 mg tablet Take 5 mg by mouth 2 (two) times a day    Historical Provider, MD   apixaban (ELIQUIS) 2.5 mg Take by mouth 2 (two) times a day    Historical Provider, MD   aspirin (Aspirin 81) 81 mg EC tablet Take by oral route. Historical Provider, MD   bisoprolol (ZEBETA) 5 mg tablet 2.5 mg  3/31/20   Historical Provider, MD   brimonidine tartrate 0.2 % ophthalmic solution  11/9/21   Historical Provider, MD   Cequa 0.09 % SOLN INSTILL 1 DROP INTO BOTH EYES TWICE A DAY 6/22/22   Historical Provider, MD   cholecalciferol (VITAMIN D3) 1,000 units tablet Take 2,000 Units by mouth daily     Historical Provider, MD   co-enzyme Q-10 30 MG capsule Take 100 mg by mouth daily      Historical Provider, MD   diphenhydrAMINE-acetaminophen (TYLENOL PM)  MG TABS Take 2 tablets by mouth daily at bedtime as needed for sleep    Historical Provider, MD   Dorzolamide HCl-Timolol Mal PF 2-0.5 % SOLN ONE DROP INTO EACH EYE TWICE DAILY 9/27/22   Historical Provider, MD   finasteride (PROSCAR) 5 mg tablet TAKE 1 TABLET (5 MG TOTAL) BY MOUTH DAILY.  5/22/23   Vika Frankel PA-C   fluticasone (FLONASE) 50 mcg/act nasal spray SPRAY 1 SPRAY INTO [facility] NOSTRIL EVERY DAY 8/4/23   Jesi Tran PA-C   gabapentin (NEURONTIN) 100 mg capsule Take 1 capsule (100 mg total) by mouth 3 (three) times a day 0/91/41   Cole Castro MD   Garlic 4003 MG CAPS Take 1,000 mg by mouth    Historical Provider, MD   indapamide (LOZOL) 1.25 mg tablet Take 1.25 mg by mouth every other day with breakfast 9/11/20   Historical Provider, MD   latanoprost (XALATAN) 0.005 % ophthalmic solution Administer 1 drop to both eyes daily at bedtime    Historical Provider, MD   loperamide (IMODIUM) 2 mg capsule TAKE 1 CAPSULE BY MOUTH 4 TIMES A DAY AS NEEDED FOR DIARRHEA. 10/29/22   Jesi Tran PA-C   multivitamin-iron-minerals-folic acid (CENTRUM) chewable tablet Chew 1 tablet daily.     Historical Provider, MD   omeprazole (PriLOSEC) 20 mg delayed release capsule TAKE 1 CAPSULE BY MOUTH EVERY DAY  Patient taking differently: Take 40 mg by mouth daily 5/21/23   Rosalba Hearn PA-C   Rhopressa 0.02 % SOLN INSTILL 1 DROP INTO RIGHT EYE ONCE A DAY 6/1/23   Historical Provider, MD   triamcinolone (KENALOG) 0.5 % cream Apply topically 2 (two) times a day 3/37/77   Cole Castro MD   amLODIPine (NORVASC) 5 mg tablet Take 5 mg by mouth daily 6/12/23 9/6/23  Historical Provider, MD     MEDICATIONS ADMINISTERED IN LAST 24 HOURS     Medication Administration - last 24 hours from 09/05/2023 1633 to 09/06/2023 1633       Date/Time Order Dose Route Action Action by     09/06/2023 0842 EDT cefTRIAXone (ROCEPHIN) IVPB (premix in dextrose) 1,000 mg 50 mL 0 mg Intravenous Stopped Rosia Soni     09/06/2023 0758 EDT cefTRIAXone (ROCEPHIN) IVPB (premix in dextrose) 1,000 mg 50 mL 1,000 mg Intravenous New Bag Rosia Soni     09/06/2023 0840 EDT azithromycin (ZITHROMAX) 500 mg in sodium chloride 0.9% 250mL IVPB 500 mg 500 mg Intravenous New Bag Rosia Soni     09/06/2023 1127 EDT allopurinol (ZYLOPRIM) tablet 100 mg 100 mg Oral Not Given Elyse Fitzgerald RN     09/06/2023 1128 EDT amLODIPine (NORVASC) tablet 5 mg 5 mg Oral Not Given Jesussamir Erickreno, RN     09/06/2023 1128 EDT apixaban (ELIQUIS) tablet 2.5 mg 2.5 mg Oral Not Given Jesussamir Stallings, RN     09/06/2023 1128 EDT aspirin (ECOTRIN LOW STRENGTH) EC tablet 81 mg 81 mg Oral Not Given [facility]     09/06/2023 1128 EDT co-enzyme Q-10 capsule 100 mg 100 mg Oral Not Given Jesussamir Stallings, RN     09/06/2023 1128 EDT finasteride (PROSCAR) tablet 5 mg 5 mg Oral Not Given [facility]     09/06/2023 1129 EDT gabapentin (NEURONTIN) capsule 100 mg 100 mg Oral Not Given Jesussamir Stallings, RN     09/06/2023 1244 EDT dexamethasone (PF) (DECADRON) injection 6 mg -- Intravenous Canceled Entry Jesussamir Erickreno, RN     09/06/2023 1256 EDT remdesivir Rudkassandra Starkey) 200 mg in sodium chloride 0.9 % 290 mL IVPB 200 mg Intravenous New Bag Jesussamir Erickreno, RN     09/06/2023 1256 EDT multi-electrolyte (PLASMALYTE-A/ISOLYTE-S PH 7.4) IV solution 75 mL/hr Intravenous [address] Osman Stallings, RN     09/06/2023 1256 EDT dexamethasone (DECADRON) injection 6 mg 6 mg Intravenous Given Jesussamir Stallings, RN        CURRENT MEDICATIONS     Current Facility-Administered Medications   Medication Dose Route Frequency Provider Last Rate   • allopurinol  100 mg Oral Daily Rashi Segovia MD     • ALPRAZolam  0.5 mg Oral BID PRN Lakshmi Pierson MD     • amitriptyline  10 mg Oral HS Rashi Segovia MD     • amLODIPine  5 mg Oral Daily Rashi Segovia MD     • apixaban  2.5 mg Oral BID Lakshmi Pierson MD     • aspirin  81 mg Oral Daily Rashi Segovia MD     • [START ON 9/7/2023] cefTRIAXone  1,000 mg Intravenous Q24H Rashi Segovia MD     • co-enzyme Q-10  100 mg Oral Daily Rashi Segovia MD     • dexamethasone  6 mg Intravenous Q24H Rashi Segovia MD     • finasteride  5 mg Oral Daily Rashi Segovia MD     • gabapentin  100 mg Oral TID Rashi Segovia MD     • insulin lispro  1-6 Units Subcutaneous TID AC Rashi Segovia MD     • multi-electrolyte  75 mL/hr Intravenous Continuous Rashi Segoiva, MD 75 mL/hr (09/06/23 1256)   • [START ON 9/7/2023] remdesivir  100 mg Intravenous Q24H Rashi Segovia MD       multi-electrolyte, 75 mL/hr, Last Rate: 75 mL/hr (09/06/23 1256)      ALPRAZolam, 0.5 mg, BID PRN        Admission Time  I spent 45 minutes admitting the patient. This involved direct patient contact where I performed a full history and physical, reviewing previous records, and reviewing laboratory and other diagnostic studies. Portions of the record may have been created with voice recognition software. Occasional wrong word or "sound a like" substitutions may have occurred due to the inherent limitations of voice recognition software.   Read the chart carefully and recognize, using context, where substitutions have occurred.    ==  Sissy Miller MD  [id number] [facility]

## 2023-09-06 NOTE — ASSESSMENT & PLAN NOTE
Lab Results   Component Value Date    EGFR 55 09/06/2023    EGFR 47 09/01/2022    EGFR 57 07/16/2022    CREATININE 1.14 09/06/2023    CREATININE 1.30 09/01/2022    CREATININE 1.11 07/16/2022   · Baseline creatinine around 1.1  · Creatinine within baseline  · Trend BMP

## 2023-09-06 NOTE — ED PROVIDER NOTES
History  Chief Complaint   Patient presents with   • Cough     Patient presents to the ED via EMS from home with c/o cough that began this morning      Patient is a 80-year-old male with history of A-fib on Eliquis, hypertension the presents for evaluation of cough. Daughter presents with the patient and helps provide history. She says since yesterday's been dealing with a hacking nonproductive cough. She noticed that he was dyspneic and had increased work of breathing prior to EMS arrival though this improved by the time that he arrived to the emergency department. Also with some intermittent fevers Tmax 100.7. He denies nausea vomiting chest pain abdominal pain urinary or bowel symptoms. He was vaccinated against COVID. He currently denies any dyspnea. Prior to Admission Medications   Prescriptions Last Dose Informant Patient Reported? Taking? Cequa 0.09 % SOLN  Child Yes No   Sig: INSTILL 1 DROP INTO BOTH EYES TWICE A DAY   Dorzolamide HCl-Timolol Mal PF 2-0.5 % SOLN  Child Yes No   Sig: ONE DROP INTO EACH EYE TWICE DAILY   Garlic 7964 MG CAPS  Child Yes No   Sig: Take 1,000 mg by mouth   Rhopressa 0.02 % SOLN   Yes No   Sig: INSTILL 1 DROP INTO RIGHT EYE ONCE A DAY   acetaminophen-codeine (TYLENOL with CODEINE #3) 300-30 MG per tablet   Yes No   Sig: Take 1-2 tablet by oral route every 4-6 hours as needed post surgery   allopurinol (ZYLOPRIM) 100 mg tablet  Child Yes No   Sig: Take 100 mg by mouth daily   amLODIPine (NORVASC) 2.5 mg tablet  Child Yes No   Sig: Take 2.5 mg by mouth 2 (two) times a day   amLODIPine (NORVASC) 5 mg tablet   Yes No   Sig: Take 5 mg by mouth daily   amitriptyline (ELAVIL) 10 mg tablet   No No   Si.5 tabs po q hs   apixaban (ELIQUIS) 2.5 mg  Child Yes No   Sig: Take by mouth 2 (two) times a day   aspirin (Aspirin 81) 81 mg EC tablet   Yes No   Sig: Take by oral route.    bisoprolol (ZEBETA) 5 mg tablet  Child Yes No   Si.5 mg    brimonidine tartrate 0.2 % ophthalmic solution  Child Yes No   cholecalciferol (VITAMIN D3) 1,000 units tablet  Child Yes No   Sig: Take 2,000 Units by mouth daily    co-enzyme Q-10 30 MG capsule  Child Yes No   Sig: Take 100 mg by mouth daily     diphenhydrAMINE-acetaminophen (TYLENOL PM)  MG TABS  Child Yes No   Sig: Take 2 tablets by mouth daily at bedtime as needed for sleep   finasteride (PROSCAR) 5 mg tablet  Child No No   Sig: TAKE 1 TABLET (5 MG TOTAL) BY MOUTH DAILY. fluticasone (FLONASE) 50 mcg/act nasal spray   No No   Sig: SPRAY 1 SPRAY INTO EACH NOSTRIL EVERY DAY   gabapentin (NEURONTIN) 100 mg capsule   No No   Sig: Take 1 capsule (100 mg total) by mouth 3 (three) times a day   indapamide (LOZOL) 1.25 mg tablet  Child Yes No   Sig: TAKE 1 TABLET BY MOUTH EVERY DAY IN THE MORNING   latanoprost (XALATAN) 0.005 % ophthalmic solution  Child Yes No   Sig: Administer 1 drop to both eyes daily at bedtime   loperamide (IMODIUM) 2 mg capsule  Child No No   Sig: TAKE 1 CAPSULE BY MOUTH 4 TIMES A DAY AS NEEDED FOR DIARRHEA.   multivitamin-iron-minerals-folic acid (CENTRUM) chewable tablet  Child Yes No   Sig: Chew 1 tablet daily.    omeprazole (PriLOSEC) 20 mg delayed release capsule  Child No No   Sig: TAKE 1 CAPSULE BY MOUTH EVERY DAY   Patient taking differently: Take 40 mg by mouth daily   triamcinolone (KENALOG) 0.5 % cream   No No   Sig: Apply topically 2 (two) times a day      Facility-Administered Medications: None       Past Medical History:   Diagnosis Date   • A-fib (HCC)    • Anemia    • Carpal tunnel syndrome, unspecified upper limb    • Cataract     last assessed: 8/18/2012   • GERD (gastroesophageal reflux disease)    • Glaucoma    • Hypertension     last assessed: 8/23/2012   • Intervertebral disc disease    • PVD (peripheral vascular disease) ([address])        Past Surgical History:   Procedure Laterality Date   • CATARACT EXTRACTION     • COLONOSCOPY     • HERNIA REPAIR     • NEUROPLASTY / TRANSPOSITION MEDIAN NERVE AT CARPAL TUNNEL     • WA LAPAROSCOPY SURG CHOLECYSTECTOMY N/A 09/14/2016    Procedure: LAPAROSCOPIC CHOLECYSTECTOMY ;  Surgeon: Anna Jameson MD;  Location: [facility];  Service: General   • UPPER GASTROINTESTINAL ENDOSCOPY         Family History   Problem Relation Age of Onset   • Glaucoma Mother    • Cancer Father    • Heart disease Father    • Glaucoma Father    • Alzheimer's disease Family    • Heart attack Family      I have reviewed and agree with the history as documented. E-Cigarette/Vaping   • E-Cigarette Use Former User      E-Cigarette/Vaping Substances   • Nicotine No    • THC No    • CBD No    • Flavoring No    • Other No    • Unknown No      Social History     Tobacco Use   • Smoking status: Former   • Smokeless tobacco: Never   • Tobacco comments:     quit 30 years ago    Vaping Use   • Vaping Use: Former   Substance Use Topics   • Alcohol use: Not Currently   • Drug use: No       Review of Systems   Constitutional: Positive for fatigue. Negative for fever. HENT: Negative for sore throat. Eyes: Negative for photophobia. Respiratory: Positive for cough. Negative for shortness of breath. Cardiovascular: Negative for chest pain. Gastrointestinal: Negative for abdominal pain. Genitourinary: Negative for dysuria. Musculoskeletal: Negative for back pain. Skin: Negative for rash. Neurological: Negative for light-headedness. Hematological: Negative for adenopathy. Psychiatric/Behavioral: Negative for agitation. All other systems reviewed and are negative. Physical Exam  Physical Exam  Vitals reviewed. Constitutional:       General: He is not in acute distress. Appearance: He is well-developed. HENT:      Head: Normocephalic. Eyes:      Pupils: Pupils are equal, round, and reactive to light. Cardiovascular:      Rate and Rhythm: Normal rate and regular rhythm. Heart sounds: Normal heart sounds. No murmur heard. No friction rub. No gallop.    Pulmonary: Effort: Pulmonary effort is normal.      Breath sounds: Normal breath sounds. Abdominal:      General: Bowel sounds are normal. There is no distension. Palpations: Abdomen is soft. Tenderness: There is no abdominal tenderness. There is no guarding. Musculoskeletal:         General: Normal range of motion. Cervical back: Normal range of motion and neck supple. Skin:     Capillary Refill: Capillary refill takes less than 2 seconds. Neurological:      Mental Status: He is alert and oriented to person, place, and time. Cranial Nerves: No cranial nerve deficit. Sensory: No sensory deficit. Motor: No abnormal muscle tone. Psychiatric:         Behavior: Behavior normal.         Thought Content:  Thought content normal.         Judgment: Judgment normal.         Vital Signs  ED Triage Vitals   Temperature Pulse Respirations Blood Pressure SpO2   09/06/23 0710 09/06/23 0710 09/06/23 0710 09/06/23 0711 09/06/23 0710   98.2 °F (36.8 °C) 84 18 169/83 97 %      Temp Source Heart Rate Source Patient Position - Orthostatic VS BP Location FiO2 (%)   09/06/23 0710 09/06/23 0710 09/06/23 0710 09/06/23 0710 --   Oral Monitor Sitting Left arm       Pain Score       09/06/23 0710       No Pain           Vitals:    09/06/23 0711 09/06/23 0730 09/06/23 0800 09/06/23 0845   BP: 169/83 165/76 (!) 177/84 (!) 177/80   Pulse:  90 90 90   Patient Position - Orthostatic VS:  Sitting Sitting Sitting         Visual Acuity      ED Medications  Medications   azithromycin (ZITHROMAX) 500 mg in sodium chloride 0.9% 250mL IVPB 500 mg (500 mg Intravenous New Bag 9/6/23 0840)   cefTRIAXone (ROCEPHIN) IVPB (premix in dextrose) 1,000 mg 50 mL (0 mg Intravenous Stopped 9/6/23 0842)       Diagnostic Studies  Results Reviewed     Procedure Component Value Units Date/Time    Procalcitonin [[id number]]  (Normal) Collected: 09/06/23 0739    Lab Status: Final result Specimen: Blood from Hand, Left Updated: 09/06/23 1282 Procalcitonin 0.09 ng/ml     FLU/RSV/COVID - if FLU/RSV clinically relevant [[id number]]  (Abnormal) Collected: 09/06/23 0722    Lab Status: Final result Specimen: Nares from Nose Updated: 09/06/23 0821     SARS-CoV-2 Positive     INFLUENZA A PCR Negative     INFLUENZA B PCR Negative     RSV PCR Negative    Narrative:      FOR PEDIATRIC PATIENTS - copy/paste COVID Guidelines URL to browser: [email or web address]    SARS-CoV-2 assay is a Nucleic Acid Amplification assay intended for the  qualitative detection of nucleic acid from SARS-CoV-2 in nasopharyngeal  swabs. Results are for the presumptive identification of SARS-CoV-2 RNA. Positive results are indicative of infection with SARS-CoV-2, the virus  causing COVID-19, but do not rule out bacterial infection or co-infection  with other viruses. Laboratories within the [facility] and its  territories are required to report all positive results to the appropriate  public health authorities. Negative results do not preclude SARS-CoV-2  infection and should not be used as the sole basis for treatment or other  patient management decisions. Negative results must be combined with  clinical observations, patient history, and epidemiological information. This test has not been FDA cleared or approved. This test has been authorized by FDA under an Emergency Use Authorization  (EUA). This test is only authorized for the duration of time the  declaration that circumstances exist justifying the authorization of the  emergency use of an in vitro diagnostic tests for detection of SARS-CoV-2  virus and/or diagnosis of COVID-19 infection under section 564(b)(1) of  the Act, 21 U. S.C. 421FNY-7(W)(2), unless the authorization is terminated  or revoked sooner. The test has been validated but independent review by FDA  and CLIA is pending. Test performed using [employer]:  This RT-PCR assay targets N2,  a region unique to SARS-CoV-2. A conserved region in the E-gene was chosen  for pan-Sarbecovirus detection which includes SARS-CoV-2. According to CMS-2020-01-R, this platform meets the definition of high-throughput technology.     B-Type Natriuretic Peptide(BNP) [[id number]]  (Abnormal) Collected: 09/06/23 0739    Lab Status: Final result Specimen: Blood from Hand, Left Updated: 09/06/23 0809      pg/mL     Comprehensive metabolic panel [[id number]]  (Abnormal) Collected: 09/06/23 0739    Lab Status: Final result Specimen: Blood from Hand, Left Updated: 09/06/23 0806     Sodium 126 mmol/L      Potassium 3.9 mmol/L      Chloride 91 mmol/L      CO2 29 mmol/L      ANION GAP 6 mmol/L      BUN 15 mg/dL      Creatinine 1.14 mg/dL      Glucose 102 mg/dL      Calcium 9.3 mg/dL      AST 76 U/L      ALT 67 U/L      Alkaline Phosphatase 82 U/L      Total Protein 8.2 g/dL      Albumin 4.4 g/dL      Total Bilirubin 0.61 mg/dL      eGFR 55 ml/min/1.73sq m     Narrative:      [place] guidelines for Chronic Kidney Disease (CKD):   •  Stage 1 with normal or high GFR (GFR > 90 mL/min/1.73 square meters)  •  Stage 2 Mild CKD (GFR = 60-89 mL/min/1.73 square meters)  •  Stage 3A Moderate CKD (GFR = 45-59 mL/min/1.73 square meters)  •  Stage 3B Moderate CKD (GFR = 30-44 mL/min/1.73 square meters)  •  Stage 4 Severe CKD (GFR = 15-29 mL/min/1.73 square meters)  •  Stage 5 End Stage CKD (GFR <15 mL/min/1.73 square meters)  Note: GFR calculation is accurate only with a steady state creatinine    CBC and differential [[id number]]  (Abnormal) Collected: 09/06/23 0739    Lab Status: Final result Specimen: Blood from Hand, Left Updated: 09/06/23 0745     WBC 4.97 Thousand/uL      RBC 3.29 Million/uL      Hemoglobin 10.5 g/dL      Hematocrit 32.7 %      MCV 99 fL      MCH 31.9 pg      MCHC 32.1 g/dL      RDW 12.8 %      MPV 11.0 fL      Platelets 090 Thousands/uL      nRBC 0 /100 WBCs      Neutrophils Relative 72 % Immat GRANS % 1 %      Lymphocytes Relative 10 %      Monocytes Relative 15 %      Eosinophils Relative 2 %      Basophils Relative 0 %      Neutrophils Absolute 3.58 Thousands/µL      Immature Grans Absolute 0.03 Thousand/uL      Lymphocytes Absolute 0.51 Thousands/µL      Monocytes Absolute 0.76 Thousand/µL      Eosinophils Absolute 0.08 Thousand/µL      Basophils Absolute 0.01 Thousands/µL                  XR chest 1 view   ED Interpretation by Wil Saucedo MD (09/06 8889)   ED wet read: Concern for lower lobe pneumonia      XR chest 1 view portable    (Results Pending)              Procedures  ECG 12 Lead Documentation Only    Date/Time: 9/6/2023 9:35 AM    Performed by: Wil Saucedo MD  Authorized by: Wil Saucedo MD    ECG reviewed by me, the ED Provider: yes    Patient location:  ED  Previous ECG:     Previous ECG:  Compared to current    Similarity:  Changes noted    Comparison to cardiac monitor: Yes    Interpretation:     Interpretation: abnormal    Rate:     ECG rate assessment: normal    Rhythm:     Rhythm: atrial fibrillation    Ectopy:     Ectopy: PVCs      PVCs:  Infrequent  QRS:     QRS axis:  Normal    QRS intervals:  Normal  Conduction:     Conduction: normal    ST segments:     ST segments:  Normal  T waves:     T waves: normal               ED Course                               SBIRT 20yo+    Flowsheet Row Most Recent Value   Initial Alcohol Screen: US AUDIT-C     1. How often do you have a drink containing alcohol? 0 Filed at: 09/06/2023 0712   2. How many drinks containing alcohol do you have on a typical day you are drinking? 0 Filed at: 09/06/2023 0712   3a. Male UNDER 65: How often do you have five or more drinks on one occasion? 0 Filed at: 09/06/2023 0712   3b. FEMALE Any Age, or MALE 65+: How often do you have 4 or more drinks on one occassion? 0 Filed at: 09/06/2023 8347   Audit-C Score 0 Filed at: 09/06/2023 1926   ROSSY: How many times in the past year have you. .. Used an illegal drug or used a prescription medication for non-medical reasons? Never Filed at: 09/06/2023 0722                    Medical Decision Making  Patient is a 70-year-old male who presents for evaluation of cough and dyspnea. Primary concern for COVID versus pneumonia. Chest x-ray reviewed, questionable left lower lobe infiltrate seen, treated with antibiotics. COVID swab is back and positive. Also noted to have mild transaminitis. Patient desaturated to the 80s on room air upon exertion. Plan to admit to the hospital for further work-up and management of COVID-19. Amount and/or Complexity of Data Reviewed  Labs: ordered. Radiology: ordered and independent interpretation performed. Risk  Prescription drug management. Decision regarding hospitalization. Disposition  Final diagnoses:   [id number]     Time reflects when diagnosis was documented in both MDM as applicable and the Disposition within this note     Time User Action Codes Description Comment    9/6/2023  8:57 AM Loi White Add [U07.1] COVID-19       ED Disposition     ED Disposition   Admit    Condition   Stable    Date/Time   Wed Sep 6, 2023 7257    Comment   Case was discussed with CHAPINCITO and the patient's admission status was agreed to be Admission Status: inpatient status to the service of Dr. Wilmer Torres . Follow-up Information    None         Patient's Medications   Discharge Prescriptions    No medications on file       No discharge procedures on file.     PDMP Review     None          ED Provider  Electronically Signed by           Lino Morse MD  09/06/23 4269

## 2023-09-06 NOTE — ASSESSMENT & PLAN NOTE
· Blood pressure stable  · Continue amlodipine  · Also on bisoprolol and indapamide as outpatient - still with slightly elevated BP this AM, resume bisoprolol

## 2023-09-06 NOTE — ASSESSMENT & PLAN NOTE
· Presents w/ non-productive cough and malaise  · Required upwards of 6 L nasal cannula, now weaned to 1 L  · Chest x-ray with left lower lobe consolidation and elevated procalcitonin -continue IV Rocephin  · Continue IV remdesivir/Decadron  · Continue respiratory protocol  · Start Mucinex  · Encourage incentive spirometry

## 2023-09-06 NOTE — RESPIRATORY THERAPY NOTE
RT Protocol Note  Thania Baum 80 y.o. male MRN: [id number]  Unit/Bed#: -01 Encounter: [id number]    Assessment    Active Problems: There are no active Hospital Problems. Home Pulmonary Medications:  None  Home Devices/Therapy: (P)  (None)    Past Medical History:   Diagnosis Date    A-fib ([address] )     Anemia     Carpal tunnel syndrome, unspecified upper limb     Cataract     last assessed: 8/18/2012    GERD (gastroesophageal reflux disease)     Glaucoma     Hypertension     last assessed: 8/23/2012    Intervertebral disc disease     PVD (peripheral vascular disease) ([address] )      Social History     Socioeconomic History    Marital status:      Spouse name: None    Number of children: None    Years of education: None    Highest education level: None   Occupational History    Occupation: retired   Tobacco Use    Smoking status: Former    Smokeless tobacco: Never    Tobacco comments:     quit 30 years ago    Vaping Use    Vaping Use: Former   Substance and Sexual Activity    Alcohol use: Not Currently    Drug use: No    Sexual activity: Not Currently   Other Topics Concern    None   Social History Narrative    Morning person     Social Determinants of Health     Financial Resource Strain: Low Risk  (7/26/2023)    Overall Financial Resource Strain (CARDIA)     Difficulty of Paying Living Expenses: Not hard at all   Food Insecurity: No Food Insecurity (9/6/2023)    Hunger Vital Sign     Worried About Running Out of Food in the Last Year: Never true     [address] in the Last Year: Never true   Transportation Needs: No Transportation Needs (9/6/2023)    PRAPARE - Transportation     Lack of Transportation (Medical): No     Lack of Transportation (Non-Medical):  No   Physical Activity: Not on file   Stress: Not on file   Social Connections: Not on file   Intimate Partner Violence: Not on file   Housing Stability: Low Risk  (9/6/2023)    Housing Stability Vital Sign     Unable to Pay for Housing in the Last Year: No     Number of Places Lived in the Last Year: 1     Unstable Housing in the Last Year: No       Subjective         Objective    Physical Exam:   Assessment Type: (P) Assess only  General Appearance: (P) Sleeping  Respiratory Pattern: (P) Normal  Chest Assessment: (P) Chest expansion symmetrical  Bilateral Breath Sounds: (P) Diminished  Cough: (P) None    Vitals:  Blood pressure (!) 134/103, pulse 87, temperature 97.7 °F (36.5 °C), temperature source Temporal, resp. rate 16, height 5' 8" (1.727 m), weight 90.7 kg (200 lb), SpO2 96 %. Imaging and other studies: I have personally reviewed pertinent reports.             Plan    Respiratory Plan: (P) No distress/Pulmonary history

## 2023-09-07 ENCOUNTER — APPOINTMENT (INPATIENT)
Dept: RADIOLOGY | Facility: HOSPITAL | Age: 88
DRG: 177 | End: 2023-09-07
Payer: COMMERCIAL

## 2023-09-07 PROBLEM — R09.02 HYPOXIA: Status: ACTIVE | Noted: 2023-09-07

## 2023-09-07 LAB
ALBUMIN SERPL BCP-MCNC: 3.9 G/DL (ref 3.5–5)
ALP SERPL-CCNC: 77 U/L (ref 34–104)
ALT SERPL W P-5'-P-CCNC: 59 U/L (ref 7–52)
ANION GAP SERPL CALCULATED.3IONS-SCNC: 7 MMOL/L
AST SERPL W P-5'-P-CCNC: 56 U/L (ref 13–39)
BACTERIA UR QL AUTO: NORMAL /HPF
BASOPHILS # BLD AUTO: 0 THOUSANDS/ÂΜL (ref 0–0.1)
BASOPHILS NFR BLD AUTO: 0 % (ref 0–1)
BILIRUB SERPL-MCNC: 0.43 MG/DL (ref 0.2–1)
BILIRUB UR QL STRIP: NEGATIVE
BNP SERPL-MCNC: 376 PG/ML (ref 0–100)
BUN SERPL-MCNC: 18 MG/DL (ref 5–25)
CALCIUM SERPL-MCNC: 8.6 MG/DL (ref 8.4–10.2)
CHLORIDE SERPL-SCNC: 92 MMOL/L (ref 96–108)
CK SERPL-CCNC: 143 U/L (ref 39–308)
CLARITY UR: CLEAR
CO2 SERPL-SCNC: 29 MMOL/L (ref 21–32)
COLOR UR: YELLOW
CREAT SERPL-MCNC: 1.04 MG/DL (ref 0.6–1.3)
CRP SERPL QL: 76 MG/L
EOSINOPHIL # BLD AUTO: 0 THOUSAND/ÂΜL (ref 0–0.61)
EOSINOPHIL NFR BLD AUTO: 0 % (ref 0–6)
ERYTHROCYTE [DISTWIDTH] IN BLOOD BY AUTOMATED COUNT: 12.5 % (ref 11.6–15.1)
GFR SERPL CREATININE-BSD FRML MDRD: 62 ML/MIN/1.73SQ M
GLUCOSE SERPL-MCNC: 121 MG/DL (ref 65–140)
GLUCOSE SERPL-MCNC: 126 MG/DL (ref 65–140)
GLUCOSE SERPL-MCNC: 127 MG/DL (ref 65–140)
GLUCOSE SERPL-MCNC: 144 MG/DL (ref 65–140)
GLUCOSE SERPL-MCNC: 150 MG/DL (ref 65–140)
GLUCOSE UR STRIP-MCNC: NEGATIVE MG/DL
HCT VFR BLD AUTO: 31.2 % (ref 36.5–49.3)
HGB BLD-MCNC: 10.3 G/DL (ref 12–17)
HGB UR QL STRIP.AUTO: ABNORMAL
IMM GRANULOCYTES # BLD AUTO: 0.03 THOUSAND/UL (ref 0–0.2)
IMM GRANULOCYTES NFR BLD AUTO: 1 % (ref 0–2)
KETONES UR STRIP-MCNC: NEGATIVE MG/DL
LEUKOCYTE ESTERASE UR QL STRIP: NEGATIVE
LYMPHOCYTES # BLD AUTO: 0.55 THOUSANDS/ÂΜL (ref 0.6–4.47)
LYMPHOCYTES NFR BLD AUTO: 12 % (ref 14–44)
MCH RBC QN AUTO: 32.3 PG (ref 26.8–34.3)
MCHC RBC AUTO-ENTMCNC: 33 G/DL (ref 31.4–37.4)
MCV RBC AUTO: 98 FL (ref 82–98)
MONOCYTES # BLD AUTO: 0.36 THOUSAND/ÂΜL (ref 0.17–1.22)
MONOCYTES NFR BLD AUTO: 8 % (ref 4–12)
MUCOUS THREADS UR QL AUTO: NORMAL
NEUTROPHILS # BLD AUTO: 3.8 THOUSANDS/ÂΜL (ref 1.85–7.62)
NEUTS SEG NFR BLD AUTO: 79 % (ref 43–75)
NITRITE UR QL STRIP: NEGATIVE
NON-SQ EPI CELLS URNS QL MICRO: NORMAL /HPF
NRBC BLD AUTO-RTO: 0 /100 WBCS
OSMOLALITY UR: 243 MMOL/KG
PH UR STRIP.AUTO: 6 [PH]
PLATELET # BLD AUTO: 164 THOUSANDS/UL (ref 149–390)
PMV BLD AUTO: 11.5 FL (ref 8.9–12.7)
POTASSIUM SERPL-SCNC: 3.7 MMOL/L (ref 3.5–5.3)
PROCALCITONIN SERPL-MCNC: 0.34 NG/ML
PROT SERPL-MCNC: 7.3 G/DL (ref 6.4–8.4)
PROT UR STRIP-MCNC: ABNORMAL MG/DL
RBC # BLD AUTO: 3.19 MILLION/UL (ref 3.88–5.62)
RBC #/AREA URNS AUTO: NORMAL /HPF
SODIUM 24H UR-SCNC: 50 MOL/L
SODIUM SERPL-SCNC: 128 MMOL/L (ref 135–147)
SP GR UR STRIP.AUTO: 1.01 (ref 1–1.03)
UROBILINOGEN UR STRIP-ACNC: <2 MG/DL
WBC # BLD AUTO: 4.74 THOUSAND/UL (ref 4.31–10.16)
WBC #/AREA URNS AUTO: NORMAL /HPF

## 2023-09-07 PROCEDURE — 71045 X-RAY EXAM CHEST 1 VIEW: CPT

## 2023-09-07 PROCEDURE — 94760 N-INVAS EAR/PLS OXIMETRY 1: CPT

## 2023-09-07 PROCEDURE — 82550 ASSAY OF CK (CPK): CPT | Performed by: STUDENT IN AN ORGANIZED HEALTH CARE EDUCATION/TRAINING PROGRAM

## 2023-09-07 PROCEDURE — 83935 ASSAY OF URINE OSMOLALITY: CPT | Performed by: PHYSICIAN ASSISTANT

## 2023-09-07 PROCEDURE — 82948 REAGENT STRIP/BLOOD GLUCOSE: CPT

## 2023-09-07 PROCEDURE — 83880 ASSAY OF NATRIURETIC PEPTIDE: CPT | Performed by: STUDENT IN AN ORGANIZED HEALTH CARE EDUCATION/TRAINING PROGRAM

## 2023-09-07 PROCEDURE — 81001 URINALYSIS AUTO W/SCOPE: CPT | Performed by: PHYSICIAN ASSISTANT

## 2023-09-07 PROCEDURE — 84145 PROCALCITONIN (PCT): CPT | Performed by: STUDENT IN AN ORGANIZED HEALTH CARE EDUCATION/TRAINING PROGRAM

## 2023-09-07 PROCEDURE — 99232 SBSQ HOSP IP/OBS MODERATE 35: CPT | Performed by: PHYSICIAN ASSISTANT

## 2023-09-07 PROCEDURE — 86140 C-REACTIVE PROTEIN: CPT | Performed by: STUDENT IN AN ORGANIZED HEALTH CARE EDUCATION/TRAINING PROGRAM

## 2023-09-07 PROCEDURE — 97116 GAIT TRAINING THERAPY: CPT

## 2023-09-07 PROCEDURE — 85025 COMPLETE CBC W/AUTO DIFF WBC: CPT | Performed by: STUDENT IN AN ORGANIZED HEALTH CARE EDUCATION/TRAINING PROGRAM

## 2023-09-07 PROCEDURE — 97163 PT EVAL HIGH COMPLEX 45 MIN: CPT

## 2023-09-07 PROCEDURE — 84300 ASSAY OF URINE SODIUM: CPT | Performed by: PHYSICIAN ASSISTANT

## 2023-09-07 PROCEDURE — 94640 AIRWAY INHALATION TREATMENT: CPT

## 2023-09-07 PROCEDURE — 80053 COMPREHEN METABOLIC PANEL: CPT | Performed by: STUDENT IN AN ORGANIZED HEALTH CARE EDUCATION/TRAINING PROGRAM

## 2023-09-07 RX ORDER — GUAIFENESIN 600 MG/1
600 TABLET, EXTENDED RELEASE ORAL EVERY 12 HOURS SCHEDULED
Status: DISCONTINUED | OUTPATIENT
Start: 2023-09-07 | End: 2023-09-09 | Stop reason: HOSPADM

## 2023-09-07 RX ORDER — BISOPROLOL FUMARATE 5 MG/1
2.5 TABLET, FILM COATED ORAL DAILY
Status: DISCONTINUED | OUTPATIENT
Start: 2023-09-08 | End: 2023-09-09 | Stop reason: HOSPADM

## 2023-09-07 RX ORDER — AMLODIPINE BESYLATE 5 MG/1
5 TABLET ORAL
COMMUNITY

## 2023-09-07 RX ORDER — LATANOPROST 50 UG/ML
1 SOLUTION/ DROPS OPHTHALMIC
Status: DISCONTINUED | OUTPATIENT
Start: 2023-09-07 | End: 2023-09-09 | Stop reason: HOSPADM

## 2023-09-07 RX ORDER — FLUTICASONE PROPIONATE 50 MCG
1 SPRAY, SUSPENSION (ML) NASAL DAILY
Status: DISCONTINUED | OUTPATIENT
Start: 2023-09-08 | End: 2023-09-09 | Stop reason: HOSPADM

## 2023-09-07 RX ORDER — DORZOLAMIDE HYDROCHLORIDE AND TIMOLOL MALEATE 20; 5 MG/ML; MG/ML
1 SOLUTION/ DROPS OPHTHALMIC 2 TIMES DAILY
Status: DISCONTINUED | OUTPATIENT
Start: 2023-09-07 | End: 2023-09-09 | Stop reason: HOSPADM

## 2023-09-07 RX ORDER — BRIMONIDINE TARTRATE 2 MG/ML
1 SOLUTION/ DROPS OPHTHALMIC 3 TIMES DAILY
Status: DISCONTINUED | OUTPATIENT
Start: 2023-09-07 | End: 2023-09-09 | Stop reason: HOSPADM

## 2023-09-07 RX ORDER — AMLODIPINE BESYLATE 5 MG/1
5 TABLET ORAL
Status: DISCONTINUED | OUTPATIENT
Start: 2023-09-08 | End: 2023-09-09 | Stop reason: HOSPADM

## 2023-09-07 RX ADMIN — APIXABAN 2.5 MG: 2.5 TABLET, FILM COATED ORAL at 08:11

## 2023-09-07 RX ADMIN — BRIMONIDINE TARTRATE 1 DROP: 2 SOLUTION/ DROPS OPHTHALMIC at 16:15

## 2023-09-07 RX ADMIN — LEVALBUTEROL HYDROCHLORIDE 1.25 MG: 1.25 SOLUTION RESPIRATORY (INHALATION) at 14:41

## 2023-09-07 RX ADMIN — REMDESIVIR 100 MG: 100 INJECTION, POWDER, LYOPHILIZED, FOR SOLUTION INTRAVENOUS at 11:53

## 2023-09-07 RX ADMIN — LEVALBUTEROL HYDROCHLORIDE 1.25 MG: 1.25 SOLUTION RESPIRATORY (INHALATION) at 20:27

## 2023-09-07 RX ADMIN — BRIMONIDINE TARTRATE 1 DROP: 2 SOLUTION/ DROPS OPHTHALMIC at 10:59

## 2023-09-07 RX ADMIN — ALLOPURINOL 100 MG: 100 TABLET ORAL at 08:12

## 2023-09-07 RX ADMIN — IPRATROPIUM BROMIDE 0.5 MG: 0.5 SOLUTION RESPIRATORY (INHALATION) at 14:41

## 2023-09-07 RX ADMIN — ASPIRIN 81 MG: 81 TABLET, COATED ORAL at 08:12

## 2023-09-07 RX ADMIN — AMITRIPTYLINE HYDROCHLORIDE 10 MG: 10 TABLET, FILM COATED ORAL at 21:07

## 2023-09-07 RX ADMIN — SODIUM CHLORIDE, SODIUM GLUCONATE, SODIUM ACETATE, POTASSIUM CHLORIDE, MAGNESIUM CHLORIDE, SODIUM PHOSPHATE, DIBASIC, AND POTASSIUM PHOSPHATE 75 ML/HR: .53; .5; .37; .037; .03; .012; .00082 INJECTION, SOLUTION INTRAVENOUS at 02:50

## 2023-09-07 RX ADMIN — GABAPENTIN 100 MG: 100 CAPSULE ORAL at 08:12

## 2023-09-07 RX ADMIN — DORZOLAMIDE HYDROCHLORIDE AND TIMOLOL MALEATE 1 DROP: 20; 5 SOLUTION/ DROPS OPHTHALMIC at 10:59

## 2023-09-07 RX ADMIN — APIXABAN 2.5 MG: 2.5 TABLET, FILM COATED ORAL at 16:16

## 2023-09-07 RX ADMIN — BRIMONIDINE TARTRATE 1 DROP: 2 SOLUTION/ DROPS OPHTHALMIC at 21:18

## 2023-09-07 RX ADMIN — GABAPENTIN 100 MG: 100 CAPSULE ORAL at 16:16

## 2023-09-07 RX ADMIN — GUAIFENESIN 600 MG: 600 TABLET ORAL at 21:07

## 2023-09-07 RX ADMIN — IPRATROPIUM BROMIDE 0.5 MG: 0.5 SOLUTION RESPIRATORY (INHALATION) at 08:54

## 2023-09-07 RX ADMIN — CEFTRIAXONE 1000 MG: 1 INJECTION, SOLUTION INTRAVENOUS at 08:12

## 2023-09-07 RX ADMIN — INSULIN LISPRO 1 UNITS: 100 INJECTION, SOLUTION INTRAVENOUS; SUBCUTANEOUS at 16:26

## 2023-09-07 RX ADMIN — GABAPENTIN 100 MG: 100 CAPSULE ORAL at 21:07

## 2023-09-07 RX ADMIN — AMLODIPINE BESYLATE 5 MG: 5 TABLET ORAL at 08:12

## 2023-09-07 RX ADMIN — FINASTERIDE 5 MG: 5 TABLET, FILM COATED ORAL at 08:12

## 2023-09-07 RX ADMIN — GUAIFENESIN 600 MG: 600 TABLET ORAL at 11:54

## 2023-09-07 RX ADMIN — DORZOLAMIDE HYDROCHLORIDE AND TIMOLOL MALEATE 1 DROP: 20; 5 SOLUTION/ DROPS OPHTHALMIC at 16:15

## 2023-09-07 RX ADMIN — DEXAMETHASONE SODIUM PHOSPHATE 6 MG: 4 INJECTION, SOLUTION INTRAMUSCULAR; INTRAVENOUS at 11:54

## 2023-09-07 RX ADMIN — LEVALBUTEROL HYDROCHLORIDE 1.25 MG: 1.25 SOLUTION RESPIRATORY (INHALATION) at 08:54

## 2023-09-07 RX ADMIN — LATANOPROST 1 DROP: 50 SOLUTION OPHTHALMIC at 21:18

## 2023-09-07 RX ADMIN — Medication 100 MG: at 08:12

## 2023-09-07 RX ADMIN — IPRATROPIUM BROMIDE 0.5 MG: 0.5 SOLUTION RESPIRATORY (INHALATION) at 20:27

## 2023-09-07 NOTE — PHYSICAL THERAPY NOTE
PHYSICAL THERAPY EVAL/TX  Physical Therapy Evaluation    Performed at least 2 patient identifiers during session:  Patient Active Problem List   Diagnosis    Cholecystitis with cholelithiasis    Allergic rhinitis    Anemia    Benign essential hypertension    GERD (gastroesophageal reflux disease)    Glaucoma    Hiatal hernia    Hyperlipidemia    Joint pain, knee    Left lumbar radiculopathy    Peripheral vascular disease (HCC)    Syncope    Stage 3a chronic kidney disease (HCC)    PAF (paroxysmal atrial fibrillation) (HCC)    Right wrist pain    Peripheral edema    Arthritis of carpometacarpal (CMC) joint of right thumb    Paresthesia    Hyponatremia    Abnormal liver function    Other insomnia    Anxiety    Carpal tunnel syndrome    S/P ablation of ventricular arrhythmia    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[address])    Pain in both feet    Benign prostatic hyperplasia    Diarrhea    Irritable bowel syndrome with diarrhea    Ambulatory dysfunction    Insomnia    Chest pain    Hypertensive urgency    Frontal headache    Other dysphagia    Corkscrew esophagus    Medicare annual wellness visit, subsequent    Dermatitis    Elevated TSH    COVID-19       Past Medical History:   Diagnosis Date    A-fib ([address])     Anemia     Carpal tunnel syndrome, unspecified upper limb     Cataract     last assessed: 8/18/2012    GERD (gastroesophageal reflux disease)     Glaucoma     Hypertension     last assessed: 8/23/2012    Intervertebral disc disease     PVD (peripheral vascular disease) ([address])        Past Surgical History:   Procedure Laterality Date    CATARACT EXTRACTION      COLONOSCOPY      HERNIA REPAIR      NEUROPLASTY / TRANSPOSITION MEDIAN NERVE AT CARPAL TUNNEL      AL LAPAROSCOPY SURG CHOLECYSTECTOMY N/A 09/14/2016    Procedure: LAPAROSCOPIC CHOLECYSTECTOMY ;  Surgeon: Junaid Durham MD;  Location: [facility];  Service: General    UPPER GASTROINTESTINAL ENDOSCOPY            09/07/23 1005   PT Last Visit   PT Visit Date 09/07/23   Note Type   Note type Evaluation   Pain Assessment   Pain Assessment Tool 0-10   Pain Score No Pain   Restrictions/Precautions   Weight Bearing Precautions Per Order No   Other Precautions Hard of hearing; Fall Risk;O2;Multiple lines; Bed Alarm;Cognitive   Home Living   Type of [address] entrance   Bathroom Shower/Tub Walk-in shower   Bathroom Equipment Grab bars in shower; Shower chair   Home Equipment Walker   Additional Comments Uses a RW at baseline   Prior Function   Level of [place] Independent with functional mobility; Needs assistance with ADLs; Needs assistance with IADLS   Lives With   (Dtr stays 16 hrs a day)   Receives Help From Family   IADLs Family/Friend/Other provides transportation; Family/Friend/Other provides meals; Family/Friend/Other provides medication management   Falls in the last 6 months 0   General   Additional Pertinent History COVID19+   Family/Caregiver Present Yes   Cognition   Overall Cognitive Status Impaired   Arousal/Participation Alert   Orientation Level Oriented to person;Oriented to place; Disoriented to time;Disoriented to situation   Following Commands Follows one step commands inconsistently   Comments [place]   Subjective   Subjective "When can I go home?"   RLE Assessment   RLE Assessment WFL   LLE Assessment   LLE Assessment WFL   Bed Mobility   Supine to Sit 5  Supervision   Additional items HOB elevated; Bedrails; Increased time required;Verbal cues   Transfers   Sit to Stand 4  Minimal assistance   Additional items Assist x 1; Armrests; Increased time required;Verbal cues  (Verbal cues for hand placement)   Additional Comments Sat edge of bed approx 3-4 minutes due to slight dizziness. Dizziness resolved.  Refer to treatment session for additional mobility   Balance   Static Sitting Good   Dynamic Sitting Fair +   Static Standing [address] Endurance Deficit   Endurance Deficit Yes   Endurance Deficit Description Easily fatigued, mild SOB   Activity Tolerance   Activity Tolerance Patient limited by fatigue;Treatment limited secondary to medical complications (Comment)   Nurse Made Aware RNAdina   Assessment   Prognosis Good   Problem List Decreased endurance; Impaired balance;Decreased mobility; Decreased cognition; Impaired hearing;Obesity   Assessment Patient is a 79y/o M who presented with cough and malaise. Difficulty and increase work of breathing. Found to have [address]. Patient resides alone in a 1sh with ramped entrance. Has family support 16 hrs/day. He uses a RW and is independent for mobility. Current medical status includes multiple lines, O2, O2 desaturation, SOB, [place], decreased cognition, fall risk, bed alarm, decreased strength, balance, endurance and mobility. Patient tolerated session well. He performed all mobility with supervision/min A. He had a slight drop in O2 saturation with exertion along with mild SOB. He is deconditioned and is not at his functional baseline. Difficulty navigating RW and is at risk for falls. Recommending level 2 resources. Moderate intensity. The patient's AM-PAC Basic Mobility Inpatient Short Form Raw Score is 17. A Raw score of less than or equal to 17 suggests the patient may benefit from discharge to post-acute rehabilitation services. Please also refer to the recommendation of the Physical Therapist for safe discharge planning. Barriers to Discharge Decreased caregiver support   Goals   Patient Goals To get better   STG Expiration Date 09/21/23   Short Term Goal #1 1. Perform supine<>sit with HOB flat without the use of bedrails ind 2. Perform sit<>stand transfers mod I 3. Ambulate 150ft with a RW mod I level   PT Treatment Day 1   Plan   Treatment/Interventions Functional transfer training;LE strengthening/ROM; Therapeutic exercise; Endurance training;Cognitive reorientation;Patient/family training;Equipment eval/education; Bed mobility;Gait training;Spoke to nursing   PT Frequency 2-3x/wk   Recommendation   UB Rehab Discharge Recommendation (PT/OT) Level 2   AM-PAC Basic Mobility Inpatient   Turning in Flat Bed Without Bedrails 3   Lying on Back to Sitting on Edge of Flat Bed Without Bedrails 3   Moving Bed to Chair 3   Standing Up From Chair Using Arms 3   Walk in Room 3   Climb 3-5 Stairs With Railing 2   Basic Mobility Inpatient Raw Score 17   Basic Mobility Standardized Score 39.67   Highest Level Of Mobility   -HLM Goal 5: Stand one or more mins   JH-HLM Achieved 7: Walk 25 feet or more   Additional Treatment Session   Start Time 1015   End Time 1030   Treatment Assessment Ambulated 40ft with a RW with min A for CG. Difficulty navigating RW with increased distance from RW and difficulty with turns. Verbal and tactile cues throughout session. Forward flexed with decreased step clearance. O2 desat on RA to 91%. At rest 93%. On 1L at rest 95%. Stand to sit with min A x 1. Sit to supin with min A x 1 for LE's. Dependent to be pulled up towards [facility] while in supine position. Equipment Use RW   End of Consult   Patient Position at End of Consult Supine;Bed/Chair alarm activated; All needs within reach     Gabrielle Prater, PT             Patient Name: Chuyita Monday  QLQXF'A Date: 9/7/2023

## 2023-09-07 NOTE — CASE MANAGEMENT
Case Management Discharge Planning Note    Patient name Brittany Moe  Location /-20 MRN [id number]  : 7/15/1931 Date 2023       Current Admission Date: 2023  Current Admission Diagnosis:COVID-19   Patient Active Problem List    Diagnosis Date Noted   • Hypoxia 2023   • COVID-19 2023   • Medicare annual wellness visit, subsequent 2023   • Dermatitis 2023   • Elevated TSH 2023   • Corkscrew esophagus 2022   • Other dysphagia 2022   • Chest pain 2022   • Hypertensive urgency 2022   • Frontal headache 2022   • Insomnia 2022   • Ambulatory dysfunction 2022   • Irritable bowel syndrome with diarrhea 2022   • Diarrhea 2022   • Benign prostatic hyperplasia 2021   • Pain in both feet 05/15/2020   • Carpal tunnel syndrome 2019   • Other insomnia 04/15/2019   • Anxiety 04/15/2019   • Hyponatremia 2019   • Abnormal liver function 2019   • Paresthesia 2018   • Arthritis of carpometacarpal [place]) joint of right thumb 2018   • Right wrist pain 2018   • Peripheral edema 2018   • Syncope 2018   • Stage 3a chronic kidney disease ([address]) 2018   • PAF (paroxysmal atrial fibrillation) ([address]) 2018   • Left lumbar radiculopathy 01/10/2017   • Cholecystitis with cholelithiasis 2016   • Joint pain, knee 2014   • Allergic rhinitis 2013   • S/P ablation of ventricular arrhythmia 2013   • 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   • Anemia 2012   • GERD (gastroesophageal reflux disease) 2012   • Glaucoma 2012   • Hiatal hernia 2012   • Hyperlipidemia 2012   • Peripheral vascular disease ([address]) 2012   • Benign essential hypertension 2012      LOS (days): 1  Geometric Mean LOS (GMLOS) (days): 2.70  Days to GMLOS:1.5     OBJECTIVE:  Risk of Unplanned Readmission Score: 15.31         Current admission status: Inpatient   Preferred Pharmacy:   [address] [facility], [address] [facility] [address]  [address] [facility] [address]  Phone: [phone] Fax: [phone]    [address] [place] [address]  Phone: [phone] Fax: [phone]    Primary Care Provider: Tonya Painter MD    Primary Insurance: [employer]   Secondary Insurance:     DISCHARGE DETAILS:        Therapy is recommending [facility] referrals sent in [place].

## 2023-09-07 NOTE — ASSESSMENT & PLAN NOTE
· Suspect in setting of COVID and possible superimposed pneumonia  · Initially requiring upwards of 6 L nasal cannula, now weaned to 1 L  · Continue respiratory protocol  · Continue O2 supplementation to maintain SPO2 > 90%

## 2023-09-07 NOTE — ASSESSMENT & PLAN NOTE
· Sodium 128 this morning -126 on admission  · Did improve slightly with IVF overnight  · Patient with faint crackles on exam.  Will hold further IVF, oral intake appears to be improving  · Home diuretic had been held on admission.   Continue to hold for now pending repeat sodium  · Trend BMP

## 2023-09-07 NOTE — UTILIZATION REVIEW
Initial Clinical Review    Admission: Date/Time/Statement:   Admission Orders (From admission, onward)     Ordered        09/06/23 0858  INPATIENT ADMISSION  Once                      Orders Placed This Encounter   Procedures   • INPATIENT ADMISSION     Standing Status:   Standing     Number of Occurrences:   1     Order Specific Question:   Level of Care     Answer:   Med Surg [16]     Order Specific Question:   Estimated length of stay     Answer:   More than 2 Midnights     Order Specific Question:   Certification     Answer:   I certify that inpatient services are medically necessary for this patient for a duration of greater than two midnights. See H&P and MD Progress Notes for additional information about the patient's course of treatment. ED Arrival Information     Expected   -    Arrival   9/6/2023 07:09    Acuity   Urgent            Means of arrival   Ambulance    Escorted by   [address] [place] [address] EMS    Service   Hospitalist    Admission type   Emergency            Arrival complaint   cough           Chief Complaint   Patient presents with   • Cough     Patient presents to the ED via EMS from home with c/o cough that began this morning        Initial Presentation: 80 y.o. male w/ PMH of Afib on Eliquis, HTN, CKD 3, PVD presented to the ED from home via EMS w/ progressively worsening nonproductive cough that started this am.   Pt w/ intermittent fevers T max-100.7, dyspneic and had increased WOB, EMS was called and he was taken to the ED. In the ED, tested +COVID, Chest Xray- LLL consolidation. Na 126. Vaccinated against [place]. On exam, aaox3, tachycardia, respiratory distress present. D-dimer 1.8. Initially on RA but O2 sat down to 76%, placed on 2L -> 4L NC-> 6L w/ improvement in O2sat @ 96%. Given IV Rocephin and IV Azithromax. Admitted as Inpatient for COVID 19. Plan:  continue w/ moderate COVID management. Continue Rocephin / Eliquis. Maintain O2 >92. Follow up on Cx. Monitor respiratory status. Cont IV Rocephin. Date: 09/07  Day 2: Pt w/ ongoing non productive cough. O2 was weaned down to 1L NC. Na improving, 128 today. Continue IV remdesivir/Decadron. Continue respiratory protocol. Start Mucinex. Encourage incentive spirometry. Continue O2 supplementation to maintain SPO2 > 90%. PT/OT. Hold IVF. Cont to hold diuretic for now pending rpt Na. Trend BMP. PE: rhonchi, rales present. Date: 09/08  Day 3: Has surpassed a 2nd midnight with active treatments and services, which include:Continue IV remdesivir/Decadron. Cont IV Rocephin. Cont to mon labs. Mon resp status.      ED Triage Vitals   Temperature Pulse Respirations Blood Pressure SpO2   09/06/23 0710 09/06/23 0710 09/06/23 0710 09/06/23 0711 09/06/23 0710   98.2 °F (36.8 °C) 84 18 169/83 97 %      Temp Source Heart Rate Source Patient Position - Orthostatic VS BP Location FiO2 (%)   09/06/23 0710 09/06/23 0710 09/06/23 0710 09/06/23 0710 --   Oral Monitor Sitting Left arm       Pain Score       09/06/23 0710       No Pain          Wt Readings from Last 1 Encounters:   09/06/23 90.7 kg (200 lb)     Additional Vital Signs:   Date/Time Temp Pulse Resp BP MAP (mmHg) SpO2 Calculated FIO2 (%) - Nasal Cannula Nasal Cannula O2 Flow Rate (L/min) O2 Device Patient Position - Orthostatic VS   09/07/23 0853 -- -- -- -- -- 97 % 24 1 L/min Nasal cannula --   09/07/23 07:56:25 97.2 °F (36.2 °C) Abnormal  91 16 156/85 109 95 % -- -- Nasal cannula Lying   09/06/23 21:20:19 97.3 °F (36.3 °C) Abnormal  94 -- 154/89 111 97 % -- -- Nasal cannula --   09/06/23 1900 -- -- -- -- -- 93 % -- -- -- --   09/06/23 1600 -- 99 -- -- -- 96 % -- -- -- --   09/06/23 1400 -- 94 -- -- -- 98 % 32 3 L/min Nasal cannula --   09/06/23 1300 -- 102 -- -- -- 100 % -- -- -- --   09/06/23 1200 -- 118 Abnormal  -- -- -- 100 % -- -- -- --   09/06/23 1124 -- -- -- -- -- 96 % 44 6 L/min Nasal cannula --   09/06/23 1107 97.7 °F (36.5 °C) 87 16 134/103 Abnormal  113 -- 36 4 L/min Nasal cannula Lying   09/06/23 1100 -- -- -- -- -- 76 % Abnormal  28 2 L/min Nasal cannula --   09/06/23 1000 -- 95 27 Abnormal  183/86 Abnormal  123 92 % -- -- None (Room air) Sitting   09/06/23 0930 -- 96 21 180/81 Abnormal  116 92 % -- -- None (Room air) Sitting   09/06/23 0900 -- 91 20 196/92 Abnormal  132 92 % -- -- None (Room air) Sitting   09/06/23 0845 -- 90 20 177/80 Abnormal  115 94 % -- -- None (Room air) Sitting   09/06/23 0828 -- -- -- -- -- 83 % Abnormal  -- -- -- --   09/06/23 0800 -- 90 20 177/84 Abnormal  121 93 % -- -- None (Room air) Sitting   09/06/23 0730 -- 90 20 165/76 109 95 % -- -- None (Room air) Sitting   09/06/23 0711 -- -- -- 169/83 -- -- -- -- -- --       Pertinent Labs/Diagnostic Test Results:   XR chest portable   Final Result by Rosina Justin MD (09/07 2890)      Improving left basilar airspace disease. Workstation performed: [id number]         XR chest 1 view   ED Interpretation by Lino Morse MD (09/06 8909)   ED wet read: Concern for lower lobe pneumonia      Final Result by Talib Chavez MD (09/06 1030)   Left basilar airspace disease most compatible with infection. The patient return for a lateral view at 7:40 a.m. the same date. This confirms patchy airspace opacity in the left lower lobe. Workstation performed: [id number]         XR chest 1 view portable   Final Result by Talib Chavez MD (09/06 1030)   Left basilar airspace disease most compatible with infection. The patient return for a lateral view at 7:40 a.m. the same date. This confirms patchy airspace opacity in the left lower lobe.                   Workstation performed: [id number]           09/06 EKG result: Atrial fibrillation with premature ventricular or aberrantly conducted complexes  Low voltage QRS  Nonspecific ST abnormality    Results from last 7 days   Lab Units 09/06/23  0722   SARS-COV-2  Positive*     Results from last 7 days   Lab Units 09/07/23  2895 09/06/23  0739   WBC Thousand/uL 4.74 4.97   HEMOGLOBIN g/dL 10.3* 10.5*   HEMATOCRIT % 31.2* 32.7*   PLATELETS Thousands/uL 164 164   NEUTROS ABS Thousands/µL 3.80 3.58         Results from last 7 days   Lab Units 09/07/23  0258 09/06/23  0739   SODIUM mmol/L 128* 126*   POTASSIUM mmol/L 3.7 3.9   CHLORIDE mmol/L 92* 91*   CO2 mmol/L 29 29   ANION GAP mmol/L 7 6   BUN mg/dL 18 15   CREATININE mg/dL 1.04 1.14   EGFR ml/min/1.73sq m 62 55   CALCIUM mg/dL 8.6 9.3     Results from last 7 days   Lab Units 09/07/23  0258 09/06/23  0739   AST U/L 56* 76*   ALT U/L 59* 67*   ALK PHOS U/L 77 82   TOTAL PROTEIN g/dL 7.3 8.2   ALBUMIN g/dL 3.9 4.4   TOTAL BILIRUBIN mg/dL 0.43 0.61     Results from last 7 days   Lab Units 09/07/23  0619 09/06/23  1729   POC GLUCOSE mg/dl 121 155*     Results from last 7 days   Lab Units 09/07/23  0258 09/06/23  0739   GLUCOSE RANDOM mg/dL 127 102         Results from last 7 days   Lab Units 09/07/23  0258   CK TOTAL U/L 143         Results from last 7 days   Lab Units 09/06/23  1036   D-DIMER QUANTITATIVE ug/ml FEU 1.18*             Results from last 7 days   Lab Units 09/07/23  0258 09/06/23  0739   PROCALCITONIN ng/ml 0.34* 0.09                 Results from last 7 days   Lab Units 09/07/23  0258 09/06/23  0739   BNP pg/mL 376* 368*               Results from last 7 days   Lab Units 09/07/23  0258   CRP mg/L 76.0*                 Results from last 7 days   Lab Units 09/06/23  0722   INFLUENZA A PCR  Negative   INFLUENZA B PCR  Negative   RSV PCR  Negative         ED Treatment:   Medication Administration from 09/06/2023 0709 to 09/06/2023 1102       Date/Time Order Dose Route Action     09/06/2023 0842 EDT cefTRIAXone (ROCEPHIN) IVPB (premix in dextrose) 1,000 mg 50 mL 0 mg Intravenous Stopped     09/06/2023 0758 EDT cefTRIAXone (ROCEPHIN) IVPB (premix in dextrose) 1,000 mg 50 mL 1,000 mg Intravenous New Bag     09/06/2023 0840 EDT azithromycin (ZITHROMAX) 500 mg in sodium chloride 0.9% 250mL IVPB 500 mg 500 mg Intravenous New Bag        Past Medical History:   Diagnosis Date   • A-fib ([address])    • Anemia    • Carpal tunnel syndrome, unspecified upper limb    • Cataract     last assessed: 8/18/2012   • GERD (gastroesophageal reflux disease)    • Glaucoma    • Hypertension     last assessed: 8/23/2012   • Intervertebral disc disease    • PVD (peripheral vascular disease) ([facility])      Present on Admission:  • Benign essential hypertension  • PAF (paroxysmal atrial fibrillation) ([facility])  • Stage 3a chronic kidney disease (HCC)  • Ambulatory dysfunction      Admitting Diagnosis: Cough [R05.9]  COVID-19 [U07.1]  Age/Sex: 80 y.o. male  Admission Orders:  SCD  Pt self proning  PT/OT    Scheduled Medications:  allopurinol, 100 mg, Oral, Daily  amitriptyline, 10 mg, Oral, HS  amLODIPine, 5 mg, Oral, Daily  apixaban, 2.5 mg, Oral, BID  aspirin, 81 mg, Oral, Daily  brimonidine tartrate, 1 drop, Both Eyes, TID  cefTRIAXone, 1,000 mg, Intravenous, Q24H  co-enzyme Q-10, 100 mg, Oral, Daily  dexamethasone, 6 mg, Intravenous, Q24H  dorzolamide-timolol, 1 drop, Both Eyes, BID  finasteride, 5 mg, Oral, Daily  gabapentin, 100 mg, Oral, TID  insulin lispro, 1-6 Units, Subcutaneous, TID AC  ipratropium, 0.5 mg, Nebulization, TID  latanoprost, 1 drop, Both Eyes, HS  levalbuterol, 1.25 mg, Nebulization, TID  remdesivir, 100 mg, Intravenous, Q24H      Continuous IV Infusions:  multi-electrolyte (PLASMALYTE-A/ISOLYTE-S PH 7.4) IV solution  Rate: 75 mL/hr Dose: 75 mL/hr  Freq: Continuous Route: IV  Last Dose: Stopped (09/07/23 1009)  Start: 09/06/23 1215 End: 09/07/23 0904     PRN Meds:  ALPRAZolam, 0.5 mg, Oral, BID PRN  metoprolol, 5 mg, Intravenous, Q8H PRN        None    Network Utilization Review Department  ATTENTION: Please call with any questions or concerns to [phone] and carefully listen to the prompts so that you are directed to the right person.  All voicemails are confidential.  [facility] all requests for admission clinical reviews, approved or denied determinations and any other requests to dedicated fax number below belonging to the campus where the patient is receiving treatment.  List of dedicated fax numbers for the Facilities:  [place] DENIALS (Administrative/Medical Necessity) [phone] [address] (Maternity/NICU/Pediatrics) [phone]   [address] [place] [address] [phone]   [facility] [phone]   [address] [place] [address] [address] [place] [address] [phone]   [address] [address] [phone]

## 2023-09-07 NOTE — PLAN OF CARE
Problem: PHYSICAL THERAPY ADULT  Goal: Performs mobility at highest level of function for planned discharge setting. See evaluation for individualized goals. Description: Treatment/Interventions: Functional transfer training, LE strengthening/ROM, Therapeutic exercise, Endurance training, Cognitive reorientation, Patient/family training, Equipment eval/education, Bed mobility, Gait training, Spoke to nursing          See flowsheet documentation for full assessment, interventions and recommendations. Note: Prognosis: Good  Problem List: Decreased endurance, Impaired balance, Decreased mobility, Decreased cognition, Impaired hearing, Obesity  Assessment: Patient is a 79y/o M who presented with cough and malaise. Difficulty and increase work of breathing. Found to have [address]. Patient resides alone in a 1sh with ramped entrance. Has family support 16 hrs/day. He uses a RW and is independent for mobility. Current medical status includes multiple lines, O2, O2 desaturation, SOB, [place], decreased cognition, fall risk, bed alarm, decreased strength, balance, endurance and mobility. Patient tolerated session well. He performed all mobility with supervision/min A. He had a slight drop in O2 saturation with exertion along with mild SOB. He is deconditioned and is not at his functional baseline. Difficulty navigating RW and is at risk for falls. Recommending level 2 resources. Moderate intensity. The patient's AM-PAC Basic Mobility Inpatient Short Form Raw Score is 17. A Raw score of less than or equal to 17 suggests the patient may benefit from discharge to post-acute rehabilitation services. Please also refer to the recommendation of the Physical Therapist for safe discharge planning. Barriers to Discharge: Decreased caregiver support          See flowsheet documentation for full assessment.

## 2023-09-07 NOTE — PROGRESS NOTES
[address] [facility]  Progress Note  Name: Bailey Momin  MRN: [id number]  Unit/Bed#: -01 I Date of Admission: 9/6/2023   Date of Service: 9/7/2023 I Hospital Day: 1    Assessment/Plan   * COVID-19  Assessment & Plan  · Presents w/ non-productive cough and malaise  · Required upwards of 6 L nasal cannula, now weaned to 1 L  · Chest x-ray with left lower lobe consolidation and elevated procalcitonin -continue IV Rocephin  · Continue IV remdesivir/Decadron  · Continue respiratory protocol  · Start Mucinex  · Encourage incentive spirometry    Hypoxia  Assessment & Plan  · Suspect in setting of COVID and possible superimposed pneumonia  · Initially requiring upwards of 6 L nasal cannula, now weaned to 1 L  · Continue respiratory protocol  · Continue O2 supplementation to maintain SPO2 > 90%    Ambulatory dysfunction  Assessment & Plan  · Resides at home with daughter  · PT/OT evaluation for disposition planning    Hyponatremia  Assessment & Plan  · Sodium 128 this morning -126 on admission  · Did improve slightly with IVF overnight  · Patient with faint crackles on exam.  Will hold further IVF, oral intake appears to be improving  · Home diuretic had been held on admission.   Continue to hold for now pending repeat sodium  · Trend BMP    PAF (paroxysmal atrial fibrillation) ([facility])  Assessment & Plan  · Restart bisoprolol  · Continue Eliquis    Stage 3a chronic kidney disease [facility])  Assessment & Plan  Lab Results   Component Value Date    EGFR 55 09/06/2023    EGFR 47 09/01/2022    EGFR 57 07/16/2022    CREATININE 1.14 09/06/2023    CREATININE 1.30 09/01/2022    CREATININE 1.11 07/16/2022   · Baseline creatinine around 1.1  · Creatinine within baseline  · Trend BMP    Benign essential hypertension  Assessment & Plan  · Blood pressure stable  · Continue amlodipine  · Also on bisoprolol and indapamide as outpatient - still with slightly elevated BP this AM, resume bisoprolol        VTE Pharmacologic Prophylaxis: VTE Score: 5 High Risk (Score >/= 5) - Pharmacological DVT Prophylaxis Ordered: apixaban (Eliquis). Sequential Compression Devices Ordered. Patient Centered Rounds: I performed bedside rounds with nursing staff today. Discussions with Specialists or Other Care Team Provider: KWESI    Education and Discussions with Family / Patient: Updated  (daughter) at bedside. Total Time Spent on Date of Encounter in care of patient: 45 minutes This time was spent on one or more of the following: performing physical exam; counseling and coordination of care; obtaining or reviewing history; documenting in the medical record; reviewing/ordering tests, medications or procedures; communicating with other healthcare professionals and discussing with patient's family/caregivers. Current Length of Stay: 1 day(s)  Current Patient Status: Inpatient   Certification Statement: The patient will continue to require additional inpatient hospital stay due to Hypoxia, IV antibiotic, remdesivir and Decadron  Discharge Plan: Anticipate discharge in 48-72 hrs to discharge location to be determined pending rehab evaluations. Code Status: Level 3 - DNAR and DNI    Subjective:   Patient overall feels somewhat better since presentation. Notes ongoing cough that is usually nonproductive but patient feels he has mucus that he is unable to expectorate. Denies chest pain/palpitations, nausea/vomiting, abdominal pain, diarrhea. Feels his appetite was better this morning than previous days. Objective:     Vitals:   Temp (24hrs), Av.3 °F (36.3 °C), Min:97.2 °F (36.2 °C), Max:97.3 °F (36.3 °C)    Temp:  [97.2 °F (36.2 °C)-97.3 °F (36.3 °C)] 97.2 °F (36.2 °C)  HR:  [91-99] 91  Resp:  [16] 16  BP: (154-156)/(85-89) 156/85  SpO2:  [93 %-98 %] 97 %  Body mass index is 30.41 kg/m². Input and Output Summary (last 24 hours):      Intake/Output Summary (Last 24 hours) at 2023 1340  Last data filed at 9/7/2023 1234  Gross per 24 hour   Intake 360 ml   Output 2130 ml   Net -1770 ml       Physical Exam:   Physical Exam  Vitals and nursing note reviewed. Constitutional:       General: He is not in acute distress. Appearance: He is well-developed. Interventions: Nasal cannula in place. Comments: No acute distress   HENT:      Head: Normocephalic and atraumatic. Eyes:      General: No scleral icterus. Extraocular Movements: Extraocular movements intact. Conjunctiva/sclera: Conjunctivae normal.   Cardiovascular:      Rate and Rhythm: Normal rate and regular rhythm. Heart sounds: No murmur heard. Pulmonary:      Effort: Pulmonary effort is normal. No respiratory distress. Breath sounds: Rhonchi and rales present. Abdominal:      General: Bowel sounds are normal.      Palpations: Abdomen is soft. Tenderness: There is no abdominal tenderness. There is no guarding or rebound. Musculoskeletal:         General: No swelling. Cervical back: Normal range of motion. Comments: Able to move upper/lower extremities bilaterally, no edema   Skin:     General: Skin is warm and dry. Capillary Refill: Capillary refill takes less than 2 seconds. Neurological:      Mental Status: He is alert. Mental status is at baseline.    Psychiatric:         Mood and Affect: Mood normal.         Speech: Speech normal.         Behavior: Behavior normal.          Additional Data:     Labs:  Results from last 7 days   Lab Units 09/07/23  0258   WBC Thousand/uL 4.74   HEMOGLOBIN g/dL 10.3*   HEMATOCRIT % 31.2*   PLATELETS Thousands/uL 164   NEUTROS PCT % 79*   LYMPHS PCT % 12*   MONOS PCT % 8   EOS PCT % 0     Results from last 7 days   Lab Units 09/07/23  0258   SODIUM mmol/L 128*   POTASSIUM mmol/L 3.7   CHLORIDE mmol/L 92*   CO2 mmol/L 29   BUN mg/dL 18   CREATININE mg/dL 1.04   ANION GAP mmol/L 7   CALCIUM mg/dL 8.6   ALBUMIN g/dL 3.9   TOTAL BILIRUBIN mg/dL 0.43   ALK PHOS U/L 77   ALT U/L 59*   AST U/L 56*   GLUCOSE RANDOM mg/dL 127         Results from last 7 days   Lab Units 09/07/23  1051 09/07/23  0619 09/06/23  1729   POC GLUCOSE mg/dl 144* 121 155*         Results from last 7 days   Lab Units 09/07/23  0258 09/06/23  0739   PROCALCITONIN ng/ml 0.34* 0.09       Lines/Drains:  Invasive Devices     Peripheral Intravenous Line  Duration           Peripheral IV 09/06/23 Dorsal (posterior); Left Hand 1 day                      Imaging: Reviewed radiology reports from this admission including: chest xray    Recent Cultures (last 7 days):         Last 24 Hours Medication List:   Current Facility-Administered Medications   Medication Dose Route Frequency Provider Last Rate   • allopurinol  100 mg Oral Daily Rashi Segovia MD     • ALPRAZolam  0.5 mg Oral BID PRN Sue Sanders MD     • amitriptyline  10 mg Oral HS Rashi Segovia MD     • amLODIPine  5 mg Oral Daily Rashi Segovia MD     • apixaban  2.5 mg Oral BID Rashi Segovia MD     • aspirin  81 mg Oral Daily Rashi Segovia MD     • [START ON 9/8/2023] bisoprolol  2.5 mg Oral Daily Deonte Collazo PA-C     • brimonidine tartrate  1 drop Both Eyes TID Deonte Collazo PA-C     • cefTRIAXone  1,000 mg Intravenous Q24H Broderick Segovia MD 1,000 mg (09/07/23 1104)   • co-enzyme Q-10  100 mg Oral Daily Rashi Segovia MD     • dexamethasone  6 mg Intravenous Q24H Rashi Segovia MD     • dorzolamide-timolol  1 drop Both Eyes BID Deonte Collazo PA-C     • finasteride  5 mg Oral Daily Rashi Segovia MD     • gabapentin  100 mg Oral TID Sue Sanders MD     • guaiFENesin  600 mg Oral Q12H [address] [facility] GRISELDA Barrow     • insulin lispro  1-6 Units Subcutaneous TID AC Rashi Segovia MD     • ipratropium  0.5 mg Nebulization TID Broderick Segovia MD     • latanoprost  1 drop Both Eyes HS Deonte Collazo PA-C     • levalbuterol  1.25 mg Nebulization TID Broderick Segovia MD     • metoprolol  5 mg Intravenous Q8H PRN Sue Sanders MD     • remdesivir 100 mg Intravenous Q24H Fermin Duval MD          Today, Patient Was Seen By: Toan Garcia PA-C    **Please Note: This note may have been constructed using a voice recognition system. **

## 2023-09-07 NOTE — PLAN OF CARE
Problem: Potential for Falls  Goal: Patient will remain free of falls  Description: INTERVENTIONS:  - Educate patient/family on patient safety including physical limitations  - Instruct patient to call for assistance with activity   - Consult OT/PT to assist with strengthening/mobility   - Keep Call bell within reach  - Keep bed low and locked with side rails adjusted as appropriate  - Keep care items and personal belongings within reach  - Initiate and maintain comfort rounds  - Make Fall Risk Sign visible to staff  - Offer Toileting every 2 Hours, in advance of need  - Initiate/Maintain alarm  - Obtain necessary fall risk management equipment:   - Apply yellow socks and bracelet for high fall risk patients  - Consider moving patient to room near nurses station  Outcome: Progressing     Problem: MOBILITY - ADULT  Goal: Maintain or return to baseline ADL function  Description: INTERVENTIONS:  -  Assess patient's ability to carry out ADLs; assess patient's baseline for ADL function and identify physical deficits which impact ability to perform ADLs (bathing, care of mouth/teeth, toileting, grooming, dressing, etc.)  - Assess/evaluate cause of self-care deficits   - Assess range of motion  - Assess patient's mobility; develop plan if impaired  - Assess patient's need for assistive devices and provide as appropriate  - Encourage maximum independence but intervene and supervise when necessary  - Involve family in performance of ADLs  - Assess for home care needs following discharge   - Consider OT consult to assist with ADL evaluation and planning for discharge  - Provide patient education as appropriate  Outcome: Progressing  Goal: Maintains/Returns to pre admission functional level  Description: INTERVENTIONS:  - Perform BMAT or MOVE assessment daily.   - Set and communicate daily mobility goal to care team and patient/family/caregiver.    - Collaborate with rehabilitation services on mobility goals if consulted  - Perform Range of Motion 6 times a day. - Reposition patient every 4 hours.   - Dangle patient 4 times a day  - Stand patient 0 times a day  - Ambulate patient 0 times a day  - Out of bed to chair 0 times a day   - Out of bed for meals 0 times a day  - Out of bed for toileting  - Record patient progress and toleration of activity level   Outcome: Progressing     Problem: Prexisting or High Potential for Compromised Skin Integrity  Goal: Skin integrity is maintained or improved  Description: INTERVENTIONS:  - Identify patients at risk for skin breakdown  - Assess and monitor skin integrity  - Assess and monitor nutrition and hydration status  - Monitor labs   - Assess for incontinence   - Turn and reposition patient  - Assist with mobility/ambulation  - Relieve pressure over bony prominences  - Avoid friction and shearing  - Provide appropriate hygiene as needed including keeping skin clean and dry  - Evaluate need for skin moisturizer/barrier cream  - Collaborate with interdisciplinary team   - Patient/family teaching  - Consider wound care consult   Outcome: Progressing

## 2023-09-08 ENCOUNTER — HOME HEALTH ADMISSION (OUTPATIENT)
Dept: HOME HEALTH SERVICES | Facility: HOME HEALTHCARE | Age: 88
End: 2023-09-08
Payer: COMMERCIAL

## 2023-09-08 PROBLEM — J96.00 ACUTE RESPIRATORY FAILURE DUE TO COVID-19 (HCC): Status: ACTIVE | Noted: 2023-09-07

## 2023-09-08 PROBLEM — U07.1 ACUTE RESPIRATORY FAILURE DUE TO COVID-19 (HCC): Status: ACTIVE | Noted: 2023-09-07

## 2023-09-08 LAB
ALBUMIN SERPL BCP-MCNC: 4.1 G/DL (ref 3.5–5)
ALP SERPL-CCNC: 79 U/L (ref 34–104)
ALT SERPL W P-5'-P-CCNC: 48 U/L (ref 7–52)
ANION GAP SERPL CALCULATED.3IONS-SCNC: 7 MMOL/L
AST SERPL W P-5'-P-CCNC: 45 U/L (ref 13–39)
BASOPHILS # BLD AUTO: 0 THOUSANDS/ÂΜL (ref 0–0.1)
BASOPHILS NFR BLD AUTO: 0 % (ref 0–1)
BILIRUB DIRECT SERPL-MCNC: 0.16 MG/DL (ref 0–0.2)
BILIRUB SERPL-MCNC: 0.43 MG/DL (ref 0.2–1)
BUN SERPL-MCNC: 24 MG/DL (ref 5–25)
CALCIUM SERPL-MCNC: 9 MG/DL (ref 8.4–10.2)
CHLORIDE SERPL-SCNC: 94 MMOL/L (ref 96–108)
CO2 SERPL-SCNC: 30 MMOL/L (ref 21–32)
CREAT SERPL-MCNC: 0.88 MG/DL (ref 0.6–1.3)
EOSINOPHIL # BLD AUTO: 0 THOUSAND/ÂΜL (ref 0–0.61)
EOSINOPHIL NFR BLD AUTO: 0 % (ref 0–6)
ERYTHROCYTE [DISTWIDTH] IN BLOOD BY AUTOMATED COUNT: 12.9 % (ref 11.6–15.1)
GFR SERPL CREATININE-BSD FRML MDRD: 74 ML/MIN/1.73SQ M
GLUCOSE SERPL-MCNC: 109 MG/DL (ref 65–140)
GLUCOSE SERPL-MCNC: 123 MG/DL (ref 65–140)
GLUCOSE SERPL-MCNC: 125 MG/DL (ref 65–140)
GLUCOSE SERPL-MCNC: 141 MG/DL (ref 65–140)
GLUCOSE SERPL-MCNC: 149 MG/DL (ref 65–140)
HCT VFR BLD AUTO: 33.1 % (ref 36.5–49.3)
HGB BLD-MCNC: 11 G/DL (ref 12–17)
IMM GRANULOCYTES # BLD AUTO: 0.06 THOUSAND/UL (ref 0–0.2)
IMM GRANULOCYTES NFR BLD AUTO: 1 % (ref 0–2)
LYMPHOCYTES # BLD AUTO: 0.55 THOUSANDS/ÂΜL (ref 0.6–4.47)
LYMPHOCYTES NFR BLD AUTO: 6 % (ref 14–44)
MCH RBC QN AUTO: 32.5 PG (ref 26.8–34.3)
MCHC RBC AUTO-ENTMCNC: 33.2 G/DL (ref 31.4–37.4)
MCV RBC AUTO: 98 FL (ref 82–98)
MONOCYTES # BLD AUTO: 0.61 THOUSAND/ÂΜL (ref 0.17–1.22)
MONOCYTES NFR BLD AUTO: 7 % (ref 4–12)
NEUTROPHILS # BLD AUTO: 7.75 THOUSANDS/ÂΜL (ref 1.85–7.62)
NEUTS SEG NFR BLD AUTO: 86 % (ref 43–75)
NRBC BLD AUTO-RTO: 0 /100 WBCS
PLATELET # BLD AUTO: 202 THOUSANDS/UL (ref 149–390)
PMV BLD AUTO: 11.4 FL (ref 8.9–12.7)
POTASSIUM SERPL-SCNC: 3.6 MMOL/L (ref 3.5–5.3)
PROCALCITONIN SERPL-MCNC: 0.31 NG/ML
PROT SERPL-MCNC: 7.6 G/DL (ref 6.4–8.4)
RBC # BLD AUTO: 3.38 MILLION/UL (ref 3.88–5.62)
SODIUM SERPL-SCNC: 131 MMOL/L (ref 135–147)
WBC # BLD AUTO: 8.97 THOUSAND/UL (ref 4.31–10.16)

## 2023-09-08 PROCEDURE — 84145 PROCALCITONIN (PCT): CPT | Performed by: PHYSICIAN ASSISTANT

## 2023-09-08 PROCEDURE — 85025 COMPLETE CBC W/AUTO DIFF WBC: CPT | Performed by: PHYSICIAN ASSISTANT

## 2023-09-08 PROCEDURE — 80048 BASIC METABOLIC PNL TOTAL CA: CPT | Performed by: PHYSICIAN ASSISTANT

## 2023-09-08 PROCEDURE — 80076 HEPATIC FUNCTION PANEL: CPT | Performed by: PHYSICIAN ASSISTANT

## 2023-09-08 PROCEDURE — 97167 OT EVAL HIGH COMPLEX 60 MIN: CPT

## 2023-09-08 PROCEDURE — 82948 REAGENT STRIP/BLOOD GLUCOSE: CPT

## 2023-09-08 PROCEDURE — 99232 SBSQ HOSP IP/OBS MODERATE 35: CPT | Performed by: PHYSICIAN ASSISTANT

## 2023-09-08 RX ORDER — LEVALBUTEROL INHALATION SOLUTION 1.25 MG/3ML
1.25 SOLUTION RESPIRATORY (INHALATION) EVERY 6 HOURS PRN
Status: DISCONTINUED | OUTPATIENT
Start: 2023-09-08 | End: 2023-09-09 | Stop reason: HOSPADM

## 2023-09-08 RX ADMIN — GABAPENTIN 100 MG: 100 CAPSULE ORAL at 17:00

## 2023-09-08 RX ADMIN — DEXAMETHASONE SODIUM PHOSPHATE 6 MG: 4 INJECTION, SOLUTION INTRAMUSCULAR; INTRAVENOUS at 12:40

## 2023-09-08 RX ADMIN — GUAIFENESIN 600 MG: 600 TABLET ORAL at 09:14

## 2023-09-08 RX ADMIN — BRIMONIDINE TARTRATE 1 DROP: 2 SOLUTION/ DROPS OPHTHALMIC at 17:00

## 2023-09-08 RX ADMIN — LATANOPROST 1 DROP: 50 SOLUTION OPHTHALMIC at 22:43

## 2023-09-08 RX ADMIN — DORZOLAMIDE HYDROCHLORIDE AND TIMOLOL MALEATE 1 DROP: 20; 5 SOLUTION/ DROPS OPHTHALMIC at 17:49

## 2023-09-08 RX ADMIN — AMLODIPINE BESYLATE 5 MG: 5 TABLET ORAL at 09:15

## 2023-09-08 RX ADMIN — AMITRIPTYLINE HYDROCHLORIDE 10 MG: 10 TABLET, FILM COATED ORAL at 22:40

## 2023-09-08 RX ADMIN — BRIMONIDINE TARTRATE 1 DROP: 2 SOLUTION/ DROPS OPHTHALMIC at 09:20

## 2023-09-08 RX ADMIN — DORZOLAMIDE HYDROCHLORIDE AND TIMOLOL MALEATE 1 DROP: 20; 5 SOLUTION/ DROPS OPHTHALMIC at 09:20

## 2023-09-08 RX ADMIN — FLUTICASONE PROPIONATE 1 SPRAY: 50 SPRAY, METERED NASAL at 12:39

## 2023-09-08 RX ADMIN — ALLOPURINOL 100 MG: 100 TABLET ORAL at 09:14

## 2023-09-08 RX ADMIN — AMLODIPINE BESYLATE 5 MG: 5 TABLET ORAL at 00:18

## 2023-09-08 RX ADMIN — GUAIFENESIN 600 MG: 600 TABLET ORAL at 21:00

## 2023-09-08 RX ADMIN — FINASTERIDE 5 MG: 5 TABLET, FILM COATED ORAL at 09:14

## 2023-09-08 RX ADMIN — CEFTRIAXONE 1000 MG: 1 INJECTION, SOLUTION INTRAVENOUS at 09:13

## 2023-09-08 RX ADMIN — GABAPENTIN 100 MG: 100 CAPSULE ORAL at 09:14

## 2023-09-08 RX ADMIN — Medication 100 MG: at 09:14

## 2023-09-08 RX ADMIN — APIXABAN 2.5 MG: 2.5 TABLET, FILM COATED ORAL at 09:15

## 2023-09-08 RX ADMIN — REMDESIVIR 100 MG: 100 INJECTION, POWDER, LYOPHILIZED, FOR SOLUTION INTRAVENOUS at 12:39

## 2023-09-08 RX ADMIN — GABAPENTIN 100 MG: 100 CAPSULE ORAL at 21:00

## 2023-09-08 RX ADMIN — APIXABAN 2.5 MG: 2.5 TABLET, FILM COATED ORAL at 17:49

## 2023-09-08 RX ADMIN — AMLODIPINE BESYLATE 5 MG: 5 TABLET ORAL at 22:41

## 2023-09-08 RX ADMIN — BISOPROLOL FUMARATE 2.5 MG: 5 TABLET, FILM COATED ORAL at 09:16

## 2023-09-08 RX ADMIN — ASPIRIN 81 MG: 81 TABLET, COATED ORAL at 09:14

## 2023-09-08 NOTE — PROGRESS NOTES
[address] [facility]  Progress Note  Name: Kassie Beltran  MRN: [id number]  Unit/Bed#: -01 I Date of Admission: 9/6/2023   Date of Service: 9/8/2023 I Hospital Day: 2    Assessment/Plan   * COVID-19  Assessment & Plan  · Presents w/ non-productive cough and malaise. Cough and shortness of breath improving  · Required upwards of 6 L nasal cannula, now weaned to 1 L  · Attempt wean to RA today  · Chest x-ray with left lower lobe consolidation and elevated procalcitonin -continue IV Rocephin  · Continue IV remdesivir/Decadron  · Continue respiratory protocol  · Continue Mucinex  · Encourage incentive spirometry    Acute respiratory failure due to COVID-19 [facility])  Assessment & Plan  · Suspect in setting of COVID and possible superimposed pneumonia  · Initially requiring upwards of 6 L nasal cannula with associated dyspnea/increased work of breathing, now weaned to 1 L  · Continue respiratory protocol  · Continue treatment for COVID 19  · Continue IV rocephin for suspected superimposed bacterial PNA  · Continue O2 supplementation to maintain SPO2 > 90%    Ambulatory dysfunction  Assessment & Plan  · Resides at home with daughter  · PT/OT evaluation for disposition planning    Hyponatremia  Assessment & Plan  · Sodium 128 yesterday -126 on admission  · Did improve slightly with IVF overnight  · Patient with faint crackles on exam 9/7. Will hold further IVF, oral intake appears to be improving  · Lungs clear to auscultation 9/8  · Home diuretic had been held on admission.   Continue to hold for now pending repeat sodium  · Sodium improving to 131  · Monitor BMP    PAF (paroxysmal atrial fibrillation) ([facility])  Assessment & Plan  · Restart bisoprolol  · Continue Eliquis    Stage 3a chronic kidney disease [facility])  Assessment & Plan  Lab Results   Component Value Date    EGFR 74 09/08/2023    EGFR 62 09/07/2023    EGFR 55 09/06/2023    CREATININE 0.88 09/08/2023    CREATININE 1.04 09/07/2023 CREATININE 1.14 2023   · Baseline creatinine around 1.1  · Creatinine within baseline  · Trend BMP    Benign essential hypertension  Assessment & Plan  · Blood pressure stable  · Continue amlodipine, bisoprolol        VTE Pharmacologic Prophylaxis: VTE Score: 5 High Risk (Score >/= 5) - Pharmacological DVT Prophylaxis Ordered: apixaban (Eliquis). Sequential Compression Devices Ordered. Patient Centered Rounds: I performed bedside rounds with nursing staff today. Discussions with Specialists or Other Care Team Provider: KWESI    Education and Discussions with Family / Patient: Updated  (son in law) at bedside. Total Time Spent on Date of Encounter in care of patient: 45 minutes This time was spent on one or more of the following: performing physical exam; counseling and coordination of care; obtaining or reviewing history; documenting in the medical record; reviewing/ordering tests, medications or procedures; communicating with other healthcare professionals and discussing with patient's family/caregivers. Current Length of Stay: 2 day(s)  Current Patient Status: Inpatient   Certification Statement: The patient will continue to require additional inpatient hospital stay due to Weaning oxygen, disposition planning  Discharge Plan: Anticipate discharge in 24-48 hrs to rehab facility. Code Status: Level 3 - DNAR and DNI    Subjective:   Patient continues to feel better each day. Minimal cough. Denies chest pain/palpitations. Shortness of breath improving. Denies nausea/vomiting or abdominal pain, diarrhea. Appetite also appears to be improving. Objective:     Vitals:   Temp (24hrs), Av.2 °F (36.2 °C), Min:97 °F (36.1 °C), Max:97.2 °F (36.2 °C)    Temp:  [97 °F (36.1 °C)-97.2 °F (36.2 °C)] 97 °F (36.1 °C)  HR:  [] 102  Resp:  [17] 17  BP: (148-173)/(83-99) 148/95  SpO2:  [94 %-98 %] 94 %  Body mass index is 30.41 kg/m².      Input and Output Summary (last 24 hours): Intake/Output Summary (Last 24 hours) at 9/8/2023 1107  Last data filed at 9/8/2023 6131  Gross per 24 hour   Intake 120 ml   Output 1175 ml   Net -1055 ml       Physical Exam:   Physical Exam  Vitals and nursing note reviewed. Constitutional:       General: He is not in acute distress. Appearance: He is well-developed. Interventions: Nasal cannula in place. Comments: No acute distress   HENT:      Head: Normocephalic and atraumatic. Eyes:      General: No scleral icterus. Extraocular Movements: Extraocular movements intact. Conjunctiva/sclera: Conjunctivae normal.   Cardiovascular:      Rate and Rhythm: Normal rate and regular rhythm. Heart sounds: No murmur heard. Pulmonary:      Effort: Pulmonary effort is normal. No respiratory distress. Breath sounds: Normal breath sounds. No wheezing, rhonchi or rales. Abdominal:      General: Bowel sounds are normal.      Palpations: Abdomen is soft. Tenderness: There is no abdominal tenderness. There is no guarding or rebound. Musculoskeletal:         General: No swelling. Cervical back: Normal range of motion. Comments: Able to move upper/lower extremities bilaterally, no edema   Skin:     General: Skin is warm and dry. Capillary Refill: Capillary refill takes less than 2 seconds. Neurological:      Mental Status: He is alert. Mental status is at baseline.    Psychiatric:         Mood and Affect: Mood normal.         Speech: Speech normal.         Behavior: Behavior normal.          Additional Data:     Labs:  Results from last 7 days   Lab Units 09/08/23  0651   WBC Thousand/uL 8.97   HEMOGLOBIN g/dL 11.0*   HEMATOCRIT % 33.1*   PLATELETS Thousands/uL 202   NEUTROS PCT % 86*   LYMPHS PCT % 6*   MONOS PCT % 7   EOS PCT % 0     Results from last 7 days   Lab Units 09/08/23  0651   SODIUM mmol/L 131*   POTASSIUM mmol/L 3.6   CHLORIDE mmol/L 94*   CO2 mmol/L 30   BUN mg/dL 24   CREATININE mg/dL 0.88   ANION GAP mmol/L 7   CALCIUM mg/dL 9.0   ALBUMIN g/dL 4.1   TOTAL BILIRUBIN mg/dL 0.43   ALK PHOS U/L 79   ALT U/L 48   AST U/L 45*   GLUCOSE RANDOM mg/dL 109         Results from last 7 days   Lab Units 09/08/23  0836 09/07/23  2106 09/07/23  1624 09/07/23  1051 09/07/23  0619 09/06/23  1729   POC GLUCOSE mg/dl 149* 126 150* 144* 121 155*         Results from last 7 days   Lab Units 09/08/23  0651 09/07/23  0258 09/06/23  0739   PROCALCITONIN ng/ml 0.31* 0.34* 0.09       Lines/Drains:  Invasive Devices     Peripheral Intravenous Line  Duration           Peripheral IV 09/08/23 Distal;Left;Ventral (anterior) Forearm <1 day                      Imaging: Reviewed radiology reports from this admission including: chest xray    Recent Cultures (last 7 days):         Last 24 Hours Medication List:   Current Facility-Administered Medications   Medication Dose Route Frequency Provider Last Rate   • allopurinol  100 mg Oral Daily Rashi Segovia MD     • ALPRAZolam  0.5 mg Oral BID PRN Greer Espinal MD     • amitriptyline  10 mg Oral HS Rashi Segovia MD     • amLODIPine  5 mg Oral Daily Rashi Segovia MD     • amLODIPine  5 mg Oral HS Corin Farmer PA-C     • apixaban  2.5 mg Oral BID Rashi Segovia MD     • aspirin  81 mg Oral Daily Rashi Segovia MD     • bisoprolol  2.5 mg Oral Daily Massiel Metz PA-C     • brimonidine tartrate  1 drop Both Eyes TID Massiel Metz PA-C     • cefTRIAXone  1,000 mg Intravenous Q24H Sher Segovia MD 1,000 mg (09/08/23 0913)   • co-enzyme Q-10  100 mg Oral Daily Rashi Segovia MD     • dexamethasone  6 mg Intravenous Q24H Rashi Segovia MD     • dorzolamide-timolol  1 drop Both Eyes BID Massiel Metz PA-C     • finasteride  5 mg Oral Daily Rashi Segovia MD     • fluticasone  1 spray Each Nare Daily Corin Farmer PA-C     • gabapentin  100 mg Oral TID Sher Segovia MD     • guaiFENesin  600 mg Oral Q12H [address] [facility]     • insulin lispro  1-6 Units Subcutaneous TID REGINO Markham MD Luca     • latanoprost  1 drop Both Eyes HS Kaya Paredes PA-C     • levalbuterol  1.25 mg Nebulization Q6H PRN Lisa Lazaro MD     • remdesivir  100 mg Intravenous Q24H Eric Keita MD          Today, Patient Was Seen By: Kaya Paredes PA-C    **Please Note: This note may have been constructed using a voice recognition system. **

## 2023-09-08 NOTE — ASSESSMENT & PLAN NOTE
· Suspect in setting of COVID and possible superimposed pneumonia  · Initially requiring upwards of 6 L nasal cannula with associated dyspnea/increased work of breathing, now weaned to 1 L  · Continue respiratory protocol  · Continue treatment for COVID 19  · Continue IV rocephin for suspected superimposed bacterial PNA  · Continue O2 supplementation to maintain SPO2 > 90%

## 2023-09-08 NOTE — PLAN OF CARE
Problem: PAIN - ADULT  Goal: Verbalizes/displays adequate comfort level or baseline comfort level  Description: Interventions:  - Encourage patient to monitor pain and request assistance  - Assess pain using appropriate pain scale  - Administer analgesics based on type and severity of pain and evaluate response  - Implement non-pharmacological measures as appropriate and evaluate response  - Consider cultural and social influences on pain and pain management  - Notify physician/advanced practitioner if interventions unsuccessful or patient reports new pain  Outcome: Progressing     Problem: INFECTION - ADULT  Goal: Absence or prevention of progression during hospitalization  Description: INTERVENTIONS:  - Assess and monitor for signs and symptoms of infection  - Monitor lab/diagnostic results  - Monitor all insertion sites, i.e. indwelling lines, tubes, and drains  - Monitor endotracheal if appropriate and nasal secretions for changes in amount and color  - [place] appropriate cooling/warming therapies per order  - Administer medications as ordered  - Instruct and encourage patient and family to use good hand hygiene technique  - Identify and instruct in appropriate isolation precautions for identified infection/condition  Outcome: Progressing     Problem: Knowledge Deficit  Goal: Patient/family/caregiver demonstrates understanding of disease process, treatment plan, medications, and discharge instructions  Description: Complete learning assessment and assess knowledge base. Interventions:  - Provide teaching at level of understanding  - Provide teaching via preferred learning methods  Outcome: Progressing     Problem: Nutrition/Hydration-ADULT  Goal: Nutrient/Hydration intake appropriate for improving, restoring or maintaining nutritional needs  Description: Monitor and assess patient's nutrition/hydration status for malnutrition.  Collaborate with interdisciplinary team and initiate plan and interventions as ordered. Monitor patient's weight and dietary intake as ordered or per policy. Utilize nutrition screening tool and intervene as necessary. Determine patient's food preferences and provide high-protein, high-caloric foods as appropriate.      INTERVENTIONS:  - Monitor oral intake, urinary output, labs, and treatment plans  - Assess nutrition and hydration status and recommend course of action  - Evaluate amount of meals eaten  - Assist patient with eating if necessary   - Allow adequate time for meals  - Recommend/ encourage appropriate diets, oral nutritional supplements, and vitamin/mineral supplements  - Order, calculate, and assess calorie counts as needed  - Recommend, monitor, and adjust tube feedings and TPN/PPN based on assessed needs  - Assess need for intravenous fluids  - Provide specific nutrition/hydration education as appropriate  - Include patient/family/caregiver in decisions related to nutrition  Outcome: Progressing

## 2023-09-08 NOTE — OCCUPATIONAL THERAPY NOTE
Occupational Therapy Evaluation      Thania Ortiz    9/8/2023    Principal Problem:    COVID-19  Active Problems:    Benign essential hypertension    Stage 3a chronic kidney disease (HCC)    PAF (paroxysmal atrial fibrillation) (HCC)    Hyponatremia    Ambulatory dysfunction    Acute respiratory failure due to COVID-19 [facility])      Past Medical History:   Diagnosis Date    A-fib ([address])     Anemia     Carpal tunnel syndrome, unspecified upper limb     Cataract     last assessed: 8/18/2012    GERD (gastroesophageal reflux disease)     Glaucoma     Hypertension     last assessed: 8/23/2012    Intervertebral disc disease     PVD (peripheral vascular disease) ([address])        Past Surgical History:   Procedure Laterality Date    CATARACT EXTRACTION      COLONOSCOPY      HERNIA REPAIR      NEUROPLASTY / TRANSPOSITION MEDIAN NERVE AT CARPAL TUNNEL      MA LAPAROSCOPY SURG CHOLECYSTECTOMY N/A 09/14/2016    Procedure: LAPAROSCOPIC CHOLECYSTECTOMY ;  Surgeon: Kailee Dubois MD;  Location: [facility];  Service: General    UPPER GASTROINTESTINAL ENDOSCOPY        09/08/23 1155   OT Last Visit   OT Visit Date 09/08/23   Note Type   Note type Evaluation   Pain Assessment   Pain Assessment Tool 0-10   Pain Score No Pain   Restrictions/Precautions   Weight Bearing Precautions Per Order No   Other Precautions Hard of hearing; Fall Risk;O2;Multiple lines; Bed Alarm;Cognitive   Home Living   Type of [address] [facility] entrance   Bathroom Shower/Tub Walk-in shower   Bathroom Equipment Grab bars in [address]   Additional Comments Uses a RW at baseline   Prior Function   Level of [place] Independent with functional mobility; Needs assistance with ADLs; Needs assistance with IADLS   Lives With   (Dtr is with the patient overnight and only leaves for about 4-5hours/day)   Receives Help From Family   IADLs Family/Friend/Other provides transportation; Family/Friend/Other provides meals; Family/Friend/Other provides medication management   Falls in the last 6 months 0   General   Family/Caregiver Present Yes  (daughter/caregiver)   ADL   Eating Assistance 5  Supervision/Setup   Grooming Assistance 5  Supervision/Setup   UB Bathing Assistance 5  Supervision/Setup   LB Bathing Assistance 5  Supervision/Setup   UB Dressing Assistance 5  Supervision/Setup   LB Dressing Assistance 5  Supervision/Setup   Toileting Assistance  5  Supervision/Setup   Bed Mobility   Supine to Sit 5  Supervision   Additional items HOB elevated; Bedrails; Increased time required;Verbal cues   Transfers   Sit to Stand 4  Minimal assistance  (CGA)   Additional items Assist x 1; Armrests; Increased time required;Verbal cues   Stand to Sit 4  Minimal assistance  (CGA)   Additional items Assist x 1; Armrests; Increased time required;Verbal cues   Stand pivot 4  Minimal assistance  (CGA)   Additional items Assist x 1; Armrests; Increased time required;Verbal cues   Functional Mobility   Functional Mobility 4  Minimal assistance   Additional Comments CGA x1 w RW, VCs for safe RW use   Activity Tolerance   Activity Tolerance Patient limited by fatigue;Treatment limited secondary to medical complications (Comment)   Nurse Made Aware RN Nikolai   RUE Assessment   RUE Assessment WFL   LUE Assessment   LUE Assessment WFL   Cognition   Overall Cognitive Status Impaired   Arousal/Participation Alert; Cooperative   Attention Attends with cues to redirect   Orientation Level Oriented X4   Memory Decreased short term memory   Following Commands Follows one step commands inconsistently   Comments [place]   Assessment   Limitation Decreased ADL status; Decreased cognition;Decreased endurance;Decreased self-care trans;Decreased high-level ADLs; Decreased UE ROM; Decreased UE strength;Decreased Safe judgement during ADL   Prognosis Good   Assessment Pt is a 80 y.o. male seen for OT evaluation at [facility], admitted 9/6/2023 w/ COVID-19.   OT completed extensive review of pt's medical and social history. Comorbidities affecting pt's functional performance at time of assessment include: HTN, CKD, aFib, ambulatory dysfunction, glaucoma, dysphagia, anxiety. Personal factors affecting pt at time of IE include:difficulty performing ADLS and health management . Prior to admission, pt was living in [facility], ramped entrance and was independent with ADL/funcitonal mobility. Dtr is present about 18-20hrs/day, including overnight. Upon evaluation, pt presents to OT below baseline due to the following performance deficits: weakness, decreased strength, decreased balance, decreased tolerance and functional transfers, bed mobility. Pt to benefit from continued skilled OT tx while in the hospital to address deficits as defined above and maximize level of functional independence w ADL's and functional mobility. Occupational Performance areas to address include: grooming, bathing/shower, toilet hygiene, dressing, functional mobility and functional transfers, bed mobility. The patient's raw score on the AM-PAC Daily Activity inpatient short form is 22, standardized score is 47.1, greater than 39.4. Patients at this level are likely to benefit from DC to home. Based on findings, pt is of high complexity, due to medical comorbidities. At this time, OT recommendations at time of discharge are level 2. Goals   Patient Goals get better   Plan   Treatment Interventions ADL retraining;UE strengthening/ROM; Functional transfer training; Endurance training;Patient/family training;Cognitive reorientation;Equipment evaluation/education; Compensatory technique education;Continued evaluation; Energy conservation   Goal Expiration Date 09/18/23   OT Frequency 2-3x/wk   Recommendation   UB Rehab Discharge Recommendation (PT/OT) Level 2   Additional Comments  Pending status upon being ready to medically discharge and daughters level of ability to provide care.    [facility] Daily Activity Inpatient   Lower Body Dressing 3   Bathing 3 Toileting 4   Upper Body Dressing 4   Grooming 4   Eating 4   Daily Activity Raw Score 22   Daily Activity Standardized Score (Calc for Raw Score >=11) 47. 1   AM-PAC Applied Cognition Inpatient   Following a Speech/Presentation 3   Understanding Ordinary Conversation 3   Taking Medications 3   Remembering Where Things Are Placed or Put Away 3   Remembering List of 4-5 Errands 3   Taking Care of Complicated Tasks 3   Applied Cognition Raw Score 18   Applied Cognition Standardized Score 38.07     Pt will achieve the following goals within 10 days. *Pt will complete grooming with independence. *Pt will complete UB bathing and dressing with independence. *Pt will complete LB bathing and dressing with independence . *Pt will complete toileting (hygiene and clothing management) with independence    *Pt will complete bed mobility with independence, with bed flat and no side rail to prep for purposeful tasks    *Pt will perform functional transfers with modified independence in order to complete ADL routine. *Pt will increase standing tolerance to 3-5 minutes in order to complete ADL routine. *Pt will complete item retrieval and light home management with supervision while demonstrating good safety. *Pt will demonstrate increased activity tolerance in order to complete ADL routine. *Pt will participate in cognitive assessment to determine level of safety for returning home    *Pt will participate in UE therapeutic exercise in order to maximize strength for ADL transfers. *Pt will sit on EOB for 10+ minutes for increased safety with seated activity tolerance during ADL tasks. *Pt will identify 3-5 fall risks to ensure safety upon discharge.     [employer], MS, OTR/L

## 2023-09-08 NOTE — ASSESSMENT & PLAN NOTE
· Presents w/ non-productive cough and malaise.   Cough and shortness of breath improving  · Required upwards of 6 L nasal cannula, now weaned to 1 L  · Attempt wean to RA today  · Chest x-ray with left lower lobe consolidation and elevated procalcitonin -continue IV Rocephin  · Continue IV remdesivir/Decadron  · Continue respiratory protocol  · Continue Mucinex  · Encourage incentive spirometry

## 2023-09-08 NOTE — PLAN OF CARE
Problem: OCCUPATIONAL THERAPY ADULT  Goal: Performs self-care activities at highest level of function for planned discharge setting. See evaluation for individualized goals. Description:   Outcome: Progressing  Note: Limitation: Decreased ADL status, Decreased cognition, Decreased endurance, Decreased self-care trans, Decreased high-level ADLs, Decreased UE ROM, Decreased UE strength, Decreased Safe judgement during ADL  Prognosis: Good  Assessment: Pt is a 80 y.o. male seen for OT evaluation at [facility], admitted 9/6/2023 w/ COVID-19. OT completed extensive review of pt's medical and social history. Comorbidities affecting pt's functional performance at time of assessment include: HTN, CKD, aFib, ambulatory dysfunction, glaucoma, dysphagia, anxiety. Personal factors affecting pt at time of IE include:difficulty performing ADLS and health management . Prior to admission, pt was living in [facility], ramped entrance and was independent with ADL/funcitonal mobility. Dtr is present about 18-20hrs/day, including overnight. Upon evaluation, pt presents to OT below baseline due to the following performance deficits: weakness, decreased strength, decreased balance, decreased tolerance and functional transfers, bed mobility. Pt to benefit from continued skilled OT tx while in the hospital to address deficits as defined above and maximize level of functional independence w ADL's and functional mobility. Occupational Performance areas to address include: grooming, bathing/shower, toilet hygiene, dressing, functional mobility and functional transfers, bed mobility. The patient's raw score on the AM-PAC Daily Activity inpatient short form is 22, standardized score is 47.1, greater than 39.4. Patients at this level are likely to benefit from DC to home. Based on findings, pt is of high complexity, due to medical comorbidities. At this time, OT recommendations at time of discharge are level 2.

## 2023-09-08 NOTE — ASSESSMENT & PLAN NOTE
Lab Results   Component Value Date    EGFR 74 09/08/2023    EGFR 62 09/07/2023    EGFR 55 09/06/2023    CREATININE 0.88 09/08/2023    CREATININE 1.04 09/07/2023    CREATININE 1.14 09/06/2023   · Baseline creatinine around 1.1  · Creatinine within baseline  · Trend BMP

## 2023-09-08 NOTE — CASE MANAGEMENT
Case Management Discharge Planning Note    Patient name Brittany Moe  Location /-58 MRN [id number]  : 7/15/1931 Date 2023       Current Admission Date: 2023  Current Admission Diagnosis:COVID-19   Patient Active Problem List    Diagnosis Date Noted   • Acute respiratory failure due to COVID-19 [facility]) 2023   • COVID-19 2023   • Medicare annual wellness visit, subsequent 2023   • Dermatitis 2023   • Elevated TSH 2023   • [place] esophagus 2022   • Other dysphagia 2022   • Chest pain 2022   • Hypertensive urgency 2022   • Frontal headache 2022   • Insomnia 2022   • Ambulatory dysfunction 2022   • Irritable bowel syndrome with diarrhea 2022   • Diarrhea 2022   • Benign prostatic hyperplasia 2021   • Pain in both feet 05/15/2020   • Carpal tunnel syndrome 2019   • Other insomnia 04/15/2019   • Anxiety 04/15/2019   • Hyponatremia 2019   • Abnormal liver function 2019   • Paresthesia 2018   • Arthritis of carpometacarpal [place]) joint of right thumb 2018   • Right wrist pain 2018   • Peripheral edema 2018   • Syncope 2018   • Stage 3a chronic kidney disease ([address]) 2018   • PAF (paroxysmal atrial fibrillation) ([address]) 2018   • Left lumbar radiculopathy 01/10/2017   • Cholecystitis with cholelithiasis 2016   • Joint pain, knee 2014   • Allergic rhinitis 2013   • S/P ablation of ventricular arrhythmia 2013   • 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   • Anemia 2012   • GERD (gastroesophageal reflux disease) 2012   • Glaucoma 2012   • Hiatal hernia 2012   • Hyperlipidemia 2012   • Peripheral vascular disease ([address]) 2012   • Benign essential hypertension 2012      LOS (days): 2  Geometric Mean LOS (GMLOS) (days): 3.60  Days to GMLOS:1.4     OBJECTIVE:  Risk of Unplanned Readmission Score: 15.7         Current admission status: Inpatient   Preferred Pharmacy:   [address] [facility], [address] [address]  Phone: [phone] Fax: [facility] [place] [facility]  Phone: [phone] Fax: [phone]    Primary Care Provider: Vonda Thrasher MD    Primary Insurance: [facility] REP  Secondary Insurance:     DISCHARGE DETAILS:     CM left message for dtr, Romayne Ali to discuss discharge plan and review str choices.

## 2023-09-08 NOTE — PLAN OF CARE
Problem: Potential for Falls  Goal: Patient will remain free of falls  Description: INTERVENTIONS:  - Educate patient/family on patient safety including physical limitations  - Instruct patient to call for assistance with activity   - Consult OT/PT to assist with strengthening/mobility   - Keep Call bell within reach  - Keep bed low and locked with side rails adjusted as appropriate  - Keep care items and personal belongings within reach  - Initiate and maintain comfort rounds  - Make Fall Risk Sign visible to staff  - Offer Toileting every 2 Hours, in advance of need  - Initiate/Maintain alarm  - Obtain necessary fall risk management equipment:   - Apply yellow socks and bracelet for high fall risk patients  - Consider moving patient to room near nurses station  Outcome: Progressing     Problem: MOBILITY - ADULT  Goal: Maintain or return to baseline ADL function  Description: INTERVENTIONS:  -  Assess patient's ability to carry out ADLs; assess patient's baseline for ADL function and identify physical deficits which impact ability to perform ADLs (bathing, care of mouth/teeth, toileting, grooming, dressing, etc.)  - Assess/evaluate cause of self-care deficits   - Assess range of motion  - Assess patient's mobility; develop plan if impaired  - Assess patient's need for assistive devices and provide as appropriate  - Encourage maximum independence but intervene and supervise when necessary  - Involve family in performance of ADLs  - Assess for home care needs following discharge   - Consider OT consult to assist with ADL evaluation and planning for discharge  - Provide patient education as appropriate  Outcome: Progressing  Goal: Maintains/Returns to pre admission functional level  Description: INTERVENTIONS:  - Perform BMAT or MOVE assessment daily.   - Set and communicate daily mobility goal to care team and patient/family/caregiver.    - Collaborate with rehabilitation services on mobility goals if consulted  - Perform Range of Motion 6 times a day. - Reposition patient every 4 hours.   - Dangle patient 4 times a day  - Stand patient 0 times a day  - Ambulate patient 0 times a day  - Out of bed to chair 0 times a day   - Out of bed for meals 0 times a day  - Out of bed for toileting  - Record patient progress and toleration of activity level   Outcome: Progressing     Problem: Prexisting or High Potential for Compromised Skin Integrity  Goal: Skin integrity is maintained or improved  Description: INTERVENTIONS:  - Identify patients at risk for skin breakdown  - Assess and monitor skin integrity  - Assess and monitor nutrition and hydration status  - Monitor labs   - Assess for incontinence   - Turn and reposition patient  - Assist with mobility/ambulation  - Relieve pressure over bony prominences  - Avoid friction and shearing  - Provide appropriate hygiene as needed including keeping skin clean and dry  - Evaluate need for skin moisturizer/barrier cream  - Collaborate with interdisciplinary team   - Patient/family teaching  - Consider wound care consult   Outcome: Progressing     Problem: PAIN - ADULT  Goal: Verbalizes/displays adequate comfort level or baseline comfort level  Description: Interventions:  - Encourage patient to monitor pain and request assistance  - Assess pain using appropriate pain scale  - Administer analgesics based on type and severity of pain and evaluate response  - Implement non-pharmacological measures as appropriate and evaluate response  - Consider cultural and social influences on pain and pain management  - Notify physician/advanced practitioner if interventions unsuccessful or patient reports new pain  Outcome: Progressing     Problem: INFECTION - ADULT  Goal: Absence or prevention of progression during hospitalization  Description: INTERVENTIONS:  - Assess and monitor for signs and symptoms of infection  - Monitor lab/diagnostic results  - Monitor all insertion sites, i.e. indwelling lines, tubes, and drains  - Monitor endotracheal if appropriate and nasal secretions for changes in amount and color  - [place] appropriate cooling/warming therapies per order  - Administer medications as ordered  - Instruct and encourage patient and family to use good hand hygiene technique  - Identify and instruct in appropriate isolation precautions for identified infection/condition  Outcome: Progressing     Problem: SAFETY ADULT  Goal: Patient will remain free of falls  Description: INTERVENTIONS:  - Educate patient/family on patient safety including physical limitations  - Instruct patient to call for assistance with activity   - Consult OT/PT to assist with strengthening/mobility   - Keep Call bell within reach  - Keep bed low and locked with side rails adjusted as appropriate  - Keep care items and personal belongings within reach  - Initiate and maintain comfort rounds  - Make Fall Risk Sign visible to staff  - Offer Toileting every 2 Hours, in advance of need  - Initiate/Maintain alarm  - Obtain necessary fall risk management equipment:   - Apply yellow socks and bracelet for high fall risk patients  - Consider moving patient to room near nurses station  Outcome: Progressing     Problem: DISCHARGE PLANNING  Goal: Discharge to home or other facility with appropriate resources  Description: INTERVENTIONS:  - Identify barriers to discharge w/patient and caregiver  - Arrange for needed discharge resources and transportation as appropriate  - Identify discharge learning needs (meds, wound care, etc.)  - Arrange for interpretive services to assist at discharge as needed  - Refer to Case Management Department for coordinating discharge planning if the patient needs post-hospital services based on physician/advanced practitioner order or complex needs related to functional status, cognitive ability, or social support system  Outcome: Progressing     Problem: Knowledge Deficit  Goal: Patient/family/caregiver demonstrates understanding of disease process, treatment plan, medications, and discharge instructions  Description: Complete learning assessment and assess knowledge base. Interventions:  - Provide teaching at level of understanding  - Provide teaching via preferred learning methods  Outcome: Progressing     Problem: Nutrition/Hydration-ADULT  Goal: Nutrient/Hydration intake appropriate for improving, restoring or maintaining nutritional needs  Description: Monitor and assess patient's nutrition/hydration status for malnutrition. Collaborate with interdisciplinary team and initiate plan and interventions as ordered. Monitor patient's weight and dietary intake as ordered or per policy. Utilize nutrition screening tool and intervene as necessary. Determine patient's food preferences and provide high-protein, high-caloric foods as appropriate.      INTERVENTIONS:  - Monitor oral intake, urinary output, labs, and treatment plans  - Assess nutrition and hydration status and recommend course of action  - Evaluate amount of meals eaten  - Assist patient with eating if necessary   - Allow adequate time for meals  - Recommend/ encourage appropriate diets, oral nutritional supplements, and vitamin/mineral supplements  - Order, calculate, and assess calorie counts as needed  - Recommend, monitor, and adjust tube feedings and TPN/PPN based on assessed needs  - Assess need for intravenous fluids  - Provide specific nutrition/hydration education as appropriate  - Include patient/family/caregiver in decisions related to nutrition  Outcome: Progressing     Problem: RESPIRATORY - ADULT  Goal: Achieves optimal ventilation and oxygenation  Description: INTERVENTIONS:  - Assess for changes in respiratory status  - Assess for changes in mentation and behavior  - Position to facilitate oxygenation and minimize respiratory effort  - Oxygen administered by appropriate delivery if ordered  - Initiate smoking cessation education as indicated  - Encourage broncho-pulmonary hygiene including cough, deep breathe, Incentive Spirometry  - Assess the need for suctioning and aspirate as needed  - Assess and instruct to report SOB or any respiratory difficulty  - Respiratory Therapy support as indicated  Outcome: Progressing     Problem: METABOLIC, FLUID AND ELECTROLYTES - ADULT  Goal: Electrolytes maintained within normal limits  Description: INTERVENTIONS:  - Monitor labs and assess patient for signs and symptoms of electrolyte imbalances  - Administer electrolyte replacement as ordered  - Monitor response to electrolyte replacements, including repeat lab results as appropriate  - Instruct patient on fluid and nutrition as appropriate  Outcome: Progressing  Goal: Fluid balance maintained  Description: INTERVENTIONS:  - Monitor labs   - Monitor I/O and WT  - Instruct patient on fluid and nutrition as appropriate  - Assess for signs & symptoms of volume excess or deficit  Outcome: Progressing  Goal: Glucose maintained within target range  Description: INTERVENTIONS:  - Monitor Blood Glucose as ordered  - Assess for signs and symptoms of hyperglycemia and hypoglycemia  - Administer ordered medications to maintain glucose within target range  - Assess nutritional intake and initiate nutrition service referral as needed  Outcome: Progressing     Problem: SKIN/TISSUE INTEGRITY - ADULT  Goal: Skin Integrity remains intact(Skin Breakdown Prevention)  Description: Assess:  -Perform Eddie assessment every x  -Clean and moisturize skin every x  -Inspect skin when repositioning, toileting, and assisting with ADLS  -Assess under medical devices such as x every x  -Assess extremities for adequate circulation and sensation     Bed Management:  -Have minimal linens on bed & keep smooth, unwrinkled  -Change linens as needed when moist or perspiring  -Avoid sitting or lying in one position for more than x hours while in bed  -Keep HOB at [facility] Toileting:  -Offer bedside commode  -Assess for incontinence every x  -Use incontinent care products after each incontinent episode such as x    Activity:  -Mobilize patient x times a day  -Encourage activity and walks on unit  -Encourage or provide ROM exercises   -Turn and reposition patient every x Hours  -Use appropriate equipment to lift or move patient in bed  -Instruct/ Assist with weight shifting every x when out of bed in chair  -Consider limitation of chair time x hour intervals    Skin Care:  -Avoid use of baby powder, tape, friction and shearing, hot water or constrictive clothing  -Relieve pressure over bony prominences using x  -Do not massage red bony areas    Next Steps:  -Teach patient strategies to minimize risks such as x   -Consider consults to  interdisciplinary teams such as x  Outcome: Progressing     Problem: HEMATOLOGIC - ADULT  Goal: Maintains hematologic stability  Description: INTERVENTIONS  - Assess for signs and symptoms of bleeding or hemorrhage  - Monitor labs  - Administer supportive blood products/factors as ordered and appropriate  Outcome: Progressing     Problem: MUSCULOSKELETAL - ADULT  Goal: Maintain or return mobility to safest level of function  Description: INTERVENTIONS:  - Assess patient's ability to carry out ADLs; assess patient's baseline for ADL function and identify physical deficits which impact ability to perform ADLs (bathing, care of mouth/teeth, toileting, grooming, dressing, etc.)  - Assess/evaluate cause of self-care deficits   - Assess range of motion  - Assess patient's mobility  - Assess patient's need for assistive devices and provide as appropriate  - Encourage maximum independence but intervene and supervise when necessary  - Involve family in performance of ADLs  - Assess for home care needs following discharge   - Consider OT consult to assist with ADL evaluation and planning for discharge  - Provide patient education as appropriate  Outcome: Progressing

## 2023-09-08 NOTE — ASSESSMENT & PLAN NOTE
· Sodium 128 yesterday -126 on admission  · Did improve slightly with IVF overnight  · Patient with faint crackles on exam 9/7. Will hold further IVF, oral intake appears to be improving  · Lungs clear to auscultation 9/8  · Home diuretic had been held on admission.   Continue to hold for now pending repeat sodium  · Sodium improving to 131  · Monitor BMP

## 2023-09-09 VITALS
WEIGHT: 200 LBS | DIASTOLIC BLOOD PRESSURE: 90 MMHG | HEART RATE: 94 BPM | SYSTOLIC BLOOD PRESSURE: 162 MMHG | BODY MASS INDEX: 30.31 KG/M2 | RESPIRATION RATE: 20 BRPM | HEIGHT: 68 IN | TEMPERATURE: 97.2 F | OXYGEN SATURATION: 94 %

## 2023-09-09 PROBLEM — U07.1 ACUTE RESPIRATORY FAILURE DUE TO COVID-19 (HCC): Status: RESOLVED | Noted: 2023-09-07 | Resolved: 2023-09-09

## 2023-09-09 PROBLEM — J96.00 ACUTE RESPIRATORY FAILURE DUE TO COVID-19 (HCC): Status: RESOLVED | Noted: 2023-09-07 | Resolved: 2023-09-09

## 2023-09-09 LAB
ANION GAP SERPL CALCULATED.3IONS-SCNC: 7 MMOL/L
BASOPHILS # BLD AUTO: 0.01 THOUSANDS/ÂΜL (ref 0–0.1)
BASOPHILS NFR BLD AUTO: 0 % (ref 0–1)
BUN SERPL-MCNC: 26 MG/DL (ref 5–25)
CALCIUM SERPL-MCNC: 8.8 MG/DL (ref 8.4–10.2)
CHLORIDE SERPL-SCNC: 95 MMOL/L (ref 96–108)
CO2 SERPL-SCNC: 29 MMOL/L (ref 21–32)
CREAT SERPL-MCNC: 0.86 MG/DL (ref 0.6–1.3)
EOSINOPHIL # BLD AUTO: 0.01 THOUSAND/ÂΜL (ref 0–0.61)
EOSINOPHIL NFR BLD AUTO: 0 % (ref 0–6)
ERYTHROCYTE [DISTWIDTH] IN BLOOD BY AUTOMATED COUNT: 12.8 % (ref 11.6–15.1)
GFR SERPL CREATININE-BSD FRML MDRD: 75 ML/MIN/1.73SQ M
GLUCOSE SERPL-MCNC: 108 MG/DL (ref 65–140)
GLUCOSE SERPL-MCNC: 116 MG/DL (ref 65–140)
GLUCOSE SERPL-MCNC: 123 MG/DL (ref 65–140)
HCT VFR BLD AUTO: 33.5 % (ref 36.5–49.3)
HGB BLD-MCNC: 11.2 G/DL (ref 12–17)
IMM GRANULOCYTES # BLD AUTO: 0.07 THOUSAND/UL (ref 0–0.2)
IMM GRANULOCYTES NFR BLD AUTO: 1 % (ref 0–2)
LYMPHOCYTES # BLD AUTO: 0.66 THOUSANDS/ÂΜL (ref 0.6–4.47)
LYMPHOCYTES NFR BLD AUTO: 7 % (ref 14–44)
MCH RBC QN AUTO: 32.7 PG (ref 26.8–34.3)
MCHC RBC AUTO-ENTMCNC: 33.4 G/DL (ref 31.4–37.4)
MCV RBC AUTO: 98 FL (ref 82–98)
MONOCYTES # BLD AUTO: 0.6 THOUSAND/ÂΜL (ref 0.17–1.22)
MONOCYTES NFR BLD AUTO: 6 % (ref 4–12)
NEUTROPHILS # BLD AUTO: 8.78 THOUSANDS/ÂΜL (ref 1.85–7.62)
NEUTS SEG NFR BLD AUTO: 86 % (ref 43–75)
NRBC BLD AUTO-RTO: 0 /100 WBCS
PLATELET # BLD AUTO: 224 THOUSANDS/UL (ref 149–390)
PMV BLD AUTO: 11.1 FL (ref 8.9–12.7)
POTASSIUM SERPL-SCNC: 3.8 MMOL/L (ref 3.5–5.3)
RBC # BLD AUTO: 3.42 MILLION/UL (ref 3.88–5.62)
SODIUM SERPL-SCNC: 131 MMOL/L (ref 135–147)
WBC # BLD AUTO: 10.13 THOUSAND/UL (ref 4.31–10.16)

## 2023-09-09 PROCEDURE — 85025 COMPLETE CBC W/AUTO DIFF WBC: CPT | Performed by: STUDENT IN AN ORGANIZED HEALTH CARE EDUCATION/TRAINING PROGRAM

## 2023-09-09 PROCEDURE — 82948 REAGENT STRIP/BLOOD GLUCOSE: CPT

## 2023-09-09 PROCEDURE — 80048 BASIC METABOLIC PNL TOTAL CA: CPT | Performed by: STUDENT IN AN ORGANIZED HEALTH CARE EDUCATION/TRAINING PROGRAM

## 2023-09-09 PROCEDURE — 99239 HOSP IP/OBS DSCHRG MGMT >30: CPT | Performed by: STUDENT IN AN ORGANIZED HEALTH CARE EDUCATION/TRAINING PROGRAM

## 2023-09-09 RX ORDER — GUAIFENESIN 600 MG/1
600 TABLET, EXTENDED RELEASE ORAL EVERY 12 HOURS SCHEDULED
Qty: 30 TABLET | Refills: 0 | Status: SHIPPED | OUTPATIENT
Start: 2023-09-09 | End: 2023-09-19

## 2023-09-09 RX ORDER — CEFDINIR 300 MG/1
300 CAPSULE ORAL EVERY 12 HOURS SCHEDULED
Qty: 14 CAPSULE | Refills: 0 | Status: SHIPPED | OUTPATIENT
Start: 2023-09-09 | End: 2023-09-16

## 2023-09-09 RX ORDER — BENZONATATE 100 MG/1
100 CAPSULE ORAL 3 TIMES DAILY PRN
Status: DISCONTINUED | OUTPATIENT
Start: 2023-09-09 | End: 2023-09-09 | Stop reason: HOSPADM

## 2023-09-09 RX ORDER — BENZONATATE 100 MG/1
100 CAPSULE ORAL 3 TIMES DAILY PRN
Qty: 20 CAPSULE | Refills: 0 | Status: SHIPPED | OUTPATIENT
Start: 2023-09-09 | End: 2023-09-19

## 2023-09-09 RX ADMIN — BRIMONIDINE TARTRATE 1 DROP: 2 SOLUTION/ DROPS OPHTHALMIC at 08:50

## 2023-09-09 RX ADMIN — Medication 100 MG: at 08:43

## 2023-09-09 RX ADMIN — ASPIRIN 81 MG: 81 TABLET, COATED ORAL at 08:47

## 2023-09-09 RX ADMIN — FINASTERIDE 5 MG: 5 TABLET, FILM COATED ORAL at 08:42

## 2023-09-09 RX ADMIN — FLUTICASONE PROPIONATE 1 SPRAY: 50 SPRAY, METERED NASAL at 08:49

## 2023-09-09 RX ADMIN — DORZOLAMIDE HYDROCHLORIDE AND TIMOLOL MALEATE 1 DROP: 20; 5 SOLUTION/ DROPS OPHTHALMIC at 08:49

## 2023-09-09 RX ADMIN — DEXAMETHASONE SODIUM PHOSPHATE 6 MG: 4 INJECTION, SOLUTION INTRAMUSCULAR; INTRAVENOUS at 13:24

## 2023-09-09 RX ADMIN — AMLODIPINE BESYLATE 5 MG: 5 TABLET ORAL at 08:42

## 2023-09-09 RX ADMIN — BENZONATATE 100 MG: 100 CAPSULE ORAL at 08:43

## 2023-09-09 RX ADMIN — ALLOPURINOL 100 MG: 100 TABLET ORAL at 08:43

## 2023-09-09 RX ADMIN — GUAIFENESIN 600 MG: 600 TABLET ORAL at 08:42

## 2023-09-09 RX ADMIN — CEFTRIAXONE 1000 MG: 1 INJECTION, SOLUTION INTRAVENOUS at 08:48

## 2023-09-09 RX ADMIN — APIXABAN 2.5 MG: 2.5 TABLET, FILM COATED ORAL at 08:44

## 2023-09-09 RX ADMIN — GABAPENTIN 100 MG: 100 CAPSULE ORAL at 08:42

## 2023-09-09 NOTE — ASSESSMENT & PLAN NOTE
· Sodium 126 on admission  · Did improve slightly with IVF  · Home diuretic had been held on admission.   Resume home diuretic on discharge  · Sodium stable above 130  · Monitor BMP    F/u with nephro outpt, bmp 1 week

## 2023-09-09 NOTE — DISCHARGE SUMMARY
[address] [facility]  Discharge- Hank Escobar 7/15/1931, 80 y.o. male MRN: [id number]  Unit/Bed#: -[facility] Encounter: [id number]  Primary Care Provider: Doreen Joseph MD   Date and time admitted to hospital: 9/6/2023  7:11 AM    * COVID-19  Assessment & Plan  · Presents w/ non-productive cough and malaise. Cough and shortness of breath improving  · Required upwards of 6 L nasal cannula, now weaned to 1 L  · wean to RA  · Chest x-ray with left lower lobe consolidation and elevated procalcitonin -completed 3 days IV Rocephin  · Completed 3 days of IV remdesivir/Decadron  · Continue respiratory protocol  · Continue Mucinex  · Encourage incentive spirometry    Acute respiratory failure due to COVID-19 (HCC)-resolved as of 9/9/2023  Assessment & Plan  · Suspect in setting of COVID and possible superimposed pneumonia  · Initially requiring upwards of 6 L nasal cannula with associated dyspnea/increased work of breathing, now weaned to RA  · Continue respiratory protocol  · Continue treatment for COVID 19  · Continue IV rocephin for suspected superimposed bacterial PNA day 3-> discharge with 7 more days of cefdinir  · Continue O2 supplementation to maintain SPO2 > 90%    Hyponatremia  Assessment & Plan  · Sodium 126 on admission  · Did improve slightly with IVF  · Home diuretic had been held on admission.   Resume home diuretic on discharge  · Sodium stable above 130  · Monitor BMP    F/u with nephro outpt, bmp 1 week    Ambulatory dysfunction  Assessment & Plan  · Resides at home with daughter  · PT/OT recommending rehab however family declining  · Pt going [address]    PAF (paroxysmal atrial fibrillation) (HCC)  Assessment & Plan  · Restart bisoprolol  · Continue Eliquis    Stage 3a chronic kidney disease [facility])  Assessment & Plan  Lab Results   Component Value Date    EGFR 75 09/09/2023    EGFR 74 09/08/2023    EGFR 62 09/07/2023    CREATININE 0.86 09/09/2023    CREATININE 0.88 09/08/2023    CREATININE 1.04 09/07/2023   · Baseline creatinine around 1.1  · Creatinine within baseline  · Trend BMP    Benign essential hypertension  Assessment & Plan  · Blood pressure stable  · Continue amlodipine, bisoprolol    Medical Problems     Resolved Problems  Date Reviewed: 9/8/2023          Resolved    Acute respiratory failure due to COVID-19 [facility]) 9/9/2023     Resolved by  Lizbeth Painter MD        Discharging Physician / Practitioner: Lizbeth Painter MD  PCP: Alyson Moreland MD  Admission Date:   Admission Orders (From admission, onward)     Ordered        09/06/23 0858  INPATIENT ADMISSION  Once                      Discharge Date: 09/09/23    Consultations During Hospital Stay:  · CM  · Pt  · OT    Procedures Performed:   XR chest portable   Final Result      Improving left basilar airspace disease. Workstation performed: [id number]         XR chest 1 view   ED Interpretation   ED wet read: Concern for lower lobe pneumonia      Final Result   Left basilar airspace disease most compatible with infection. The patient return for a lateral view at 7:40 a.m. the same date. This confirms patchy airspace opacity in the left lower lobe. Workstation performed: [id number]         XR chest 1 view portable   Final Result   Left basilar airspace disease most compatible with infection. The patient return for a lateral view at 7:40 a.m. the same date. This confirms patchy airspace opacity in the left lower lobe.                   Workstation performed: [id number]           ·     Significant Findings / Test Results:   · COVID +  · See above    Incidental Findings:   · none   ·     Test Results Pending at Discharge (will require follow up):   · none     Outpatient Tests Requested:  · BMP 1 week    Complications:  None known at this time    Reason for Admission: Jenifer Course:   Ramon Maki is a 80 y.o. male patient who originally presented to the hospital on 9/6/2023 due to nonproductive cough and fatigue with worsening shortness of breath. In the ED was found to be COVID-positive and requiring oxygen. Chest x-ray also showing left lower lobe pneumonia and started on Rocephin. Was started on Decadron and remdesivir. Oxygen was weaned down to room air. PT OT evaluated the patient and recommended rehab however family declining and requesting home with home health. Also in admission patient found to be hyponatremic most likely secondary to pneumonia as well as COVID improved with IV fluids. Has remained stable greater than 130. He is otherwise remained hemodynamically stable afebrile in no acute respiratory distress. He has been medically cleared for discharge. He is to follow-up with his PCP and nephrology. He is to complete 7 more days of cefdinir. He will also be discharged with Mucinex and Tessalon Perles. He is to complete BMP in 1 week. Please see above list of diagnoses and related plan for additional information. Condition at Discharge: stable    Discharge Day Visit / Exam:   Subjective: Jared Larios was seen and examined at bedside. No acute events overnight. Discussed plan of care. All questions and concerns were answered and addressed. Was concerned about his cough and that he had it is nonproductive. Discussed that this will continue to happen for several weeks given the fact that he had pneumonia on top of COVID. Vitals: Blood Pressure: 162/90 (09/09/23 0745)  Pulse: 94 (09/09/23 0745)  Temperature: (!) 97.2 °F (36.2 °C) (09/09/23 0745)  Temp Source: Temporal (09/08/23 0915)  Respirations: 20 (09/09/23 0745)  Height: 5' 8" (172.7 cm) (09/06/23 0710)  Weight - Scale: 90.7 kg (200 lb) (09/06/23 0710)  SpO2: 94 % (09/09/23 0745)  Exam:   Physical Exam  Vitals and nursing note reviewed. Constitutional:       General: He is not in acute distress. Appearance: He is obese. He is not ill-appearing. HENT:      Head: Normocephalic and atraumatic. Cardiovascular:      Rate and Rhythm: Normal rate and regular rhythm. Pulses: Normal pulses. Heart sounds: Normal heart sounds. Pulmonary:      Effort: Pulmonary effort is normal.      Breath sounds: Normal breath sounds. Abdominal:      General: Abdomen is flat. Bowel sounds are normal.      Palpations: Abdomen is soft. Musculoskeletal:      Right lower leg: No edema. Left lower leg: No edema. Skin:     General: Skin is warm. Neurological:      General: No focal deficit present. Mental Status: He is alert and oriented to person, place, and time. Discussion with Family: Updated  (daughter) at bedside. Discharge instructions/Information to patient and family:   See after visit summary for information provided to patient and family. Provisions for Follow-Up Care:  See after visit summary for information related to follow-up care and any pertinent home health orders. Disposition:   Home with [facility] Services (Reminder: Complete face to face encounter)    Planned Readmission: no     Discharge Statement:  I spent 35 minutes discharging the patient. This time was spent on the day of discharge. I had direct contact with the patient on the day of discharge. Greater than 50% of the total time was spent examining patient, answering all patient questions, arranging and discussing plan of care with patient as well as directly providing post-discharge instructions. Additional time then spent on discharge activities. Discharge Medications:  See after visit summary for reconciled discharge medications provided to patient and/or family.       **Please Note: This note may have been constructed using a voice recognition system**

## 2023-09-09 NOTE — ASSESSMENT & PLAN NOTE
Lab Results   Component Value Date    EGFR 75 09/09/2023    EGFR 74 09/08/2023    EGFR 62 09/07/2023    CREATININE 0.86 09/09/2023    CREATININE 0.88 09/08/2023    CREATININE 1.04 09/07/2023   · Baseline creatinine around 1.1  · Creatinine within baseline  · Trend BMP

## 2023-09-09 NOTE — ASSESSMENT & PLAN NOTE
· Resides at home with daughter  · PT/OT recommending rehab however family declining  · Pt going [address] [facility]

## 2023-09-09 NOTE — DISCHARGE INSTR - AVS FIRST PAGE
You are to follow-up with your PCP next week    You will be discharged with cefdinir to be taken twice a day for 7 days    You are to complete blood work in 1 week prior to seeing your PCP    You have a referral for nephrology to also follow-up with them for low sodium levels.     You will also be discharged with Mucinex and Tessalon Perles to help with your cough

## 2023-09-09 NOTE — ASSESSMENT & PLAN NOTE
· Presents w/ non-productive cough and malaise.   Cough and shortness of breath improving  · Required upwards of 6 L nasal cannula, now weaned to 1 L  · wean to RA  · Chest x-ray with left lower lobe consolidation and elevated procalcitonin -completed 3 days IV Rocephin  · Completed 3 days of IV remdesivir/Decadron  · Continue respiratory protocol  · Continue Mucinex  · Encourage incentive spirometry

## 2023-09-09 NOTE — ASSESSMENT & PLAN NOTE
· Suspect in setting of COVID and possible superimposed pneumonia  · Initially requiring upwards of 6 L nasal cannula with associated dyspnea/increased work of breathing, now weaned to RA  · Continue respiratory protocol  · Continue treatment for COVID 19  · Continue IV rocephin for suspected superimposed bacterial PNA day 3-> discharge with 7 more days of cefdinir  · Continue O2 supplementation to maintain SPO2 > 90%

## 2023-09-10 ENCOUNTER — HOME CARE VISIT (OUTPATIENT)
Dept: HOME HEALTH SERVICES | Facility: HOME HEALTHCARE | Age: 88
End: 2023-09-10

## 2023-09-10 ENCOUNTER — HOME CARE VISIT (OUTPATIENT)
Dept: HOME HEALTH SERVICES | Facility: HOME HEALTHCARE | Age: 88
End: 2023-09-10
Payer: COMMERCIAL

## 2023-09-10 VITALS
HEART RATE: 84 BPM | TEMPERATURE: 96.9 F | DIASTOLIC BLOOD PRESSURE: 72 MMHG | OXYGEN SATURATION: 93 % | RESPIRATION RATE: 18 BRPM | SYSTOLIC BLOOD PRESSURE: 132 MMHG

## 2023-09-10 PROCEDURE — G0299 HHS/HOSPICE OF RN EA 15 MIN: HCPCS

## 2023-09-10 PROCEDURE — 400013 VN SOC

## 2023-09-10 PROCEDURE — 10330081 VN NO-PAY CLAIM PROCEDURE

## 2023-09-10 NOTE — Clinical Note
[facility] has admitted your patient to [facility] service with the following disciplines:   SN, PT and OT   This report is informational only, no responses is needed   Primary focus of home health care. PULMONARY   Patient stated goals of care. REMAIN AT [facility]   Anticipated visit pattern and next visit date. 0I9. 2W2. 1W2   NEXT VISIT [facility]   See medication list - meds in home differ from AVS. ALL MEDS AT HOME   Significant clinical findings. WEAKNESS, BENITEZ, DECREASE ENDURANCE, FATIGUE   Potential barriers to goal achievement. NA   Other pertinent information.   -Per dgtr,  pt is using cequa and lozol which are DC meds in the AVS but it is just because he didnt have this in the hospital as they dont carry this meds. Please clarify if it is okay to continue this meds. thanks    Thank you for allowing us to participate in the care of your patient.

## 2023-09-10 NOTE — CASE COMMUNICATION
[facility] has admitted your patient to [facility] service with the following disciplines:      SN, PT and OT  This report is informational only, no responses is needed  Primary focus of home health care. PULMONARY  Patient stated goals of care. REMAIN AT [facility]  Anticipated visit pattern and next visit date. 4B7. 2W2. 1W2  NEXT VISIT [facility]  See medication list - meds in home differ from AVS. ALL MEDS AT HOME  Signific ant clinical findings. WEAKNESS, BENITEZ, DECREASE ENDURANCE, FATIGUE  Potential barriers to goal achievement. NA  Other pertinent information. NA    Thank you for allowing us to participate in the care of your patient.

## 2023-09-11 ENCOUNTER — TRANSITIONAL CARE MANAGEMENT (OUTPATIENT)
Dept: FAMILY MEDICINE CLINIC | Facility: CLINIC | Age: 88
End: 2023-09-11

## 2023-09-11 NOTE — UTILIZATION REVIEW
NOTIFICATION OF ADMISSION DISCHARGE   This is a Notification of Discharge from [address] [place] [address]. Please be advised that this patient has been discharge from our facility. Below you will find the admission and discharge date and time including the patient’s disposition. UTILIZATION REVIEW CONTACT:  Chelsy Jerome  Utilization   Network Utilization Review Department  Phone: [phone] carefully listen to the prompts. All voicemails are confidential.  Email: [email or web address]     ADMISSION INFORMATION  PRESENTATION DATE: 9/6/2023  7:11 AM  OBERVATION ADMISSION DATE:   INPATIENT ADMISSION DATE: 9/6/23  8:58 AM   DISCHARGE DATE: 9/9/2023  4:18 PM   DISPOSITION:Home with Home Health Care    IMPORTANT INFORMATION:  Send all requests for admission clinical reviews, approved or denied determinations and any other requests to dedicated fax number below belonging to the campus where the patient is receiving treatment.  List of dedicated fax numbers:  [place] DENIALS (Administrative/Medical Necessity) [phone] [address] [facility] (Maternity/NICU/Pediatrics) [phone]   [facility] [phone]   [facility] [phone] [address] [facility] [address] [phone]   [address] [place] [address] [phone]   [facility] [phone]   [address] [facility] [facility] [phone]   [address] [place] [address] [phone] [address] [facility] [phone] [address] [facility] [address] 32[facility] [phone]

## 2023-09-12 ENCOUNTER — TELEPHONE (OUTPATIENT)
Dept: FAMILY MEDICINE CLINIC | Facility: CLINIC | Age: 88
End: 2023-09-12

## 2023-09-12 NOTE — TELEPHONE ENCOUNTER
Pt was prescribed benzonatate while in hospital, it is upsetting his stomach and visiting nurse advised daughter to call PCP to see if there is anything else he can take. She did note she worries about cough syrups that may effect BP. Please advise, pt already has TCM scheduled 9/20, thank you.

## 2023-09-12 NOTE — TELEPHONE ENCOUNTER
He may try over-the-counter Delsym or Coricidin HBP. Neither of these should affect his blood pressure.

## 2023-09-13 ENCOUNTER — HOME CARE VISIT (OUTPATIENT)
Dept: HOME HEALTH SERVICES | Facility: HOME HEALTHCARE | Age: 88
End: 2023-09-13
Payer: COMMERCIAL

## 2023-09-13 VITALS
HEART RATE: 82 BPM | SYSTOLIC BLOOD PRESSURE: 128 MMHG | OXYGEN SATURATION: 94 % | TEMPERATURE: 98.1 F | DIASTOLIC BLOOD PRESSURE: 70 MMHG | RESPIRATION RATE: 16 BRPM

## 2023-09-13 VITALS — HEART RATE: 78 BPM | OXYGEN SATURATION: 95 % | DIASTOLIC BLOOD PRESSURE: 58 MMHG | SYSTOLIC BLOOD PRESSURE: 110 MMHG

## 2023-09-13 PROCEDURE — G0151 HHCP-SERV OF PT,EA 15 MIN: HCPCS

## 2023-09-13 PROCEDURE — G0152 HHCP-SERV OF OT,EA 15 MIN: HCPCS

## 2023-09-13 PROCEDURE — G0300 HHS/HOSPICE OF LPN EA 15 MIN: HCPCS

## 2023-09-14 ENCOUNTER — HOME CARE VISIT (OUTPATIENT)
Dept: HOME HEALTH SERVICES | Facility: HOME HEALTHCARE | Age: 88
End: 2023-09-14
Payer: COMMERCIAL

## 2023-09-14 PROCEDURE — G0151 HHCP-SERV OF PT,EA 15 MIN: HCPCS

## 2023-09-15 ENCOUNTER — HOME CARE VISIT (OUTPATIENT)
Dept: HOME HEALTH SERVICES | Facility: HOME HEALTHCARE | Age: 88
End: 2023-09-15
Payer: COMMERCIAL

## 2023-09-15 ENCOUNTER — TELEPHONE (OUTPATIENT)
Dept: HOME HEALTH SERVICES | Facility: HOME HEALTHCARE | Age: 88
End: 2023-09-15

## 2023-09-15 VITALS — DIASTOLIC BLOOD PRESSURE: 82 MMHG | HEART RATE: 84 BPM | OXYGEN SATURATION: 96 % | SYSTOLIC BLOOD PRESSURE: 140 MMHG

## 2023-09-15 PROCEDURE — G0152 HHCP-SERV OF OT,EA 15 MIN: HCPCS

## 2023-09-16 ENCOUNTER — HOME CARE VISIT (OUTPATIENT)
Dept: HOME HEALTH SERVICES | Facility: HOME HEALTHCARE | Age: 88
End: 2023-09-16
Payer: COMMERCIAL

## 2023-09-16 ENCOUNTER — LAB REQUISITION (OUTPATIENT)
Dept: LAB | Facility: HOSPITAL | Age: 88
End: 2023-09-16
Payer: COMMERCIAL

## 2023-09-16 VITALS
TEMPERATURE: 97.8 F | HEART RATE: 84 BPM | OXYGEN SATURATION: 98 % | SYSTOLIC BLOOD PRESSURE: 160 MMHG | DIASTOLIC BLOOD PRESSURE: 90 MMHG | RESPIRATION RATE: 18 BRPM

## 2023-09-16 DIAGNOSIS — E87.1 HYPO-OSMOLALITY AND HYPONATREMIA: ICD-10-CM

## 2023-09-16 LAB
ANION GAP SERPL CALCULATED.3IONS-SCNC: 8 MMOL/L
BUN SERPL-MCNC: 16 MG/DL (ref 5–25)
CALCIUM SERPL-MCNC: 8.2 MG/DL (ref 8.4–10.2)
CHLORIDE SERPL-SCNC: 96 MMOL/L (ref 96–108)
CO2 SERPL-SCNC: 27 MMOL/L (ref 21–32)
CREAT SERPL-MCNC: 1.04 MG/DL (ref 0.6–1.3)
GFR SERPL CREATININE-BSD FRML MDRD: 62 ML/MIN/1.73SQ M
GLUCOSE P FAST SERPL-MCNC: 102 MG/DL (ref 65–99)
POTASSIUM SERPL-SCNC: 4.2 MMOL/L (ref 3.5–5.3)
SODIUM SERPL-SCNC: 131 MMOL/L (ref 135–147)

## 2023-09-16 PROCEDURE — G0299 HHS/HOSPICE OF RN EA 15 MIN: HCPCS

## 2023-09-16 PROCEDURE — 80048 BASIC METABOLIC PNL TOTAL CA: CPT | Performed by: FAMILY MEDICINE

## 2023-09-16 NOTE — CASE COMMUNICATION
Pt correct address is :    [address] [facility] [address] [place] [address]    Visit occured at this location today

## 2023-09-19 ENCOUNTER — HOME CARE VISIT (OUTPATIENT)
Dept: HOME HEALTH SERVICES | Facility: HOME HEALTHCARE | Age: 88
End: 2023-09-19
Payer: COMMERCIAL

## 2023-09-19 VITALS
DIASTOLIC BLOOD PRESSURE: 82 MMHG | TEMPERATURE: 97.1 F | SYSTOLIC BLOOD PRESSURE: 136 MMHG | OXYGEN SATURATION: 94 % | HEART RATE: 81 BPM

## 2023-09-19 VITALS — SYSTOLIC BLOOD PRESSURE: 143 MMHG | DIASTOLIC BLOOD PRESSURE: 83 MMHG | HEART RATE: 89 BPM | OXYGEN SATURATION: 95 %

## 2023-09-19 PROCEDURE — G0152 HHCP-SERV OF OT,EA 15 MIN: HCPCS

## 2023-09-19 PROCEDURE — G0151 HHCP-SERV OF PT,EA 15 MIN: HCPCS

## 2023-09-19 PROCEDURE — G0299 HHS/HOSPICE OF RN EA 15 MIN: HCPCS

## 2023-09-20 ENCOUNTER — TELEMEDICINE (OUTPATIENT)
Dept: FAMILY MEDICINE CLINIC | Facility: CLINIC | Age: 88
End: 2023-09-20
Payer: COMMERCIAL

## 2023-09-20 VITALS
TEMPERATURE: 97.3 F | OXYGEN SATURATION: 92 % | DIASTOLIC BLOOD PRESSURE: 86 MMHG | BODY MASS INDEX: 30.31 KG/M2 | SYSTOLIC BLOOD PRESSURE: 148 MMHG | RESPIRATION RATE: 17 BRPM | HEIGHT: 68 IN | WEIGHT: 200 LBS | HEART RATE: 90 BPM

## 2023-09-20 DIAGNOSIS — U07.1 COVID-19: ICD-10-CM

## 2023-09-20 DIAGNOSIS — E87.1 HYPONATREMIA: Primary | ICD-10-CM

## 2023-09-20 DIAGNOSIS — R60.9 PERIPHERAL EDEMA: ICD-10-CM

## 2023-09-20 DIAGNOSIS — I10 BENIGN ESSENTIAL HYPERTENSION: Chronic | ICD-10-CM

## 2023-09-20 PROBLEM — R19.7 DIARRHEA: Status: RESOLVED | Noted: 2022-01-06 | Resolved: 2023-09-20

## 2023-09-20 PROBLEM — M25.531 RIGHT WRIST PAIN: Status: RESOLVED | Noted: 2018-08-31 | Resolved: 2023-09-20

## 2023-09-20 PROBLEM — R07.9 CHEST PAIN: Status: RESOLVED | Noted: 2022-07-04 | Resolved: 2023-09-20

## 2023-09-20 PROBLEM — I16.0 HYPERTENSIVE URGENCY: Status: RESOLVED | Noted: 2022-07-04 | Resolved: 2023-09-20

## 2023-09-20 PROCEDURE — 99495 TRANSJ CARE MGMT MOD F2F 14D: CPT | Performed by: FAMILY MEDICINE

## 2023-09-20 RX ORDER — CYCLOSPORINE 0 G/ML
SOLUTION/ DROPS OPHTHALMIC; TOPICAL 2 TIMES DAILY
COMMUNITY

## 2023-09-20 RX ORDER — ALLOPURINOL 100 MG/1
100 TABLET ORAL DAILY
COMMUNITY

## 2023-09-20 RX ORDER — NETARSUDIL 0.2 MG/ML
SOLUTION/ DROPS OPHTHALMIC; TOPICAL
COMMUNITY
Start: 2023-09-11

## 2023-09-20 RX ORDER — INDAPAMIDE 1.25 MG/1
1.25 TABLET ORAL DAILY
COMMUNITY
Start: 2023-09-10

## 2023-09-20 NOTE — ASSESSMENT & PLAN NOTE
Hypertension. Patient blood pressure has been stable when checked by visiting nurses.   He will continue current regimen of medication

## 2023-09-20 NOTE — ASSESSMENT & PLAN NOTE
Peripheral edema. Patient will continue with leg elevation and use of knee-high compression stockings. Patient was instructed to check daily weights and call if he notices 2 to 3 pound increase in weight within a week.

## 2023-09-20 NOTE — ASSESSMENT & PLAN NOTE
COVID-19 infection. Patient received antiviral antibodies during hospital stay. Patient also completed course of Omnicef for suspected overlying pneumonia. He does not have any shortness of breath or fevers as an outpatient.

## 2023-09-20 NOTE — ASSESSMENT & PLAN NOTE
Hyponatremia. Patient will continue to hold diuretic medicine for now.   He will repeat BMP next week for further evaluation

## 2023-09-20 NOTE — PROGRESS NOTES
Virtual Regular Visit    Verification of patient location:    Patient is located at Home in the following state in which I hold an active license [place]    TCM Call     Date and time call was made  9/11/2023  8:20 AM    Hospital care reviewed  Records reviewed    Patient was hospitialized at  [address] [place] [address]    Date of Admission  09/06/23    Date of discharge  09/09/23    Diagnosis  COVID-19    Disposition  Home; Home health services    Were the patients medications reviewed and updated  No    Current Symptoms  None    Weakness severity  Moderate    Fatigue severity  Moderate      TCM Call     Post hospital issues  None    Should patient be enrolled in anticoag monitoring? No    Scheduled for follow up? Yes    Did you obtain your prescribed medications  Yes    Do you need help managing your prescriptions or medications  No    Is transportation to your appointment needed  No    Specify why  Daughter will drive    I have advised the patient to call PCP with any new or worsening symptoms  Dheeraj Hannah, [address]  Family    The type of support provided  Physical; Emotional    Do you have social support  Yes, as much as I need    Are you recieving any outpatient services  No    Are you recieving home care services  No    Are you using any community resources  No    Current waiver services  No    Have you fallen in the last 12 months  No    Interperter language line needed  No    Counseling  Family    Counseling topics  Importance of RX compliance    Comments  TCM scheduled 9/20/23 @ 16:20 with Dr. Byrd Or is a 80 y.o. male patient who originally presented to the hospital on 9/6/2023 due to nonproductive cough and fatigue with worsening shortness of breath. In the ED was found to be COVID-positive and requiring oxygen. Chest x-ray also showing left lower lobe pneumonia and started on Rocephin. Was started on Decadron and remdesivir.   Oxygen was weaned down to room air. PT OT evaluated the patient and recommended rehab however family declining and requesting home with home health. Also in admission patient found to be hyponatremic most likely secondary to pneumonia as well as COVID improved with IV fluids. Has remained stable greater than 130. He is otherwise remained hemodynamically stable afebrile in no acute respiratory distress. He has been medically cleared for discharge. He is to follow-up with his PCP and nephrology. He is to complete 7 more days of cefdinir. He will also be discharged with Mucinex and Tessalon Perles. He is to complete BMP in 1 week.       Assessment/Plan:    Problem List Items Addressed This Visit        Cardiovascular and Mediastinum    Benign essential hypertension (Chronic)     Hypertension. Patient blood pressure has been stable when checked by visiting nurses. He will continue current regimen of medication         Relevant Medications    indapamide (LOZOL) 1.25 mg tablet       Other    Peripheral edema     Peripheral edema. Patient will continue with leg elevation and use of knee-high compression stockings. Patient was instructed to check daily weights and call if he notices 2 to 3 pound increase in weight within a week. Hyponatremia - Primary     Hyponatremia. Patient will continue to hold diuretic medicine for now. He will repeat BMP next week for further evaluation         Relevant Orders    Basic metabolic panel    [id number]     COVID-19 infection. Patient received antiviral antibodies during hospital stay. Patient also completed course of Omnicef for suspected overlying pneumonia. He does not have any shortness of breath or fevers as an outpatient.                  Reason for visit is   Chief Complaint   Patient presents with   • Transition of Care Management        Encounter provider Jyotsna Dimas MD    Provider located at [address] [phone]      Recent Visits  No visits were found meeting these conditions. Showing recent visits within past 7 days and meeting all other requirements  Today's Visits  Date Type Provider Dept   09/20/23 Telemedicine Josephine Artis MD [address] today's visits and meeting all other requirements  Future Appointments  No visits were found meeting these conditions. Showing future appointments within next 150 days and meeting all other requirements       The patient was identified by name and date of birth. Ly Huddleston was informed that this is a telemedicine visit and that the visit is being conducted through the [address] [place] [address] 22 Now platform. He agrees to proceed. .  My office door was closed. No one else was in the room. He acknowledged consent and understanding of privacy and security of the video platform. The patient has agreed to participate and understands they can discontinue the visit at any time. Patient is aware this is a billable service. Subjective  Ly Huddleston is a 80 y.o. male       Patient having telemedicine visit after being admitted to the hospital recently with COVID-19 infection and subsequent pneumonia. Since his discharge the patient has been at home and pulse oximetry readings have been consistently above 90% on room air. He denies any shortness of breath. He does have a dry cough and recently started Mucinex. He does have a home visiting nurse who states he still has some mild congestion in the lungs. He is consistently trying to use his incentive spirometer. He completed an oral course of Omnicef as prescribed by the hospital upon discharge. Initially patient had hyponatremia at 126 that was improved to 131 upon discharge. His diuretics were held at the hospital.  At home the patient has noticed some slight increase in peripheral edema since he is unable to use his reclining chair as often due to coughing spells. He does use knee-high support stockings. Past Medical History:   Diagnosis Date   • A-fib [facility])    • Anemia    • Carpal tunnel syndrome, unspecified upper limb    • Cataract     last assessed: 8/18/2012   • GERD (gastroesophageal reflux disease)    • Glaucoma    • Hypertension     last assessed: 8/23/2012   • Intervertebral disc disease    • PVD (peripheral vascular disease) (HCC)        Past Surgical History:   Procedure Laterality Date   • CATARACT EXTRACTION     • COLONOSCOPY     • HERNIA REPAIR     • NEUROPLASTY / TRANSPOSITION MEDIAN NERVE AT CARPAL TUNNEL     • PA LAPAROSCOPY SURG CHOLECYSTECTOMY N/A 09/14/2016    Procedure: LAPAROSCOPIC CHOLECYSTECTOMY ;  Surgeon: Pelon Shelton MD;  Location: [facility];  Service: General   • UPPER GASTROINTESTINAL ENDOSCOPY         Current Outpatient Medications   Medication Sig Dispense Refill   • allopurinol (ZYLOPRIM) 100 mg tablet Take 100 mg by mouth daily     • amitriptyline (ELAVIL) 10 mg tablet 1.5 tabs po q hs (Patient taking differently: Take 15 mg by mouth daily at bedtime 1.5 tabs po q hs) 45 tablet 5   • amLODIPine (NORVASC) 5 mg tablet Take 5 mg by mouth daily at bedtime     • apixaban (ELIQUIS) 2.5 mg Take by mouth 2 (two) times a day     • bisoprolol (ZEBETA) 5 mg tablet 2.5 mg      • brimonidine tartrate 0.2 % ophthalmic solution      • cholecalciferol (VITAMIN D3) 1,000 units tablet Take 2,000 Units by mouth daily      • co-enzyme Q-10 30 MG capsule Take 100 mg by mouth daily       • cycloSPORINE, PF, (Cequa) 0.09 % SOLN Apply to eye 2 (two) times a day     • diphenhydrAMINE-acetaminophen (TYLENOL PM)  MG TABS Take 2 tablets by mouth daily at bedtime as needed for sleep     • Dorzolamide HCl-Timolol Mal PF 2-0.5 % SOLN ONE DROP INTO EACH EYE TWICE DAILY     • finasteride (PROSCAR) 5 mg tablet TAKE 1 TABLET (5 MG TOTAL) BY MOUTH DAILY.  90 tablet 3   • fluticasone (FLONASE) 50 mcg/act nasal spray SPRAY 1 SPRAY INTO EACH NOSTRIL EVERY DAY 48 mL 1   • gabapentin (NEURONTIN) 100 mg capsule Take 1 capsule (100 mg total) by mouth 3 (three) times a day (Patient taking differently: Take 100 mg by mouth 2 (two) times a day) 90 capsule 3   • Garlic 7289 MG CAPS Take 1,000 mg by mouth     • latanoprost (XALATAN) 0.005 % ophthalmic solution Administer 1 drop to both eyes daily at bedtime     • multivitamin-iron-minerals-folic acid (CENTRUM) chewable tablet Chew 1 tablet daily. • omeprazole (PriLOSEC) 20 mg delayed release capsule TAKE 1 CAPSULE BY MOUTH EVERY DAY (Patient taking differently: Take 40 mg by mouth daily) 90 capsule 2   • Rhopressa 0.02 % SOLN INSTILL 1 DROP INTO RIGHT EYE ONCE A DAY     • triamcinolone (KENALOG) 0.5 % cream Apply topically 2 (two) times a day 30 g 2   • indapamide (LOZOL) 1.25 mg tablet Take 1.25 mg by mouth daily As directed (Patient not taking: Reported on 9/20/2023)       No current facility-administered medications for this visit. Allergies   Allergen Reactions   • Bempedoic Acid Other (See Comments)     Gout   • Atorvastatin GI Intolerance     Other reaction(s): GI upset   • Ezetimibe Other (See Comments)     myalgia  Other reaction(s): Other (See Comments)  Myalgia  Other reaction(s): Myalgia  myalgia   • Statins Rash       Review of Systems   Constitutional: Positive for fatigue. HENT: Positive for congestion. Respiratory: Positive for cough. Cardiovascular: Positive for leg swelling. Negative for chest pain and palpitations. Gastrointestinal: Negative. Genitourinary: Negative. Musculoskeletal: Negative. Neurological: Negative. Psychiatric/Behavioral: Negative. Video Exam    Vitals:    09/20/23 1001   BP: 148/86   Pulse: 90   Resp: 17   Temp: (!) 97.3 °F (36.3 °C)   SpO2: 92%   Weight: 90.7 kg (200 lb)   Height: 5' 8" (1.727 m)       Physical Exam  Constitutional:       General: He is not in acute distress. Appearance: Normal appearance. He is not ill-appearing. Eyes:      General:         Right eye: No discharge.          Left eye: No discharge. Extraocular Movements: Extraocular movements intact. Conjunctiva/sclera: Conjunctivae normal.      Pupils: Pupils are equal, round, and reactive to light. Pulmonary:      Effort: No respiratory distress. Musculoskeletal:      Right lower leg: Edema present. Left lower leg: Edema present. Comments: Patient appears to have +1 peripheral edema bilateral lower extremities. No breakdown of skin of lower extremities. Neurological:      Mental Status: He is alert. Mental status is at baseline. Psychiatric:         Mood and Affect: Mood normal.         Behavior: Behavior normal.         Thought Content:  Thought content normal.         Judgment: Judgment normal.          Visit Time  Total Visit Duration: 20 min

## 2023-09-21 ENCOUNTER — HOME CARE VISIT (OUTPATIENT)
Dept: HOME HEALTH SERVICES | Facility: HOME HEALTHCARE | Age: 88
End: 2023-09-21
Payer: COMMERCIAL

## 2023-09-21 PROCEDURE — G0151 HHCP-SERV OF PT,EA 15 MIN: HCPCS

## 2023-09-21 PROCEDURE — G0180 MD CERTIFICATION HHA PATIENT: HCPCS | Performed by: STUDENT IN AN ORGANIZED HEALTH CARE EDUCATION/TRAINING PROGRAM

## 2023-09-22 ENCOUNTER — HOME CARE VISIT (OUTPATIENT)
Dept: HOME HEALTH SERVICES | Facility: HOME HEALTHCARE | Age: 88
End: 2023-09-22
Payer: COMMERCIAL

## 2023-09-22 VITALS — HEART RATE: 80 BPM | SYSTOLIC BLOOD PRESSURE: 130 MMHG | OXYGEN SATURATION: 98 % | DIASTOLIC BLOOD PRESSURE: 68 MMHG

## 2023-09-22 VITALS
RESPIRATION RATE: 18 BRPM | OXYGEN SATURATION: 95 % | DIASTOLIC BLOOD PRESSURE: 74 MMHG | SYSTOLIC BLOOD PRESSURE: 122 MMHG | HEART RATE: 76 BPM

## 2023-09-22 PROCEDURE — G0299 HHS/HOSPICE OF RN EA 15 MIN: HCPCS

## 2023-09-22 PROCEDURE — G0152 HHCP-SERV OF OT,EA 15 MIN: HCPCS

## 2023-09-26 ENCOUNTER — HOME CARE VISIT (OUTPATIENT)
Dept: HOME HEALTH SERVICES | Facility: HOME HEALTHCARE | Age: 88
End: 2023-09-26
Payer: COMMERCIAL

## 2023-09-26 ENCOUNTER — APPOINTMENT (OUTPATIENT)
Dept: LAB | Facility: CLINIC | Age: 88
End: 2023-09-26
Payer: COMMERCIAL

## 2023-09-26 VITALS
HEART RATE: 74 BPM | DIASTOLIC BLOOD PRESSURE: 62 MMHG | WEIGHT: 194 LBS | BODY MASS INDEX: 29.5 KG/M2 | TEMPERATURE: 98 F | SYSTOLIC BLOOD PRESSURE: 106 MMHG | OXYGEN SATURATION: 97 %

## 2023-09-26 DIAGNOSIS — E87.1 HYPONATREMIA: ICD-10-CM

## 2023-09-26 PROBLEM — Z00.00 MEDICARE ANNUAL WELLNESS VISIT, SUBSEQUENT: Status: RESOLVED | Noted: 2023-07-28 | Resolved: 2023-09-26

## 2023-09-26 LAB
ANION GAP SERPL CALCULATED.3IONS-SCNC: 8 MMOL/L
BUN SERPL-MCNC: 14 MG/DL (ref 5–25)
CALCIUM SERPL-MCNC: 8.5 MG/DL (ref 8.4–10.2)
CHLORIDE SERPL-SCNC: 99 MMOL/L (ref 96–108)
CO2 SERPL-SCNC: 27 MMOL/L (ref 21–32)
CREAT SERPL-MCNC: 1.04 MG/DL (ref 0.6–1.3)
GFR SERPL CREATININE-BSD FRML MDRD: 62 ML/MIN/1.73SQ M
GLUCOSE P FAST SERPL-MCNC: 102 MG/DL (ref 65–99)
POTASSIUM SERPL-SCNC: 4.2 MMOL/L (ref 3.5–5.3)
SODIUM SERPL-SCNC: 134 MMOL/L (ref 135–147)

## 2023-09-26 PROCEDURE — 36415 COLL VENOUS BLD VENIPUNCTURE: CPT

## 2023-09-26 PROCEDURE — G0151 HHCP-SERV OF PT,EA 15 MIN: HCPCS

## 2023-09-26 PROCEDURE — G0299 HHS/HOSPICE OF RN EA 15 MIN: HCPCS

## 2023-09-26 PROCEDURE — 80048 BASIC METABOLIC PNL TOTAL CA: CPT

## 2023-09-27 ENCOUNTER — HOME CARE VISIT (OUTPATIENT)
Dept: HOME HEALTH SERVICES | Facility: HOME HEALTHCARE | Age: 88
End: 2023-09-27
Payer: COMMERCIAL

## 2023-09-27 PROCEDURE — G0152 HHCP-SERV OF OT,EA 15 MIN: HCPCS

## 2023-09-28 ENCOUNTER — HOME CARE VISIT (OUTPATIENT)
Dept: HOME HEALTH SERVICES | Facility: HOME HEALTHCARE | Age: 88
End: 2023-09-28
Payer: COMMERCIAL

## 2023-09-28 ENCOUNTER — TELEPHONE (OUTPATIENT)
Dept: NEPHROLOGY | Facility: CLINIC | Age: 88
End: 2023-09-28

## 2023-09-28 VITALS — SYSTOLIC BLOOD PRESSURE: 142 MMHG | DIASTOLIC BLOOD PRESSURE: 78 MMHG

## 2023-09-28 PROCEDURE — G0151 HHCP-SERV OF PT,EA 15 MIN: HCPCS

## 2023-09-28 NOTE — TELEPHONE ENCOUNTER
Spoke to pt's daughter about scheduling a consultation (referral [facility]) and she stated pt will be following with PCP.

## 2023-09-29 ENCOUNTER — HOME CARE VISIT (OUTPATIENT)
Dept: HOME HEALTH SERVICES | Facility: HOME HEALTHCARE | Age: 88
End: 2023-09-29
Payer: COMMERCIAL

## 2023-09-29 VITALS
TEMPERATURE: 98 F | DIASTOLIC BLOOD PRESSURE: 70 MMHG | HEART RATE: 84 BPM | SYSTOLIC BLOOD PRESSURE: 116 MMHG | OXYGEN SATURATION: 99 %

## 2023-09-29 PROCEDURE — G0299 HHS/HOSPICE OF RN EA 15 MIN: HCPCS

## 2023-10-13 ENCOUNTER — OFFICE VISIT (OUTPATIENT)
Dept: OBGYN CLINIC | Facility: CLINIC | Age: 88
End: 2023-10-13
Payer: COMMERCIAL

## 2023-10-13 ENCOUNTER — APPOINTMENT (OUTPATIENT)
Dept: RADIOLOGY | Facility: AMBULARY SURGERY CENTER | Age: 88
End: 2023-10-13
Attending: ORTHOPAEDIC SURGERY
Payer: COMMERCIAL

## 2023-10-13 VITALS
WEIGHT: 198 LBS | BODY MASS INDEX: 30.01 KG/M2 | HEIGHT: 68 IN | DIASTOLIC BLOOD PRESSURE: 90 MMHG | SYSTOLIC BLOOD PRESSURE: 163 MMHG | HEART RATE: 89 BPM

## 2023-10-13 DIAGNOSIS — M17.12 PRIMARY OSTEOARTHRITIS OF LEFT KNEE: Primary | ICD-10-CM

## 2023-10-13 DIAGNOSIS — M25.562 LEFT KNEE PAIN, UNSPECIFIED CHRONICITY: ICD-10-CM

## 2023-10-13 DIAGNOSIS — M62.81 QUADRICEPS WEAKNESS: ICD-10-CM

## 2023-10-13 PROCEDURE — 99213 OFFICE O/P EST LOW 20 MIN: CPT | Performed by: ORTHOPAEDIC SURGERY

## 2023-10-13 PROCEDURE — 73562 X-RAY EXAM OF KNEE 3: CPT

## 2023-10-13 NOTE — PROGRESS NOTES
Assessment:  1. Primary osteoarthritis of left knee        2. Left knee pain, unspecified chronicity  XR knee 3 vw left non injury      3. Quadriceps weakness            Plan:    Patient with chronic left knee pain with severe osteoarthritis and quadriceps weakness   Weightbearing as tolerated   Provided patient with VMO and quadriceps strengthening exercises and hamstring stretching exercises   May follow-up as needed        The above stated was discussed in layman's terms and the patient expressed understanding. All questions were answered to the patient's satisfaction. Subjective: Tricia Franklin is a 80 y.o. male who presents today to establish care for left knee pain. Patient states he has been having increased pain and buckling in his left knee in the past month. He recently was in the hospital due to having pneumonia and states he feels like he has been weak. He states he has pain with walking and a shock feeling in the posterior aspect of the left knee. States he does have shortness of breath with walking and is doing breathing treatments daily. He does use a cane for assistance when ambulating.       Review of systems negative unless otherwise specified in HPI    Past Medical History:   Diagnosis Date    A-fib ([address])     Anemia     Carpal tunnel syndrome, unspecified upper limb     Cataract     last assessed: 8/18/2012    GERD (gastroesophageal reflux disease)     Glaucoma     Hypertension     last assessed: 8/23/2012    Intervertebral disc disease     PVD (peripheral vascular disease) ([address])        Past Surgical History:   Procedure Laterality Date    CATARACT EXTRACTION      COLONOSCOPY      HERNIA REPAIR      NEUROPLASTY / TRANSPOSITION MEDIAN NERVE AT CARPAL TUNNEL      IN LAPAROSCOPY SURG CHOLECYSTECTOMY N/A 09/14/2016    Procedure: LAPAROSCOPIC CHOLECYSTECTOMY ;  Surgeon: Gina Khan MD;  Location[facility];  Service: General    UPPER GASTROINTESTINAL ENDOSCOPY         Family History Problem Relation Age of Onset    Glaucoma Mother     Cancer Father     Heart disease Father     Glaucoma Father     Alzheimer's disease Family     Heart attack Family        Social History     Occupational History    Occupation: retired   Tobacco Use    Smoking status: Former    Smokeless tobacco: Never    Tobacco comments:     quit 30 years ago    Vaping Use    Vaping Use: Former   Substance and Sexual Activity    Alcohol use: Not Currently    Drug use: No    Sexual activity: Not Currently         Current Outpatient Medications:     allopurinol (ZYLOPRIM) 100 mg tablet, Take 100 mg by mouth daily, Disp: , Rfl:     amitriptyline (ELAVIL) 10 mg tablet, 1.5 tabs po q hs (Patient taking differently: Take 15 mg by mouth daily at bedtime 1.5 tabs po q hs), Disp: 45 tablet, Rfl: 5    amLODIPine (NORVASC) 5 mg tablet, Take 5 mg by mouth daily at bedtime, Disp: , Rfl:     apixaban (ELIQUIS) 2.5 mg, Take by mouth 2 (two) times a day, Disp: , Rfl:     bisoprolol (ZEBETA) 5 mg tablet, 2.5 mg , Disp: , Rfl:     brimonidine tartrate 0.2 % ophthalmic solution, , Disp: , Rfl:     cholecalciferol (VITAMIN D3) 1,000 units tablet, Take 2,000 Units by mouth daily , Disp: , Rfl:     co-enzyme Q-10 30 MG capsule, Take 100 mg by mouth daily  , Disp: , Rfl:     cycloSPORINE, PF, (Cequa) 0.09 % SOLN, Apply to eye 2 (two) times a day, Disp: , Rfl:     diphenhydrAMINE-acetaminophen (TYLENOL PM)  MG TABS, Take 2 tablets by mouth daily at bedtime as needed for sleep, Disp: , Rfl:     Dorzolamide HCl-Timolol Mal PF 2-0.5 % SOLN, ONE DROP INTO EACH EYE TWICE DAILY, Disp: , Rfl:     finasteride (PROSCAR) 5 mg tablet, TAKE 1 TABLET (5 MG TOTAL) BY MOUTH DAILY. , Disp: 90 tablet, Rfl: 3    fluticasone (FLONASE) 50 mcg/act nasal spray, SPRAY 1 SPRAY INTO EACH NOSTRIL EVERY DAY, Disp: 48 mL, Rfl: 1    gabapentin (NEURONTIN) 100 mg capsule, Take 1 capsule (100 mg total) by mouth 3 (three) times a day (Patient taking differently: Take 100 mg by mouth 2 (two) times a day), Disp: 90 capsule, Rfl: 3    Garlic 2706 MG CAPS, Take 1,000 mg by mouth, Disp: , Rfl:     indapamide (LOZOL) 1.25 mg tablet, Take 1.25 mg by mouth daily As directed (Patient not taking: Reported on 9/20/2023), Disp: , Rfl:     latanoprost (XALATAN) 0.005 % ophthalmic solution, Administer 1 drop to both eyes daily at bedtime, Disp: , Rfl:     multivitamin-iron-minerals-folic acid (CENTRUM) chewable tablet, Chew 1 tablet daily. , Disp: , Rfl:     omeprazole (PriLOSEC) 20 mg delayed release capsule, TAKE 1 CAPSULE BY MOUTH EVERY DAY (Patient taking differently: Take 40 mg by mouth daily), Disp: 90 capsule, Rfl: 2    Rhopressa 0.02 % SOLN, INSTILL 1 DROP INTO RIGHT EYE ONCE A DAY, Disp: , Rfl:     triamcinolone (KENALOG) 0.5 % cream, Apply topically 2 (two) times a day, Disp: 30 g, Rfl: 2    Allergies   Allergen Reactions    Bempedoic Acid Other (See Comments)     Gout    Atorvastatin GI Intolerance     Other reaction(s): GI upset    Ezetimibe Other (See Comments)     myalgia  Other reaction(s):  Other (See Comments)  Myalgia  Other reaction(s): Myalgia  myalgia    Statins Rash            Vitals:    10/13/23 0757   BP: 163/90   Pulse: 89       Objective:            Physical Exam  Physical Exam:      General Appearance:    Alert, cooperative, no distress, appears stated age   Head:    Normocephalic, without obvious abnormality, atraumatic   Eyes:    conjunctiva/corneas clear, both eyes         Nose:   Nares normal, septum midline, no drainage    Throat:   Lips normal; teeth and gums normal   Neck:    symmetrical, trachea midline, ;     thyroid:  no enlargement/   Back:     Symmetric, no curvature, ROM normal   Lungs:   No audible wheezing or labored breathing   Chest Wall:    No tenderness or deformity    Heart:    Regular rate and rhythm                         Pulses:   2+ and symmetric all extremities   Skin:   Skin color, texture, turgor normal, no rashes or lesions   Neurologic:   normal strength, sensation and reflexes     throughout                       Ortho Exam  Left knee  Skin intact  No erythema   Or swelling  No effusion  Flexion contracture 25 degrees  Flexion 110 degrees  Tenderness to palpation over the medial or lateral joint lines  Neurovascularly Intact Distally     Diagnostics, reviewed and taken today if performed as documented: The attending physician has personally reviewed the pertinent films in PACS and interpretation is as follows: X-ray left knee demonstrates severe tricompartmental joint space narrowing with osteophyte formation      Procedures, if performed today:    Procedures    None performed      Scribe Attestation      I,:  Malgorzata Damon am acting as a scribe while in the presence of the attending physician.:       I,:  Penny Stokes MD personally performed the services described in this documentation    as scribed in my presence.:               Portions of the record may have been created with voice recognition software. Occasional wrong word or "sound a like" substitutions may have occurred due to the inherent limitations of voice recognition software. Read the chart carefully and recognize, using context, where substitutions have occurred.

## 2023-10-13 NOTE — PATIENT INSTRUCTIONS
STRENGTHENING:   Quadriceps:   Isometric Quad sets                        Progression for Quadriceps/VMO:  Hold at 45 degree angle (Picture #1)    Key: Slow & Intentional  Two ways to perform:  Start with 10 reps on each side maintaining neutral pelvis. *   Hold position for a 3-5 count  When form and exercise because less challenging; increase the REPITITIONS or DURATION your holding.                       HAMSTRING STRETCHING EXERCISE   Hamstring stretching:                                     Perform effective stretching exercises:   3 sets of 10 repetitions (rep)  Hold each rep for 20-30 seconds

## 2023-10-16 ENCOUNTER — OFFICE VISIT (OUTPATIENT)
Dept: OBGYN CLINIC | Facility: HOSPITAL | Age: 88
End: 2023-10-16
Payer: COMMERCIAL

## 2023-10-16 VITALS — BODY MASS INDEX: 30.31 KG/M2 | HEIGHT: 68 IN | WEIGHT: 200 LBS

## 2023-10-16 DIAGNOSIS — M19.131 SLAC (SCAPHOLUNATE ADVANCED COLLAPSE) OF WRIST, RIGHT: Primary | ICD-10-CM

## 2023-10-16 PROCEDURE — 99214 OFFICE O/P EST MOD 30 MIN: CPT | Performed by: PHYSICIAN ASSISTANT

## 2023-10-16 PROCEDURE — 20605 DRAIN/INJ JOINT/BURSA W/O US: CPT | Performed by: PHYSICIAN ASSISTANT

## 2023-10-16 RX ORDER — LIDOCAINE HYDROCHLORIDE 10 MG/ML
0.5 INJECTION, SOLUTION INFILTRATION; PERINEURAL
Status: COMPLETED | OUTPATIENT
Start: 2023-10-16 | End: 2023-10-16

## 2023-10-16 RX ORDER — TRIAMCINOLONE ACETONIDE 40 MG/ML
40 INJECTION, SUSPENSION INTRA-ARTICULAR; INTRAMUSCULAR
Status: COMPLETED | OUTPATIENT
Start: 2023-10-16 | End: 2023-10-16

## 2023-10-16 RX ORDER — BUPIVACAINE HYDROCHLORIDE 2.5 MG/ML
0.5 INJECTION, SOLUTION INFILTRATION; PERINEURAL
Status: COMPLETED | OUTPATIENT
Start: 2023-10-16 | End: 2023-10-16

## 2023-10-16 RX ADMIN — TRIAMCINOLONE ACETONIDE 40 MG: 40 INJECTION, SUSPENSION INTRA-ARTICULAR; INTRAMUSCULAR at 09:45

## 2023-10-16 RX ADMIN — BUPIVACAINE HYDROCHLORIDE 0.5 ML: 2.5 INJECTION, SOLUTION INFILTRATION; PERINEURAL at 09:45

## 2023-10-16 RX ADMIN — LIDOCAINE HYDROCHLORIDE 0.5 ML: 10 INJECTION, SOLUTION INFILTRATION; PERINEURAL at 09:45

## 2023-10-16 NOTE — PROGRESS NOTES
ASSESSMENT/PLAN:    78-year-old RHD male for follow-up of right SLAC wrist.    He continues to have significant relief following corticosteroid injections. Patient was offered, and accepted, Kenalog/Marcaine/lidocaine injection to the right radial carpal joint for relief of pain and inflammation. Patient tolerated treatment well. He may get repeat injections every 3 months as needed. I do suspect recurrence of his carpal tunnel syndrome despite surgery 20+ years ago, family  notes they are not interested in surgery again     The patient verbalized understanding of exam findings and treatment plan. We engaged in the shared decision-making process and treatment options were discussed at length with the patient. Surgical and conservative management discussed today along with risks and benefits. 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 To Do Next Visit:  Re-evaluation of current issue    ____________________________________________________________________________________________________________________________________________      CHIEF COMPLAINT:  Chief Complaint   Patient presents with    Follow-up     Patient is here for an injection today       SUBJECTIVE:  Chuyita Monday is a 80y.o. year old RHD male who presents for follow-up evaluation of right SLAC wrist.  He was last seen in regards to this issue on 11/7/2022, at which time he received a corticosteroid injection. Patient states that he got significant relief following this injection. He missed a few injections due to hospitalizations, but his daughter notes it has been bothering him a lot lately. He reports generalized soreness, stiffness, and some numbness in the hand. Hx of carpal tunnel releases bilaterally, family does not wish do proceed with any additional surgeries.          I have personally reviewed all the relevant PMH, PSH, SH, FH, Medications and allergies.      PAST MEDICAL HISTORY:  Past Medical History:   Diagnosis Date    A-fib ([address])     Anemia     Carpal tunnel syndrome, unspecified upper limb     Cataract     last assessed: 8/18/2012    GERD (gastroesophageal reflux disease)     Glaucoma     Hypertension     last assessed: 8/23/2012    Intervertebral disc disease     PVD (peripheral vascular disease) ([address])        PAST SURGICAL HISTORY:  Past Surgical History:   Procedure Laterality Date    CATARACT EXTRACTION      COLONOSCOPY      HERNIA REPAIR      NEUROPLASTY / TRANSPOSITION MEDIAN NERVE AT CARPAL TUNNEL      [place] LAPAROSCOPY SURG CHOLECYSTECTOMY N/A 09/14/2016    Procedure: LAPAROSCOPIC CHOLECYSTECTOMY ;  Surgeon: Mavis Pinedo MD;  Location: [facility];  Service: General    UPPER GASTROINTESTINAL ENDOSCOPY         FAMILY HISTORY:  Family History   Problem Relation Age of Onset    Glaucoma Mother     Cancer Father     Heart disease Father     Glaucoma Father     Alzheimer's disease Family     Heart attack Family        SOCIAL HISTORY:  Social History     Tobacco Use    Smoking status: Former    Smokeless tobacco: Never    Tobacco comments:     quit 30 years ago    Vaping Use    Vaping Use: Former   Substance Use Topics    Alcohol use: Not Currently    Drug use: No       MEDICATIONS:    Current Outpatient Medications:     allopurinol (ZYLOPRIM) 100 mg tablet, Take 100 mg by mouth daily, Disp: , Rfl:     amitriptyline (ELAVIL) 10 mg tablet, 1.5 tabs po q hs (Patient taking differently: Take 15 mg by mouth daily at bedtime 1.5 tabs po q hs), Disp: 45 tablet, Rfl: 5    amLODIPine (NORVASC) 5 mg tablet, Take 5 mg by mouth daily at bedtime, Disp: , Rfl:     apixaban (ELIQUIS) 2.5 mg, Take by mouth 2 (two) times a day, Disp: , Rfl:     bisoprolol (ZEBETA) 5 mg tablet, 2.5 mg , Disp: , Rfl:     brimonidine tartrate 0.2 % ophthalmic solution, , Disp: , Rfl:     cholecalciferol (VITAMIN D3) 1,000 units tablet, Take 2,000 Units by mouth daily , Disp: , Rfl:     co-enzyme Q-10 30 MG capsule, Take 100 mg by mouth daily  , Disp: , Rfl:     cycloSPORINE, PF, (Cequa) 0.09 % SOLN, Apply to eye 2 (two) times a day, Disp: , Rfl:     diphenhydrAMINE-acetaminophen (TYLENOL PM)  MG TABS, Take 2 tablets by mouth daily at bedtime as needed for sleep, Disp: , Rfl:     Dorzolamide HCl-Timolol Mal PF 2-0.5 % SOLN, ONE DROP INTO EACH EYE TWICE DAILY, Disp: , Rfl:     finasteride (PROSCAR) 5 mg tablet, TAKE 1 TABLET (5 MG TOTAL) BY MOUTH DAILY. , Disp: 90 tablet, Rfl: 3    fluticasone (FLONASE) 50 mcg/act nasal spray, SPRAY 1 SPRAY INTO EACH NOSTRIL EVERY DAY, Disp: 48 mL, Rfl: 1    gabapentin (NEURONTIN) 100 mg capsule, Take 1 capsule (100 mg total) by mouth 3 (three) times a day (Patient taking differently: Take 100 mg by mouth 2 (two) times a day), Disp: 90 capsule, Rfl: 3    Garlic 0281 MG CAPS, Take 1,000 mg by mouth, Disp: , Rfl:     latanoprost (XALATAN) 0.005 % ophthalmic solution, Administer 1 drop to both eyes daily at bedtime, Disp: , Rfl:     multivitamin-iron-minerals-folic acid (CENTRUM) chewable tablet, Chew 1 tablet daily. , Disp: , Rfl:     omeprazole (PriLOSEC) 20 mg delayed release capsule, TAKE 1 CAPSULE BY MOUTH EVERY DAY (Patient taking differently: Take 40 mg by mouth daily), Disp: 90 capsule, Rfl: 2    Rhopressa 0.02 % SOLN, INSTILL 1 DROP INTO RIGHT EYE ONCE A DAY, Disp: , Rfl:     triamcinolone (KENALOG) 0.5 % cream, Apply topically 2 (two) times a day, Disp: 30 g, Rfl: 2    indapamide (LOZOL) 1.25 mg tablet, Take 1.25 mg by mouth daily As directed (Patient not taking: Reported on 9/20/2023), Disp: , Rfl:     ALLERGIES:  Allergies   Allergen Reactions    Bempedoic Acid Other (See Comments)     Gout    Atorvastatin GI Intolerance     Other reaction(s): GI upset    Ezetimibe Other (See Comments)     myalgia  Other reaction(s):  Other (See Comments)  Myalgia  Other reaction(s): Myalgia  myalgia Statins Rash       REVIEW OF SYSTEMS:  Review of Systems   Constitutional:  Negative for chills, fever and unexpected weight change. HENT:  Negative for hearing loss, nosebleeds and sore throat. Eyes:  Negative for pain, redness and visual disturbance. Respiratory:  Negative for cough, shortness of breath and wheezing. Cardiovascular:  Negative for chest pain, palpitations and leg swelling. Gastrointestinal:  Negative for abdominal pain, nausea and vomiting. Endocrine: Negative for polydipsia and polyuria. Genitourinary:  Negative for dysuria and hematuria. Musculoskeletal:  Positive for arthralgias. As noted in HPI   Skin:  Negative for rash and wound. Neurological:  Negative for dizziness, numbness and headaches. Psychiatric/Behavioral:  Negative for decreased concentration and suicidal ideas. The patient is not nervous/anxious. VITALS:  Vitals:       LABS:  HgA1c:   Lab Results   Component Value Date    HGBA1C 5.4 12/03/2018     BMP:   Lab Results   Component Value Date    CALCIUM 8.5 09/26/2023     10/31/2017    K 4.2 09/26/2023    CO2 27 09/26/2023    CL 99 09/26/2023    BUN 14 09/26/2023    CREATININE 1.04 09/26/2023       _____________________________________________________  PHYSICAL EXAMINATION:  General: well developed and well nourished, alert, oriented times 3 and appears comfortable  Psychiatric: Normal  HEENT: Normocephalic, Atraumatic Trachea Midline, No torticollis  Pulmonary: No audible wheezing or respiratory distress   Cardiovascular: No pitting edema, 2+ radial pulse   Abdominal/GI: abdomen non tender, non distended   Skin: No masses, erythema, lacerations, fluctation, ulcerations  Neurovascular: Sensation Intact to the Median, Ulnar, Radial Nerve, Motor Intact to the Median, Ulnar, Radial Nerve and Pulses Intact  Musculoskeletal: Normal, except as noted in detailed exam and in HPI.       MUSCULOSKELETAL EXAMINATION:  Right wrist -   No erythema or ecchymosis   Moderate Edema to 2nd 3rd and 4th dorsal extensor compartment   Radio carpal joint is mildly tender to palpation   2nd, 3rd and 4th dorsal compartments are mildly tender to palpation   Demonstrates limited active wrist extension and flexion  Some limitation to full MCP extension  2+ distal radial pulse with brisk capillary refill to the fingers  Radial, median, and ulnar motor and sensory distributions intact  Sensation to light touch intact distally     ___________________________________________________  STUDIES REVIEWED:  No new imaging reviewed this visit    PROCEDURES PERFORMED:  Medium joint arthrocentesis: R radiocarpal  Universal Protocol:  Consent: Verbal consent obtained. Risks and benefits: risks, benefits and alternatives were discussed  Consent given by: patient  Time out: Immediately prior to procedure a "time out" was called to verify the correct patient, procedure, equipment, support staff and site/side marked as required.   Patient understanding: patient states understanding of the procedure being performed  Site marked: the operative site was marked  Required items: required blood products, implants, devices, and special equipment available  Patient identity confirmed: verbally with patient  Supporting Documentation  Indications: pain   Procedure Details  Location: wrist - R radiocarpal  Needle size: 25 G  Ultrasound guidance: no  Approach: dorsal  Medications administered: 40 mg triamcinolone acetonide 40 mg/mL; 0.5 mL lidocaine 1 %; 0.5 mL bupivacaine 0.25 %    Patient tolerance: patient tolerated the procedure well with no immediate complications  Dressing:  Sterile dressing applied        _____________________________________________________    Yessy Kimbrough

## 2023-10-25 DIAGNOSIS — K58.0 IRRITABLE BOWEL SYNDROME WITH DIARRHEA: ICD-10-CM

## 2023-10-25 RX ORDER — AMITRIPTYLINE HYDROCHLORIDE 10 MG/1
TABLET, FILM COATED ORAL
Qty: 45 TABLET | Refills: 0 | Status: SHIPPED | OUTPATIENT
Start: 2023-10-25

## 2023-11-07 DIAGNOSIS — K58.0 IRRITABLE BOWEL SYNDROME WITH DIARRHEA: ICD-10-CM

## 2023-11-07 RX ORDER — AMITRIPTYLINE HYDROCHLORIDE 10 MG/1
TABLET, FILM COATED ORAL
Qty: 45 TABLET | Refills: 0 | Status: SHIPPED | OUTPATIENT
Start: 2023-11-07

## 2023-11-17 DIAGNOSIS — L30.9 DERMATITIS: ICD-10-CM

## 2023-11-17 RX ORDER — TRIAMCINOLONE ACETONIDE 5 MG/G
1 CREAM TOPICAL 2 TIMES DAILY
Qty: 30 G | Refills: 2 | Status: SHIPPED | OUTPATIENT
Start: 2023-11-17

## 2023-12-11 DIAGNOSIS — K58.0 IRRITABLE BOWEL SYNDROME WITH DIARRHEA: ICD-10-CM

## 2023-12-11 RX ORDER — AMITRIPTYLINE HYDROCHLORIDE 10 MG/1
TABLET, FILM COATED ORAL
Qty: 45 TABLET | Refills: 1 | Status: SHIPPED | OUTPATIENT
Start: 2023-12-11

## 2024-01-02 DIAGNOSIS — K58.0 IRRITABLE BOWEL SYNDROME WITH DIARRHEA: ICD-10-CM

## 2024-01-02 RX ORDER — AMITRIPTYLINE HYDROCHLORIDE 10 MG/1
TABLET, FILM COATED ORAL
Qty: 45 TABLET | Refills: 1 | Status: CANCELLED | OUTPATIENT
Start: 2024-01-02

## 2024-01-03 DIAGNOSIS — K58.0 IRRITABLE BOWEL SYNDROME WITH DIARRHEA: ICD-10-CM

## 2024-01-03 RX ORDER — AMITRIPTYLINE HYDROCHLORIDE 10 MG/1
TABLET, FILM COATED ORAL
Qty: 45 TABLET | Refills: 3 | Status: SHIPPED | OUTPATIENT
Start: 2024-01-03

## 2024-01-04 ENCOUNTER — APPOINTMENT (EMERGENCY)
Dept: CT IMAGING | Facility: HOSPITAL | Age: 89
End: 2024-01-04
Attending: EMERGENCY MEDICINE
Payer: COMMERCIAL

## 2024-01-04 ENCOUNTER — HOSPITAL ENCOUNTER (EMERGENCY)
Facility: HOSPITAL | Age: 89
Discharge: HOME | End: 2024-01-04
Attending: EMERGENCY MEDICINE
Payer: COMMERCIAL

## 2024-01-04 VITALS
RESPIRATION RATE: 17 BRPM | HEART RATE: 78 BPM | SYSTOLIC BLOOD PRESSURE: 165 MMHG | OXYGEN SATURATION: 96 % | TEMPERATURE: 98 F | DIASTOLIC BLOOD PRESSURE: 90 MMHG
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
## 2024-01-04 DIAGNOSIS — S00.03XA CONTUSION OF SCALP, INITIAL ENCOUNTER: ICD-10-CM

## 2024-01-04 LAB
ATRIAL RATE: 89 BPM
QRS AXIS: -2 DEGREES
QRSD INTERVAL: 82 MS
QT INTERVAL: 342 MS
QTC INTERVAL: 420 MS
T WAVE AXIS: 43 DEGREES
VENTRICULAR RATE: 91 BPM

## 2024-01-04 PROCEDURE — 72125 CT NECK SPINE W/O DYE: CPT

## 2024-01-04 PROCEDURE — 99285 EMERGENCY DEPT VISIT HI MDM: CPT | Performed by: EMERGENCY MEDICINE

## 2024-01-04 PROCEDURE — 93005 ELECTROCARDIOGRAM TRACING: CPT

## 2024-01-04 PROCEDURE — 99284 EMERGENCY DEPT VISIT MOD MDM: CPT

## 2024-01-04 PROCEDURE — 70450 CT HEAD/BRAIN W/O DYE: CPT

## 2024-01-04 NOTE — ED PROVIDER NOTES
Emergency Department Trauma Note  Jose Mullins 89 y.o. male MRN: [id number]  Unit/Bed#: ED TR13/TR13B Encounter: [id number]      Trauma Alert: Trauma Acuity: Trauma Evaluation  Model of Arrival: Mode of Arrival: Direct from scene via    Trauma Team: Current Providers  Attending Provider: Miah Perry DO  Registered Nurse: Shell Fernandez RN  Consultants:     None      History of Present Illness     Chief Complaint:   Chief Complaint   Patient presents with    Fall     Slipped while sitting at table, fell backwards and hit back of head on the refrigerator.. Denies any LOC or pain. Denies any visual changes, HA or dizziness.     HPI:  Jose Mullins is a 89 y.o. male who presents with scalp contusion.  Mechanism:Details of Incident: slipper caught on chair leg, patient fell backwards onto buttocks than hit head on the refrigerator. NO LOC Injury Date: 01/04/24 Injury Time: 0915 Injury Occurence Location - Specify County: kitchen    Fell backwards out of a chair, struck head on the refrigerator.  No loss of consciousness.  This was witnessed.  Denies headache, diplopia, vertigo, nausea.  Does endorse mild neck soreness without radiculopathy.  Denies injury to the back, chest, abdomen, extremities.  Patient takes Eliquis regularly.      Review of Systems   Constitutional:  Negative for fever.   Respiratory:  Negative for cough.    Gastrointestinal:  Negative for abdominal pain.   Neurological:  Negative for dizziness and headaches.       Historical Information     Immunizations:   Immunization History   Administered Date(s) Administered    COVID-19 PFIZER VACCINE 0.3 ML IM 04/07/2021, 05/01/2021    INFLUENZA 09/23/2014    Influenza Split High Dose Preservative Free IM 09/23/2014, 12/17/2015    Influenza, seasonal, injectable 10/24/2003, 11/08/2005, 11/13/2007, 10/16/2008, 12/16/2009, 11/10/2010, 11/18/2011, 09/24/2012, 09/25/2013    Td (adult), adsorbed 11/01/2003    Tdap 03/12/2018       Past Medical History:    Diagnosis Date    A-fib ([facility])     Anemia     Carpal tunnel syndrome, unspecified upper limb     Cataract     last assessed: 2012    GERD (gastroesophageal reflux disease)     Glaucoma     Hypertension     last assessed: 2012    Intervertebral disc disease     PVD (peripheral vascular disease) ([facility])        Family History   Problem Relation Age of Onset    Glaucoma Mother     Cancer Father     Heart disease Father     Glaucoma Father     Alzheimer's disease Family     Heart attack Family      Past Surgical History:   Procedure Laterality Date    CATARACT EXTRACTION      COLONOSCOPY      HERNIA REPAIR      NEUROPLASTY / TRANSPOSITION MEDIAN NERVE AT CARPAL TUNNEL      [facility] LAPAROSCOPY SURG CHOLECYSTECTOMY N/A 2016    Procedure: LAPAROSCOPIC CHOLECYSTECTOMY ;  Surgeon: Christo Cristina MD;  Location: [facility];  Service: General    UPPER GASTROINTESTINAL ENDOSCOPY       Social History     Tobacco Use    Smoking status: Former    Smokeless tobacco: Never    Tobacco comments:     quit 30 years ago    Vaping Use    Vaping status: Former   Substance Use Topics    Alcohol use: Not Currently    Drug use: No     E-Cigarette/Vaping    E-Cigarette Use Former User      E-Cigarette/Vaping Substances    Nicotine No     THC No     CBD No     Flavoring No     Other No     Unknown No        Family History: non-contributory    Meds/Allergies   Prior to Admission Medications   Prescriptions Last Dose Informant Patient Reported? Taking?   Dorzolamide HCl-Timolol Mal PF 2-0.5 % SOLN  Child Yes No   Sig: ONE DROP INTO EACH EYE TWICE DAILY   Garlic 1000 MG CAPS  Child Yes No   Sig: Take 1,000 mg by mouth   Rhopressa 0.02 % SOLN   Yes No   Sig: INSTILL 1 DROP INTO RIGHT EYE ONCE A DAY   allopurinol (ZYLOPRIM) 100 mg tablet   Yes No   Sig: Take 100 mg by mouth daily   amLODIPine (NORVASC) 5 mg tablet  Child Yes No   Sig: Take 5 mg by mouth daily at bedtime   amitriptyline (ELAVIL) 10 mg tablet   No No   Si.5 tabs po q hs    apixaban (ELIQUIS) 2.5 mg  Child Yes No   Sig: Take by mouth 2 (two) times a day   bisoprolol (ZEBETA) 5 mg tablet  Child Yes No   Si.5 mg    brimonidine tartrate 0.2 % ophthalmic solution  Child Yes No   cholecalciferol (VITAMIN D3) 1,000 units tablet  Child Yes No   Sig: Take 2,000 Units by mouth daily    co-enzyme Q-10 30 MG capsule  Child Yes No   Sig: Take 100 mg by mouth daily     cycloSPORINE, PF, (Cequa) 0.09 % SOLN   Yes No   Sig: Apply to eye 2 (two) times a day   diphenhydrAMINE-acetaminophen (TYLENOL PM)  MG TABS  Child Yes No   Sig: Take 2 tablets by mouth daily at bedtime as needed for sleep   finasteride (PROSCAR) 5 mg tablet  Child No No   Sig: TAKE 1 TABLET (5 MG TOTAL) BY MOUTH DAILY.   fluticasone (FLONASE) 50 mcg/act nasal spray   No No   Sig: SPRAY 1 SPRAY INTO EACH NOSTRIL EVERY DAY   gabapentin (NEURONTIN) 100 mg capsule   No No   Sig: Take 1 capsule (100 mg total) by mouth 3 (three) times a day   Patient taking differently: Take 100 mg by mouth 2 (two) times a day   indapamide (LOZOL) 1.25 mg tablet   Yes No   Sig: Take 1.25 mg by mouth daily As directed   latanoprost (XALATAN) 0.005 % ophthalmic solution  Child Yes No   Sig: Administer 1 drop to both eyes daily at bedtime   multivitamin-iron-minerals-folic acid (CENTRUM) chewable tablet  Child Yes No   Sig: Chew 1 tablet daily.   omeprazole (PriLOSEC) 20 mg delayed release capsule  Child No No   Sig: TAKE 1 CAPSULE BY MOUTH EVERY DAY   Patient taking differently: Take 40 mg by mouth daily   triamcinolone (KENALOG) 0.5 % cream   No No   Sig: APPLY TO AFFECTED AREA TWICE A DAY      Facility-Administered Medications: None       Allergies   Allergen Reactions    Bempedoic Acid Other (See Comments)     Gout    Atorvastatin GI Intolerance     Other reaction(s): GI upset    Ezetimibe Other (See Comments)     myalgia  Other reaction(s): Other (See Comments)  Myalgia  Other reaction(s): Myalgia  myalgia    Statins Rash       PHYSICAL  EXAM    PE limited by: Nothing    Objective   Vitals:   First set: Temperature: 98 °F (36.7 °C) (01/04/24 1020)  Pulse: 84 (01/04/24 1020)  Respirations: 14 (01/04/24 1020)  Blood Pressure: (!) 182/99 (01/04/24 1020)  SpO2: 96 % (01/04/24 1020)    Primary Survey:   (A) Airway: Normal vocalizations  (B) Breathing: Symmetric air intake and chest rise  (C) Circulation: Pulses:   normal  (D) Disabliity:  GCS Total:  15  (E) Expose:  Completed    Breath sounds equal. No crepitus or chest wall tenderness with compression over the chest. No pain or instability with compression over the pelvis. No bedside imaging required. Patient is stable to proceed to CT scan.    Secondary Survey: (Click on Physical Exam tab above)  Physical Exam  Vitals and nursing note reviewed.   Constitutional:       General: He is not in acute distress.     Appearance: He is well-developed and normal weight. He is not ill-appearing or diaphoretic.   HENT:      Head: Normocephalic and atraumatic.      Right Ear: External ear normal.      Left Ear: External ear normal.   Eyes:      General: No scleral icterus.     Conjunctiva/sclera: Conjunctivae normal.   Neck:      Vascular: No JVD.   Cardiovascular:      Rate and Rhythm: Normal rate and regular rhythm.      Pulses: Normal pulses.      Heart sounds: Normal heart sounds.   Pulmonary:      Effort: Pulmonary effort is normal. No respiratory distress.      Breath sounds: Normal breath sounds.   Chest:      Chest wall: No tenderness.   Abdominal:      General: Bowel sounds are normal.      Palpations: Abdomen is soft.      Tenderness: There is no abdominal tenderness.   Musculoskeletal:         General: No tenderness or signs of injury. Normal range of motion.      Cervical back: Neck supple. No tenderness.   Skin:     General: Skin is warm and dry.      Capillary Refill: Capillary refill takes less than 2 seconds.      Findings: No bruising or rash.   Neurological:      General: No focal deficit  present.      Mental Status: He is alert and oriented to person, place, and time. Mental status is at baseline.      Cranial Nerves: No cranial nerve deficit.      Coordination: Coordination normal.      Deep Tendon Reflexes: Reflexes are normal and symmetric.   Psychiatric:         Mood and Affect: Mood normal.         Behavior: Behavior normal.         Cervical spine cleared by clinical criteria? No (imaging required)      Invasive Devices       None                   Lab Results:   Results Reviewed       None                   Imaging Studies:   Direct to CT: No  TRAUMA - CT head wo contrast   Final Result by Erik Reza MD (01/04 1105)      No acute intracranial abnormality.            The study was marked in EPIC for immediate notification.      Workstation performed: [id number]         TRAUMA - CT spine cervical wo contrast   Final Result by Erik Reza MD (01/04 1111)      No acute cervical spine fracture or traumatic malalignment.      The study was marked in EPIC for immediate notification.            Workstation performed: [id number]               Procedures  ECG 12 Lead Documentation Only    Date/Time: 1/4/2024 10:36 AM    Performed by: Miah Perry DO  Authorized by: Miah Perry DO    ECG reviewed by me, the ED Provider: yes    Patient location:  ED  Previous ECG:     Previous ECG:  Compared to current    Comparison ECG info:  Septal infarct is now present    Similarity:  Changes noted    Comparison to cardiac monitor: Yes    Rhythm:     Rhythm: atrial fibrillation    QRS:     QRS axis:  Normal  Conduction:     Conduction: normal             ED Course           Medical Decision Making  89-year-old male with low-energy fall from chair.  States he bumped his head buttocks but has no complaints.  Very stable on exam.  Will do imaging including CT head and C-spine, labs and EKG.    Workup reassuring.  Remained stable here.  Ambulatory.  Family at bedside.  They will take him  home.    Amount and/or Complexity of Data Reviewed  External Data Reviewed: labs, radiology and ECG.  Labs: ordered.  Radiology: ordered.  ECG/medicine tests: ordered and independent interpretation performed.                Disposition  [facility] Transfer: No  Final diagnoses:   Fall, initial encounter   Contusion of scalp, initial encounter     Time reflects when diagnosis was documented in both MDM as applicable and the Disposition within this note       Time User Action Codes Description Comment    1/4/2024  1:06 PM Miah Perry Add [W19.XXXA] Fall, initial encounter     1/4/2024  1:07 PM Miah Perry Add [S00.03XA] Contusion of scalp, initial encounter           ED Disposition       ED Disposition   Discharge    Condition   Stable    Date/Time   Thu Jan 4, 2024  1:06 PM    Comment   Jose Mullins discharge to home/self care.                   Follow-up Information       Follow up With Specialties Details Why Contact Info    Moises Ham MD Family Medicine Call  As needed [address] [place] [address]  [phone]            Discharge Medication List as of 1/4/2024  1:14 PM        CONTINUE these medications which have NOT CHANGED    Details   allopurinol (ZYLOPRIM) 100 mg tablet Take 100 mg by mouth daily, Historical Med      amitriptyline (ELAVIL) 10 mg tablet 1.5 tabs po q hs, Normal      amLODIPine (NORVASC) 5 mg tablet Take 5 mg by mouth daily at bedtime, Historical Med      apixaban (ELIQUIS) 2.5 mg Take by mouth 2 (two) times a day, Historical Med      bisoprolol (ZEBETA) 5 mg tablet 2.5 mg , Historical Med      brimonidine tartrate 0.2 % ophthalmic solution Starting Tue 11/9/2021, Historical Med      cholecalciferol (VITAMIN D3) 1,000 units tablet Take 2,000 Units by mouth daily , Historical Med      co-enzyme Q-10 30 MG capsule Take 100 mg by mouth daily  , Historical Med      cycloSPORINE, PF, (Cequa) 0.09 % SOLN Apply to eye 2 (two) times a day, Historical Med       diphenhydrAMINE-acetaminophen (TYLENOL PM)  MG TABS Take 2 tablets by mouth daily at bedtime as needed for sleep, Historical Med      Dorzolamide HCl-Timolol Mal PF 2-0.5 % SOLN ONE DROP INTO EACH EYE TWICE DAILY, Historical Med      finasteride (PROSCAR) 5 mg tablet TAKE 1 TABLET (5 MG TOTAL) BY MOUTH DAILY., Starting Mon 5/22/2023, Normal      fluticasone (FLONASE) 50 mcg/act nasal spray SPRAY 1 SPRAY INTO EACH NOSTRIL EVERY DAY, Normal      gabapentin (NEURONTIN) 100 mg capsule Take 1 capsule (100 mg total) by mouth 3 (three) times a day, Starting Fri 7/28/2023, Normal      Garlic 1000 MG CAPS Take 1,000 mg by mouth, Historical Med      indapamide (LOZOL) 1.25 mg tablet Take 1.25 mg by mouth daily As directed, Starting Sun 9/10/2023, Historical Med      latanoprost (XALATAN) 0.005 % ophthalmic solution Administer 1 drop to both eyes daily at bedtime, Historical Med      multivitamin-iron-minerals-folic acid (CENTRUM) chewable tablet Chew 1 tablet daily., Historical Med      omeprazole (PriLOSEC) 20 mg delayed release capsule TAKE 1 CAPSULE BY MOUTH EVERY DAY, Normal      Rhopressa 0.02 % SOLN INSTILL 1 DROP INTO RIGHT EYE ONCE A DAY, Historical Med      triamcinolone (KENALOG) 0.5 % cream APPLY TO AFFECTED AREA TWICE A DAY, Starting Fri 11/17/2023, Normal           No discharge procedures on file.    PDMP Review       None            ED Provider  Electronically Signed by           Miah Perry DO  01/06/24 [id number]

## 2024-01-04 NOTE — DISCHARGE INSTRUCTIONS
The neck CT shows a 1.8 cm thyroid nodule:    Incidental discovery of one or more thyroid nodule(s) measuring more than 1.5 cm and without suspicious features is noted in this patient who is above 35 years old; according to   guidelines published in the February 2015 white paper on incidental thyroid nodules in the Journal of the American College of Radiology (JACR), further characterization with thyroid ultrasound may be considered.  However, as the majority of incidental thyroid nodules are benign and because small incidental thyroid malignancies typically demonstrate indolent behavior, consideration of the patient's life expectancy and possible comorbidities should be taken into account prior to a decision to pursue   further imaging.    Please discuss the above finding with Jose's PCP.    The rest the CT scans did not show any injuries or bleeding.  Apply ice to the scalp contusion as needed for pain or swelling.  Take Tylenol as needed for pain.  Please follow instructions below.

## 2024-01-05 ENCOUNTER — VBI (OUTPATIENT)
Dept: FAMILY MEDICINE CLINIC | Facility: CLINIC | Age: 89
End: 2024-01-05

## 2024-01-05 DIAGNOSIS — Z71.89 COORDINATION OF COMPLEX CARE: Primary | ICD-10-CM

## 2024-01-05 NOTE — TELEPHONE ENCOUNTER
01/05/24 8:32 AM    Patient contacted post ED visit, [facility] department spoke with patient/caregiver and outreach was successful.    Thank you.  PARRISH MARTEL  [facility]

## 2024-01-08 ENCOUNTER — OFFICE VISIT (OUTPATIENT)
Dept: OBGYN CLINIC | Facility: HOSPITAL | Age: 89
End: 2024-01-08
Payer: COMMERCIAL

## 2024-01-08 ENCOUNTER — PATIENT OUTREACH (OUTPATIENT)
Dept: FAMILY MEDICINE CLINIC | Facility: CLINIC | Age: 89
End: 2024-01-08

## 2024-01-08 VITALS — HEIGHT: 68 IN | BODY MASS INDEX: 30.31 KG/M2 | WEIGHT: 200 LBS

## 2024-01-08 DIAGNOSIS — G56.01 CARPAL TUNNEL SYNDROME OF RIGHT WRIST: Primary | ICD-10-CM

## 2024-01-08 PROCEDURE — 20526 THER INJECTION CARP TUNNEL: CPT | Performed by: SURGERY

## 2024-01-08 PROCEDURE — 99214 OFFICE O/P EST MOD 30 MIN: CPT | Performed by: SURGERY

## 2024-01-08 RX ORDER — LIDOCAINE HYDROCHLORIDE 10 MG/ML
1 INJECTION, SOLUTION INFILTRATION; PERINEURAL
Status: COMPLETED | OUTPATIENT
Start: 2024-01-08 | End: 2024-01-08

## 2024-01-08 RX ORDER — DEXAMETHASONE SODIUM PHOSPHATE 10 MG/ML
40 INJECTION, SOLUTION INTRAMUSCULAR; INTRAVENOUS
Status: COMPLETED | OUTPATIENT
Start: 2024-01-08 | End: 2024-01-08

## 2024-01-08 RX ADMIN — LIDOCAINE HYDROCHLORIDE 1 ML: 10 INJECTION, SOLUTION INFILTRATION; PERINEURAL at 10:30

## 2024-01-08 RX ADMIN — DEXAMETHASONE SODIUM PHOSPHATE 40 MG: 10 INJECTION, SOLUTION INTRAMUSCULAR; INTRAVENOUS at 10:30

## 2024-01-08 NOTE — PROGRESS NOTES
Spoke with patients daughter Jennifer. Reports her dad is fine since his fall. Has orthopedic appointment today for follow for wrist . No questions or concerns. Jose does not have his own number she takes all his calls.

## 2024-01-08 NOTE — PROGRESS NOTES
ASSESSMENT/PLAN:      89-year-old male here for his numbness and tingling into the right hand.  Due to patient's acute onset within the last month, conservative treatments were discussed with the patient that included bracing at night, localized cortisone injection.  Patient does not tolerate bracing at night according to his daughter.  Patient wished to proceed with a cortisone injection, which she tolerated well.      The patient verbalized understanding of exam findings and treatment plan. We engaged in the shared decision-making process and treatment options were discussed at length with the patient. Surgical and conservative management discussed today along with risks and benefits.    Diagnoses and all orders for this visit:    Carpal tunnel syndrome of right wrist    Other orders  -     Hand/upper extremity injection        Follow Up:  Return if symptoms worsen or fail to improve.      To Do Next Visit:  Re-evaluation of current issue    ____________________________________________________________________________________________________________________________________________      CHIEF COMPLAINT:  Chief Complaint   Patient presents with    Right Hand - Numbness       SUBJECTIVE:  Jose Mullins is a 89 y.o. year old RHD male who presents today here for his numbness and tingling into the right hand. He reports that his numbness is in the right thumb, index and long fingers. He reports that this started about 1 month ago. He has tried bracing but it becomes more painful. He has treated for a right SLAC wrist in the past with a radiocarpal injection, 10/16/2023 that gave him good relief. He continues to have no pain int he dorsal wrist today.        I have personally reviewed all the relevant PMH, PSH, SH, FH, Medications and allergies.     PAST MEDICAL HISTORY:  Past Medical History:   Diagnosis Date    A-fib (HCC)     Anemia     Carpal tunnel syndrome, unspecified upper limb     Cataract     last assessed:  8/18/2012    GERD (gastroesophageal reflux disease)     Glaucoma     Hypertension     last assessed: 8/23/2012    Intervertebral disc disease     PVD (peripheral vascular disease) (HCC)        PAST SURGICAL HISTORY:  Past Surgical History:   Procedure Laterality Date    CATARACT EXTRACTION      COLONOSCOPY      HERNIA REPAIR      NEUROPLASTY / TRANSPOSITION MEDIAN NERVE AT CARPAL TUNNEL      [place] LAPAROSCOPY SURG CHOLECYSTECTOMY N/A 09/14/2016    Procedure: LAPAROSCOPIC CHOLECYSTECTOMY ;  Surgeon: Christo Cristina MD;  Location: [facility];  Service: General    UPPER GASTROINTESTINAL ENDOSCOPY         FAMILY HISTORY:  Family History   Problem Relation Age of Onset    Glaucoma Mother     Cancer Father     Heart disease Father     Glaucoma Father     Alzheimer's disease Family     Heart attack Family        SOCIAL HISTORY:  Social History     Tobacco Use    Smoking status: Former    Smokeless tobacco: Never    Tobacco comments:     quit 30 years ago    Vaping Use    Vaping status: Former   Substance Use Topics    Alcohol use: Not Currently    Drug use: No       MEDICATIONS:    Current Outpatient Medications:     allopurinol (ZYLOPRIM) 100 mg tablet, Take 100 mg by mouth daily, Disp: , Rfl:     amitriptyline (ELAVIL) 10 mg tablet, 1.5 tabs po q hs (Patient taking differently: 20 mg 1.5 tabs po q hs), Disp: 45 tablet, Rfl: 3    amLODIPine (NORVASC) 5 mg tablet, Take 5 mg by mouth daily at bedtime, Disp: , Rfl:     apixaban (ELIQUIS) 2.5 mg, Take by mouth 2 (two) times a day, Disp: , Rfl:     bisoprolol (ZEBETA) 5 mg tablet, 2.5 mg , Disp: , Rfl:     brimonidine tartrate 0.2 % ophthalmic solution, , Disp: , Rfl:     cholecalciferol (VITAMIN D3) 1,000 units tablet, Take 2,000 Units by mouth daily , Disp: , Rfl:     co-enzyme Q-10 30 MG capsule, Take 100 mg by mouth daily  , Disp: , Rfl:     cycloSPORINE, PF, (Cequa) 0.09 % SOLN, Apply to eye 2 (two) times a day, Disp: , Rfl:     diphenhydrAMINE-acetaminophen (TYLENOL PM)   MG TABS, Take 2 tablets by mouth daily at bedtime as needed for sleep, Disp: , Rfl:     Dorzolamide HCl-Timolol Mal PF 2-0.5 % SOLN, ONE DROP INTO EACH EYE TWICE DAILY, Disp: , Rfl:     finasteride (PROSCAR) 5 mg tablet, TAKE 1 TABLET (5 MG TOTAL) BY MOUTH DAILY., Disp: 90 tablet, Rfl: 3    gabapentin (NEURONTIN) 100 mg capsule, Take 1 capsule (100 mg total) by mouth 3 (three) times a day (Patient taking differently: Take 100 mg by mouth 2 (two) times a day), Disp: 90 capsule, Rfl: 3    Garlic 1000 MG CAPS, Take 1,000 mg by mouth, Disp: , Rfl:     indapamide (LOZOL) 1.25 mg tablet, Take 1.25 mg by mouth daily As directed, Disp: , Rfl:     latanoprost (XALATAN) 0.005 % ophthalmic solution, Administer 1 drop to both eyes daily at bedtime, Disp: , Rfl:     multivitamin-iron-minerals-folic acid (CENTRUM) chewable tablet, Chew 1 tablet daily., Disp: , Rfl:     omeprazole (PriLOSEC) 20 mg delayed release capsule, TAKE 1 CAPSULE BY MOUTH EVERY DAY (Patient taking differently: Take 40 mg by mouth daily), Disp: 90 capsule, Rfl: 2    Rhopressa 0.02 % SOLN, INSTILL 1 DROP INTO RIGHT EYE ONCE A DAY, Disp: , Rfl:     triamcinolone (KENALOG) 0.5 % cream, APPLY TO AFFECTED AREA TWICE A DAY, Disp: 30 g, Rfl: 2    fluticasone (FLONASE) 50 mcg/act nasal spray, SPRAY 1 SPRAY INTO EACH NOSTRIL EVERY DAY, Disp: 48 mL, Rfl: 1    ALLERGIES:  Allergies   Allergen Reactions    Bempedoic Acid Other (See Comments)     Gout    Atorvastatin GI Intolerance     Other reaction(s): GI upset    Ezetimibe Other (See Comments)     myalgia  Other reaction(s): Other (See Comments)  Myalgia  Other reaction(s): Myalgia  myalgia    Statins Rash       REVIEW OF SYSTEMS:  Review of Systems   Constitutional:  Negative for chills, fever and unexpected weight change.   HENT:  Negative for hearing loss, nosebleeds and sore throat.    Eyes:  Negative for pain, redness and visual disturbance.   Respiratory:  Negative for cough, shortness of breath and wheezing.     Cardiovascular:  Negative for chest pain, palpitations and leg swelling.   Gastrointestinal:  Negative for abdominal pain, nausea and vomiting.   Endocrine: Negative for polydipsia and polyuria.   Genitourinary:  Negative for dysuria and hematuria.   Skin:  Negative for rash and wound.   Neurological:  Positive for numbness. Negative for dizziness, light-headedness and headaches.   Psychiatric/Behavioral:  Negative for decreased concentration, dysphoric mood and suicidal ideas. The patient is not nervous/anxious.        VITALS:  Vitals:       LABS:  HgA1c:   Lab Results   Component Value Date    HGBA1C 5.4 12/03/2018     BMP:   Lab Results   Component Value Date    CALCIUM 8.5 09/26/2023     10/31/2017    K 4.2 09/26/2023    CO2 27 09/26/2023    CL 99 09/26/2023    BUN 14 09/26/2023    CREATININE 1.04 09/26/2023       _____________________________________________________  PHYSICAL EXAMINATION:  General: well developed and well nourished, alert, oriented times 3, and appears comfortable  Psychiatric: Normal  HEENT: Normocephalic, Atraumatic Trachea Midline, No torticollis  Pulmonary: No audible wheezing or respiratory distress   Cardiovascular: No pitting edema, 2+ radial pulse   Abdominal/GI: abdomen non tender, non distended   Skin: No masses, erythema, lacerations, fluctation, ulcerations  Neurovascular: Sensation Intact to the Median, Ulnar, Radial Nerve, Motor Intact to the Median, Ulnar, Radial Nerve, and Pulses Intact  Musculoskeletal: Normal, except as noted in detailed exam and in HPI.      MUSCULOSKELETAL EXAMINATION:    Right Carpal Tunnel Exam:    Negative thenar atrophy. Negative phalen's test. Positive carpal tunnel compression. Negative tinels over median nerve at the wrist.  Opposition strength 5/5.  Abduction strength 5/5.      __________________________________________________  STUDIES REVIEWED:  No images reviewed at today's visit          PROCEDURES PERFORMED:  Hand/upper extremity  "injection: R carpal tunnel  [place] Protocol:  Consent: Verbal consent obtained.  Risks and benefits: risks, benefits and alternatives were discussed  Consent given by: patient  Time out: Immediately prior to procedure a \"time out\" was called to verify the correct patient, procedure, equipment, support staff and site/side marked as required.  Timeout called at: 1/8/2024 10:54 AM.  Patient understanding: patient states understanding of the procedure being performed  Site marked: the operative site was marked  Patient identity confirmed: verbally with patient  Supporting Documentation  Indications: tendon swelling and pain   Procedure Details  Condition:carpal tunnel syndrome Site: R carpal tunnel   Preparation: Patient was prepped and draped in the usual sterile fashion  Needle size: 25 G  Ultrasound guidance: no  Approach: volar  Medications administered: 1 mL lidocaine 1 %; 40 mg dexamethasone 100 mg/10 mL  Patient tolerance: patient tolerated the procedure well with no immediate complications  Dressing:  Sterile dressing applied           _____________________________________________________      Scribe Attestation      I,:  Phylicia Rojas am acting as a scribe while in the presence of the attending physician.:       I,:  Nathan Mishra MD personally performed the services described in this documentation    as scribed in my presence.:                  "

## 2024-01-23 ENCOUNTER — TELEPHONE (OUTPATIENT)
Dept: FAMILY MEDICINE CLINIC | Facility: CLINIC | Age: 89
End: 2024-01-23

## 2024-01-23 DIAGNOSIS — K58.0 IRRITABLE BOWEL SYNDROME WITH DIARRHEA: ICD-10-CM

## 2024-01-23 RX ORDER — AMITRIPTYLINE HYDROCHLORIDE 10 MG/1
TABLET, FILM COATED ORAL
Qty: 45 TABLET | Refills: 3 | Status: SHIPPED | OUTPATIENT
Start: 2024-01-23 | End: 2024-01-23 | Stop reason: SDUPTHER

## 2024-01-23 RX ORDER — AMITRIPTYLINE HYDROCHLORIDE 10 MG/1
TABLET, FILM COATED ORAL
Qty: 60 TABLET | Refills: 5 | Status: SHIPPED | OUTPATIENT
Start: 2024-01-23

## 2024-01-23 NOTE — TELEPHONE ENCOUNTER
Jennifer called to state that her dad has been taking 20mg of amitriptyline since September and is requesting a prescription be sent reflecting that and not 1.5 of 10mg. Please advise.

## 2024-02-05 DIAGNOSIS — R09.82 POST-NASAL DRIP: ICD-10-CM

## 2024-02-05 RX ORDER — FLUTICASONE PROPIONATE 50 MCG
SPRAY, SUSPENSION (ML) NASAL
Qty: 48 ML | Refills: 1 | Status: SHIPPED | OUTPATIENT
Start: 2024-02-05

## 2024-02-07 DIAGNOSIS — M79.672 PAIN IN BOTH FEET: ICD-10-CM

## 2024-02-07 DIAGNOSIS — M79.671 PAIN IN BOTH FEET: ICD-10-CM

## 2024-02-07 RX ORDER — GABAPENTIN 100 MG/1
100 CAPSULE ORAL 3 TIMES DAILY
Qty: 90 CAPSULE | Refills: 3 | Status: SHIPPED | OUTPATIENT
Start: 2024-02-07

## 2024-02-13 ENCOUNTER — RA CDI HCC (OUTPATIENT)
Dept: OTHER | Facility: HOSPITAL | Age: 89
End: 2024-02-13

## 2024-02-14 ENCOUNTER — TELEMEDICINE (OUTPATIENT)
Dept: FAMILY MEDICINE CLINIC | Facility: CLINIC | Age: 89
End: 2024-02-14
Payer: COMMERCIAL

## 2024-02-14 VITALS
HEIGHT: 68 IN | BODY MASS INDEX: 29.98 KG/M2 | HEART RATE: 89 BPM | OXYGEN SATURATION: 97 % | SYSTOLIC BLOOD PRESSURE: 137 MMHG | TEMPERATURE: 96.7 F | WEIGHT: 197.8 LBS | DIASTOLIC BLOOD PRESSURE: 88 MMHG

## 2024-02-14 DIAGNOSIS — G44.219 EPISODIC TENSION-TYPE HEADACHE, NOT INTRACTABLE: Primary | ICD-10-CM

## 2024-02-14 PROCEDURE — 99213 OFFICE O/P EST LOW 20 MIN: CPT | Performed by: FAMILY MEDICINE

## 2024-02-14 NOTE — ASSESSMENT & PLAN NOTE
Headache.  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  He also has glaucoma of his right eye but denies pure significant eye pain.  Patient is limited in treatment options due to his use of Eliquis and diagnosis of glaucoma.  I explained to his daughter that if Tylenol seems to help with symptoms this is the safest route for now since he is not taking maximum doses of Tylenol and it appears to be effective.  If symptoms become more frequent or severe we discussed adding a muscle relaxer which in itself had its possible side effects.  She was also instructed to get more details on procedure that ophthalmology may want to pursue so that they can relate this information to cardiologist in hopes that they would clear him to have something performed for his uncontrolled glaucoma pressures

## 2024-02-14 NOTE — PROGRESS NOTES
Virtual Regular Visit    Verification of patient location:    Patient is located at Home in the following state in which I hold an active license [place]      Assessment/Plan:    Problem List Items Addressed This Visit       Episodic tension-type headache, not intractable - Primary     Headache.  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  He also has glaucoma of his right eye but denies pure significant eye pain.  Patient is limited in treatment options due to his use of Eliquis and diagnosis of glaucoma.  I explained to his daughter that if Tylenol seems to help with symptoms this is the safest route for now since he is not taking maximum doses of Tylenol and it appears to be effective.  If symptoms become more frequent or severe we discussed adding a muscle relaxer which in itself had its possible side effects.  She was also instructed to get more details on procedure that ophthalmology may want to pursue so that they can relate this information to cardiologist in hopes that they would clear him to have something performed for his uncontrolled glaucoma pressures                 Reason for visit is   Chief Complaint   Patient presents with    Headache     Eye pain x3 weeks, on and off     Virtual Regular Visit        Encounter provider Moises Ham MD    Provider located at [address] [place] [id number]      Recent Visits  No visits were found meeting these conditions.  Showing recent visits within past 7 days and meeting all other requirements  Today's Visits  Date Type Provider Dept   02/14/24 Telemedicine Moises Ham MD [facility]   Showing today's visits and meeting all other requirements  Future Appointments  No visits were found meeting these conditions.  Showing future appointments within next 150 days and meeting all other requirements       The patient  was identified by name and date of birth. Jose Mullins was informed that this is a telemedicine visit and that the visit is being conducted through the [employer] platform. He agrees to proceed..  My office door was closed. No one else was in the room.  He acknowledged consent and understanding of privacy and security of the video platform. The patient has agreed to participate and understands they can discontinue the visit at any time.    Patient is aware this is a billable service.     Subjective  Jose Mullins is a 89 y.o. male       Patient having telemedicine visit due to intermittent headaches.  Patient lives with his daughter who relates most of the history.  Patient experiences headaches that often stem from stiffness on the right side of his neck and might extend to his right head and eye area.  On average he may experience headaches 4 to 5 days/week.  Patient may take 1-3 Tylenol per day if he has a headache which seems to resolve his symptoms.  He denies any nausea or vomiting with his headaches or change in visual acuity.  He does have a history of glaucoma in his right eye with an elevated pressure of 44 on last office visit with his ophthalmologist.  He does take drops for his eyes.  Ophthalmology recommended surgical intervention for his glaucoma however cardiologist felt it was not worth the risk given his comorbidities.  He does have some visual field deficits from his underlying glaucoma.    Headache       Past Medical History:   Diagnosis Date    A-fib ([facility])     Anemia     Carpal tunnel syndrome, unspecified upper limb     Cataract     last assessed: 8/18/2012    GERD (gastroesophageal reflux disease)     Glaucoma     Hypertension     last assessed: 8/23/2012    Intervertebral disc disease     PVD (peripheral vascular disease) ([facility])        Past Surgical History:   Procedure Laterality Date    CATARACT EXTRACTION      COLONOSCOPY      HERNIA REPAIR      NEUROPLASTY / TRANSPOSITION MEDIAN NERVE  AT CARPAL TUNNEL      [place] LAPAROSCOPY SURG CHOLECYSTECTOMY N/A 09/14/2016    Procedure: LAPAROSCOPIC CHOLECYSTECTOMY ;  Surgeon: Christo Cristina MD;  Location: [facility];  Service: General    UPPER GASTROINTESTINAL ENDOSCOPY         Current Outpatient Medications   Medication Sig Dispense Refill    allopurinol (ZYLOPRIM) 100 mg tablet Take 100 mg by mouth daily      amitriptyline (ELAVIL) 10 mg tablet 2 tabs po q hs 60 tablet 5    amLODIPine (NORVASC) 5 mg tablet Take 5 mg by mouth daily at bedtime      apixaban (ELIQUIS) 2.5 mg Take by mouth 2 (two) times a day      bisoprolol (ZEBETA) 5 mg tablet 2.5 mg       brimonidine tartrate 0.2 % ophthalmic solution       cholecalciferol (VITAMIN D3) 1,000 units tablet Take 2,000 Units by mouth daily       co-enzyme Q-10 30 MG capsule Take 100 mg by mouth daily        diphenhydrAMINE-acetaminophen (TYLENOL PM)  MG TABS Take 2 tablets by mouth daily at bedtime as needed for sleep      Dorzolamide HCl-Timolol Mal PF 2-0.5 % SOLN ONE DROP INTO EACH EYE TWICE DAILY      finasteride (PROSCAR) 5 mg tablet TAKE 1 TABLET (5 MG TOTAL) BY MOUTH DAILY. 90 tablet 3    Garlic 1000 MG CAPS Take 1,000 mg by mouth      indapamide (LOZOL) 1.25 mg tablet Take 1.25 mg by mouth daily As directed      latanoprost (XALATAN) 0.005 % ophthalmic solution Administer 1 drop to both eyes daily at bedtime      multivitamin-iron-minerals-folic acid (CENTRUM) chewable tablet Chew 1 tablet daily.      omeprazole (PriLOSEC) 20 mg delayed release capsule TAKE 1 CAPSULE BY MOUTH EVERY DAY (Patient taking differently: Take 40 mg by mouth daily) 90 capsule 2    Rhopressa 0.02 % SOLN INSTILL 1 DROP INTO RIGHT EYE ONCE A DAY      triamcinolone (KENALOG) 0.5 % cream APPLY TO AFFECTED AREA TWICE A DAY 30 g 2    cycloSPORINE, PF, (Cequa) 0.09 % SOLN Apply to eye 2 (two) times a day      fluticasone (FLONASE) 50 mcg/act nasal spray SPRAY 1 SPRAY INTO EACH NOSTRIL EVERY DAY 48 mL 1    gabapentin (NEURONTIN) 100 mg  "capsule Take 1 capsule (100 mg total) by mouth 3 (three) times a day 90 capsule 3     No current facility-administered medications for this visit.        Allergies   Allergen Reactions    Bempedoic Acid Other (See Comments)     Gout    Atorvastatin GI Intolerance     Other reaction(s): GI upset    Ezetimibe Other (See Comments)     myalgia  Other reaction(s): Other (See Comments)  Myalgia  Other reaction(s): Myalgia  myalgia    Statins Rash       Review of Systems   Constitutional: Negative.    Eyes:  Positive for visual disturbance.   Respiratory: Negative.     Cardiovascular: Negative.    Gastrointestinal: Negative.    Musculoskeletal:  Positive for neck pain and neck stiffness.   Neurological:  Positive for headaches.       Video Exam    Vitals:    02/14/24 1032   BP: 137/88   Pulse: 89   Temp: (!) 96.7 °F (35.9 °C)   SpO2: 97%   Weight: 89.7 kg (197 lb 12.8 oz)   Height: 5' 8\" (1.727 m)       Physical Exam  Constitutional:       Appearance: Normal appearance. He is not ill-appearing.   Pulmonary:      Effort: No respiratory distress.   Neurological:      Mental Status: He is alert. Mental status is at baseline.   Psychiatric:         Mood and Affect: Mood normal.         Behavior: Behavior normal.         Thought Content: Thought content normal.         Judgment: Judgment normal.          Visit Time  Total Visit Duration: 15 min        "

## 2024-02-15 DIAGNOSIS — K58.0 IRRITABLE BOWEL SYNDROME WITH DIARRHEA: ICD-10-CM

## 2024-02-15 RX ORDER — AMITRIPTYLINE HYDROCHLORIDE 10 MG/1
TABLET, FILM COATED ORAL
Qty: 180 TABLET | Refills: 2 | Status: SHIPPED | OUTPATIENT
Start: 2024-02-15

## 2024-03-29 ENCOUNTER — CONSULT (OUTPATIENT)
Dept: FAMILY MEDICINE CLINIC | Facility: CLINIC | Age: 89
End: 2024-03-29
Payer: COMMERCIAL

## 2024-03-29 VITALS
DIASTOLIC BLOOD PRESSURE: 80 MMHG | SYSTOLIC BLOOD PRESSURE: 144 MMHG | RESPIRATION RATE: 16 BRPM | OXYGEN SATURATION: 95 % | TEMPERATURE: 98 F | HEART RATE: 91 BPM | HEIGHT: 68 IN | WEIGHT: 207.4 LBS | BODY MASS INDEX: 31.43 KG/M2

## 2024-03-29 DIAGNOSIS — D64.9 ANEMIA, UNSPECIFIED TYPE: ICD-10-CM

## 2024-03-29 DIAGNOSIS — H40.9 GLAUCOMA OF RIGHT EYE, UNSPECIFIED GLAUCOMA TYPE: ICD-10-CM

## 2024-03-29 DIAGNOSIS — N18.31 STAGE 3A CHRONIC KIDNEY DISEASE (HCC): ICD-10-CM

## 2024-03-29 DIAGNOSIS — I10 BENIGN ESSENTIAL HYPERTENSION: Chronic | ICD-10-CM

## 2024-03-29 DIAGNOSIS — I73.9 PERIPHERAL VASCULAR DISEASE (HCC): ICD-10-CM

## 2024-03-29 DIAGNOSIS — I42.9 SECONDARY CARDIOMYOPATHY (HCC): ICD-10-CM

## 2024-03-29 DIAGNOSIS — I48.21 PERMANENT ATRIAL FIBRILLATION (HCC): ICD-10-CM

## 2024-03-29 DIAGNOSIS — E87.1 HYPONATREMIA: ICD-10-CM

## 2024-03-29 DIAGNOSIS — Z01.818 PRE-OPERATIVE EXAMINATION: Primary | ICD-10-CM

## 2024-03-29 PROBLEM — R55 SYNCOPE: Status: RESOLVED | Noted: 2018-03-12 | Resolved: 2024-03-29

## 2024-03-29 PROBLEM — U07.1 COVID-19: Status: RESOLVED | Noted: 2023-09-06 | Resolved: 2024-03-29

## 2024-03-29 PROCEDURE — G2211 COMPLEX E/M VISIT ADD ON: HCPCS | Performed by: NURSE PRACTITIONER

## 2024-03-29 PROCEDURE — 99214 OFFICE O/P EST MOD 30 MIN: CPT | Performed by: NURSE PRACTITIONER

## 2024-03-29 NOTE — ASSESSMENT & PLAN NOTE
Lab Results   Component Value Date    EGFR 59 (L) 03/20/2024    EGFR 62 09/26/2023    EGFR 62 09/16/2023    CREATININE 1.15 03/20/2024    CREATININE 1.04 09/26/2023    CREATININE 1.04 09/16/2023     Creatinine baseline 1.0-1.1.  EGFR 59-62.

## 2024-03-29 NOTE — ASSESSMENT & PLAN NOTE
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   Echo 12/21/23:  Mild left ventricular hypertrophy with basal septal hypertrophy LV   systolic function is normal, EF~55-60% and mild diastolic dysfunction   Mildly dilated right ventricle with mildly reduced RV systolic function   Mild biatrial enlargement   Mildly ectatic thoracic aorta   Mild pulmonary hypertension, PA ~35-40 mmHg   Moderate aortic sclerosis with trivial to mild aortic insufficiency   Mild mitral sclerosis with mild mitral regurgitation

## 2024-03-29 NOTE — ASSESSMENT & PLAN NOTE
Atrial fibrillation with RVR s/p ADRIANNE cardioversion September 30, 2019.   Now with chronic a. Fib, rate controlled, on bisoprolol and eliquis.

## 2024-03-29 NOTE — ASSESSMENT & PLAN NOTE
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   3/13/2018 carotid duplex:  RIGHT:  There is <50% stenosis noted in the internal carotid artery. Plaque is  calcified, heterogenous and irregular.  Vertebral artery flow is antegrade. There is no significant subclavian artery  disease.  LEFT:  There is a 50-69% stenosis in the internal carotid artery. Plaque is calcified,  heterogenous and irregular.  Vertebral artery flow is antegrade. There is no significant subclavian artery  disease.

## 2024-03-29 NOTE — PROGRESS NOTES
FAMILY PRACTICE OFFICE VISIT       NAME: Jose Mullins  AGE: 89 y.o. SEX: male       : 7/15/1931        MRN: [id number]    Assessment and Plan   1. Pre-operative examination  Right eye glaucoma surgery on 4/3 with Dr. Garcia, [facility]. Under MAC anesthesia.    His cardiologist, Dr. Valdez, has cleared him for surgery.   As per cardiology:  Eliquis stop for 3 days.   Restart same day if acceptable to ophthalmologist.      Chronic conditions are stable and he is clear for surgery as scheduled.     2. Glaucoma of right eye, unspecified glaucoma type    3. Permanent atrial fibrillation (HCC)  Assessment & Plan:  Atrial fibrillation with RVR s/p ADRIANNE cardioversion 2019.   Now with chronic a. Fib, rate controlled, on bisoprolol and eliquis.      4. Peripheral vascular disease (HCC)  Assessment & Plan:  17: celiac and mesenteric duplex study:>70% stenosis identified in the proximal celiac artery.   3/13/2018 carotid duplex:  RIGHT:  There is <50% stenosis noted in the internal carotid artery. Plaque is  calcified, heterogenous and irregular.  Vertebral artery flow is antegrade. There is no significant subclavian artery  disease.  LEFT:  There is a 50-69% stenosis in the internal carotid artery. Plaque is calcified,  heterogenous and irregular.  Vertebral artery flow is antegrade. There is no significant subclavian artery  disease.      5. 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 PVC ablation completed in 2013.   Echo 23:  Mild left ventricular hypertrophy with basal septal hypertrophy LV   systolic function is normal, EF~55-60% and mild diastolic dysfunction   Mildly dilated right ventricle with mildly reduced RV systolic function   Mild biatrial enlargement   Mildly ectatic thoracic aorta   Mild pulmonary hypertension, PA ~35-40 mmHg   Moderate aortic sclerosis with trivial to mild aortic insufficiency   Mild mitral  sclerosis with mild mitral regurgitation       6. Stage 3a chronic kidney disease (HCC)  Assessment & Plan:  Lab Results   Component Value Date    EGFR 59 (L) 03/20/2024    EGFR 62 09/26/2023    EGFR 62 09/16/2023    CREATININE 1.15 03/20/2024    CREATININE 1.04 09/26/2023    CREATININE 1.04 09/16/2023     Creatinine baseline 1.0-1.1.  EGFR 59-62.      7. Hyponatremia  Assessment & Plan:  Sodium stable at 139.      8. Benign essential hypertension  Assessment & Plan:  Stable blood pressure on current medications.       9. Anemia, unspecified type  Assessment & Plan:  Hemoglobin stable at 11.6.             Chief Complaint     Chief Complaint   Patient presents with    Pre-op Exam     Eye surgery right eye        History of Present Illness     Jose Mullins is a 89 year old male presenting today for pre-operative examination.     Right eye glaucoma surgery on 4/3 with Dr. Garcia, [facility].     His cardiologist, Dr. Valdez, has cleared him for surgery.   Under MAC anesthesia.    No recent cold symptoms or flu like symptoms.   No prior anesthesia/sedation problems.     Denies dizziness, shortness of breath, or chest pain.        Accompanied by his daughter today.         Review of Systems   Review of Systems   Constitutional: Negative.    HENT: Negative.     Eyes:  Positive for visual disturbance.   Respiratory: Negative.     Cardiovascular: Negative.    Gastrointestinal: Negative.    Genitourinary: Negative.    Musculoskeletal: Negative.    Skin: Negative.    Neurological: Negative.    Hematological: Negative.    Psychiatric/Behavioral: Negative.         I have reviewed the patient's medical history in detail; there are no changes to the history as noted in the electronic medical record.      Patient Active Problem List   Diagnosis    Cholecystitis with cholelithiasis    Allergic rhinitis    Anemia    Benign essential hypertension    GERD (gastroesophageal reflux disease)    Glaucoma    Hiatal  hernia    Hyperlipidemia    Joint pain, knee    Left lumbar radiculopathy    Peripheral vascular disease (HCC)    Stage 3a chronic kidney disease (HCC)    Permanent atrial fibrillation (HCC)    Peripheral edema    Arthritis of carpometacarpal (CMC) joint of right thumb    Paresthesia    Hyponatremia    Other insomnia    Anxiety    Carpal tunnel syndrome    S/P ablation of ventricular arrhythmia    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HCC)    Pain in both feet    Benign prostatic hyperplasia    Irritable bowel syndrome with diarrhea    Ambulatory dysfunction    Frontal headache    Other dysphagia    Corkscrew esophagus    Dermatitis    Elevated TSH    Episodic tension-type headache, not intractable      Past Medical History:   Diagnosis Date    A-fib (HCC)     Anemia     Carpal tunnel syndrome, unspecified upper limb     Cataract     last assessed: 8/18/2012    COVID-19 09/06/2023    GERD (gastroesophageal reflux disease)     Glaucoma     Hypertension     last assessed: 8/23/2012    Intervertebral disc disease     PVD (peripheral vascular disease) ([facility])     Syncope 03/12/2018      Past Surgical History:   Procedure Laterality Date    CATARACT EXTRACTION      COLONOSCOPY      HERNIA REPAIR      NEUROPLASTY / TRANSPOSITION MEDIAN NERVE AT CARPAL TUNNEL      MD LAPAROSCOPY SURG CHOLECYSTECTOMY N/A 09/14/2016    Procedure: LAPAROSCOPIC CHOLECYSTECTOMY ;  Surgeon: Christo Cristina MD;  Location: [facility];  Service: General    UPPER GASTROINTESTINAL ENDOSCOPY        Family History   Problem Relation Age of Onset    Glaucoma Mother     Cancer Father     Heart disease Father     Glaucoma Father     Alzheimer's disease Family     Heart attack Family       Social History     Socioeconomic History    Marital status:      Spouse name: Not on file    Number of children: Not on file    Years of education: Not on file    Highest education level: Not on file   Occupational History    Occupation: retired   Tobacco Use    Smoking status:  "Former    Smokeless tobacco: Never    Tobacco comments:     quit 30 years ago    Vaping Use    Vaping status: Former   Substance and Sexual Activity    Alcohol use: Not Currently    Drug use: No    Sexual activity: Not Currently   Other Topics Concern    Not on file   Social History Narrative    Morning person     Social Determinants of Health     Financial Resource Strain: Low Risk  (7/26/2023)    Overall Financial Resource Strain (CARDIA)     Difficulty of Paying Living Expenses: Not hard at all   Food Insecurity: No Food Insecurity (9/6/2023)    Hunger Vital Sign     Worried About Running Out of Food in the Last Year: Never true     Ran Out of Food in the Last Year: Never true   Transportation Needs: No Transportation Needs (9/6/2023)    PRAPARE - Transportation     Lack of Transportation (Medical): No     Lack of Transportation (Non-Medical): No   Physical Activity: Not on file   Stress: Not on file   Social Connections: Not on file   Intimate Partner Violence: Not on file   Housing Stability: Low Risk  (9/6/2023)    Housing Stability Vital Sign     Unable to Pay for Housing in the Last Year: No     Number of Places Lived in the Last Year: 1     Unstable Housing in the Last Year: No          Objective     Vitals:    03/29/24 0729   BP: 144/80   BP Location: Left arm   Patient Position: Sitting   Cuff Size: Standard   Pulse: 91   Resp: 16   Temp: 98 °F (36.7 °C)   TempSrc: Temporal   SpO2: 95%   Weight: 94.1 kg (207 lb 6.4 oz)   Height: 5' 8\" (1.727 m)     Wt Readings from Last 3 Encounters:   03/29/24 94.1 kg (207 lb 6.4 oz)   02/14/24 89.7 kg (197 lb 12.8 oz)   01/08/24 90.7 kg (200 lb)     Physical Exam  Vitals and nursing note reviewed.   Constitutional:       General: He is not in acute distress.     Appearance: Normal appearance. He is not ill-appearing.   HENT:      Head: Atraumatic.      Right Ear: Tympanic membrane normal.      Left Ear: Tympanic membrane normal.      Mouth/Throat:      Mouth: Mucous " membranes are moist.      Pharynx: Oropharynx is clear.   Eyes:      Conjunctiva/sclera: Conjunctivae normal.      Pupils: Pupils are equal, round, and reactive to light.      Comments: Right eye is cloudy appearing.   Cardiovascular:      Rate and Rhythm: Normal rate. Rhythm irregular.      Heart sounds: No murmur heard.  Pulmonary:      Effort: Pulmonary effort is normal.      Breath sounds: Rales (few crackles left base) present.   Abdominal:      General: Bowel sounds are normal.      Palpations: Abdomen is soft.      Tenderness: There is no abdominal tenderness.   Musculoskeletal:      Cervical back: Normal range of motion and neck supple.      Right lower leg: No edema.      Left lower leg: No edema.      Comments: Wearing compression socks.   Lymphadenopathy:      Cervical: No cervical adenopathy.   Skin:     General: Skin is warm and dry.      Findings: No rash.   Neurological:      Mental Status: He is alert.      Gait: Gait abnormal (ambulating with cane).   Psychiatric:         Mood and Affect: Mood normal.      Comments: Decreased hearing            ALLERGIES:  Allergies   Allergen Reactions    Bempedoic Acid Other (See Comments)     Gout    Atorvastatin GI Intolerance     Other reaction(s): GI upset    Ezetimibe Other (See Comments)     myalgia  Other reaction(s): Other (See Comments)  Myalgia  Other reaction(s): Myalgia  myalgia    Statins Rash       Current Medications     Current Outpatient Medications   Medication Sig Dispense Refill    allopurinol (ZYLOPRIM) 100 mg tablet Take 100 mg by mouth daily      amitriptyline (ELAVIL) 10 mg tablet TAKE 2 TABLETS BY MOUTH AT BEDTIME 180 tablet 2    amLODIPine (NORVASC) 5 mg tablet Take 5 mg by mouth daily at bedtime      apixaban (ELIQUIS) 2.5 mg Take by mouth 2 (two) times a day      bisoprolol (ZEBETA) 5 mg tablet 2.5 mg       brimonidine tartrate 0.2 % ophthalmic solution       cholecalciferol (VITAMIN D3) 1,000 units tablet Take 2,000 Units by mouth  daily       co-enzyme Q-10 30 MG capsule Take 100 mg by mouth daily        diphenhydrAMINE-acetaminophen (TYLENOL PM)  MG TABS Take 2 tablets by mouth daily at bedtime as needed for sleep      Dorzolamide HCl-Timolol Mal PF 2-0.5 % SOLN ONE DROP INTO EACH EYE TWICE DAILY      finasteride (PROSCAR) 5 mg tablet TAKE 1 TABLET (5 MG TOTAL) BY MOUTH DAILY. 90 tablet 3    gabapentin (NEURONTIN) 100 mg capsule Take 1 capsule (100 mg total) by mouth 3 (three) times a day 90 capsule 3    Garlic 1000 MG CAPS Take 1,000 mg by mouth      indapamide (LOZOL) 1.25 mg tablet Take 1.25 mg by mouth daily As directed      latanoprost (XALATAN) 0.005 % ophthalmic solution Administer 1 drop to both eyes daily at bedtime      multivitamin-iron-minerals-folic acid (CENTRUM) chewable tablet Chew 1 tablet daily.      omeprazole (PriLOSEC) 20 mg delayed release capsule TAKE 1 CAPSULE BY MOUTH EVERY DAY (Patient taking differently: Take 40 mg by mouth daily) 90 capsule 2    Rhopressa 0.02 % SOLN INSTILL 1 DROP INTO RIGHT EYE ONCE A DAY      triamcinolone (KENALOG) 0.5 % cream APPLY TO AFFECTED AREA TWICE A DAY 30 g 2     No current facility-administered medications for this visit.         Health Maintenance     Health Maintenance   Topic Date Due    SLP PLAN OF CARE  Never done    Pneumococcal Vaccine: 65+ Years (1 of 2 - PCV) Never done    Zoster Vaccine (1 of 2) Never done    Falls: Plan of Care  Never done    OT PLAN OF CARE  12/05/2020    Influenza Vaccine (1) 09/01/2023    COVID-19 Vaccine (3 - 2023-24 season) 09/01/2023    Medicare Annual Wellness Visit (AWV)  07/28/2024    Depression Screening  02/14/2025    Fall Risk  03/29/2025    Hepatitis C Screening  Completed    HIB Vaccine  Aged Out    IPV Vaccine  Aged Out    Hepatitis A Vaccine  Aged Out    Meningococcal ACWY Vaccine  Aged Out    HPV Vaccine  Aged Out     Immunization History   Administered Date(s) Administered    COVID-19 PFIZER VACCINE 0.3 ML IM 04/07/2021, 05/01/2021     INFLUENZA 09/23/2014    Influenza Split High Dose Preservative Free IM 09/23/2014, 12/17/2015    Influenza, seasonal, injectable 10/24/2003, 11/08/2005, 11/13/2007, 10/16/2008, 12/16/2009, 11/10/2010, 11/18/2011, 09/24/2012, 09/25/2013    Td (adult), adsorbed 11/01/2003    Tdap 03/12/2018       MARGARITA Mohan

## 2024-04-12 DIAGNOSIS — K21.9 GASTROESOPHAGEAL REFLUX DISEASE, UNSPECIFIED WHETHER ESOPHAGITIS PRESENT: ICD-10-CM

## 2024-04-12 RX ORDER — OMEPRAZOLE 20 MG/1
20 CAPSULE, DELAYED RELEASE ORAL DAILY
Qty: 30 CAPSULE | Refills: 0 | Status: SHIPPED | OUTPATIENT
Start: 2024-04-12

## 2024-04-12 NOTE — TELEPHONE ENCOUNTER
Spoke with daughter, she wants to see if the PCP will write the script for medication. It's getting harder to take him out to Dr's appointments

## 2024-04-19 ENCOUNTER — RA CDI HCC (OUTPATIENT)
Dept: OTHER | Facility: HOSPITAL | Age: 89
End: 2024-04-19

## 2024-04-26 ENCOUNTER — TELEMEDICINE (OUTPATIENT)
Dept: FAMILY MEDICINE CLINIC | Facility: CLINIC | Age: 89
End: 2024-04-26
Payer: COMMERCIAL

## 2024-04-26 VITALS
OXYGEN SATURATION: 94 % | BODY MASS INDEX: 30.41 KG/M2 | TEMPERATURE: 95.3 F | SYSTOLIC BLOOD PRESSURE: 133 MMHG | WEIGHT: 200 LBS | DIASTOLIC BLOOD PRESSURE: 73 MMHG

## 2024-04-26 DIAGNOSIS — K21.9 GASTROESOPHAGEAL REFLUX DISEASE, UNSPECIFIED WHETHER ESOPHAGITIS PRESENT: ICD-10-CM

## 2024-04-26 DIAGNOSIS — G47.09 OTHER INSOMNIA: ICD-10-CM

## 2024-04-26 DIAGNOSIS — G44.89 OTHER HEADACHE SYNDROME: Primary | ICD-10-CM

## 2024-04-26 DIAGNOSIS — K58.0 IRRITABLE BOWEL SYNDROME WITH DIARRHEA: ICD-10-CM

## 2024-04-26 PROCEDURE — 99213 OFFICE O/P EST LOW 20 MIN: CPT | Performed by: FAMILY MEDICINE

## 2024-04-26 PROCEDURE — G2211 COMPLEX E/M VISIT ADD ON: HCPCS | Performed by: FAMILY MEDICINE

## 2024-04-26 RX ORDER — AMITRIPTYLINE HYDROCHLORIDE 10 MG/1
TABLET, FILM COATED ORAL
Qty: 270 TABLET | Refills: 2 | Status: SHIPPED | OUTPATIENT
Start: 2024-04-26

## 2024-04-26 RX ORDER — OMEPRAZOLE 20 MG/1
20 CAPSULE, DELAYED RELEASE ORAL DAILY
Qty: 90 CAPSULE | Refills: 1 | Status: SHIPPED | OUTPATIENT
Start: 2024-04-26

## 2024-04-26 NOTE — ASSESSMENT & PLAN NOTE
Headache syndrome.  We discussed possible etiologies of his headache.  Due to the fact that he is also having some insomnia episode very decided to increase his amitriptyline to 30 mg at bedtime.  His cardiologist felt he was safe to use this medication to a maximum of 50 mg.  They will report within 2 weeks if symptoms persist without improvement whereby we may also consider increasing his gabapentin which is currently 100 mg twice daily.

## 2024-04-26 NOTE — PROGRESS NOTES
Virtual Regular Visit    Verification of patient location:    Patient is located at Home in the following state in which I hold an active license [place]      Assessment/Plan:    Problem List Items Addressed This Visit       GERD (gastroesophageal reflux disease)    Relevant Medications    omeprazole (PriLOSEC) 20 mg delayed release capsule    Other insomnia    Irritable bowel syndrome with diarrhea    Relevant Medications    amitriptyline (ELAVIL) 10 mg tablet    Other headache syndrome - Primary     Headache syndrome.  We discussed possible etiologies of his headache.  Due to the fact that he is also having some insomnia episode very decided to increase his amitriptyline to 30 mg at bedtime.  His cardiologist felt he was safe to use this medication to a maximum of 50 mg.  They will report within 2 weeks if symptoms persist without improvement whereby we may also consider increasing his gabapentin which is currently 100 mg twice daily.         Relevant Medications    amitriptyline (ELAVIL) 10 mg tablet            Reason for visit is   Chief Complaint   Patient presents with    Insomnia    Headache     Patient daughter states its been going on since Nov 2023        Encounter provider Moises Ham MD      Recent Visits  No visits were found meeting these conditions.  Showing recent visits within past 7 days and meeting all other requirements  Today's Visits  Date Type Provider Dept   04/26/24 Telemedicine Moises Ham MD [facility]   Showing today's visits and meeting all other requirements  Future Appointments  No visits were found meeting these conditions.  Showing future appointments within next 150 days and meeting all other requirements       The patient was identified by name and date of birth. Jose Mullins was informed that this is a telemedicine visit and that the visit is being conducted through the [employer] platform. He agrees to proceed..  My office door was closed. No one else was in the room.   He acknowledged consent and understanding of privacy and security of the video platform. The patient has agreed to participate and understands they can discontinue the visit at any time.    Patient is aware this is a billable service.     Subjective  Jose Mullins is a 89 y.o. male     Patient having telemedicine visit today due to to his experience of having more frequent headaches.  Patient is accompanied by his daughter.  She states that he recently had a laser eye procedure to decrease the patient's blood pressure is stable at this time and he will continue current regimen of medications that initially went from 44 down to 15 but subsequently went back up to 19 about the time patient's headaches occurred.  He was restarted on eyedrops that he had been taking prior to laser procedure.  He describes headaches that originate in the occiput area and radiate around the side of his face to his right eye.  Patient denies any nausea or vomiting    Patient's daughter also requested refill on his PPI medication as previously recommended by his gastroenterologist.    Headache       Past Medical History:   Diagnosis Date    A-fib (HCC)     Anemia     Carpal tunnel syndrome, unspecified upper limb     Cataract     last assessed: 8/18/2012    COVID-19 09/06/2023    GERD (gastroesophageal reflux disease)     Glaucoma     Hypertension     last assessed: 8/23/2012    Intervertebral disc disease     PVD (peripheral vascular disease) ([facility])     Syncope 03/12/2018       Past Surgical History:   Procedure Laterality Date    CATARACT EXTRACTION      COLONOSCOPY      HERNIA REPAIR      NEUROPLASTY / TRANSPOSITION MEDIAN NERVE AT CARPAL TUNNEL      VT LAPAROSCOPY SURG CHOLECYSTECTOMY N/A 09/14/2016    Procedure: LAPAROSCOPIC CHOLECYSTECTOMY ;  Surgeon: Christo Cristina MD;  Location: [facility];  Service: General    UPPER GASTROINTESTINAL ENDOSCOPY         Current Outpatient Medications   Medication Sig Dispense Refill    allopurinol  (ZYLOPRIM) 100 mg tablet Take 100 mg by mouth daily      amitriptyline (ELAVIL) 10 mg tablet TAKE 3 TABLETS BY MOUTH AT BEDTIME 270 tablet 2    amLODIPine (NORVASC) 5 mg tablet Take 5 mg by mouth daily at bedtime      apixaban (ELIQUIS) 2.5 mg Take by mouth 2 (two) times a day      brimonidine tartrate 0.2 % ophthalmic solution       cholecalciferol (VITAMIN D3) 1,000 units tablet Take 2,000 Units by mouth daily       co-enzyme Q-10 30 MG capsule Take 100 mg by mouth daily        diphenhydrAMINE-acetaminophen (TYLENOL PM)  MG TABS Take 2 tablets by mouth daily at bedtime as needed for sleep      Dorzolamide HCl-Timolol Mal PF 2-0.5 % SOLN ONE DROP INTO EACH EYE TWICE DAILY      finasteride (PROSCAR) 5 mg tablet TAKE 1 TABLET (5 MG TOTAL) BY MOUTH DAILY. 90 tablet 3    gabapentin (NEURONTIN) 100 mg capsule Take 1 capsule (100 mg total) by mouth 3 (three) times a day 90 capsule 3    Garlic 1000 MG CAPS Take 1,000 mg by mouth      indapamide (LOZOL) 1.25 mg tablet Take 1.25 mg by mouth daily As directed      latanoprost (XALATAN) 0.005 % ophthalmic solution Administer 1 drop to both eyes daily at bedtime      multivitamin-iron-minerals-folic acid (CENTRUM) chewable tablet Chew 1 tablet daily.      omeprazole (PriLOSEC) 20 mg delayed release capsule Take 1 capsule (20 mg total) by mouth daily 90 capsule 1    Rhopressa 0.02 % SOLN INSTILL 1 DROP INTO RIGHT EYE ONCE A DAY      triamcinolone (KENALOG) 0.5 % cream APPLY TO AFFECTED AREA TWICE A DAY 30 g 2    bisoprolol (ZEBETA) 5 mg tablet 2.5 mg        No current facility-administered medications for this visit.        Allergies   Allergen Reactions    Bempedoic Acid Other (See Comments)     Gout    Atorvastatin GI Intolerance     Other reaction(s): GI upset    Ezetimibe Other (See Comments)     myalgia  Other reaction(s): Other (See Comments)  Myalgia  Other reaction(s): Myalgia  myalgia    Statins Rash       Review of Systems   Constitutional: Negative.     Neurological:  Positive for headaches.   Psychiatric/Behavioral:  Positive for sleep disturbance.        Video Exam    Vitals:    04/26/24 0956   BP: 133/73   BP Location: Left arm   Temp: (!) 95.3 °F (35.2 °C)   TempSrc: Temporal   SpO2: 94%   Weight: 90.7 kg (200 lb)       Physical Exam  Constitutional:       General: He is not in acute distress.     Appearance: Normal appearance. He is not ill-appearing.   Pulmonary:      Effort: No respiratory distress.   Neurological:      Mental Status: He is alert. Mental status is at baseline.   Psychiatric:         Mood and Affect: Mood normal.         Behavior: Behavior normal.         Thought Content: Thought content normal.         Judgment: Judgment normal.          Visit Time  Total Visit Duration: 15 min

## 2024-06-03 DIAGNOSIS — R60.0 LOCALIZED EDEMA: ICD-10-CM

## 2024-06-03 DIAGNOSIS — I10 ESSENTIAL (PRIMARY) HYPERTENSION: ICD-10-CM

## 2024-06-03 RX ORDER — INDAPAMIDE 1.25 MG/1
1.25 TABLET ORAL DAILY
Qty: 90 TABLET | Refills: 1 | Status: SHIPPED | OUTPATIENT
Start: 2024-06-03

## 2024-06-10 DIAGNOSIS — I10 ESSENTIAL (PRIMARY) HYPERTENSION: ICD-10-CM

## 2024-06-10 RX ORDER — AMLODIPINE BESYLATE 2.5 MG/1
TABLET ORAL
Qty: 36 TABLET | Refills: 8 | Status: SHIPPED | OUTPATIENT
Start: 2024-06-10

## 2024-06-14 ENCOUNTER — TELEMEDICINE (OUTPATIENT)
Dept: FAMILY MEDICINE CLINIC | Facility: CLINIC | Age: 89
End: 2024-06-14
Payer: COMMERCIAL

## 2024-06-14 VITALS
WEIGHT: 206 LBS | SYSTOLIC BLOOD PRESSURE: 147 MMHG | DIASTOLIC BLOOD PRESSURE: 83 MMHG | TEMPERATURE: 96.3 F | BODY MASS INDEX: 31.22 KG/M2 | HEART RATE: 81 BPM | OXYGEN SATURATION: 94 % | HEIGHT: 68 IN

## 2024-06-14 DIAGNOSIS — G44.89 OTHER HEADACHE SYNDROME: Primary | ICD-10-CM

## 2024-06-14 DIAGNOSIS — H40.9 GLAUCOMA OF RIGHT EYE, UNSPECIFIED GLAUCOMA TYPE: ICD-10-CM

## 2024-06-14 PROCEDURE — 99213 OFFICE O/P EST LOW 20 MIN: CPT | Performed by: FAMILY MEDICINE

## 2024-06-14 PROCEDURE — G2211 COMPLEX E/M VISIT ADD ON: HCPCS | Performed by: FAMILY MEDICINE

## 2024-06-14 NOTE — PROGRESS NOTES
Virtual Regular Visit    Verification of patient location:    Patient is located at Home in the following state in which I hold an active license [place]      Assessment/Plan:    Problem List Items Addressed This Visit       Glaucoma    Relevant Orders    MRI brain w wo contrast    Other headache syndrome - Primary     Headache.  Patient's ophthalmologist requested that we order MRI of his brain with and without contrast for further evaluation of his elevated eye pressure and headaches.  Order was placed in epic.  We will make further recommendations pending results of test.  We discussed with the patient's daughter regarding titrating his amitriptyline or gabapentin if needed for headaches if no other explanation is found after having MRI         Relevant Orders    MRI brain w wo contrast            Reason for visit is   Chief Complaint   Patient presents with    Follow-up    Headache        Encounter provider Moises Ham MD      Recent Visits  No visits were found meeting these conditions.  Showing recent visits within past 7 days and meeting all other requirements  Today's Visits  Date Type Provider Dept   06/14/24 Telemedicine Moises Ham MD [facility]   Showing today's visits and meeting all other requirements  Future Appointments  No visits were found meeting these conditions.  Showing future appointments within next 150 days and meeting all other requirements       The patient was identified by name and date of birth. Joseramos Mullins was informed that this is a telemedicine visit and that the visit is being conducted through the xCloud platform. He agrees to proceed..  My office door was closed. No one else was in the room.  He acknowledged consent and understanding of privacy and security of the video platform. The patient has agreed to participate and understands they can discontinue the visit at any time.    Patient is aware this is a billable service.     Subjective  Jose CAIN Susanna is a 89 y.o.  male      Patient having telemedicine visit due to experiencing chronic headaches.  Patient describes dull headache that may occur 5 out of 7 days a week.  Symptoms usually begin in along occiput area and radiate up to behind his right eye.  Patient has been under care of ophthalmologist for glaucoma.  He had laser procedure that initially lowered his eye pressure and his eyedrops were discontinued.  Unfortunately his pressure increased once again up to 19 and he was restarted on eyedrops for his glaucoma.  Throughout this time patient's headaches have not fluctuated much in terms of severity or frequency.  He describes possibly a 6 out of 10 severity of headache.  He does continue to have some decreased visual acuity of his right eye.  He denies any nausea or vomiting.  He takes over-the-counter Tylenol as needed for headaches once or twice daily    Headache       Past Medical History:   Diagnosis Date    A-fib ([facility])     Anemia     Carpal tunnel syndrome, unspecified upper limb     Cataract     last assessed: 8/18/2012    COVID-19 09/06/2023    GERD (gastroesophageal reflux disease)     Glaucoma     Hypertension     last assessed: 8/23/2012    Intervertebral disc disease     PVD (peripheral vascular disease) ([facility])     Syncope 03/12/2018       Past Surgical History:   Procedure Laterality Date    CATARACT EXTRACTION      COLONOSCOPY      HERNIA REPAIR      NEUROPLASTY / TRANSPOSITION MEDIAN NERVE AT CARPAL TUNNEL      NC LAPAROSCOPY SURG CHOLECYSTECTOMY N/A 09/14/2016    Procedure: LAPAROSCOPIC CHOLECYSTECTOMY ;  Surgeon: Christo Cristina MD;  Location: [facility];  Service: General    UPPER GASTROINTESTINAL ENDOSCOPY         Current Outpatient Medications   Medication Sig Dispense Refill    allopurinol (ZYLOPRIM) 100 mg tablet Take 100 mg by mouth daily      amitriptyline (ELAVIL) 10 mg tablet TAKE 3 TABLETS BY MOUTH AT BEDTIME 270 tablet 2    amLODIPine (NORVASC) 2.5 mg tablet TAKE 1 TABLET AT NIGHT MONDAY WEDNESDAY  AND FRIDAY AND AS NEEDED SBP >150 36 tablet 8    amLODIPine (NORVASC) 5 mg tablet Take 5 mg by mouth daily at bedtime      apixaban (ELIQUIS) 2.5 mg Take by mouth 2 (two) times a day      bisoprolol (ZEBETA) 5 mg tablet 2.5 mg       brimonidine tartrate 0.2 % ophthalmic solution       cholecalciferol (VITAMIN D3) 1,000 units tablet Take 2,000 Units by mouth daily       co-enzyme Q-10 30 MG capsule Take 100 mg by mouth daily        diphenhydrAMINE-acetaminophen (TYLENOL PM)  MG TABS Take 2 tablets by mouth daily at bedtime as needed for sleep      Dorzolamide HCl-Timolol Mal PF 2-0.5 % SOLN ONE DROP INTO EACH EYE TWICE DAILY      finasteride (PROSCAR) 5 mg tablet TAKE 1 TABLET (5 MG TOTAL) BY MOUTH DAILY. 90 tablet 3    gabapentin (NEURONTIN) 100 mg capsule Take 1 capsule (100 mg total) by mouth 3 (three) times a day 90 capsule 3    Garlic 1000 MG CAPS Take 1,000 mg by mouth      indapamide (LOZOL) 1.25 mg tablet TAKE 1 TABLET BY MOUTH DAILY OR AS DIRECTED 90 tablet 1    latanoprost (XALATAN) 0.005 % ophthalmic solution Administer 1 drop to both eyes daily at bedtime      multivitamin-iron-minerals-folic acid (CENTRUM) chewable tablet Chew 1 tablet daily.      omeprazole (PriLOSEC) 20 mg delayed release capsule Take 1 capsule (20 mg total) by mouth daily 90 capsule 1    Rhopressa 0.02 % SOLN INSTILL 1 DROP INTO RIGHT EYE ONCE A DAY      triamcinolone (KENALOG) 0.5 % cream APPLY TO AFFECTED AREA TWICE A DAY 30 g 2     No current facility-administered medications for this visit.        Allergies   Allergen Reactions    Bempedoic Acid Other (See Comments)     Gout    Atorvastatin GI Intolerance     Other reaction(s): GI upset    Ezetimibe Other (See Comments)     myalgia  Other reaction(s): Other (See Comments)  Myalgia  Other reaction(s): Myalgia  myalgia    Statins Rash       Review of Systems   Constitutional: Negative.    HENT: Negative.     Eyes:  Positive for visual disturbance.   Respiratory: Negative.    "  Cardiovascular: Negative.    Gastrointestinal: Negative.    Neurological:  Positive for headaches.       Video Exam    Vitals:    06/14/24 1134   BP: 147/83   Pulse: 81   Temp: (!) 96.3 °F (35.7 °C)   SpO2: 94%   Weight: 93.4 kg (206 lb)   Height: 5' 8\" (1.727 m)       Physical Exam  Constitutional:       General: He is not in acute distress.     Appearance: Normal appearance. He is not ill-appearing.   Eyes:      Extraocular Movements: Extraocular movements intact.      Pupils: Pupils are equal, round, and reactive to light.   Pulmonary:      Effort: No respiratory distress.   Neurological:      Mental Status: He is alert. Mental status is at baseline.   Psychiatric:         Mood and Affect: Mood normal.         Behavior: Behavior normal.         Thought Content: Thought content normal.         Judgment: Judgment normal.          Visit Time  Total Visit Duration: 15 min        "

## 2024-06-14 NOTE — ASSESSMENT & PLAN NOTE
Headache.  Patient's ophthalmologist requested that we order MRI of his brain with and without contrast for further evaluation of his elevated eye pressure and headaches.  Order was placed in epic.  We will make further recommendations pending results of test.  We discussed with the patient's daughter regarding titrating his amitriptyline or gabapentin if needed for headaches if no other explanation is found after having MRI

## 2024-06-18 ENCOUNTER — OFFICE VISIT (OUTPATIENT)
Dept: OBGYN CLINIC | Facility: CLINIC | Age: 89
End: 2024-06-18
Payer: COMMERCIAL

## 2024-06-18 ENCOUNTER — HOME CARE VISIT (OUTPATIENT)
Dept: HOME HEALTH SERVICES | Facility: HOME HEALTHCARE | Age: 89
End: 2024-06-18

## 2024-06-18 ENCOUNTER — HOSPITAL ENCOUNTER (OUTPATIENT)
Dept: RADIOLOGY | Facility: HOSPITAL | Age: 89
Discharge: HOME | End: 2024-06-18
Attending: ORTHOPAEDIC SURGERY
Payer: COMMERCIAL

## 2024-06-18 ENCOUNTER — HOME HEALTH ADMISSION (OUTPATIENT)
Dept: HOME HEALTH SERVICES | Facility: HOME HEALTHCARE | Age: 89
End: 2024-06-18
Payer: COMMERCIAL

## 2024-06-18 VITALS
SYSTOLIC BLOOD PRESSURE: 157 MMHG | DIASTOLIC BLOOD PRESSURE: 95 MMHG | BODY MASS INDEX: 31.22 KG/M2 | WEIGHT: 206 LBS | HEART RATE: 90 BPM | HEIGHT: 68 IN

## 2024-06-18 DIAGNOSIS — R29.898 MUSCULAR DECONDITIONING: ICD-10-CM

## 2024-06-18 DIAGNOSIS — R26.9 GAIT DIFFICULTY: ICD-10-CM

## 2024-06-18 DIAGNOSIS — R53.81 PHYSICAL DECONDITIONING: ICD-10-CM

## 2024-06-18 DIAGNOSIS — M17.12 PRIMARY OSTEOARTHRITIS OF LEFT KNEE: Primary | ICD-10-CM

## 2024-06-18 DIAGNOSIS — M25.552 LEFT HIP PAIN: ICD-10-CM

## 2024-06-18 DIAGNOSIS — M17.12 PRIMARY OSTEOARTHRITIS OF LEFT KNEE: ICD-10-CM

## 2024-06-18 PROCEDURE — 73562 X-RAY EXAM OF KNEE 3: CPT

## 2024-06-18 PROCEDURE — 73502 X-RAY EXAM HIP UNI 2-3 VIEWS: CPT

## 2024-06-18 PROCEDURE — 99213 OFFICE O/P EST LOW 20 MIN: CPT | Performed by: ORTHOPAEDIC SURGERY

## 2024-06-18 NOTE — PROGRESS NOTES
Assessment:   Diagnosis ICD-10-CM Associated Orders   1. Primary osteoarthritis of left knee  M17.12 XR knee 3 vw left non injury     Referral to [facility]     Durable Medical Equipment      2. Left hip pain  M25.552 XR hip/pelv 2-3 vws left if performed      3. Muscular deconditioning  R29.898 Referral to [facility]      4. Physical deconditioning  R53.81 Referral to [facility]      5. Gait difficulty  R26.9 Referral to [facility]          Plan:  New x-rays of the left knee and hip were obtained today and reviewed with the patient and his daughter.  On exam patient has nonpainful range of motion of the knee, +10 degrees of extension to 110 degrees of flexion.  Patient has weakness with hip flexion.  Patient's daughter is worried about his deconditioning.  Home health physical therapy was ordered to help with deconditioning strengthening of the quads, glutes to help the patient with navigating his walker.  A hinged knee brace was provided for the patient's left knee due to giving out.  Do encourage the patient to do the quadriceps exercises provided by [facility] to ensure that the knee does not give out.  We will see the patient in 3 months for reevaluation    To do next visit:  Return in about 3 months (around 9/18/2024) for left knee.    The above stated was discussed in layman's terms and the patient expressed understanding.  All questions were answered to the patient's satisfaction.       Scribe Attestation      I,:  Phylicia Rojas am acting as a scribe while in the presence of the attending physician.:       I,:  Cathie Salgado MD personally performed the services described in this documentation    as scribed in my presence.:               Subjective:   Jose Mullins is a 89 y.o. male who presents today for a follow-up for his chronic left knee pain due to severe osteoarthritis and quadriceps weakness.  Patient is also having left hip  pain.  Patient ambulates with a walker.  Patient's daughter reports that he had been complaining of left hip pain and knee pain approximately 2 weeks ago.  He has pain in the left knee when he pivots when he is doing his walking.  At this time he does not have pain in either the left knee or hip, but his daughter would like it evaluated.  She has treated in the past with periodic cortisone injections for the left knee severe osteoarthritis.  He is accompanied with his daughter who is his caretaker.      Review of systems negative unless otherwise specified in HPI  Review of Systems   Constitutional:  Negative for chills, fever and unexpected weight change.   HENT:  Negative for hearing loss, nosebleeds and sore throat.    Eyes:  Negative for pain, redness and visual disturbance.   Respiratory:  Negative for cough, shortness of breath and wheezing.    Cardiovascular:  Negative for chest pain, palpitations and leg swelling.   Gastrointestinal:  Negative for abdominal pain, nausea and vomiting.   Endocrine: Negative for polydipsia and polyuria.   Genitourinary:  Negative for dysuria and hematuria.   Musculoskeletal:  Positive for arthralgias.   Skin:  Negative for rash and wound.   Neurological:  Negative for dizziness, light-headedness and headaches.   Psychiatric/Behavioral:  Negative for decreased concentration, dysphoric mood and suicidal ideas. The patient is not nervous/anxious.        Past Medical History:   Diagnosis Date    A-fib ([facility])     Anemia     Carpal tunnel syndrome, unspecified upper limb     Cataract     last assessed: 8/18/2012    COVID-19 09/06/2023    GERD (gastroesophageal reflux disease)     Glaucoma     Hypertension     last assessed: 8/23/2012    Intervertebral disc disease     PVD (peripheral vascular disease) ([facility])     Stomach disorder 11/2021    Syncope 03/12/2018       Past Surgical History:   Procedure Laterality Date    CATARACT EXTRACTION      COLONOSCOPY      HERNIA REPAIR       NEUROPLASTY / TRANSPOSITION MEDIAN NERVE AT CARPAL TUNNEL      OR LAPAROSCOPY SURG CHOLECYSTECTOMY N/A 09/14/2016    Procedure: LAPAROSCOPIC CHOLECYSTECTOMY ;  Surgeon: Christo Cristina MD;  Location: [facility];  Service: General    UPPER GASTROINTESTINAL ENDOSCOPY         Family History   Problem Relation Age of Onset    Glaucoma Mother     Cancer Father     Heart disease Father     Glaucoma Father     Alzheimer's disease Family     Heart attack Family        Social History     Occupational History    Occupation: retired   Tobacco Use    Smoking status: Former     Current packs/day: 0.50     Average packs/day: 0.5 packs/day for 35.0 years (17.5 ttl pk-yrs)     Types: Cigarettes    Smokeless tobacco: Former     Types: Snuff    Tobacco comments:     quit 30 years ago    Vaping Use    Vaping status: Former   Substance and Sexual Activity    Alcohol use: Not Currently    Drug use: Never    Sexual activity: Not Currently     Partners: Female         Current Outpatient Medications:     allopurinol (ZYLOPRIM) 100 mg tablet, Take 100 mg by mouth daily, Disp: , Rfl:     amitriptyline (ELAVIL) 10 mg tablet, TAKE 3 TABLETS BY MOUTH AT BEDTIME, Disp: 270 tablet, Rfl: 2    amLODIPine (NORVASC) 2.5 mg tablet, TAKE 1 TABLET AT NIGHT MONDAY WEDNESDAY AND FRIDAY AND AS NEEDED SBP >150, Disp: 36 tablet, Rfl: 8    amLODIPine (NORVASC) 5 mg tablet, Take 5 mg by mouth daily at bedtime, Disp: , Rfl:     apixaban (ELIQUIS) 2.5 mg, Take by mouth 2 (two) times a day, Disp: , Rfl:     bisoprolol (ZEBETA) 5 mg tablet, 2.5 mg , Disp: , Rfl:     brimonidine tartrate 0.2 % ophthalmic solution, , Disp: , Rfl:     cholecalciferol (VITAMIN D3) 1,000 units tablet, Take 2,000 Units by mouth daily , Disp: , Rfl:     co-enzyme Q-10 30 MG capsule, Take 100 mg by mouth daily  , Disp: , Rfl:     diphenhydrAMINE-acetaminophen (TYLENOL PM)  MG TABS, Take 2 tablets by mouth daily at bedtime as needed for sleep, Disp: , Rfl:     Dorzolamide HCl-Timolol  Mal PF 2-0.5 % SOLN, ONE DROP INTO EACH EYE TWICE DAILY, Disp: , Rfl:     finasteride (PROSCAR) 5 mg tablet, TAKE 1 TABLET (5 MG TOTAL) BY MOUTH DAILY., Disp: 90 tablet, Rfl: 3    gabapentin (NEURONTIN) 100 mg capsule, Take 1 capsule (100 mg total) by mouth 3 (three) times a day, Disp: 90 capsule, Rfl: 3    Garlic 1000 MG CAPS, Take 1,000 mg by mouth, Disp: , Rfl:     indapamide (LOZOL) 1.25 mg tablet, TAKE 1 TABLET BY MOUTH DAILY OR AS DIRECTED, Disp: 90 tablet, Rfl: 1    latanoprost (XALATAN) 0.005 % ophthalmic solution, Administer 1 drop to both eyes daily at bedtime, Disp: , Rfl:     multivitamin-iron-minerals-folic acid (CENTRUM) chewable tablet, Chew 1 tablet daily., Disp: , Rfl:     omeprazole (PriLOSEC) 20 mg delayed release capsule, Take 1 capsule (20 mg total) by mouth daily, Disp: 90 capsule, Rfl: 1    Rhopressa 0.02 % SOLN, INSTILL 1 DROP INTO RIGHT EYE ONCE A DAY, Disp: , Rfl:     triamcinolone (KENALOG) 0.5 % cream, APPLY TO AFFECTED AREA TWICE A DAY, Disp: 30 g, Rfl: 2    Allergies   Allergen Reactions    Bempedoic Acid Other (See Comments)     Gout    Atorvastatin GI Intolerance     Other reaction(s): GI upset    Ezetimibe Other (See Comments)     myalgia  Other reaction(s): Other (See Comments)  Myalgia  Other reaction(s): Myalgia  myalgia    Statins Rash            Vitals:    06/18/24 0843   BP: 157/95   Pulse: 90       Body mass index is 31.32 kg/m².  Wt Readings from Last 3 Encounters:   06/18/24 93.4 kg (206 lb)   06/14/24 93.4 kg (206 lb)   04/26/24 90.7 kg (200 lb)       Objective:                    Left Knee Exam     Tenderness   The patient is experiencing tenderness in the lateral joint line (IT band).    Range of Motion   Extension:  10   Flexion:  110     Tests   Varus: negative Valgus: negative  Drawer:  Anterior - negative     Posterior - negative    Other   Erythema: absent  Sensation: normal  Pulse: present  Swelling: none  Effusion: no effusion present    Comments:  Calf is soft and  "non tender      Left Hip Exam     Tenderness   The patient is experiencing tenderness in the lateral.    Range of Motion   Flexion:  60     Other   Pulse: present            Diagnostics, reviewed and taken today if performed as documented:    The attending physician has personally reviewed the pertinent films in PACS and interpretation is as follows:  Xrays of the left knee 3 views: tricompartmental OA  Xrays of the left hip/pelvis: Mild osteoarthritis of the inferior aspect of the left hip joint    Procedures, if performed today:    Procedures    None performed      Portions of the record may have been created with voice recognition software.  Occasional wrong word or \"sound a like\" substitutions may have occurred due to the inherent limitations of voice recognition software.  Read the chart carefully and recognize, using context, where substitutions have occurred.  "

## 2024-06-20 ENCOUNTER — HOME CARE VISIT (OUTPATIENT)
Dept: HOME HEALTH SERVICES | Facility: HOME HEALTHCARE | Age: 89
End: 2024-06-20
Payer: COMMERCIAL

## 2024-06-20 VITALS — SYSTOLIC BLOOD PRESSURE: 142 MMHG | OXYGEN SATURATION: 96 % | HEART RATE: 78 BPM | DIASTOLIC BLOOD PRESSURE: 80 MMHG

## 2024-06-20 PROCEDURE — G0151 HHCP-SERV OF PT,EA 15 MIN: HCPCS

## 2024-06-21 ENCOUNTER — HOME CARE VISIT (OUTPATIENT)
Dept: HOME HEALTH SERVICES | Facility: HOME HEALTHCARE | Age: 89
End: 2024-06-21
Payer: COMMERCIAL

## 2024-06-25 ENCOUNTER — HOME CARE VISIT (OUTPATIENT)
Dept: HOME HEALTH SERVICES | Facility: HOME HEALTHCARE | Age: 89
End: 2024-06-25
Payer: COMMERCIAL

## 2024-06-25 ENCOUNTER — TELEPHONE (OUTPATIENT)
Age: 89
End: 2024-06-25

## 2024-06-25 VITALS — DIASTOLIC BLOOD PRESSURE: 80 MMHG | SYSTOLIC BLOOD PRESSURE: 138 MMHG

## 2024-06-25 PROCEDURE — G0151 HHCP-SERV OF PT,EA 15 MIN: HCPCS

## 2024-06-27 ENCOUNTER — HOME CARE VISIT (OUTPATIENT)
Dept: HOME HEALTH SERVICES | Facility: HOME HEALTHCARE | Age: 89
End: 2024-06-27
Payer: COMMERCIAL

## 2024-06-27 VITALS — SYSTOLIC BLOOD PRESSURE: 138 MMHG | DIASTOLIC BLOOD PRESSURE: 80 MMHG

## 2024-06-27 DIAGNOSIS — I48.21 PERMANENT ATRIAL FIBRILLATION (HCC): Primary | ICD-10-CM

## 2024-06-27 DIAGNOSIS — F41.9 ANXIETY: ICD-10-CM

## 2024-06-27 PROCEDURE — G0151 HHCP-SERV OF PT,EA 15 MIN: HCPCS

## 2024-06-27 RX ORDER — LORAZEPAM 0.5 MG/1
TABLET ORAL
Qty: 1 TABLET | Refills: 0 | Status: SHIPPED | OUTPATIENT
Start: 2024-06-27

## 2024-06-30 DIAGNOSIS — M79.671 PAIN IN BOTH FEET: ICD-10-CM

## 2024-06-30 DIAGNOSIS — M79.672 PAIN IN BOTH FEET: ICD-10-CM

## 2024-06-30 RX ORDER — GABAPENTIN 100 MG/1
100 CAPSULE ORAL 3 TIMES DAILY
Qty: 90 CAPSULE | Refills: 5 | Status: SHIPPED | OUTPATIENT
Start: 2024-06-30

## 2024-07-01 NOTE — TELEPHONE ENCOUNTER
Caller: Ralf/Jennifer    Doctor: Naomi    Reason for call: Called asking for response re:too large of a brace. Please cb to discuss options    Call back#: [phone]

## 2024-07-02 ENCOUNTER — HOME CARE VISIT (OUTPATIENT)
Dept: HOME HEALTH SERVICES | Facility: HOME HEALTHCARE | Age: 89
End: 2024-07-02
Payer: COMMERCIAL

## 2024-07-02 PROCEDURE — G0151 HHCP-SERV OF PT,EA 15 MIN: HCPCS

## 2024-07-05 ENCOUNTER — HOME CARE VISIT (OUTPATIENT)
Dept: HOME HEALTH SERVICES | Facility: HOME HEALTHCARE | Age: 89
End: 2024-07-05
Payer: COMMERCIAL

## 2024-07-05 VITALS — DIASTOLIC BLOOD PRESSURE: 78 MMHG | SYSTOLIC BLOOD PRESSURE: 138 MMHG

## 2024-07-05 DIAGNOSIS — R30.0 DYSURIA: ICD-10-CM

## 2024-07-05 PROCEDURE — G0151 HHCP-SERV OF PT,EA 15 MIN: HCPCS

## 2024-07-05 RX ORDER — FINASTERIDE 5 MG/1
5 TABLET, FILM COATED ORAL DAILY
Qty: 90 TABLET | Refills: 1 | Status: SHIPPED | OUTPATIENT
Start: 2024-07-05

## 2024-07-08 ENCOUNTER — OFFICE VISIT (OUTPATIENT)
Dept: OBGYN CLINIC | Facility: HOSPITAL | Age: 89
End: 2024-07-08
Payer: COMMERCIAL

## 2024-07-08 VITALS — WEIGHT: 205 LBS | HEIGHT: 68 IN | BODY MASS INDEX: 31.07 KG/M2

## 2024-07-08 DIAGNOSIS — G56.01 CARPAL TUNNEL SYNDROME OF RIGHT WRIST: Primary | ICD-10-CM

## 2024-07-08 PROCEDURE — 20526 THER INJECTION CARP TUNNEL: CPT | Performed by: SURGERY

## 2024-07-08 PROCEDURE — 99213 OFFICE O/P EST LOW 20 MIN: CPT | Performed by: SURGERY

## 2024-07-08 RX ORDER — DEXAMETHASONE SODIUM PHOSPHATE 10 MG/ML
40 INJECTION, SOLUTION INTRAMUSCULAR; INTRAVENOUS
Status: COMPLETED | OUTPATIENT
Start: 2024-07-08 | End: 2024-07-08

## 2024-07-08 RX ORDER — LIDOCAINE HYDROCHLORIDE 10 MG/ML
1 INJECTION, SOLUTION INFILTRATION; PERINEURAL
Status: COMPLETED | OUTPATIENT
Start: 2024-07-08 | End: 2024-07-08

## 2024-07-08 RX ADMIN — DEXAMETHASONE SODIUM PHOSPHATE 40 MG: 10 INJECTION, SOLUTION INTRAMUSCULAR; INTRAVENOUS at 08:30

## 2024-07-08 RX ADMIN — LIDOCAINE HYDROCHLORIDE 1 ML: 10 INJECTION, SOLUTION INFILTRATION; PERINEURAL at 08:30

## 2024-07-08 NOTE — PROGRESS NOTES
ASSESSMENT/PLAN:      89-year-old male here for his right carpal tunnel syndrome.  Patient's symptoms of pins-and-needles respond well to cortisone injections.  He has tried to brace but due to his SLAC wrist braces do not fit comfortably.  Patient wishes to continue with nonoperative treatments of cortisone injection into the right carpal tunnel today.  Patient tolerated the injection well.    The patient verbalized understanding of exam findings and treatment plan. We engaged in the shared decision-making process and treatment options were discussed at length with the patient. Surgical and conservative management discussed today along with risks and benefits.    Diagnoses and all orders for this visit:    Carpal tunnel syndrome of right wrist        Follow Up:  Return in 6 months (on 1/8/2025) for right wrist.      To Do Next Visit:  Re-evaluation of current issue    ____________________________________________________________________________________________________________________________________________      CHIEF COMPLAINT:  Chief Complaint   Patient presents with    Follow-up     Follow up of the the right hand. Injection 1/8/24. Pins and needles the last 2 weeks.        SUBJECTIVE:  Jose Mullins is a 89 y.o. year old RHD male who presents today for follow-up for his right hand numbness and tingling.  At his last visit on 1/8/2024, patient had a cortisone injection into the carpal tunnel that gave him significant relief until approximately 2 weeks ago.  He notes he has numbness and tingling into the right thumb, index and long fingers.  Patient does have a history of SLAC wrist with a radiocarpal joint injection performed on 10/16/2023.       I have personally reviewed all the relevant PMH, PSH, SH, FH, Medications and allergies.     PAST MEDICAL HISTORY:  Past Medical History:   Diagnosis Date    A-fib (HCC)     Anemia     Carpal tunnel syndrome, unspecified upper limb     Cataract     last assessed:  8/18/2012    COVID-19 09/06/2023    GERD (gastroesophageal reflux disease)     Glaucoma     Hypertension     last assessed: 8/23/2012    Intervertebral disc disease     PVD (peripheral vascular disease) (HCC)     Stomach disorder 11/2021    Syncope 03/12/2018       PAST SURGICAL HISTORY:  Past Surgical History:   Procedure Laterality Date    CATARACT EXTRACTION      COLONOSCOPY      HERNIA REPAIR      NEUROPLASTY / TRANSPOSITION MEDIAN NERVE AT CARPAL TUNNEL      SD LAPAROSCOPY SURG CHOLECYSTECTOMY N/A 09/14/2016    Procedure: LAPAROSCOPIC CHOLECYSTECTOMY ;  Surgeon: Christo Cristina MD;  Location: [facility];  Service: General    UPPER GASTROINTESTINAL ENDOSCOPY         FAMILY HISTORY:  Family History   Problem Relation Age of Onset    Glaucoma Mother     Cancer Father     Heart disease Father     Glaucoma Father     Alzheimer's disease Family     Heart attack Family        SOCIAL HISTORY:  Social History     Tobacco Use    Smoking status: Former     Current packs/day: 0.50     Average packs/day: 0.5 packs/day for 35.0 years (17.5 ttl pk-yrs)     Types: Cigarettes    Smokeless tobacco: Former     Types: Snuff    Tobacco comments:     quit 30 years ago    Vaping Use    Vaping status: Former   Substance Use Topics    Alcohol use: Not Currently    Drug use: Never       MEDICATIONS:    Current Outpatient Medications:     allopurinol (ZYLOPRIM) 100 mg tablet, Take 100 mg by mouth daily, Disp: , Rfl:     amitriptyline (ELAVIL) 10 mg tablet, TAKE 3 TABLETS BY MOUTH AT BEDTIME, Disp: 270 tablet, Rfl: 2    amLODIPine (NORVASC) 2.5 mg tablet, TAKE 1 TABLET AT NIGHT MONDAY WEDNESDAY AND FRIDAY AND AS NEEDED SBP >150, Disp: 36 tablet, Rfl: 8    amLODIPine (NORVASC) 5 mg tablet, Take 5 mg by mouth daily at bedtime, Disp: , Rfl:     apixaban (ELIQUIS) 2.5 mg, Take 1 tablet (2.5 mg total) by mouth 2 (two) times a day, Disp: 180 tablet, Rfl: 1    bisoprolol (ZEBETA) 5 mg tablet, Take 2.5 mg by mouth daily, Disp: , Rfl:     brimonidine  tartrate 0.2 % ophthalmic solution, Administer 1 drop to the right eye 2 (two) times a day, Disp: , Rfl:     cholecalciferol (VITAMIN D3) 1,000 units tablet, Take 2,000 Units by mouth daily , Disp: , Rfl:     co-enzyme Q-10 30 MG capsule, Take 100 mg by mouth daily  , Disp: , Rfl:     diphenhydrAMINE-acetaminophen (TYLENOL PM)  MG TABS, Take 2 tablets by mouth daily at bedtime as needed for sleep, Disp: , Rfl:     Dorzolamide HCl-Timolol Mal PF 2-0.5 % SOLN, ONE DROP INTO EACH EYE TWICE DAILY, Disp: , Rfl:     finasteride (PROSCAR) 5 mg tablet, TAKE 1 TABLET (5 MG TOTAL) BY MOUTH DAILY., Disp: 90 tablet, Rfl: 1    gabapentin (NEURONTIN) 100 mg capsule, TAKE 1 CAPSULE BY MOUTH THREE TIMES A DAY, Disp: 90 capsule, Rfl: 5    Garlic 1000 MG CAPS, Take 1,000 mg by mouth in the morning, Disp: , Rfl:     indapamide (LOZOL) 1.25 mg tablet, TAKE 1 TABLET BY MOUTH DAILY OR AS DIRECTED, Disp: 90 tablet, Rfl: 1    latanoprost (XALATAN) 0.005 % ophthalmic solution, Administer 1 drop to both eyes daily at bedtime, Disp: , Rfl:     LORazepam (Ativan) 0.5 mg tablet, Take one tab 1-2 hours prior to MRI testing, Disp: 1 tablet, Rfl: 0    multivitamin-iron-minerals-folic acid (CENTRUM) chewable tablet, Chew 1 tablet daily., Disp: , Rfl:     omeprazole (PriLOSEC) 20 mg delayed release capsule, Take 1 capsule (20 mg total) by mouth daily, Disp: 90 capsule, Rfl: 1    Rhopressa 0.02 % SOLN, INSTILL 1 DROP INTO RIGHT EYE ONCE A DAY, Disp: , Rfl:     ALLERGIES:  Allergies   Allergen Reactions    Bempedoic Acid Other (See Comments)     Gout    Atorvastatin GI Intolerance     Other reaction(s): GI upset    Ezetimibe Other (See Comments)     myalgia  Other reaction(s): Other (See Comments)  Myalgia  Other reaction(s): Myalgia  myalgia    Statins Rash       REVIEW OF SYSTEMS:  Review of Systems    VITALS:  Vitals:         _____________________________________________________  PHYSICAL EXAMINATION:  General: well developed and well  "nourished, alert, oriented times 3, and appears comfortable  Psychiatric: Normal  HEENT: Normocephalic, Atraumatic Trachea Midline, No torticollis  Pulmonary: No audible wheezing or respiratory distress   Cardiovascular: No pitting edema, 2+ radial pulse   Abdominal/GI: abdomen non tender, non distended   Skin: No masses, erythema, lacerations, fluctation, ulcerations  Neurovascular: Sensation intact to the Ulnar Nerve, Sensation Intact to the Radial Nerve, Decreased Sensation to  the Median Nerve, Motor Intact to the Median, Ulnar, Radial Nerve, and Pulses Intact  Musculoskeletal: Normal, except as noted in detailed exam and in HPI.      MUSCULOSKELETAL EXAMINATION:    Right Carpal Tunnel Exam:    Negative thenar atrophy. Negative phalen's test. Positive carpal tunnel compression. Positive tinels over median nerve at the wrist.  Opposition strength 5/5.  Abduction strength 5/5.      ___________________________________________________    STUDIES REVIEWED:  No images reviewed at today's visit  LABS REVIEWED:    HgA1c:   Lab Results   Component Value Date    HGBA1C 5.4 12/03/2018     BMP:   Lab Results   Component Value Date    CALCIUM 9.7 03/20/2024     10/31/2017    K 4.9 03/20/2024    CO2 30 03/20/2024     03/20/2024    BUN 15 03/20/2024    CREATININE 1.15 03/20/2024             PROCEDURES PERFORMED:  Hand/upper extremity injection: R carpal tunnel  [place] Protocol:  Consent: Verbal consent obtained.  Risks and benefits: risks, benefits and alternatives were discussed  Consent given by: patient  Time out: Immediately prior to procedure a \"time out\" was called to verify the correct patient, procedure, equipment, support staff and site/side marked as required.  Timeout called at: 7/8/2024 9:00 AM.  Patient understanding: patient states understanding of the procedure being performed  Site marked: the operative site was marked  Patient identity confirmed: verbally with patient  Supporting " Documentation  Indications: pain, diagnostic and therapeutic   Procedure Details  Condition:carpal tunnel syndrome Site: R carpal tunnel   Preparation: Patient was prepped and draped in the usual sterile fashion  Needle size: 25 G  Ultrasound guidance: no  Approach: volar  Medications administered: 1 mL lidocaine 1 %; 40 mg dexamethasone 100 mg/10 mL  Patient tolerance: patient tolerated the procedure well with no immediate complications  Dressing:  Sterile dressing applied           _____________________________________________________      Scribe Attestation      I,:  Phylicia Rojas am acting as a scribe while in the presence of the attending physician.:       I,:  Nathan Mishra MD personally performed the services described in this documentation    as scribed in my presence.:

## 2024-07-11 ENCOUNTER — HOSPITAL ENCOUNTER (OUTPATIENT)
Dept: MRI IMAGING | Facility: HOSPITAL | Age: 89
Discharge: HOME | End: 2024-07-11

## 2024-07-11 DIAGNOSIS — H40.9 GLAUCOMA OF RIGHT EYE, UNSPECIFIED GLAUCOMA TYPE: ICD-10-CM

## 2024-07-11 DIAGNOSIS — G44.89 OTHER HEADACHE SYNDROME: ICD-10-CM

## 2024-10-01 ENCOUNTER — CONSULT (OUTPATIENT)
Dept: CARDIOLOGY CLINIC | Facility: CLINIC | Age: 89
End: 2024-10-01
Payer: COMMERCIAL

## 2024-10-01 ENCOUNTER — HOSPITAL ENCOUNTER (OUTPATIENT)
Dept: RADIOLOGY | Facility: HOSPITAL | Age: 89
Discharge: HOME | End: 2024-10-01
Payer: COMMERCIAL

## 2024-10-01 VITALS
DIASTOLIC BLOOD PRESSURE: 70 MMHG | BODY MASS INDEX: 30.77 KG/M2 | WEIGHT: 203 LBS | HEIGHT: 68 IN | SYSTOLIC BLOOD PRESSURE: 136 MMHG | HEART RATE: 84 BPM

## 2024-10-01 DIAGNOSIS — R06.09 DYSPNEA ON EXERTION: ICD-10-CM

## 2024-10-01 DIAGNOSIS — I50.9 CONGESTIVE HEART FAILURE, UNSPECIFIED HF CHRONICITY, UNSPECIFIED HEART FAILURE TYPE (HCC): ICD-10-CM

## 2024-10-01 DIAGNOSIS — I10 ESSENTIAL (PRIMARY) HYPERTENSION: ICD-10-CM

## 2024-10-01 DIAGNOSIS — I48.21 PERMANENT ATRIAL FIBRILLATION (HCC): Primary | ICD-10-CM

## 2024-10-01 PROCEDURE — 99204 OFFICE O/P NEW MOD 45 MIN: CPT | Performed by: INTERNAL MEDICINE

## 2024-10-01 PROCEDURE — 93000 ELECTROCARDIOGRAM COMPLETE: CPT | Performed by: INTERNAL MEDICINE

## 2024-10-01 PROCEDURE — 71046 X-RAY EXAM CHEST 2 VIEWS: CPT

## 2024-10-01 RX ORDER — AMLODIPINE BESYLATE 5 MG/1
5 TABLET ORAL
Qty: 90 TABLET | Refills: 3 | Status: SHIPPED | OUTPATIENT
Start: 2024-10-01

## 2024-10-01 RX ORDER — TERAZOSIN 2 MG/1
2 CAPSULE ORAL
COMMUNITY
End: 2024-10-01

## 2024-10-01 RX ORDER — BISOPROLOL FUMARATE 5 MG/1
2.5 TABLET, FILM COATED ORAL DAILY
Qty: 45 TABLET | Refills: 3 | Status: SHIPPED | OUTPATIENT
Start: 2024-10-01

## 2024-10-01 RX ORDER — FUROSEMIDE 20 MG
40 TABLET ORAL DAILY
Qty: 7 TABLET | Refills: 0 | Status: SHIPPED | OUTPATIENT
Start: 2024-10-01 | End: 2024-10-04 | Stop reason: ALTCHOICE

## 2024-10-01 NOTE — PATIENT INSTRUCTIONS
Start Jardiance 10 mg daily today -- this will be a long term medication  Start Lasix (furosemide) 40 mg daily today, take for 3 days -- hopefully just a temporary medication    Labwork on Friday AM    Friday afternoon-- weight and blood pressure check in off

## 2024-10-01 NOTE — PROGRESS NOTES
[facility] Cardiology  Office Consultation  Jose ANT Mullins 89 y.o. male MRN: [id number]        Chief Complaint    Chief Complaint   Patient presents with    Hypertension    Consult       Referring Provider: No ref. provider found    Impression & Plan:    1. Permanent atrial fibrillation (HCC)  He is rate controlled. Continue bisoprolol 2.5 mg daily. Continue thromboembolic ppx with apixaban 2.5 mg BID.   - POCT ECG  - bisoprolol (ZEBETA) 5 mg tablet; Take 0.5 tablets (2.5 mg total) by mouth daily  Dispense: 45 tablet; Refill: 3    2. Congestive heart failure, unspecified HF chronicity, unspecified heart failure type (HCC)  Today he has basilar rales, diminished breath sounds on L base, c/f pleural effusion, and elevated JVD  Has prior history of PVC induced CMP which recovered. Also has risk factors for diastolic CHF.     Check CXR. We will defer echo for now as he has one within the last year. If he fails to respond to therapy we can check echo.   Start Jardiance 10 mg daily.   Start lasix 40 mg daily for 3 day course -- BMP, BNP and weight check in 3-4 days. At that time we can decide about maintenance diuretic, perhaps spironolactone.     - furosemide (LASIX) 20 mg tablet; Take 2 tablets (40 mg total) by mouth daily  Dispense: 7 tablet; Refill: 0  - Basic metabolic panel; Future  - bisoprolol (ZEBETA) 5 mg tablet; Take 0.5 tablets (2.5 mg total) by mouth daily  Dispense: 45 tablet; Refill: 3    3. Dyspnea on exertion  - Empagliflozin (JARDIANCE) 10 MG TABS tablet; Take 1 tablet (10 mg total) by mouth every morning  Dispense: 30 tablet; Refill: 11  - XR chest pa and lateral; Future  - B-Type Natriuretic Peptide(BNP); Future  - furosemide (LASIX) 20 mg tablet; Take 2 tablets (40 mg total) by mouth daily  Dispense: 7 tablet; Refill: 0  - Basic metabolic panel; Future    4. Essential (primary) hypertension  - amLODIPine (NORVASC) 5 mg tablet; Take 1 tablet (5 mg total) by mouth daily at bedtime  Dispense: 90 tablet;  Refill: 3        We will see Jose Mullins back in 1 month for follow-up.    HPI: Jose Mullins is a 89 y.o. 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    He was previously seen by Dr. José Miguel Ledbetter of [facility] who recently retired. He was last seen by Dr. Ledbetter on 6/11/24 for a routine follow-up and no therapy changes were made.     His daughter makes sure walks across the house 5 times per day to stay active. She has noticed he appears more dyspneic while getting settled back into his chair.  She will check his oxygen and it is 97%. The patient himself has no complaints. He sometimes sleeps in a bed, sometimes in the recliner. He denies orthopnea but daughter states he sleeps best in the chair. He has LE edema which progresses through the day and is managed with compression stockings.       Cardiac testing:     Echo 12/21/23  This result has an attachment that is not available.   Mild left ventricular hypertrophy with basal septal hypertrophy LV   systolic function is normal, EF~55-60% and mild diastolic dysfunction   Mildly dilated right ventricle with mildly reduced RV systolic function   Mild biatrial enlargement   Mildly ectatic thoracic aorta   Mild pulmonary hypertension, PA ~35-40 mmHg   Moderate aortic sclerosis with trivial to mild aortic insufficiency   Mild mitral sclerosis with mild mitral regurgitation   Compared to March 8, 2022, there have been no major changes.          EKG reviewed personally:   10/1/24 - atrial firbillation, 84 bpm, poor anterior R wave progression, septal infarct pattern, abnormal study.       Relevant Laboratory Studies:  (Laboratory studies personally reviewed)  BMP 3/20/24 - Cr 1.15, K 4.9      Review of Systems      Past Medical History:   Diagnosis Date    A-fib (HCC)     Anemia     Carpal tunnel syndrome, unspecified upper limb     Cataract     last assessed: 8/18/2012    COVID-19 09/06/2023     GERD (gastroesophageal reflux disease)     Glaucoma     Hypertension     last assessed: 8/23/2012    Intervertebral disc disease     PVD (peripheral vascular disease) (HCC)     Stomach disorder 11/2021    Syncope 03/12/2018     Past Surgical History:   Procedure Laterality Date    CATARACT EXTRACTION      COLONOSCOPY      HERNIA REPAIR      NEUROPLASTY / TRANSPOSITION MEDIAN NERVE AT CARPAL TUNNEL      OR LAPAROSCOPY SURG CHOLECYSTECTOMY N/A 09/14/2016    Procedure: LAPAROSCOPIC CHOLECYSTECTOMY ;  Surgeon: Christo Cristina MD;  Location: [facility];  Service: General    UPPER GASTROINTESTINAL ENDOSCOPY       Social History     Substance and Sexual Activity   Alcohol Use Not Currently     Social History     Substance and Sexual Activity   Drug Use Never     Social History     Tobacco Use   Smoking Status Former    Current packs/day: 0.50    Average packs/day: 0.5 packs/day for 35.0 years (17.5 ttl pk-yrs)    Types: Cigarettes   Smokeless Tobacco Former    Types: Snuff   Tobacco Comments    quit 30 years ago      Family History   Problem Relation Age of Onset    Glaucoma Mother     Cancer Father     Heart disease Father     Glaucoma Father     Alzheimer's disease Family     Heart attack Family        Allergies:  Allergies   Allergen Reactions    Bempedoic Acid Other (See Comments)     Gout    Atorvastatin GI Intolerance     Other reaction(s): GI upset    Ezetimibe Other (See Comments)     myalgia  Other reaction(s): Other (See Comments)  Myalgia  Other reaction(s): Myalgia  myalgia    Statins Rash       Medications (as of START of this encounter):   Outpatient Medications Prior to Visit   Medication Sig Dispense Refill    allopurinol (ZYLOPRIM) 100 mg tablet Take 100 mg by mouth daily      amitriptyline (ELAVIL) 10 mg tablet TAKE 3 TABLETS BY MOUTH AT BEDTIME (Patient taking differently: 15 mg TAKE1 1/2 TABLETS BY MOUTH AT BEDTIME) 270 tablet 2    amLODIPine (NORVASC) 2.5 mg tablet TAKE 1 TABLET AT NIGHT MONDAY  WEDNESDAY AND FRIDAY AND AS NEEDED SBP >150 (Patient taking differently: 5 mg) 36 tablet 8    apixaban (ELIQUIS) 2.5 mg Take 1 tablet (2.5 mg total) by mouth 2 (two) times a day 180 tablet 1    bisoprolol (ZEBETA) 5 mg tablet Take 2.5 mg by mouth daily      brimonidine tartrate 0.2 % ophthalmic solution Administer 1 drop to the right eye 2 (two) times a day      cholecalciferol (VITAMIN D3) 1,000 units tablet Take 2,000 Units by mouth daily       co-enzyme Q-10 30 MG capsule Take 100 mg by mouth daily        diphenhydrAMINE-acetaminophen (TYLENOL PM)  MG TABS Take 2 tablets by mouth daily at bedtime as needed for sleep      Dorzolamide HCl-Timolol Mal PF 2-0.5 % SOLN ONE DROP INTO EACH EYE TWICE DAILY      finasteride (PROSCAR) 5 mg tablet TAKE 1 TABLET (5 MG TOTAL) BY MOUTH DAILY. 90 tablet 1    gabapentin (NEURONTIN) 100 mg capsule TAKE 1 CAPSULE BY MOUTH THREE TIMES A DAY (Patient taking differently: Take 100 mg by mouth 2 (two) times a day) 90 capsule 5    Garlic 1000 MG CAPS Take 1,000 mg by mouth in the morning      indapamide (LOZOL) 1.25 mg tablet TAKE 1 TABLET BY MOUTH DAILY OR AS DIRECTED (Patient taking differently: Take 1.25 mg by mouth daily Every other day) 90 tablet 1    latanoprost (XALATAN) 0.005 % ophthalmic solution Administer 1 drop to both eyes daily at bedtime      multivitamin-iron-minerals-folic acid (CENTRUM) chewable tablet Chew 1 tablet daily.      omeprazole (PriLOSEC) 20 mg delayed release capsule Take 1 capsule (20 mg total) by mouth daily 90 capsule 1    Rhopressa 0.02 % SOLN INSTILL 1 DROP INTO RIGHT EYE ONCE A DAY      terazosin (HYTRIN) 2 mg capsule Take 2 mg by mouth daily at bedtime      amLODIPine (NORVASC) 5 mg tablet Take 5 mg by mouth daily at bedtime (Patient not taking: Reported on 10/1/2024)      LORazepam (Ativan) 0.5 mg tablet Take one tab 1-2 hours prior to MRI testing (Patient not taking: Reported on 10/1/2024) 1 tablet 0     No facility-administered medications  "prior to visit.         Vitals:    10/01/24 0846   BP: 136/70   Pulse: 84     Weight (last 2 days)       Date/Time Weight    10/01/24 0846 92.1 (203)            General: Jose Mullins is a well appearing male, in no acute distress, sitting comfortably  HEENT: moist mucous membranes, EOMI  Neck:  + JVD, supple, trachea midline   Cardiovascular: irregular rhythm, 2/6 SM RUSB  Pulmonary: normal respiratory effort, diminished L base with bibasilar rales  Abdomen: soft and nondistended  Extremities: trace to +1 lower extremity edema. Warm and well perfused extremities.   Neuro: no focal motor deficits, AAOx3 (person, place, time)  Psych: Normal mood and affect, cooperative        Time Spent:  Total time (face-to-face and non-face-to-face) spent on today's visit was 45 minutes. This includes preparation for the visits (i.e. reviewing test results), performance of a medically appropriate history and examination, and orders for medications, tests or other procedures. This time is exclusive of procedures performed and time spent teaching.    Colt Shin MD    This note was completed in part utilizing [employer] recognition software. Grammatical errors, random word insertion, spelling mistakes, occasional wrong word or \"sound-alike\" substitutions and incomplete sentences may be an occasional consequence of the system secondary to software limitations, ambient noise and hardware issues. At the time of dictation, efforts were made to edit, clarify and /or correct errors.  Please read the chart carefully and recognize, using context, where substitutions have occurred.  If you have any questions or concerns about the context, text or information contained within the body of this dictation, please contact myself, the provider, for further clarification.    "

## 2024-10-03 ENCOUNTER — NURSE TRIAGE (OUTPATIENT)
Age: 89
End: 2024-10-03

## 2024-10-03 DIAGNOSIS — H40.9 GLAUCOMA OF RIGHT EYE, UNSPECIFIED GLAUCOMA TYPE: Primary | ICD-10-CM

## 2024-10-03 RX ORDER — BRIMONIDINE TARTRATE 2 MG/ML
SOLUTION/ DROPS OPHTHALMIC
Qty: 15 ML | Refills: 1 | Status: SHIPPED | OUTPATIENT
Start: 2024-10-03

## 2024-10-03 NOTE — TELEPHONE ENCOUNTER
"Patient was prescribed lasix 40 mg for 3 days.  Today was last dose. Daughter is concerned that patient's urine output has decreased.  During the night he uses a urinal and typically puts out 1557-0885 ml urine nightly.  Last night UO was only 600 ml.  She is unsure of daytime output as he only uses the urinal during the night.  Daughter checked O2 sat while on the phone. And with patient resting, sat was fir 89%, then came up to 91%.  He is in no distress.  Labs were planned to be drawn tomorrow AM.    Please advise.        Reason for Disposition  • Nursing judgment    Answer Assessment - Initial Assessment Questions  1. REASON FOR CALL: \"What is your main concern right now?\"      Daughter believes urine output has decreased since taking the furosemide    Protocols used: No Protocol Available-ADULT-[place]    "

## 2024-10-04 ENCOUNTER — APPOINTMENT (OUTPATIENT)
Dept: LAB | Facility: HOSPITAL | Age: 89
End: 2024-10-04
Payer: COMMERCIAL

## 2024-10-04 DIAGNOSIS — I50.9 CONGESTIVE HEART FAILURE, UNSPECIFIED HF CHRONICITY, UNSPECIFIED HEART FAILURE TYPE (HCC): Primary | ICD-10-CM

## 2024-10-04 DIAGNOSIS — R06.09 DYSPNEA ON EXERTION: ICD-10-CM

## 2024-10-04 DIAGNOSIS — I50.9 CONGESTIVE HEART FAILURE, UNSPECIFIED HF CHRONICITY, UNSPECIFIED HEART FAILURE TYPE (HCC): ICD-10-CM

## 2024-10-04 LAB
ANION GAP SERPL CALCULATED.3IONS-SCNC: 8 MMOL/L (ref 4–13)
BNP SERPL-MCNC: 223 PG/ML (ref 0–100)
BUN SERPL-MCNC: 22 MG/DL (ref 5–25)
CALCIUM SERPL-MCNC: 9.3 MG/DL (ref 8.4–10.2)
CHLORIDE SERPL-SCNC: 99 MMOL/L (ref 96–108)
CO2 SERPL-SCNC: 29 MMOL/L (ref 21–32)
CREAT SERPL-MCNC: 1.34 MG/DL (ref 0.6–1.3)
GFR SERPL CREATININE-BSD FRML MDRD: 45 ML/MIN/1.73SQ M
GLUCOSE P FAST SERPL-MCNC: 93 MG/DL (ref 65–99)
POTASSIUM SERPL-SCNC: 4.3 MMOL/L (ref 3.5–5.3)
SODIUM SERPL-SCNC: 136 MMOL/L (ref 135–147)

## 2024-10-04 PROCEDURE — 83880 ASSAY OF NATRIURETIC PEPTIDE: CPT

## 2024-10-04 PROCEDURE — 80048 BASIC METABOLIC PNL TOTAL CA: CPT

## 2024-10-04 PROCEDURE — 36415 COLL VENOUS BLD VENIPUNCTURE: CPT

## 2024-10-04 RX ORDER — SPIRONOLACTONE 25 MG/1
25 TABLET ORAL DAILY
Qty: 90 TABLET | Refills: 1 | Status: SHIPPED | OUTPATIENT
Start: 2024-10-04

## 2024-10-04 NOTE — TELEPHONE ENCOUNTER
Pt in for wgt check today.     In-office weight, dressed was 202.5 lb    BMP/BMP reviewed by Dr. Shin    Stop Lasix  Start Spironolactone 25 mg daily  Continue Jardiance  Repeat BMP in 2 weeks.    Instructions given to pt and daughter by physician.    Labs ordered.   Med list corrected to reflect stopped lasix.  New rx to [facility]

## 2024-10-07 DIAGNOSIS — M25.569 ARTHRALGIA OF KNEE, UNSPECIFIED LATERALITY: Primary | ICD-10-CM

## 2024-10-07 DIAGNOSIS — M10.9 GOUT, UNSPECIFIED CAUSE, UNSPECIFIED CHRONICITY, UNSPECIFIED SITE: Primary | ICD-10-CM

## 2024-10-07 NOTE — TELEPHONE ENCOUNTER
Medication: allopurinol 100mg tablet    Dose/Frequency: 1 tablet daily    Quantity: 90 tablets    Pharmacy: [facility]    Office:   [] PCP/Provider -   [x] Speciality/Provider -     Does the patient have enough for 3 days?   [x] Yes   [] No - Send as HP to POD

## 2024-10-08 RX ORDER — ALLOPURINOL 100 MG/1
100 TABLET ORAL DAILY
Qty: 90 TABLET | Refills: 0 | Status: SHIPPED | OUTPATIENT
Start: 2024-10-08 | End: 2024-10-10

## 2024-10-10 RX ORDER — ALLOPURINOL 100 MG/1
100 TABLET ORAL DAILY
Qty: 90 TABLET | Refills: 1 | Status: SHIPPED | OUTPATIENT
Start: 2024-10-10

## 2024-10-18 ENCOUNTER — APPOINTMENT (OUTPATIENT)
Dept: LAB | Facility: HOSPITAL | Age: 89
End: 2024-10-18
Payer: COMMERCIAL

## 2024-10-18 DIAGNOSIS — I50.9 CONGESTIVE HEART FAILURE, UNSPECIFIED HF CHRONICITY, UNSPECIFIED HEART FAILURE TYPE (HCC): ICD-10-CM

## 2024-10-18 LAB
ANION GAP SERPL CALCULATED.3IONS-SCNC: 8 MMOL/L (ref 4–13)
BUN SERPL-MCNC: 26 MG/DL (ref 5–25)
CALCIUM SERPL-MCNC: 9 MG/DL (ref 8.4–10.2)
CHLORIDE SERPL-SCNC: 101 MMOL/L (ref 96–108)
CO2 SERPL-SCNC: 26 MMOL/L (ref 21–32)
CREAT SERPL-MCNC: 1.4 MG/DL (ref 0.6–1.3)
GFR SERPL CREATININE-BSD FRML MDRD: 43 ML/MIN/1.73SQ M
GLUCOSE P FAST SERPL-MCNC: 93 MG/DL (ref 65–99)
POTASSIUM SERPL-SCNC: 4.6 MMOL/L (ref 3.5–5.3)
SODIUM SERPL-SCNC: 135 MMOL/L (ref 135–147)

## 2024-10-18 PROCEDURE — 80048 BASIC METABOLIC PNL TOTAL CA: CPT

## 2024-10-18 PROCEDURE — 36415 COLL VENOUS BLD VENIPUNCTURE: CPT

## 2024-10-24 ENCOUNTER — OFFICE VISIT (OUTPATIENT)
Dept: CARDIOLOGY CLINIC | Facility: CLINIC | Age: 89
End: 2024-10-24
Payer: COMMERCIAL

## 2024-10-24 VITALS
WEIGHT: 204.8 LBS | SYSTOLIC BLOOD PRESSURE: 140 MMHG | HEART RATE: 82 BPM | HEIGHT: 68 IN | DIASTOLIC BLOOD PRESSURE: 66 MMHG | BODY MASS INDEX: 31.04 KG/M2

## 2024-10-24 DIAGNOSIS — I50.32 CHRONIC DIASTOLIC CONGESTIVE HEART FAILURE (HCC): ICD-10-CM

## 2024-10-24 DIAGNOSIS — I48.21 PERMANENT ATRIAL FIBRILLATION (HCC): Primary | ICD-10-CM

## 2024-10-24 PROCEDURE — G2211 COMPLEX E/M VISIT ADD ON: HCPCS | Performed by: INTERNAL MEDICINE

## 2024-10-24 PROCEDURE — 99214 OFFICE O/P EST MOD 30 MIN: CPT | Performed by: INTERNAL MEDICINE

## 2024-10-24 RX ORDER — SPIRONOLACTONE 25 MG/1
25 TABLET ORAL DAILY
Qty: 90 TABLET | Refills: 3 | Status: SHIPPED | OUTPATIENT
Start: 2024-10-24

## 2024-10-24 NOTE — PROGRESS NOTES
Cardiology Outpatient Follow-Up Note - Jose Mullins 89 y.o. male MRN: [id number]      Assessment/Plan:    1. Chronic diastolic congestive heart failure (HCC)  Stable on new regimen of Jardiance 10 mg daily and Spironolactone 25 mg daily.   Improved sleep per daughter which may mean less orthopnea. Hard to discern symptoms from patient himself.   Continue current regimen.   - spironolactone (ALDACTONE) 25 mg tablet; Take 1 tablet (25 mg total) by mouth daily  Dispense: 90 tablet; Refill: 3  - Basic metabolic panel; Future    2. Permanent atrial fibrillation (HCC)  Stable, continue current regimen.   - Basic metabolic panel; Future      We will see Jose Mullins back in 6 months for routine follow-up.    Subjective:     HPI: Jose Mullins is a 89 y.o. 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     We started Jardiance last visit and a few days of lasix. His daughter has not noticed much of a difference. However, she has noticed he is sleeping much better. In the interrim we also started spironolactone.     Patient has no complaints today.       Cardiac Testing:    Echo 12/21/23  Narrative    This result has an attachment that is not available.  Mild left ventricular hypertrophy with basal septal hypertrophy LV  systolic function is normal, EF~55-60% and mild diastolic dysfunction  Mildly dilated right ventricle with mildly reduced RV systolic function  Mild biatrial enlargement  Mildly ectatic thoracic aorta  Mild pulmonary hypertension, PA ~35-40 mmHg  Moderate aortic sclerosis with trivial to mild aortic insufficiency  Mild mitral sclerosis with mild mitral regurgitation  Compared to March 8, 2022, there have been no major changes.       EKGs, personally reviewed:      Relevant Labs & Results:  BMP 10/18/24 - Cr 1.40, K 4.6  BNP 10/4/23 - 223.        ROS:  Review of Systems:  Review of Systems    Objective:     Vitals:   Vitals:     "10/24/24 0948   BP: 140/66   BP Location: Right arm   Patient Position: Sitting   Cuff Size: Standard   Pulse: 82   Weight: 92.9 kg (204 lb 12.8 oz)   Height: 5' 8\" (1.727 m)    Body surface area is 2.06 meters squared.  Wt Readings from Last 3 Encounters:   10/24/24 92.9 kg (204 lb 12.8 oz)   10/01/24 92.1 kg (203 lb)   07/08/24 93 kg (205 lb)       Physical Exam:  General: Jose Mullins is a well appearing elderly male, in no acute distress, sitting comfortably with a rolling walker  HEENT: moist mucous membranes, EOMI  Neck:  No JVD, supple, trachea midline  Cardiovascular: unremarkable S1/S2, irregular rhythm, normal rate, 2/6 SM  Pulmonary: normal respiratory effort, CTAB  Abdomen: soft and nondistended  Extremities: trace lower extremity edema. Warm and well perfused extremities.  Neuro:deferred  Psych: Normal mood and affect, cooperative      Medications (at the START of this encounter):  Outpatient Medications Prior to Visit   Medication Sig Dispense Refill    allopurinol (ZYLOPRIM) 100 mg tablet Take 1 tablet (100 mg total) by mouth daily 90 tablet 1    amitriptyline (ELAVIL) 10 mg tablet TAKE 3 TABLETS BY MOUTH AT BEDTIME 270 tablet 2    amLODIPine (NORVASC) 5 mg tablet Take 1 tablet (5 mg total) by mouth daily at bedtime 90 tablet 3    apixaban (ELIQUIS) 2.5 mg Take 1 tablet (2.5 mg total) by mouth 2 (two) times a day 180 tablet 1    bisoprolol (ZEBETA) 5 mg tablet Take 0.5 tablets (2.5 mg total) by mouth daily 45 tablet 3    brimonidine tartrate 0.2 % ophthalmic solution INSTILL 1 DROP TWICE DAILY IN THE RIGHT EYE 15 mL 1    cholecalciferol (VITAMIN D3) 1,000 units tablet Take 2,000 Units by mouth daily       co-enzyme Q-10 30 MG capsule Take 100 mg by mouth daily        diphenhydrAMINE-acetaminophen (TYLENOL PM)  MG TABS Take 2 tablets by mouth daily at bedtime as needed for sleep      Dorzolamide HCl-Timolol Mal PF 2-0.5 % SOLN ONE DROP INTO EACH EYE TWICE DAILY      Empagliflozin (JARDIANCE) 10 " "MG TABS tablet Take 1 tablet (10 mg total) by mouth every morning 30 tablet 11    finasteride (PROSCAR) 5 mg tablet TAKE 1 TABLET (5 MG TOTAL) BY MOUTH DAILY. 90 tablet 1    gabapentin (NEURONTIN) 100 mg capsule TAKE 1 CAPSULE BY MOUTH THREE TIMES A DAY (Patient taking differently: Take 100 mg by mouth 2 (two) times a day) 90 capsule 5    Garlic 1000 MG CAPS Take 1,000 mg by mouth in the morning      latanoprost (XALATAN) 0.005 % ophthalmic solution Administer 1 drop to both eyes daily at bedtime      multivitamin-iron-minerals-folic acid (CENTRUM) chewable tablet Chew 1 tablet daily.      omeprazole (PriLOSEC) 20 mg delayed release capsule Take 1 capsule (20 mg total) by mouth daily 90 capsule 1    Rhopressa 0.02 % SOLN INSTILL 1 DROP INTO RIGHT EYE ONCE A DAY      spironolactone (ALDACTONE) 25 mg tablet Take 1 tablet (25 mg total) by mouth daily 90 tablet 1    LORazepam (Ativan) 0.5 mg tablet Take one tab 1-2 hours prior to MRI testing (Patient not taking: Reported on 10/1/2024) 1 tablet 0     No facility-administered medications prior to visit.                 This note was completed in part utilizing Dragon [employer] voice recognition software. Grammatical errors, random word insertion, spelling mistakes, occasional wrong word or \"sound-alike\" substitutions and incomplete sentences may be an occasional consequence of the system secondary to software limitations, ambient noise and hardware issues. At the time of dictation, efforts were made to edit, clarify and /or correct errors.  Please read the chart carefully and recognize, using context, where substitutions have occurred.  If you have any questions or concerns about the context, text or information contained within the body of this dictation, please contact myself, the provider, for further clarification.    "

## 2024-10-30 ENCOUNTER — HOSPITAL ENCOUNTER (INPATIENT)
Facility: HOSPITAL | Age: 89
LOS: 3 days | Discharge: HOME HEALTH | DRG: 871 | End: 2024-11-02
Attending: EMERGENCY MEDICINE | Admitting: INTERNAL MEDICINE
Payer: COMMERCIAL

## 2024-10-30 ENCOUNTER — APPOINTMENT (EMERGENCY)
Dept: RADIOLOGY | Facility: HOSPITAL | Age: 89
DRG: 871 | End: 2024-10-30
Payer: COMMERCIAL

## 2024-10-30 DIAGNOSIS — E87.1 HYPONATREMIA: ICD-10-CM

## 2024-10-30 DIAGNOSIS — B97.89 VIRAL SEPSIS (HCC): ICD-10-CM

## 2024-10-30 DIAGNOSIS — J81.1 PULMONARY EDEMA: ICD-10-CM

## 2024-10-30 DIAGNOSIS — K21.9 GASTROESOPHAGEAL REFLUX DISEASE, UNSPECIFIED WHETHER ESOPHAGITIS PRESENT: ICD-10-CM

## 2024-10-30 DIAGNOSIS — U07.1 COVID: ICD-10-CM

## 2024-10-30 DIAGNOSIS — M79.672 PAIN IN BOTH FEET: ICD-10-CM

## 2024-10-30 DIAGNOSIS — M79.671 PAIN IN BOTH FEET: ICD-10-CM

## 2024-10-30 DIAGNOSIS — A41.89 VIRAL SEPSIS (HCC): ICD-10-CM

## 2024-10-30 DIAGNOSIS — J11.1 INFLUENZA: Primary | ICD-10-CM

## 2024-10-30 PROBLEM — I50.33 ACUTE ON CHRONIC DIASTOLIC CONGESTIVE HEART FAILURE (HCC): Status: ACTIVE | Noted: 2024-10-24

## 2024-10-30 PROBLEM — R06.89 ACUTE RESPIRATORY INSUFFICIENCY: Status: ACTIVE | Noted: 2024-10-30

## 2024-10-30 LAB
4HR DELTA HS TROPONIN: 0 NG/L
ALBUMIN SERPL BCG-MCNC: 4 G/DL (ref 3.5–5)
ALP SERPL-CCNC: 72 U/L (ref 34–104)
ALT SERPL W P-5'-P-CCNC: 14 U/L (ref 7–52)
ANION GAP SERPL CALCULATED.3IONS-SCNC: 7 MMOL/L (ref 4–13)
APTT PPP: 38 SECONDS (ref 23–34)
AST SERPL W P-5'-P-CCNC: 15 U/L (ref 13–39)
ATRIAL RATE: 125 BPM
BACTERIA UR QL AUTO: NORMAL /HPF
BASOPHILS # BLD AUTO: 0.02 THOUSANDS/ΜL (ref 0–0.1)
BASOPHILS NFR BLD AUTO: 0 % (ref 0–1)
BILIRUB SERPL-MCNC: 0.43 MG/DL (ref 0.2–1)
BILIRUB UR QL STRIP: NEGATIVE
BNP SERPL-MCNC: 275 PG/ML (ref 0–100)
BUN SERPL-MCNC: 24 MG/DL (ref 5–25)
CALCIUM SERPL-MCNC: 9.3 MG/DL (ref 8.4–10.2)
CARDIAC TROPONIN I PNL SERPL HS: 10 NG/L
CARDIAC TROPONIN I PNL SERPL HS: 10 NG/L
CHLORIDE SERPL-SCNC: 100 MMOL/L (ref 96–108)
CK SERPL-CCNC: 44 U/L (ref 39–308)
CLARITY UR: CLEAR
CO2 SERPL-SCNC: 26 MMOL/L (ref 21–32)
COLOR UR: YELLOW
CREAT SERPL-MCNC: 1.4 MG/DL (ref 0.6–1.3)
CRP SERPL QL: 43.4 MG/L
D DIMER PPP FEU-MCNC: 0.76 UG/ML FEU
EOSINOPHIL # BLD AUTO: 0.43 THOUSAND/ΜL (ref 0–0.61)
EOSINOPHIL NFR BLD AUTO: 6 % (ref 0–6)
ERYTHROCYTE [DISTWIDTH] IN BLOOD BY AUTOMATED COUNT: 12.8 % (ref 11.6–15.1)
FLUAV AG UPPER RESP QL IA.RAPID: NEGATIVE
FLUBV AG UPPER RESP QL IA.RAPID: POSITIVE
GFR SERPL CREATININE-BSD FRML MDRD: 43 ML/MIN/1.73SQ M
GLUCOSE SERPL-MCNC: 134 MG/DL (ref 65–140)
GLUCOSE UR STRIP-MCNC: ABNORMAL MG/DL
HCT VFR BLD AUTO: 32.8 % (ref 36.5–49.3)
HGB BLD-MCNC: 10.6 G/DL (ref 12–17)
HGB UR QL STRIP.AUTO: NEGATIVE
IMM GRANULOCYTES # BLD AUTO: 0.03 THOUSAND/UL (ref 0–0.2)
IMM GRANULOCYTES NFR BLD AUTO: 0 % (ref 0–2)
INR PPP: 1.21 (ref 0.85–1.19)
KETONES UR STRIP-MCNC: NEGATIVE MG/DL
LACTATE SERPL-SCNC: 1.4 MMOL/L (ref 0.5–2)
LEUKOCYTE ESTERASE UR QL STRIP: NEGATIVE
LYMPHOCYTES # BLD AUTO: 0.88 THOUSANDS/ΜL (ref 0.6–4.47)
LYMPHOCYTES NFR BLD AUTO: 11 % (ref 14–44)
MCH RBC QN AUTO: 32.7 PG (ref 26.8–34.3)
MCHC RBC AUTO-ENTMCNC: 32.3 G/DL (ref 31.4–37.4)
MCV RBC AUTO: 101 FL (ref 82–98)
MONOCYTES # BLD AUTO: 0.88 THOUSAND/ΜL (ref 0.17–1.22)
MONOCYTES NFR BLD AUTO: 11 % (ref 4–12)
NEUTROPHILS # BLD AUTO: 5.54 THOUSANDS/ΜL (ref 1.85–7.62)
NEUTS SEG NFR BLD AUTO: 72 % (ref 43–75)
NITRITE UR QL STRIP: NEGATIVE
NON-SQ EPI CELLS URNS QL MICRO: NORMAL /HPF
NRBC BLD AUTO-RTO: 0 /100 WBCS
PH UR STRIP.AUTO: 6 [PH]
PLATELET # BLD AUTO: 244 THOUSANDS/UL (ref 149–390)
PMV BLD AUTO: 10.6 FL (ref 8.9–12.7)
POTASSIUM SERPL-SCNC: 4.5 MMOL/L (ref 3.5–5.3)
PROCALCITONIN SERPL-MCNC: 0.09 NG/ML
PROT SERPL-MCNC: 7.6 G/DL (ref 6.4–8.4)
PROT UR STRIP-MCNC: ABNORMAL MG/DL
PROTHROMBIN TIME: 15.8 SECONDS (ref 12.3–15)
QRS AXIS: -18 DEGREES
QRSD INTERVAL: 88 MS
QT INTERVAL: 324 MS
QTC INTERVAL: 405 MS
RBC # BLD AUTO: 3.24 MILLION/UL (ref 3.88–5.62)
RBC #/AREA URNS AUTO: NORMAL /HPF
SARS-COV+SARS-COV-2 AG RESP QL IA.RAPID: POSITIVE
SODIUM SERPL-SCNC: 133 MMOL/L (ref 135–147)
SP GR UR STRIP.AUTO: 1.01 (ref 1–1.03)
T WAVE AXIS: 62 DEGREES
UROBILINOGEN UR STRIP-ACNC: <2 MG/DL
VENTRICULAR RATE: 94 BPM
WBC # BLD AUTO: 7.78 THOUSAND/UL (ref 4.31–10.16)
WBC #/AREA URNS AUTO: NORMAL /HPF

## 2024-10-30 PROCEDURE — 85610 PROTHROMBIN TIME: CPT | Performed by: EMERGENCY MEDICINE

## 2024-10-30 PROCEDURE — 94640 AIRWAY INHALATION TREATMENT: CPT

## 2024-10-30 PROCEDURE — XW033E5 INTRODUCTION OF REMDESIVIR ANTI-INFECTIVE INTO PERIPHERAL VEIN, PERCUTANEOUS APPROACH, NEW TECHNOLOGY GROUP 5: ICD-10-PCS | Performed by: EMERGENCY MEDICINE

## 2024-10-30 PROCEDURE — 83880 ASSAY OF NATRIURETIC PEPTIDE: CPT | Performed by: EMERGENCY MEDICINE

## 2024-10-30 PROCEDURE — 94760 N-INVAS EAR/PLS OXIMETRY 1: CPT

## 2024-10-30 PROCEDURE — 84145 PROCALCITONIN (PCT): CPT | Performed by: EMERGENCY MEDICINE

## 2024-10-30 PROCEDURE — 82550 ASSAY OF CK (CPK): CPT | Performed by: STUDENT IN AN ORGANIZED HEALTH CARE EDUCATION/TRAINING PROGRAM

## 2024-10-30 PROCEDURE — 83605 ASSAY OF LACTIC ACID: CPT | Performed by: EMERGENCY MEDICINE

## 2024-10-30 PROCEDURE — 84484 ASSAY OF TROPONIN QUANT: CPT | Performed by: STUDENT IN AN ORGANIZED HEALTH CARE EDUCATION/TRAINING PROGRAM

## 2024-10-30 PROCEDURE — 87040 BLOOD CULTURE FOR BACTERIA: CPT | Performed by: EMERGENCY MEDICINE

## 2024-10-30 PROCEDURE — 99285 EMERGENCY DEPT VISIT HI MDM: CPT

## 2024-10-30 PROCEDURE — 71045 X-RAY EXAM CHEST 1 VIEW: CPT

## 2024-10-30 PROCEDURE — 86140 C-REACTIVE PROTEIN: CPT | Performed by: STUDENT IN AN ORGANIZED HEALTH CARE EDUCATION/TRAINING PROGRAM

## 2024-10-30 PROCEDURE — 87804 INFLUENZA ASSAY W/OPTIC: CPT | Performed by: EMERGENCY MEDICINE

## 2024-10-30 PROCEDURE — 87811 SARS-COV-2 COVID19 W/OPTIC: CPT | Performed by: EMERGENCY MEDICINE

## 2024-10-30 PROCEDURE — 87154 CUL TYP ID BLD PTHGN 6+ TRGT: CPT | Performed by: EMERGENCY MEDICINE

## 2024-10-30 PROCEDURE — 81001 URINALYSIS AUTO W/SCOPE: CPT | Performed by: EMERGENCY MEDICINE

## 2024-10-30 PROCEDURE — 84484 ASSAY OF TROPONIN QUANT: CPT | Performed by: EMERGENCY MEDICINE

## 2024-10-30 PROCEDURE — 85730 THROMBOPLASTIN TIME PARTIAL: CPT | Performed by: EMERGENCY MEDICINE

## 2024-10-30 PROCEDURE — 80053 COMPREHEN METABOLIC PANEL: CPT | Performed by: EMERGENCY MEDICINE

## 2024-10-30 PROCEDURE — 94664 DEMO&/EVAL PT USE INHALER: CPT

## 2024-10-30 PROCEDURE — 99223 1ST HOSP IP/OBS HIGH 75: CPT | Performed by: STUDENT IN AN ORGANIZED HEALTH CARE EDUCATION/TRAINING PROGRAM

## 2024-10-30 PROCEDURE — 36415 COLL VENOUS BLD VENIPUNCTURE: CPT | Performed by: EMERGENCY MEDICINE

## 2024-10-30 PROCEDURE — 93005 ELECTROCARDIOGRAM TRACING: CPT

## 2024-10-30 PROCEDURE — 99285 EMERGENCY DEPT VISIT HI MDM: CPT | Performed by: EMERGENCY MEDICINE

## 2024-10-30 PROCEDURE — 93010 ELECTROCARDIOGRAM REPORT: CPT | Performed by: INTERNAL MEDICINE

## 2024-10-30 PROCEDURE — 85025 COMPLETE CBC W/AUTO DIFF WBC: CPT | Performed by: EMERGENCY MEDICINE

## 2024-10-30 PROCEDURE — 85379 FIBRIN DEGRADATION QUANT: CPT | Performed by: STUDENT IN AN ORGANIZED HEALTH CARE EDUCATION/TRAINING PROGRAM

## 2024-10-30 RX ORDER — SPIRONOLACTONE 25 MG/1
25 TABLET ORAL DAILY
Status: DISCONTINUED | OUTPATIENT
Start: 2024-10-30 | End: 2024-11-02 | Stop reason: HOSPADM

## 2024-10-30 RX ORDER — GABAPENTIN 100 MG/1
100 CAPSULE ORAL 2 TIMES DAILY
Status: DISCONTINUED | OUTPATIENT
Start: 2024-10-30 | End: 2024-11-02 | Stop reason: HOSPADM

## 2024-10-30 RX ORDER — DEXAMETHASONE SODIUM PHOSPHATE 10 MG/ML
6 INJECTION, SOLUTION INTRAMUSCULAR; INTRAVENOUS EVERY 24 HOURS
Status: DISCONTINUED | OUTPATIENT
Start: 2024-10-30 | End: 2024-11-02 | Stop reason: HOSPADM

## 2024-10-30 RX ORDER — AMITRIPTYLINE HYDROCHLORIDE 10 MG/1
30 TABLET ORAL
Status: DISCONTINUED | OUTPATIENT
Start: 2024-10-30 | End: 2024-11-02 | Stop reason: HOSPADM

## 2024-10-30 RX ORDER — BISOPROLOL FUMARATE 5 MG/1
2.5 TABLET, FILM COATED ORAL DAILY
Status: DISCONTINUED | OUTPATIENT
Start: 2024-10-30 | End: 2024-11-02 | Stop reason: HOSPADM

## 2024-10-30 RX ORDER — OSELTAMIVIR PHOSPHATE 30 MG/1
30 CAPSULE ORAL EVERY 12 HOURS SCHEDULED
Status: DISCONTINUED | OUTPATIENT
Start: 2024-10-30 | End: 2024-11-02 | Stop reason: HOSPADM

## 2024-10-30 RX ORDER — AMLODIPINE BESYLATE 5 MG/1
5 TABLET ORAL
Status: DISCONTINUED | OUTPATIENT
Start: 2024-10-30 | End: 2024-11-02 | Stop reason: HOSPADM

## 2024-10-30 RX ORDER — ONDANSETRON 2 MG/ML
4 INJECTION INTRAMUSCULAR; INTRAVENOUS EVERY 4 HOURS PRN
Status: DISCONTINUED | OUTPATIENT
Start: 2024-10-30 | End: 2024-11-02 | Stop reason: HOSPADM

## 2024-10-30 RX ORDER — LATANOPROST 50 UG/ML
1 SOLUTION/ DROPS OPHTHALMIC
Status: DISCONTINUED | OUTPATIENT
Start: 2024-10-30 | End: 2024-11-02 | Stop reason: HOSPADM

## 2024-10-30 RX ORDER — FINASTERIDE 5 MG/1
5 TABLET, FILM COATED ORAL DAILY
Status: DISCONTINUED | OUTPATIENT
Start: 2024-10-30 | End: 2024-11-02 | Stop reason: HOSPADM

## 2024-10-30 RX ORDER — PANTOPRAZOLE SODIUM 20 MG/1
20 TABLET, DELAYED RELEASE ORAL
Status: DISCONTINUED | OUTPATIENT
Start: 2024-10-30 | End: 2024-11-02 | Stop reason: HOSPADM

## 2024-10-30 RX ORDER — DORZOLAMIDE HYDROCHLORIDE AND TIMOLOL MALEATE 20; 5 MG/ML; MG/ML
1 SOLUTION/ DROPS OPHTHALMIC 2 TIMES DAILY
Status: DISCONTINUED | OUTPATIENT
Start: 2024-10-30 | End: 2024-11-02 | Stop reason: HOSPADM

## 2024-10-30 RX ORDER — ALLOPURINOL 100 MG/1
100 TABLET ORAL DAILY
Status: DISCONTINUED | OUTPATIENT
Start: 2024-10-30 | End: 2024-11-02 | Stop reason: HOSPADM

## 2024-10-30 RX ORDER — POLYETHYLENE GLYCOL 3350 17 G/17G
17 POWDER, FOR SOLUTION ORAL DAILY PRN
Status: DISCONTINUED | OUTPATIENT
Start: 2024-10-30 | End: 2024-11-02 | Stop reason: HOSPADM

## 2024-10-30 RX ORDER — MAGNESIUM HYDROXIDE/ALUMINUM HYDROXICE/SIMETHICONE 120; 1200; 1200 MG/30ML; MG/30ML; MG/30ML
30 SUSPENSION ORAL EVERY 6 HOURS PRN
Status: DISCONTINUED | OUTPATIENT
Start: 2024-10-30 | End: 2024-11-02 | Stop reason: HOSPADM

## 2024-10-30 RX ORDER — LEVALBUTEROL INHALATION SOLUTION 0.63 MG/3ML
0.63 SOLUTION RESPIRATORY (INHALATION)
Status: DISCONTINUED | OUTPATIENT
Start: 2024-10-30 | End: 2024-10-31

## 2024-10-30 RX ORDER — GUAIFENESIN 600 MG/1
600 TABLET, EXTENDED RELEASE ORAL EVERY 12 HOURS SCHEDULED
Status: DISCONTINUED | OUTPATIENT
Start: 2024-10-30 | End: 2024-11-02 | Stop reason: HOSPADM

## 2024-10-30 RX ORDER — BRIMONIDINE TARTRATE 2 MG/ML
1 SOLUTION/ DROPS OPHTHALMIC 2 TIMES DAILY
Status: DISCONTINUED | OUTPATIENT
Start: 2024-10-30 | End: 2024-11-02 | Stop reason: HOSPADM

## 2024-10-30 RX ORDER — SODIUM CHLORIDE, SODIUM GLUCONATE, SODIUM ACETATE, POTASSIUM CHLORIDE, MAGNESIUM CHLORIDE, SODIUM PHOSPHATE, DIBASIC, AND POTASSIUM PHOSPHATE .53; .5; .37; .037; .03; .012; .00082 G/100ML; G/100ML; G/100ML; G/100ML; G/100ML; G/100ML; G/100ML
500 INJECTION, SOLUTION INTRAVENOUS ONCE
Status: COMPLETED | OUTPATIENT
Start: 2024-10-30 | End: 2024-10-30

## 2024-10-30 RX ORDER — ACETAMINOPHEN 325 MG/1
650 TABLET ORAL EVERY 6 HOURS PRN
Status: DISCONTINUED | OUTPATIENT
Start: 2024-10-30 | End: 2024-11-02 | Stop reason: HOSPADM

## 2024-10-30 RX ORDER — BENZONATATE 100 MG/1
100 CAPSULE ORAL 3 TIMES DAILY PRN
Status: DISCONTINUED | OUTPATIENT
Start: 2024-10-30 | End: 2024-11-02 | Stop reason: HOSPADM

## 2024-10-30 RX ADMIN — GABAPENTIN 100 MG: 100 CAPSULE ORAL at 17:50

## 2024-10-30 RX ADMIN — AMLODIPINE BESYLATE 5 MG: 5 TABLET ORAL at 21:53

## 2024-10-30 RX ADMIN — LATANOPROST 1 DROP: 50 SOLUTION OPHTHALMIC at 21:57

## 2024-10-30 RX ADMIN — IPRATROPIUM BROMIDE 0.5 MG: 0.5 SOLUTION RESPIRATORY (INHALATION) at 14:09

## 2024-10-30 RX ADMIN — SODIUM CHLORIDE, SODIUM GLUCONATE, SODIUM ACETATE, POTASSIUM CHLORIDE, MAGNESIUM CHLORIDE, SODIUM PHOSPHATE, DIBASIC, AND POTASSIUM PHOSPHATE 500 ML: .53; .5; .37; .037; .03; .012; .00082 INJECTION, SOLUTION INTRAVENOUS at 15:06

## 2024-10-30 RX ADMIN — GUAIFENESIN 600 MG: 600 TABLET ORAL at 13:42

## 2024-10-30 RX ADMIN — AMITRIPTYLINE HYDROCHLORIDE 30 MG: 10 TABLET, FILM COATED ORAL at 21:52

## 2024-10-30 RX ADMIN — SPIRONOLACTONE 25 MG: 25 TABLET ORAL at 13:45

## 2024-10-30 RX ADMIN — APIXABAN 2.5 MG: 2.5 TABLET, FILM COATED ORAL at 13:43

## 2024-10-30 RX ADMIN — IPRATROPIUM BROMIDE 0.5 MG: 0.5 SOLUTION RESPIRATORY (INHALATION) at 20:04

## 2024-10-30 RX ADMIN — ALLOPURINOL 100 MG: 100 TABLET ORAL at 13:40

## 2024-10-30 RX ADMIN — BRIMONIDINE TARTRATE 1 DROP: 2 SOLUTION/ DROPS OPHTHALMIC at 19:20

## 2024-10-30 RX ADMIN — DEXAMETHASONE SODIUM PHOSPHATE 6 MG: 10 INJECTION, SOLUTION INTRAMUSCULAR; INTRAVENOUS at 13:35

## 2024-10-30 RX ADMIN — FINASTERIDE 5 MG: 5 TABLET, FILM COATED ORAL at 13:38

## 2024-10-30 RX ADMIN — OSELTAMIVIR PHOSPHATE 30 MG: 30 CAPSULE ORAL at 13:40

## 2024-10-30 RX ADMIN — REMDESIVIR 200 MG: 100 INJECTION, POWDER, LYOPHILIZED, FOR SOLUTION INTRAVENOUS at 13:37

## 2024-10-30 RX ADMIN — PANTOPRAZOLE SODIUM 20 MG: 20 TABLET, DELAYED RELEASE ORAL at 13:38

## 2024-10-30 RX ADMIN — LEVALBUTEROL HYDROCHLORIDE 0.63 MG: 0.63 SOLUTION RESPIRATORY (INHALATION) at 20:04

## 2024-10-30 RX ADMIN — LEVALBUTEROL HYDROCHLORIDE 0.63 MG: 0.63 SOLUTION RESPIRATORY (INHALATION) at 14:09

## 2024-10-30 RX ADMIN — BISOPROLOL FUMARATE 2.5 MG: 5 TABLET, FILM COATED ORAL at 13:41

## 2024-10-30 RX ADMIN — DORZOLAMIDE HYDROCHLORIDE AND TIMOLOL MALEATE 1 DROP: 20; 5 SOLUTION/ DROPS OPHTHALMIC at 19:22

## 2024-10-30 RX ADMIN — OSELTAMIVIR PHOSPHATE 30 MG: 30 CAPSULE ORAL at 21:55

## 2024-10-30 RX ADMIN — GABAPENTIN 100 MG: 100 CAPSULE ORAL at 13:44

## 2024-10-30 RX ADMIN — GUAIFENESIN 600 MG: 600 TABLET ORAL at 21:55

## 2024-10-30 NOTE — ASSESSMENT & PLAN NOTE
Patient presenting due to cough and shortness of breath    In the ED patient meeting SIRS criteria with tachycardia and tachypnea  Source of infection noted to be viral in nature with being COVID and flu positive  Lactic acid negative  Procalcitonin negative  Patient not meeting any other endorgan dysfunction    Patient meeting mild COVID protocol  CRP  D-dimer  CK  Initiating decadron and remdesivir  Initiating tamiflu  Supportive treatment

## 2024-10-30 NOTE — ASSESSMENT & PLAN NOTE
Continue home regimen of amlodipine bisoprolol and Aldactone however at higher parameters due to sepsis

## 2024-10-30 NOTE — ASSESSMENT & PLAN NOTE
Patient presenting due to cough and shortness of breath    In the ED patient meeting SIRS criteria with tachycardia and tachypnea  Source of infection noted to be viral in nature with being COVID and flu positive  Lactic acid negative  Procalcitonin negative  Patient not meeting any other endorgan dysfunction    Patient meeting mild COVID protocol  CRP increasing  D-dimer slightly elevated-> pt on Eliquis  CK WNL  continue decadron and remdesivir day 1  Continue tamiflu day 1  Supportive treatment

## 2024-10-30 NOTE — PLAN OF CARE
Problem: INFECTION - ADULT  Goal: Absence or prevention of progression during hospitalization  Description: INTERVENTIONS:  - Assess and monitor for signs and symptoms of infection  - Monitor lab/diagnostic results  - Monitor all insertion sites, i.e. indwelling lines, tubes, and drains  - Monitor endotracheal if appropriate and nasal secretions for changes in amount and color  - [place] appropriate cooling/warming therapies per order  - Administer medications as ordered  - Instruct and encourage patient and family to use good hand hygiene technique  - Identify and instruct in appropriate isolation precautions for identified infection/condition  Outcome: Progressing  Goal: Absence of fever/infection during neutropenic period  Description: INTERVENTIONS:  - Monitor WBC    Outcome: Progressing     Problem: DISCHARGE PLANNING  Goal: Discharge to home or other facility with appropriate resources  Description: INTERVENTIONS:  - Identify barriers to discharge w/patient and caregiver  - Arrange for needed discharge resources and transportation as appropriate  - Identify discharge learning needs (meds, wound care, etc.)  - Arrange for interpretive services to assist at discharge as needed  - Refer to Case Management Department for coordinating discharge planning if the patient needs post-hospital services based on physician/advanced practitioner order or complex needs related to functional status, cognitive ability, or social support system  Outcome: Progressing     Problem: Knowledge Deficit  Goal: Patient/family/caregiver demonstrates understanding of disease process, treatment plan, medications, and discharge instructions  Description: Complete learning assessment and assess knowledge base.  Interventions:  - Provide teaching at level of understanding  - Provide teaching via preferred learning methods  Outcome: Progressing     Problem: CARDIOVASCULAR - ADULT  Goal: Maintains optimal cardiac output and hemodynamic  stability  Description: INTERVENTIONS:  - Monitor I/O, vital signs and rhythm  - Monitor for S/S and trends of decreased cardiac output  - Administer and titrate ordered vasoactive medications to optimize hemodynamic stability  - Assess quality of pulses, skin color and temperature  - Assess for signs of decreased coronary artery perfusion  - Instruct patient to report change in severity of symptoms  Outcome: Progressing  Goal: Absence of cardiac dysrhythmias or at baseline rhythm  Description: INTERVENTIONS:  - Continuous cardiac monitoring, vital signs, obtain 12 lead EKG if ordered  - Administer antiarrhythmic and heart rate control medications as ordered  - Monitor electrolytes and administer replacement therapy as ordered  Outcome: Progressing     Problem: RESPIRATORY - ADULT  Goal: Achieves optimal ventilation and oxygenation  Description: INTERVENTIONS:  - Assess for changes in respiratory status  - Assess for changes in mentation and behavior  - Position to facilitate oxygenation and minimize respiratory effort  - Oxygen administered by appropriate delivery if ordered  - Initiate smoking cessation education as indicated  - Encourage broncho-pulmonary hygiene including cough, deep breathe, Incentive Spirometry  - Assess the need for suctioning and aspirate as needed  - Assess and instruct to report SOB or any respiratory difficulty  - Respiratory Therapy support as indicated  Outcome: Progressing     Problem: SAFETY ADULT  Goal: Patient will remain free of falls  Description: INTERVENTIONS:  - Educate patient/family on patient safety including physical limitations  - Instruct patient to call for assistance with activity   - Consult OT/PT to assist with strengthening/mobility   - Keep Call bell within reach  - Keep bed low and locked with side rails adjusted as appropriate  - Keep care items and personal belongings within reach  - Initiate and maintain comfort rounds  - Make Fall Risk Sign visible to staff  -  Offer Toileting every 2 Hours, in advance of need  - Initiate/Maintain bed/chair alarm  - Obtain necessary fall risk management equipment:   - Apply yellow socks and bracelet for high fall risk patients  - Consider moving patient to room near nurses station  Outcome: Progressing

## 2024-10-30 NOTE — ASSESSMENT & PLAN NOTE
Patient requiring 2 L nasal cannula on admission  Most likely secondary to viral sepsis secondary to flu and COVID infection  Wean as tolerated  Supportive treatment

## 2024-10-30 NOTE — RESPIRATORY THERAPY NOTE
RT Protocol Note  Jose Mullins 89 y.o. male MRN: [id number]  Unit/Bed#: -01 Encounter: [id number]    Assessment    Principal Problem:    Viral sepsis (HCC)  Active Problems:    Anemia of chronic disease    Benign essential hypertension    Stage 3a chronic kidney disease (HCC)    Permanent atrial fibrillation (HCC)    Chronic diastolic congestive heart failure (HCC)    Acute respiratory insufficiency      Home Pulmonary Medications:  none    Past Medical History:   Diagnosis Date    A-fib (HCC)     Anemia     Carpal tunnel syndrome, unspecified upper limb     Cataract     last assessed: 8/18/2012    COVID-19 09/06/2023    GERD (gastroesophageal reflux disease)     Glaucoma     Hypertension     last assessed: 8/23/2012    Intervertebral disc disease     PVD (peripheral vascular disease) (HCC)     Stomach disorder 11/2021    Syncope 03/12/2018     Social History     Socioeconomic History    Marital status:      Spouse name: None    Number of children: None    Years of education: None    Highest education level: None   Occupational History    Occupation: retired   Tobacco Use    Smoking status: Former     Current packs/day: 0.50     Average packs/day: 0.5 packs/day for 35.0 years (17.5 ttl pk-yrs)     Types: Cigarettes    Smokeless tobacco: Former     Types: Snuff    Tobacco comments:     quit 30 years ago    Vaping Use    Vaping status: Former   Substance and Sexual Activity    Alcohol use: Not Currently    Drug use: Never    Sexual activity: Not Currently     Partners: Female   Other Topics Concern    None   Social History Narrative    Morning person     Social Determinants of Health     Financial Resource Strain: Low Risk  (7/26/2023)    Overall Financial Resource Strain (CARDIA)     Difficulty of Paying Living Expenses: Not hard at all   Food Insecurity: No Food Insecurity (9/6/2023)    Hunger Vital Sign     Worried About Running Out of Food in the Last Year: Never true     Ran Out of Food in the Last  Year: Never true   Transportation Needs: No Transportation Needs (7/5/2024)    OASIS : Transportation     Lack of Transportation (Medical): No     Lack of Transportation (Non-Medical): No     Patient Unable or Declines to Respond: No   Physical Activity: Not on file   Stress: Not on file   Social Connections: Not on file   Intimate Partner Violence: Not on file   Housing Stability: Low Risk  (9/6/2023)    Housing Stability Vital Sign     Unable to Pay for Housing in the Last Year: No     Number of Times Moved in the Last Year: 1     Homeless in the Last Year: No       Subjective       Pt is Covid and Flu+    Objective    Physical Exam:   Assessment Type: Assess only  General Appearance: Sleeping  Respiratory Pattern: Dyspnea at rest  Chest Assessment: Chest expansion symmetrical  Bilateral Breath Sounds: Diminished  O2 Device: nasal canulla    Vitals:  Blood pressure 150/77, pulse 104, temperature 98.2 °F (36.8 °C), temperature source Oral, resp. rate 20, weight 91.4 kg (201 lb 8 oz), SpO2 (!) 88%.          Imaging and other studies: Results Review Statement: I personally reviewed the following image studies/reports in PACS and discussed pertinent findings with Radiology: chest xray. My interpretation of the radiology images/reports is: Pulmonary edema .    O2 Device: nasal canulla     Plan    Respiratory Plan: Mild Distress pathway  Airway Clearance Plan: Incentive Spirometer

## 2024-10-30 NOTE — ED PROVIDER NOTES
Time reflects when diagnosis was documented in both MDM as applicable and the Disposition within this note       Time User Action Codes Description Comment    10/30/2024 10:22 AM Mendez Garcia [J11.1] Influenza     10/30/2024 10:22 AM Mendez Garcia [J81.1] Pulmonary edema     10/30/2024 10:22 AM Mendez Garcia [U07.1] COVID           ED Disposition       ED Disposition   Admit    Condition   Stable    Date/Time   Wed Oct 30, 2024 10:22 AM    Comment   Case was discussed with [facility] and the patient's admission status was agreed to be Admission Status: inpatient status to the service of Dr. Hunt .               Assessment & Plan       Medical Decision Making  89-year-old male presenting with cough and worsening shortness of breath.  Concern for infectious etiology, CHF.  Also consider ACS.  Obtain cardiopulmonary/sepsis evaluation with labs, EKG, blood cultures, chest x-ray.    Viral swab positive for influenza and COVID.  Admit to [facility] telemetry.    Amount and/or Complexity of Data Reviewed  Labs: ordered.  Radiology: ordered.    Risk  Decision regarding hospitalization.             Medications   dexamethasone (PF) (DECADRON) injection 6 mg (has no administration in time range)   remdesivir (Veklury) 200 mg in sodium chloride 0.9 % 290 mL IVPB (has no administration in time range)     Followed by   remdesivir (Veklury) 100 mg in sodium chloride 0.9 % 270 mL IVPB (has no administration in time range)   oseltamivir (TAMIFLU) capsule 30 mg (has no administration in time range)   levalbuterol (XOPENEX) inhalation solution 0.63 mg (has no administration in time range)   ipratropium (ATROVENT) 0.02 % inhalation solution 0.5 mg (has no administration in time range)   guaiFENesin (MUCINEX) 12 hr tablet 600 mg (has no administration in time range)   benzonatate (TESSALON PERLES) capsule 100 mg (has no administration in time range)       ED Risk Strat Scores                           SBIRT 20yo+      Flowsheet Row  Most Recent Value   Initial Alcohol Screen: US AUDIT-C     1. How often do you have a drink containing alcohol? 0 Filed at: 10/30/2024 0955   2. How many drinks containing alcohol do you have on a typical day you are drinking?  0 Filed at: 10/30/2024 0955   3a. Male UNDER 65: How often do you have five or more drinks on one occasion? 0 Filed at: 10/30/2024 0955   3b. FEMALE Any Age, or MALE 65+: How often do you have 4 or more drinks on one occassion? 0 Filed at: 10/30/2024 0955   Audit-C Score 0 Filed at: 10/30/2024 0955   ROSSY: How many times in the past year have you...    Used an illegal drug or used a prescription medication for non-medical reasons? Never Filed at: 10/30/2024 0955                            History of Present Illness       Chief Complaint   Patient presents with    Cough     Comes to ed c/o cough and sob.         Past Medical History:   Diagnosis Date    A-fib (HCC)     Anemia     Carpal tunnel syndrome, unspecified upper limb     Cataract     last assessed: 8/18/2012    COVID-19 09/06/2023    GERD (gastroesophageal reflux disease)     Glaucoma     Hypertension     last assessed: 8/23/2012    Intervertebral disc disease     PVD (peripheral vascular disease) (HCC)     Stomach disorder 11/2021    Syncope 03/12/2018      Past Surgical History:   Procedure Laterality Date    CATARACT EXTRACTION      COLONOSCOPY      HERNIA REPAIR      NEUROPLASTY / TRANSPOSITION MEDIAN NERVE AT CARPAL TUNNEL      NE LAPAROSCOPY SURG CHOLECYSTECTOMY N/A 09/14/2016    Procedure: LAPAROSCOPIC CHOLECYSTECTOMY ;  Surgeon: Christo Cristina MD;  Location: [facility];  Service: General    UPPER GASTROINTESTINAL ENDOSCOPY        Family History   Problem Relation Age of Onset    Glaucoma Mother     Cancer Father     Heart disease Father     Glaucoma Father     Alzheimer's disease Family     Heart attack Family       Social History     Tobacco Use    Smoking status: Former     Current packs/day: 0.50     Average packs/day: 0.5  packs/day for 35.0 years (17.5 ttl pk-yrs)     Types: Cigarettes    Smokeless tobacco: Former     Types: Snuff    Tobacco comments:     quit 30 years ago    Vaping Use    Vaping status: Former   Substance Use Topics    Alcohol use: Not Currently    Drug use: Never      E-Cigarette/Vaping    E-Cigarette Use Former User       E-Cigarette/Vaping Substances    Nicotine No     THC No     CBD No     Flavoring No     Other No     Unknown No       I have reviewed and agree with the history as documented.     89-year-old male presents for evaluation of worsening dyspnea on exertion with associated cough that started a few days ago.  Has gotten progressively worse.  Patient noted to be hypoxemic on arrival 88% on room air with labored breathing.  Seems to have improved with rest.        Review of Systems   Respiratory:  Positive for cough and shortness of breath.            Objective       ED Triage Vitals   Temperature Pulse Blood Pressure Respirations SpO2 Patient Position - Orthostatic VS   10/30/24 0915 10/30/24 0915 10/30/24 0915 10/30/24 0915 10/30/24 0915 10/30/24 1137   98.2 °F (36.8 °C) 104 140/89 22 (!) 88 % Lying      Temp Source Heart Rate Source BP Location FiO2 (%) Pain Score    10/30/24 0915 10/30/24 0915 10/30/24 1137 -- 10/30/24 0915    Temporal Monitor Left arm  No Pain      Vitals      Date and Time Temp Pulse SpO2 Resp BP Pain Score FACES Pain Rating User   10/30/24 1137 98.2 °F (36.8 °C) -- -- 20 150/77 -- -- AB   10/30/24 0915 98.2 °F (36.8 °C) 104 88 % 22 140/89 No Pain -- BY            Physical Exam  Vitals and nursing note reviewed.   Constitutional:       General: He is not in acute distress.     Appearance: He is well-developed.   HENT:      Head: Normocephalic and atraumatic.      Right Ear: External ear normal.      Left Ear: External ear normal.      Nose: Nose normal.   Eyes:      General: No scleral icterus.  Pulmonary:      Effort: No respiratory distress.      Breath sounds: No wheezing.       Comments: Mild conversational dyspnea  Abdominal:      General: There is no distension.      Palpations: Abdomen is soft.   Musculoskeletal:         General: No deformity. Normal range of motion.      Cervical back: Normal range of motion and neck supple.   Skin:     General: Skin is warm.      Findings: No rash.   Neurological:      General: No focal deficit present.      Mental Status: He is alert.      Gait: Gait normal.   Psychiatric:         Mood and Affect: Mood normal.         Results Reviewed       Procedure Component Value Units Date/Time    D-dimer, quantitative [[id number]]  (Abnormal) Collected: 10/30/24 0929    Lab Status: Final result Specimen: Blood from Arm, Left Updated: 10/30/24 1336     D-Dimer, Quant 0.76 ug/ml FEU     Narrative:      In the evaluation for possible pulmonary embolism, in the appropriate (Well's Score of 4 or less) patient, the age adjusted d-dimer cutoff for this patient can be calculated as:    Age x 0.01 (in ug/mL) for Age-adjusted D-dimer exclusion threshold for a patient over 50 years.    C-reactive protein [[id number]]  (Abnormal) Collected: 10/30/24 0929    Lab Status: Final result Specimen: Blood from Arm, Left Updated: 10/30/24 1245     CRP 43.4 mg/L     CK [[id number]]  (Normal) Collected: 10/30/24 0929    Lab Status: Final result Specimen: Blood from Arm, Left Updated: 10/30/24 1245     Total CK 44 U/L     HS Troponin I 4hr [[id number]]     Lab Status: No result Specimen: Blood     Urine Microscopic [[id number]]  (Normal) Collected: 10/30/24 1041    Lab Status: Final result Specimen: Urine, Clean Catch Updated: 10/30/24 1057     RBC, UA None Seen /hpf      WBC, UA None Seen /hpf      Epithelial Cells None Seen /hpf      Bacteria, UA None Seen /hpf     UA w Reflex to Microscopic w Reflex to Culture [[id number]]  (Abnormal) Collected: 10/30/24 1041    Lab Status: Final result Specimen: Urine, Clean Catch Updated: 10/30/24 1053     Color, UA Yellow     Clarity, UA Clear      Specific Gravity, UA 1.010     pH, UA 6.0     Leukocytes, UA Negative     Nitrite, UA Negative     Protein, UA 30 (1+) mg/dl      Glucose, UA 1000 (1%) mg/dl      Ketones, UA Negative mg/dl      Urobilinogen, UA <2.0 mg/dl      Bilirubin, UA Negative     Occult Blood, UA Negative    B-Type Natriuretic Peptide(BNP) [[id number]]  (Abnormal) Collected: 10/30/24 0929    Lab Status: Final result Specimen: Blood from Arm, Left Updated: 10/30/24 1009      pg/mL     Procalcitonin [[id number]]  (Normal) Collected: 10/30/24 0929    Lab Status: Final result Specimen: Blood from Arm, Left Updated: 10/30/24 1004     Procalcitonin 0.09 ng/ml     FLU/COVID Rapid Antigen (30 min. TAT) - Preferred screening test in ED [[id number]]  (Abnormal) Collected: 10/30/24 0929    Lab Status: Final result Specimen: Nares from Nose Updated: 10/30/24 1003     SARS COV Rapid Antigen Positive     Influenza A Rapid Antigen Negative     Influenza B Rapid Antigen Positive    Narrative:      This test has been performed using the Quidel Delilah 2 FLU+SARS Antigen test under the Emergency Use Authorization (EUA). This test has been validated by the  and verified by the performing laboratory. The Delilah uses lateral flow immunofluorescent sandwich assay to detect SARS-COV, Influenza A and Influenza B Antigen.     The Quidel Delilah 2 SARS Antigen test does not differentiate between SARS-CoV and SARS-CoV-2.     Negative results are presumptive and may be confirmed with a molecular assay, if necessary, for patient management. Negative results do not rule out SARS-CoV-2 or influenza infection and should not be used as the sole basis for treatment or patient management decisions. A negative test result may occur if the level of antigen in a sample is below the limit of detection of this test.     Positive results are indicative of the presence of viral antigens, but do not rule out bacterial infection or co-infection with other viruses.     All  test results should be used as an adjunct to clinical observations and other information available to the provider.    FOR PEDIATRIC PATIENTS - copy/paste COVID Guidelines URL to browser: [email or web address]    Protime-INR [[id number]]  (Abnormal) Collected: 10/30/24 0929    Lab Status: Final result Specimen: Blood from Arm, Left Updated: 10/30/24 1002     Protime 15.8 seconds      INR 1.21    Narrative:      INR Therapeutic Range    Indication                                             INR Range      Atrial Fibrillation                                               2.0-3.0  Hypercoagulable State                                    2.0.2.3  Left Ventricular Asist Device                            2.0-3.0  Mechanical Heart Valve                                  -    Aortic(with afib, MI, embolism, HF, LA enlargement,    and/or coagulopathy)                                     2.0-3.0 (2.5-3.5)     Mitral                                                             2.5-3.5  Prosthetic/Bioprosthetic Heart Valve               2.0-3.0  Venous thromboembolism (VTE: VT, PE        2.0-3.0    APTT [[id number]]  (Abnormal) Collected: 10/30/24 0929    Lab Status: Final result Specimen: Blood from Arm, Left Updated: 10/30/24 1002     PTT 38 seconds     HS Troponin I 2hr [[id number]]     Lab Status: No result Specimen: Blood     HS Troponin 0hr (reflex protocol) [[id number]]  (Normal) Collected: 10/30/24 0929    Lab Status: Final result Specimen: Blood from Arm, Left Updated: 10/30/24 1000     hs TnI 0hr 10 ng/L     Lactic acid [[id number]]  (Normal) Collected: 10/30/24 0929    Lab Status: Final result Specimen: Blood from Arm, Left Updated: 10/30/24 0954     LACTIC ACID 1.4 mmol/L     Narrative:      Result may be elevated if tourniquet was used during collection.    Comprehensive metabolic panel [[id number]]  (Abnormal) Collected: 10/30/24 0929    Lab Status: Final result  Specimen: Blood from Arm, Left Updated: 10/30/24 0953     Sodium 133 mmol/L      Potassium 4.5 mmol/L      Chloride 100 mmol/L      CO2 26 mmol/L      ANION GAP 7 mmol/L      BUN 24 mg/dL      Creatinine 1.40 mg/dL      Glucose 134 mg/dL      Calcium 9.3 mg/dL      AST 15 U/L      ALT 14 U/L      Alkaline Phosphatase 72 U/L      Total Protein 7.6 g/dL      Albumin 4.0 g/dL      Total Bilirubin 0.43 mg/dL      eGFR 43 ml/min/1.73sq m     Narrative:      National Kid[employer] Disease Foundation guidelines for Chronic Kidney Disease (CKD):     Stage 1 with normal or high GFR (GFR > 90 mL/min/1.73 square meters)    Stage 2 Mild CKD (GFR = 60-89 mL/min/1.73 square meters)    Stage 3A Moderate CKD (GFR = 45-59 mL/min/1.73 square meters)    Stage 3B Moderate CKD (GFR = 30-44 mL/min/1.73 square meters)    Stage 4 Severe CKD (GFR = 15-29 mL/min/1.73 square meters)    Stage 5 End Stage CKD (GFR <15 mL/min/1.73 square meters)  Note: GFR calculation is accurate only with a steady state creatinine    Blood culture #1 [[id number]] Collected: 10/30/24 0950    Lab Status: In process Specimen: Blood from Arm, Right Updated: 10/30/24 0953    Blood culture #2 [[id number]] Collected: 10/30/24 0947    Lab Status: In process Specimen: Blood from Arm, Left Updated: 10/30/24 0950    CBC and differential [[id number]]  (Abnormal) Collected: 10/30/24 0929    Lab Status: Final result Specimen: Blood from Arm, Left Updated: 10/30/24 0938     WBC 7.78 Thousand/uL      RBC 3.24 Million/uL      Hemoglobin 10.6 g/dL      Hematocrit 32.8 %       fL      MCH 32.7 pg      MCHC 32.3 g/dL      RDW 12.8 %      MPV 10.6 fL      Platelets 244 Thousands/uL      nRBC 0 /100 WBCs      Segmented % 72 %      Immature Grans % 0 %      Lymphocytes % 11 %      Monocytes % 11 %      Eosinophils Relative 6 %      Basophils Relative 0 %      Absolute Neutrophils 5.54 Thousands/µL      Absolute Immature Grans 0.03 Thousand/uL      Absolute Lymphocytes 0.88 Thousands/µL       Absolute Monocytes 0.88 Thousand/µL      Eosinophils Absolute 0.43 Thousand/µL      Basophils Absolute 0.02 Thousands/µL             XR chest 1 view portable   Final Interpretation by Chris Urban MD (10/30 1013)      Findings suggest mild CHF. Possible small left pleural effusion.            Workstation performed: [id number]             Procedures    ED Medication and Procedure Management   Prior to Admission Medications   Prescriptions Last Dose Informant Patient Reported? Taking?   Dorzolamide HCl-Timolol Mal PF 2-0.5 % SOLN  Child Yes No   Sig: ONE DROP INTO EACH EYE TWICE DAILY   Empagliflozin (JARDIANCE) 10 MG TABS tablet  Child No No   Sig: Take 1 tablet (10 mg total) by mouth every morning   Garlic 1000 MG CAPS  Child Yes No   Sig: Take 1,000 mg by mouth in the morning   Rhopressa 0.02 % SOLN  Child Yes No   Sig: INSTILL 1 DROP INTO RIGHT EYE ONCE A DAY   allopurinol (ZYLOPRIM) 100 mg tablet  Child No No   Sig: Take 1 tablet (100 mg total) by mouth daily   amLODIPine (NORVASC) 5 mg tablet  Child No No   Sig: Take 1 tablet (5 mg total) by mouth daily at bedtime   amitriptyline (ELAVIL) 10 mg tablet  Child No No   Sig: TAKE 3 TABLETS BY MOUTH AT BEDTIME   apixaban (ELIQUIS) 2.5 mg  Child No No   Sig: Take 1 tablet (2.5 mg total) by mouth 2 (two) times a day   bisoprolol (ZEBETA) 5 mg tablet  Child No No   Sig: Take 0.5 tablets (2.5 mg total) by mouth daily   brimonidine tartrate 0.2 % ophthalmic solution  Child No No   Sig: INSTILL 1 DROP TWICE DAILY IN THE RIGHT EYE   cholecalciferol (VITAMIN D3) 1,000 units tablet  Child Yes No   Sig: Take 2,000 Units by mouth daily    co-enzyme Q-10 30 MG capsule  Child Yes No   Sig: Take 100 mg by mouth daily     diphenhydrAMINE-acetaminophen (TYLENOL PM)  MG TABS  Child Yes No   Sig: Take 2 tablets by mouth daily at bedtime as needed for sleep   finasteride (PROSCAR) 5 mg tablet  Child No No   Sig: TAKE 1 TABLET (5 MG TOTAL) BY MOUTH DAILY.    gabapentin (NEURONTIN) 100 mg capsule  Child No No   Sig: TAKE 1 CAPSULE BY MOUTH THREE TIMES A DAY   Patient taking differently: Take 100 mg by mouth 2 (two) times a day   latanoprost (XALATAN) 0.005 % ophthalmic solution  Child Yes No   Sig: Administer 1 drop to both eyes daily at bedtime   multivitamin-iron-minerals-folic acid (CENTRUM) chewable tablet  Child Yes No   Sig: Chew 1 tablet daily.   omeprazole (PriLOSEC) 20 mg delayed release capsule  Child No No   Sig: Take 1 capsule (20 mg total) by mouth daily   spironolactone (ALDACTONE) 25 mg tablet   No No   Sig: Take 1 tablet (25 mg total) by mouth daily      Facility-Administered Medications: None     Current Discharge Medication List        CONTINUE these medications which have NOT CHANGED    Details   allopurinol (ZYLOPRIM) 100 mg tablet Take 1 tablet (100 mg total) by mouth daily  Qty: 90 tablet, Refills: 1    Associated Diagnoses: Arthralgia of knee, unspecified laterality      amitriptyline (ELAVIL) 10 mg tablet TAKE 3 TABLETS BY MOUTH AT BEDTIME  Qty: 270 tablet, Refills: 2    Associated Diagnoses: Irritable bowel syndrome with diarrhea      amLODIPine (NORVASC) 5 mg tablet Take 1 tablet (5 mg total) by mouth daily at bedtime  Qty: 90 tablet, Refills: 3    Associated Diagnoses: Essential (primary) hypertension      apixaban (ELIQUIS) 2.5 mg Take 1 tablet (2.5 mg total) by mouth 2 (two) times a day  Qty: 180 tablet, Refills: 1    Associated Diagnoses: Permanent atrial fibrillation (HCC)      bisoprolol (ZEBETA) 5 mg tablet Take 0.5 tablets (2.5 mg total) by mouth daily  Qty: 45 tablet, Refills: 3    Associated Diagnoses: Permanent atrial fibrillation (HCC); Congestive heart failure, unspecified HF chronicity, unspecified heart failure type (HCC)      brimonidine tartrate 0.2 % ophthalmic solution INSTILL 1 DROP TWICE DAILY IN THE RIGHT EYE  Qty: 15 mL, Refills: 1    Associated Diagnoses: Glaucoma of right eye, unspecified glaucoma type       cholecalciferol (VITAMIN D3) 1,000 units tablet Take 2,000 Units by mouth daily       co-enzyme Q-10 30 MG capsule Take 100 mg by mouth daily        diphenhydrAMINE-acetaminophen (TYLENOL PM)  MG TABS Take 2 tablets by mouth daily at bedtime as needed for sleep      Dorzolamide HCl-Timolol Mal PF 2-0.5 % SOLN ONE DROP INTO EACH EYE TWICE DAILY      Empagliflozin (JARDIANCE) 10 MG TABS tablet Take 1 tablet (10 mg total) by mouth every morning  Qty: 30 tablet, Refills: 11    Associated Diagnoses: Dyspnea on exertion      finasteride (PROSCAR) 5 mg tablet TAKE 1 TABLET (5 MG TOTAL) BY MOUTH DAILY.  Qty: 90 tablet, Refills: 1    Associated Diagnoses: Dysuria      gabapentin (NEURONTIN) 100 mg capsule TAKE 1 CAPSULE BY MOUTH THREE TIMES A DAY  Qty: 90 capsule, Refills: 5    Associated Diagnoses: Pain in both feet      Garlic 1000 MG CAPS Take 1,000 mg by mouth in the morning      latanoprost (XALATAN) 0.005 % ophthalmic solution Administer 1 drop to both eyes daily at bedtime      multivitamin-iron-minerals-folic acid (CENTRUM) chewable tablet Chew 1 tablet daily.      omeprazole (PriLOSEC) 20 mg delayed release capsule Take 1 capsule (20 mg total) by mouth daily  Qty: 90 capsule, Refills: 1    Associated Diagnoses: Gastroesophageal reflux disease, unspecified whether esophagitis present      Rhopressa 0.02 % SOLN INSTILL 1 DROP INTO RIGHT EYE ONCE A DAY      spironolactone (ALDACTONE) 25 mg tablet Take 1 tablet (25 mg total) by mouth daily  Qty: 90 tablet, Refills: 3    Associated Diagnoses: Chronic diastolic congestive heart failure (HCC)           No discharge procedures on file.  ED SEPSIS DOCUMENTATION   Time reflects when diagnosis was documented in both MDM as applicable and the Disposition within this note       Time User Action Codes Description Comment    10/30/2024 10:22 AM Mendez Garcia [J11.1] Influenza     10/30/2024 10:22 AM Mendez Garcia [J81.1] Pulmonary edema     10/30/2024 10:22 AM  Mendez Garcia Add [U07.1] COVID                  Mendez Garcia DO  10/30/24 1344

## 2024-10-30 NOTE — ASSESSMENT & PLAN NOTE
Wt Readings from Last 3 Encounters:   10/31/24 91.8 kg (202 lb 6.1 oz)   10/24/24 92.9 kg (204 lb 12.8 oz)   10/01/24 92.1 kg (203 lb)     BNP on admission 275 similar to previous on 10/4/24->223  CXR showing mild pulm edema  Pt without weight gain  Recent echo done on 12/21/2023 showing an LVEF of 55 to 60% with mild diastolic dysfunction mildly dilated right ventricle mildly reduced right ventricular systolic function mild biatrial enlargement mild pulmonary hypertension moderate aortic sclerosis  Patient examining euvolemic at this time do not believe patient is in an acute exacerbation  Continue to monitor respiratory status  Cardiac low-salt diet  I's and O's  Daily weights

## 2024-10-30 NOTE — H&P
H&P - Hospitalist   Name: Jose Mullins 89 y.o. male I MRN: [id number]  Unit/Bed#: [facility] I Date of Admission: 10/30/2024   Date of Service: 10/30/2024 I Hospital Day: 0     Assessment & Plan  Viral sepsis (HCC)  Patient presenting due to cough and shortness of breath    In the ED patient meeting SIRS criteria with tachycardia and tachypnea  Source of infection noted to be viral in nature with being COVID and flu positive  Lactic acid negative  Procalcitonin negative  Patient not meeting any other endorgan dysfunction    Patient meeting mild COVID protocol  CRP  D-dimer  CK  Initiating decadron and remdesivir  Initiating tamiflu  Supportive treatment    Chronic diastolic congestive heart failure (HCC)  Wt Readings from Last 3 Encounters:   10/30/24 91.4 kg (201 lb 8 oz)   10/24/24 92.9 kg (204 lb 12.8 oz)   10/01/24 92.1 kg (203 lb)     BNP on admission 275 similar to previous on 10/4/24->223  CXR showing mild pulm edema  Pt without weight gain  Recent echo done on 12/21/2023 showing an LVEF of 55 to 60% with mild diastolic dysfunction mildly dilated right ventricle mildly reduced right ventricular systolic function mild biatrial enlargement mild pulmonary hypertension moderate aortic sclerosis  Patient examining euvolemic at this time do not believe patient is in an acute exacerbation  Continue to monitor respiratory status  Cardiac low-salt diet  I's and O's  Daily weights  Anemia of chronic disease  Hemoglobin on admission 10.6  Baseline appears to be between 10 and 11  Most likely secondary to CKD  Continue to trend  Transfuse when less than 7  Benign essential hypertension  Continue home regimen of amlodipine bisoprolol and Aldactone however at higher parameters due to sepsis  Stage 3a chronic kidney disease (HCC)  Lab Results   Component Value Date    EGFR 43 10/30/2024    EGFR 43 10/18/2024    EGFR 45 10/04/2024    CREATININE 1.40 (H) 10/30/2024    CREATININE 1.40 (H) 10/18/2024    CREATININE 1.34 (H) 10/04/2024      Creatinine on admission 1.40  Baseline appears to be around 1.1-1.3  Patient not meeting KDIGO criteria for FRANCO  Continue to trend  Avoid nephrotoxic agents hypotension and contrast  Will continue Aldactone at this time however at higher parameters due to sepsis  I's and O's  Urine retention protocol  Bladder scan  Continue to trend  Permanent atrial fibrillation (HCC)  RC bisoprolol  AC Eliquis  Continue  Acute respiratory insufficiency  Patient requiring 2 L nasal cannula on admission  Most likely secondary to viral sepsis secondary to flu and COVID infection  Wean as tolerated  Supportive treatment        VTE Pharmacologic Prophylaxis: VTE Score: 9 High Risk (Score >/= 5) - Pharmacological DVT Prophylaxis Ordered: apixaban (Eliquis). Sequential Compression Devices Ordered.  Code Status: Prior level 3  Discussion with family: Updated  (daughter) via phone.    Anticipated Length of Stay: Patient will be admitted on an inpatient basis with an anticipated length of stay of greater than 2 midnights secondary to sepsis.    History of Present Illness   Chief Complaint: Cough and shortness of breath    Jose Mullins is a 89 y.o. male with a PMH of CHF, CKD, anemia, hypertension, A-fib, who presents with a cough and shortness of breath that has been gradually worsening over the past few days.  Patient describes a nonproductive dry cough.  In the ED patient meeting SIRS criteria with tachycardia and tachypnea and was found to have COVID and flu.  Patient started on mild COVID protocol as patient was requiring 2 L nasal cannula.  Decadron and remdesivir initiated as well as Tamiflu.  Unlikely to be acute CHF exacerbation although chest x-ray shows mild pulmonary edema.  BNP similar to previous earlier this month.  Will continue to monitor respiratory status.  Patient with an elevated creatinine however not meeting KDIGO criteria will continue to monitor I's and O's and Aldactone at this time.  Will wean  oxygen as tolerated.    Review of Systems   All other systems reviewed and are negative.      Historical Information   Past Medical History:   Diagnosis Date    A-fib (HCC)     Anemia     Carpal tunnel syndrome, unspecified upper limb     Cataract     last assessed: 8/18/2012    COVID-19 09/06/2023    GERD (gastroesophageal reflux disease)     Glaucoma     Hypertension     last assessed: 8/23/2012    Intervertebral disc disease     PVD (peripheral vascular disease) (HCC)     Stomach disorder 11/2021    Syncope 03/12/2018     Past Surgical History:   Procedure Laterality Date    CATARACT EXTRACTION      COLONOSCOPY      HERNIA REPAIR      NEUROPLASTY / TRANSPOSITION MEDIAN NERVE AT CARPAL TUNNEL      ID LAPAROSCOPY SURG CHOLECYSTECTOMY N/A 09/14/2016    Procedure: LAPAROSCOPIC CHOLECYSTECTOMY ;  Surgeon: Christo Cristina MD;  Location: [facility];  Service: General    UPPER GASTROINTESTINAL ENDOSCOPY       Social History     Tobacco Use    Smoking status: Former     Current packs/day: 0.50     Average packs/day: 0.5 packs/day for 35.0 years (17.5 ttl pk-yrs)     Types: Cigarettes    Smokeless tobacco: Former     Types: Snuff    Tobacco comments:     quit 30 years ago    Vaping Use    Vaping status: Former   Substance and Sexual Activity    Alcohol use: Not Currently    Drug use: Never    Sexual activity: Not Currently     Partners: Female     E-Cigarette/Vaping    E-Cigarette Use Former User      E-Cigarette/Vaping Substances    Nicotine No     THC No     CBD No     Flavoring No     Other No     Unknown No      Family History   Problem Relation Age of Onset    Glaucoma Mother     Cancer Father     Heart disease Father     Glaucoma Father     Alzheimer's disease Family     Heart attack Family      Social History:  Marital Status:    Occupation:   Patient Pre-hospital Living Situation: Home, With other family member: daughter  Patient Pre-hospital Level of Mobility: walks with walker  Patient Pre-hospital Diet  Restrictions: none    Meds/Allergies   I have reviewed home medications using recent Epic encounter.  Prior to Admission medications    Medication Sig Start Date End Date Taking? Authorizing Provider   allopurinol (ZYLOPRIM) 100 mg tablet Take 1 tablet (100 mg total) by mouth daily 10/10/24   Colt Shin MD   amitriptyline (ELAVIL) 10 mg tablet TAKE 3 TABLETS BY MOUTH AT BEDTIME 4/26/24   Moises Ham MD   amLODIPine (NORVASC) 5 mg tablet Take 1 tablet (5 mg total) by mouth daily at bedtime 10/1/24   Colt Shin MD   apixaban (ELIQUIS) 2.5 mg Take 1 tablet (2.5 mg total) by mouth 2 (two) times a day 6/27/24   Moises Ham MD   bisoprolol (ZEBETA) 5 mg tablet Take 0.5 tablets (2.5 mg total) by mouth daily 10/1/24   Colt Shin MD   brimonidine tartrate 0.2 % ophthalmic solution INSTILL 1 DROP TWICE DAILY IN THE RIGHT EYE 10/3/24   Moises Ham MD   cholecalciferol (VITAMIN D3) 1,000 units tablet Take 2,000 Units by mouth daily     Historical Provider, MD   co-enzyme Q-10 30 MG capsule Take 100 mg by mouth daily      Historical Provider, MD   diphenhydrAMINE-acetaminophen (TYLENOL PM)  MG TABS Take 2 tablets by mouth daily at bedtime as needed for sleep    Historical Provider, MD   Dorzolamide HCl-Timolol Mal PF 2-0.5 % SOLN ONE DROP INTO EACH EYE TWICE DAILY 9/27/22   Historical Provider, MD   Empagliflozin (JARDIANCE) 10 MG TABS tablet Take 1 tablet (10 mg total) by mouth every morning 10/1/24   Colt Shin MD   finasteride (PROSCAR) 5 mg tablet TAKE 1 TABLET (5 MG TOTAL) BY MOUTH DAILY. 7/5/24   Vika Frankel PA-C   gabapentin (NEURONTIN) 100 mg capsule TAKE 1 CAPSULE BY MOUTH THREE TIMES A DAY  Patient taking differently: Take 100 mg by mouth 2 (two) times a day 6/30/24   Moises Ham MD   Garlic 1000 MG CAPS Take 1,000 mg by mouth in the morning    Historical Provider, MD   latanoprost (XALATAN) 0.005 % ophthalmic solution Administer 1 drop to both eyes  daily at bedtime    Historical Provider, MD   multivitamin-iron-minerals-folic acid (CENTRUM) chewable tablet Chew 1 tablet daily.    Historical Provider, MD   omeprazole (PriLOSEC) 20 mg delayed release capsule Take 1 capsule (20 mg total) by mouth daily 4/26/24   Moises Ham MD   Rhopressa 0.02 % SOLN INSTILL 1 DROP INTO RIGHT EYE ONCE A DAY 9/11/23   Historical Provider, MD   spironolactone (ALDACTONE) 25 mg tablet Take 1 tablet (25 mg total) by mouth daily 10/24/24   Colt Shin MD     Allergies   Allergen Reactions    Bempedoic Acid Other (See Comments)     Gout    Atorvastatin GI Intolerance     Other reaction(s): GI upset    Ezetimibe Other (See Comments)     myalgia  Other reaction(s): Other (See Comments)  Myalgia  Other reaction(s): Myalgia  myalgia    Statins Rash       Objective :  Temp:  [98.2 °F (36.8 °C)] 98.2 °F (36.8 °C)  HR:  [104] 104  BP: (140)/(89) 140/89  Resp:  [22] 22  SpO2:  [88 %] 88 %  O2 Device: None (Room air)    Physical Exam  Vitals and nursing note reviewed.   Constitutional:       General: He is not in acute distress.     Appearance: He is obese. He is ill-appearing.   HENT:      Head: Normocephalic and atraumatic.   Cardiovascular:      Rate and Rhythm: Normal rate. Rhythm irregular.      Pulses: Normal pulses.      Heart sounds: Normal heart sounds.   Pulmonary:      Effort: Pulmonary effort is normal.      Breath sounds: Normal breath sounds.      Comments: 2L NC  Abdominal:      General: Abdomen is flat. Bowel sounds are normal.      Palpations: Abdomen is soft.   Musculoskeletal:      Right lower leg: No edema.      Left lower leg: No edema.   Skin:     General: Skin is warm.   Neurological:      General: No focal deficit present.      Mental Status: He is alert and oriented to person, place, and time.          Lines/Drains:            Lab Results: I have reviewed the following results:  Results from last 7 days   Lab Units 10/30/24  0929   WBC Thousand/uL 7.78    HEMOGLOBIN g/dL 10.6*   HEMATOCRIT % 32.8*   PLATELETS Thousands/uL 244   SEGS PCT % 72   LYMPHO PCT % 11*   MONO PCT % 11   EOS PCT % 6     Results from last 7 days   Lab Units 10/30/24  0929   SODIUM mmol/L 133*   POTASSIUM mmol/L 4.5   CHLORIDE mmol/L 100   CO2 mmol/L 26   BUN mg/dL 24   CREATININE mg/dL 1.40*   ANION GAP mmol/L 7   CALCIUM mg/dL 9.3   ALBUMIN g/dL 4.0   TOTAL BILIRUBIN mg/dL 0.43   ALK PHOS U/L 72   ALT U/L 14   AST U/L 15   GLUCOSE RANDOM mg/dL 134     Results from last 7 days   Lab Units 10/30/24  0929   INR  1.21*         Lab Results   Component Value Date    HGBA1C 5.4 12/03/2018     Results from last 7 days   Lab Units 10/30/24  0929   LACTIC ACID mmol/L 1.4   PROCALCITONIN ng/ml 0.09       Imaging Results Review: I personally reviewed the following image studies in PACS and associated radiology reports: chest xray. My interpretation of the radiology images/reports is: mild pulm edema.  Other Study Results Review: EKG was reviewed.  EKG was personally reviewed and my interpretation is: Personally Reviewed. Atrial fibrillation. HR 95..    Administrative Statements   I have spent a total time of 80 minutes in caring for this patient on the day of the visit/encounter including Diagnostic results, Prognosis, Risks and benefits of tx options, Instructions for management, Patient and family education, Importance of tx compliance, Risk factor reductions, Impressions, Counseling / Coordination of care, Documenting in the medical record, Reviewing / ordering tests, medicine, procedures  , and Obtaining or reviewing history  .    ** Please Note: This note has been constructed using a voice recognition system. **

## 2024-10-30 NOTE — ASSESSMENT & PLAN NOTE
Hemoglobin on admission 10.6  Baseline appears to be between 10 and 11  Most likely secondary to CKD  Continue to trend  Transfuse when less than 7

## 2024-10-30 NOTE — ASSESSMENT & PLAN NOTE
Wt Readings from Last 3 Encounters:   10/30/24 91.4 kg (201 lb 8 oz)   10/24/24 92.9 kg (204 lb 12.8 oz)   10/01/24 92.1 kg (203 lb)     BNP on admission 275 similar to previous on 10/4/24->223  CXR showing mild pulm edema  Pt without weight gain  Recent echo done on 12/21/2023 showing an LVEF of 55 to 60% with mild diastolic dysfunction mildly dilated right ventricle mildly reduced right ventricular systolic function mild biatrial enlargement mild pulmonary hypertension moderate aortic sclerosis  Patient examining euvolemic at this time do not believe patient is in an acute exacerbation  Continue to monitor respiratory status  Cardiac low-salt diet  I's and O's  Daily weights

## 2024-10-30 NOTE — ASSESSMENT & PLAN NOTE
Lab Results   Component Value Date    EGFR 43 10/31/2024    EGFR 43 10/30/2024    EGFR 43 10/18/2024    CREATININE 1.40 (H) 10/31/2024    CREATININE 1.40 (H) 10/30/2024    CREATININE 1.40 (H) 10/18/2024     Creatinine on admission 1.40  Baseline appears to be around 1.1-1.3  Patient not meeting KDIGO criteria for FRANCO  Continue to trend  Avoid nephrotoxic agents hypotension and contrast  Will continue Aldactone at this time however at higher parameters due to sepsis  Hold jardiance   I's and O's  Urine retention protocol  Bladder scan  Continue to trend

## 2024-10-30 NOTE — ASSESSMENT & PLAN NOTE
Lab Results   Component Value Date    EGFR 43 10/30/2024    EGFR 43 10/18/2024    EGFR 45 10/04/2024    CREATININE 1.40 (H) 10/30/2024    CREATININE 1.40 (H) 10/18/2024    CREATININE 1.34 (H) 10/04/2024     Creatinine on admission 1.40  Baseline appears to be around 1.1-1.3  Patient not meeting KDIGO criteria for FRANCO  Continue to trend  Avoid nephrotoxic agents hypotension and contrast  Will continue Aldactone at this time however at higher parameters due to sepsis  I's and O's  Urine retention protocol  Bladder scan  Continue to trend

## 2024-10-30 NOTE — PROGRESS NOTES
Patient's daughter is in room without a mask. Patient's daughter was offered N-95 mask, however, she declined the mask.

## 2024-10-31 LAB
2HR DELTA HS TROPONIN: -1 NG/L
ALBUMIN SERPL BCG-MCNC: 3.5 G/DL (ref 3.5–5)
ALP SERPL-CCNC: 64 U/L (ref 34–104)
ALT SERPL W P-5'-P-CCNC: 13 U/L (ref 7–52)
ANION GAP SERPL CALCULATED.3IONS-SCNC: 7 MMOL/L (ref 4–13)
AST SERPL W P-5'-P-CCNC: 15 U/L (ref 13–39)
BASOPHILS # BLD AUTO: 0 THOUSANDS/ΜL (ref 0–0.1)
BASOPHILS NFR BLD AUTO: 0 % (ref 0–1)
BILIRUB SERPL-MCNC: 0.29 MG/DL (ref 0.2–1)
BUN SERPL-MCNC: 28 MG/DL (ref 5–25)
CALCIUM SERPL-MCNC: 8.8 MG/DL (ref 8.4–10.2)
CARDIAC TROPONIN I PNL SERPL HS: 9 NG/L
CHLORIDE SERPL-SCNC: 101 MMOL/L (ref 96–108)
CK SERPL-CCNC: 33 U/L (ref 39–308)
CO2 SERPL-SCNC: 24 MMOL/L (ref 21–32)
CREAT SERPL-MCNC: 1.4 MG/DL (ref 0.6–1.3)
CRP SERPL QL: 52 MG/L
EOSINOPHIL # BLD AUTO: 0 THOUSAND/ΜL (ref 0–0.61)
EOSINOPHIL NFR BLD AUTO: 0 % (ref 0–6)
ERYTHROCYTE [DISTWIDTH] IN BLOOD BY AUTOMATED COUNT: 12.8 % (ref 11.6–15.1)
GFR SERPL CREATININE-BSD FRML MDRD: 43 ML/MIN/1.73SQ M
GLUCOSE SERPL-MCNC: 146 MG/DL (ref 65–140)
HCT VFR BLD AUTO: 31.2 % (ref 36.5–49.3)
HGB BLD-MCNC: 9.8 G/DL (ref 12–17)
IMM GRANULOCYTES # BLD AUTO: 0.06 THOUSAND/UL (ref 0–0.2)
IMM GRANULOCYTES NFR BLD AUTO: 1 % (ref 0–2)
LYMPHOCYTES # BLD AUTO: 0.74 THOUSANDS/ΜL (ref 0.6–4.47)
LYMPHOCYTES NFR BLD AUTO: 11 % (ref 14–44)
MAGNESIUM SERPL-MCNC: 2 MG/DL (ref 1.9–2.7)
MCH RBC QN AUTO: 32 PG (ref 26.8–34.3)
MCHC RBC AUTO-ENTMCNC: 31.4 G/DL (ref 31.4–37.4)
MCV RBC AUTO: 102 FL (ref 82–98)
MONOCYTES # BLD AUTO: 0.27 THOUSAND/ΜL (ref 0.17–1.22)
MONOCYTES NFR BLD AUTO: 4 % (ref 4–12)
NEUTROPHILS # BLD AUTO: 5.77 THOUSANDS/ΜL (ref 1.85–7.62)
NEUTS SEG NFR BLD AUTO: 84 % (ref 43–75)
NRBC BLD AUTO-RTO: 0 /100 WBCS
PHOSPHATE SERPL-MCNC: 4.1 MG/DL (ref 2.3–4.1)
PLATELET # BLD AUTO: 249 THOUSANDS/UL (ref 149–390)
PMV BLD AUTO: 11.3 FL (ref 8.9–12.7)
POTASSIUM SERPL-SCNC: 4.6 MMOL/L (ref 3.5–5.3)
PROCALCITONIN SERPL-MCNC: 0.12 NG/ML
PROT SERPL-MCNC: 7 G/DL (ref 6.4–8.4)
RBC # BLD AUTO: 3.06 MILLION/UL (ref 3.88–5.62)
SODIUM SERPL-SCNC: 132 MMOL/L (ref 135–147)
WBC # BLD AUTO: 6.84 THOUSAND/UL (ref 4.31–10.16)

## 2024-10-31 PROCEDURE — 86140 C-REACTIVE PROTEIN: CPT | Performed by: STUDENT IN AN ORGANIZED HEALTH CARE EDUCATION/TRAINING PROGRAM

## 2024-10-31 PROCEDURE — 85025 COMPLETE CBC W/AUTO DIFF WBC: CPT | Performed by: STUDENT IN AN ORGANIZED HEALTH CARE EDUCATION/TRAINING PROGRAM

## 2024-10-31 PROCEDURE — 94640 AIRWAY INHALATION TREATMENT: CPT

## 2024-10-31 PROCEDURE — 94760 N-INVAS EAR/PLS OXIMETRY 1: CPT

## 2024-10-31 PROCEDURE — 82550 ASSAY OF CK (CPK): CPT | Performed by: STUDENT IN AN ORGANIZED HEALTH CARE EDUCATION/TRAINING PROGRAM

## 2024-10-31 PROCEDURE — 84145 PROCALCITONIN (PCT): CPT | Performed by: STUDENT IN AN ORGANIZED HEALTH CARE EDUCATION/TRAINING PROGRAM

## 2024-10-31 PROCEDURE — 99233 SBSQ HOSP IP/OBS HIGH 50: CPT | Performed by: STUDENT IN AN ORGANIZED HEALTH CARE EDUCATION/TRAINING PROGRAM

## 2024-10-31 PROCEDURE — 80053 COMPREHEN METABOLIC PANEL: CPT | Performed by: STUDENT IN AN ORGANIZED HEALTH CARE EDUCATION/TRAINING PROGRAM

## 2024-10-31 PROCEDURE — 84484 ASSAY OF TROPONIN QUANT: CPT | Performed by: STUDENT IN AN ORGANIZED HEALTH CARE EDUCATION/TRAINING PROGRAM

## 2024-10-31 PROCEDURE — 83735 ASSAY OF MAGNESIUM: CPT | Performed by: STUDENT IN AN ORGANIZED HEALTH CARE EDUCATION/TRAINING PROGRAM

## 2024-10-31 PROCEDURE — 84100 ASSAY OF PHOSPHORUS: CPT | Performed by: STUDENT IN AN ORGANIZED HEALTH CARE EDUCATION/TRAINING PROGRAM

## 2024-10-31 PROCEDURE — 97167 OT EVAL HIGH COMPLEX 60 MIN: CPT

## 2024-10-31 RX ORDER — LEVALBUTEROL INHALATION SOLUTION 0.63 MG/3ML
0.63 SOLUTION RESPIRATORY (INHALATION) EVERY 6 HOURS PRN
Status: DISCONTINUED | OUTPATIENT
Start: 2024-10-31 | End: 2024-11-02 | Stop reason: HOSPADM

## 2024-10-31 RX ADMIN — FINASTERIDE 5 MG: 5 TABLET, FILM COATED ORAL at 09:43

## 2024-10-31 RX ADMIN — GABAPENTIN 100 MG: 100 CAPSULE ORAL at 09:42

## 2024-10-31 RX ADMIN — DORZOLAMIDE HYDROCHLORIDE AND TIMOLOL MALEATE 1 DROP: 20; 5 SOLUTION/ DROPS OPHTHALMIC at 18:11

## 2024-10-31 RX ADMIN — OSELTAMIVIR PHOSPHATE 30 MG: 30 CAPSULE ORAL at 09:41

## 2024-10-31 RX ADMIN — BISOPROLOL FUMARATE 2.5 MG: 5 TABLET, FILM COATED ORAL at 09:42

## 2024-10-31 RX ADMIN — BRIMONIDINE TARTRATE 1 DROP: 2 SOLUTION/ DROPS OPHTHALMIC at 18:11

## 2024-10-31 RX ADMIN — LATANOPROST 1 DROP: 50 SOLUTION OPHTHALMIC at 21:32

## 2024-10-31 RX ADMIN — APIXABAN 2.5 MG: 2.5 TABLET, FILM COATED ORAL at 09:42

## 2024-10-31 RX ADMIN — LEVALBUTEROL HYDROCHLORIDE 0.63 MG: 0.63 SOLUTION RESPIRATORY (INHALATION) at 08:25

## 2024-10-31 RX ADMIN — PANTOPRAZOLE SODIUM 20 MG: 20 TABLET, DELAYED RELEASE ORAL at 05:13

## 2024-10-31 RX ADMIN — REMDESIVIR 100 MG: 100 INJECTION, POWDER, LYOPHILIZED, FOR SOLUTION INTRAVENOUS at 12:36

## 2024-10-31 RX ADMIN — GABAPENTIN 100 MG: 100 CAPSULE ORAL at 18:10

## 2024-10-31 RX ADMIN — DEXAMETHASONE SODIUM PHOSPHATE 6 MG: 10 INJECTION, SOLUTION INTRAMUSCULAR; INTRAVENOUS at 12:37

## 2024-10-31 RX ADMIN — IPRATROPIUM BROMIDE 0.5 MG: 0.5 SOLUTION RESPIRATORY (INHALATION) at 19:36

## 2024-10-31 RX ADMIN — GUAIFENESIN 600 MG: 600 TABLET ORAL at 09:42

## 2024-10-31 RX ADMIN — LEVALBUTEROL HYDROCHLORIDE 0.63 MG: 0.63 SOLUTION RESPIRATORY (INHALATION) at 19:36

## 2024-10-31 RX ADMIN — IPRATROPIUM BROMIDE 0.5 MG: 0.5 SOLUTION RESPIRATORY (INHALATION) at 15:19

## 2024-10-31 RX ADMIN — SPIRONOLACTONE 25 MG: 25 TABLET ORAL at 09:43

## 2024-10-31 RX ADMIN — OSELTAMIVIR PHOSPHATE 30 MG: 30 CAPSULE ORAL at 21:16

## 2024-10-31 RX ADMIN — DORZOLAMIDE HYDROCHLORIDE AND TIMOLOL MALEATE 1 DROP: 20; 5 SOLUTION/ DROPS OPHTHALMIC at 09:43

## 2024-10-31 RX ADMIN — LEVALBUTEROL HYDROCHLORIDE 0.63 MG: 0.63 SOLUTION RESPIRATORY (INHALATION) at 15:19

## 2024-10-31 RX ADMIN — ALLOPURINOL 100 MG: 100 TABLET ORAL at 09:43

## 2024-10-31 RX ADMIN — IPRATROPIUM BROMIDE 0.5 MG: 0.5 SOLUTION RESPIRATORY (INHALATION) at 08:25

## 2024-10-31 RX ADMIN — GUAIFENESIN 600 MG: 600 TABLET ORAL at 21:15

## 2024-10-31 RX ADMIN — AMITRIPTYLINE HYDROCHLORIDE 30 MG: 10 TABLET, FILM COATED ORAL at 21:15

## 2024-10-31 RX ADMIN — APIXABAN 2.5 MG: 2.5 TABLET, FILM COATED ORAL at 21:15

## 2024-10-31 NOTE — ASSESSMENT & PLAN NOTE
Patient presenting due to cough and shortness of breath    In the ED patient meeting SIRS criteria with tachycardia and tachypnea  Source of infection noted to be viral in nature with being COVID and flu positive  Lactic acid negative  Procalcitonin negative  Patient not meeting any other endorgan dysfunction    Patient meeting mild COVID protocol  CRP increasing  D-dimer slightly elevated-> pt on Eliquis  CK WNL  continue decadron and remdesivir day 2  Continue tamiflu day 2  Supportive treatment

## 2024-10-31 NOTE — ASSESSMENT & PLAN NOTE
Patient requiring 2 L nasal cannula on admission  Most likely secondary to viral sepsis secondary to flu and COVID infection  Wean as tolerated  Supportive treatment  Continues to require 2L NC

## 2024-10-31 NOTE — PLAN OF CARE
Problem: PAIN - ADULT  Goal: Verbalizes/displays adequate comfort level or baseline comfort level  Description: Interventions:  - Encourage patient to monitor pain and request assistance  - Assess pain using appropriate pain scale  - Administer analgesics based on type and severity of pain and evaluate response  - Implement non-pharmacological measures as appropriate and evaluate response  - Consider cultural and social influences on pain and pain management  - Notify physician/advanced practitioner if interventions unsuccessful or patient reports new pain  Outcome: Progressing     Problem: INFECTION - ADULT  Goal: Absence of fever/infection during neutropenic period  Description: INTERVENTIONS:  - Monitor WBC    Outcome: Progressing     Problem: SAFETY ADULT  Goal: Patient will remain free of falls  Description: INTERVENTIONS:  - Educate patient/family on patient safety including physical limitations  - Instruct patient to call for assistance with activity   - Consult OT/PT to assist with strengthening/mobility   - Keep Call bell within reach  - Keep bed low and locked with side rails adjusted as appropriate  - Keep care items and personal belongings within reach  - Initiate and maintain comfort rounds  - Make Fall Risk Sign visible to staff  - Offer Toileting every 2 Hours, in advance of need  - Initiate/Maintain bed alarm  - Obtain necessary fall risk management equipment: socks  - Apply yellow socks and bracelet for high fall risk patients  - Consider moving patient to room near nurses station  Outcome: Progressing

## 2024-10-31 NOTE — PLAN OF CARE
Problem: PAIN - ADULT  Goal: Verbalizes/displays adequate comfort level or baseline comfort level  Description: Interventions:  - Encourage patient to monitor pain and request assistance  - Assess pain using appropriate pain scale  - Administer analgesics based on type and severity of pain and evaluate response  - Implement non-pharmacological measures as appropriate and evaluate response  - Consider cultural and social influences on pain and pain management  - Notify physician/advanced practitioner if interventions unsuccessful or patient reports new pain  Outcome: Progressing     Problem: INFECTION - ADULT  Goal: Absence or prevention of progression during hospitalization  Description: INTERVENTIONS:  - Assess and monitor for signs and symptoms of infection  - Monitor lab/diagnostic results  - Monitor all insertion sites, i.e. indwelling lines, tubes, and drains  - Monitor endotracheal if appropriate and nasal secretions for changes in amount and color  - [place] appropriate cooling/warming therapies per order  - Administer medications as ordered  - Instruct and encourage patient and family to use good hand hygiene technique  - Identify and instruct in appropriate isolation precautions for identified infection/condition  Outcome: Progressing  Goal: Absence of fever/infection during neutropenic period  Description: INTERVENTIONS:  - Monitor WBC    Outcome: Progressing     Problem: SAFETY ADULT  Goal: Patient will remain free of falls  Description: INTERVENTIONS:  - Educate patient/family on patient safety including physical limitations  - Instruct patient to call for assistance with activity   - Consult OT/PT to assist with strengthening/mobility   - Keep Call bell within reach  - Keep bed low and locked with side rails adjusted as appropriate  - Keep care items and personal belongings within reach  - Initiate and maintain comfort rounds  - Make Fall Risk Sign visible to staff  - Offer Toileting every 2 Hours,  in advance of need  - Initiate/Maintain bed/chair   Problem: DISCHARGE PLANNING  Goal: Discharge to home or other facility with appropriate resources  Description: INTERVENTIONS:  - Identify barriers to discharge w/patient and caregiver  - Arrange for needed discharge resources and transportation as appropriate  - Identify discharge learning needs (meds, wound care, etc.)  - Arrange for interpretive services to assist at discharge as needed  - Refer to Case Management Department for coordinating discharge planning if the patient needs post-hospital services based on physician/advanced practitioner order or complex needs related to functional status, cognitive ability, or social support system  Outcome: Progressing     Problem: Knowledge Deficit  Goal: Patient/family/caregiver demonstrates understanding of disease process, treatment plan, medications, and discharge instructions  Description: Complete learning assessment and assess knowledge base.  Interventions:  - Provide teaching at level of understanding  - Provide teaching via preferred learning methods  Outcome: Progressing     Problem: RESPIRATORY - ADULT  Goal: Achieves optimal ventilation and oxygenation  Description: INTERVENTIONS:  - Assess for changes in respiratory status  - Assess for changes in mentation and behavior  - Position to facilitate oxygenation and minimize respiratory effort  - Oxygen administered by appropriate delivery if ordered  - Initiate smoking cessation education as indicated  - Encourage broncho-pulmonary hygiene including cough, deep breathe, Incentive Spirometry  - Assess the need for suctioning and aspirate as needed  - Assess and instruct to report SOB or any respiratory difficulty  - Respiratory Therapy support as indicated  Outcome: Progressing   alarm  - Obtain necessary fall risk management equipment:   - Apply yellow socks and bracelet for high fall risk patients  - Consider moving patient to room near nurses  station  Outcome: Progressing

## 2024-10-31 NOTE — ASSESSMENT & PLAN NOTE
Wt Readings from Last 3 Encounters:   11/01/24 91.6 kg (201 lb 15.1 oz)   10/24/24 92.9 kg (204 lb 12.8 oz)   10/01/24 92.1 kg (203 lb)     BNP on admission 275 similar to previous on 10/4/24->223  CXR showing mild pulm edema  Pt without weight gain  Recent echo done on 12/21/2023 showing an LVEF of 55 to 60% with mild diastolic dysfunction mildly dilated right ventricle mildly reduced right ventricular systolic function mild biatrial enlargement mild pulmonary hypertension moderate aortic sclerosis  Patient examining euvolemic at this time do not believe patient is in an acute exacerbation  Continue to monitor respiratory status  Cardiac low-salt diet  I's and O's  Daily weights

## 2024-10-31 NOTE — OCCUPATIONAL THERAPY NOTE
Occupational Therapy Evaluation     Patient Name: Jose Mullins  Today's Date: 10/31/2024  Problem List  Principal Problem:    Viral sepsis (HCC)  Active Problems:    Anemia of chronic disease    Benign essential hypertension    Stage 3a chronic kidney disease (HCC)    Permanent atrial fibrillation (HCC)    Chronic diastolic congestive heart failure (HCC)    Acute respiratory insufficiency    Past Medical History  Past Medical History:   Diagnosis Date    A-fib (HCC)     Anemia     Carpal tunnel syndrome, unspecified upper limb     Cataract     last assessed: 8/18/2012    COVID-19 09/06/2023    GERD (gastroesophageal reflux disease)     Glaucoma     Hypertension     last assessed: 8/23/2012    Intervertebral disc disease     PVD (peripheral vascular disease) (HCC)     Stomach disorder 11/2021    Syncope 03/12/2018     Past Surgical History  Past Surgical History:   Procedure Laterality Date    CATARACT EXTRACTION      COLONOSCOPY      HERNIA REPAIR      NEUROPLASTY / TRANSPOSITION MEDIAN NERVE AT CARPAL TUNNEL      SD LAPAROSCOPY SURG CHOLECYSTECTOMY N/A 09/14/2016    Procedure: LAPAROSCOPIC CHOLECYSTECTOMY ;  Surgeon: Christo Cristina MD;  Location: [facility];  Service: General    UPPER GASTROINTESTINAL ENDOSCOPY             10/31/24 1421   OT Last Visit   OT Visit Date 10/31/24   Note Type   Note type Evaluation   Pain Assessment   Pain Assessment Tool 0-10   Pain Score No Pain   Restrictions/Precautions   Weight Bearing Precautions Per Order No   Other Precautions Chair Alarm;Bed Alarm;Fall Risk;Hard of hearing   Home Living   Type of Home House  (1 SH 0 ZAHEER)   Home Layout One level;Performs ADLs on one level;Able to live on main level with bedroom/bathroom   Bathroom Shower/Tub Walk-in shower   Bathroom Toilet Standard   Bathroom Equipment Grab bars in shower;Shower chair;Commode;Grab bars around toilet   Bathroom Accessibility Accessible   Home Equipment Walker   Prior Function   Level of [place] Needs  "assistance with ADLs;Needs assistance with IADLS;Independent with functional mobility   Lives With Daughter (stays with pt ~20/day)   Receives Help From Family   IADLs Family/Friend/Other provides transportation;Family/Friend/Other provides meals;Family/Friend/Other provides medication management   Falls in the last 6 months 0   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   Reciprocal Relationships dtr   General   Family/Caregiver Present Yes   Additional General Comments dtr   Subjective   Subjective \"I have trouble hearing.\"   ADL   Eating Assistance 7  Independent   Grooming Assistance 7  Independent   UB Bathing Assistance 4  Minimal Assistance   LB Bathing Assistance 4  Minimal Assistance   UB Dressing Assistance 4  Minimal Assistance   LB Dressing Assistance 4  Minimal Assistance   Toileting Assistance  5  Supervision/Setup   Bed Mobility   Additional Comments Pt OOB in recliner chair upon arrival   Transfers   Sit to Stand 5  Supervision   Additional items Armrests;Increased time required;Verbal cues   Stand to Sit 5  Supervision   Additional items Armrests;Increased time required;Verbal cues   Additional Comments slightly impulsive; VC's for hand placement during sit <> stand transfers   Functional Mobility   Functional Mobility 4  Minimal assistance   Additional Comments x1 CGA   Additional items Rolling walker   Balance   Static Sitting Good   Dynamic Sitting Fair +   Static Standing Fair   Dynamic Standing Fair   Activity Tolerance   Activity Tolerance Patient tolerated treatment well   Nurse Made Aware ILANA CRUZ Assessment   RUE Assessment WFL   LUE Assessment   LUE Assessment WFL   Vision-Basic Assessment   Visual History Cataracts;Glaucoma   Vision - Complex Assessment   Acuity Able to read employee name badge without difficulty   Psychosocial "   Psychosocial (WDL) WDL   Patient Behaviors/Mood Calm   Cognition   Overall Cognitive Status WFL   Arousal/Participation Alert   Attention Attends with cues to redirect   Orientation Level Oriented X4  (cues needed to name [email or web address])   Memory Decreased short term memory   Following Commands Follows one step commands without difficulty   Comments [place]; pt's dtr states pt is at baseline with his cognition   Assessment   Limitation Decreased ADL status;Decreased high-level ADLs;Decreased Safe judgement during ADL;Decreased endurance   Prognosis Good   Assessment Pt is a 89 y.o. male seen for OT evaluation at [facility], admitted 10/30/2024 w/ Viral sepsis (HCC). Extensive chart review completed by OT. Comorbidities affecting the pt's functional performance include a significant PMH of: A-fib, cataract, htn, PVD, stomach disorder, syncope, and intervertebral disc disease . Personal factors affecting pt at time of IE include: difficulty performing ADLS, difficulty performing IADLS , limited insight into deficits, and health management . PTA pt was living in a 1  with 0 ZAHEER. Pt states PTA he was A for ADL/IADLs, I w/ functional mobility with use of RW at baseline, (-) falls, (+) home alone - 4 hrs per day (dtr with for ~20hrs). Upon OT IE , pt demonstrated supervision for functional transfers, MinAx1/CGA for functional mobility, [place] for UB ADLs and [place] for LB ADLS 2* the following deficits impacting occupational performance: decreased strength and decreased balance. Pt receives A with ADLs at baseline and is currently demonstrating the need for [place] with ADLs at this time. No acute OT needs; please re-consult if change in status. The patient's raw score on the AM-PAC Daily Activity inpatient short form is 20 , standardized score is 42.03 , greater than 39.4. Patients at this level are likely to benefit from DC to home. However, please refer to therapist recommendation for discharge planning given other factors that may  influence destination. At this time, OT recommendations at time of discharge are DC with Level III - Low Rehab Resource Intensity resources.   Goals   Patient Goals To feel better   Plan   OT Frequency Eval only   Discharge Recommendation   Rehab Resource Intensity Level, OT III (Minimum Resource Intensity)   AM-PAC Daily Activity Inpatient   Lower Body Dressing 3   Bathing 3   Toileting 3   Upper Body Dressing 3   Grooming 4   Eating 4   Daily Activity Raw Score 20   Daily Activity Standardized Score (Calc for Raw Score >=11) 42.03   AM-PAC Applied Cognition Inpatient   Following a Speech/Presentation 4   Understanding Ordinary Conversation 4   Taking Medications 3   Remembering Where Things Are Placed or Put Away 3   Remembering List of 4-5 Errands 3   Taking Care of Complicated Tasks 3   Applied Cognition Raw Score 20   Applied Cognition Standardized Score 41.76   End of Consult   Education Provided Yes   Patient Position at End of Consult Bedside chair;Bed/Chair alarm activated;All needs within reach   Nurse Communication Nurse aware of consult       Madelaine Jacome OTR/L

## 2024-10-31 NOTE — UTILIZATION REVIEW
Initial Clinical Review    Admission: Date/Time/Statement:   Admission Orders (From admission, onward)       Ordered        10/30/24 1022  INPATIENT ADMISSION  Once                          Orders Placed This Encounter   Procedures    INPATIENT ADMISSION     Standing Status:   Standing     Number of Occurrences:   1     Order Specific Question:   Level of Care     Answer:   Med Surg [16]     Order Specific Question:   Estimated length of stay     Answer:   More than 2 Midnights     Order Specific Question:   Certification     Answer:   I certify that inpatient services are medically necessary for this patient for a duration of greater than two midnights. See H&P and MD Progress Notes for additional information about the patient's course of treatment.     ED Arrival Information       Expected   -    Arrival   10/30/2024 09:06    Acuity   Urgent              Means of arrival   Wheelchair    Escorted by   Family Member    Service   Hospitalist    Admission type   Emergency              Arrival complaint   Cough & Shortness of Breath             Chief Complaint   Patient presents with    Cough     Comes to ed c/o cough and sob.         Initial Presentation: 89 y.o. male to ED via WC from home  Present to ED with worsening cough and shortness of breath. Endorses nonproductive dry cough. (+) COVID  PMHX: CHF, CKD, anemia, hypertension, A-fib   Admitted to [facility] with DX: Viral sepsis   on exam: tachy; hypertensive; tachypnea; RA sat 88% - placed on O2 @ 2L via nc sat 99%;   CXR showing mild pulm edema   PLAN: cont decadron iv; cont remdesivir iv; cont tamiflu; cont Duonebs; monitor labs; Accuchecks with ssic; titrate O2; continuous pulse ox      Anticipated Length of Stay/Certification Statement: Patient will be admitted on an inpatient basis with an anticipated length of stay of greater than 2 midnights secondary to sepsis.       Date: 10/31/24      Day 2   Patient states that he feels improved. Patient still with audible  wheezing and requiring oxygen. CRP increasing; D-dimer slightly elevated-> pt on Eliquis.  Creatinine continues to remain stable however sodium downward trending patient placed on fluid restriction.   Plan: cont decadron iv; cont remdesivir iv; cont tamiflu; cont Duonebs; monitor labs; Accuchecks with ssic; fluid restriction; titrate O2; continuous pulse ox; hold jardiance. Fluid restriction      Date: 11/1/24     Day 3: Has surpassed a 2nd midnight with active treatments and services. Require additional inpatient hospital stay due to sepsis   Patient continues to feel improved. For some time patient had been on room air however with exertion patient desaturating and placed back on nasal cannula.   Exam: tachy; RA sat 83% - cont O2 @ 2L via nc; lungs with wheezing; hyponatremia improving - Na 134 - fluid restriction broadened to 2 L; Cr improving Cr 1.31; WBC 11.36  Plan: cont decadron iv; cont remdesivir iv; cont tamiflu; monitor labs; Accuchecks with ssic; fluid restriction; titrate O2; continuous pulse ox; hold jardiance. Fluid restriction      Scheduled Medications:  allopurinol, 100 mg, Oral, Daily  amitriptyline, 30 mg, Oral, HS  amLODIPine, 5 mg, Oral, HS  apixaban, 2.5 mg, Oral, BID  bisoprolol, 2.5 mg, Oral, Daily  brimonidine tartrate, 1 drop, Right Eye, BID  dexamethasone, 6 mg, Intravenous, Q24H  dorzolamide-timolol, 1 drop, Both Eyes, BID  finasteride, 5 mg, Oral, Daily  gabapentin, 100 mg, Oral, BID  guaiFENesin, 600 mg, Oral, Q12H SHERWIN  ipratropium, 0.5 mg, Nebulization, TID  latanoprost, 1 drop, Both Eyes, HS  levalbuterol, 0.63 mg, Nebulization, TID  Netarsudil Dimesylate, 1 drop, Right Eye, Daily  oseltamivir, 30 mg, Oral, Q12H SHERWIN (tamiflu)  pantoprazole, 20 mg, Oral, Early Morning  remdesivir, 100 mg, Intravenous, Q24H  spironolactone, 25 mg, Oral, Daily      Continuous IV Infusions:  None       PRN Meds:  acetaminophen, 650 mg, Oral, Q6H PRN  aluminum-magnesium hydroxide-simethicone, 30 mL, Oral,  Q6H PRN  benzonatate, 100 mg, Oral, TID PRN  ondansetron, 4 mg, Intravenous, Q4H PRN  polyethylene glycol, 17 g, Oral, Daily PRN      ED Triage Vitals [10/30/24 0915]   Temperature Pulse Respirations Blood Pressure SpO2 Pain Score   98.2 °F (36.8 °C) 104 22 140/89 (!) 88 % No Pain     Weight (last 2 days)       Date/Time Weight    11/01/24 0500 91.6 (201.94)    10/31/24 0535 91.8 (202.38)    10/30/24 1802 91.4 (201.5)    10/30/24 0915 91.4 (201.5)            Vital Signs (last 3 days)       Date/Time Temp Pulse Resp BP MAP (mmHg) SpO2 Calculated FIO2 (%) - Nasal Cannula Nasal Cannula O2 Flow Rate (L/min) O2 Device Patient Position - Orthostatic VS [place] Coma Scale Score Pain    11/01/24 07:56:30 97 °F (36.1 °C) 108 21 131/86 101 83 % -- -- -- -- -- --    10/31/24 21:15:07 -- 91 -- 128/82 97 94 % -- -- None (Room air) -- 15 No Pain    10/31/24 1938 -- -- -- -- -- 94 % -- -- None (Room air) -- -- --    10/31/24 1522 -- -- -- -- -- 93 % -- -- None (Room air) -- -- --    10/31/24 14:24:17 98.7 °F (37.1 °C) 91 20 132/81 98 93 % -- -- None (Room air) Sitting -- --    10/31/24 1421 -- -- -- -- -- -- -- -- -- -- -- No Pain    10/31/24 0831 -- -- -- -- -- 97 % 28 2 L/min Nasal cannula -- -- --    10/31/24 0735 -- -- -- -- -- -- -- -- -- -- -- No Pain    10/31/24 07:26:34 97.5 °F (36.4 °C) 94 20 133/80 98 97 % 28 2 L/min Nasal cannula Lying -- No Pain    10/31/24 0301 -- -- -- -- -- -- -- -- -- -- -- No Pain    10/30/24 21:47:06 97.8 °F (36.6 °C) 98 20 133/77 96 96 % 28 2 L/min Nasal cannula Lying 14 No Pain    10/30/24 21:26:39 98 °F (36.7 °C) -- 18 144/77 99 -- 28 2 L/min Nasal cannula Lying -- --    10/30/24 1900 -- -- -- -- -- -- 28 2 L/min Nasal cannula -- -- --    10/30/24 1802 98.2 °F (36.8 °C) 99 20 150/77 -- -- -- -- -- -- -- --    10/30/24 17:21:10 98.2 °F (36.8 °C) 99 22 158/80 106 97 % -- -- None (Room air) Lying -- --    10/30/24 1350 -- -- -- -- -- -- -- -- -- -- -- No Pain    10/30/24 1300 -- -- -- -- -- 98 %  -- -- -- -- -- --    10/30/24 1137 98.2 °F (36.8 °C) -- 20 150/77 101 -- -- -- -- Lying -- --    10/30/24 1100 -- -- -- -- -- -- -- -- Nasal cannula -- -- --    10/30/24 0953 -- -- -- -- -- -- -- -- -- -- 15 --    10/30/24 0915 98.2 °F (36.8 °C) 104 22 140/89 -- 88 % -- -- None (Room air) -- -- No Pain              Pertinent Labs/Diagnostic Test Results:   Radiology:  XR chest 1 view portable   Final Interpretation by Chris Urban MD (10/30 1013)      Findings suggest mild CHF. Possible small left pleural effusion.            Workstation performed: [id number]             Results from last 7 days   Lab Units 11/01/24  0509 10/31/24  0339 10/30/24  0929   WBC Thousand/uL 11.36* 6.84 7.78   HEMOGLOBIN g/dL 10.3* 9.8* 10.6*   HEMATOCRIT % 32.3* 31.2* 32.8*   PLATELETS Thousands/uL 290 249 244   TOTAL NEUT ABS Thousands/µL 9.86* 5.77 5.54        Results from last 7 days   Lab Units 11/01/24  0509 10/31/24  0339 10/30/24  0929   SODIUM mmol/L 134* 132* 133*   POTASSIUM mmol/L 4.5 4.6 4.5   CHLORIDE mmol/L 101 101 100   CO2 mmol/L 24 24 26   ANION GAP mmol/L 9 7 7   BUN mg/dL 36* 28* 24   CREATININE mg/dL 1.31* 1.40* 1.40*   EGFR ml/min/1.73sq m 46 43 43   CALCIUM mg/dL 8.8 8.8 9.3   MAGNESIUM mg/dL  --  2.0  --    PHOSPHORUS mg/dL  --  4.1  --      Results from last 7 days   Lab Units 11/01/24  0509 10/31/24  0339 10/30/24  0929   AST U/L 19 15 15   ALT U/L 17 13 14   ALK PHOS U/L 65 64 72   TOTAL PROTEIN g/dL 7.6 7.0 7.6   ALBUMIN g/dL 4.0 3.5 4.0   TOTAL BILIRUBIN mg/dL 0.35 0.29 0.43        Results from last 7 days   Lab Units 11/01/24  0509 10/31/24  0339 10/30/24  0929   GLUCOSE RANDOM mg/dL 119 146* 134        Results from last 7 days   Lab Units 10/31/24  0339 10/30/24  0929   CK TOTAL U/L 33* 44     Results from last 7 days   Lab Units 10/31/24  0339 10/30/24  1411 10/30/24  0929   HS TNI 0HR ng/L  --   --  10   HS TNI 2HR ng/L 9  --   --    HSTNI D2 ng/L -1  --   --    HS TNI 4HR ng/L  --  10  --     HSTNI D4 ng/L  --  0  --      Results from last 7 days   Lab Units 10/30/24  0929   D-DIMER QUANTITATIVE ug/ml FEU 0.76*     Results from last 7 days   Lab Units 10/30/24  0929   PROTIME seconds 15.8*   INR  1.21*   PTT seconds 38*        Results from last 7 days   Lab Units 10/31/24  0339 10/30/24  0929   PROCALCITONIN ng/ml 0.12 0.09     Results from last 7 days   Lab Units 10/30/24  0929   LACTIC ACID mmol/L 1.4        Results from last 7 days   Lab Units 10/30/24  0929   BNP pg/mL 275*        Results from last 7 days   Lab Units 10/31/24  0339 10/30/24  0929   CRP mg/L 52.0* 43.4*        Results from last 7 days   Lab Units 10/30/24  1041   CLARITY UA  Clear   COLOR UA  Yellow   SPEC GRAV UA  1.010   PH UA  6.0   GLUCOSE UA mg/dl 1000 (1%)*   KETONES UA mg/dl Negative   BLOOD UA  Negative   PROTEIN UA mg/dl 30 (1+)*   NITRITE UA  Negative   BILIRUBIN UA  Negative   UROBILINOGEN UA (BE) mg/dl <2.0   LEUKOCYTES UA  Negative   WBC UA /hpf None Seen   RBC UA /hpf None Seen   BACTERIA UA /hpf None Seen   EPITHELIAL CELLS WET PREP /hpf None Seen        Results from last 7 days   Lab Units 10/30/24  0950 10/30/24  0947   BLOOD CULTURE   --  No Growth at 24 hrs.   GRAM STAIN RESULT  Gram positive cocci in clusters*  --           Past Medical History:   Diagnosis Date    A-fib (HCC)     Anemia     Carpal tunnel syndrome, unspecified upper limb     Cataract     last assessed: 8/18/2012    COVID-19 09/06/2023    GERD (gastroesophageal reflux disease)     Glaucoma     Hypertension     last assessed: 8/23/2012    Intervertebral disc disease     PVD (peripheral vascular disease) (HCC)     Stomach disorder 11/2021    Syncope 03/12/2018     Present on Admission:   Viral sepsis (HCC)   Benign essential hypertension   Anemia of chronic disease   Stage 3a chronic kidney disease (HCC)   Permanent atrial fibrillation (HCC)   Chronic diastolic congestive heart failure (HCC)   Hyponatremia      Admitting Diagnosis: Cough  [R05.9]  Influenza [J11.1]  Pulmonary edema [J81.1]  COVID [U07.1]  Age/Sex: 89 y.o. male    Network Utilization Review Department  ATTENTION: Please call with any questions or concerns to [phone] and carefully listen to the prompts so that you are directed to the right person. All voicemails are confidential.   For Discharge needs, contact Care Management DC Support Team at [phone] opt. 2  Send all requests for admission clinical reviews, approved or denied determinations and any other requests to dedicated fax number below belonging to the campus where the patient is receiving treatment. List of dedicated fax numbers for the Facilities:  FACILITY NAME UR FAX NUMBER   ADMISSION DENIALS (Administrative/Medical Necessity) [phone]   DISCHARGE SUPPORT TEAM (NETWORK) [phone]   PARENT CHILD HEALTH (Maternity/NICU/Pediatrics) [phone]   [facility] [phone]   [facility] [phone]   [facility] [phone]   [facility] [phone]   [facility] [phone]   [facility] [phone]   [facility] [phone]   [facility] [phone]   [facility] [phone]   [facility] [phone]   [facility] [phone]   [facility] [phone]

## 2024-10-31 NOTE — PROGRESS NOTES
Progress Note - Hospitalist   Name: Jose Mullins 89 y.o. male I MRN: [id number]  Unit/Bed#: -01 I Date of Admission: 10/30/2024   Date of Service: 10/31/2024 I Hospital Day: 1    Assessment & Plan  Viral sepsis (HCC)  Patient presenting due to cough and shortness of breath    In the ED patient meeting SIRS criteria with tachycardia and tachypnea  Source of infection noted to be viral in nature with being COVID and flu positive  Lactic acid negative  Procalcitonin negative  Patient not meeting any other endorgan dysfunction    Patient meeting mild COVID protocol  CRP increasing  D-dimer slightly elevated-> pt on Eliquis  CK WNL  continue decadron and remdesivir day 1  Continue tamiflu day 1  Supportive treatment    Chronic diastolic congestive heart failure (HCC)  Wt Readings from Last 3 Encounters:   10/31/24 91.8 kg (202 lb 6.1 oz)   10/24/24 92.9 kg (204 lb 12.8 oz)   10/01/24 92.1 kg (203 lb)     BNP on admission 275 similar to previous on 10/4/24->223  CXR showing mild pulm edema  Pt without weight gain  Recent echo done on 12/21/2023 showing an LVEF of 55 to 60% with mild diastolic dysfunction mildly dilated right ventricle mildly reduced right ventricular systolic function mild biatrial enlargement mild pulmonary hypertension moderate aortic sclerosis  Patient examining euvolemic at this time do not believe patient is in an acute exacerbation  Continue to monitor respiratory status  Cardiac low-salt diet  I's and O's  Daily weights  Anemia of chronic disease  Hemoglobin on admission 10.6  Baseline appears to be between 10 and 11  Most likely secondary to CKD  Continue to trend  Transfuse when less than 7  Benign essential hypertension  Continue home regimen of amlodipine bisoprolol and Aldactone however at higher parameters due to sepsis  Stage 3a chronic kidney disease (HCC)  Lab Results   Component Value Date    EGFR 43 10/31/2024    EGFR 43 10/30/2024    EGFR 43 10/18/2024    CREATININE 1.40 (H)  10/31/2024    CREATININE 1.40 (H) 10/30/2024    CREATININE 1.40 (H) 10/18/2024     Creatinine on admission 1.40  Baseline appears to be around 1.1-1.3  Patient not meeting KDIGO criteria for FRANCO  Continue to trend  Avoid nephrotoxic agents hypotension and contrast  Will continue Aldactone at this time however at higher parameters due to sepsis  Hold jardiance   I's and O's  Urine retention protocol  Bladder scan  Continue to trend  Permanent atrial fibrillation (HCC)  RC bisoprolol  AC Eliquis  Continue  Acute respiratory insufficiency  Patient requiring 2 L nasal cannula on admission  Most likely secondary to viral sepsis secondary to flu and COVID infection  Wean as tolerated  Supportive treatment      VTE Pharmacologic Prophylaxis: VTE Score: 9 High Risk (Score >/= 5) - Pharmacological DVT Prophylaxis Ordered: apixaban (Eliquis). Sequential Compression Devices Ordered.    Mobility:   Basic Mobility Inpatient Raw Score: 13  -HLM Goal: 4: Move to chair/commode  JH-HLM Achieved: 2: Bed activities/Dependent transfer  JH-HLM Goal NOT achieved. Continue with multidisciplinary rounding and encourage appropriate mobility to improve upon -HLM goals.    Patient Centered Rounds: I performed bedside rounds with nursing staff today.   Discussions with Specialists or Other Care Team Provider: CM    Education and Discussions with Family / Patient: Updated  (daughter) at bedside.    Current Length of Stay: 1 day(s)  Current Patient Status: Inpatient   Certification Statement: The patient will continue to require additional inpatient hospital stay due to sepsis  Discharge Plan: Anticipate discharge in >72 hrs to home.    Code Status: Level 3 - DNAR and DNI    Subjective   Jose was seen and examined at bedside.  No acute events overnight.  Discussed plan of care.  All questions and concerns were answered and addressed.  Patient states that he feels improved.  Patient still with audible wheezing and requiring  oxygen.  Will continue remdesivir Decadron and Tamiflu.  Continue other supportive measures.  Creatinine continues to remain stable however sodium downward trending patient placed on fluid restriction.    Objective :  Temp:  [97.8 °F (36.6 °C)-98.2 °F (36.8 °C)] 97.8 °F (36.6 °C)  HR:  [] 98  BP: (133-158)/(77-89) 133/77  Resp:  [18-22] 20  SpO2:  [88 %-98 %] 96 %  O2 Device: Nasal cannula  Nasal Cannula O2 Flow Rate (L/min):  [2 L/min] 2 L/min    Body mass index is 30.77 kg/m².     Input and Output Summary (last 24 hours):     Intake/Output Summary (Last 24 hours) at 10/31/2024 0646  Last data filed at 10/31/2024 0626  Gross per 24 hour   Intake 480 ml   Output 1475 ml   Net -995 ml       Physical Exam  Vitals and nursing note reviewed.   Constitutional:       General: He is not in acute distress.     Appearance: He is obese. He is ill-appearing.   HENT:      Head: Normocephalic and atraumatic.   Cardiovascular:      Rate and Rhythm: Normal rate. Rhythm irregular.      Pulses: Normal pulses.      Heart sounds: Normal heart sounds.   Pulmonary:      Effort: Pulmonary effort is normal.      Breath sounds: Bilateral expiratory wheezing in all lung fields     Comments: 2L NC  Abdominal:      General: Abdomen is flat. Bowel sounds are normal.      Palpations: Abdomen is soft.   Musculoskeletal:      Right lower leg: No edema.      Left lower leg: No edema.   Skin:     General: Skin is warm.   Neurological:      General: No focal deficit present.      Mental Status: He is alert and oriented to person, place, and time.    Lines/Drains:              Lab Results: I have reviewed the following results:   Results from last 7 days   Lab Units 10/31/24  0339   WBC Thousand/uL 6.84   HEMOGLOBIN g/dL 9.8*   HEMATOCRIT % 31.2*   PLATELETS Thousands/uL 249   SEGS PCT % 84*   LYMPHO PCT % 11*   MONO PCT % 4   EOS PCT % 0     Results from last 7 days   Lab Units 10/31/24  0339   SODIUM mmol/L 132*   POTASSIUM mmol/L 4.6    CHLORIDE mmol/L 101   CO2 mmol/L 24   BUN mg/dL 28*   CREATININE mg/dL 1.40*   ANION GAP mmol/L 7   CALCIUM mg/dL 8.8   ALBUMIN g/dL 3.5   TOTAL BILIRUBIN mg/dL 0.29   ALK PHOS U/L 64   ALT U/L 13   AST U/L 15   GLUCOSE RANDOM mg/dL 146*     Results from last 7 days   Lab Units 10/30/24  0929   INR  1.21*             Results from last 7 days   Lab Units 10/31/24  0339 10/30/24  0929   LACTIC ACID mmol/L  --  1.4   PROCALCITONIN ng/ml 0.12 0.09       Recent Cultures (last 7 days):   Results from last 7 days   Lab Units 10/30/24  0950 10/30/24  0947   BLOOD CULTURE  Received in Microbiology Lab. Culture in Progress. Received in Microbiology Lab. Culture in Progress.       Imaging Results Review: No pertinent imaging studies reviewed.  Other Study Results Review: No additional pertinent studies reviewed.    Last 24 Hours Medication List:     Current Facility-Administered Medications:     acetaminophen (TYLENOL) tablet 650 mg, Q6H PRN    allopurinol (ZYLOPRIM) tablet 100 mg, Daily    aluminum-magnesium hydroxide-simethicone (MAALOX) oral suspension 30 mL, Q6H PRN    amitriptyline (ELAVIL) tablet 30 mg, HS    amLODIPine (NORVASC) tablet 5 mg, HS    apixaban (ELIQUIS) tablet 2.5 mg, BID    benzonatate (TESSALON PERLES) capsule 100 mg, TID PRN    bisoprolol (ZEBETA) tablet 2.5 mg, Daily    brimonidine tartrate 0.2 % ophthalmic solution 1 drop, BID    dexamethasone (PF) (DECADRON) injection 6 mg, Q24H    dorzolamide-timolol (COSOPT) 2-0.5 % ophthalmic solution 1 drop, BID    finasteride (PROSCAR) tablet 5 mg, Daily    gabapentin (NEURONTIN) capsule 100 mg, BID    guaiFENesin (MUCINEX) 12 hr tablet 600 mg, Q12H SHERWIN    ipratropium (ATROVENT) 0.02 % inhalation solution 0.5 mg, TID    latanoprost (XALATAN) 0.005 % ophthalmic solution 1 drop, HS    levalbuterol (XOPENEX) inhalation solution 0.63 mg, TID    Netarsudil Dimesylate 0.02 % SOLN 1 drop, Daily    ondansetron (ZOFRAN) injection 4 mg, Q4H PRN    oseltamivir (TAMIFLU)  capsule 30 mg, Q12H SHERWIN    pantoprazole (PROTONIX) EC tablet 20 mg, Early Morning    polyethylene glycol (MIRALAX) packet 17 g, Daily PRN    [COMPLETED] remdesivir (Veklury) 200 mg in sodium chloride 0.9 % 290 mL IVPB, Q24H **FOLLOWED BY** remdesivir (Veklury) 100 mg in sodium chloride 0.9 % 270 mL IVPB, Q24H    spironolactone (ALDACTONE) tablet 25 mg, Daily    Administrative Statements   Today, Patient Was Seen By: Helga Lester MD  I have spent a total time of 54 minutes in caring for this patient on the day of the visit/encounter including Diagnostic results, Prognosis, Risks and benefits of tx options, Instructions for management, Patient and family education, Importance of tx compliance, Risk factor reductions, Impressions, Counseling / Coordination of care, Documenting in the medical record, Reviewing / ordering tests, medicine, procedures  , and Obtaining or reviewing history  .    **Please Note: This note may have been constructed using a voice recognition system.**

## 2024-10-31 NOTE — ASSESSMENT & PLAN NOTE
Lab Results   Component Value Date    EGFR 46 11/01/2024    EGFR 43 10/31/2024    EGFR 43 10/30/2024    CREATININE 1.31 (H) 11/01/2024    CREATININE 1.40 (H) 10/31/2024    CREATININE 1.40 (H) 10/30/2024     Creatinine on admission 1.40  Baseline appears to be around 1.1-1.3  Patient not meeting KDIGO criteria for FRANCO  Continue to trend  Avoid nephrotoxic agents hypotension and contrast  Will continue Aldactone at this time however at higher parameters due to sepsis  Hold jardiance   I's and O's  Urine retention protocol  Bladder scan  Continue to trend  Back to baseline

## 2024-10-31 NOTE — CASE MANAGEMENT
Case Management Assessment & Discharge Planning Note    Patient name Jose Mullins  Location /-01 MRN [id number]  : 7/15/1931 Date 10/31/2024       Current Admission Date: 10/30/2024  Current Admission Diagnosis:Viral sepsis (HCC)   Patient Active Problem List    Diagnosis Date Noted Date Diagnosed    Acute respiratory insufficiency 10/30/2024     Chronic diastolic congestive heart failure (HCC) 10/24/2024     Primary osteoarthritis of left knee 2024     Other headache syndrome 2024     Episodic tension-type headache, not intractable 2024     Dermatitis 2023     Elevated TSH 2023     Corkscrew esophagus 2022     Other dysphagia 2022     Frontal headache 2022     Ambulatory dysfunction 2022     Irritable bowel syndrome with diarrhea 2022     Benign prostatic hyperplasia 2021     Pain in both feet 05/15/2020     Carpal tunnel syndrome 2019     Other insomnia 04/15/2019     Anxiety 04/15/2019     Viral sepsis (HCC) 2019     Hyponatremia 2019     Paresthesia 2018     Arthritis of carpometacarpal (CMC) joint of right thumb 2018     Peripheral edema 2018     Stage 3a chronic kidney disease (HCC) 2018     Permanent atrial fibrillation (HCC) 2018     Left lumbar radiculopathy 01/10/2017     Cholecystitis with cholelithiasis 2016     Joint pain, knee 2014     Allergic rhinitis 2013     S/P ablation of ventricular arrhythmia 2013     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HCC) 2012     Anemia of chronic disease 2012     GERD (gastroesophageal reflux disease) 2012     Glaucoma 2012     Hiatal hernia 2012     Other hyperlipidemia 2012     Peripheral vascular disease (HCC) 2012     Benign essential hypertension 2012       LOS (days): 1  Geometric Mean LOS (GMLOS) (days): 4.9  Days to GMLOS:3.7     OBJECTIVE:    Risk of Unplanned  Readmission Score: 23.62         Current admission status: Inpatient       Preferred Pharmacy:   [facility]/pharmacy #[id number] - [place] - [address]  [address]  [place] [address]  Phone: [phone] Fax: [phone]    [facility]  [address]  [place] [address]  Phone: [phone] Fax: [phone]    Primary Care Provider: Moises Ham MD    Primary Insurance: [employer]  Secondary Insurance:     ASSESSMENT:  Active Health Care Proxies       Jennifer Simms Health Care Agent - Daughter   Primary Phone: [phone] (Home)                 Advance Directives  Does patient have a Health Care POA?: Yes  Does patient have Advance Directives?: Yes  Advance Directives: Power of  for health care  Primary Contact: gilda Alberto.         Readmission Root Cause  30 Day Readmission: No    Patient Information  Admitted from:: Home  Mental Status: Alert  During Assessment patient was accompanied by: Daughter  Assessment information provided by:: Daughter  Primary Caregiver: Family  Caregiver's Name:: gilda Alberto.  Caregiver's Relationship to Patient:: Family Member  Caregiver's Telephone Number:: [phone]  Support Systems: Daughter, Self  County of Residence: [place]  What city do you live in?: [place]  Home entry access options. Select all that apply.: Ramp  Type of Current Residence: Located within [facility]  Living Arrangements: Lives w/ Family members, Lives Alone (Daughter is at pt's home 20 hours a day.)  Is patient a ?: Yes  Is patient active with [facility] ( [facility])?: No    Activities of Daily Living Prior to Admission  Functional Status: Assistance  Completes ADLs independently?: No  Level of ADL dependence: Assistance  Ambulates independently?: No  Level of ambulatory dependence: Assistance  Does patient use assisted devices?: Yes  Assisted Devices (DME) used: Walker  Does patient currently own DME?: Yes  What DME does  the patient currently own?: Walker  Does patient have a history of Outpatient Therapy (PT/OT)?: No  Does the patient have a history of Short-Term Rehab?: No  Does patient have a history of HHC?: Yes (Pt's daughter could not recall at time of assessment)  Does patient currently have HHC?: No         Patient Information Continued  Income Source: SSI/SSD (Also has a pension)  Does patient have prescription coverage?: Yes  Does patient receive dialysis treatments?: No  Does patient have a history of substance abuse?: No  Does patient have a history of Mental Health Diagnosis?: No         Means of Transportation  Means of Transport to Appts:: Family transport          DISCHARGE DETAILS:    Discharge planning discussed with:: Pt's daughter Jennifer.  Freedom of Choice: Yes     CM contacted family/caregiver?: Yes  Were Treatment Team discharge recommendations reviewed with patient/caregiver?: Yes  Did patient/caregiver verbalize understanding of patient care needs?: Yes  Were patient/caregiver advised of the risks associated with not following Treatment Team discharge recommendations?: Yes    Contacts  Patient Contacts: Jennifer Simms, daughter.  Relationship to Patient:: Family  Contact Method: In Person  Reason/Outcome: Emergency Contact, Discharge Planning, Continuity of Care    Requested Home Health Care         Is the patient interested in HHC at discharge?: No    DME Referral Provided  Referral made for DME?: No                                                           Additional Comments: CM met with pt and pt's daughter via bedside intercom; however, pt could not hear, so CM spoke with pt's daughter outside of room to discern potential needs at discharge. Pt lives technically alone in a 1sh, ramp to enter, 1st fl setup. Technically lives alone, as pt's daughter is at pt's home 20hrs per day and when pt's daughter is not in the home, she has a camera in the home that connects to her phone for updates. Pt needs  assistance with walking and ADLs, which the daughter provides. Per daughter Jennifer, she assists pt with bathing and feeding. Additionally, she walks with the patient in the home for supportive exercise. 'We do five laps around the house,' per Jennifer. No hx of OPPT, no hx of STR. Hx of HHC. No reported inpatient psych stays or D&A treatment. Daughter Jennifer is medical POA. Preferred pharmacy is [facility] in [place]. Daughter can provide transport once medically stable.

## 2024-11-01 DIAGNOSIS — K21.9 GASTROESOPHAGEAL REFLUX DISEASE, UNSPECIFIED WHETHER ESOPHAGITIS PRESENT: ICD-10-CM

## 2024-11-01 LAB
ALBUMIN SERPL BCG-MCNC: 4 G/DL (ref 3.5–5)
ALP SERPL-CCNC: 65 U/L (ref 34–104)
ALT SERPL W P-5'-P-CCNC: 17 U/L (ref 7–52)
ANION GAP SERPL CALCULATED.3IONS-SCNC: 9 MMOL/L (ref 4–13)
AST SERPL W P-5'-P-CCNC: 19 U/L (ref 13–39)
BASOPHILS # BLD AUTO: 0.01 THOUSANDS/ΜL (ref 0–0.1)
BASOPHILS NFR BLD AUTO: 0 % (ref 0–1)
BILIRUB SERPL-MCNC: 0.35 MG/DL (ref 0.2–1)
BUN SERPL-MCNC: 36 MG/DL (ref 5–25)
CALCIUM SERPL-MCNC: 8.8 MG/DL (ref 8.4–10.2)
CHLORIDE SERPL-SCNC: 101 MMOL/L (ref 96–108)
CO2 SERPL-SCNC: 24 MMOL/L (ref 21–32)
CREAT SERPL-MCNC: 1.31 MG/DL (ref 0.6–1.3)
EOSINOPHIL # BLD AUTO: 0 THOUSAND/ΜL (ref 0–0.61)
EOSINOPHIL NFR BLD AUTO: 0 % (ref 0–6)
ERYTHROCYTE [DISTWIDTH] IN BLOOD BY AUTOMATED COUNT: 12.9 % (ref 11.6–15.1)
GFR SERPL CREATININE-BSD FRML MDRD: 46 ML/MIN/1.73SQ M
GLUCOSE SERPL-MCNC: 119 MG/DL (ref 65–140)
HCT VFR BLD AUTO: 32.3 % (ref 36.5–49.3)
HGB BLD-MCNC: 10.3 G/DL (ref 12–17)
IMM GRANULOCYTES # BLD AUTO: 0.07 THOUSAND/UL (ref 0–0.2)
IMM GRANULOCYTES NFR BLD AUTO: 1 % (ref 0–2)
LYMPHOCYTES # BLD AUTO: 0.74 THOUSANDS/ΜL (ref 0.6–4.47)
LYMPHOCYTES NFR BLD AUTO: 7 % (ref 14–44)
MCH RBC QN AUTO: 31.8 PG (ref 26.8–34.3)
MCHC RBC AUTO-ENTMCNC: 31.9 G/DL (ref 31.4–37.4)
MCV RBC AUTO: 100 FL (ref 82–98)
MONOCYTES # BLD AUTO: 0.68 THOUSAND/ΜL (ref 0.17–1.22)
MONOCYTES NFR BLD AUTO: 6 % (ref 4–12)
NEUTROPHILS # BLD AUTO: 9.86 THOUSANDS/ΜL (ref 1.85–7.62)
NEUTS SEG NFR BLD AUTO: 86 % (ref 43–75)
NRBC BLD AUTO-RTO: 0 /100 WBCS
PLATELET # BLD AUTO: 290 THOUSANDS/UL (ref 149–390)
PMV BLD AUTO: 11.4 FL (ref 8.9–12.7)
POTASSIUM SERPL-SCNC: 4.5 MMOL/L (ref 3.5–5.3)
PROT SERPL-MCNC: 7.6 G/DL (ref 6.4–8.4)
RBC # BLD AUTO: 3.24 MILLION/UL (ref 3.88–5.62)
SODIUM SERPL-SCNC: 134 MMOL/L (ref 135–147)
WBC # BLD AUTO: 11.36 THOUSAND/UL (ref 4.31–10.16)

## 2024-11-01 PROCEDURE — 97163 PT EVAL HIGH COMPLEX 45 MIN: CPT

## 2024-11-01 PROCEDURE — 99232 SBSQ HOSP IP/OBS MODERATE 35: CPT | Performed by: STUDENT IN AN ORGANIZED HEALTH CARE EDUCATION/TRAINING PROGRAM

## 2024-11-01 PROCEDURE — 85025 COMPLETE CBC W/AUTO DIFF WBC: CPT | Performed by: STUDENT IN AN ORGANIZED HEALTH CARE EDUCATION/TRAINING PROGRAM

## 2024-11-01 PROCEDURE — 80053 COMPREHEN METABOLIC PANEL: CPT | Performed by: STUDENT IN AN ORGANIZED HEALTH CARE EDUCATION/TRAINING PROGRAM

## 2024-11-01 RX ADMIN — ALLOPURINOL 100 MG: 100 TABLET ORAL at 09:46

## 2024-11-01 RX ADMIN — APIXABAN 2.5 MG: 2.5 TABLET, FILM COATED ORAL at 09:46

## 2024-11-01 RX ADMIN — OSELTAMIVIR PHOSPHATE 30 MG: 30 CAPSULE ORAL at 09:46

## 2024-11-01 RX ADMIN — SPIRONOLACTONE 25 MG: 25 TABLET ORAL at 09:46

## 2024-11-01 RX ADMIN — PANTOPRAZOLE SODIUM 20 MG: 20 TABLET, DELAYED RELEASE ORAL at 05:31

## 2024-11-01 RX ADMIN — REMDESIVIR 100 MG: 100 INJECTION, POWDER, LYOPHILIZED, FOR SOLUTION INTRAVENOUS at 12:55

## 2024-11-01 RX ADMIN — GUAIFENESIN 600 MG: 600 TABLET ORAL at 22:39

## 2024-11-01 RX ADMIN — BRIMONIDINE TARTRATE 1 DROP: 2 SOLUTION/ DROPS OPHTHALMIC at 09:45

## 2024-11-01 RX ADMIN — LATANOPROST 1 DROP: 50 SOLUTION OPHTHALMIC at 22:39

## 2024-11-01 RX ADMIN — BISOPROLOL FUMARATE 2.5 MG: 5 TABLET, FILM COATED ORAL at 09:46

## 2024-11-01 RX ADMIN — APIXABAN 2.5 MG: 2.5 TABLET, FILM COATED ORAL at 22:39

## 2024-11-01 RX ADMIN — GABAPENTIN 100 MG: 100 CAPSULE ORAL at 17:00

## 2024-11-01 RX ADMIN — DORZOLAMIDE HYDROCHLORIDE AND TIMOLOL MALEATE 1 DROP: 20; 5 SOLUTION/ DROPS OPHTHALMIC at 09:45

## 2024-11-01 RX ADMIN — FINASTERIDE 5 MG: 5 TABLET, FILM COATED ORAL at 09:46

## 2024-11-01 RX ADMIN — AMITRIPTYLINE HYDROCHLORIDE 30 MG: 10 TABLET, FILM COATED ORAL at 22:37

## 2024-11-01 RX ADMIN — GABAPENTIN 100 MG: 100 CAPSULE ORAL at 09:46

## 2024-11-01 RX ADMIN — DEXAMETHASONE SODIUM PHOSPHATE 6 MG: 10 INJECTION, SOLUTION INTRAMUSCULAR; INTRAVENOUS at 12:55

## 2024-11-01 RX ADMIN — OSELTAMIVIR PHOSPHATE 30 MG: 30 CAPSULE ORAL at 22:38

## 2024-11-01 RX ADMIN — GUAIFENESIN 600 MG: 600 TABLET ORAL at 09:46

## 2024-11-01 RX ADMIN — DORZOLAMIDE HYDROCHLORIDE AND TIMOLOL MALEATE 1 DROP: 20; 5 SOLUTION/ DROPS OPHTHALMIC at 17:01

## 2024-11-01 RX ADMIN — BRIMONIDINE TARTRATE 1 DROP: 2 SOLUTION/ DROPS OPHTHALMIC at 17:01

## 2024-11-01 RX ADMIN — AMLODIPINE BESYLATE 5 MG: 5 TABLET ORAL at 22:37

## 2024-11-01 NOTE — PROGRESS NOTES
Progress Note - Hospitalist   Name: Jose Mullins 89 y.o. male I MRN: [id number]  Unit/Bed#: -01 I Date of Admission: 10/30/2024   Date of Service: 11/1/2024 I Hospital Day: 2    Assessment & Plan  Viral sepsis (HCC)  Patient presenting due to cough and shortness of breath    In the ED patient meeting SIRS criteria with tachycardia and tachypnea  Source of infection noted to be viral in nature with being COVID and flu positive  Lactic acid negative  Procalcitonin negative  Patient not meeting any other endorgan dysfunction    Patient meeting mild COVID protocol  CRP increasing  D-dimer slightly elevated-> pt on Eliquis  CK WNL  continue decadron and remdesivir day 2  Continue tamiflu day 2  Supportive treatment    Chronic diastolic congestive heart failure (HCC)  Wt Readings from Last 3 Encounters:   11/01/24 91.6 kg (201 lb 15.1 oz)   10/24/24 92.9 kg (204 lb 12.8 oz)   10/01/24 92.1 kg (203 lb)     BNP on admission 275 similar to previous on 10/4/24->223  CXR showing mild pulm edema  Pt without weight gain  Recent echo done on 12/21/2023 showing an LVEF of 55 to 60% with mild diastolic dysfunction mildly dilated right ventricle mildly reduced right ventricular systolic function mild biatrial enlargement mild pulmonary hypertension moderate aortic sclerosis  Patient examining euvolemic at this time do not believe patient is in an acute exacerbation  Continue to monitor respiratory status  Cardiac low-salt diet  I's and O's  Daily weights  Anemia of chronic disease  Hemoglobin on admission 10.6  Baseline appears to be between 10 and 11  Most likely secondary to CKD  Continue to trend  Transfuse when less than 7  Benign essential hypertension  Continue home regimen of amlodipine bisoprolol and Aldactone however at higher parameters due to sepsis  Stage 3a chronic kidney disease (HCC)  Lab Results   Component Value Date    EGFR 46 11/01/2024    EGFR 43 10/31/2024    EGFR 43 10/30/2024    CREATININE 1.31 (H)  11/01/2024    CREATININE 1.40 (H) 10/31/2024    CREATININE 1.40 (H) 10/30/2024     Creatinine on admission 1.40  Baseline appears to be around 1.1-1.3  Patient not meeting KDIGO criteria for FRANCO  Continue to trend  Avoid nephrotoxic agents hypotension and contrast  Will continue Aldactone at this time however at higher parameters due to sepsis  Hold jardiance   I's and O's  Urine retention protocol  Bladder scan  Continue to trend  Back to baseline  Permanent atrial fibrillation (HCC)  RC bisoprolol  AC Eliquis  Continue  Acute respiratory insufficiency  Patient requiring 2 L nasal cannula on admission  Most likely secondary to viral sepsis secondary to flu and COVID infection  Wean as tolerated  Supportive treatment  Continues to require 2L NC     Hyponatremia  Improving  Fluid restriction broadened to 2L  Continue to trend    VTE Pharmacologic Prophylaxis: VTE Score: 9 High Risk (Score >/= 5) - Pharmacological DVT Prophylaxis Ordered: apixaban (Eliquis). Sequential Compression Devices Ordered.    Mobility:   Basic Mobility Inpatient Raw Score: 18  JH-HLM Goal: 6: Walk 10 steps or more  JH-HLM Achieved: 4: Move to chair/commode  JH-HLM Goal NOT achieved. Continue with multidisciplinary rounding and encourage appropriate mobility to improve upon JH-HLM goals.    Patient Centered Rounds: I performed bedside rounds with nursing staff today.   Discussions with Specialists or Other Care Team Provider: CM, Pt, OT    Education and Discussions with Family / Patient: Updated  (daughter) at bedside.    Current Length of Stay: 2 day(s)  Current Patient Status: Inpatient   Certification Statement: The patient will continue to require additional inpatient hospital stay due to sepsis  Discharge Plan: Anticipate discharge in >72 hrs to home.    Code Status: Level 3 - DNAR and DNI    Subjective   Jose was seen and examined at bedside.  No acute events overnight.  Discussed plan of care.  All questions and concerns  were answered and addressed.  Patient continues to feel improved.  For some time patient had been on room air however with exertion patient desaturating and placed back on nasal cannula.  Will continue mild COVID protocol and Tamiflu with Decadron and remdesivir.  Creatinine improving.  Hyponatremia improving fluid restriction broadened to 2 L    Objective :  Temp:  [97.5 °F (36.4 °C)-98.7 °F (37.1 °C)] 98.7 °F (37.1 °C)  HR:  [91-94] 91  BP: (128-133)/(80-82) 128/82  Resp:  [20] 20  SpO2:  [93 %-97 %] 94 %  O2 Device: None (Room air)  Nasal Cannula O2 Flow Rate (L/min):  [2 L/min] 2 L/min    Body mass index is 30.71 kg/m².     Input and Output Summary (last 24 hours):     Intake/Output Summary (Last 24 hours) at 11/1/2024 0708  Last data filed at 11/1/2024 0313  Gross per 24 hour   Intake 1200 ml   Output 1900 ml   Net -700 ml       Physical Exam  Vitals and nursing note reviewed.   Constitutional:       General: He is not in acute distress.     Appearance: He is obese. He is ill-appearing.   HENT:      Head: Normocephalic and atraumatic.   Cardiovascular:      Rate and Rhythm: Normal rate. Rhythm irregular.      Pulses: Normal pulses.      Heart sounds: Normal heart sounds.   Pulmonary:      Effort: Pulmonary effort is normal.      Breath sounds: Bilateral expiratory wheezing in all lung fields     Comments: 2L NC  Abdominal:      General: Abdomen is flat. Bowel sounds are normal.      Palpations: Abdomen is soft.   Musculoskeletal:      Right lower leg: No edema.      Left lower leg: No edema.   Skin:     General: Skin is warm.   Neurological:      General: No focal deficit present.      Mental Status: He is alert and oriented to person, place, and time.    Lines/Drains:              Lab Results: I have reviewed the following results:   Results from last 7 days   Lab Units 11/01/24  0509   WBC Thousand/uL 11.36*   HEMOGLOBIN g/dL 10.3*   HEMATOCRIT % 32.3*   PLATELETS Thousands/uL 290   SEGS PCT % 86*   LYMPHO  PCT % 7*   MONO PCT % 6   EOS PCT % 0     Results from last 7 days   Lab Units 11/01/24  0509   SODIUM mmol/L 134*   POTASSIUM mmol/L 4.5   CHLORIDE mmol/L 101   CO2 mmol/L 24   BUN mg/dL 36*   CREATININE mg/dL 1.31*   ANION GAP mmol/L 9   CALCIUM mg/dL 8.8   ALBUMIN g/dL 4.0   TOTAL BILIRUBIN mg/dL 0.35   ALK PHOS U/L 65   ALT U/L 17   AST U/L 19   GLUCOSE RANDOM mg/dL 119     Results from last 7 days   Lab Units 10/30/24  0929   INR  1.21*             Results from last 7 days   Lab Units 10/31/24  0339 10/30/24  0929   LACTIC ACID mmol/L  --  1.4   PROCALCITONIN ng/ml 0.12 0.09       Recent Cultures (last 7 days):   Results from last 7 days   Lab Units 10/30/24  0950 10/30/24  0947   BLOOD CULTURE   --  No Growth at 24 hrs.   GRAM STAIN RESULT  Gram positive cocci in clusters*  --        Imaging Results Review: No pertinent imaging studies reviewed.  Other Study Results Review: No additional pertinent studies reviewed.    Last 24 Hours Medication List:     Current Facility-Administered Medications:     acetaminophen (TYLENOL) tablet 650 mg, Q6H PRN    allopurinol (ZYLOPRIM) tablet 100 mg, Daily    aluminum-magnesium hydroxide-simethicone (MAALOX) oral suspension 30 mL, Q6H PRN    amitriptyline (ELAVIL) tablet 30 mg, HS    amLODIPine (NORVASC) tablet 5 mg, HS    apixaban (ELIQUIS) tablet 2.5 mg, BID    benzonatate (TESSALON PERLES) capsule 100 mg, TID PRN    bisoprolol (ZEBETA) tablet 2.5 mg, Daily    brimonidine tartrate 0.2 % ophthalmic solution 1 drop, BID    dexamethasone (PF) (DECADRON) injection 6 mg, Q24H    dorzolamide-timolol (COSOPT) 2-0.5 % ophthalmic solution 1 drop, BID    finasteride (PROSCAR) tablet 5 mg, Daily    gabapentin (NEURONTIN) capsule 100 mg, BID    guaiFENesin (MUCINEX) 12 hr tablet 600 mg, Q12H SHERWIN    latanoprost (XALATAN) 0.005 % ophthalmic solution 1 drop, HS    levalbuterol (XOPENEX) inhalation solution 0.63 mg, Q6H PRN    Netarsudil Dimesylate 0.02 % SOLN 1 drop, Daily    ondansetron  (ZOFRAN) injection 4 mg, Q4H PRN    oseltamivir (TAMIFLU) capsule 30 mg, Q12H SHERWIN    pantoprazole (PROTONIX) EC tablet 20 mg, Early Morning    polyethylene glycol (MIRALAX) packet 17 g, Daily PRN    [COMPLETED] remdesivir (Veklury) 200 mg in sodium chloride 0.9 % 290 mL IVPB, Q24H **FOLLOWED BY** remdesivir (Veklury) 100 mg in sodium chloride 0.9 % 270 mL IVPB, Q24H    spironolactone (ALDACTONE) tablet 25 mg, Daily    Administrative Statements   Today, Patient Was Seen By: Helga Lester MD  I have spent a total time of 54 minutes in caring for this patient on the day of the visit/encounter including Diagnostic results, Prognosis, Risks and benefits of tx options, Instructions for management, Patient and family education, Importance of tx compliance, Risk factor reductions, Impressions, Counseling / Coordination of care, Documenting in the medical record, Reviewing / ordering tests, medicine, procedures  , and Obtaining or reviewing history  .    **Please Note: This note may have been constructed using a voice recognition system.**

## 2024-11-01 NOTE — PLAN OF CARE
Problem: PAIN - ADULT  Goal: Verbalizes/displays adequate comfort level or baseline comfort level  Description: Interventions:  - Encourage patient to monitor pain and request assistance  - Assess pain using appropriate pain scale  - Administer analgesics based on type and severity of pain and evaluate response  - Implement non-pharmacological measures as appropriate and evaluate response  - Consider cultural and social influences on pain and pain management  - Notify physician/advanced practitioner if interventions unsuccessful or patient reports new pain  Outcome: Progressing     Problem: INFECTION - ADULT  Goal: Absence or prevention of progression during hospitalization  Description: INTERVENTIONS:  - Assess and monitor for signs and symptoms of infection  - Monitor lab/diagnostic results  - Monitor all insertion sites, i.e. indwelling lines, tubes, and drains  - Monitor endotracheal if appropriate and nasal secretions for changes in amount and color  - [place] appropriate cooling/warming therapies per order  - Administer medications as ordered  - Instruct and encourage patient and family to use good hand hygiene technique  - Identify and instruct in appropriate isolation precautions for identified infection/condition  Outcome: Progressing  Goal: Absence of fever/infection during neutropenic period  Description: INTERVENTIONS:  - Monitor WBC    Outcome: Progressing     Problem: SAFETY ADULT  Goal: Patient will remain free of falls  Description: INTERVENTIONS:  - Educate patient/family on patient safety including physical limitations  - Instruct patient to call for assistance with activity   - Consult OT/PT to assist with strengthening/mobility   - Keep Call bell within reach  - Keep bed low and locked with side rails adjusted as appropriate  - Keep care items and personal belongings within reach  - Initiate and maintain comfort rounds  - Make Fall Risk Sign visible to staff  - Offer Toileting every 2 Hours,  in advance of need  - Initiate/Maintain bed alarm  - Obtain necessary fall risk management equipment: call light  - Apply yellow socks and bracelet for high fall risk patients  - Consider moving patient to room near nurses station  Outcome: Progressing  Goal: Maintain or return to baseline ADL function  Description: INTERVENTIONS:  -  Assess patient's ability to carry out ADLs; assess patient's baseline for ADL function and identify physical deficits which impact ability to perform ADLs (bathing, care of mouth/teeth, toileting, grooming, dressing, etc.)  - Assess/evaluate cause of self-care deficits   - Assess range of motion  - Assess patient's mobility; develop plan if impaired  - Assess patient's need for assistive devices and provide as appropriate  - Encourage maximum independence but intervene and supervise when necessary  - Involve family in performance of ADLs  - Assess for home care needs following discharge   - Consider OT consult to assist with ADL evaluation and planning for discharge  - Provide patient education as appropriate  Outcome: Progressing  Goal: Maintains/Returns to pre admission functional level  Description: INTERVENTIONS:  - Perform AM-PAC 6 Click Basic Mobility/ Daily Activity assessment daily.  - Set and communicate daily mobility goal to care team and patient/family/caregiver.   - Collaborate with rehabilitation services on mobility goals if consulted  - Perform Range of Motion 3 times a day.  - Reposition patient every 2 hours.  - Dangle patient 3 times a day  - Stand patient 3 times a day  - Ambulate patient 3 times a day  - Out of bed to chair 3 times a day   - Out of bed for meals 3 times a day  - Out of bed for toileting  - Record patient progress and toleration of activity level   Outcome: Progressing

## 2024-11-01 NOTE — ASSESSMENT & PLAN NOTE
Wt Readings from Last 3 Encounters:   11/02/24 89.9 kg (198 lb 3.2 oz)   10/24/24 92.9 kg (204 lb 12.8 oz)   10/01/24 92.1 kg (203 lb)     BNP on admission 275 similar to previous on 10/4/24->223  CXR showing mild pulm edema  Pt without weight gain  Recent echo done on 12/21/2023 showing an LVEF of 55 to 60% with mild diastolic dysfunction mildly dilated right ventricle mildly reduced right ventricular systolic function mild biatrial enlargement mild pulmonary hypertension moderate aortic sclerosis  Patient examining euvolemic at this time do not believe patient is in an acute exacerbation  Continue to monitor respiratory status  Cardiac low-salt diet  I's and O's  Daily weights

## 2024-11-01 NOTE — QUICK NOTE
1/2 blood cultures positive for gram positive cocci in clusters. Other with no growth. Blood culture identification panel showing staphylococcus. Suspect contaminant. Monitor off antibiotics.

## 2024-11-01 NOTE — ASSESSMENT & PLAN NOTE
Lab Results   Component Value Date    EGFR 47 11/02/2024    EGFR 46 11/01/2024    EGFR 43 10/31/2024    CREATININE 1.29 11/02/2024    CREATININE 1.31 (H) 11/01/2024    CREATININE 1.40 (H) 10/31/2024     Creatinine on admission 1.40  Baseline appears to be around 1.1-1.3  Patient not meeting KDIGO criteria for FRANCO  Continue to trend  Avoid nephrotoxic agents hypotension and contrast  Will continue Aldactone at this time however at higher parameters due to sepsis  Hold jardiance   I's and O's  Urine retention protocol  Bladder scan  Continue to trend  Back to baseline

## 2024-11-01 NOTE — CASE MANAGEMENT
Case Management Discharge Planning Note    Patient name Jose Mullins  Location /-01 MRN [id number]  : 7/15/1931 Date 2024       Current Admission Date: 10/30/2024  Current Admission Diagnosis:Viral sepsis (HCC)   Patient Active Problem List    Diagnosis Date Noted Date Diagnosed    Acute respiratory insufficiency 10/30/2024     Chronic diastolic congestive heart failure (HCC) 10/24/2024     Primary osteoarthritis of left knee 2024     Other headache syndrome 2024     Episodic tension-type headache, not intractable 2024     Dermatitis 2023     Elevated TSH 2023     Corkscrew esophagus 2022     Other dysphagia 2022     Frontal headache 2022     Ambulatory dysfunction 2022     Irritable bowel syndrome with diarrhea 2022     Benign prostatic hyperplasia 2021     Pain in both feet 05/15/2020     Carpal tunnel syndrome 2019     Other insomnia 04/15/2019     Anxiety 04/15/2019     Viral sepsis (HCC) 2019     Hyponatremia 2019     Paresthesia 2018     Arthritis of carpometacarpal (CMC) joint of right thumb 2018     Peripheral edema 2018     Stage 3a chronic kidney disease (HCC) 2018     Permanent atrial fibrillation (HCC) 2018     Left lumbar radiculopathy 01/10/2017     Cholecystitis with cholelithiasis 2016     Joint pain, knee 2014     Allergic rhinitis 2013     S/P ablation of ventricular arrhythmia 2013     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HCC) 2012     Anemia of chronic disease 2012     GERD (gastroesophageal reflux disease) 2012     Glaucoma 2012     Hiatal hernia 2012     Other hyperlipidemia 2012     Peripheral vascular disease (HCC) 2012     Benign essential hypertension 2012       LOS (days): 2  Geometric Mean LOS (GMLOS) (days): 4.9  Days to GMLOS:2.6     OBJECTIVE:  Risk of Unplanned Readmission Score: 23.31          Current admission status: Inpatient   Preferred Pharmacy:   [facility]/pharmacy #[id number] - [place] - [address]  [address]  [place] [address]  Phone: [phone] Fax: [phone]    [facility]  [address]  [place] [address]  Phone: [phone] Fax: [phone]    Primary Care Provider: Moises Ham MD    Primary Insurance: [facility]  Secondary Insurance:     DISCHARGE DETAILS:                                Requested Home Health Care         Is the patient interested in HHC at discharge?: Yes  Home Health Discipline requested:: Occupational Therapy, Physical Therapy, Nursing  Home Health Agency Name:: Other  HHA External Referral Reason (only applicable if external HHA name selected): Patient has established relationship with provider  Home Health Follow-Up Provider:: PCP  Home Health Services Needed:: Heart Failure Management, Strengthening/Theraputic Exercises to Improve Function, Gait/ADL Training, Evaluate Functional Status and Safety  Homebound Criteria Met:: Uses an Assist Device (i.e. cane, walker, etc), Requires the Assistance of Another Person for Safe Ambulation or to Leave the Home  Supporting Clincal Findings:: Fatigues Easliy in Short Distances, Limited Endurance         Other Referral/Resources/Interventions Provided:  Interventions: HHC  Referral Comments: Pt and daughter agreeable to HHC. [place] referrals made in [place]. Awaiting Accepting.         Treatment Team Recommendation: Home with Home Health Care  Discharge Destination Plan:: Home with Home Health Care

## 2024-11-01 NOTE — RESPIRATORY THERAPY NOTE
RT Protocol Note  Jose Mullins 89 y.o. male MRN: [id number]  Unit/Bed#: -01 Encounter: [id number]    Assessment    Principal Problem:    Viral sepsis (HCC)  Active Problems:    Anemia of chronic disease    Benign essential hypertension    Stage 3a chronic kidney disease (HCC)    Permanent atrial fibrillation (HCC)    Chronic diastolic congestive heart failure (HCC)    Acute respiratory insufficiency      Home Pulmonary Medications:  Home Devices/Therapy: (P)  (N/A)     Past Medical History:   Diagnosis Date    A-fib (HCC)     Anemia     Carpal tunnel syndrome, unspecified upper limb     Cataract     last assessed: 8/18/2012    COVID-19 09/06/2023    GERD (gastroesophageal reflux disease)     Glaucoma     Hypertension     last assessed: 8/23/2012    Intervertebral disc disease     PVD (peripheral vascular disease) (HCC)     Stomach disorder 11/2021    Syncope 03/12/2018     Social History     Socioeconomic History    Marital status:      Spouse name: None    Number of children: None    Years of education: None    Highest education level: None   Occupational History    Occupation: retired   Tobacco Use    Smoking status: Former     Current packs/day: 0.50     Average packs/day: 0.5 packs/day for 35.0 years (17.5 ttl pk-yrs)     Types: Cigarettes    Smokeless tobacco: Former     Types: Snuff    Tobacco comments:     quit 30 years ago    Vaping Use    Vaping status: Former   Substance and Sexual Activity    Alcohol use: Not Currently    Drug use: Never    Sexual activity: Not Currently     Partners: Female   Other Topics Concern    None   Social History Narrative    Morning person     Social Determinants of Health     Financial Resource Strain: Low Risk  (7/26/2023)    Overall Financial Resource Strain (CARDIA)     Difficulty of Paying Living Expenses: Not hard at all   Food Insecurity: No Food Insecurity (10/30/2024)    Nursing - Inadequate Food Risk Classification     Worried About Running Out of Food in  "the Last Year: Not on file     Ran Out of Food in the Last Year: Not on file     Ran Out of Food in the Last Year: 1   Transportation Needs: No Transportation Needs (10/30/2024)    Nursing - Transportation Risk Classification     Lack of Transportation: Not on file     Lack of Transportation: 2   Physical Activity: Not on file   Stress: Not on file   Social Connections: Not on file   Intimate Partner Violence: Unknown (10/30/2024)    Nursing IPS     Feels Physically and Emotionally Safe: Not on file     Physically Hurt by Someone: Not on file     Humiliated or Emotionally Abused by Someone: Not on file     Physically Hurt by Someone: 2     Hurt or Threatened by Someone: 2   Housing Stability: Unknown (10/30/2024)    Nursing: Inadequate Housing Risk Classification     Has Housing: Not on file     Worried About Losing Housing: Not on file     Unable to Get Utilities: Not on file     Unable to Pay for Housing in the Last Year: 2     Has Housin       Subjective         Objective    Physical Exam:   Assessment Type: During-treatment  General Appearance: Alert, Awake  Respiratory Pattern: Normal  Chest Assessment: Chest expansion symmetrical  Bilateral Breath Sounds: Diminished  Cough: Non-productive  O2 Device: RA    Vitals:  Blood pressure 128/82, pulse 91, temperature 98.7 °F (37.1 °C), temperature source Oral, resp. rate 20, height 5' 8\" (1.727 m), weight 91.8 kg (202 lb 6.1 oz), SpO2 94%.          Imaging and other studies:     O2 Device: RA     Plan    Respiratory Plan: (P) No distress/Pulmonary history, Discontinue Protocol  Airway Clearance Plan: Incentive Spirometer     Resp Comments: (P) Patient has no diagnosed hx. of pulmonary disease and does not use pulmonary medications for home regimen. Patient is currently not in respiratory distress and stable on RA. Plan to change treatment regimen to PRN per protocol.   "

## 2024-11-01 NOTE — PLAN OF CARE
Problem: PAIN - ADULT  Goal: Verbalizes/displays adequate comfort level or baseline comfort level  Description: Interventions:  - Encourage patient to monitor pain and request assistance  - Assess pain using appropriate pain scale  - Administer analgesics based on type and severity of pain and evaluate response  - Implement non-pharmacological measures as appropriate and evaluate response  - Consider cultural and social influences on pain and pain management  - Notify physician/advanced practitioner if interventions unsuccessful or patient reports new pain  Outcome: Progressing     Problem: INFECTION - ADULT  Goal: Absence or prevention of progression during hospitalization  Description: INTERVENTIONS:  - Assess and monitor for signs and symptoms of infection  - Monitor lab/diagnostic results  - Monitor all insertion sites, i.e. indwelling lines, tubes, and drains  - Monitor endotracheal if appropriate and nasal secretions for changes in amount and color  - Emmalena appropriate cooling/warming therapies per order  - Administer medications as ordered  - Instruct and encourage patient and family to use good hand hygiene technique  - Identify and instruct in appropriate isolation precautions for identified infection/condition  Outcome: Progressing  Goal: Absence of fever/infection during neutropenic period  Description: INTERVENTIONS:  - Monitor WBC    Outcome: Progressing     Problem: SAFETY ADULT  Goal: Patient will remain free of falls  Description: INTERVENTIONS:  - Educate patient/family on patient safety including physical limitations  - Instruct patient to call for assistance with activity   - Consult OT/PT to assist with strengthening/mobility   - Keep Call bell within reach  - Keep bed low and locked with side rails adjusted as appropriate  - Keep care items and personal belongings within reach  - Initiate and maintain comfort rounds  - Make Fall Risk Sign visible to staff  - Offer Toileting every 2 Hours,  in advance of need  - Initiate/Maintain bed/chair alarm  - Obtain necessary fall risk management equipment  - Apply yellow socks and bracelet for high fall risk patients  - Consider moving patient to room near nurses station  Outcome: Progressing  Goal: Maintain or return to baseline ADL function  Description: INTERVENTIONS:  -  Assess patient's ability to carry out ADLs; assess patient's baseline for ADL function and identify physical deficits which impact ability to perform ADLs (bathing, care of mouth/teeth, toileting, grooming, dressing, etc.)  - Assess/evaluate cause of self-care deficits   - Assess range of motion  - Assess patient's mobility; develop plan if impaired  - Assess patient's need for assistive devices and provide as appropriate  - Encourage maximum independence but intervene and supervise when necessary  - Involve family in performance of ADLs  - Assess for home care needs following discharge   - Consider OT consult to assist with ADL evaluation and planning for discharge  - Provide patient education as appropriate  Outcome: Progressing  Goal: Maintains/Returns to pre admission functional level  Description: INTERVENTIONS:  - Perform AM-PAC 6 Click Basic Mobility/ Daily Activity assessment daily.  - Set and communicate daily mobility goal to care team and patient/family/caregiver.   - Collaborate with rehabilitation services on mobility goals if consulted  - Perform Range of Motion 3 times a day.  - Reposition patient every 2 hours.  - Dangle patient 3 times a day  - Stand patient 3 times a day  - Ambulate patient 3 times a day  - Out of bed to chair 3 times a day   - Out of bed for meals 3 times a day  - Out of bed for toileting  - Record patient progress and toleration of activity level   Outcome: Progressing     Problem: DISCHARGE PLANNING  Goal: Discharge to home or other facility with appropriate resources  Description: INTERVENTIONS:  - Identify barriers to discharge w/patient and  caregiver  - Arrange for needed discharge resources and transportation as appropriate  - Identify discharge learning needs (meds, wound care, etc.)  - Arrange for interpretive services to assist at discharge as needed  - Refer to Case Management Department for coordinating discharge planning if the patient needs post-hospital services based on physician/advanced practitioner order or complex needs related to functional status, cognitive ability, or social support system  Outcome: Progressing     Problem: Knowledge Deficit  Goal: Patient/family/caregiver demonstrates understanding of disease process, treatment plan, medications, and discharge instructions  Description: Complete learning assessment and assess knowledge base.  Interventions:  - Provide teaching at level of understanding  - Provide teaching via preferred learning methods  Outcome: Progressing     Problem: CARDIOVASCULAR - ADULT  Goal: Maintains optimal cardiac output and hemodynamic stability  Description: INTERVENTIONS:  - Monitor I/O, vital signs and rhythm  - Monitor for S/S and trends of decreased cardiac output  - Administer and titrate ordered vasoactive medications to optimize hemodynamic stability  - Assess quality of pulses, skin color and temperature  - Assess for signs of decreased coronary artery perfusion  - Instruct patient to report change in severity of symptoms  Outcome: Progressing  Goal: Absence of cardiac dysrhythmias or at baseline rhythm  Description: INTERVENTIONS:  - Continuous cardiac monitoring, vital signs, obtain 12 lead EKG if ordered  - Administer antiarrhythmic and heart rate control medications as ordered  - Monitor electrolytes and administer replacement therapy as ordered  Outcome: Progressing     Problem: RESPIRATORY - ADULT  Goal: Achieves optimal ventilation and oxygenation  Description: INTERVENTIONS:  - Assess for changes in respiratory status  - Assess for changes in mentation and behavior  - Position to  facilitate oxygenation and minimize respiratory effort  - Oxygen administered by appropriate delivery if ordered  - Initiate smoking cessation education as indicated  - Encourage broncho-pulmonary hygiene including cough, deep breathe, Incentive Spirometry  - Assess the need for suctioning and aspirate as needed  - Assess and instruct to report SOB or any respiratory difficulty  - Respiratory Therapy support as indicated  Outcome: Progressing     Problem: Prexisting or High Potential for Compromised Skin Integrity  Goal: Skin integrity is maintained or improved  Description: INTERVENTIONS:  - Identify patients at risk for skin breakdown  - Assess and monitor skin integrity  - Assess and monitor nutrition and hydration status  - Monitor labs   - Assess for incontinence   - Turn and reposition patient  - Assist with mobility/ambulation  - Relieve pressure over bony prominences  - Avoid friction and shearing  - Provide appropriate hygiene as needed including keeping skin clean and dry  - Evaluate need for skin moisturizer/barrier cream  - Collaborate with interdisciplinary team   - Patient/family teaching  - Consider wound care consult   Outcome: Progressing

## 2024-11-01 NOTE — PHYSICAL THERAPY NOTE
"                                                                                  PHYSICAL THERAPY EVALUATION NOTE      Patient Name: Jose Mullins  Today's Date: 2024    AGE:   89 y.o.  Mrn:   [id number]  ADMIT DX:  Cough [R05.9]  Influenza [J11.1]  Pulmonary edema [J81.1]  COVID [U07.1]    Past Medical History:   Diagnosis Date    A-fib ([facility])     Anemia     Carpal tunnel syndrome, unspecified upper limb     Cataract     last assessed: 2012    COVID-19 2023    GERD (gastroesophageal reflux disease)     Glaucoma     Hypertension     last assessed: 2012    Intervertebral disc disease     PVD (peripheral vascular disease) ([facility])     Stomach disorder 2021    Syncope 2018     Length Of Stay: 2  PHYSICAL THERAPY EVALUATION :   Patient's identity confirmed via 2 patient identifiers (full name and ) at start of session       24 1554   PT Last Visit   PT Visit Date 24   Note Type   Note type Evaluation   Additional Comments Pt goes by \"Vasile\"   Pain Assessment   Pain Assessment Tool 0-10   Pain Score No Pain   Restrictions/Precautions   Weight Bearing Precautions Per Order No   Other Precautions Contact/isolation;Airborne/isolation;Cognitive;Chair Alarm;Bed Alarm;O2;Fall Risk   Home Living   Type of Home House   Home Layout One level  (0 ZAHEER)   Bathroom Shower/Tub Walk-in shower   Home Equipment Walker   Additional Comments Patient reports ambulating w/ RW at all times   Prior Function   Level of Sharkey Needs assistance with ADLs;Needs assistance with IADLS;Independent with functional mobility   Lives With Daughter   Receives Help From Family   IADLs Family/Friend/Other provides transportation;Family/Friend/Other provides meals;Family/Friend/Other provides medication management   Falls in the last 6 months 0   Vocational Retired   Comments Patient reports his daughter is with him for 20 hours/day   General   Family/Caregiver Present No   Cognition   Arousal/Participation " "Alert   Attention Attends with cues to redirect   Orientation Level Oriented X4   Memory Decreased short term memory   Following Commands Follows one step commands without difficulty   Comments Pt pleasant and agreeable to participate, patient is at his baseline cognition per OT note yesterday   Subjective   Subjective \"I can do all that!\"   RLE Assessment   RLE Assessment WFL   Strength RLE   RLE Overall Strength 4-/5   LLE Assessment   LLE Assessment WFL   Strength LLE   LLE Overall Strength 4-/5   Vision-Basic Assessment   Visual History Cataracts;Glaucoma   Bed Mobility   Additional Comments Pt OOB in recliner chair at start and end of session   Transfers   Sit to Stand 6  Modified independent   Additional items Assist x 1;Armrests;Increased time required   Stand to Sit 6  Modified independent   Additional items Assist x 1;Increased time required;Verbal cues;Armrests   Ambulation/Elevation   Gait pattern Decreased foot clearance;Forward Flexion;Short stride;Excessively slow  (benefits from cues to keep AC inside RW)   Gait Assistance 5  Supervision   Additional items Assist x 1;Verbal cues   Assistive Device Rolling walker   Distance 50 ft  (around room w/ multiple turns)   Balance   Static Sitting Good   Static Standing Fair   Ambulatory Fair -   Activity Tolerance   Activity Tolerance Patient tolerated treatment well   Medical Staff Made Aware S/w OT Madelaine   Assessment   Problem List Decreased strength;Impaired balance;Decreased mobility;Decreased cognition   Assessment Jose Mullins is a 89 y.o. Male who presents to [facility] on 10/30/2024 from home w/ c/o cough and SOB and diagnosis of COVID, viral sepsis. Orders for PT eval and treat received. Pt presents w/ comorbidities of chronic anemia, HTN, Afib, CHF, GERD, glaucoma. At baseline, pt mobilizes supervision w/ RW, and reports 0 falls in the last 6 months. Upon evaluation, pt presents w/ the following deficits: weakness, impaired balance, impaired hearing, " impaired vision, and decreased endurance. Upon eval, pt requires modified I for transfers, and S for gait. Based on this PT evaluation today, patient's discharge recommendation is for Level III - Low Rehab Resource Intensity. Given the above findings from this evaluation, at this time this patient does not require skilled inpatient PT for the remainder of this admission. Will D/C patient from PT caseload, please reconsult if any changes or needs arise.   Goals   Patient Goals to feel better   Discharge Recommendation   Rehab Resource Intensity Level, PT III (Minimum Resource Intensity)   AM-PAC Basic Mobility Inpatient   Turning in Flat Bed Without Bedrails 4   Lying on Back to Sitting on Edge of Flat Bed Without Bedrails 3   Moving Bed to Chair 4   Standing Up From Chair Using Arms 4   Walk in Room 3   Climb 3-5 Stairs With Railing 3   Basic Mobility Inpatient Raw Score 21   Basic Mobility Standardized Score 45.55   [facility] Highest Level Of Mobility   -HLM Goal 6: Walk 10 steps or more   -HLM Achieved 7: Walk 25 feet or more   End of Consult   Patient Position at End of Consult Bedside chair;Bed/Chair alarm activated;All needs within reach         The patient's AM-PAC Basic Mobility Inpatient Short Form Raw Score is 21, Standardized Score is 45.55. A standardized score greater than 38.32 (raw score of 16) suggests the patient may benefit from discharge to home which may not coincide with above PT recommendations. However please refer to therapist recommendation for discharge planning given other factors that may influence destination.    Given the above findings from this evaluation, at this time this patient does not require skilled inpatient PT for the remainder of this admission. Will D/C patient from PT caseload, please reconsult if any changes or needs arise.      Kala Navas PT, DPT

## 2024-11-02 VITALS
DIASTOLIC BLOOD PRESSURE: 89 MMHG | RESPIRATION RATE: 19 BRPM | WEIGHT: 198.2 LBS | TEMPERATURE: 97.8 F | HEART RATE: 92 BPM | BODY MASS INDEX: 30.04 KG/M2 | OXYGEN SATURATION: 96 % | SYSTOLIC BLOOD PRESSURE: 151 MMHG | HEIGHT: 68 IN

## 2024-11-02 LAB
ANION GAP SERPL CALCULATED.3IONS-SCNC: 8 MMOL/L (ref 4–13)
BUN SERPL-MCNC: 42 MG/DL (ref 5–25)
CALCIUM SERPL-MCNC: 8.8 MG/DL (ref 8.4–10.2)
CHLORIDE SERPL-SCNC: 99 MMOL/L (ref 96–108)
CO2 SERPL-SCNC: 25 MMOL/L (ref 21–32)
CREAT SERPL-MCNC: 1.29 MG/DL (ref 0.6–1.3)
DME PARACHUTE DELIVERY DATE REQUESTED: NORMAL
DME PARACHUTE DELIVERY NOTE: NORMAL
DME PARACHUTE ITEM DESCRIPTION: NORMAL
DME PARACHUTE ORDER STATUS: NORMAL
DME PARACHUTE SUPPLIER NAME: NORMAL
DME PARACHUTE SUPPLIER PHONE: NORMAL
ERYTHROCYTE [DISTWIDTH] IN BLOOD BY AUTOMATED COUNT: 12.8 % (ref 11.6–15.1)
GFR SERPL CREATININE-BSD FRML MDRD: 47 ML/MIN/1.73SQ M
GLUCOSE SERPL-MCNC: 107 MG/DL (ref 65–140)
HCT VFR BLD AUTO: 35.6 % (ref 36.5–49.3)
HGB BLD-MCNC: 11.7 G/DL (ref 12–17)
MCH RBC QN AUTO: 32.5 PG (ref 26.8–34.3)
MCHC RBC AUTO-ENTMCNC: 32.9 G/DL (ref 31.4–37.4)
MCV RBC AUTO: 99 FL (ref 82–98)
PLATELET # BLD AUTO: 302 THOUSANDS/UL (ref 149–390)
PMV BLD AUTO: 10.4 FL (ref 8.9–12.7)
POTASSIUM SERPL-SCNC: 5 MMOL/L (ref 3.5–5.3)
RBC # BLD AUTO: 3.6 MILLION/UL (ref 3.88–5.62)
SODIUM SERPL-SCNC: 132 MMOL/L (ref 135–147)
WBC # BLD AUTO: 12.39 THOUSAND/UL (ref 4.31–10.16)

## 2024-11-02 PROCEDURE — 99239 HOSP IP/OBS DSCHRG MGMT >30: CPT | Performed by: STUDENT IN AN ORGANIZED HEALTH CARE EDUCATION/TRAINING PROGRAM

## 2024-11-02 PROCEDURE — 99233 SBSQ HOSP IP/OBS HIGH 50: CPT | Performed by: STUDENT IN AN ORGANIZED HEALTH CARE EDUCATION/TRAINING PROGRAM

## 2024-11-02 PROCEDURE — 94761 N-INVAS EAR/PLS OXIMETRY MLT: CPT

## 2024-11-02 PROCEDURE — 85027 COMPLETE CBC AUTOMATED: CPT | Performed by: STUDENT IN AN ORGANIZED HEALTH CARE EDUCATION/TRAINING PROGRAM

## 2024-11-02 PROCEDURE — 80048 BASIC METABOLIC PNL TOTAL CA: CPT | Performed by: STUDENT IN AN ORGANIZED HEALTH CARE EDUCATION/TRAINING PROGRAM

## 2024-11-02 RX ORDER — METHYLPREDNISOLONE 4 MG/1
TABLET ORAL
Qty: 1 EACH | Refills: 0 | Status: SHIPPED | OUTPATIENT
Start: 2024-11-02 | End: 2024-11-11

## 2024-11-02 RX ORDER — GUAIFENESIN 600 MG/1
1200 TABLET, EXTENDED RELEASE ORAL EVERY 12 HOURS SCHEDULED
Qty: 60 TABLET | Refills: 0 | Status: ON HOLD | OUTPATIENT
Start: 2024-11-02 | End: 2024-11-17

## 2024-11-02 RX ORDER — OSELTAMIVIR PHOSPHATE 30 MG/1
30 CAPSULE ORAL EVERY 12 HOURS SCHEDULED
Qty: 3 CAPSULE | Refills: 0 | Status: SHIPPED | OUTPATIENT
Start: 2024-11-02 | End: 2024-11-04

## 2024-11-02 RX ORDER — GABAPENTIN 100 MG/1
100 CAPSULE ORAL 2 TIMES DAILY
Status: ON HOLD
Start: 2024-11-02

## 2024-11-02 RX ORDER — BENZONATATE 100 MG/1
100 CAPSULE ORAL 3 TIMES DAILY PRN
Qty: 20 CAPSULE | Refills: 0 | Status: SHIPPED | OUTPATIENT
Start: 2024-11-02 | End: 2024-11-11

## 2024-11-02 RX ADMIN — ALLOPURINOL 100 MG: 100 TABLET ORAL at 09:17

## 2024-11-02 RX ADMIN — BRIMONIDINE TARTRATE 1 DROP: 2 SOLUTION/ DROPS OPHTHALMIC at 09:18

## 2024-11-02 RX ADMIN — DEXAMETHASONE SODIUM PHOSPHATE 6 MG: 10 INJECTION, SOLUTION INTRAMUSCULAR; INTRAVENOUS at 11:17

## 2024-11-02 RX ADMIN — BISOPROLOL FUMARATE 2.5 MG: 5 TABLET, FILM COATED ORAL at 10:00

## 2024-11-02 RX ADMIN — GABAPENTIN 100 MG: 100 CAPSULE ORAL at 09:17

## 2024-11-02 RX ADMIN — PANTOPRAZOLE SODIUM 20 MG: 20 TABLET, DELAYED RELEASE ORAL at 05:26

## 2024-11-02 RX ADMIN — OSELTAMIVIR PHOSPHATE 30 MG: 30 CAPSULE ORAL at 09:17

## 2024-11-02 RX ADMIN — GUAIFENESIN 600 MG: 600 TABLET ORAL at 09:17

## 2024-11-02 RX ADMIN — FINASTERIDE 5 MG: 5 TABLET, FILM COATED ORAL at 09:17

## 2024-11-02 RX ADMIN — DORZOLAMIDE HYDROCHLORIDE AND TIMOLOL MALEATE 1 DROP: 20; 5 SOLUTION/ DROPS OPHTHALMIC at 09:18

## 2024-11-02 RX ADMIN — REMDESIVIR 100 MG: 100 INJECTION, POWDER, LYOPHILIZED, FOR SOLUTION INTRAVENOUS at 12:00

## 2024-11-02 RX ADMIN — SPIRONOLACTONE 25 MG: 25 TABLET ORAL at 09:17

## 2024-11-02 RX ADMIN — APIXABAN 2.5 MG: 2.5 TABLET, FILM COATED ORAL at 09:17

## 2024-11-02 NOTE — NURSING NOTE
The bed alarm is not working for the patient in room 327. The four side rails are activated to prevent the patient from falling out of bed. I told the patient to ring his call bell if he needs assistance.

## 2024-11-02 NOTE — PROGRESS NOTES
Patient:    MRN:  [id number]    Becka Request ID:  [id number]    Level of care reserved:  Home Health Agency    Partner Reserved:  [place] [address] [phone]    Clinical needs requested:  chf, physical therapy, occupational therapy    Geography searched:  [phone]    Start of Service:    Request sent:  4:27pm EDT on 11/1/2024 by Usman Moise    Partner reserved:  10:35am EDT on 11/2/2024 by Arabella Grey    Choice list shared:  10:32am EDT on 11/2/2024 by Arabella Grey

## 2024-11-02 NOTE — NURSING NOTE
Please disregard the previous message about the bed alarm for the patient in room 327. The bed alarm is now working.

## 2024-11-02 NOTE — RESPIRATORY THERAPY NOTE
Home Oxygen Qualifying Test     Patient name: Jose Mullins        : 7/15/1931   Date of Test:  2024  Diagnosis:    Home Oxygen Test:    **Medicare Guidelines require item(s) 1-5 on all ambulatory patients or 1 and 2 on non-ambulatory patients.    1. Baseline SPO2 on Room Air at rest 98 %   If <= 88% on Room Air add O2 via NC to obtain SpO2 >=88%. If LPM needed, document LPM  needed to reach =>88%    SPO2 during exertion on Room Air 81  %  During exertion monitor SPO2. If SPO2 increases >=89%, do not add supplemental oxygen    SPO2 on Oxygen at Rest 92 % at 4 LPM    SPO2 during exertion on Oxygen 91 % at 4 LPM    Test performed during exertion activity.      [x]  Supplemental Home Oxygen is indicated.    []  Client does not qualify for home oxygen.    Respiratory Additional Notes-    Ronnie Aguero, RT

## 2024-11-02 NOTE — PROGRESS NOTES
Progress Note - Hospitalist   Name: Jose Mullins 89 y.o. male I MRN: [id number]  Unit/Bed#: -01 I Date of Admission: 10/30/2024   Date of Service: 11/2/2024 I Hospital Day: 3    Assessment & Plan  Viral sepsis (HCC)  Patient presenting due to cough and shortness of breath    In the ED patient meeting SIRS criteria with tachycardia and tachypnea  Source of infection noted to be viral in nature with being COVID and flu positive  Lactic acid negative  Procalcitonin negative  Patient not meeting any other endorgan dysfunction    Patient meeting mild COVID protocol  CRP increasing  D-dimer slightly elevated-> pt on Eliquis  CK WNL  continue decadron and remdesivir day 3  Continue tamiflu day 3  Supportive treatment    Chronic diastolic congestive heart failure (HCC)  Wt Readings from Last 3 Encounters:   11/02/24 89.9 kg (198 lb 3.2 oz)   10/24/24 92.9 kg (204 lb 12.8 oz)   10/01/24 92.1 kg (203 lb)     BNP on admission 275 similar to previous on 10/4/24->223  CXR showing mild pulm edema  Pt without weight gain  Recent echo done on 12/21/2023 showing an LVEF of 55 to 60% with mild diastolic dysfunction mildly dilated right ventricle mildly reduced right ventricular systolic function mild biatrial enlargement mild pulmonary hypertension moderate aortic sclerosis  Patient examining euvolemic at this time do not believe patient is in an acute exacerbation  Continue to monitor respiratory status  Cardiac low-salt diet  I's and O's  Daily weights  Anemia of chronic disease  Hemoglobin on admission 10.6  Baseline appears to be between 10 and 11  Most likely secondary to CKD  Continue to trend  Transfuse when less than 7  Benign essential hypertension  Continue home regimen of amlodipine bisoprolol and Aldactone however at higher parameters due to sepsis  Stage 3a chronic kidney disease (HCC)  Lab Results   Component Value Date    EGFR 47 11/02/2024    EGFR 46 11/01/2024    EGFR 43 10/31/2024    CREATININE 1.29  11/02/2024    CREATININE 1.31 (H) 11/01/2024    CREATININE 1.40 (H) 10/31/2024     Creatinine on admission 1.40  Baseline appears to be around 1.1-1.3  Patient not meeting KDIGO criteria for FRANCO  Continue to trend  Avoid nephrotoxic agents hypotension and contrast  Will continue Aldactone at this time however at higher parameters due to sepsis  Hold jardiance   I's and O's  Urine retention protocol  Bladder scan  Continue to trend  Back to baseline  Permanent atrial fibrillation (HCC)  RC bisoprolol  AC Eliquis  Continue  Acute respiratory insufficiency  Patient requiring 2 L nasal cannula on admission  Most likely secondary to viral sepsis secondary to flu and COVID infection  Wean as tolerated  Supportive treatment  Wean to RA    Hyponatremia  Improving  Fluid restriction 1800  Na stable  Continue to trend    VTE Pharmacologic Prophylaxis: VTE Score: 9 High Risk (Score >/= 5) - Pharmacological DVT Prophylaxis Ordered: apixaban (Eliquis). Sequential Compression Devices Ordered.    Mobility:   Basic Mobility Inpatient Raw Score: 21  JH-HLM Goal: 6: Walk 10 steps or more  JH-HLM Achieved: 5: Stand (1 or more minutes)  JH-HLM Goal NOT achieved. Continue with multidisciplinary rounding and encourage appropriate mobility to improve upon JH-HLM goals.    Patient Centered Rounds: I performed bedside rounds with nursing staff today.   Discussions with Specialists or Other Care Team Provider: CM, Pt, OT    Education and Discussions with Family / Patient: Updated  (daughter) at bedside.    Current Length of Stay: 3 day(s)  Current Patient Status: Inpatient   Certification Statement: The patient will continue to require additional inpatient hospital stay due to sepsis  Discharge Plan: Anticipate discharge in >72 hrs to home.    Code Status: Level 3 - DNAR and DNI    Subjective   Jose was seen and examined at bedside.  No acute events overnight.  Discussed plan of care.  All questions and concerns were answered  and addressed.  Patient continues to feel improved.  Patient continues to be on room air overnight.  Patient desats when walking.  Will continue Decadron remdesivir and Tamiflu.    Objective :  Temp:  [97.2 °F (36.2 °C)-97.8 °F (36.6 °C)] 97.8 °F (36.6 °C)  HR:  [79-92] 92  BP: (137-151)/(70-89) 151/89  Resp:  [18-19] 19  SpO2:  [94 %-97 %] 96 %    Body mass index is 30.14 kg/m².     Input and Output Summary (last 24 hours):     Intake/Output Summary (Last 24 hours) at 11/2/2024 1017  Last data filed at 11/2/2024 0931  Gross per 24 hour   Intake 1556 ml   Output 855 ml   Net 701 ml       Physical Exam  Vitals and nursing note reviewed.   Constitutional:       General: He is not in acute distress.     Appearance: He is obese. He is ill-appearing.   HENT:      Head: Normocephalic and atraumatic.   Cardiovascular:      Rate and Rhythm: Normal rate. Rhythm irregular.      Pulses: Normal pulses.      Heart sounds: Normal heart sounds.   Pulmonary:      Effort: Pulmonary effort is normal.      Breath sounds: Normal     Comments:RA  Abdominal:      General: Abdomen is flat. Bowel sounds are normal.      Palpations: Abdomen is soft.   Musculoskeletal:      Right lower leg: No edema.      Left lower leg: No edema.   Skin:     General: Skin is warm.   Neurological:      General: No focal deficit present.      Mental Status: He is alert and oriented to person, place, and time.    Lines/Drains:              Lab Results: I have reviewed the following results:   Results from last 7 days   Lab Units 11/02/24  0523 11/01/24  0509   WBC Thousand/uL 12.39* 11.36*   HEMOGLOBIN g/dL 11.7* 10.3*   HEMATOCRIT % 35.6* 32.3*   PLATELETS Thousands/uL 302 290   SEGS PCT %  --  86*   LYMPHO PCT %  --  7*   MONO PCT %  --  6   EOS PCT %  --  0     Results from last 7 days   Lab Units 11/02/24  0523 11/01/24  0509   SODIUM mmol/L 132* 134*   POTASSIUM mmol/L 5.0 4.5   CHLORIDE mmol/L 99 101   CO2 mmol/L 25 24   BUN mg/dL 42* 36*   CREATININE  mg/dL 1.29 1.31*   ANION GAP mmol/L 8 9   CALCIUM mg/dL 8.8 8.8   ALBUMIN g/dL  --  4.0   TOTAL BILIRUBIN mg/dL  --  0.35   ALK PHOS U/L  --  65   ALT U/L  --  17   AST U/L  --  19   GLUCOSE RANDOM mg/dL 107 119     Results from last 7 days   Lab Units 10/30/24  0929   INR  1.21*             Results from last 7 days   Lab Units 10/31/24  0339 10/30/24  0929   LACTIC ACID mmol/L  --  1.4   PROCALCITONIN ng/ml 0.12 0.09       Recent Cultures (last 7 days):   Results from last 7 days   Lab Units 10/30/24  0950 10/30/24  0947   BLOOD CULTURE  Staphylococcus coagulase negative* No Growth at 48 hrs.   GRAM STAIN RESULT  Gram positive cocci in clusters*  --        Imaging Results Review: No pertinent imaging studies reviewed.  Other Study Results Review: No additional pertinent studies reviewed.    Last 24 Hours Medication List:     Current Facility-Administered Medications:     acetaminophen (TYLENOL) tablet 650 mg, Q6H PRN    allopurinol (ZYLOPRIM) tablet 100 mg, Daily    aluminum-magnesium hydroxide-simethicone (MAALOX) oral suspension 30 mL, Q6H PRN    amitriptyline (ELAVIL) tablet 30 mg, HS    amLODIPine (NORVASC) tablet 5 mg, HS    apixaban (ELIQUIS) tablet 2.5 mg, BID    benzonatate (TESSALON PERLES) capsule 100 mg, TID PRN    bisoprolol (ZEBETA) tablet 2.5 mg, Daily    brimonidine tartrate 0.2 % ophthalmic solution 1 drop, BID    dexamethasone (PF) (DECADRON) injection 6 mg, Q24H    dorzolamide-timolol (COSOPT) 2-0.5 % ophthalmic solution 1 drop, BID    finasteride (PROSCAR) tablet 5 mg, Daily    gabapentin (NEURONTIN) capsule 100 mg, BID    guaiFENesin (MUCINEX) 12 hr tablet 600 mg, Q12H SHERWIN    latanoprost (XALATAN) 0.005 % ophthalmic solution 1 drop, HS    levalbuterol (XOPENEX) inhalation solution 0.63 mg, Q6H PRN    Netarsudil Dimesylate 0.02 % SOLN 1 drop, Daily    ondansetron (ZOFRAN) injection 4 mg, Q4H PRN    oseltamivir (TAMIFLU) capsule 30 mg, Q12H SHERWIN    pantoprazole (PROTONIX) EC tablet 20 mg, Early  Morning    polyethylene glycol (MIRALAX) packet 17 g, Daily PRN    [COMPLETED] remdesivir (Veklury) 200 mg in sodium chloride 0.9 % 290 mL IVPB, Q24H **FOLLOWED BY** remdesivir (Veklury) 100 mg in sodium chloride 0.9 % 270 mL IVPB, Q24H    spironolactone (ALDACTONE) tablet 25 mg, Daily    Administrative Statements   Today, Patient Was Seen By: Helga Lester MD  I have spent a total time of 54 minutes in caring for this patient on the day of the visit/encounter including Diagnostic results, Prognosis, Risks and benefits of tx options, Instructions for management, Patient and family education, Importance of tx compliance, Risk factor reductions, Impressions, Counseling / Coordination of care, Documenting in the medical record, Reviewing / ordering tests, medicine, procedures  , and Obtaining or reviewing history  .    **Please Note: This note may have been constructed using a voice recognition system.**

## 2024-11-02 NOTE — ASSESSMENT & PLAN NOTE
Patient presenting due to cough and shortness of breath    In the ED patient meeting SIRS criteria with tachycardia and tachypnea  Source of infection noted to be viral in nature with being COVID and flu positive  Lactic acid negative  Procalcitonin negative  Patient not meeting any other endorgan dysfunction    Patient meeting mild COVID protocol  CRP increasing  D-dimer slightly elevated-> pt on Eliquis  CK WNL  continue decadron and remdesivir day 3  Continue tamiflu day 3  Supportive treatment  1/2 BC + most likely contaminant as 2nd BC NGTD @ 4 days    F/u with PCP and will be discharged with remainder of tamiflu and medrol dose imelda

## 2024-11-02 NOTE — PLAN OF CARE
Problem: PAIN - ADULT  Goal: Verbalizes/displays adequate comfort level or baseline comfort level  Description: Interventions:  - Encourage patient to monitor pain and request assistance  - Assess pain using appropriate pain scale  - Administer analgesics based on type and severity of pain and evaluate response  - Implement non-pharmacological measures as appropriate and evaluate response  - Consider cultural and social influences on pain and pain management  - Notify physician/advanced practitioner if interventions unsuccessful or patient reports new pain  Outcome: Progressing     Problem: INFECTION - ADULT  Goal: Absence or prevention of progression during hospitalization  Description: INTERVENTIONS:  - Assess and monitor for signs and symptoms of infection  - Monitor lab/diagnostic results  - Monitor all insertion sites, i.e. indwelling lines, tubes, and drains  - Monitor endotracheal if appropriate and nasal secretions for changes in amount and color  - [place] appropriate cooling/warming therapies per order  - Administer medications as ordered  - Instruct and encourage patient and family to use good hand hygiene technique  - Identify and instruct in appropriate isolation precautions for identified infection/condition  Outcome: Progressing     Problem: SAFETY ADULT  Goal: Patient will remain free of falls  Description: INTERVENTIONS:  - Educate patient/family on patient safety including physical limitations  - Instruct patient to call for assistance with activity   - Consult OT/PT to assist with strengthening/mobility   - Keep Call bell within reach  - Keep bed low and locked with side rails adjusted as appropriate  - Keep care items and personal belongings within reach  - Initiate and maintain comfort rounds  - Make Fall Risk Sign visible to staff  - Offer Toileting every 2 Hours, in advance of need  - Initiate/Maintain 2 alarm  - Obtain necessary fall risk management equipment: 2  - Apply yellow socks and  bracelet for high fall risk patients  - Consider moving patient to room near nurses station  Outcome: Progressing     Problem: DISCHARGE PLANNING  Goal: Discharge to home or other facility with appropriate resources  Description: INTERVENTIONS:  - Identify barriers to discharge w/patient and caregiver  - Arrange for needed discharge resources and transportation as appropriate  - Identify discharge learning needs (meds, wound care, etc.)  - Arrange for interpretive services to assist at discharge as needed  - Refer to Case Management Department for coordinating discharge planning if the patient needs post-hospital services based on physician/advanced practitioner order or complex needs related to functional status, cognitive ability, or social support system  Outcome: Progressing

## 2024-11-02 NOTE — PLAN OF CARE
Problem: PAIN - ADULT  Goal: Verbalizes/displays adequate comfort level or baseline comfort level  Description: Interventions:  - Encourage patient to monitor pain and request assistance  - Assess pain using appropriate pain scale  - Administer analgesics based on type and severity of pain and evaluate response  - Implement non-pharmacological measures as appropriate and evaluate response  - Consider cultural and social influences on pain and pain management  - Notify physician/advanced practitioner if interventions unsuccessful or patient reports new pain  Outcome: Progressing     Problem: INFECTION - ADULT  Goal: Absence or prevention of progression during hospitalization  Description: INTERVENTIONS:  - Assess and monitor for signs and symptoms of infection  - Monitor lab/diagnostic results  - Monitor all insertion sites, i.e. indwelling lines, tubes, and drains  - Monitor endotracheal if appropriate and nasal secretions for changes in amount and color  - [place] appropriate cooling/warming therapies per order  - Administer medications as ordered  - Instruct and encourage patient and family to use good hand hygiene technique  - Identify and instruct in appropriate isolation precautions for identified infection/condition  Outcome: Progressing  Goal: Absence of fever/infection during neutropenic period  Description: INTERVENTIONS:  - Monitor WBC    Outcome: Progressing     Problem: SAFETY ADULT  Goal: Patient will remain free of falls  Description: INTERVENTIONS:  - Educate patient/family on patient safety including physical limitations  - Instruct patient to call for assistance with activity   - Consult OT/PT to assist with strengthening/mobility   - Keep Call bell within reach  - Keep bed low and locked with side rails adjusted as appropriate  - Keep care items and personal belongings within reach  - Initiate and maintain comfort rounds  - Make Fall Risk Sign visible to staff  - Offer Toileting every 2 Hours,  in advance of need  - Initiate/Maintain shift alarm  - Obtain necessary fall risk management equipment: socks   - Apply yellow socks and bracelet for high fall risk patients  - Consider moving patient to room near nurses station  Outcome: Progressing  Goal: Maintain or return to baseline ADL function  Description: INTERVENTIONS:  -  Assess patient's ability to carry out ADLs; assess patient's baseline for ADL function and identify physical deficits which impact ability to perform ADLs (bathing, care of mouth/teeth, toileting, grooming, dressing, etc.)  - Assess/evaluate cause of self-care deficits   - Assess range of motion  - Assess patient's mobility; develop plan if impaired  - Assess patient's need for assistive devices and provide as appropriate  - Encourage maximum independence but intervene and supervise when necessary  - Involve family in performance of ADLs  - Assess for home care needs following discharge   - Consider OT consult to assist with ADL evaluation and planning for discharge  - Provide patient education as appropriate  Outcome: Progressing  Goal: Maintains/Returns to pre admission functional level  Description: INTERVENTIONS:  - Perform AM-PAC 6 Click Basic Mobility/ Daily Activity assessment daily.  - Set and communicate daily mobility goal to care team and patient/family/caregiver.   - Collaborate with rehabilitation services on mobility goals if consulted  - Perform Range of Motion 2 times a day.  - Reposition patient every 2 hours.  - Dangle patient 2 times a day  - Stand patient 2 times a day  - Ambulate patient 2 times a day  - Out of bed to chair 2 times a day   - Out of bed for meals 2 times a day  - Out of bed for toileting  - Record patient progress and toleration of activity level   Outcome: Progressing     Problem: DISCHARGE PLANNING  Goal: Discharge to home or other facility with appropriate resources  Description: INTERVENTIONS:  - Identify barriers to discharge w/patient and  caregiver  - Arrange for needed discharge resources and transportation as appropriate  - Identify discharge learning needs (meds, wound care, etc.)  - Arrange for interpretive services to assist at discharge as needed  - Refer to Case Management Department for coordinating discharge planning if the patient needs post-hospital services based on physician/advanced practitioner order or complex needs related to functional status, cognitive ability, or social support system  Outcome: Progressing     Problem: Knowledge Deficit  Goal: Patient/family/caregiver demonstrates understanding of disease process, treatment plan, medications, and discharge instructions  Description: Complete learning assessment and assess knowledge base.  Interventions:  - Provide teaching at level of understanding  - Provide teaching via preferred learning methods  Outcome: Progressing     Problem: CARDIOVASCULAR - ADULT  Goal: Maintains optimal cardiac output and hemodynamic stability  Description: INTERVENTIONS:  - Monitor I/O, vital signs and rhythm  - Monitor for S/S and trends of decreased cardiac output  - Administer and titrate ordered vasoactive medications to optimize hemodynamic stability  - Assess quality of pulses, skin color and temperature  - Assess for signs of decreased coronary artery perfusion  - Instruct patient to report change in severity of symptoms  Outcome: Progressing  Goal: Absence of cardiac dysrhythmias or at baseline rhythm  Description: INTERVENTIONS:  - Continuous cardiac monitoring, vital signs, obtain 12 lead EKG if ordered  - Administer antiarrhythmic and heart rate control medications as ordered  - Monitor electrolytes and administer replacement therapy as ordered  Outcome: Progressing     Problem: RESPIRATORY - ADULT  Goal: Achieves optimal ventilation and oxygenation  Description: INTERVENTIONS:  - Assess for changes in respiratory status  - Assess for changes in mentation and behavior  - Position to  facilitate oxygenation and minimize respiratory effort  - Oxygen administered by appropriate delivery if ordered  - Initiate smoking cessation education as indicated  - Encourage broncho-pulmonary hygiene including cough, deep breathe, Incentive Spirometry  - Assess the need for suctioning and aspirate as needed  - Assess and instruct to report SOB or any respiratory difficulty  - Respiratory Therapy support as indicated  Outcome: Progressing     Problem: Prexisting or High Potential for Compromised Skin Integrity  Goal: Skin integrity is maintained or improved  Description: INTERVENTIONS:  - Identify patients at risk for skin breakdown  - Assess and monitor skin integrity  - Assess and monitor nutrition and hydration status  - Monitor labs   - Assess for incontinence   - Turn and reposition patient  - Assist with mobility/ambulation  - Relieve pressure over bony prominences  - Avoid friction and shearing  - Provide appropriate hygiene as needed including keeping skin clean and dry  - Evaluate need for skin moisturizer/barrier cream  - Collaborate with interdisciplinary team   - Patient/family teaching  - Consider wound care consult   Outcome: Progressing

## 2024-11-02 NOTE — ASSESSMENT & PLAN NOTE
Patient requiring 2 L nasal cannula on admission  Most likely secondary to viral sepsis secondary to flu and COVID infection  Wean as tolerated  Supportive treatment  Weaned to RA   Home O2 eval

## 2024-11-02 NOTE — CASE MANAGEMENT
Case Management Discharge Planning Note    Patient name Jose Mullins  Location /-01 MRN [id number]  : 7/15/1931 Date 2024       Current Admission Date: 10/30/2024  Current Admission Diagnosis:Viral sepsis (HCC)   Patient Active Problem List    Diagnosis Date Noted Date Diagnosed    Acute respiratory insufficiency 10/30/2024     Chronic diastolic congestive heart failure (HCC) 10/24/2024     Primary osteoarthritis of left knee 2024     Other headache syndrome 2024     Episodic tension-type headache, not intractable 2024     Dermatitis 2023     Elevated TSH 2023     Corkscrew esophagus 2022     Other dysphagia 2022     Frontal headache 2022     Ambulatory dysfunction 2022     Irritable bowel syndrome with diarrhea 2022     Benign prostatic hyperplasia 2021     Pain in both feet 05/15/2020     Carpal tunnel syndrome 2019     Other insomnia 04/15/2019     Anxiety 04/15/2019     Viral sepsis (HCC) 2019     Hyponatremia 2019     Paresthesia 2018     Arthritis of carpometacarpal (CMC) joint of right thumb 2018     Peripheral edema 2018     Stage 3a chronic kidney disease (HCC) 2018     Permanent atrial fibrillation (HCC) 2018     Left lumbar radiculopathy 01/10/2017     Cholecystitis with cholelithiasis 2016     Joint pain, knee 2014     Allergic rhinitis 2013     S/P ablation of ventricular arrhythmia 2013     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HCC) 2012     Anemia of chronic disease 2012     GERD (gastroesophageal reflux disease) 2012     Glaucoma 2012     Hiatal hernia 2012     Other hyperlipidemia 2012     Peripheral vascular disease (HCC) 2012     Benign essential hypertension 2012       LOS (days): 3  Geometric Mean LOS (GMLOS) (days): 4.9  Days to GMLOS:1.8     OBJECTIVE:  Risk of Unplanned Readmission Score: 20.82          Current admission status: Inpatient   Preferred Pharmacy:   [facility]/pharmacy #[id number] - [place] - [address]  [address]  [place] [address]  Phone: [phone] Fax: [phone]    [facility]  [address]  [place] [address]  Phone: [phone] Fax: [phone]    Primary Care Provider: Moises Ham MD    Primary Insurance: [facility]  Secondary Insurance:     DISCHARGE DETAILS:        CM received approval to deliver portable O2 tank to pt. CM delivered portable tank from closet stock to pt in his room. Pt signed delivery ticket. Delivery ticket placed in [employer]Sol folder. CM updated [place].

## 2024-11-02 NOTE — DISCHARGE INSTR - AVS FIRST PAGE
You are to follow-up with your PCP in 1 week    You will be discharged with the following:  Tamiflu 30 mg to be taken twice a day to complete a total of 5 days  Medrol dose pack to be taken as written  You may take Tessalon Perles 100 mg up to 3 times a day as needed for cough  You may take Mucinex 1200 mg twice a day for 15 days      You are to continue a fluid restriction of 1.8 L      You are to complete blood work the day prior to seeing your PCP

## 2024-11-02 NOTE — DISCHARGE SUMMARY
Discharge Summary - Hospitalist   Name: Jose Mullins 89 y.o. male I MRN: [id number]  Unit/Bed#: -01 I Date of Admission: 10/30/2024   Date of Service: 11/2/2024 I Hospital Day: 3     Assessment & Plan  Viral sepsis (HCC)  Patient presenting due to cough and shortness of breath    In the ED patient meeting SIRS criteria with tachycardia and tachypnea  Source of infection noted to be viral in nature with being COVID and flu positive  Lactic acid negative  Procalcitonin negative  Patient not meeting any other endorgan dysfunction    Patient meeting mild COVID protocol  CRP increasing  D-dimer slightly elevated-> pt on Eliquis  CK WNL  continue decadron and remdesivir day 3  Continue tamiflu day 3  Supportive treatment  1/2 BC + most likely contaminant as 2nd BC NGTD @ 4 days    F/u with PCP and will be discharged with remainder of tamiflu and medrol dose imelda  Chronic diastolic congestive heart failure (HCC)  Wt Readings from Last 3 Encounters:   11/02/24 89.9 kg (198 lb 3.2 oz)   10/24/24 92.9 kg (204 lb 12.8 oz)   10/01/24 92.1 kg (203 lb)     BNP on admission 275 similar to previous on 10/4/24->223  CXR showing mild pulm edema  Pt without weight gain  Recent echo done on 12/21/2023 showing an LVEF of 55 to 60% with mild diastolic dysfunction mildly dilated right ventricle mildly reduced right ventricular systolic function mild biatrial enlargement mild pulmonary hypertension moderate aortic sclerosis  Patient examining euvolemic at this time do not believe patient is in an acute exacerbation  Continue to monitor respiratory status  Cardiac low-salt diet  I's and O's  Daily weights  Anemia of chronic disease  Hemoglobin on admission 10.6  Baseline appears to be between 10 and 11  Most likely secondary to CKD  Continue to trend  Transfuse when less than 7  Benign essential hypertension  Continue home regimen of amlodipine bisoprolol and Aldactone however at higher parameters due to sepsis  Stage 3a chronic  kidney disease (HCC)  Lab Results   Component Value Date    EGFR 47 11/02/2024    EGFR 46 11/01/2024    EGFR 43 10/31/2024    CREATININE 1.29 11/02/2024    CREATININE 1.31 (H) 11/01/2024    CREATININE 1.40 (H) 10/31/2024     Creatinine on admission 1.40  Baseline appears to be around 1.1-1.3  Patient not meeting KDIGO criteria for [place]  Continue to trend  Avoid nephrotoxic agents hypotension and contrast  Will continue Aldactone at this time however at higher parameters due to sepsis  Hold jardiance   I's and O's  Urine retention protocol  Bladder scan  Continue to trend  Back to baseline  Permanent atrial fibrillation (HCC)  RC bisoprolol  AC Eliquis  Continue  Acute respiratory insufficiency  Patient requiring 2 L nasal cannula on admission  Most likely secondary to viral sepsis secondary to flu and COVID infection  Wean as tolerated  Supportive treatment  Weaned to RA   Home O2 eval    Hyponatremia  Improving  Fluid restriction 1800 on discharge  Continue to trend     Medical Problems       Resolved Problems  Date Reviewed: 10/24/2024   None       Discharging Physician / Practitioner: Helga Lester MD  PCP: Moises Ham MD  Admission Date:   Admission Orders (From admission, onward)       Ordered        10/30/24 1022  INPATIENT ADMISSION  Once                          Discharge Date: 11/02/24    Consultations During Hospital Stay:  CM  Pt  OT    Procedures Performed:   XR chest 1 view portable   Final Result      Findings suggest mild CHF. Possible small left pleural effusion.            Workstation performed: [id number]               Significant Findings / Test Results:   none    Incidental Findings:   none       Test Results Pending at Discharge (will require follow up):   none     Outpatient Tests Requested:  BMP in 1 week    Complications:  none known at this time    Reason for Admission: viral sepsis    Hospital Course:   Jose Mullins is a 89 y.o. male patient who originally presented to the hospital on  "10/30/2024 due to cough and shortness of breath.  Patient meeting sepsis criteria on admission secondary to COVID and flu.  Patient started on mild COVID protocol and was initiated on Decadron and remdesivir.  For flu.  Patient was initiated on Tamiflu.  Patient also requiring 2 L nasal cannula on admission.  Patient developed slight hyponatremia.  Has remained stable with fluid restriction.  Patient was weaned to room air however continued to desat he at home O2 eval was done prior to discharge and pt qualified for home O2.  Patient is otherwise remained hemodynamically stable afebrile in no acute respiratory distress.  Patient has been medically cleared for discharge.  He is to follow-up with his PCP.  He will continue Tamiflu for the rest of the duration and be discharged on a Medrol Dosepak.  He is to continue with fluid restriction of 1800.  He is to complete a BMP in 1 week    Hospital Course: No notes on file      Please see above list of diagnoses and related plan for additional information.     Condition at Discharge: stable    Discharge Day Visit / Exam:   Subjective:  Jose was seen and examined at bedside.  No acute events overnight.  Discussed plan of care.  All questions and concerns were answered and addressed.  Has no acute complaints at this time.  Is eager to leave.  Is resting well on room air.  Vitals: Blood Pressure: 151/89 (11/02/24 0815)  Pulse: 92 (11/02/24 0815)  Temperature: 97.8 °F (36.6 °C) (11/02/24 0815)  Temp Source: Oral (11/01/24 2100)  Respirations: 19 (11/02/24 0815)  Height: 5' 8\" (172.7 cm) (10/30/24 1802)  Weight - Scale: 89.9 kg (198 lb 3.2 oz) (11/02/24 0531)  SpO2: 96 % (11/02/24 0815)  Physical Exam   Vitals and nursing note reviewed.   Constitutional:       General: He is not in acute distress.     Appearance: He is obese. He is ill-appearing.   HENT:      Head: Normocephalic and atraumatic.   Cardiovascular:      Rate and Rhythm: Normal rate. Rhythm irregular.      " Pulses: Normal pulses.      Heart sounds: Normal heart sounds.   Pulmonary:      Effort: Pulmonary effort is normal.      Breath sounds: Normal.     Comments: Room air  Abdominal:      General: Abdomen is flat. Bowel sounds are normal.      Palpations: Abdomen is soft.   Musculoskeletal:      Right lower leg: No edema.      Left lower leg: No edema.   Skin:     General: Skin is warm.   Neurological:      General: No focal deficit present.      Mental Status: He is alert and oriented to person, place, and time.    Discussion with Family: Updated  (daughter) at bedside.    Discharge instructions/Information to patient and family:   See after visit summary for information provided to patient and family.      Provisions for Follow-Up Care:  See after visit summary for information related to follow-up care and any pertinent home health orders.      Mobility at time of Discharge:   Basic Mobility Inpatient Raw Score: 21  JH-HLM Goal: 6: Walk 10 steps or more  JH-HLM Achieved: 5: Stand (1 or more minutes)  HLM Goal NOT achieved. Continue to encourage mobility in post discharge setting.     Disposition:   Home with VNA Services (Reminder: Complete face to face encounter)    Planned Readmission: no    Discharge Medications:  See after visit summary for reconciled discharge medications provided to patient and/or family.      Administrative Statements   Discharge Statement:  I have spent a total time of 40 minutes in caring for this patient on the day of the visit/encounter. >30 minutes of time was spent on: Diagnostic results, Prognosis, Risks and benefits of tx options, Instructions for management, Patient and family education, Importance of tx compliance, Risk factor reductions, Impressions, Counseling / Coordination of care, Documenting in the medical record, Reviewing / ordering tests, medicine, procedures  , and Communicating with other healthcare professionals .        **Please Note: This note may have  been constructed using a voice recognition system**

## 2024-11-02 NOTE — CASE MANAGEMENT
Case Management Discharge Planning Note    Patient name Jose Mullins  Location /-01 MRN [id number]  : 7/15/1931 Date 2024       Current Admission Date: 10/30/2024  Current Admission Diagnosis:Viral sepsis (HCC)   Patient Active Problem List    Diagnosis Date Noted Date Diagnosed    Acute respiratory insufficiency 10/30/2024     Chronic diastolic congestive heart failure (HCC) 10/24/2024     Primary osteoarthritis of left knee 2024     Other headache syndrome 2024     Episodic tension-type headache, not intractable 2024     Dermatitis 2023     Elevated TSH 2023     Corkscrew esophagus 2022     Other dysphagia 2022     Frontal headache 2022     Ambulatory dysfunction 2022     Irritable bowel syndrome with diarrhea 2022     Benign prostatic hyperplasia 2021     Pain in both feet 05/15/2020     Carpal tunnel syndrome 2019     Other insomnia 04/15/2019     Anxiety 04/15/2019     Viral sepsis (HCC) 2019     Hyponatremia 2019     Paresthesia 2018     Arthritis of carpometacarpal (CMC) joint of right thumb 2018     Peripheral edema 2018     Stage 3a chronic kidney disease (HCC) 2018     Permanent atrial fibrillation (HCC) 2018     Left lumbar radiculopathy 01/10/2017     Cholecystitis with cholelithiasis 2016     Joint pain, knee 2014     Allergic rhinitis 2013     S/P ablation of ventricular arrhythmia 2013     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HCC) 2012     Anemia of chronic disease 2012     GERD (gastroesophageal reflux disease) 2012     Glaucoma 2012     Hiatal hernia 2012     Other hyperlipidemia 2012     Peripheral vascular disease (HCC) 2012     Benign essential hypertension 2012       LOS (days): 3  Geometric Mean LOS (GMLOS) (days): 4.9  Days to GMLOS:1.9     OBJECTIVE:  Risk of Unplanned Readmission Score: 22.15          Current admission status: Inpatient   Preferred Pharmacy:   [facility]/pharmacy #[id number] - [place] - [address]  [address]  [place] [address]  Phone: [phone] Fax: [phone]    [facility]  [address] [place]  [place] [address]  Phone: [phone] Fax: [phone]    Primary Care Provider: Moises Ham MD    Primary Insurance: [facility]  Secondary Insurance:     DISCHARGE DETAILS:     CM spoke w/ dtr, Jennifer, and reviewed pt choice list for HH. Dtr selected [facility]. [facility] reserved in [place]. CM placed order for home O2 via [place]. Awaiting approval to deliver portable tank.       Requested Home Health Care         Home Health Agency Name:: [facility]  [facility]HA External Referral Reason (only applicable if external HHA name selected): Scheduling access issues

## 2024-11-02 NOTE — ASSESSMENT & PLAN NOTE
Patient presenting due to cough and shortness of breath    In the ED patient meeting SIRS criteria with tachycardia and tachypnea  Source of infection noted to be viral in nature with being COVID and flu positive  Lactic acid negative  Procalcitonin negative  Patient not meeting any other endorgan dysfunction    Patient meeting mild COVID protocol  CRP increasing  D-dimer slightly elevated-> pt on Eliquis  CK WNL  continue decadron and remdesivir day 3  Continue tamiflu day 3  Supportive treatment

## 2024-11-02 NOTE — ASSESSMENT & PLAN NOTE
Patient requiring 2 L nasal cannula on admission  Most likely secondary to viral sepsis secondary to flu and COVID infection  Wean as tolerated  Supportive treatment  Wean to RA

## 2024-11-03 LAB
BACTERIA BLD CULT: ABNORMAL
GRAM STN SPEC: ABNORMAL
S AUREUS+CONS DNA BLD POS NAA+NON-PROBE: DETECTED

## 2024-11-04 ENCOUNTER — TRANSITIONAL CARE MANAGEMENT (OUTPATIENT)
Dept: FAMILY MEDICINE CLINIC | Facility: CLINIC | Age: 89
End: 2024-11-04

## 2024-11-04 DIAGNOSIS — Z71.89 COMPLEX CARE COORDINATION: Primary | ICD-10-CM

## 2024-11-04 LAB — BACTERIA BLD CULT: NORMAL

## 2024-11-04 NOTE — UTILIZATION REVIEW
NOTIFICATION OF ADMISSION DISCHARGE   This is a Notification of Discharge from [facility]. Please be advised that this patient has been discharge from our facility. Below you will find the admission and discharge date and time including the patient’s disposition.   UTILIZATION REVIEW CONTACT:  Lynda Erwin  Utilization   Network Utilization Review Department  Phone: [phone] x carefully listen to the prompts. All voicemails are confidential.  Email: [email or web address]     ADMISSION INFORMATION  PRESENTATION DATE: 10/30/2024  9:10 AM  OBERVATION ADMISSION DATE: N/A  INPATIENT ADMISSION DATE: 10/30/24 10:22 AM   DISCHARGE DATE: 11/2/2024  2:11 PM   DISPOSITION:Home with Home Health Care    Network Utilization Review Department  ATTENTION: Please call with any questions or concerns to [phone] and carefully listen to the prompts so that you are directed to the right person. All voicemails are confidential.   For Discharge needs, contact Care Management DC Support Team at [phone] opt. 2  Send all requests for admission clinical reviews, approved or denied determinations and any other requests to dedicated fax number below belonging to the campus where the patient is receiving treatment. List of dedicated fax numbers for the Facilities:  FACILITY NAME UR FAX NUMBER   ADMISSION DENIALS (Administrative/Medical Necessity) [phone]   DISCHARGE SUPPORT TEAM ([facility]) [phone]   PARENT CHILD HEALTH (Maternity/NICU/Pediatrics) [phone]   [facility] [phone]   General acute hospital [phone]   Pending sale to [facility] [phone]   [facility] [phone]   [facility] [phone]   [facility] [phone]   [facility] [phone]   [facility]  [place] [phone]   [facility] [phone]   [facility] [phone]   [facility] [phone]   [facility] [phone]

## 2024-11-05 ENCOUNTER — RA CDI HCC (OUTPATIENT)
Dept: OTHER | Facility: HOSPITAL | Age: 89
End: 2024-11-05

## 2024-11-05 ENCOUNTER — TELEPHONE (OUTPATIENT)
Age: 89
End: 2024-11-05

## 2024-11-05 ENCOUNTER — PATIENT OUTREACH (OUTPATIENT)
Dept: CASE MANAGEMENT | Facility: OTHER | Age: 89
End: 2024-11-05

## 2024-11-05 NOTE — TELEPHONE ENCOUNTER
Patient's daughter called to discuss 1800 fluid restriction on discharge from hospital.  She wants to make sure patient will not dehydrate.  Last night she said patient has 725 mL urine output and that is half of what he usually has at night.  She knows he is drinking less but wanted to make sure he about dehydration.    [facility] scheduled 11/13    Thank you

## 2024-11-05 NOTE — PROGRESS NOTES
Outpatient Care Management note- CM referral.  Call initiated to Jennifer vo and CM role explained.    Patient with h/o HTN, glaucoma, HLD, CHF, PVD, CKD3, Afib- on Eliquis s/p ablation, BPH, anxiety, anemia. Most recent Echo 10/21/23 55-60% EF.     Jose seen in ED on 10/30 with dry, NP cough and worsening SOB. Patient was hypoxic on arrival- O2 sat 88% on RA. He also had audible wheezing. Patient placed on 2L O2 with improvement. Patient tested positive for Covid and Influenza. Patient was given Decadron, Nebulizer treatments, Mucinex, Tessalon Perles and Tamiflu in ED. Jose was also tachycardic and tachypneic but lactic acid and procalcitonin WNL. No acute CHF exacerbation was noted despite CXR showing mild pulmonary edema and BNP was similar to prior value. Patient was discharged on 11/2 with recommendation to follow 1800 ml fluid restriction. Sodium levels ranged between 132-134, most recent 132 on 11/2. Orders for home oxygen, [facility]- PT/OT/RN. He was directed to take Mucinex 1200 mg BID x 12 days, Tessalon Perles 100 mg TID PRN, Medrol pack and Tamiflu 30 mg BID x 5 days and continue 1800 fluid restriction.     Patient lives with his daughterJennifer in a ranch style home with ramp access. Noted that daughter is home 20hrs/day and has a camera to view Jose when not home. Noted the patient uses a RW at baseline.     Per chart, Jennifer vo reached out to PCP office today regarding fluid restriction and concern for dehydration.  Await response from PCP to address further.     Spoke with Jennifer briefly as she was getting the patient in the car to go vote today. She quickly stated that she reached out to the PCP office about the fluid restriction but has not heard back from them yet. Her concern is that she normally would empty approximately 2141-6133 ml of urine from overnight but she only emptied 725 ml from last night.This RN advised her to continue doing what she is doing- the 1800 ml fluid  restriction and wait to discuss further with PCP. This RN did mention the patient;s h/o CHF and hyponatremia associated with fluid restriction. Jennifer agreeable to further outreach and to discuss Jose further. She is requesting schedule call for tomorrow 11/6.     Patient has orders for repeat BMP.     Patient has PCP appt 11/13

## 2024-11-06 ENCOUNTER — PATIENT OUTREACH (OUTPATIENT)
Dept: CASE MANAGEMENT | Facility: OTHER | Age: 89
End: 2024-11-06

## 2024-11-06 NOTE — TELEPHONE ENCOUNTER
Patient daughter called aware of recommendations. States yesterday had 650 ml urine output and urine is darker than usual.        Please review.  Thank you

## 2024-11-06 NOTE — TELEPHONE ENCOUNTER
Please continue fluid restriction and monitoring urine output. The color of the urine tells a lot about hydration status as well, as well as tongue dryness, skin elasticity, and other signs. It is important he continue to restrict fluids due to heart failure. Please consider scheduling TCM sooner if they have any more concerns.

## 2024-11-06 NOTE — PROGRESS NOTES
"Outpatient Care Management Note- Follow up call.    Spoke with patient's daughter, Jennifer. Jennifer states Jose's cough is about 90% better and the SOB has improved since having home oxygen. She confirms receipt of the oxygen equipment. Reviewed medications from hospital stay: Mucinex, Tessalon Perles, Medrol pack and Tamiflu. Jennifer confirms the Tamiflu course is complete. Jennifer reports that Jose's recovery is slow but \"he is getting there.\" He is using a RW post hospitalization which was his baseline. Jennifer confirms referral for [facility] PT/OT/RN. She states nursing saw him 11/4, PT saw him 11/5 and OT saw him today. Jennifer reports the therapy is going well. Jennifer is aware to continue to follow an 1800ml fluid restriction as per the hospital d/c summary. She previously expressed concern for dehydration but will continue to monitor his urinary output and report any additional concerns to the PCP office.     Jennifer states Jose lives alone in a ranch style home and she stays often. She reports she leaves for a few hours at a time, for example, if she left around 8 am, she would return at 10:30 am. Jennifer states she assists with his dressing ans makes his meals. She is also doing household tasks like laundry. She states she is the only family to assist with his needs. She has looked into the dept of aging for help and waiver programs but Jose doesn't qualify d/t income.     Patient is scheduled with PCP on 11/11 for TCM.     Daughter Jennifer agreeable to further outreach, will schedule next call.   "

## 2024-11-08 ENCOUNTER — APPOINTMENT (OUTPATIENT)
Dept: LAB | Facility: HOSPITAL | Age: 89
End: 2024-11-08
Payer: COMMERCIAL

## 2024-11-08 DIAGNOSIS — A41.89 VIRAL SEPSIS (HCC): ICD-10-CM

## 2024-11-08 DIAGNOSIS — B97.89 VIRAL SEPSIS (HCC): ICD-10-CM

## 2024-11-08 DIAGNOSIS — E87.1 HYPONATREMIA: ICD-10-CM

## 2024-11-08 LAB
ANION GAP SERPL CALCULATED.3IONS-SCNC: 5 MMOL/L (ref 4–13)
BUN SERPL-MCNC: 37 MG/DL (ref 5–25)
CALCIUM SERPL-MCNC: 8.5 MG/DL (ref 8.4–10.2)
CHLORIDE SERPL-SCNC: 102 MMOL/L (ref 96–108)
CO2 SERPL-SCNC: 28 MMOL/L (ref 21–32)
CREAT SERPL-MCNC: 1.45 MG/DL (ref 0.6–1.3)
DME PARACHUTE DELIVERY DATE ACTUAL: NORMAL
DME PARACHUTE DELIVERY DATE REQUESTED: NORMAL
DME PARACHUTE DELIVERY NOTE: NORMAL
DME PARACHUTE ITEM DESCRIPTION: NORMAL
DME PARACHUTE ORDER STATUS: NORMAL
DME PARACHUTE SUPPLIER NAME: NORMAL
DME PARACHUTE SUPPLIER PHONE: NORMAL
GFR SERPL CREATININE-BSD FRML MDRD: 41 ML/MIN/1.73SQ M
GLUCOSE P FAST SERPL-MCNC: 85 MG/DL (ref 65–99)
POTASSIUM SERPL-SCNC: 5.2 MMOL/L (ref 3.5–5.3)
SODIUM SERPL-SCNC: 135 MMOL/L (ref 135–147)

## 2024-11-08 PROCEDURE — 80048 BASIC METABOLIC PNL TOTAL CA: CPT

## 2024-11-08 PROCEDURE — 36415 COLL VENOUS BLD VENIPUNCTURE: CPT

## 2024-11-11 ENCOUNTER — OFFICE VISIT (OUTPATIENT)
Dept: FAMILY MEDICINE CLINIC | Facility: CLINIC | Age: 89
End: 2024-11-11
Payer: COMMERCIAL

## 2024-11-11 VITALS
RESPIRATION RATE: 16 BRPM | DIASTOLIC BLOOD PRESSURE: 66 MMHG | WEIGHT: 198 LBS | TEMPERATURE: 98 F | BODY MASS INDEX: 30.01 KG/M2 | HEIGHT: 68 IN | SYSTOLIC BLOOD PRESSURE: 130 MMHG

## 2024-11-11 DIAGNOSIS — B97.89 VIRAL SEPSIS (HCC): ICD-10-CM

## 2024-11-11 DIAGNOSIS — A41.89 VIRAL SEPSIS (HCC): ICD-10-CM

## 2024-11-11 DIAGNOSIS — N18.31 STAGE 3A CHRONIC KIDNEY DISEASE (HCC): ICD-10-CM

## 2024-11-11 DIAGNOSIS — I10 BENIGN ESSENTIAL HYPERTENSION: Primary | Chronic | ICD-10-CM

## 2024-11-11 DIAGNOSIS — I50.32 CHRONIC DIASTOLIC CONGESTIVE HEART FAILURE (HCC): ICD-10-CM

## 2024-11-11 PROCEDURE — 99495 TRANSJ CARE MGMT MOD F2F 14D: CPT | Performed by: FAMILY MEDICINE

## 2024-11-11 NOTE — ASSESSMENT & PLAN NOTE
Wt Readings from Last 3 Encounters:   11/11/24 89.8 kg (198 lb)   11/02/24 89.9 kg (198 lb 3.2 oz)   10/24/24 92.9 kg (204 lb 12.8 oz)   No acute exacerbation while admitted. Fluid restriction on discharge but was not previously. Urine is darker than his usual but he is not thirsty. Will increase fluids to 2000 mL daily and continue to monitor. If no exacerbation can consider further increase.

## 2024-11-11 NOTE — PROGRESS NOTES
Transition of Care Visit  Name: Jose Mullins      : 7/15/1931      MRN: [id number]  Encounter Provider: Aquilino Luna DO  Encounter Date: 2024   Encounter department: [facility]    Assessment & Plan  Benign essential hypertension  BP at goal today.       Chronic diastolic congestive heart failure (HCC)  Wt Readings from Last 3 Encounters:   24 89.8 kg (198 lb)   24 89.9 kg (198 lb 3.2 oz)   10/24/24 92.9 kg (204 lb 12.8 oz)   No acute exacerbation while admitted. Fluid restriction on discharge but was not previously. Urine is darker than his usual but he is not thirsty. Will increase fluids to 2000 mL daily and continue to monitor. If no exacerbation can consider further increase.                 Stage 3a chronic kidney disease (HCC)  Lab Results   Component Value Date    EGFR 41 2024    EGFR 47 2024    EGFR 46 2024    CREATININE 1.45 (H) 2024    CREATININE 1.29 2024    CREATININE 1.31 (H) 2024     No FRANCO in hospital but did have slight increase in Cr. Increased slightly on recheck a few days ago. Will increase fluids to 2L daily from 1800 mL and continue to monitor UOP. As long as no complications can further increase fluids.       Viral sepsis (HCC)  Symptoms significantly improved. Still requiring home O2 intermittently, daughter is managing and maintaining goal SpO2 > 90%.            History of Present Illness     Transitional Care Management Review:   Jose Mullins is a 89 y.o. male here for TCM follow up.     During the TCM phone call patient stated:  TCM Call       Date and time call was made  2024  7:35 AM    Hospital care reviewed  Records reviewed    Patient was hospitialized at  [facility]    Date of Admission  10/30/24    Date of discharge  24    Diagnosis  Viral sepsis    Disposition  Home    Were the patients medications reviewed and updated  No    Current Symptoms  None    Weakness severity   Moderate    Fatigue severity  Moderate          TCM Call       Post hospital issues  None    Should patient be enrolled in anticoag monitoring?  No    Scheduled for follow up?  Yes    Did you obtain your prescribed medications  Yes    Do you need help managing your prescriptions or medications  No    Is transportation to your appointment needed  No    Specify why  Daughter will drive    I have advised the patient to call PCP with any new or worsening symptoms  Neeta Singer MA    Living Arrangements  Children; Spouse or Significiant other    Support System  Family    The type of support provided  Physical; Emotional    Do you have social support  Yes, as much as I need    Are you recieving any outpatient services  No    Are you recieving home care services  No    Are you using any community resources  No    Current waiver services  No    Have you fallen in the last 12 months  No    Interperter language line needed  No    Counseling  Family    Counseling topics  Activities of daily living; Importance of RX compliance    Comments  Patient schedule TCM appt with MARGARITA Wray on 11/13 at 9:20 am          Hospital Course:  Admitted 10/30-11/2/24 for sepsis due to COVID and Flu. Received Decadron, Remdesivir, and Tamiflu. Required 2 L NC O2 but did not qualify for home O2 based on notes. Discharged on medrol dosepak. Continued on fluid restriction 1800 mL and repeat BMP in one week.    HPI  Repeat BMP on 11/8 showed hyponatremia had resolved. Cr slightly increased at 1.45 from 1.29, baseline around 1.3 per records.    Doing home PT, has a hard time. Wants to refuse it from now on. Daughter does some home exercises with him daily.     He is using home O2, will sometimes desat to 80 with exertion. Was told to use 4L around the clock, daughter has him on 2L all the time. Sleeping with O2 on, sleeping well. Has been removing it while he is sitting and watching TV and SpO2 96-99. Daughter puts it on him while sleeping to  "maintain SpO2 > 90.     Continues on Mucinex. Completed Tamiflu and steroids.     Daughter concerned for dehydration given fluid restriction 1800 ml. Was not fluid restricted prior to hospitalization.Urine is darker yellow than previously. UOP was previously 6306-4575 overnight. Has been 600s-700s overnight recently but up to 1200 this morning.       Review of Systems  Objective     /66 (BP Location: Left arm, Patient Position: Sitting, Cuff Size: Large)   Temp 98 °F (36.7 °C) (Temporal)   Resp 16   Ht 5' 8\" (1.727 m)   Wt 89.8 kg (198 lb)   BMI 30.11 kg/m²     Physical Exam  Vitals reviewed.   Constitutional:       Appearance: Normal appearance.   Eyes:      Extraocular Movements: Extraocular movements intact.      Conjunctiva/sclera: Conjunctivae normal.   Cardiovascular:      Rate and Rhythm: Normal rate. Rhythm irregular.      Heart sounds: Normal heart sounds.   Pulmonary:      Effort: Pulmonary effort is normal.      Breath sounds: Normal breath sounds.   Skin:     General: Skin is warm and dry.   Neurological:      Mental Status: He is alert.   Psychiatric:         Mood and Affect: Mood normal.         Behavior: Behavior normal.       Medications have been reviewed by provider in current encounter      "

## 2024-11-11 NOTE — ASSESSMENT & PLAN NOTE
Symptoms significantly improved. Still requiring home O2 intermittently, daughter is managing and maintaining goal SpO2 > 90%.

## 2024-11-12 DIAGNOSIS — K58.0 IRRITABLE BOWEL SYNDROME WITH DIARRHEA: ICD-10-CM

## 2024-11-12 DIAGNOSIS — R30.0 DYSURIA: ICD-10-CM

## 2024-11-13 RX ORDER — FINASTERIDE 5 MG/1
5 TABLET, FILM COATED ORAL DAILY
Qty: 90 TABLET | Refills: 1 | Status: SHIPPED | OUTPATIENT
Start: 2024-11-13

## 2024-11-13 RX ORDER — AMITRIPTYLINE HYDROCHLORIDE 10 MG/1
TABLET ORAL
Qty: 270 TABLET | Refills: 1 | Status: SHIPPED | OUTPATIENT
Start: 2024-11-13

## 2024-11-14 ENCOUNTER — HOSPITAL ENCOUNTER (INPATIENT)
Facility: HOSPITAL | Age: 89
LOS: 5 days | Discharge: HOME HEALTH | DRG: 871 | End: 2024-11-19
Attending: EMERGENCY MEDICINE | Admitting: STUDENT IN AN ORGANIZED HEALTH CARE EDUCATION/TRAINING PROGRAM
Payer: COMMERCIAL

## 2024-11-14 ENCOUNTER — APPOINTMENT (EMERGENCY)
Dept: RADIOLOGY | Facility: HOSPITAL | Age: 89
DRG: 871 | End: 2024-11-14
Payer: COMMERCIAL

## 2024-11-14 ENCOUNTER — PATIENT OUTREACH (OUTPATIENT)
Dept: CASE MANAGEMENT | Facility: OTHER | Age: 89
End: 2024-11-14

## 2024-11-14 ENCOUNTER — APPOINTMENT (INPATIENT)
Dept: CT IMAGING | Facility: HOSPITAL | Age: 89
DRG: 871 | End: 2024-11-14
Payer: COMMERCIAL

## 2024-11-14 ENCOUNTER — TELEPHONE (OUTPATIENT)
Age: 89
End: 2024-11-14

## 2024-11-14 DIAGNOSIS — R26.2 AMBULATORY DYSFUNCTION: ICD-10-CM

## 2024-11-14 DIAGNOSIS — I50.32 CHRONIC DIASTOLIC CONGESTIVE HEART FAILURE (HCC): ICD-10-CM

## 2024-11-14 DIAGNOSIS — R65.10 SIRS (SYSTEMIC INFLAMMATORY RESPONSE SYNDROME) (HCC): ICD-10-CM

## 2024-11-14 DIAGNOSIS — R06.09 DYSPNEA ON EXERTION: Primary | ICD-10-CM

## 2024-11-14 DIAGNOSIS — D64.9 SYMPTOMATIC ANEMIA: ICD-10-CM

## 2024-11-14 DIAGNOSIS — E87.1 HYPONATREMIA: ICD-10-CM

## 2024-11-14 DIAGNOSIS — J96.01 ACUTE RESPIRATORY FAILURE WITH HYPOXIA (HCC): ICD-10-CM

## 2024-11-14 DIAGNOSIS — I50.9 CHF (CONGESTIVE HEART FAILURE) (HCC): ICD-10-CM

## 2024-11-14 PROBLEM — J96.10 CHRONIC RESPIRATORY FAILURE (HCC): Status: ACTIVE | Noted: 2024-11-14

## 2024-11-14 LAB
2HR DELTA HS TROPONIN: -1 NG/L
4HR DELTA HS TROPONIN: 1 NG/L
ANION GAP SERPL CALCULATED.3IONS-SCNC: 5 MMOL/L (ref 4–13)
APTT PPP: 44 SECONDS (ref 23–34)
BACTERIA UR QL AUTO: NORMAL /HPF
BASE EXCESS BLDA CALC-SCNC: -1 MMOL/L (ref -2–3)
BASOPHILS # BLD AUTO: 0.02 THOUSANDS/ÂΜL (ref 0–0.1)
BASOPHILS NFR BLD AUTO: 0 % (ref 0–1)
BILIRUB UR QL STRIP: NEGATIVE
BNP SERPL-MCNC: 251 PG/ML (ref 0–100)
BUN SERPL-MCNC: 27 MG/DL (ref 5–25)
CA-I BLD-SCNC: 1.13 MMOL/L (ref 1.12–1.32)
CALCIUM SERPL-MCNC: 9.1 MG/DL (ref 8.4–10.2)
CARDIAC TROPONIN I PNL SERPL HS: 13 NG/L (ref ?–50)
CARDIAC TROPONIN I PNL SERPL HS: 14 NG/L (ref ?–50)
CARDIAC TROPONIN I PNL SERPL HS: 15 NG/L (ref ?–50)
CHLORIDE SERPL-SCNC: 97 MMOL/L (ref 96–108)
CLARITY UR: CLEAR
CO2 SERPL-SCNC: 26 MMOL/L (ref 21–32)
COLOR UR: YELLOW
CREAT SERPL-MCNC: 1.46 MG/DL (ref 0.6–1.3)
D DIMER PPP FEU-MCNC: 1.24 UG/ML FEU
EOSINOPHIL # BLD AUTO: 0.44 THOUSAND/ÂΜL (ref 0–0.61)
EOSINOPHIL NFR BLD AUTO: 7 % (ref 0–6)
ERYTHROCYTE [DISTWIDTH] IN BLOOD BY AUTOMATED COUNT: 12.9 % (ref 11.6–15.1)
GFR SERPL CREATININE-BSD FRML MDRD: 40 ML/MIN/1.73SQ M
GLUCOSE SERPL-MCNC: 102 MG/DL (ref 65–140)
GLUCOSE SERPL-MCNC: 103 MG/DL (ref 65–140)
GLUCOSE UR STRIP-MCNC: ABNORMAL MG/DL
HCO3 BLDA-SCNC: 24.4 MMOL/L (ref 24–30)
HCT VFR BLD AUTO: 31.3 % (ref 36.5–49.3)
HCT VFR BLD CALC: 30 % (ref 36.5–49.3)
HGB BLD-MCNC: 9.9 G/DL (ref 12–17)
HGB BLDA-MCNC: 10.2 G/DL (ref 12–17)
HGB UR QL STRIP.AUTO: NEGATIVE
IMM GRANULOCYTES # BLD AUTO: 0.03 THOUSAND/UL (ref 0–0.2)
IMM GRANULOCYTES NFR BLD AUTO: 1 % (ref 0–2)
INR PPP: 1.2 (ref 0.85–1.19)
KETONES UR STRIP-MCNC: NEGATIVE MG/DL
LACTATE SERPL-SCNC: 0.5 MMOL/L (ref 0.5–2)
LEUKOCYTE ESTERASE UR QL STRIP: NEGATIVE
LYMPHOCYTES # BLD AUTO: 0.8 THOUSANDS/ÂΜL (ref 0.6–4.47)
LYMPHOCYTES NFR BLD AUTO: 12 % (ref 14–44)
MAGNESIUM SERPL-MCNC: 2.1 MG/DL (ref 1.9–2.7)
MCH RBC QN AUTO: 33 PG (ref 26.8–34.3)
MCHC RBC AUTO-ENTMCNC: 31.6 G/DL (ref 31.4–37.4)
MCV RBC AUTO: 104 FL (ref 82–98)
MONOCYTES # BLD AUTO: 1.1 THOUSAND/ÂΜL (ref 0.17–1.22)
MONOCYTES NFR BLD AUTO: 17 % (ref 4–12)
NEUTROPHILS # BLD AUTO: 4.26 THOUSANDS/ÂΜL (ref 1.85–7.62)
NEUTS SEG NFR BLD AUTO: 63 % (ref 43–75)
NITRITE UR QL STRIP: NEGATIVE
NON-SQ EPI CELLS URNS QL MICRO: NORMAL /HPF
NRBC BLD AUTO-RTO: 0 /100 WBCS
PCO2 BLD: 26 MMOL/L (ref 21–32)
PCO2 BLD: 41.9 MM HG (ref 42–50)
PH BLD: 7.37 [PH] (ref 7.3–7.4)
PH UR STRIP.AUTO: 5.5 [PH]
PLATELET # BLD AUTO: 218 THOUSANDS/UL (ref 149–390)
PMV BLD AUTO: 11.5 FL (ref 8.9–12.7)
PO2 BLD: 32 MM HG (ref 35–45)
POTASSIUM BLD-SCNC: 5.7 MMOL/L (ref 3.5–5.3)
POTASSIUM SERPL-SCNC: 4.9 MMOL/L (ref 3.5–5.3)
POTASSIUM SERPL-SCNC: 5.7 MMOL/L (ref 3.5–5.3)
PROCALCITONIN SERPL-MCNC: 0.25 NG/ML
PROT UR STRIP-MCNC: ABNORMAL MG/DL
PROTHROMBIN TIME: 15.7 SECONDS (ref 12.3–15)
RBC # BLD AUTO: 3 MILLION/UL (ref 3.88–5.62)
RBC #/AREA URNS AUTO: NORMAL /HPF
SAO2 % BLD FROM PO2: 59 % (ref 60–85)
SARS-COV-2 RNA RESP QL NAA+PROBE: POSITIVE
SODIUM BLD-SCNC: 127 MMOL/L (ref 136–145)
SODIUM SERPL-SCNC: 128 MMOL/L (ref 135–147)
SP GR UR STRIP.AUTO: <1.005 (ref 1–1.03)
SPECIMEN SOURCE: ABNORMAL
UROBILINOGEN UR STRIP-ACNC: <2 MG/DL
WBC # BLD AUTO: 6.65 THOUSAND/UL (ref 4.31–10.16)
WBC #/AREA URNS AUTO: NORMAL /HPF

## 2024-11-14 PROCEDURE — 85610 PROTHROMBIN TIME: CPT

## 2024-11-14 PROCEDURE — 84132 ASSAY OF SERUM POTASSIUM: CPT | Performed by: PHYSICIAN ASSISTANT

## 2024-11-14 PROCEDURE — 80048 BASIC METABOLIC PNL TOTAL CA: CPT

## 2024-11-14 PROCEDURE — 82803 BLOOD GASES ANY COMBINATION: CPT

## 2024-11-14 PROCEDURE — 81001 URINALYSIS AUTO W/SCOPE: CPT

## 2024-11-14 PROCEDURE — 0202U NFCT DS 22 TRGT SARS-COV-2: CPT | Performed by: STUDENT IN AN ORGANIZED HEALTH CARE EDUCATION/TRAINING PROGRAM

## 2024-11-14 PROCEDURE — 99285 EMERGENCY DEPT VISIT HI MDM: CPT

## 2024-11-14 PROCEDURE — 85014 HEMATOCRIT: CPT

## 2024-11-14 PROCEDURE — 93005 ELECTROCARDIOGRAM TRACING: CPT

## 2024-11-14 PROCEDURE — 99223 1ST HOSP IP/OBS HIGH 75: CPT | Performed by: STUDENT IN AN ORGANIZED HEALTH CARE EDUCATION/TRAINING PROGRAM

## 2024-11-14 PROCEDURE — 71275 CT ANGIOGRAPHY CHEST: CPT

## 2024-11-14 PROCEDURE — 84295 ASSAY OF SERUM SODIUM: CPT

## 2024-11-14 PROCEDURE — 84484 ASSAY OF TROPONIN QUANT: CPT

## 2024-11-14 PROCEDURE — 94640 AIRWAY INHALATION TREATMENT: CPT

## 2024-11-14 PROCEDURE — 84484 ASSAY OF TROPONIN QUANT: CPT | Performed by: STUDENT IN AN ORGANIZED HEALTH CARE EDUCATION/TRAINING PROGRAM

## 2024-11-14 PROCEDURE — 36415 COLL VENOUS BLD VENIPUNCTURE: CPT

## 2024-11-14 PROCEDURE — 82947 ASSAY GLUCOSE BLOOD QUANT: CPT

## 2024-11-14 PROCEDURE — 83605 ASSAY OF LACTIC ACID: CPT

## 2024-11-14 PROCEDURE — 83735 ASSAY OF MAGNESIUM: CPT

## 2024-11-14 PROCEDURE — 84132 ASSAY OF SERUM POTASSIUM: CPT

## 2024-11-14 PROCEDURE — 87635 SARS-COV-2 COVID-19 AMP PRB: CPT | Performed by: STUDENT IN AN ORGANIZED HEALTH CARE EDUCATION/TRAINING PROGRAM

## 2024-11-14 PROCEDURE — 85730 THROMBOPLASTIN TIME PARTIAL: CPT

## 2024-11-14 PROCEDURE — 94664 DEMO&/EVAL PT USE INHALER: CPT

## 2024-11-14 PROCEDURE — 84145 PROCALCITONIN (PCT): CPT

## 2024-11-14 PROCEDURE — 83880 ASSAY OF NATRIURETIC PEPTIDE: CPT

## 2024-11-14 PROCEDURE — 71045 X-RAY EXAM CHEST 1 VIEW: CPT

## 2024-11-14 PROCEDURE — 87040 BLOOD CULTURE FOR BACTERIA: CPT

## 2024-11-14 PROCEDURE — 85379 FIBRIN DEGRADATION QUANT: CPT

## 2024-11-14 PROCEDURE — 85025 COMPLETE CBC W/AUTO DIFF WBC: CPT

## 2024-11-14 PROCEDURE — 82330 ASSAY OF CALCIUM: CPT

## 2024-11-14 RX ORDER — ACETAMINOPHEN 10 MG/ML
1000 INJECTION, SOLUTION INTRAVENOUS ONCE
Status: COMPLETED | OUTPATIENT
Start: 2024-11-14 | End: 2024-11-14

## 2024-11-14 RX ORDER — DEXAMETHASONE SODIUM PHOSPHATE 10 MG/ML
6 INJECTION, SOLUTION INTRAMUSCULAR; INTRAVENOUS DAILY
Status: DISCONTINUED | OUTPATIENT
Start: 2024-11-15 | End: 2024-11-19 | Stop reason: HOSPADM

## 2024-11-14 RX ORDER — ACETAMINOPHEN 325 MG/1
650 TABLET ORAL EVERY 6 HOURS PRN
Status: DISCONTINUED | OUTPATIENT
Start: 2024-11-14 | End: 2024-11-19 | Stop reason: HOSPADM

## 2024-11-14 RX ORDER — SODIUM CHLORIDE 9 MG/ML
3 INJECTION INTRAVENOUS
Status: DISCONTINUED | OUTPATIENT
Start: 2024-11-14 | End: 2024-11-19 | Stop reason: HOSPADM

## 2024-11-14 RX ORDER — FUROSEMIDE 10 MG/ML
40 INJECTION INTRAMUSCULAR; INTRAVENOUS
Status: DISCONTINUED | OUTPATIENT
Start: 2024-11-15 | End: 2024-11-14

## 2024-11-14 RX ORDER — POLYETHYLENE GLYCOL 3350 17 G/17G
17 POWDER, FOR SOLUTION ORAL DAILY PRN
Status: DISCONTINUED | OUTPATIENT
Start: 2024-11-14 | End: 2024-11-19 | Stop reason: HOSPADM

## 2024-11-14 RX ORDER — ALBUMIN HUMAN 50 G/1000ML
12.5 SOLUTION INTRAVENOUS ONCE
Status: COMPLETED | OUTPATIENT
Start: 2024-11-14 | End: 2024-11-14

## 2024-11-14 RX ORDER — AMLODIPINE BESYLATE 5 MG/1
5 TABLET ORAL
Status: DISCONTINUED | OUTPATIENT
Start: 2024-11-14 | End: 2024-11-19 | Stop reason: HOSPADM

## 2024-11-14 RX ORDER — PANTOPRAZOLE SODIUM 20 MG/1
20 TABLET, DELAYED RELEASE ORAL
Status: DISCONTINUED | OUTPATIENT
Start: 2024-11-15 | End: 2024-11-19 | Stop reason: HOSPADM

## 2024-11-14 RX ORDER — HEPARIN SODIUM 1000 [USP'U]/ML
2000 INJECTION, SOLUTION INTRAVENOUS; SUBCUTANEOUS EVERY 6 HOURS PRN
Status: DISCONTINUED | OUTPATIENT
Start: 2024-11-15 | End: 2024-11-15

## 2024-11-14 RX ORDER — ONDANSETRON 2 MG/ML
4 INJECTION INTRAMUSCULAR; INTRAVENOUS EVERY 4 HOURS PRN
Status: DISCONTINUED | OUTPATIENT
Start: 2024-11-14 | End: 2024-11-19 | Stop reason: HOSPADM

## 2024-11-14 RX ORDER — GABAPENTIN 100 MG/1
100 CAPSULE ORAL 2 TIMES DAILY
Status: DISCONTINUED | OUTPATIENT
Start: 2024-11-14 | End: 2024-11-19 | Stop reason: HOSPADM

## 2024-11-14 RX ORDER — AMITRIPTYLINE HYDROCHLORIDE 10 MG/1
30 TABLET ORAL
Status: DISCONTINUED | OUTPATIENT
Start: 2024-11-14 | End: 2024-11-19 | Stop reason: HOSPADM

## 2024-11-14 RX ORDER — IPRATROPIUM BROMIDE AND ALBUTEROL SULFATE 2.5; .5 MG/3ML; MG/3ML
3 SOLUTION RESPIRATORY (INHALATION) ONCE
Status: COMPLETED | OUTPATIENT
Start: 2024-11-14 | End: 2024-11-14

## 2024-11-14 RX ORDER — LATANOPROST 50 UG/ML
1 SOLUTION/ DROPS OPHTHALMIC
Status: DISCONTINUED | OUTPATIENT
Start: 2024-11-15 | End: 2024-11-19 | Stop reason: HOSPADM

## 2024-11-14 RX ORDER — BISOPROLOL FUMARATE 5 MG/1
2.5 TABLET, FILM COATED ORAL DAILY
Status: DISCONTINUED | OUTPATIENT
Start: 2024-11-15 | End: 2024-11-19 | Stop reason: HOSPADM

## 2024-11-14 RX ORDER — HEPARIN SODIUM 10000 [USP'U]/100ML
3-20 INJECTION, SOLUTION INTRAVENOUS
Status: DISCONTINUED | OUTPATIENT
Start: 2024-11-15 | End: 2024-11-15

## 2024-11-14 RX ORDER — METOPROLOL TARTRATE 1 MG/ML
5 INJECTION, SOLUTION INTRAVENOUS EVERY 6 HOURS PRN
Status: DISCONTINUED | OUTPATIENT
Start: 2024-11-14 | End: 2024-11-19 | Stop reason: HOSPADM

## 2024-11-14 RX ORDER — SPIRONOLACTONE 25 MG/1
25 TABLET ORAL DAILY
Status: DISCONTINUED | OUTPATIENT
Start: 2024-11-15 | End: 2024-11-19 | Stop reason: HOSPADM

## 2024-11-14 RX ORDER — CEFTRIAXONE 1 G/50ML
1000 INJECTION, SOLUTION INTRAVENOUS EVERY 24 HOURS
Status: DISCONTINUED | OUTPATIENT
Start: 2024-11-14 | End: 2024-11-15

## 2024-11-14 RX ORDER — ALLOPURINOL 100 MG/1
100 TABLET ORAL DAILY
Status: DISCONTINUED | OUTPATIENT
Start: 2024-11-15 | End: 2024-11-19 | Stop reason: HOSPADM

## 2024-11-14 RX ORDER — VANCOMYCIN HYDROCHLORIDE 1 G/200ML
12.5 INJECTION, SOLUTION INTRAVENOUS DAILY PRN
Status: DISCONTINUED | OUTPATIENT
Start: 2024-11-14 | End: 2024-11-15

## 2024-11-14 RX ORDER — SODIUM CHLORIDE, SODIUM GLUCONATE, SODIUM ACETATE, POTASSIUM CHLORIDE, MAGNESIUM CHLORIDE, SODIUM PHOSPHATE, DIBASIC, AND POTASSIUM PHOSPHATE .53; .5; .37; .037; .03; .012; .00082 G/100ML; G/100ML; G/100ML; G/100ML; G/100ML; G/100ML; G/100ML
50 INJECTION, SOLUTION INTRAVENOUS CONTINUOUS
Status: DISCONTINUED | OUTPATIENT
Start: 2024-11-14 | End: 2024-11-16

## 2024-11-14 RX ORDER — SODIUM CHLORIDE, SODIUM GLUCONATE, SODIUM ACETATE, POTASSIUM CHLORIDE, MAGNESIUM CHLORIDE, SODIUM PHOSPHATE, DIBASIC, AND POTASSIUM PHOSPHATE .53; .5; .37; .037; .03; .012; .00082 G/100ML; G/100ML; G/100ML; G/100ML; G/100ML; G/100ML; G/100ML
500 INJECTION, SOLUTION INTRAVENOUS ONCE
Status: COMPLETED | OUTPATIENT
Start: 2024-11-14 | End: 2024-11-14

## 2024-11-14 RX ORDER — DORZOLAMIDE HYDROCHLORIDE AND TIMOLOL MALEATE 20; 5 MG/ML; MG/ML
1 SOLUTION/ DROPS OPHTHALMIC 2 TIMES DAILY
Status: DISCONTINUED | OUTPATIENT
Start: 2024-11-14 | End: 2024-11-19 | Stop reason: HOSPADM

## 2024-11-14 RX ORDER — BRIMONIDINE TARTRATE 2 MG/ML
1 SOLUTION/ DROPS OPHTHALMIC 2 TIMES DAILY
Status: DISCONTINUED | OUTPATIENT
Start: 2024-11-14 | End: 2024-11-19 | Stop reason: HOSPADM

## 2024-11-14 RX ORDER — HEPARIN SODIUM 1000 [USP'U]/ML
4000 INJECTION, SOLUTION INTRAVENOUS; SUBCUTANEOUS EVERY 6 HOURS PRN
Status: DISCONTINUED | OUTPATIENT
Start: 2024-11-15 | End: 2024-11-15

## 2024-11-14 RX ORDER — FINASTERIDE 5 MG/1
5 TABLET, FILM COATED ORAL DAILY
Status: DISCONTINUED | OUTPATIENT
Start: 2024-11-15 | End: 2024-11-19 | Stop reason: HOSPADM

## 2024-11-14 RX ORDER — DEXAMETHASONE SODIUM PHOSPHATE 10 MG/ML
6 INJECTION, SOLUTION INTRAMUSCULAR; INTRAVENOUS ONCE
Status: COMPLETED | OUTPATIENT
Start: 2024-11-14 | End: 2024-11-14

## 2024-11-14 RX ADMIN — ACETAMINOPHEN 650 MG: 325 TABLET, FILM COATED ORAL at 18:42

## 2024-11-14 RX ADMIN — METOPROLOL TARTRATE 5 MG: 5 INJECTION INTRAVENOUS at 18:42

## 2024-11-14 RX ADMIN — GABAPENTIN 100 MG: 100 CAPSULE ORAL at 18:42

## 2024-11-14 RX ADMIN — SODIUM CHLORIDE, SODIUM GLUCONATE, SODIUM ACETATE, POTASSIUM CHLORIDE, MAGNESIUM CHLORIDE, SODIUM PHOSPHATE, DIBASIC, AND POTASSIUM PHOSPHATE 50 ML/HR: .53; .5; .37; .037; .03; .012; .00082 INJECTION, SOLUTION INTRAVENOUS at 19:32

## 2024-11-14 RX ADMIN — CEFTRIAXONE 1000 MG: 1 INJECTION, SOLUTION INTRAVENOUS at 20:23

## 2024-11-14 RX ADMIN — AMITRIPTYLINE HYDROCHLORIDE 30 MG: 10 TABLET, FILM COATED ORAL at 21:09

## 2024-11-14 RX ADMIN — ALBUMIN (HUMAN) 12.5 G: 12.5 INJECTION, SOLUTION INTRAVENOUS at 18:56

## 2024-11-14 RX ADMIN — IPRATROPIUM BROMIDE AND ALBUTEROL SULFATE 3 ML: .5; 3 SOLUTION RESPIRATORY (INHALATION) at 14:20

## 2024-11-14 RX ADMIN — IOHEXOL 85 ML: 350 INJECTION, SOLUTION INTRAVENOUS at 22:06

## 2024-11-14 RX ADMIN — DEXAMETHASONE SODIUM PHOSPHATE 6 MG: 10 INJECTION, SOLUTION INTRAMUSCULAR; INTRAVENOUS at 21:44

## 2024-11-14 RX ADMIN — SODIUM CHLORIDE, SODIUM GLUCONATE, SODIUM ACETATE, POTASSIUM CHLORIDE, MAGNESIUM CHLORIDE, SODIUM PHOSPHATE, DIBASIC, AND POTASSIUM PHOSPHATE 500 ML: .53; .5; .37; .037; .03; .012; .00082 INJECTION, SOLUTION INTRAVENOUS at 19:28

## 2024-11-14 RX ADMIN — ACETAMINOPHEN 1000 MG: 10 INJECTION INTRAVENOUS at 19:34

## 2024-11-14 RX ADMIN — VANCOMYCIN HYDROCHLORIDE 2000 MG: 1 INJECTION, POWDER, LYOPHILIZED, FOR SOLUTION INTRAVENOUS at 21:09

## 2024-11-14 RX ADMIN — APIXABAN 2.5 MG: 2.5 TABLET, FILM COATED ORAL at 18:42

## 2024-11-14 NOTE — ASSESSMENT & PLAN NOTE
Hemoglobin on admission 9.9  Baseline appears to be between 10 and 11  Most likely secondary to CKD  Continue to trend  Transfuse when less than 7

## 2024-11-14 NOTE — TELEPHONE ENCOUNTER
Please speak to patient's daughter directly.  Dr. Luna was the one that saw patient for TCM visit 3 days ago however, if patient is still spiking fevers to 101 and he is feeling weak I would have to request an emergency room visit once again.  He may be somewhat dehydrated from his fluid restriction.  He may have developed a secondary bacterial infection other than influenza and COVID which are viruses

## 2024-11-14 NOTE — H&P
H&P - Hospitalist   Name: Jose Mullins 89 y.o. male I MRN: [id number]  Unit/Bed#: -01 I Date of Admission: 11/14/2024   Date of Service: 11/14/2024 I Hospital Day: 0     Assessment & Plan  Acute on chronic diastolic congestive heart failure (HCC)  Wt Readings from Last 3 Encounters:   11/11/24 89.8 kg (198 lb)   11/02/24 89.9 kg (198 lb 3.2 oz)   10/24/24 92.9 kg (204 lb 12.8 oz)     Patient presenting due to shortness of breath  Patient recently admitted due to viral sepsis secondary to COVID and flu    On admission patient meeting SIRS criteria with tachycardia and tachypnea  No source of infection on admission  Troponins negative.  Lactic acid and procalcitonin negative  No other endorgan dysfunction noted  Patient with an elevated D-dimer of 1.24 however has had previous elevations of D-dimers patient is on Eliquis, will obtain venous Dopplers  Chest x-ray on admission showing pulmonary edema  BNP on admission 257  Echo done on 12/21/2023 showed an LVEF of 55 to 60% with mild diastolic dysfunction mildly dilated right ventricle mildly reduced right ventricle systolic function mild bilateral enlargement mild pulmonary hypertension moderate aortic sclerosis  Obtain updated echo  Initiating Lasix 40 IV twice daily as patient is not on a diuretic except for spironolactone at home  I's and O's  Daily weights  Fluid restriction of 1.5 L  Cardio consulted recs appreciated      Hyponatremia  Pt with a Na of 128 on admission  Most likely d/t hypervolemia  Lasix IV bid  Fl restriction of 1.5  Continue to trend  Deferring hyponatremic labs at this time  SIRS (systemic inflammatory response syndrome) (HCC)  See mgx above  Ua negative  Continue to trend fever curve and WBC  BC pending  Anemia of chronic disease  Hemoglobin on admission 9.9  Baseline appears to be between 10 and 11  Most likely secondary to CKD  Continue to trend  Transfuse when less than 7  Benign essential hypertension  Continue home regimen of  amlodipine bisoprolol and Aldactone at higher parameters due to need for Lasix  Stage 3a chronic kidney disease (HCC)  Lab Results   Component Value Date    EGFR 40 11/14/2024    EGFR 41 11/08/2024    EGFR 47 11/02/2024    CREATININE 1.46 (H) 11/14/2024    CREATININE 1.45 (H) 11/08/2024    CREATININE 1.29 11/02/2024     Creatinine on admission 1.46  Baseline appears to be around 1.2-1.4  Not meeting KDIGO criteria  Continue to trend  Avoid nephrotoxic agents hypotension and contrast  I's and O's    Permanent atrial fibrillation (HCC)  RC bisoprolol  AC Eliquis  Continue  Chronic respiratory failure (HCC)  Since previous admission patient has not been able to be weaned off of oxygen  Now requiring 2 to 3 L nasal cannula at baseline  Patient requiring 3.5 L nasal cannula on admission  Continue to wean as tolerated        VTE Pharmacologic Prophylaxis: VTE Score: 4 Moderate Risk (Score 3-4) - Pharmacological DVT Prophylaxis Ordered: apixaban (Eliquis).  Code Status: Prior Level 3  Discussion with family: Updated  (daughter) at bedside.    Anticipated Length of Stay: Patient will be admitted on an inpatient basis with an anticipated length of stay of greater than 2 midnights secondary to achfe.    History of Present Illness   Chief Complaint: sob    Jose Mullins is a 89 y.o. male with a PMH of CHF, hypertension, A-fib on Eliquis, anemia, CKD and chronic respiratory failure on 2 to 3 L nasal cannula who presents with worsening sob.  Imaging on admission showing pulmonary congestion.  Will initiate Lasix IV twice daily cardiology consulted echo ordered.  Possible component of COVID residual effects as patient is still requiring 2 L nasal cannula.  Patient also meeting SIRS criteria blood cultures obtained however no source of infection continue to monitor fever curve and leukocytes.    Review of Systems   All other systems reviewed and are negative.      Historical Information   Past Medical History:    Diagnosis Date    A-fib ([facility])     Anemia     Carpal tunnel syndrome, unspecified upper limb     Cataract     last assessed: 8/18/2012    COVID-19 09/06/2023    GERD (gastroesophageal reflux disease)     Glaucoma     Hypertension     last assessed: 8/23/2012    Intervertebral disc disease     PVD (peripheral vascular disease) ([facility])     Stomach disorder 11/2021    Syncope 03/12/2018     Past Surgical History:   Procedure Laterality Date    CATARACT EXTRACTION      COLONOSCOPY      HERNIA REPAIR      NEUROPLASTY / TRANSPOSITION MEDIAN NERVE AT CARPAL TUNNEL      VT LAPAROSCOPY SURG CHOLECYSTECTOMY N/A 09/14/2016    Procedure: LAPAROSCOPIC CHOLECYSTECTOMY ;  Surgeon: Christo Cristina MD;  Location: [facility];  Service: General    UPPER GASTROINTESTINAL ENDOSCOPY       Social History     Tobacco Use    Smoking status: Former     Current packs/day: 0.50     Average packs/day: 0.5 packs/day for 35.0 years (17.5 ttl pk-yrs)     Types: Cigarettes    Smokeless tobacco: Former     Types: Snuff    Tobacco comments:     quit 30 years ago    Vaping Use    Vaping status: Former   Substance and Sexual Activity    Alcohol use: Not Currently    Drug use: Never    Sexual activity: Not Currently     Partners: Female     E-Cigarette/Vaping    E-Cigarette Use Former User      E-Cigarette/Vaping Substances    Nicotine No     THC No     CBD No     Flavoring No     Other No     Unknown No      Family History   Problem Relation Age of Onset    Glaucoma Mother     Cancer Father     Heart disease Father     Glaucoma Father     Alzheimer's disease Family     Heart attack Family      Social History:  Marital Status:    Occupation:   Patient Pre-hospital Living Situation: Home, With other family member: daughter  Patient Pre-hospital Level of Mobility: walks with walker  Patient Pre-hospital Diet Restrictions: none    Meds/Allergies   I have reviewed home medications using recent Epic encounter.  Prior to Admission medications    Medication  Sig Start Date End Date Taking? Authorizing Provider   allopurinol (ZYLOPRIM) 100 mg tablet Take 1 tablet (100 mg total) by mouth daily 10/10/24  Yes Colt Shin MD   amitriptyline (ELAVIL) 10 mg tablet TAKE 3 TABLETS BY MOUTH AT BEDTIME 11/13/24  Yes Moises Ham MD   amLODIPine (NORVASC) 5 mg tablet Take 1 tablet (5 mg total) by mouth daily at bedtime 10/1/24  Yes Colt Shin MD   apixaban (ELIQUIS) 2.5 mg Take 1 tablet (2.5 mg total) by mouth 2 (two) times a day 6/27/24  Yes Moises Ham MD   bisoprolol (ZEBETA) 5 mg tablet Take 0.5 tablets (2.5 mg total) by mouth daily 10/1/24  Yes Colt Shin MD   brimonidine tartrate 0.2 % ophthalmic solution INSTILL 1 DROP TWICE DAILY IN THE RIGHT EYE 10/3/24  Yes Moises Ham MD   cholecalciferol (VITAMIN D3) 1,000 units tablet Take 2,000 Units by mouth daily    Yes Historical Provider, MD   co-enzyme Q-10 30 MG capsule Take 100 mg by mouth daily     Yes Historical Provider, MD   diphenhydrAMINE-acetaminophen (TYLENOL PM)  MG TABS Take 2 tablets by mouth daily at bedtime as needed for sleep   Yes Historical Provider, MD   Dorzolamide HCl-Timolol Mal PF 2-0.5 % SOLN ONE DROP INTO EACH EYE TWICE DAILY 9/27/22  Yes Historical Provider, MD   Empagliflozin (JARDIANCE) 10 MG TABS tablet Take 1 tablet (10 mg total) by mouth every morning 10/1/24  Yes Colt Shin MD   finasteride (PROSCAR) 5 mg tablet TAKE 1 TABLET (5 MG TOTAL) BY MOUTH DAILY. 11/13/24  Yes Mehnaz Garcia PA-C   gabapentin (NEURONTIN) 100 mg capsule Take 1 capsule (100 mg total) by mouth 2 (two) times a day 11/2/24  Yes Helga Lester MD   guaiFENesin (MUCINEX) 600 mg 12 hr tablet Take 2 tablets (1,200 mg total) by mouth every 12 (twelve) hours for 15 days 11/2/24 11/17/24 Yes Helga Lester MD   latanoprost (XALATAN) 0.005 % ophthalmic solution Administer 1 drop to both eyes daily at bedtime   Yes Historical Provider, MD   multivitamin-iron-minerals-folic acid  (CENTRUM) chewable tablet Chew 1 tablet daily.   Yes Historical Provider, MD   omeprazole (PriLOSEC) 20 mg delayed release capsule TAKE 1 CAPSULE BY MOUTH EVERY DAY 11/1/24  Yes Moises Ham MD   Rhopressa 0.02 % SOLN INSTILL 1 DROP INTO RIGHT EYE ONCE A DAY 9/11/23  Yes Historical Provider, MD   spironolactone (ALDACTONE) 25 mg tablet Take 1 tablet (25 mg total) by mouth daily 10/24/24  Yes Colt Shin MD   Garlic 1000 MG CAPS Take 1,000 mg by mouth in the morning    Historical Provider, MD     Allergies   Allergen Reactions    Bempedoic Acid Other (See Comments)     Gout    Atorvastatin GI Intolerance     Other reaction(s): GI upset    Ezetimibe Other (See Comments)     myalgia  Other reaction(s): Other (See Comments)  Myalgia  Other reaction(s): Myalgia  myalgia    Statins Rash       Objective :  Temp:  [97.9 °F (36.6 °C)-98.1 °F (36.7 °C)] 97.9 °F (36.6 °C)  HR:  [] 105  BP: (124-170)/() 124/95  Resp:  [20-25] 20  SpO2:  [97 %-100 %] 97 %  O2 Device: Nasal cannula  Nasal Cannula O2 Flow Rate (L/min):  [3 L/min-3.5 L/min] 3.5 L/min    Physical Exam  Vitals and nursing note reviewed.   Constitutional:       General: He is not in acute distress.     Appearance: He is normal weight. He is not ill-appearing.   HENT:      Head: Normocephalic and atraumatic.   Cardiovascular:      Rate and Rhythm: Normal rate. Rhythm irregular.      Pulses: Normal pulses.      Heart sounds: Normal heart sounds.   Pulmonary:      Effort: Pulmonary effort is normal.      Breath sounds: Rales present.      Comments: 3.5 L  Abdominal:      General: Abdomen is flat. Bowel sounds are normal.      Palpations: Abdomen is soft.   Musculoskeletal:      Right lower leg: Edema present.      Left lower leg: Edema present.   Skin:     General: Skin is warm.   Neurological:      General: No focal deficit present.      Mental Status: He is alert and oriented to person, place, and time.          Lines/Drains:            Lab  Results: I have reviewed the following results:  Results from last 7 days   Lab Units 11/14/24  1427 11/14/24  1412   WBC Thousand/uL  --  6.65   HEMOGLOBIN g/dL  --  9.9*   I STAT HEMOGLOBIN g/dl 10.2*  --    HEMATOCRIT %  --  31.3*   HEMATOCRIT, ISTAT % 30*  --    PLATELETS Thousands/uL  --  218   SEGS PCT %  --  63   LYMPHO PCT %  --  12*   MONO PCT %  --  17*   EOS PCT %  --  7*     Results from last 7 days   Lab Units 11/14/24  1521 11/14/24  1427 11/14/24  1412   SODIUM mmol/L  --   --  128*   POTASSIUM mmol/L 4.9  --  5.7*   CHLORIDE mmol/L  --   --  97   CO2 mmol/L  --   --  26   CO2, I-STAT mmol/L  --  26  --    BUN mg/dL  --   --  27*   CREATININE mg/dL  --   --  1.46*   ANION GAP mmol/L  --   --  5   CALCIUM mg/dL  --   --  9.1   GLUCOSE RANDOM mg/dL  --   --  103     Results from last 7 days   Lab Units 11/14/24  1510   INR  1.20*         Lab Results   Component Value Date    HGBA1C 5.4 12/03/2018     Results from last 7 days   Lab Units 11/14/24  1442 11/14/24  1412   LACTIC ACID mmol/L 0.5  --    PROCALCITONIN ng/ml  --  0.25       Imaging Results Review: I personally reviewed the following image studies in PACS and associated radiology reports: chest xray. My interpretation of the radiology images/reports is: Pulmonary congestion.  Other Study Results Review: EKG was reviewed.  EKG was personally reviewed and my interpretation is: Personally Reviewed. Atrial fibrillation. ..    Administrative Statements   I have spent a total time of 81 minutes in caring for this patient on the day of the visit/encounter including Diagnostic results, Prognosis, Risks and benefits of tx options, Instructions for management, Patient and family education, Importance of tx compliance, Risk factor reductions, Impressions, Counseling / Coordination of care, Documenting in the medical record, Reviewing / ordering tests, medicine, procedures  , Obtaining or reviewing history  , and Communicating with other healthcare  professionals .    ** Please Note: This note has been constructed using a voice recognition system. **

## 2024-11-14 NOTE — ASSESSMENT & PLAN NOTE
Continue home regimen of amlodipine bisoprolol and Aldactone at higher parameters due to need for Lasix

## 2024-11-14 NOTE — ASSESSMENT & PLAN NOTE
Since previous admission patient has not been able to be weaned off of oxygen  Now requiring 2 to 3 L nasal cannula at baseline  Patient requiring 3.5 L nasal cannula on admission  Continue to wean as tolerated

## 2024-11-14 NOTE — ASSESSMENT & PLAN NOTE
Lab Results   Component Value Date    EGFR 40 11/14/2024    EGFR 41 11/08/2024    EGFR 47 11/02/2024    CREATININE 1.46 (H) 11/14/2024    CREATININE 1.45 (H) 11/08/2024    CREATININE 1.29 11/02/2024     Creatinine on admission 1.46  Baseline appears to be around 1.2-1.4  Not meeting KDIGO criteria  Continue to trend  Avoid nephrotoxic agents hypotension and contrast  I's and O's

## 2024-11-14 NOTE — ASSESSMENT & PLAN NOTE
Pt with a Na of 128 on admission  Most likely d/t hypervolemia  Lasix IV bid  Fl restriction of 1.5  Continue to trend  Deferring hyponatremic labs at this time

## 2024-11-14 NOTE — ED PROVIDER NOTES
Time reflects when diagnosis was documented in both MDM as applicable and the Disposition within this note       Time User Action Codes Description Comment    11/14/2024  4:18 PM Nataliia Huerta Add [R06.09] Dyspnea on exertion     11/14/2024  4:18 PM Nataliia Huerta Add [E87.1] Hyponatremia     11/14/2024  4:19 PM Nataliia Huerta Add [I50.9] CHF (congestive heart failure) (HCC)     11/14/2024  4:19 PM Nataliia Huerta Add [D64.9] Symptomatic anemia           ED Disposition       ED Disposition   Admit    Condition   Stable    Date/Time   Thu Nov 14, 2024  4:18 PM    Comment   Case was discussed with Dr. Lester and the patient's admission status was agreed to be Admission Status: inpatient status to the service of Dr. Lester.               Assessment & Plan       Medical Decision Making  Differential diagnosis includes, but is not limited to, CHF exacerbation, pneumonia, pleural effusion, acute respiratory insufficiency, symptomatic anemia, acute dysrhythmia, electrolyte disturbances, UTI, etc. Will obtain labs and imaging. Wheezing noted, will order duoneb. No fluid overload on physical exam.    Patient requiring supplemental oxygen 3-4L in the ED, in no acute respiratory distress. Hyponatremia noted at 128 (135 6 days ago), Hgb 9.9 (11.7 12 days ago). CXR with moderate pulmonary congestion, . Initial trop 14, delta -1, ECG available for review in patient chart. Discussed elevated D-dimer with [facility] attending, Dr. Lester, however imaging deferred at this time due to history of elevated d-dimer and patient currently on eliquis. Other labs as noted below. Will admit to [facility] service for hyponatremia, symptomatic anemia, CHF, etc. Patient agreeable to admission.    Amount and/or Complexity of Data Reviewed  Labs: ordered. Decision-making details documented in ED Course.  Radiology: ordered.    Risk  Prescription drug management.  Decision regarding hospitalization.        ED Course as of 11/14/24 1705   Thu Nov 14,  2024   1409 Patient in the low 80s on 2L on arrival. Currently on 4-5L NC, sat high 90s   1522 Sodium(!): 128  135, 6 days ago   1526 Potassium(!): 5.7  Hemolyzed, repeating   1609 Message sent to [employer]   sodium chloride (PF) 0.9 % injection 3 mL (has no administration in time range)   ipratropium-albuterol (DUO-NEB) 0.5-2.5 mg/3 mL inhalation solution 3 mL (3 mL Nebulization Given 11/14/24 1420)       ED Risk Strat Scores                           SBIRT 20yo+      Flowsheet Row Most Recent Value   Initial Alcohol Screen: US AUDIT-C     1. How often do you have a drink containing alcohol? 0 Filed at: 11/14/2024 1352   2. How many drinks containing alcohol do you have on a typical day you are drinking?  0 Filed at: 11/14/2024 1352   3a. Male UNDER 65: How often do you have five or more drinks on one occasion? 0 Filed at: 11/14/2024 1352   3b. FEMALE Any Age, or MALE 65+: How often do you have 4 or more drinks on one occassion? 0 Filed at: 11/14/2024 1352   Audit-C Score 0 Filed at: 11/14/2024 1352   ROSSY: How many times in the past year have you...    Used an illegal drug or used a prescription medication for non-medical reasons? Never Filed at: 11/14/2024 1352                            History of Present Illness       Chief Complaint   Patient presents with    Shortness of Breath     Patient presents to the ED via EMS with c/o SOB requiring increased o2 (baseline 2L) states had covid 10/30        Past Medical History:   Diagnosis Date    A-fib (HCC)     Anemia     Carpal tunnel syndrome, unspecified upper limb     Cataract     last assessed: 8/18/2012    COVID-19 09/06/2023    GERD (gastroesophageal reflux disease)     Glaucoma     Hypertension     last assessed: 8/23/2012    Intervertebral disc disease     PVD (peripheral vascular disease) (HCC)     Stomach disorder 11/2021    Syncope 03/12/2018      Past Surgical History:   Procedure Laterality Date    CATARACT EXTRACTION      COLONOSCOPY       HERNIA REPAIR      NEUROPLASTY / TRANSPOSITION MEDIAN NERVE AT CARPAL TUNNEL      MO LAPAROSCOPY SURG CHOLECYSTECTOMY N/A 09/14/2016    Procedure: LAPAROSCOPIC CHOLECYSTECTOMY ;  Surgeon: Christo Cristina MD;  Location: [facility];  Service: General    UPPER GASTROINTESTINAL ENDOSCOPY        Family History   Problem Relation Age of Onset    Glaucoma Mother     Cancer Father     Heart disease Father     Glaucoma Father     Alzheimer's disease Family     Heart attack Family       Social History     Tobacco Use    Smoking status: Former     Current packs/day: 0.50     Average packs/day: 0.5 packs/day for 35.0 years (17.5 ttl pk-yrs)     Types: Cigarettes    Smokeless tobacco: Former     Types: Snuff    Tobacco comments:     quit 30 years ago    Vaping Use    Vaping status: Former   Substance Use Topics    Alcohol use: Not Currently    Drug use: Never      E-Cigarette/Vaping    E-Cigarette Use Former User       E-Cigarette/Vaping Substances    Nicotine No     THC No     CBD No     Flavoring No     Other No     Unknown No       I have reviewed and agree with the history as documented.     Patient is a 89-year-old male with past medical history of anemia, A-fib on eliquis, CHF, COVID, hypertension, PVD, amongst other chronic medical conditions presenting to the emergency department for evaluation of complaints of dyspnea on exertion, fatigue and generalized weakness. Patient was recently admitted to the hospital 10/30/2024 to 11/02/2020 at which time he met sepsis criteria on admission secondary to COVID and flu. Patient has been intermittently requiring supplemental oxygen 2L since last admission. Patient reports that he felt in a good state of health however noticed a change about 2 days ago and describes he is unable to walk without becoming short of breath and fatigued. Patient denies fever, chills, cough, chest pain, chest tightness, numbness, recent falls, trauma.       Shortness of Breath  Associated symptoms: no  abdominal pain, no chest pain, no cough, no ear pain, no fever, no rash, no sore throat and no vomiting        Review of Systems   Constitutional:  Negative for chills and fever.   HENT:  Negative for ear pain and sore throat.    Eyes:  Negative for pain and visual disturbance.   Respiratory:  Positive for shortness of breath. Negative for cough and chest tightness.    Cardiovascular:  Negative for chest pain, palpitations and leg swelling.   Gastrointestinal:  Negative for abdominal pain, blood in stool, nausea and vomiting.   Genitourinary:  Negative for dysuria.   Musculoskeletal:  Negative for arthralgias and back pain.   Skin:  Negative for color change and rash.   Neurological:  Positive for weakness (generalized). Negative for dizziness, seizures, syncope, light-headedness and numbness.   Psychiatric/Behavioral:  Negative for confusion.    All other systems reviewed and are negative.        Objective       ED Triage Vitals [11/14/24 1351]   Temperature Pulse Blood Pressure Respirations SpO2 Patient Position - Orthostatic VS   98.1 °F (36.7 °C) 97 157/75 20 98 % Sitting      Temp Source Heart Rate Source BP Location FiO2 (%) Pain Score    Oral Monitor Left arm -- No Pain      Vitals      Date and Time Temp Pulse SpO2 Resp BP Pain Score FACES Pain Rating User   11/14/24 1646 -- -- -- -- -- No Pain -- OE   11/14/24 1643 97.9 °F (36.6 °C) 105 97 % 20 124/95 -- -- DII   11/14/24 1600 -- 103 98 % 20 155/115 -- -- ELF   11/14/24 1530 -- 97 99 % 20 170/75 -- -- RN   11/14/24 1515 -- 105 99 % 20 152/84 -- -- RN   11/14/24 1430 -- 95 100 % 21 139/60 -- -- RN   11/14/24 1400 -- 96 99 % 4L 25 155/66 -- --    11/14/24 1351 98.1 °F (36.7 °C) 97 98 % 20 157/75 No Pain --             Physical Exam  Vitals and nursing note reviewed.   Constitutional:       General: He is not in acute distress.     Appearance: He is well-developed. He is ill-appearing.      Comments: Appears fatigued, but is conversational   HENT:       Head: Normocephalic and atraumatic.   Eyes:      Conjunctiva/sclera: Conjunctivae normal.   Cardiovascular:      Rate and Rhythm: Normal rate. Rhythm irregular.   Pulmonary:      Effort: Pulmonary effort is normal. No respiratory distress.      Breath sounds: Wheezing present.   Abdominal:      Palpations: Abdomen is soft.      Tenderness: There is no abdominal tenderness.   Musculoskeletal:         General: No swelling. Normal range of motion.      Cervical back: Neck supple.      Right lower leg: No edema.      Left lower leg: No edema.   Skin:     General: Skin is warm and dry.      Capillary Refill: Capillary refill takes less than 2 seconds.   Neurological:      General: No focal deficit present.      Mental Status: He is alert and oriented to person, place, and time. Mental status is at baseline.      GCS: GCS eye subscore is 4. GCS verbal subscore is 5. GCS motor subscore is 6.      Sensory: Sensation is intact.      Motor: Motor function is intact.      Comments: Patient is awake, alert and oriented to person, place, time and situation. No lateralizing motor or sensory deficits.    Psychiatric:         Mood and Affect: Mood normal.         Results Reviewed       Procedure Component Value Units Date/Time    HS Troponin I 2hr [[id number]]  (Normal) Collected: 11/14/24 1624    Lab Status: Final result Specimen: Blood from Arm, Left Updated: 11/14/24 1701     hs TnI 2hr 13 ng/L      Delta 2hr hsTnI -1 ng/L     HS Troponin I 4hr [[id number]]     Lab Status: No result Specimen: Blood     Potassium [[id number]]  (Normal) Collected: 11/14/24 1521    Lab Status: Final result Specimen: Blood from Arm, Right Updated: 11/14/24 1540     Potassium 4.9 mmol/L     D-Dimer [[id number]]  (Abnormal) Collected: 11/14/24 1510    Lab Status: Final result Specimen: Blood from Arm, Right Updated: 11/14/24 1533     D-Dimer, Quant 1.24 ug/ml FEU     Narrative:      In the evaluation for possible pulmonary embolism, in the appropriate  (Well's Score of 4 or less) patient, the age adjusted d-dimer cutoff for this patient can be calculated as:    Age x 0.01 (in ug/mL) for Age-adjusted D-dimer exclusion threshold for a patient over 50 years.    Protime-INR [[id number]]  (Abnormal) Collected: 11/14/24 1510    Lab Status: Final result Specimen: Blood from Arm, Right Updated: 11/14/24 1531     Protime 15.7 seconds      INR 1.20    Narrative:      INR Therapeutic Range    Indication                                             INR Range      Atrial Fibrillation                                               2.0-3.0  Hypercoagulable State                                    2.0.2.3  Left Ventricular Asist Device                            2.0-3.0  Mechanical Heart Valve                                  -    Aortic(with afib, MI, embolism, HF, LA enlargement,    and/or coagulopathy)                                     2.0-3.0 (2.5-3.5)     Mitral                                                             2.5-3.5  Prosthetic/Bioprosthetic Heart Valve               2.0-3.0  Venous thromboembolism (VTE: VT, PE        2.0-3.0    APTT [[id number]]  (Abnormal) Collected: 11/14/24 1510    Lab Status: Final result Specimen: Blood from Arm, Right Updated: 11/14/24 1531     PTT 44 seconds     Urine Microscopic [[id number]]  (Normal) Collected: 11/14/24 1511    Lab Status: Final result Specimen: Urine, Other Updated: 11/14/24 1526     RBC, UA None Seen /hpf      WBC, UA None Seen /hpf      Epithelial Cells None Seen /hpf      Bacteria, UA None Seen /hpf     UA w Reflex to Microscopic w Reflex to Culture [[id number]]  (Abnormal) Collected: 11/14/24 1511    Lab Status: Final result Specimen: Urine, Other Updated: 11/14/24 1518     Color, UA Yellow     Clarity, UA Clear     Specific Gravity, UA <1.005     pH, UA 5.5     Leukocytes, UA Negative     Nitrite, UA Negative     Protein, UA Trace mg/dl      Glucose, UA 1000 (1%) mg/dl      Ketones, UA Negative mg/dl       Urobilinogen, UA <2.0 mg/dl      Bilirubin, UA Negative     Occult Blood, UA Negative    Basic metabolic panel [[id number]]  (Abnormal) Collected: 11/14/24 1412    Lab Status: Final result Specimen: Blood from Arm, Left Updated: 11/14/24 1511     Sodium 128 mmol/L      Potassium 5.7 mmol/L      Chloride 97 mmol/L      CO2 26 mmol/L      ANION GAP 5 mmol/L      BUN 27 mg/dL      Creatinine 1.46 mg/dL      Glucose 103 mg/dL      Calcium 9.1 mg/dL      eGFR 40 ml/min/1.73sq m     Narrative:      National Kid[employer] guidelines for Chronic Kidney Disease (CKD):     Stage 1 with normal or high GFR (GFR > 90 mL/min/1.73 square meters)    Stage 2 Mild CKD (GFR = 60-89 mL/min/1.73 square meters)    Stage 3A Moderate CKD (GFR = 45-59 mL/min/1.73 square meters)    Stage 3B Moderate CKD (GFR = 30-44 mL/min/1.73 square meters)    Stage 4 Severe CKD (GFR = 15-29 mL/min/1.73 square meters)    Stage 5 End Stage CKD (GFR <15 mL/min/1.73 square meters)  Note: GFR calculation is accurate only with a steady state creatinine    Magnesium [[id number]]  (Normal) Collected: 11/14/24 1412    Lab Status: Final result Specimen: Blood from Arm, Left Updated: 11/14/24 1511     Magnesium 2.1 mg/dL     Lactic acid [[id number]]  (Normal) Collected: 11/14/24 1442    Lab Status: Final result Specimen: Blood from Arm, Right Updated: 11/14/24 1509     LACTIC ACID 0.5 mmol/L     Narrative:      Result may be elevated if tourniquet was used during collection.    B-Type Natriuretic Peptide(BNP) [[id number]]  (Abnormal) Collected: 11/14/24 1412    Lab Status: Final result Specimen: Blood from Arm, Left Updated: 11/14/24 1505      pg/mL     HS Troponin 0hr (reflex protocol) [[id number]]  (Normal) Collected: 11/14/24 1412    Lab Status: Final result Specimen: Blood from Arm, Left Updated: 11/14/24 1504     hs TnI 0hr 14 ng/L     Procalcitonin [[id number]]  (Normal) Collected: 11/14/24 1412    Lab Status: Final result Specimen: Blood from  Arm, Left Updated: 11/14/24 1504     Procalcitonin 0.25 ng/ml     Blood culture #2 [[id number]] Collected: 11/14/24 1442    Lab Status: In process Specimen: Blood from Arm, Left Updated: 11/14/24 1502    Blood culture #1 [[id number]] Collected: 11/14/24 1442    Lab Status: In process Specimen: Blood from Arm, Right Updated: 11/14/24 1502    CBC and differential [[id number]]  (Abnormal) Collected: 11/14/24 1412    Lab Status: Final result Specimen: Blood from Arm, Left Updated: 11/14/24 1440     WBC 6.65 Thousand/uL      RBC 3.00 Million/uL      Hemoglobin 9.9 g/dL      Hematocrit 31.3 %       fL      MCH 33.0 pg      MCHC 31.6 g/dL      RDW 12.9 %      MPV 11.5 fL      Platelets 218 Thousands/uL      nRBC 0 /100 WBCs      Segmented % 63 %      Immature Grans % 1 %      Lymphocytes % 12 %      Monocytes % 17 %      Eosinophils Relative 7 %      Basophils Relative 0 %      Absolute Neutrophils 4.26 Thousands/µL      Absolute Immature Grans 0.03 Thousand/uL      Absolute Lymphocytes 0.80 Thousands/µL      Absolute Monocytes 1.10 Thousand/µL      Eosinophils Absolute 0.44 Thousand/µL      Basophils Absolute 0.02 Thousands/µL     POCT Blood Gas (CG8+) [[id number]]  (Abnormal) Collected: 11/14/24 1427    Lab Status: Final result Specimen: Venous Updated: 11/14/24 1431     ph, Edwin ISTAT 7.373     pCO2, Edwin i-STAT 41.9 mm HG      pO2, Edwin i-STAT 32.0 mm HG      BE, i-STAT -1 mmol/L      HCO3, Edwin i-STAT 24.4 mmol/L      CO2, i-STAT 26 mmol/L      O2 Sat, i-STAT 59 %      SODIUM, I-STAT 127 mmol/l      Potassium, i-STAT 5.7 mmol/L      Calcium, Ionized i-STAT 1.13 mmol/L      Hct, i-STAT 30 %      Hgb, i-STAT 10.2 g/dl      Glucose, i-STAT 102 mg/dl      Specimen Type VENOUS            X-ray chest 1 view portable   Final Interpretation by Marlin Tellez MD (11/14 1530)      Moderate pulmonary venous congestion.            Workstation performed: [id number]             ECG 12 Lead Documentation Only    Date/Time:  11/14/2024 3:18 PM    Performed by: Nataliia Huerta PA-C  Authorized by: Nataliia Huerta PA-C    Indications / Diagnosis:  Weakness  ECG reviewed: moon.    Patient location:  ED  Rate:     ECG rate:  98  Rhythm:     Rhythm: atrial fibrillation    ECG 12 Lead Documentation Only    Date/Time: 11/14/2024 4:23 PM    Performed by: Nataliia Huerta PA-C  Authorized by: Nataliia Huerta PA-C    Indications / Diagnosis:  Weakness  ECG reviewed: mary ellen.    Rate:     ECG rate:  100  Rhythm:     Rhythm: atrial fibrillation        ED Medication and Procedure Management   Prior to Admission Medications   Prescriptions Last Dose Informant Patient Reported? Taking?   Dorzolamide HCl-Timolol Mal PF 2-0.5 % SOLN 11/14/2024 Child Yes Yes   Sig: ONE DROP INTO EACH EYE TWICE DAILY   Empagliflozin (JARDIANCE) 10 MG TABS tablet 11/14/2024 Child No Yes   Sig: Take 1 tablet (10 mg total) by mouth every morning   Garlic 1000 MG CAPS  Child Yes No   Sig: Take 1,000 mg by mouth in the morning   Rhopressa 0.02 % SOLN 11/14/2024 Child Yes Yes   Sig: INSTILL 1 DROP INTO RIGHT EYE ONCE A DAY   allopurinol (ZYLOPRIM) 100 mg tablet 11/14/2024 Child No Yes   Sig: Take 1 tablet (100 mg total) by mouth daily   amLODIPine (NORVASC) 5 mg tablet 11/13/2024 Child No Yes   Sig: Take 1 tablet (5 mg total) by mouth daily at bedtime   amitriptyline (ELAVIL) 10 mg tablet 11/13/2024  No Yes   Sig: TAKE 3 TABLETS BY MOUTH AT BEDTIME   apixaban (ELIQUIS) 2.5 mg 11/14/2024 at  9:00 AM Child No Yes   Sig: Take 1 tablet (2.5 mg total) by mouth 2 (two) times a day   bisoprolol (ZEBETA) 5 mg tablet 11/13/2024 Child No Yes   Sig: Take 0.5 tablets (2.5 mg total) by mouth daily   brimonidine tartrate 0.2 % ophthalmic solution 11/14/2024 Morning Child No Yes   Sig: INSTILL 1 DROP TWICE DAILY IN THE RIGHT EYE   cholecalciferol (VITAMIN D3) 1,000 units tablet 11/14/2024 Child Yes Yes   Sig: Take 2,000 Units by mouth daily    co-enzyme Q-10 30 MG capsule 11/14/2024 Child Yes Yes    Sig: Take 100 mg by mouth daily     diphenhydrAMINE-acetaminophen (TYLENOL PM)  MG TABS 11/13/2024 Child Yes Yes   Sig: Take 2 tablets by mouth daily at bedtime as needed for sleep   finasteride (PROSCAR) 5 mg tablet 11/14/2024  No Yes   Sig: TAKE 1 TABLET (5 MG TOTAL) BY MOUTH DAILY.   gabapentin (NEURONTIN) 100 mg capsule 11/14/2024 Morning  No Yes   Sig: Take 1 capsule (100 mg total) by mouth 2 (two) times a day   guaiFENesin (MUCINEX) 600 mg 12 hr tablet 11/14/2024 Morning  No Yes   Sig: Take 2 tablets (1,200 mg total) by mouth every 12 (twelve) hours for 15 days   latanoprost (XALATAN) 0.005 % ophthalmic solution 11/14/2024 Child Yes Yes   Sig: Administer 1 drop to both eyes daily at bedtime   multivitamin-iron-minerals-folic acid (CENTRUM) chewable tablet 11/14/2024 Child Yes Yes   Sig: Chew 1 tablet daily.   omeprazole (PriLOSEC) 20 mg delayed release capsule 11/14/2024  No Yes   Sig: TAKE 1 CAPSULE BY MOUTH EVERY DAY   spironolactone (ALDACTONE) 25 mg tablet 11/14/2024  No Yes   Sig: Take 1 tablet (25 mg total) by mouth daily      Facility-Administered Medications: None     Current Discharge Medication List        CONTINUE these medications which have NOT CHANGED    Details   allopurinol (ZYLOPRIM) 100 mg tablet Take 1 tablet (100 mg total) by mouth daily  Qty: 90 tablet, Refills: 1    Associated Diagnoses: Arthralgia of knee, unspecified laterality      amitriptyline (ELAVIL) 10 mg tablet TAKE 3 TABLETS BY MOUTH AT BEDTIME  Qty: 270 tablet, Refills: 1    Associated Diagnoses: Irritable bowel syndrome with diarrhea      amLODIPine (NORVASC) 5 mg tablet Take 1 tablet (5 mg total) by mouth daily at bedtime  Qty: 90 tablet, Refills: 3    Associated Diagnoses: Essential (primary) hypertension      apixaban (ELIQUIS) 2.5 mg Take 1 tablet (2.5 mg total) by mouth 2 (two) times a day  Qty: 180 tablet, Refills: 1    Associated Diagnoses: Permanent atrial fibrillation (HCC)      bisoprolol (ZEBETA) 5 mg tablet  Take 0.5 tablets (2.5 mg total) by mouth daily  Qty: 45 tablet, Refills: 3    Associated Diagnoses: Permanent atrial fibrillation (HCC); Congestive heart failure, unspecified HF chronicity, unspecified heart failure type (HCC)      brimonidine tartrate 0.2 % ophthalmic solution INSTILL 1 DROP TWICE DAILY IN THE RIGHT EYE  Qty: 15 mL, Refills: 1    Associated Diagnoses: Glaucoma of right eye, unspecified glaucoma type      cholecalciferol (VITAMIN D3) 1,000 units tablet Take 2,000 Units by mouth daily       co-enzyme Q-10 30 MG capsule Take 100 mg by mouth daily        diphenhydrAMINE-acetaminophen (TYLENOL PM)  MG TABS Take 2 tablets by mouth daily at bedtime as needed for sleep      Dorzolamide HCl-Timolol Mal PF 2-0.5 % SOLN ONE DROP INTO EACH EYE TWICE DAILY      Empagliflozin (JARDIANCE) 10 MG TABS tablet Take 1 tablet (10 mg total) by mouth every morning  Qty: 30 tablet, Refills: 11    Associated Diagnoses: Dyspnea on exertion      finasteride (PROSCAR) 5 mg tablet TAKE 1 TABLET (5 MG TOTAL) BY MOUTH DAILY.  Qty: 90 tablet, Refills: 1    Associated Diagnoses: Dysuria      gabapentin (NEURONTIN) 100 mg capsule Take 1 capsule (100 mg total) by mouth 2 (two) times a day    Associated Diagnoses: Pain in both feet      Garlic 1000 MG CAPS Take 1,000 mg by mouth in the morning      guaiFENesin (MUCINEX) 600 mg 12 hr tablet Take 2 tablets (1,200 mg total) by mouth every 12 (twelve) hours for 15 days  Qty: 60 tablet, Refills: 0    Associated Diagnoses: Viral sepsis (HCC)      latanoprost (XALATAN) 0.005 % ophthalmic solution Administer 1 drop to both eyes daily at bedtime      multivitamin-iron-minerals-folic acid (CENTRUM) chewable tablet Chew 1 tablet daily.      omeprazole (PriLOSEC) 20 mg delayed release capsule TAKE 1 CAPSULE BY MOUTH EVERY DAY  Qty: 90 capsule, Refills: 1    Associated Diagnoses: Gastroesophageal reflux disease, unspecified whether esophagitis present      Rhopressa 0.02 % SOLN INSTILL 1  DROP INTO RIGHT EYE ONCE A DAY      spironolactone (ALDACTONE) 25 mg tablet Take 1 tablet (25 mg total) by mouth daily  Qty: 90 tablet, Refills: 3    Associated Diagnoses: Chronic diastolic congestive heart failure (HCC)           No discharge procedures on file.  ED SEPSIS DOCUMENTATION   Time reflects when diagnosis was documented in both MDM as applicable and the Disposition within this note       Time User Action Codes Description Comment    11/14/2024  4:18 PM Nataliia Huerta [R06.09] Dyspnea on exertion     11/14/2024  4:18 PM Nataliia Huerta [E87.1] Hyponatremia     11/14/2024  4:19 PM Nataliia Huerta [I50.9] CHF (congestive heart failure) (HCC)     11/14/2024  4:19 PM Nataliia Huerta [D64.9] Symptomatic anemia                  Nataliia Huerta PA-C  11/14/24 1640       Nataliia Huerta PA-C  11/14/24 1705

## 2024-11-14 NOTE — QUICK NOTE
Notified that pt had a fever of 101.7 rectally  Initiating isolyte 50 cc/hr and dc'd IV lasix  Dc'd cardio consult  CTA pe  Pna ag  MRSA cx  Sputum culture  Resp PP  Vanc and CTX    Helga Lester

## 2024-11-14 NOTE — PLAN OF CARE
Problem: PAIN - ADULT  Goal: Verbalizes/displays adequate comfort level or baseline comfort level  Description: Interventions:  - Encourage patient to monitor pain and request assistance  - Assess pain using appropriate pain scale  - Administer analgesics based on type and severity of pain and evaluate response  - Implement non-pharmacological measures as appropriate and evaluate response  - Consider cultural and social influences on pain and pain management  - Notify physician/advanced practitioner if interventions unsuccessful or patient reports new pain  Outcome: Progressing     Problem: INFECTION - ADULT  Goal: Absence or prevention of progression during hospitalization  Description: INTERVENTIONS:  - Assess and monitor for signs and symptoms of infection  - Monitor lab/diagnostic results  - Monitor all insertion sites, i.e. indwelling lines, tubes, and drains  - Monitor endotracheal if appropriate and nasal secretions for changes in amount and color  - [place] appropriate cooling/warming therapies per order  - Administer medications as ordered  - Instruct and encourage patient and family to use good hand hygiene technique  - Identify and instruct in appropriate isolation precautions for identified infection/condition  Outcome: Progressing  Goal: Absence of fever/infection during neutropenic period  Description: INTERVENTIONS:  - Monitor WBC    Outcome: Progressing

## 2024-11-14 NOTE — TELEPHONE ENCOUNTER
Kinsey called concerned with patients condition she advised patient to be taking back to the emergency room, however, declined and would only go if his primary care provider recommended him go. Patient was just discharged from ED with Sepsis due to covid/flu, [facility] nurse reported elevated HR of 103, diminished lung sounds very hard to hear any abnormal sounds, fever of 101. Gave tylenol, increased weakness. BP within range 120/80 Please reach out to patient and/or daughter who is with the patient now, if ED is advised she will call 911 for transport.

## 2024-11-14 NOTE — ASSESSMENT & PLAN NOTE
Wt Readings from Last 3 Encounters:   11/11/24 89.8 kg (198 lb)   11/02/24 89.9 kg (198 lb 3.2 oz)   10/24/24 92.9 kg (204 lb 12.8 oz)     Patient presenting due to shortness of breath  Patient recently admitted due to viral sepsis secondary to COVID and flu    On admission patient meeting SIRS criteria with tachycardia and tachypnea  No source of infection on admission  Troponins negative.  Lactic acid and procalcitonin negative  No other endorgan dysfunction noted  Patient with an elevated D-dimer of 1.24 however has had previous elevations of D-dimers patient is on Eliquis, will obtain venous Dopplers  Chest x-ray on admission showing pulmonary edema  BNP on admission 257  Echo done on 12/21/2023 showed an LVEF of 55 to 60% with mild diastolic dysfunction mildly dilated right ventricle mildly reduced right ventricle systolic function mild bilateral enlargement mild pulmonary hypertension moderate aortic sclerosis  Obtain updated echo  Initiating Lasix 40 IV twice daily as patient is not on a diuretic except for spironolactone at home  I's and O's  Daily weights  Fluid restriction of 1.5 L  Cardio consulted recs appreciated

## 2024-11-15 ENCOUNTER — APPOINTMENT (INPATIENT)
Dept: NON INVASIVE DIAGNOSTICS | Facility: HOSPITAL | Age: 89
DRG: 871 | End: 2024-11-15
Payer: COMMERCIAL

## 2024-11-15 ENCOUNTER — HOME HEALTH ADMISSION (OUTPATIENT)
Dept: HOME HEALTH SERVICES | Facility: HOME HEALTHCARE | Age: 89
End: 2024-11-15
Payer: COMMERCIAL

## 2024-11-15 PROBLEM — U07.1 SEPSIS DUE TO COVID-19 (HCC): Status: ACTIVE | Noted: 2019-04-06

## 2024-11-15 LAB
ANION GAP SERPL CALCULATED.3IONS-SCNC: 7 MMOL/L (ref 4–13)
AORTIC ROOT: 3.3 CM
AORTIC VALVE MEAN VELOCITY: 9.4 M/S
APICAL FOUR CHAMBER EJECTION FRACTION: 37 %
APTT PPP: 46 SECONDS (ref 23–34)
ASCENDING AORTA: 3.4 CM
ATRIAL RATE: 111 BPM
ATRIAL RATE: 97 BPM
AV AREA BY CONTINUOUS VTI: 1.5 CM2
AV AREA PEAK VELOCITY: 1.6 CM2
AV LVOT MEAN GRADIENT: 1 MMHG
AV LVOT PEAK GRADIENT: 1 MMHG
AV MEAN GRADIENT: 4 MMHG
AV PEAK GRADIENT: 9 MMHG
AV VALVE AREA: 1.45 CM2
AV VELOCITY RATIO: 0.37
B PARAP IS1001 DNA NPH QL NAA+NON-PROBE: NOT DETECTED
B PERT.PT PRMT NPH QL NAA+NON-PROBE: NOT DETECTED
BSA FOR ECHO PROCEDURE: 2.04 M2
BUN SERPL-MCNC: 24 MG/DL (ref 5–25)
C PNEUM DNA NPH QL NAA+NON-PROBE: NOT DETECTED
CALCIUM SERPL-MCNC: 9.1 MG/DL (ref 8.4–10.2)
CHLORIDE SERPL-SCNC: 99 MMOL/L (ref 96–108)
CO2 SERPL-SCNC: 27 MMOL/L (ref 21–32)
CREAT SERPL-MCNC: 1.19 MG/DL (ref 0.6–1.3)
DOP CALC AO PEAK VEL: 1.47 M/S
DOP CALC AO VTI: 28.77 CM
DOP CALC LVOT AREA: 4.15 CM2
DOP CALC LVOT CARDIAC INDEX: 2.3 L/MIN/M2
DOP CALC LVOT CARDIAC OUTPUT: 4.68 L/MIN
DOP CALC LVOT DIAMETER: 2.3 CM
DOP CALC LVOT PEAK VEL VTI: 10.08 CM
DOP CALC LVOT PEAK VEL: 0.55 M/S
DOP CALC LVOT STROKE INDEX: 19.6 ML/M2
DOP CALC LVOT STROKE VOLUME: 41.86
E WAVE DECELERATION TIME: 124 MS
E/A RATIO: 70
ERYTHROCYTE [DISTWIDTH] IN BLOOD BY AUTOMATED COUNT: 12.8 % (ref 11.6–15.1)
FLUAV RNA NPH QL NAA+NON-PROBE: NOT DETECTED
FLUBV RNA NPH QL NAA+NON-PROBE: NOT DETECTED
FRACTIONAL SHORTENING: 10 (ref 28–44)
GFR SERPL CREATININE-BSD FRML MDRD: 52 ML/MIN/1.73SQ M
GLUCOSE SERPL-MCNC: 127 MG/DL (ref 65–140)
HADV DNA NPH QL NAA+NON-PROBE: NOT DETECTED
HCOV 229E RNA NPH QL NAA+NON-PROBE: NOT DETECTED
HCOV HKU1 RNA NPH QL NAA+NON-PROBE: NOT DETECTED
HCOV NL63 RNA NPH QL NAA+NON-PROBE: NOT DETECTED
HCOV OC43 RNA NPH QL NAA+NON-PROBE: NOT DETECTED
HCT VFR BLD AUTO: 30.5 % (ref 36.5–49.3)
HGB BLD-MCNC: 9.6 G/DL (ref 12–17)
HMPV RNA NPH QL NAA+NON-PROBE: NOT DETECTED
HPIV1 RNA NPH QL NAA+NON-PROBE: NOT DETECTED
HPIV2 RNA NPH QL NAA+NON-PROBE: NOT DETECTED
HPIV3 RNA NPH QL NAA+NON-PROBE: NOT DETECTED
HPIV4 RNA NPH QL NAA+NON-PROBE: NOT DETECTED
INTERVENTRICULAR SEPTUM IN DIASTOLE (PARASTERNAL SHORT AXIS VIEW): 1.3 CM
INTERVENTRICULAR SEPTUM: 1.3 CM (ref 0.6–1.1)
L PNEUMO1 AG UR QL IA.RAPID: NEGATIVE
LAAS-AP2: 21.8 CM2
LAAS-AP4: 24.2 CM2
LEFT ATRIUM AREA SYSTOLE SINGLE PLANE A4C: 24.5 CM2
LEFT ATRIUM SIZE: 4.8 CM
LEFT ATRIUM VOLUME (MOD BIPLANE): 67 ML
LEFT ATRIUM VOLUME INDEX (MOD BIPLANE): 32.8 ML/M2
LEFT INTERNAL DIMENSION IN SYSTOLE: 3.8 CM (ref 2.1–4)
LEFT VENTRICLE DIASTOLIC VOLUME (MOD BIPLANE): 73 ML
LEFT VENTRICLE DIASTOLIC VOLUME INDEX (MOD BIPLANE): 35.8 ML/M2
LEFT VENTRICLE SYSTOLIC VOLUME (MOD BIPLANE): 47 ML
LEFT VENTRICLE SYSTOLIC VOLUME INDEX (MOD BIPLANE): 23 ML/M2
LEFT VENTRICULAR INTERNAL DIMENSION IN DIASTOLE: 4.2 CM (ref 3.5–6)
LEFT VENTRICULAR POSTERIOR WALL IN END DIASTOLE: 1.3 CM
LEFT VENTRICULAR STROKE VOLUME: 17 ML
LV EF: 35 %
LVSV (TEICH): 17 ML
M PNEUMO DNA NPH QL NAA+NON-PROBE: NOT DETECTED
MCH RBC QN AUTO: 32.4 PG (ref 26.8–34.3)
MCHC RBC AUTO-ENTMCNC: 31.5 G/DL (ref 31.4–37.4)
MCV RBC AUTO: 103 FL (ref 82–98)
MV E'TISSUE VEL-SEP: 9 CM/S
MV PEAK A VEL: 0.01 M/S
MV PEAK E VEL: 70 CM/S
MV STENOSIS PRESSURE HALF TIME: 36 MS
MV VALVE AREA P 1/2 METHOD: 6.11
PLATELET # BLD AUTO: 188 THOUSANDS/UL (ref 149–390)
PMV BLD AUTO: 10.3 FL (ref 8.9–12.7)
POTASSIUM SERPL-SCNC: 5.1 MMOL/L (ref 3.5–5.3)
QRS AXIS: 38 DEGREES
QRS AXIS: 40 DEGREES
QRS AXIS: 45 DEGREES
QRSD INTERVAL: 82 MS
QRSD INTERVAL: 84 MS
QRSD INTERVAL: 86 MS
QT INTERVAL: 230 MS
QT INTERVAL: 306 MS
QT INTERVAL: 308 MS
QTC INTERVAL: 329 MS
QTC INTERVAL: 393 MS
QTC INTERVAL: 394 MS
RBC # BLD AUTO: 2.96 MILLION/UL (ref 3.88–5.62)
RIGHT ATRIUM AREA SYSTOLE A4C: 37 CM2
RIGHT VENTRICLE ID DIMENSION: 4.5 CM
RSV RNA NPH QL NAA+NON-PROBE: NOT DETECTED
RV+EV RNA NPH QL NAA+NON-PROBE: NOT DETECTED
S PNEUM AG UR QL: NEGATIVE
SARS-COV-2 RNA NPH QL NAA+NON-PROBE: DETECTED
SL CV LEFT ATRIUM LENGTH A2C: 6.2 CM
SL CV LV EF: 55
SL CV PED ECHO LEFT VENTRICLE DIASTOLIC VOLUME (MOD BIPLANE) 2D: 79 ML
SL CV PED ECHO LEFT VENTRICLE SYSTOLIC VOLUME (MOD BIPLANE) 2D: 61 ML
SL CV TR MAX PG ANTEGRADE: 28 MMHG
SODIUM SERPL-SCNC: 133 MMOL/L (ref 135–147)
T WAVE AXIS: -23 DEGREES
T WAVE AXIS: 24 DEGREES
T WAVE AXIS: 40 DEGREES
TR MAX PG: 34 MMHG
TR PEAK VELOCITY: 2.9 M/S
TRICUSPID ANNULAR PLANE SYSTOLIC EXCURSION: 1.6 CM
TRICUSPID VALVE PEAK REGURGITATION VELOCITY: 2.93 M/S
TV MEAN GRADIENT: 21 MMHG
TV MV D: 2.2 M/S
TV VALVE AREA BY CONTINUITY EQUATION: 0.55 CM2
TV VTI: 76.39 CM
VANCOMYCIN SERPL-MCNC: 17.5 UG/ML (ref 10–20)
VENTRICULAR RATE: 100 BPM
VENTRICULAR RATE: 123 BPM
VENTRICULAR RATE: 98 BPM
WBC # BLD AUTO: 4.73 THOUSAND/UL (ref 4.31–10.16)

## 2024-11-15 PROCEDURE — 97167 OT EVAL HIGH COMPLEX 60 MIN: CPT

## 2024-11-15 PROCEDURE — 80202 ASSAY OF VANCOMYCIN: CPT | Performed by: STUDENT IN AN ORGANIZED HEALTH CARE EDUCATION/TRAINING PROGRAM

## 2024-11-15 PROCEDURE — 93010 ELECTROCARDIOGRAM REPORT: CPT | Performed by: INTERNAL MEDICINE

## 2024-11-15 PROCEDURE — 93306 TTE W/DOPPLER COMPLETE: CPT

## 2024-11-15 PROCEDURE — 87081 CULTURE SCREEN ONLY: CPT | Performed by: STUDENT IN AN ORGANIZED HEALTH CARE EDUCATION/TRAINING PROGRAM

## 2024-11-15 PROCEDURE — 80048 BASIC METABOLIC PNL TOTAL CA: CPT | Performed by: STUDENT IN AN ORGANIZED HEALTH CARE EDUCATION/TRAINING PROGRAM

## 2024-11-15 PROCEDURE — 85730 THROMBOPLASTIN TIME PARTIAL: CPT | Performed by: PHYSICIAN ASSISTANT

## 2024-11-15 PROCEDURE — 87449 NOS EACH ORGANISM AG IA: CPT | Performed by: STUDENT IN AN ORGANIZED HEALTH CARE EDUCATION/TRAINING PROGRAM

## 2024-11-15 PROCEDURE — 85027 COMPLETE CBC AUTOMATED: CPT | Performed by: STUDENT IN AN ORGANIZED HEALTH CARE EDUCATION/TRAINING PROGRAM

## 2024-11-15 PROCEDURE — 97163 PT EVAL HIGH COMPLEX 45 MIN: CPT

## 2024-11-15 PROCEDURE — 93306 TTE W/DOPPLER COMPLETE: CPT | Performed by: INTERNAL MEDICINE

## 2024-11-15 PROCEDURE — 99233 SBSQ HOSP IP/OBS HIGH 50: CPT | Performed by: STUDENT IN AN ORGANIZED HEALTH CARE EDUCATION/TRAINING PROGRAM

## 2024-11-15 PROCEDURE — 93970 EXTREMITY STUDY: CPT

## 2024-11-15 PROCEDURE — 93970 EXTREMITY STUDY: CPT | Performed by: SURGERY

## 2024-11-15 RX ORDER — VANCOMYCIN HYDROCHLORIDE 1 G/200ML
12.5 INJECTION, SOLUTION INTRAVENOUS EVERY 24 HOURS
Status: DISCONTINUED | OUTPATIENT
Start: 2024-11-15 | End: 2024-11-16

## 2024-11-15 RX ADMIN — DEXAMETHASONE SODIUM PHOSPHATE 6 MG: 10 INJECTION, SOLUTION INTRAMUSCULAR; INTRAVENOUS at 11:17

## 2024-11-15 RX ADMIN — HEPARIN SODIUM 11.8 UNITS/KG/HR: 10000 INJECTION, SOLUTION INTRAVENOUS at 06:39

## 2024-11-15 RX ADMIN — REMDESIVIR 100 MG: 100 INJECTION, POWDER, LYOPHILIZED, FOR SOLUTION INTRAVENOUS at 21:17

## 2024-11-15 RX ADMIN — VANCOMYCIN HYDROCHLORIDE 1000 MG: 1 INJECTION, SOLUTION INTRAVENOUS at 23:24

## 2024-11-15 RX ADMIN — BISOPROLOL FUMARATE 2.5 MG: 5 TABLET, FILM COATED ORAL at 08:27

## 2024-11-15 RX ADMIN — EMPAGLIFLOZIN 10 MG: 10 TABLET, FILM COATED ORAL at 08:32

## 2024-11-15 RX ADMIN — LATANOPROST 1 DROP: 50 SOLUTION OPHTHALMIC at 21:27

## 2024-11-15 RX ADMIN — REMDESIVIR 200 MG: 100 INJECTION, POWDER, LYOPHILIZED, FOR SOLUTION INTRAVENOUS at 00:45

## 2024-11-15 RX ADMIN — DORZOLAMIDE HYDROCHLORIDE AND TIMOLOL MALEATE 1 DROP: 20; 5 SOLUTION/ DROPS OPHTHALMIC at 11:10

## 2024-11-15 RX ADMIN — DORZOLAMIDE HYDROCHLORIDE AND TIMOLOL MALEATE 1 DROP: 20; 5 SOLUTION/ DROPS OPHTHALMIC at 18:00

## 2024-11-15 RX ADMIN — Medication 3 MG: at 21:17

## 2024-11-15 RX ADMIN — PANTOPRAZOLE SODIUM 20 MG: 20 TABLET, DELAYED RELEASE ORAL at 05:51

## 2024-11-15 RX ADMIN — APIXABAN 2.5 MG: 2.5 TABLET, FILM COATED ORAL at 18:01

## 2024-11-15 RX ADMIN — BRIMONIDINE TARTRATE 1 DROP: 2 SOLUTION/ DROPS OPHTHALMIC at 18:00

## 2024-11-15 RX ADMIN — GABAPENTIN 100 MG: 100 CAPSULE ORAL at 08:32

## 2024-11-15 RX ADMIN — AMITRIPTYLINE HYDROCHLORIDE 30 MG: 10 TABLET, FILM COATED ORAL at 21:17

## 2024-11-15 RX ADMIN — BRIMONIDINE TARTRATE 1 DROP: 2 SOLUTION/ DROPS OPHTHALMIC at 11:10

## 2024-11-15 RX ADMIN — GABAPENTIN 100 MG: 100 CAPSULE ORAL at 18:01

## 2024-11-15 RX ADMIN — AZITHROMYCIN MONOHYDRATE 500 MG: 500 INJECTION, POWDER, LYOPHILIZED, FOR SOLUTION INTRAVENOUS at 08:23

## 2024-11-15 RX ADMIN — ALLOPURINOL 100 MG: 100 TABLET ORAL at 08:32

## 2024-11-15 RX ADMIN — FINASTERIDE 5 MG: 5 TABLET, FILM COATED ORAL at 08:32

## 2024-11-15 NOTE — PROGRESS NOTES
Patient:    MRN:  [id number]    Becka Request ID:  [id number]    Level of care reserved:  Home Health Agency    Partner Reserved:  [facility], [place] [address] [phone]    Clinical needs requested:  covid possible contact exposure, covid tested positive, chf, physical therapy, occupational therapy    Geography searched:  [phone]    Start of Service:    Request sent:  10:49am EST on 11/15/2024 by Usman Moise    Partner reserved:  10:57am EST on 11/15/2024 by Usman Moise    Choice list shared:  10:57am EST on 11/15/2024 by Usman Moise

## 2024-11-15 NOTE — ASSESSMENT & PLAN NOTE
Pt with a Na of 128 on admission  Most likely d/t hypervolemia  Continue gentle ivf  Fl restriction of 1.5  Continue to trend  Deferring hyponatremic labs at this time  Improving

## 2024-11-15 NOTE — PROGRESS NOTES
Jose Mullins is a 89 y.o. male who is currently ordered Vancomycin IV with management by the Pharmacy Consult service.  Relevant clinical data and objective / subjective history reviewed.  Vancomycin Assessment:  Indication and Goal AUC/Trough: Pneumonia (goal -600, trough >10)  Clinical Status:  initial dosing  Micro:     Renal Function:  SCr: 1.46 mg/dL  CrCl: 34.4 mL/min  Renal replacement: Not on dialysis  Days of Therapy: 1  Current Dose: 2000 mg once loading dose    Vancomycin Plan:  New Dosing: Pulse dosing 1000 mg daily PRN level < 15   Next Level: 0600 on 11/15/24 with AM labs  Renal Function Monitoring: Daily BMP and UOP  Pharmacy will continue to follow closely for s/sx of nephrotoxicity, infusion reactions and appropriateness of therapy.  BMP and CBC will be ordered per protocol. We will continue to follow the patient’s culture results and clinical progress daily.    Mireya Morin, Pharmacist

## 2024-11-15 NOTE — CASE MANAGEMENT
Case Management Assessment & Discharge Planning Note    Patient name Jose Mullins  Location /-01 MRN [id number]  : 7/15/1931 Date 11/15/2024       Current Admission Date: 2024  Current Admission Diagnosis:Sepsis due to COVID-19 (HCC)   Patient Active Problem List    Diagnosis Date Noted Date Diagnosed    Chronic respiratory failure ([facility]) 2024     Acute respiratory insufficiency 10/30/2024     Chronic diastolic congestive heart failure ([facility]) 10/24/2024     Primary osteoarthritis of left knee 2024     Other headache syndrome 2024     Episodic tension-type headache, not intractable 2024     Dermatitis 2023     Elevated TSH 2023     Corkscrew esophagus 2022     Other dysphagia 2022     Frontal headache 2022     Ambulatory dysfunction 2022     Irritable bowel syndrome with diarrhea 2022     Benign prostatic hyperplasia 2021     Pain in both feet 05/15/2020     Carpal tunnel syndrome 2019     Other insomnia 04/15/2019     Anxiety 04/15/2019     Sepsis due to COVID-19 (HCC) 2019     Hyponatremia 2019     Paresthesia 2018     Arthritis of carpometacarpal (CMC) joint of right thumb 2018     Peripheral edema 2018     Stage 3a chronic kidney disease (HCC) 2018     Permanent atrial fibrillation (HCC) 2018     Left lumbar radiculopathy 01/10/2017     Cholecystitis with cholelithiasis 2016     Joint pain, knee 2014     Allergic rhinitis 2013     S/P ablation of ventricular arrhythmia 2013     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HCC) 2012     Anemia of chronic disease 2012     GERD (gastroesophageal reflux disease) 2012     Glaucoma 2012     Hiatal hernia 2012     Other hyperlipidemia 2012     Peripheral vascular disease (HCC) 2012     Benign essential hypertension 2012       LOS (days): 1  Geometric Mean LOS (GMLOS) (days):  2.6  Days to GMLOS:1.6     OBJECTIVE:  PATIENT READMITTED TO HOSPITAL  Risk of Unplanned Readmission Score: 23.27         Current admission status: Inpatient       Preferred Pharmacy:   [facility]/pharmacy #[id number] - [place] - [address]  [address]  [facility] [address]  Phone: [phone] Fax: [phone]    [facility]  [address] [place] [address]  Phone: [phone] Fax: [phone]    Primary Care Provider: Moises Ham MD    Primary Insurance: [employer]  Secondary Insurance:     ASSESSMENT:  Active Health Care Proxies       Jennifer Simms Health Care Agent - Daughter   Primary Phone: [phone] (Home)                 Advance Directives  Does patient have a Health Care POA?: Yes  Does patient have Advance Directives?: Yes  Advance Directives: Power of  for health care  Primary Contact: Jennifer Simms, gilda.         Readmission Root Cause  30 Day Readmission: Yes  During your hospital stay, did someone (provider, nurse, ) explain your care to you in a way you could understand?: Yes  Did you feel medically stable to leave the hospital?: No (Per daughter, concern was over pt's 02 Requirements.)  Were you able to pay for your medication at the pharmacy?: Yes  Did you have reliable transportation to take you to your appointments?: Yes  During previous admission, was a post-acute recommendation made?: Yes  What post-acute resources were offered?: [facility]  Patient was readmitted due to: Sepsis due to COVID-19.  Action Plan: [facility] reserved in [place].    Patient Information  Admitted from:: Home  Mental Status: Alert  During Assessment patient was accompanied by: Daughter  Assessment information provided by:: Daughter  Primary Caregiver: Family  Caregiver's Name:: Jennifer Simms, daughter  Caregiver's Relationship to Patient:: Family Member  Caregiver's Telephone Number:: [phone]  Support Systems: Self, Daughter  County   Residence: [place]  What city do you live in?: [place]  Home entry access options. Select all that apply.: Ramp  Type of Current Residence: [facility]  Living Arrangements: Lives w/ Children    Activities of Daily Living Prior to Admission  Functional Status: Assistance  Completes ADLs independently?: No  Level of ADL dependence: Assistance  Ambulates independently?: No  Level of ambulatory dependence: Assistance  Does patient use assisted devices?: Yes  Assisted Devices (DME) used: Walker, Shower Chair, Home Oxygen concentrator  DME Company Name (respiratory supplies): [employer]  O2 Rate(s): 2L  Does patient currently own DME?: Yes  What DME does the patient currently own?: Home Oxygen concentrator, Walker, Shower Chair  Does patient have a history of Outpatient Therapy (PT/OT)?: No  Does the patient have a history of Short-Term Rehab?: No  Does patient have a history of HHC?: Yes ([facility]  Does patient currently have HHC?: Yes (Per chart review, Pt is current with [facility].)    Current Home Health Care  Type of Current Home Care Services: Home PT, Home OT, Nurse visit  Home Health Agency Name:: [facility]  Current Home Health Follow-Up Provider:: PCP    Patient Information Continued  Income Source: SSI/SSD  Does patient have prescription coverage?: Yes  Does patient receive dialysis treatments?: No  Does patient have a history of substance abuse?: No  Does patient have a history of Mental Health Diagnosis?: No         Means of Transportation  Means of Transport to Appts:: Family transport          DISCHARGE DETAILS:    Discharge planning discussed with:: Pt's daughterJennifer.  Freedom of Choice: Yes     CM contacted family/caregiver?: Yes  Were Treatment Team discharge recommendations reviewed with patient/caregiver?: Yes  Did patient/caregiver verbalize understanding of patient care needs?: Yes  Were patient/caregiver advised of the risks associated with not following Treatment Team discharge  recommendations?: Yes    Contacts  Patient Contacts: Jennifer Simms, daughter.  Relationship to Patient:: Family  Contact Method: In Person  Reason/Outcome: Emergency Contact, Discharge Planning, Continuity of Care    Requested Home Health Care         Is the patient interested in HHC at discharge?: Yes  Home Health Discipline requested:: Nursing, Occupational Therapy, Physical Therapy  [facility] Health Agency [facility]  HHA External Referral Reason (only applicable if external HHA name selected): Patient has established relationship with provider  Home Health Follow-Up Provider:: PCP  Home Health Services Needed:: Strengthening/Theraputic Exercises to Improve Function, Oxygen Via Nasal Cannula, Gait/ADL Training, Evaluate Functional Status and Safety  Oxygen LPM Ordered (if applicable based on home health services needed):: 2 LPM  Homebound Criteria Met:: Uses an Assist Device (i.e. cane, walker, etc), Requires the Assistance of Another Person for Safe Ambulation or to Leave the Home  Supporting Clincal Findings:: Limited Endurance, Fatigues Easliy in Short Distances, Dyspnea with Exertion, Requires Oxygen         Other Referral/Resources/Interventions Provided:  Interventions: HHC  Referral Comments: [facility] in [place], per pt and daughter preference.         Treatment Team Recommendation: Home with Home Health Care  Discharge Destination Plan:: Home with Home Health Care                                         Additional Comments: Met with daughter, given pt's COVID precautions, to discern discharge needs. Pt lives technically alone (pt's daughter is at pt's home 20hrs a day and has cameras to monitor if pt falls) in a 1sh, ramp to enter, 1st fl setup. Daughter reports assisting pt with walking and ADLs such as dressing and bathing. Pt uses and owns walker, shower chair, and home 02 through [employer] 2-4L continuous. Pt's daughter mentioned she increased 02 to 5L before admitting pt to hospital. At  time of writing, pt is on 3L. [facility] reserved in [place]. No inpatient psych or D&A treatment. Jennifer is medical POA. Daughter Jennifer can provide transport at discharge. Of note, pt has an outpatient CM: Aida Dutton RN.

## 2024-11-15 NOTE — PLAN OF CARE
Problem: OCCUPATIONAL THERAPY ADULT  Goal: Performs self-care activities at highest level of function for planned discharge setting.  See evaluation for individualized goals.  Description: Treatment Interventions: ADL retraining, UE strengthening/ROM, Functional transfer training, Endurance training, Cognitive reorientation, Patient/family training, Equipment evaluation/education, Compensatory technique education, Continued evaluation          See flowsheet documentation for full assessment, interventions and recommendations.   11/15/2024 1120 by Madelaine Jacome OT  Note: Limitation: Decreased ADL status, Decreased Safe judgement during ADL, Decreased cognition, Decreased endurance, Decreased self-care trans, Decreased high-level ADLs  Prognosis: Fair, Good  Assessment: Pt is a 89 y.o. male seen for OT evaluation at [facility], admitted 11/14/2024 w/ Sepsis due to COVID-19 (HCC). Extensive chart review completed by OT. Comorbidities affecting the pt's functional performance include a significant PMH of: anemia, hypertension, stage 3 CKD, a-fib, hyponatremia, CHF, glaucoma, arthritis, PVD, knee pain, ambulatory dysfunction, and anxiety. Personal factors affecting pt at time of IE include: difficulty performing ADLS, difficulty performing IADLS , limited insight into deficits, and health management . Pt states PTA he was A for ADL/IADLs, I w/ functional mobility with use of RW at baseline, (-) falls, (-) home alone -dtr is now w/ pt 24/7.  Upon OT IE pt demonstrated  Vicente for bed mobility, supervsion for functional transfers, Min Ax1 for functional mobility, Chandler for UB ADLs and Chandler for LB ADLS 2* the following deficits impacting occupational performance: weakness, decreased strength, decreased balance, decreased tolerance, impaired memory, impaired sequencing, impaired problem solving, and decreased safety awareness. Pt demonstrates the need for continued skilled OT tx focused on grooming, bathing/shower, toilet hygiene,  dressing, functional mobility, and clothing management during their stay in the hospital to address the stated deficits and maximize their level of functional independence w/ their ADLS and functional mobility. The patient's raw score on the AM-PAC Daily Activity inpatient short form is 20, standardized score is 42.03, greater than 39.4. Patients at this level are likely to benefit from DC to home. However, please refer to therapist recommendation for discharge planning given other factors that may influence destination. At this time, OT recommendations at time of discharge are DC with Level III - Low Rehab Resource Intensity resources.     Rehab Resource Intensity Level, OT: III (Minimum Resource Intensity)

## 2024-11-15 NOTE — PLAN OF CARE
"  Problem: PHYSICAL THERAPY ADULT  Goal: Performs mobility at highest level of function for planned discharge setting.  See evaluation for individualized goals.  Description: Treatment/Interventions: Functional transfer training, LE strengthening/ROM, Therapeutic exercise, Endurance training, Cognitive reorientation, Patient/family training, Equipment eval/education, Bed mobility, Gait training  Equipment Recommended: Walker       See flowsheet documentation for full assessment, interventions and recommendations.  11/15/2024 1254 by Kala Navas PT  Note:    Problem List: Decreased strength, Impaired balance, Decreased endurance, Decreased mobility, Decreased cognition, Impaired hearing, Impaired vision  Assessment: Jose \"Vasile\"  Susanna is a 89 y.o. Male who presents to [facility] on 11/14/2024 from home w/ c/o worsening SOB and diagnosis of sepsis d/t COVID. Orders for PT eval and treat received. Pt presents w/ comorbidities of HTN, Afib, CHF, chronic respiratory failure, GERD, glaucoma, PVD, . At baseline, pt mobilizes modified I w/ RW, and reports 0 falls in the last 6 months. Upon evaluation, pt presents w/ the following deficits: weakness, impaired balance, impaired hearing, and decreased endurance. Upon eval, pt requires supervision for bed mobility, supervision for transfers, and min A x 1 for gait. Based on this PT evaluation today, patient's discharge recommendation is for Level III - Low Rehab Resource Intensity. During this admission, pt would benefit from continued skilled inpatient PT in the acute care setting in order to address the abovementioned deficits to maximize function and mobility before DC from acute care.        Rehab Resource Intensity Level, PT: III (Minimum Resource Intensity)    See flowsheet documentation for full assessment.        "

## 2024-11-15 NOTE — OCCUPATIONAL THERAPY NOTE
Occupational Therapy Evaluation     Patient Name: Jose Mullins  Today's Date: 11/15/2024  Problem List  Principal Problem:    Sepsis due to COVID-19 (HCC)  Active Problems:    Anemia of chronic disease    Benign essential hypertension    Stage 3a chronic kidney disease (HCC)    Permanent atrial fibrillation (HCC)    Hyponatremia    Chronic diastolic congestive heart failure (HCC)    Chronic respiratory failure (HCC)    Past Medical History  Past Medical History:   Diagnosis Date    A-fib (HCC)     Anemia     Carpal tunnel syndrome, unspecified upper limb     Cataract     last assessed: 8/18/2012    COVID-19 09/06/2023    GERD (gastroesophageal reflux disease)     Glaucoma     Hypertension     last assessed: 8/23/2012    Intervertebral disc disease     PVD (peripheral vascular disease) (HCC)     Stomach disorder 11/2021    Syncope 03/12/2018     Past Surgical History  Past Surgical History:   Procedure Laterality Date    CATARACT EXTRACTION      COLONOSCOPY      HERNIA REPAIR      NEUROPLASTY / TRANSPOSITION MEDIAN NERVE AT CARPAL TUNNEL      RI LAPAROSCOPY SURG CHOLECYSTECTOMY N/A 09/14/2016    Procedure: LAPAROSCOPIC CHOLECYSTECTOMY ;  Surgeon: Christo Cristina MD;  Location: [facility];  Service: General    UPPER GASTROINTESTINAL ENDOSCOPY          11/15/24 0917   OT Last Visit   OT Visit Date 11/15/24   Note Type   Note type Evaluation   Pain Assessment   Pain Assessment Tool 0-10   Pain Score No Pain   Restrictions/Precautions   Weight Bearing Precautions Per Order No   Other Precautions Airborne/isolation;Contact/isolation;Cognitive;Chair Alarm;Bed Alarm;O2;Fall Risk;Hard of hearing  (2L NC O2; [place] b/l ears)   Home Living   Type of Home House   Home Layout One level;Able to live on main level with bedroom/bathroom;Ramped entrance   Bathroom Shower/Tub Walk-in shower   Bathroom Equipment Grab bars in shower   Home Equipment Walker   Prior Function   Level of [place] Independent with ADLs;Needs assistance  "with ADLs;Needs assistance with IADLS   Lives With Alone  (dtr staying 24/7)   Receives Help From Family   IADLs Family/Friend/Other provides transportation;Family/Friend/Other provides meals;Family/Friend/Other provides medication management   Falls in the last 6 months 0   Vocational Retired  (MAC )   Lifestyle   Autonomy PTA pt was I with functional mobility w/ use of RW, A for ADL/IADLs   Reciprocal Relationships dtr   General   Family/Caregiver Present Yes   Subjective   Subjective \"My breakfast is good, im suprised.\"   ADL   Eating Assistance 7  Independent   Grooming Assistance 5  Supervision/Setup   UB Bathing Assistance 4  Minimal Assistance   LB Bathing Assistance 5  Moderate Assistance   UB Dressing Assistance 4  Minimal Assistance   LB Dressing Assistance 5  Moderate Assistance   Toileting Assistance  5  Supervision/Setup   Bed Mobility   Supine to Sit 6  Modified independent   Additional items Increased time required   Transfers   Sit to Stand 5  Supervision   Additional items Increased time required;Verbal cues   Stand to Sit 5  Supervision   Additional items Armrests;Increased time required;Verbal cues   Functional Mobility   Functional Mobility 4  Minimal assistance   Additional Comments x1   Additional items Rolling walker   Balance   Static Sitting Good   Dynamic Sitting Fair +   Static Standing Fair   Dynamic Standing Fair   Activity Tolerance   Activity Tolerance Patient limited by fatigue   Medical Staff Made Aware PT Kala   Nurse Made Aware ILANA CRUZ Assessment   RUE Assessment WFL  (strength grossly 4/5)   LUE Assessment   LUE Assessment WFL  (strength grossly 4/5)   Hand Function   Gross Motor Coordination Functional   Fine Motor Coordination Functional   Vision-Basic Assessment   Visual History Cataracts;Glaucoma   Psychosocial   Psychosocial (WDL) WDL   Cognition   Overall Cognitive Status Impaired   Arousal/Participation Alert;Cooperative   Attention Attends with cues " to redirect   Orientation Level Oriented X4   Memory Decreased short term memory   Following Commands Follows one step commands inconsistently   Comments Pt pleasent and willing to work with therapy; [place] -- difficulty understanding staff with PPE on.   Assessment   Limitation Decreased ADL status;Decreased Safe judgement during ADL;Decreased cognition;Decreased endurance;Decreased self-care trans;Decreased high-level ADLs   Prognosis Fair;Good   Assessment Pt is a 89 y.o. male seen for OT evaluation at [facility], admitted 11/14/2024 w/ Sepsis due to COVID-19 (HCC). Extensive chart review completed by OT. Comorbidities affecting the pt's functional performance include a significant PMH of: anemia, hypertension, stage 3 CKD, a-fib, hyponatremia, CHF, glaucoma, arthritis, PVD, knee pain, ambulatory dysfunction, and anxiety. Personal factors affecting pt at time of IE include: difficulty performing ADLS, difficulty performing IADLS , limited insight into deficits, and health management . Pt states PTA he was A for ADL/IADLs, I w/ functional mobility with use of RW at baseline, (-) falls, (-) home alone -dtr is now w/ pt 24/7.  Upon OT IE pt demonstrated  Vicente for bed mobility, supervsion for functional transfers, Min Ax1 for functional mobility, Chandler for UB ADLs and Chandler for LB ADLS 2* the following deficits impacting occupational performance: weakness, decreased strength, decreased balance, decreased tolerance, impaired memory, impaired sequencing, impaired problem solving, and decreased safety awareness. Pt demonstrates the need for continued skilled OT tx focused on grooming, bathing/shower, toilet hygiene, dressing, functional mobility, and clothing management during their stay in the hospital to address the stated deficits and maximize their level of functional independence w/ their ADLS and functional mobility. The patient's raw score on the AM-PAC Daily Activity inpatient short form is 20, standardized score is 42.03,  greater than 39.4. Patients at this level are likely to benefit from DC to home. However, please refer to therapist recommendation for discharge planning given other factors that may influence destination. At this time, OT recommendations at time of discharge are DC with Level III - Low Rehab Resource Intensity resources.   Goals   Patient Goals To feel better   Plan   Treatment Interventions ADL retraining;UE strengthening/ROM;Functional transfer training;Endurance training;Cognitive reorientation;Patient/family training;Equipment evaluation/education;Compensatory technique education;Continued evaluation   Goal Expiration Date 11/25/24   OT Treatment Day 0   OT Frequency 1-2x/wk   Discharge Recommendation   Rehab Resource Intensity Level, OT III (Minimum Resource Intensity)   AM-PAC Daily Activity Inpatient   Lower Body Dressing 2   Bathing 3   Toileting 3   Upper Body Dressing 3   Grooming 4   Eating 4   Daily Activity Raw Score 19   Daily Activity Standardized Score (Calc for Raw Score >=11) 40.22   AM-PAC Applied Cognition Inpatient   Following a Speech/Presentation 3   Understanding Ordinary Conversation 3   Taking Medications 3   Remembering Where Things Are Placed or Put Away 3   Remembering List of 4-5 Errands 3   Taking Care of Complicated Tasks 3   Applied Cognition Raw Score 18   Applied Cognition Standardized Score 38.07   End of Consult   Education Provided Yes;Family or social support of family present for education by provider   Patient Position at End of Consult Bedside chair;Bed/Chair alarm activated;All needs within reach   Nurse Communication Nurse aware of consult     Pt will complete the following goals within 10 days:     Pt will complete grooming as independent.    Pt will complete UB bathing and dressing with Vicente.    Pt will complete LB bathing and dressing with Chandler & DME PRN.    Pt will complete toileting w/ supervision w/ G hygiene/thoroughness using DME PRN    Pt will improve functional  mobility during ADL/IADL/leisure tasks to Vicente using DME as needed w/ G balance/safety.    Pt will improve balance by 1 grade to facilitate completion of ADL tasks.     Pt will perform functional transfers with Vicente in order to facilitate completion of  ADL routine.    Pt will increase standing tolerance to 10 minutes in order to complete ADL routine.    Pt will verbalize and demonstrate understanding in use of compensatory techniques and use of long handled AE for increased safety and independence during LB ADL tasks.       Pt will participate in UE therapeutic exercise in order to maximize strength for ADL transfers.     Pt will identify 3-5 fall risks to ensure safety upon discharge.      Madelaine Jacome, OTR/L

## 2024-11-15 NOTE — NURSING NOTE
Pt was provided with a scale and CHF education and booklets. Unable to get an ordered daily weight on pt d/t the bed alarm/scale not working because the bed was not zeroed. Pt was ordered a standing scale weight, was too fatigued to get oob this morning. RN will continue to assess and monitor pt. Call bell is within reach.

## 2024-11-15 NOTE — ASSESSMENT & PLAN NOTE
Wt Readings from Last 3 Encounters:   11/11/24 89.8 kg (198 lb)   11/02/24 89.9 kg (198 lb 3.2 oz)   10/24/24 92.9 kg (204 lb 12.8 oz)     Patient presenting due to shortness of breath  Patient recently admitted due to viral sepsis secondary to COVID and flu    On admission patient meeting SIRS criteria with tachycardia and tachypnea  No source of infection on admission  Troponins negative.  Lactic acid and procalcitonin negative  No other endorgan dysfunction noted  elevated D-dimer of 1.24 CTA and BL LE dopplers negative for DVT  Chest x-ray on admission showing pulmonary edema  BNP on admission 257 which is slightly above baseline   Echo done on 12/21/2023 showed an LVEF of 55 to 60% with mild diastolic dysfunction mildly dilated right ventricle mildly reduced right ventricle systolic function mild bilateral enlargement mild pulmonary hypertension moderate aortic sclerosis  Obtain updated echo  Hold spironolactone  I's and O's  Daily weights  Continue Fluid restriction of 1.5 L

## 2024-11-15 NOTE — PROGRESS NOTES
Jose Mullins is a 89 y.o. male who is currently ordered Vancomycin IV with management by the Pharmacy Consult service.  Relevant clinical data and objective / subjective history reviewed.  Vancomycin Assessment:  Indication and Goal AUC/Trough: Pneumonia (goal -600, trough >10), -600, trough >10  Clinical Status: stable  Micro:     Renal Function:  SCr: 1.19 mg/dL  CrCl: 41.9 mL/min  Renal replacement: Not on dialysis  Days of Therapy: 2  Current Dose: 1000mg daily prn when random level </= 15  Vancomycin Plan: Patient is currently on pulse vancomycin dosing and most recent random vancomycin level drawn today with morning lab is 17.5 ug/ml. Scr improved to 1.19 mg/dL from 1.46 yesterday (baseline 1.2 - 1.4). Based on today's assessment will change vancomycin to scheduled dosing as follows   New Dosinmg IV Q24H  Estimated AUC: 459 mcg*hr/mL  Estimated Trough: 13.7 mcg/mL  Next Level: With AM labs at 0600 on 24  Renal Function Monitoring: Daily BMP and UOP  Pharmacy will continue to follow closely for s/sx of nephrotoxicity, infusion reactions and appropriateness of therapy.  BMP and CBC will be ordered per protocol. We will continue to follow the patient’s culture results and clinical progress daily.    Maureen Jacobo, Pharmacist

## 2024-11-15 NOTE — CASE MANAGEMENT
Case Management Discharge Planning Note    Patient name Jose Mullins  Location /-01 MRN [id number]  : 7/15/1931 Date 11/15/2024       Current Admission Date: 2024  Current Admission Diagnosis:Sepsis due to COVID-19 (formerly [facility])   Patient Active Problem List    Diagnosis Date Noted Date Diagnosed    Chronic respiratory failure (formerly [facility]) 2024     Acute respiratory insufficiency 10/30/2024     Chronic diastolic congestive heart failure (formerly [facility]) 10/24/2024     Primary osteoarthritis of left knee 2024     Other headache syndrome 2024     Episodic tension-type headache, not intractable 2024     Dermatitis 2023     Elevated TSH 2023     Corkscrew esophagus 2022     Other dysphagia 2022     Frontal headache 2022     Ambulatory dysfunction 2022     Irritable bowel syndrome with diarrhea 2022     Benign prostatic hyperplasia 2021     Pain in both feet 05/15/2020     Carpal tunnel syndrome 2019     Other insomnia 04/15/2019     Anxiety 04/15/2019     Sepsis due to COVID-19 (formerly [facility]) 2019     Hyponatremia 2019     Paresthesia 2018     Arthritis of carpometacarpal (CMC) joint of right thumb 2018     Peripheral edema 2018     Stage 3a chronic kidney disease (HCC) 2018     Permanent atrial fibrillation (HCC) 2018     Left lumbar radiculopathy 01/10/2017     Cholecystitis with cholelithiasis 2016     Joint pain, knee 2014     Allergic rhinitis 2013     S/P ablation of ventricular arrhythmia 2013     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HCC) 2012     Anemia of chronic disease 2012     GERD (gastroesophageal reflux disease) 2012     Glaucoma 2012     Hiatal hernia 2012     Other hyperlipidemia 2012     Peripheral vascular disease (HCC) 2012     Benign essential hypertension 2012       LOS (days): 1  Geometric Mean LOS (GMLOS) (days): 2.6  Days to  GMLOS:1.8     OBJECTIVE:  Risk of Unplanned Readmission Score: 23.21         Current admission status: Inpatient   Preferred Pharmacy:   [facility]/pharm[facility] #[id number] - [place] - [address]  [address]  [place] [address]  Phone: [phone] Fax: [phone]    [facility]  [address] [facility] [place] [address]  Phone: [phone] Fax: [phone]    Primary Care Provider: Moises Ham MD    [facility]  Secondary Insurance:     DISCHARGE DETAILS:                                Requested Home Health Care         Is the patient interested in HHC at discharge?: Yes  Home Health Discipline requested:: Nursing, Physical Therapy, Occupational Therapy  Home Health Agency Name:: Other  HHA External Referral Reason (only applicable if external HHA name selected): Patient has established relationship with provider  Home Health Follow-Up Provider:: PCP  Home Health Services Needed:: Evaluate Functional Status and Safety, Gait/ADL Training, Strengthening/Theraputic Exercises to Improve Function, Heart Failure Management  Oxygen LPM Ordered (if applicable based on home health services needed):: 2 LPM  Homebound Criteria Met:: Uses an Assist Device (i.e. cane, walker, etc), Requires the Assistance of Another Person for Safe Ambulation or to Leave the Home  Supporting Clincal Findings:: Dyspnea with Exertion, Limited Endurance, Fatigues Easliy in Short Distances         Other Referral/Resources/Interventions Provided:  Interventions: HHC  Referral Comments: Pt is current with [facility]. Family preferring [facility]. Referral to [facility] made in [place].

## 2024-11-15 NOTE — UTILIZATION REVIEW
Initial Clinical Review    Admission: Date/Time/Statement:   Admission Orders (From admission, onward)       Ordered        11/14/24 1619  INPATIENT ADMISSION  Once                          Orders Placed This Encounter   Procedures    INPATIENT ADMISSION     Standing Status:   Standing     Number of Occurrences:   1     Level of Care:   Med Surg [16]     Estimated length of stay:   More than 2 Midnights     Certification:   I certify that inpatient services are medically necessary for this patient for a duration of greater than two midnights. See H&P and MD Progress Notes for additional information about the patient's course of treatment.     ED Arrival Information       Expected   -    Arrival   11/14/2024 13:46    Acuity   Emergent              Means of arrival   Ambulance    Escorted by   [facility]    Service   Hospitalist    Admission type   Emergency              Arrival complaint   -             Chief Complaint   Patient presents with    Shortness of Breath     Patient presents to the ED via EMS with c/o SOB requiring increased o2 (baseline 2L) states had covid 10/30        Initial Presentation: 89 y.o. male presents to ed from home on 11-14  via ems for evaluation and treatment of shortness on breath requiring an increase in oxygen above baseline 2L O2nc used at sleep.  Covid positive 10/30. PMHX: A FIB, CHF, pneumonia, anemia. Clinical assessment significant for wheezing, 3L -4L O2nc, fatigue.      D-DIMER 12.24, COVID +,  , K 5.7, BUN 27, CR 1.46, .   Initially treated with duo neb x1. Admit to inpatient for sepsis due to covid, CHF exacerbation, hyponatremia, FRANCO cr 1.2-1.4.  Plan includes: venous duplex, echo, fluid restriction, I/O, daily wt, cardiology consult, iv lasix bid, wean oxygen to baseline, iv isolyte 50/hr, sputum culture, iv vancomycin.      Anticipated Length of Stay/Certification Statement: Patient will be admitted on an inpatient basis with an anticipated length of  stay of greater than 2 midnights secondary to achfe.     Date: 11-15-24    Day 2: inpatient med surg  Certification Statement: The patient will continue to require additional inpatient hospital stay due to sepsis   Discharge Plan: Anticipate discharge in >72 hrs to home with home services.    Overnight fever, tachycardia, hypertension, chills, restlessness, fatigue.  Afib /RVR -120s. STAT Imaging showed pulmonary edema and diffuse bilateral groundglass opacities concerning for pneumonia.  Start iv azithromycin, iv decadron, iv vancomycin, iv remdesivir, iv heparin gtt, iv multi electrolyte 50/hr.   PT/OT evaluations find minimum functional deficits.  Iv heparin gtt discontinued 11-15 1355.      Date: 11-16-24  inpatient med surg  Day 3: Has surpassed a 2nd midnight with active treatments and services  for treatment of COVID.     Certification Statement: The patient will continue to require additional inpatient hospital stay due to sepsis   Discharge Plan: Anticipate discharge in >72 hrs to home with home services.  Clinical assessment significant for rhonchi and rales.  PROCAL 0.39.  Continue iv decadron and iv remdesivir day 2 with iv vancomycin. Follow up cultures.  Hyponatremia stable.  Continue fluid restriction 1.5L. Oxygen now at baseline.  Monitor on telemetry.        ED Treatment-Medication Administration from 11/14/2024 1345 to 11/14/2024 1633         Date/Time Order Dose Route Action     11/14/2024 1420 ipratropium-albuterol (DUO-NEB) 0.5-2.5 mg/3 mL inhalation solution 3 mL 3 mL Nebulization Given         Scheduled Medications:  allopurinol, 100 mg, Oral, Daily  amitriptyline, 30 mg, Oral, HS  [Held by provider] amLODIPine, 5 mg, Oral, HS  azithromycin, 500 mg, Intravenous, Q24H  bisoprolol, 2.5 mg, Oral, Daily  brimonidine tartrate, 1 drop, Right Eye, BID  dexamethasone, 6 mg, Intravenous, Daily  dorzolamide-timolol, 1 drop, Both Eyes, BID  Empagliflozin, 10 mg, Oral, QAM  finasteride, 5 mg, Oral,  Daily  gabapentin, 100 mg, Oral, BID  latanoprost, 1 drop, Both Eyes, HS  melatonin, 3 mg, Oral, HS  Netarsudil Dimesylate, , Right Eye, Daily  pantoprazole, 20 mg, Oral, Early Morning  remdesivir, 100 mg, Intravenous, Q24H  [Held by provider] spironolactone, 25 mg, Oral, Daily  vancomycin, 12.5 mg/kg, Intravenous, Q24H      Continuous IV Infusions:  heparin (porcine), 3-20 Units/kg/hr (Order-Specific), Intravenous, Titrated  multi-electrolyte, 50 mL/hr, Intravenous, Continuous      PRN Meds:  acetaminophen, 650 mg, Oral, Q6H PRN  heparin (porcine), 2,000 Units, Intravenous, Q6H PRN  heparin (porcine), 4,000 Units, Intravenous, Q6H PRN  metoprolol, 5 mg, Intravenous, Q6H PRN  ondansetron, 4 mg, Intravenous, Q4H PRN  polyethylene glycol, 17 g, Oral, Daily PRN  sodium chloride (PF), 3 mL, Intravenous, Q1H PRN      ED Triage Vitals [11/14/24 1351]   Temperature Pulse Respirations Blood Pressure SpO2 Pain Score   98.1 °F (36.7 °C) 97 20 157/75 98 % No Pain     Weight (last 2 days)       Date/Time Weight    11/15/24 1140 91.3 (201.2)    11/15/24 1041 89.8 (198)            Vital Signs (last 3 days)       Date/Time Temp Pulse Resp BP MAP  SpO2 Nasal Cannula O2 Flow Rate (L/min) Archer Coma Scale Score Pain    11/15/24 1041 -- 100 -- 163/88 -- -- -- -- --    11/15/24 0925 -- -- -- -- -- -- -- -- No Pain    11/15/24 0917 -- -- -- -- -- -- -- -- No Pain    11/15/24 0757 -- -- -- -- -- -- -- -- No Pain    11/15/24 07:45:30 96.3 °F (35.7 °C) 100 16 163/88 113 97 % -- -- --    11/15/24 05:58:14 96 °F (35.6 °C) 110 -- 152/88 109 97 % -- -- --    11/15/24 05:55:20 96 °F (35.6 °C) 104 -- 152/88 109 97 % -- -- --    11/14/24 23:02:51 97.8 °F (36.6 °C) 105 -- -- -- 96 % -- -- --    11/14/24 2251 99.1 °F (37.3 °C) 103 18 150/77 -- -- -- 15 --    11/14/24 21:25:26 99.1 °F (37.3 °C) 106 -- -- -- 97 % 3 L/min -- No Pain    11/14/24 20:19:12 100.7 °F (38.2 °C) 114 -- 150/77 101 95 % -- -- --    11/14/24 1938 101.8 °F (38.8 °C) 109 --  165/111 129 96 % -- -- --    11/14/24 18:47:46 -- 140 -- 105/86 92 96 % -- -- --    11/14/24 1830 101.7 °F (38.7 °C) -- -- -- -- -- -- -- --    11/14/24 18:17:02 99.6 °F (37.6 °C) 129 -- -- -- 96 % -- -- --    11/14/24 1646 -- -- -- -- -- -- 3.5 L/min 15 No Pain    11/14/24 16:43:49 97.9 °F (36.6 °C) 105 20 124/95 105 97 % -- -- --    11/14/24 1600 -- 103 20 155/115 131 98 % 3 L/min -- --    11/14/24 1530 -- 97 20 170/75 108 99 % -- -- --    11/14/24 1515 -- 105 20 152/84 105 99 % -- -- --    11/14/24 1430 -- 95 21 139/60 87 100 % 3 L/min -- --    11/14/24 1420 -- -- -- -- -- -- -- 15 --    11/14/24 1400 -- 96 25 155/66 94 99 % -- -- --    11/14/24 1351 98.1 °F (36.7 °C) 97 20 157/75 -- 98 % -- -- No Pain       Pertinent Labs/Diagnostic Test Results:     Radiology:   VAS VENOUS DUPLEX - LOWER LIMB BILATERAL   Final  (11/15 1215)   RIGHT LOWER LIMB:  No evidence of acute or chronic deep vein thrombosis.  No evidence of superficial thrombophlebitis noted.  Doppler evaluation shows a normal response to augmentation maneuvers..  Popliteal, posterior tibial and anterior tibial arterial Doppler waveforms are biphasic.       LEFT LOWER LIMB:  No evidence of acute or chronic deep vein thrombosis.  No evidence of superficial thrombophlebitis noted.  Doppler evaluation shows a normal response to augmentation maneuvers.  Popliteal, posterior tibial and anterior tibial arterial Doppler waveforms are biphasic.     CTA chest pe study   Final (11/14 5434)      Limited evaluation as above. No evidence of saddle/centrally obstructing, main or lobar pulmonary embolus. Segmental and subsegmental branches not adequately assessed.      Diffuse patchy groundglass opacities throughout the bilateral lungs, favor infectious versus inflammatory in etiology including but not limited to viral/atypical pneumonia. Clinical correlation recommended. No focal airspace consolidation.      Suspect mild interstitial pulmonary edema. Cardiomegaly and  trace left pleural effusion.         X-ray chest 1 view portable   Final  (11/14 1530)      Moderate pulmonary venous congestion.        Cardiology:  Echo complete w/ contrast if indicated    by JIMBO Oneal (11/15 1042)      ECG 12 lead   Final (11/15 1006)   Atrial fibrillation with rapid ventricular response with premature ventricular or aberrantly conducted complexes      Low voltage QRS   Nonspecific ST abnormality   Abnormal QRS-T angle, consider primary T wave abnormality   Abnormal ECG   When compared with ECG of 14-Nov-2024 16:23,    No significant change was found      ECG 12 lead   Final  (11/15 1006)   Atrial fibrillation   Low voltage QRS   Nonspecific ST abnormality   Abnormal ECG   When compared with ECG of 30-Oct-2024 09:35,   Questionable change in QRS axis        GI:  No orders to display       Results from last 7 days   Lab Units 11/14/24  1854   SARS-COV-2  Positive*     Results from last 7 days   Lab Units 11/15/24  0640 11/14/24  1427 11/14/24  1412   WBC Thousand/uL 4.73  --  6.65   HEMOGLOBIN g/dL 9.6*  --  9.9*   I STAT HEMOGLOBIN g/dl  --  10.2*  --    HEMATOCRIT % 30.5*  --  31.3*   HEMATOCRIT, ISTAT %  --  30*  --    PLATELETS Thousands/uL 188  --  218   TOTAL NEUT ABS Thousands/µL  --   --  4.26         Results from last 7 days   Lab Units 11/15/24  0640 11/14/24  1521 11/14/24  1427 11/14/24  1412   SODIUM mmol/L 133*  --   --  128*   POTASSIUM mmol/L 5.1 4.9  --  5.7*   CHLORIDE mmol/L 99  --   --  97   CO2 mmol/L 27  --   --  26   CO2, I-STAT mmol/L  --   --  26  --    ANION GAP mmol/L 7  --   --  5   BUN mg/dL 24  --   --  27*   CREATININE mg/dL 1.19  --   --  1.46*   EGFR ml/min/1.73sq m 52  --   --  40   CALCIUM mg/dL 9.1  --   --  9.1   CALCIUM, IONIZED, ISTAT mmol/L  --   --  1.13  --    MAGNESIUM mg/dL  --   --   --  2.1             Results from last 7 days   Lab Units 11/15/24  0640 11/14/24  1412   GLUCOSE RANDOM mg/dL 127 103           Results from last 7 days   Lab  Units 11/14/24  1427   PH, KAL I-STAT  7.373   PCO2, KAL ISTAT mm HG 41.9*   PO2, KAL ISTAT mm HG 32.0*   HCO3, KAL ISTAT mmol/L 24.4   I STAT BASE EXC mmol/L -1   I STAT O2 SAT % 59*         Results from last 7 days   Lab Units 11/14/24  1823 11/14/24  1624 11/14/24  1412   HS TNI 0HR ng/L  --   --  14   HS TNI 2HR ng/L  --  13  --    HSTNI D2 ng/L  --  -1  --    HS TNI 4HR ng/L 15  --   --    HSTNI D4 ng/L 1  --   --      Results from last 7 days   Lab Units 11/14/24  1510   D-DIMER QUANTITATIVE ug/ml FEU 1.24*     Results from last 7 days   Lab Units 11/15/24  0640 11/14/24  1510   PROTIME seconds  --  15.7*   INR   --  1.20*   PTT seconds 46* 44*         Results from last 7 days   Lab Units 11/14/24  1412   PROCALCITONIN ng/ml 0.25     Results from last 7 days   Lab Units 11/14/24  1442   LACTIC ACID mmol/L 0.5     Results from last 7 days   Lab Units 11/14/24  1412   BNP pg/mL 251*     Results from last 7 days   Lab Units 11/14/24  1511   CLARITY UA  Clear   COLOR UA  Yellow   SPEC GRAV UA  <1.005*   PH UA  5.5   GLUCOSE UA mg/dl 1000 (1%)*   KETONES UA mg/dl Negative   BLOOD UA  Negative   PROTEIN UA mg/dl Trace*   NITRITE UA  Negative   BILIRUBIN UA  Negative   UROBILINOGEN UA (BE) mg/dl <2.0   LEUKOCYTES UA  Negative   WBC UA /hpf None Seen   RBC UA /hpf None Seen   BACTERIA UA /hpf None Seen   EPITHELIAL CELLS WET PREP /hpf None Seen     Results from last 7 days   Lab Units 11/15/24  0640   STREP PNEUMONIAE ANTIGEN, URINE  Negative   LEGIONELLA URINARY ANTIGEN  Negative       Results from last 7 days   Lab Units 11/14/24  1442   BLOOD CULTURE  Received in Microbiology Lab. Culture in Progress.    Received in Microbiology Lab. Culture in Progress.             Results from last 7 days   Lab Units 11/15/24  0644   VANCOMYCIN RM ug/mL 17.5       Past Medical History:   Diagnosis Date    A-fib (HCC)     Anemia     Carpal tunnel syndrome, unspecified upper limb     Cataract     last assessed: 8/18/2012     COVID-19 09/06/2023    GERD (gastroesophageal reflux disease)     Glaucoma     Hypertension     last assessed: 8/23/2012    Intervertebral disc disease     PVD (peripheral vascular disease) (HCC)     Stomach disorder 11/2021    Syncope 03/12/2018     Present on Admission:   Chronic diastolic congestive heart failure (HCC)   Hyponatremia   Anemia of chronic disease   Benign essential hypertension   Stage 3a chronic kidney disease (HCC)   Permanent atrial fibrillation (HCC)   Sepsis due to COVID-19 (HCC)      Admitting Diagnosis:     Shortness of breath [R06.02]  CHF (congestive heart failure) (HCC) [I50.9]  Hyponatremia [E87.1]  Dyspnea on exertion [R06.09]  Symptomatic anemia [D64.9]    Age/Sex: 89 y.o. male    Network Utilization Review Department  ATTENTION: Please call with any questions or concerns to [phone] and carefully listen to the prompts so that you are directed to the right person. All voicemails are confidential.   For Discharge needs, contact Care Management DC Support Team at [phone] opt. [phone]  Send all requests for admission clinical reviews, approved or denied determinations and any other requests to dedicated fax number below belonging to the campus where the patient is receiving treatment. List of dedicated fax numbers for the Facilities:  FACILITY NAME UR FAX NUMBER   ADMISSION DENIALS (Administrative/Medical Necessity) [phone]   DISCHARGE SUPPORT TEAM (NETWORK) [phone]   PARENT CHILD HEALTH (Maternity/NICU/Pediatrics) [phone]   [facility] [phone]   [facility] [phone]   [facility] [phone]   [facility] [phone]   [facility] [phone]   [facility] [phone]   [facility] [phone]   [facility] [phone]   [facility]  [facility] [phone]   [facility] [phone]   [facility] [phone]   [facility] [phone]

## 2024-11-15 NOTE — ASSESSMENT & PLAN NOTE
Lab Results   Component Value Date    EGFR 52 11/15/2024    EGFR 40 11/14/2024    EGFR 41 11/08/2024    CREATININE 1.19 11/15/2024    CREATININE 1.46 (H) 11/14/2024    CREATININE 1.45 (H) 11/08/2024     Creatinine on admission 1.46  Baseline appears to be around 1.2-1.4  Not meeting KDIGO criteria  Continue to trend  Avoid nephrotoxic agents hypotension and contrast  I's and O's

## 2024-11-15 NOTE — PLAN OF CARE
Problem: INFECTION - ADULT  Goal: Absence or prevention of progression during hospitalization  Description: INTERVENTIONS:  - Assess and monitor for signs and symptoms of infection  - Monitor lab/diagnostic results  - Monitor all insertion sites, i.e. indwelling lines, tubes, and drains  - Monitor endotracheal if appropriate and nasal secretions for changes in amount and color  - [place] appropriate cooling/warming therapies per order  - Administer medications as ordered  - Instruct and encourage patient and family to use good hand hygiene technique  - Identify and instruct in appropriate isolation precautions for identified infection/condition  Outcome: Progressing  Goal: Absence of fever/infection during neutropenic period  Description: INTERVENTIONS:  - Monitor WBC    Outcome: Progressing     Problem: SAFETY ADULT  Goal: Patient will remain free of falls  Description: INTERVENTIONS:  - Educate patient/family on patient safety including physical limitations  - Instruct patient to call for assistance with activity   - Consult OT/PT to assist with strengthening/mobility   - Keep Call bell within reach  - Keep bed low and locked with side rails adjusted as appropriate  - Keep care items and personal belongings within reach  - Initiate and maintain comfort rounds  - Make Fall Risk Sign visible to staff  - Offer Toileting every 2 Hours, in advance of need  - Initiate/Maintain 2 alarms  - Obtain necessary fall risk management equipment: yellow bracelet and socks  - Apply yellow socks and bracelet for high fall risk patients  - Consider moving patient to room near nurses station  Outcome: Progressing  Goal: Maintain or return to baseline ADL function  Description: INTERVENTIONS:  -  Assess patient's ability to carry out ADLs; assess patient's baseline for ADL function and identify physical deficits which impact ability to perform ADLs (bathing, care of mouth/teeth, toileting, grooming, dressing, etc.)  -  Assess/evaluate cause of self-care deficits   - Assess range of motion  - Assess patient's mobility; develop plan if impaired  - Assess patient's need for assistive devices and provide as appropriate  - Encourage maximum independence but intervene and supervise when necessary  - Involve family in performance of ADLs  - Assess for home care needs following discharge   - Consider OT consult to assist with ADL evaluation and planning for discharge  - Provide patient education as appropriate  Outcome: Progressing  Goal: Maintains/Returns to pre admission functional level  Description: INTERVENTIONS:  - Perform AM-PAC 6 Click Basic Mobility/ Daily Activity assessment daily.  - Set and communicate daily mobility goal to care team and patient/family/caregiver.   - Collaborate with rehabilitation services on mobility goals if consulted  - Perform Range of Motion 2 times a day.  - Reposition patient every 2 hours.  - Dangle patient 2 times a day  - Stand patient 2 times a day  - Ambulate patient 2 times a day  - Out of bed to chair 2 times a day   - Out of bed for meals 2 times a day  - Out of bed for toileting  - Record patient progress and toleration of activity level   Outcome: Progressing     Problem: Potential for Falls  Goal: Patient will remain free of falls  Description: INTERVENTIONS:  - Educate patient/family on patient safety including physical limitations  - Instruct patient to call for assistance with activity   - Consult OT/PT to assist with strengthening/mobility   - Keep Call bell within reach  - Keep bed low and locked with side rails adjusted as appropriate  - Keep care items and personal belongings within reach  - Initiate and maintain comfort rounds  - Make Fall Risk Sign visible to staff  - Offer Toileting every 2 Hours, in advance of need  - Initiate/Maintain 2 alarms  - Obtain necessary fall risk management equipment: yellow bracelet and socks  - Apply yellow socks and bracelet for high fall risk  patients  - Consider moving patient to room near nurses station  Outcome: Progressing

## 2024-11-15 NOTE — NURSING NOTE
Upon admit- around 16:45- Pt noted to be calm, alert and oriented x4. Pt daughter at bedside. Pt noted to be shaking slightly, but temperature was 97.9 upon admit and 99.6 at 18:17. Provider notified and rectal temp was taken. Rectal temp of 101.7 noted at 1830. Pt noted by daughter and RN  to be slightly agitated and acting differently than his original noted mentation upon admit. Pt also noted to be in AFIB RVR with HR sustaining in the 120s, Pt given IV metoprolol, PO tylenol and evening meds.  Provider notified and orders placed. Additional dose of IV tylenol, albumin, and fluid bolus started on pt. Fluids hung. Report given to Olga PRECIADO.

## 2024-11-15 NOTE — PHYSICAL THERAPY NOTE
PHYSICAL THERAPY EVALUATION NOTE    Patient Name: Jose Mullins  Today's Date: 11/15/2024    AGE:   89 y.o.  Mrn:   [id number]  ADMIT DX:  Shortness of breath [R06.02]  CHF (congestive heart failure) ([facility]) [I50.9]  Hyponatremia [E87.1]  Dyspnea on exertion [R06.09]  Symptomatic anemia [D64.9]    Past Medical History:   Diagnosis Date    A-fib ([facility])     Anemia     Carpal tunnel syndrome, unspecified upper limb     Cataract     last assessed: 2012    COVID-19 2023    GERD (gastroesophageal reflux disease)     Glaucoma     Hypertension     last assessed: 2012    Intervertebral disc disease     PVD (peripheral vascular disease) ([facility])     Stomach disorder 2021    Syncope 2018     Length Of Stay: 1  PHYSICAL THERAPY EVALUATION :   Patient's identity confirmed via 2 patient identifiers (full name and ) at start of session       11/15/24 0925   PT Last Visit   PT Visit Date 11/15/24   Note Type   Note type Evaluation   Additional Comments out at 0940   Pain Assessment   Pain Assessment Tool 0-10   Pain Score No Pain   Restrictions/Precautions   Weight Bearing Precautions Per Order No   Other Precautions Airborne/isolation;Contact/isolation;Cognitive;Chair Alarm;Bed Alarm;O2;Fall Risk  (2L NC O2)   Home Living   Type of Home House   Home Layout One level;Ramped entrance   Bathroom Equipment Grab bars in shower   Home Equipment Walker   Additional Comments Vasile walks w/ a RW at baseline   Prior Function   Level of [place] Independent with functional mobility;Needs assistance with ADLs;Needs assistance with IADLS   Lives With Alone  (but daughter has been staying with )   Receives Help From Family;Home health   IADLs Family/Friend/Other provides transportation;Family/Friend/Other provides meals;Family/Friend/Other provides medication management   Falls in the last 6 months 0   Vocational Retired   General  "  Additional Pertinent History Patient sating 93% on RA at start of session, maintains >90s throughout session, at time ? reads on the masimo   Family/Caregiver Present Yes   Cognition   Overall Cognitive Status Impaired   Arousal/Participation Alert   Attention Attends with cues to redirect   Orientation Level Oriented X4   Memory Decreased short term memory   Following Commands Follows one step commands inconsistently   Comments Pt pleasantly confused, [place]   RLE Assessment   RLE Assessment WFL   Strength RLE   RLE Overall Strength 4-/5   LLE Assessment   LLE Assessment WFL   Strength LLE   LLE Overall Strength 4-/5   Vision-Basic Assessment   Visual History Cataracts;Glaucoma   Bed Mobility   Supine to Sit 5  Supervision   Additional items Assist x 1;HOB elevated;Increased time required   Transfers   Sit to Stand 5  Supervision   Additional items Assist x 1;Increased time required   Stand to Sit 5  Supervision   Additional items Assist x 1;Increased time required   Ambulation/Elevation   Gait pattern Decreased foot clearance;Excessively slow   Gait Assistance 4  Minimal assist   Additional items Assist x 1   Assistive Device Rolling walker   Distance 5 ft   Ambulation/Elevation Additional Comments Intermittent BLE \"bouncing\", not necessarily buckling   Balance   Static Sitting Good   Dynamic Sitting Fair +   Static Standing Fair -   Dynamic Standing Fair -   Ambulatory Poor +   Activity Tolerance   Activity Tolerance Patient limited by fatigue   Medical Staff Made Aware TRINA Burkett   Nurse Made Aware ILANA Ortez   Assessment   Problem List Decreased strength;Impaired balance;Decreased endurance;Decreased mobility;Decreased cognition;Impaired hearing;Impaired vision   Assessment Jose \"Vasile\"  Susanna is a 89 y.o. Male who presents to [facility] on 11/14/2024 from home w/ c/o worsening SOB and diagnosis of sepsis d/t COVID. Orders for PT eval and treat received. Pt presents w/ comorbidities of HTN, Afib, CHF, chronic " respiratory failure, GERD, glaucoma, PVD, . At baseline, pt mobilizes modified I w/ RW, and reports 0 falls in the last 6 months. Upon evaluation, pt presents w/ the following deficits: weakness, impaired balance, impaired hearing, and decreased endurance. Upon eval, pt requires supervision for bed mobility, supervision for transfers, and min A x 1 for gait. Based on this PT evaluation today, patient's discharge recommendation is for Level III - Low Rehab Resource Intensity. During this admission, pt would benefit from continued skilled inpatient PT in the acute care setting in order to address the abovementioned deficits to maximize function and mobility before DC from acute care.   Goals   Patient Goals to feel better   [facility] Expiration Date 11/25/24   Short Term Goal #1 Patient will: Perform all bed mobility tasks modified independent to improve pt's independence w/ repositioning for decrease risk of skin breakdown, Perform all transfers modified independent consistently from various height surfaces in order to improve I w/ engagement w/ real-world environments/situations, Ambulate at least 100 ft. with roller walker w/ supervision w/o LOB to facilitate return and engagement w/ previous living environment, and Tolerate 3 hr OOB to faciliate upright tolerance   PT Treatment Day 0   Plan   Treatment/Interventions Functional transfer training;LE strengthening/ROM;Therapeutic exercise;Endurance training;Cognitive reorientation;Patient/family training;Equipment eval/education;Bed mobility;Gait training   PT Frequency 2-3x/wk   Discharge Recommendation   Rehab Resource Intensity Level, PT III (Minimum Resource Intensity)   Equipment Recommended Walker   AM-PAC Basic Mobility Inpatient   Turning in Flat Bed Without Bedrails 3   Lying on Back to Sitting on Edge of Flat Bed Without Bedrails 3   Moving Bed to Chair 3   Standing Up From Chair Using Arms 3   Walk in Room 3   Climb 3-5 Stairs With Railing 1   Basic Mobility  Inpatient Raw Score 16   Basic Mobility Standardized Score 38.32   [facility] Highest Level Of Mobility   -[facility] Goal 5: Stand one or more mins   -[facility] Achieved 5: Stand (1 or more minutes)   End of Consult   Patient Position at End of Consult Bedside chair;Bed/Chair alarm activated;All needs within reach         The patient's AM-PAC Basic Mobility Inpatient Short Form Raw Score is 16, Standardized Score is 38.32. A standardized score greater than 38.32 (raw score of 16) suggests the patient may benefit from discharge to home which may not coincide with above PT recommendations. However please refer to therapist recommendation for discharge planning given other factors that may influence destination.    Pt would benefit from skilled inpatient PT during this admission in order to facilitate progress towards goals to maximize functional independence    Kala Navas PT, DPT

## 2024-11-15 NOTE — QUICK NOTE
Pt COVID positive. Remdesivir and dexamethasone started. Heparin gtt to start in AM as he received PM dose of eliquis already. Awaiting CT PE study. Hold on baricitinib for now - if remains on higher oxygen than baseline 2L by morning would initiate then.

## 2024-11-15 NOTE — PROGRESS NOTES
Progress Note - Hospitalist   Name: Jose Mullins 89 y.o. male I MRN: [id number]  Unit/Bed#: -01 I Date of Admission: 11/14/2024   Date of Service: 11/15/2024 I Hospital Day: 1    Assessment & Plan  Chronic diastolic congestive heart failure (HCC)  Wt Readings from Last 3 Encounters:   11/11/24 89.8 kg (198 lb)   11/02/24 89.9 kg (198 lb 3.2 oz)   10/24/24 92.9 kg (204 lb 12.8 oz)     Patient presenting due to shortness of breath  Patient recently admitted due to viral sepsis secondary to COVID and flu    On admission patient meeting SIRS criteria with tachycardia and tachypnea  No source of infection on admission  Troponins negative.  Lactic acid and procalcitonin negative  No other endorgan dysfunction noted  elevated D-dimer of 1.24 CTA and BL LE dopplers negative for DVT  Chest x-ray on admission showing pulmonary edema  BNP on admission 257 which is slightly above baseline   Echo done on 12/21/2023 showed an LVEF of 55 to 60% with mild diastolic dysfunction mildly dilated right ventricle mildly reduced right ventricle systolic function mild bilateral enlargement mild pulmonary hypertension moderate aortic sclerosis  Obtain updated echo  Hold spironolactone  I's and O's  Daily weights  Continue Fluid restriction of 1.5 L      Hyponatremia  Pt with a Na of 128 on admission  Most likely d/t hypervolemia  Continue gentle ivf  Fl restriction of 1.5  Continue to trend  Deferring hyponatremic labs at this time  Improving      Sepsis due to COVID-19 (HCC)  See mgx above  Ua negative  Continue to trend fever curve and WBC  Pt with repeat + COVID infection   BC pending  Initiated Decadron and remdesivir day 1  Urine antigens negative  MRSA culture pending  Dc'd azithromycin  Will continue vancomycin until MRSA cultures result  Anemia of chronic disease  Hemoglobin on admission 9.9  Baseline appears to be between 10 and 11  Most likely secondary to CKD  Continue to trend  Transfuse when less than 7  Benign essential  hypertension  Hold home regimen of amlodipine and Aldactone  Continue bisoprolol   Stage 3a chronic kidney disease (HCC)  Lab Results   Component Value Date    EGFR 52 11/15/2024    EGFR 40 11/14/2024    EGFR 41 11/08/2024    CREATININE 1.19 11/15/2024    CREATININE 1.46 (H) 11/14/2024    CREATININE 1.45 (H) 11/08/2024     Creatinine on admission 1.46  Baseline appears to be around 1.2-1.4  Not meeting KDIGO criteria  Continue to trend  Avoid nephrotoxic agents hypotension and contrast  I's and O's    Permanent atrial fibrillation (HCC)  RC bisoprolol  AC Eliquis  Continue  Chronic respiratory failure (HCC)  Since previous admission patient has not been able to be weaned off of oxygen  Now requiring 2 to 3 L nasal cannula at baseline  Patient requiring 3.5 L nasal cannula on admission  Continue to wean as tolerated      VTE Pharmacologic Prophylaxis: VTE Score: 4 Moderate Risk (Score 3-4) - Pharmacological DVT Prophylaxis Ordered: apixaban (Eliquis).    Mobility:   Basic Mobility Inpatient Raw Score: 13  -HLM Goal: 4: Move to chair/commode  JH-HLM Achieved: 2: Bed activities/Dependent transfer  JH-HLM Goal NOT achieved. Continue with multidisciplinary rounding and encourage appropriate mobility to improve upon -HLM goals.    Patient Centered Rounds: I performed bedside rounds with nursing staff today.   Discussions with Specialists or Other Care Team Provider: CM    Education and Discussions with Family / Patient: Updated  (daughter) at bedside.    Current Length of Stay: 1 day(s)  Current Patient Status: Inpatient   Certification Statement: The patient will continue to require additional inpatient hospital stay due to sepsis   Discharge Plan: Anticipate discharge in >72 hrs to home with home services.    Code Status: Level 3 - DNAR and DNI    Subjective   Jose was seen and examined at bedside.  No acute events overnight.  Discussed plan of care.  All questions and concerns were answered and  addressed.  Is clinically ill this morning and seems very tired and fatigued.  Has no acute complaints at this time.  Will continue gentle IV fluids and fluid restriction.  Will continue vancomycin discontinued azithromycin awaiting MRSA culture and blood cultures.  Sepsis most likely secondary to COVID continue remdesivir and Decadron.  As patient is improving no need for barcitinib CTA and Dopplers negative transition back to Eliquis discontinued heparin drip    Objective :  Temp:  [96 °F (35.6 °C)-101.8 °F (38.8 °C)] 96 °F (35.6 °C)  HR:  [] 110  BP: (105-170)/() 152/88  Resp:  [18-25] 18  SpO2:  [95 %-100 %] 97 %  O2 Device: Nasal cannula  Nasal Cannula O2 Flow Rate (L/min):  [3 L/min-3.5 L/min] 3 L/min    There is no height or weight on file to calculate BMI.     Input and Output Summary (last 24 hours):     Intake/Output Summary (Last 24 hours) at 11/15/2024 0720  Last data filed at 11/15/2024 0646  Gross per 24 hour   Intake 480 ml   Output 750 ml   Net -270 ml       Physical Exam  Vitals and nursing note reviewed.   Constitutional:       General: He is not in acute distress.     Appearance: He is ill-appearing.   HENT:      Head: Normocephalic and atraumatic.   Cardiovascular:      Rate and Rhythm: Normal rate. Rhythm irregular.      Pulses: Normal pulses.      Heart sounds: Normal heart sounds.   Pulmonary:      Effort: Pulmonary effort is normal.      Comments: 3.5L  Abdominal:      General: Abdomen is flat. Bowel sounds are normal.      Palpations: Abdomen is soft.   Musculoskeletal:      Right lower leg: No edema.      Left lower leg: No edema.   Skin:     General: Skin is warm.   Neurological:      General: No focal deficit present.      Mental Status: He is alert and oriented to person, place, and time.           Lines/Drains:        Telemetry:  Telemetry Orders (From admission, onward)               24 Hour Telemetry Monitoring  Continuous x 24 Hours (Telem)        Question:  Reason for 24  Hour Telemetry  Answer:  Decompensated CHF- and any one of the following: continuous diuretic infusion or total diuretic dose >200 mg daily, associated electrolyte derangement (I.e. K < 3.0), ionotropic drip (continuous infusion), hx of ventricular arrhythmia, or new EF < 35%                                  Lab Results: I have reviewed the following results:   Results from last 7 days   Lab Units 11/15/24  0640 11/14/24  1427 11/14/24  1412   WBC Thousand/uL 4.73  --  6.65   HEMOGLOBIN g/dL 9.6*  --  9.9*   I STAT HEMOGLOBIN   --    < >  --    HEMATOCRIT % 30.5*  --  31.3*   HEMATOCRIT, ISTAT   --    < >  --    PLATELETS Thousands/uL 188  --  218   SEGS PCT %  --   --  63   LYMPHO PCT %  --   --  12*   MONO PCT %  --   --  17*   EOS PCT %  --   --  7*    < > = values in this interval not displayed.     Results from last 7 days   Lab Units 11/15/24  0640   SODIUM mmol/L 133*   POTASSIUM mmol/L 5.1   CHLORIDE mmol/L 99   CO2 mmol/L 27   BUN mg/dL 24   CREATININE mg/dL 1.19   ANION GAP mmol/L 7   CALCIUM mg/dL 9.1   GLUCOSE RANDOM mg/dL 127     Results from last 7 days   Lab Units 11/14/24  1510   INR  1.20*             Results from last 7 days   Lab Units 11/14/24  1442 11/14/24  1412   LACTIC ACID mmol/L 0.5  --    PROCALCITONIN ng/ml  --  0.25       Recent Cultures (last 7 days):   Results from last 7 days   Lab Units 11/14/24  1442   BLOOD CULTURE  Received in Microbiology Lab. Culture in Progress.  Received in Microbiology Lab. Culture in Progress.       Imaging Results Review: No pertinent imaging studies reviewed.  Other Study Results Review: No additional pertinent studies reviewed.    Last 24 Hours Medication List:     Current Facility-Administered Medications:     acetaminophen (TYLENOL) tablet 650 mg, Q6H PRN    allopurinol (ZYLOPRIM) tablet 100 mg, Daily    amitriptyline (ELAVIL) tablet 30 mg, HS    [Held by provider] amLODIPine (NORVASC) tablet 5 mg, HS    azithromycin (ZITHROMAX) 500 mg in sodium  chloride 0.9% 250mL IVPB 500 mg, Q24H    bisoprolol (ZEBETA) tablet 2.5 mg, Daily    brimonidine tartrate 0.2 % ophthalmic solution 1 drop, BID    dexamethasone (PF) (DECADRON) injection 6 mg, Daily    dorzolamide-timolol (COSOPT) 2-0.5 % ophthalmic solution 1 drop, BID    Empagliflozin (JARDIANCE) tablet 10 mg, QAM    finasteride (PROSCAR) tablet 5 mg, Daily    gabapentin (NEURONTIN) capsule 100 mg, BID    heparin (porcine) 25,000 units in 0.45% NaCl 250 mL infusion (premix), Titrated, Last Rate: 11.8 Units/kg/hr (11/15/24 0639)    heparin (porcine) injection 2,000 Units, Q6H PRN    heparin (porcine) injection 4,000 Units, Q6H PRN    latanoprost (XALATAN) 0.005 % ophthalmic solution 1 drop, HS    melatonin tablet 3 mg, HS    metoprolol (LOPRESSOR) injection 5 mg, Q6H PRN    multi-electrolyte (PLASMALYTE-A/ISOLYTE-S PH 7.4) IV solution, Continuous, Last Rate: 50 mL/hr (11/14/24 1932)    Netarsudil Dimesylate 0.02 % SOLN, Daily    ondansetron (ZOFRAN) injection 4 mg, Q4H PRN    pantoprazole (PROTONIX) EC tablet 20 mg, Early Morning    polyethylene glycol (MIRALAX) packet 17 g, Daily PRN    [COMPLETED] remdesivir (Veklury) 200 mg in sodium chloride 0.9 % 290 mL IVPB, Q24H **FOLLOWED BY** remdesivir (Veklury) 100 mg in sodium chloride 0.9 % 270 mL IVPB, Q24H    Insert peripheral IV, Once **AND** sodium chloride (PF) 0.9 % injection 3 mL, Q1H PRN    [Held by provider] spironolactone (ALDACTONE) tablet 25 mg, Daily    vancomycin (VANCOCIN) IVPB (premix in dextrose) 1,000 mg 200 mL, Daily PRN    Administrative Statements   Today, Patient Was Seen By: Helga Lester MD  I have spent a total time of 40 minutes in caring for this patient on the day of the visit/encounter including Diagnostic results, Prognosis, Risks and benefits of tx options, Instructions for management, Patient and family education, Importance of tx compliance, Risk factor reductions, Impressions, Counseling / Coordination of care, Documenting in the  medical record, Reviewing / ordering tests, medicine, procedures  , Obtaining or reviewing history  , and Communicating with other healthcare professionals .    **Please Note: This note may have been constructed using a voice recognition system.**

## 2024-11-15 NOTE — RESPIRATORY THERAPY NOTE
RT Protocol Note  Jose Mullins 89 y.o. male MRN: [id number]  Unit/Bed#: -01 Encounter: [id number]        Plan       Airway Clearance Plan: Incentive Spirometer

## 2024-11-15 NOTE — ASSESSMENT & PLAN NOTE
See mgx above  Ua negative  Continue to trend fever curve and WBC  Pt with repeat + COVID infection   BC pending  Initiated Decadron and remdesivir day 1  Urine antigens negative  MRSA culture pending  Dc'd azithromycin  Will continue vancomycin until MRSA cultures result

## 2024-11-16 LAB
ALBUMIN SERPL BCG-MCNC: 3.4 G/DL (ref 3.5–5)
ALP SERPL-CCNC: 63 U/L (ref 34–104)
ALT SERPL W P-5'-P-CCNC: 21 U/L (ref 7–52)
ANION GAP SERPL CALCULATED.3IONS-SCNC: 5 MMOL/L (ref 4–13)
AST SERPL W P-5'-P-CCNC: 17 U/L (ref 13–39)
BASOPHILS # BLD AUTO: 0 THOUSANDS/ÂΜL (ref 0–0.1)
BASOPHILS NFR BLD AUTO: 0 % (ref 0–1)
BILIRUB SERPL-MCNC: 0.28 MG/DL (ref 0.2–1)
BUN SERPL-MCNC: 34 MG/DL (ref 5–25)
CALCIUM ALBUM COR SERPL-MCNC: 8.4 MG/DL (ref 8.3–10.1)
CALCIUM SERPL-MCNC: 7.9 MG/DL (ref 8.4–10.2)
CHLORIDE SERPL-SCNC: 101 MMOL/L (ref 96–108)
CO2 SERPL-SCNC: 27 MMOL/L (ref 21–32)
CREAT SERPL-MCNC: 1.34 MG/DL (ref 0.6–1.3)
EOSINOPHIL # BLD AUTO: 0 THOUSAND/ÂΜL (ref 0–0.61)
EOSINOPHIL NFR BLD AUTO: 0 % (ref 0–6)
ERYTHROCYTE [DISTWIDTH] IN BLOOD BY AUTOMATED COUNT: 12.7 % (ref 11.6–15.1)
GFR SERPL CREATININE-BSD FRML MDRD: 45 ML/MIN/1.73SQ M
GLUCOSE SERPL-MCNC: 134 MG/DL (ref 65–140)
HCT VFR BLD AUTO: 28 % (ref 36.5–49.3)
HGB BLD-MCNC: 8.9 G/DL (ref 12–17)
IMM GRANULOCYTES # BLD AUTO: 0.08 THOUSAND/UL (ref 0–0.2)
IMM GRANULOCYTES NFR BLD AUTO: 1 % (ref 0–2)
LYMPHOCYTES # BLD AUTO: 0.5 THOUSANDS/ÂΜL (ref 0.6–4.47)
LYMPHOCYTES NFR BLD AUTO: 5 % (ref 14–44)
MAGNESIUM SERPL-MCNC: 2.1 MG/DL (ref 1.9–2.7)
MCH RBC QN AUTO: 32.4 PG (ref 26.8–34.3)
MCHC RBC AUTO-ENTMCNC: 31.8 G/DL (ref 31.4–37.4)
MCV RBC AUTO: 102 FL (ref 82–98)
MONOCYTES # BLD AUTO: 0.6 THOUSAND/ÂΜL (ref 0.17–1.22)
MONOCYTES NFR BLD AUTO: 6 % (ref 4–12)
MRSA NOSE QL CULT: NORMAL
NEUTROPHILS # BLD AUTO: 9.41 THOUSANDS/ÂΜL (ref 1.85–7.62)
NEUTS SEG NFR BLD AUTO: 88 % (ref 43–75)
NRBC BLD AUTO-RTO: 0 /100 WBCS
PLATELET # BLD AUTO: 204 THOUSANDS/UL (ref 149–390)
PMV BLD AUTO: 10.5 FL (ref 8.9–12.7)
POTASSIUM SERPL-SCNC: 4.7 MMOL/L (ref 3.5–5.3)
PROCALCITONIN SERPL-MCNC: 0.39 NG/ML
PROT SERPL-MCNC: 6.7 G/DL (ref 6.4–8.4)
RBC # BLD AUTO: 2.75 MILLION/UL (ref 3.88–5.62)
SODIUM SERPL-SCNC: 133 MMOL/L (ref 135–147)
WBC # BLD AUTO: 10.59 THOUSAND/UL (ref 4.31–10.16)

## 2024-11-16 PROCEDURE — 84145 PROCALCITONIN (PCT): CPT | Performed by: STUDENT IN AN ORGANIZED HEALTH CARE EDUCATION/TRAINING PROGRAM

## 2024-11-16 PROCEDURE — 80053 COMPREHEN METABOLIC PANEL: CPT | Performed by: STUDENT IN AN ORGANIZED HEALTH CARE EDUCATION/TRAINING PROGRAM

## 2024-11-16 PROCEDURE — 85025 COMPLETE CBC W/AUTO DIFF WBC: CPT | Performed by: STUDENT IN AN ORGANIZED HEALTH CARE EDUCATION/TRAINING PROGRAM

## 2024-11-16 PROCEDURE — 83735 ASSAY OF MAGNESIUM: CPT | Performed by: STUDENT IN AN ORGANIZED HEALTH CARE EDUCATION/TRAINING PROGRAM

## 2024-11-16 PROCEDURE — 99233 SBSQ HOSP IP/OBS HIGH 50: CPT | Performed by: STUDENT IN AN ORGANIZED HEALTH CARE EDUCATION/TRAINING PROGRAM

## 2024-11-16 RX ORDER — FUROSEMIDE 10 MG/ML
40 INJECTION INTRAMUSCULAR; INTRAVENOUS ONCE
Status: DISCONTINUED | OUTPATIENT
Start: 2024-11-16 | End: 2024-11-16

## 2024-11-16 RX ORDER — VANCOMYCIN HYDROCHLORIDE 750 MG/150ML
10 INJECTION, SOLUTION INTRAVENOUS DAILY PRN
Status: DISCONTINUED | OUTPATIENT
Start: 2024-11-16 | End: 2024-11-16

## 2024-11-16 RX ORDER — VANCOMYCIN HYDROCHLORIDE 750 MG/150ML
10 INJECTION, SOLUTION INTRAVENOUS EVERY 24 HOURS
Status: DISCONTINUED | OUTPATIENT
Start: 2024-11-16 | End: 2024-11-17

## 2024-11-16 RX ADMIN — EMPAGLIFLOZIN 10 MG: 10 TABLET, FILM COATED ORAL at 10:30

## 2024-11-16 RX ADMIN — BRIMONIDINE TARTRATE 1 DROP: 2 SOLUTION/ DROPS OPHTHALMIC at 10:26

## 2024-11-16 RX ADMIN — APIXABAN 2.5 MG: 2.5 TABLET, FILM COATED ORAL at 10:30

## 2024-11-16 RX ADMIN — AMLODIPINE BESYLATE 5 MG: 5 TABLET ORAL at 21:25

## 2024-11-16 RX ADMIN — SPIRONOLACTONE 25 MG: 25 TABLET ORAL at 10:30

## 2024-11-16 RX ADMIN — BRIMONIDINE TARTRATE 1 DROP: 2 SOLUTION/ DROPS OPHTHALMIC at 18:41

## 2024-11-16 RX ADMIN — DORZOLAMIDE HYDROCHLORIDE AND TIMOLOL MALEATE 1 DROP: 20; 5 SOLUTION/ DROPS OPHTHALMIC at 10:26

## 2024-11-16 RX ADMIN — ALLOPURINOL 100 MG: 100 TABLET ORAL at 10:28

## 2024-11-16 RX ADMIN — AMITRIPTYLINE HYDROCHLORIDE 30 MG: 10 TABLET, FILM COATED ORAL at 21:25

## 2024-11-16 RX ADMIN — FINASTERIDE 5 MG: 5 TABLET, FILM COATED ORAL at 10:29

## 2024-11-16 RX ADMIN — DEXAMETHASONE SODIUM PHOSPHATE 6 MG: 10 INJECTION, SOLUTION INTRAMUSCULAR; INTRAVENOUS at 13:32

## 2024-11-16 RX ADMIN — VANCOMYCIN HYDROCHLORIDE 750 MG: 750 INJECTION, SOLUTION INTRAVENOUS at 22:24

## 2024-11-16 RX ADMIN — GABAPENTIN 100 MG: 100 CAPSULE ORAL at 18:42

## 2024-11-16 RX ADMIN — GABAPENTIN 100 MG: 100 CAPSULE ORAL at 10:30

## 2024-11-16 RX ADMIN — REMDESIVIR 100 MG: 100 INJECTION, POWDER, LYOPHILIZED, FOR SOLUTION INTRAVENOUS at 21:26

## 2024-11-16 RX ADMIN — BISOPROLOL FUMARATE 2.5 MG: 5 TABLET, FILM COATED ORAL at 10:29

## 2024-11-16 RX ADMIN — APIXABAN 2.5 MG: 2.5 TABLET, FILM COATED ORAL at 18:42

## 2024-11-16 RX ADMIN — DORZOLAMIDE HYDROCHLORIDE AND TIMOLOL MALEATE 1 DROP: 20; 5 SOLUTION/ DROPS OPHTHALMIC at 18:41

## 2024-11-16 RX ADMIN — SODIUM CHLORIDE, SODIUM GLUCONATE, SODIUM ACETATE, POTASSIUM CHLORIDE, MAGNESIUM CHLORIDE, SODIUM PHOSPHATE, DIBASIC, AND POTASSIUM PHOSPHATE 50 ML/HR: .53; .5; .37; .037; .03; .012; .00082 INJECTION, SOLUTION INTRAVENOUS at 00:55

## 2024-11-16 RX ADMIN — LATANOPROST 1 DROP: 50 SOLUTION OPHTHALMIC at 21:32

## 2024-11-16 RX ADMIN — PANTOPRAZOLE SODIUM 20 MG: 20 TABLET, DELAYED RELEASE ORAL at 05:45

## 2024-11-16 NOTE — ASSESSMENT & PLAN NOTE
Lab Results   Component Value Date    EGFR 45 11/16/2024    EGFR 52 11/15/2024    EGFR 40 11/14/2024    CREATININE 1.34 (H) 11/16/2024    CREATININE 1.19 11/15/2024    CREATININE 1.46 (H) 11/14/2024     Creatinine on admission 1.46  Baseline appears to be around 1.2-1.4  Not meeting KDIGO criteria  Continue to trend  Avoid nephrotoxic agents hypotension and contrast  I's and O's

## 2024-11-16 NOTE — ASSESSMENT & PLAN NOTE
Wt Readings from Last 3 Encounters:   11/16/24 92.4 kg (203 lb 9.6 oz)   11/11/24 89.8 kg (198 lb)   11/02/24 89.9 kg (198 lb 3.2 oz)     Patient presenting due to shortness of breath  Patient recently admitted due to viral sepsis secondary to COVID and flu    On admission patient meeting SIRS criteria with tachycardia and tachypnea  No source of infection on admission  Troponins negative.  Lactic acid and procalcitonin negative  No other endorgan dysfunction noted  elevated D-dimer of 1.24 CTA negative for PE and BL LE dopplers negative for DVT  Chest x-ray on admission showing pulmonary edema  BNP on admission 257 which is slightly above baseline   Echo done on 12/21/2023 showed an LVEF of 55 to 60% with mild diastolic dysfunction mildly dilated right ventricle mildly reduced right ventricle systolic function mild bilateral enlargement mild pulmonary hypertension moderate aortic sclerosis  updated echo 11/15/24 LVEF of 55% normal systolic function wall motion is normal.  Right ventricle moderately dilated systolic function moderately reduced.  Right atrium moderately dilated.  Aortic valve sclerosis, severe tricuspid regurg  Resumed spironolactone  I's and O's  Daily weights  Continue Fluid restriction of 1.5 L

## 2024-11-16 NOTE — ASSESSMENT & PLAN NOTE
Since previous admission patient has not been able to be weaned off of oxygen  Now requiring 2 to 3 L nasal cannula at baseline  Patient requiring 3.5 L nasal cannula on admission  Continue to wean as tolerated  Pt back to baseline

## 2024-11-16 NOTE — PROGRESS NOTES
Jose Mullins is a 89 y.o. male who is currently ordered Vancomycin IV with management by the Pharmacy Consult service.  Relevant clinical data and objective / subjective history reviewed.  Vancomycin Assessment:  Indication and Goal AUC/Trough: Pneumonia (goal -600, trough >10), -600, trough >10  Clinical Status: stable  Micro:     Renal Function:  SCr: 1.34 mg/dL  CrCl: 38 mL/min  Renal replacement: Not on dialysis  Days of Therapy: 3  Current Dose: 1000mg every 24 hours  Vancomycin Plan: current dose predicts trough close to 20. Thus, dose changed to as below.  New Dosinmg every 24 hours  Estimated AUC: 401 mcg*hr/mL  Estimated Trough: 12.6 mcg/mL  Next Level: 24 at 0600  Renal Function Monitoring: Daily BMP and UOP  Pharmacy will continue to follow closely for s/sx of nephrotoxicity, infusion reactions and appropriateness of therapy.  BMP and CBC will be ordered per protocol. We will continue to follow the patient’s culture results and clinical progress daily.    Beltran Veloz, Pharmacist, PharmD, BCPS

## 2024-11-16 NOTE — PROGRESS NOTES
Progress Note - Hospitalist   Name: Jose Mullins 89 y.o. male I MRN: [id number]  Unit/Bed#: -01 I Date of Admission: 11/14/2024   Date of Service: 11/16/2024 I Hospital Day: 2    Assessment & Plan  Chronic diastolic congestive heart failure (HCC)  Wt Readings from Last 3 Encounters:   11/16/24 92.4 kg (203 lb 9.6 oz)   11/11/24 89.8 kg (198 lb)   11/02/24 89.9 kg (198 lb 3.2 oz)     Patient presenting due to shortness of breath  Patient recently admitted due to viral sepsis secondary to COVID and flu    On admission patient meeting SIRS criteria with tachycardia and tachypnea  No source of infection on admission  Troponins negative.  Lactic acid and procalcitonin negative  No other endorgan dysfunction noted  elevated D-dimer of 1.24 CTA negative for PE and BL LE dopplers negative for DVT  Chest x-ray on admission showing pulmonary edema  BNP on admission 257 which is slightly above baseline   Echo done on 12/21/2023 showed an LVEF of 55 to 60% with mild diastolic dysfunction mildly dilated right ventricle mildly reduced right ventricle systolic function mild bilateral enlargement mild pulmonary hypertension moderate aortic sclerosis  updated echo 11/15/24 LVEF of 55% normal systolic function wall motion is normal.  Right ventricle moderately dilated systolic function moderately reduced.  Right atrium moderately dilated.  Aortic valve sclerosis, severe tricuspid regurg  Resumed spironolactone  I's and O's  Daily weights  Continue Fluid restriction of 1.5 L      Hyponatremia  Pt with a Na of 128 on admission  Most likely d/t hypervolemia  Dc'd gentle ivf  Fl restriction of 1.5  Continue to trend  Deferring hyponatremic labs at this time  Stable at 133      Sepsis due to COVID-19 (HCC)  See mgx above  Ua negative  Continue to trend fever curve and WBC  Pt with repeat + COVID infection   BC NGTD @ 24h  Initiated Decadron and remdesivir day 2  Urine antigens negative  MRSA culture pending  Dc'd azithromycin  Will  continue vancomycin until MRSA cultures result  Anemia of chronic disease  Hemoglobin on admission 9.9  Baseline appears to be between 10 and 11  Most likely secondary to CKD  Continue to trend  Transfuse when less than 7  Benign essential hypertension  Resumes amlodipine and Aldactone at higher parameters   Continue bisoprolol   Stage 3a chronic kidney disease (HCC)  Lab Results   Component Value Date    EGFR 45 11/16/2024    EGFR 52 11/15/2024    EGFR 40 11/14/2024    CREATININE 1.34 (H) 11/16/2024    CREATININE 1.19 11/15/2024    CREATININE 1.46 (H) 11/14/2024     Creatinine on admission 1.46  Baseline appears to be around 1.2-1.4  Not meeting KDIGO criteria  Continue to trend  Avoid nephrotoxic agents hypotension and contrast  I's and O's    Permanent atrial fibrillation (HCC)  RC bisoprolol  AC Eliquis  Continue  Chronic respiratory failure (HCC)  Since previous admission patient has not been able to be weaned off of oxygen  Now requiring 2 to 3 L nasal cannula at baseline  Patient requiring 3.5 L nasal cannula on admission  Continue to wean as tolerated  Pt back to baseline     VTE Pharmacologic Prophylaxis: VTE Score: 4 Moderate Risk (Score 3-4) - Pharmacological DVT Prophylaxis Ordered: apixaban (Eliquis).    Mobility:   Basic Mobility Inpatient Raw Score: 16  JH-HLM Goal: 5: Stand one or more mins  JH-HLM Achieved: 5: Stand (1 or more minutes)  JH-HLM Goal NOT achieved. Continue with multidisciplinary rounding and encourage appropriate mobility to improve upon JH-HLM goals.    Patient Centered Rounds: I performed bedside rounds with nursing staff today.   Discussions with Specialists or Other Care Team Provider: CM    Education and Discussions with Family / Patient: Updated  (daughter) at bedside.    Current Length of Stay: 2 day(s)  Current Patient Status: Inpatient   Certification Statement: The patient will continue to require additional inpatient hospital stay due to sepsis   Discharge  Plan: Anticipate discharge in >72 hrs to home with home services.    Code Status: Level 3 - DNAR and DNI    Subjective   Jose was seen and examined at bedside.  No acute events overnight.  Discussed plan of care.  All questions and concerns were answered and addressed.  Patient looks clinically much improved today.  Has no acute complaints at this time.  Is still very weak.  Will continue vancomycin Decadron and remdesivir at this time.  Sodium stable at 133.  Oxygen weaned back to baseline.  Creatinine increased today patient resumed on home blood pressure medications.  IV fluids discontinued.    Objective :  Temp:  [96.3 °F (35.7 °C)-97.9 °F (36.6 °C)] 97.9 °F (36.6 °C)  HR:  [100-107] 107  BP: (162-163)/(88-97) 162/97  Resp:  [16-18] 18  SpO2:  [95 %-97 %] 95 %  O2 Device: Nasal cannula  Nasal Cannula O2 Flow Rate (L/min):  [2 L/min-3 L/min] 2 L/min    Body mass index is 30.96 kg/m².     Input and Output Summary (last 24 hours):     Intake/Output Summary (Last 24 hours) at 11/16/2024 0658  Last data filed at 11/16/2024 0601  Gross per 24 hour   Intake 3160.17 ml   Output 800 ml   Net 2360.17 ml       Physical Exam  Vitals and nursing note reviewed.   Constitutional:       General: He is not in acute distress.     Appearance: He is ill-appearing.   HENT:      Head: Normocephalic and atraumatic.   Cardiovascular:      Rate and Rhythm: Normal rate. Rhythm irregular.      Pulses: Normal pulses.      Heart sounds: Murmur heard.   Pulmonary:      Effort: Pulmonary effort is normal.      Breath sounds: Rhonchi and rales present.      Comments: 2L  Abdominal:      General: Abdomen is flat. Bowel sounds are normal.      Palpations: Abdomen is soft.   Musculoskeletal:      Right lower leg: No edema.      Left lower leg: No edema.   Skin:     General: Skin is warm.   Neurological:      General: No focal deficit present.      Mental Status: He is alert and oriented to person, place, and time.            Lines/Drains:        Telemetry:  Telemetry Orders (From admission, onward)               24 Hour Telemetry Monitoring  Continuous x 24 Hours (Telem)        Question:  Reason for 24 Hour Telemetry  Answer:  Decompensated CHF- and any one of the following: continuous diuretic infusion or total diuretic dose >200 mg daily, associated electrolyte derangement (I.e. K < 3.0), ionotropic drip (continuous infusion), hx of ventricular arrhythmia, or new EF < 35%                                  Lab Results: I have reviewed the following results:   Results from last 7 days   Lab Units 11/16/24  0536   WBC Thousand/uL 10.59*   HEMOGLOBIN g/dL 8.9*   HEMATOCRIT % 28.0*   PLATELETS Thousands/uL 204   SEGS PCT % 88*   LYMPHO PCT % 5*   MONO PCT % 6   EOS PCT % 0     Results from last 7 days   Lab Units 11/16/24  0536   SODIUM mmol/L 133*   POTASSIUM mmol/L 4.7   CHLORIDE mmol/L 101   CO2 mmol/L 27   BUN mg/dL 34*   CREATININE mg/dL 1.34*   ANION GAP mmol/L 5   CALCIUM mg/dL 7.9*   ALBUMIN g/dL 3.4*   TOTAL BILIRUBIN mg/dL 0.28   ALK PHOS U/L 63   ALT U/L 21   AST U/L 17   GLUCOSE RANDOM mg/dL 134     Results from last 7 days   Lab Units 11/14/24  1510   INR  1.20*             Results from last 7 days   Lab Units 11/16/24  0536 11/14/24  1442 11/14/24  1412   LACTIC ACID mmol/L  --  0.5  --    PROCALCITONIN ng/ml 0.39*  --  0.25       Recent Cultures (last 7 days):   Results from last 7 days   Lab Units 11/15/24  0640 11/14/24  1442   BLOOD CULTURE   --  No Growth at 24 hrs.  No Growth at 24 hrs.   LEGIONELLA URINARY ANTIGEN  Negative  --        Imaging Results Review: No pertinent imaging studies reviewed.  Other Study Results Review: No additional pertinent studies reviewed.    Last 24 Hours Medication List:     Current Facility-Administered Medications:     acetaminophen (TYLENOL) tablet 650 mg, Q6H PRN    allopurinol (ZYLOPRIM) tablet 100 mg, Daily    amitriptyline (ELAVIL) tablet 30 mg, HS    amLODIPine (NORVASC)  tablet 5 mg, HS    apixaban (ELIQUIS) tablet 2.5 mg, BID    bisoprolol (ZEBETA) tablet 2.5 mg, Daily    brimonidine tartrate 0.2 % ophthalmic solution 1 drop, BID    dexamethasone (PF) (DECADRON) injection 6 mg, Daily    dorzolamide-timolol (COSOPT) 2-0.5 % ophthalmic solution 1 drop, BID    Empagliflozin (JARDIANCE) tablet 10 mg, QAM    finasteride (PROSCAR) tablet 5 mg, Daily    furosemide (LASIX) injection 40 mg, Once    gabapentin (NEURONTIN) capsule 100 mg, BID    latanoprost (XALATAN) 0.005 % ophthalmic solution 1 drop, HS    melatonin tablet 3 mg, HS    metoprolol (LOPRESSOR) injection 5 mg, Q6H PRN    Netarsudil Dimesylate 0.02 % SOLN, Daily    ondansetron (ZOFRAN) injection 4 mg, Q4H PRN    pantoprazole (PROTONIX) EC tablet 20 mg, Early Morning    polyethylene glycol (MIRALAX) packet 17 g, Daily PRN    [COMPLETED] remdesivir (Veklury) 200 mg in sodium chloride 0.9 % 290 mL IVPB, Q24H **FOLLOWED BY** remdesivir (Veklury) 100 mg in sodium chloride 0.9 % 270 mL IVPB, Q24H    Insert peripheral IV, Once **AND** sodium chloride (PF) 0.9 % injection 3 mL, Q1H PRN    spironolactone (ALDACTONE) tablet 25 mg, Daily    vancomycin (VANCOCIN) IVPB (premix in dextrose) 750 mg 150 mL, Q24H    Administrative Statements   Today, Patient Was Seen By: Helga Lester MD  I have spent a total time of 54 minutes in caring for this patient on the day of the visit/encounter including Diagnostic results, Prognosis, Risks and benefits of tx options, Instructions for management, Patient and family education, Importance of tx compliance, Risk factor reductions, Impressions, Counseling / Coordination of care, Documenting in the medical record, Reviewing / ordering tests, medicine, procedures  , Obtaining or reviewing history  , and Communicating with other healthcare professionals .    **Please Note: This note may have been constructed using a voice recognition system.**

## 2024-11-16 NOTE — ASSESSMENT & PLAN NOTE
Pt with a Na of 128 on admission  Most likely d/t hypervolemia  Dc'd gentle ivf  Fl restriction of 1.5  Continue to trend  Deferring hyponatremic labs at this time  Stable at 133

## 2024-11-16 NOTE — PLAN OF CARE
Problem: Potential for Falls  Goal: Patient will remain free of falls  Description: INTERVENTIONS:  - Educate patient/family on patient safety including physical limitations  - Instruct patient to call for assistance with activity   - Consult OT/PT to assist with strengthening/mobility   - Keep Call bell within reach  - Keep bed low and locked with side rails adjusted as appropriate  - Keep care items and personal belongings within reach  - Initiate and maintain comfort rounds  - Make Fall Risk Sign visible to staff  - Offer Toileting every 2 Hours, in advance of need  - Initiate/Maintain 2 alarms  - Obtain necessary fall risk management equipment: yellow bracelet and socks  - Apply yellow socks and bracelet for high fall risk patients  - Consider moving patient to room near nurses station  Outcome: Progressing     Problem: PAIN - ADULT  Goal: Verbalizes/displays adequate comfort level or baseline comfort level  Description: Interventions:  - Encourage patient to monitor pain and request assistance  - Assess pain using appropriate pain scale  - Administer analgesics based on type and severity of pain and evaluate response  - Implement non-pharmacological measures as appropriate and evaluate response  - Consider cultural and social influences on pain and pain management  - Notify physician/advanced practitioner if interventions unsuccessful or patient reports new pain  Outcome: Progressing     Problem: INFECTION - ADULT  Goal: Absence or prevention of progression during hospitalization  Description: INTERVENTIONS:  - Assess and monitor for signs and symptoms of infection  - Monitor lab/diagnostic results  - Monitor all insertion sites, i.e. indwelling lines, tubes, and drains  - Monitor endotracheal if appropriate and nasal secretions for changes in amount and color  - [place] appropriate cooling/warming therapies per order  - Administer medications as ordered  - Instruct and encourage patient and family to use  good hand hygiene technique  - Identify and instruct in appropriate isolation precautions for identified infection/condition  Outcome: Progressing  Goal: Absence of fever/infection during neutropenic period  Description: INTERVENTIONS:  - Monitor WBC    Outcome: Progressing     Problem: SAFETY ADULT  Goal: Patient will remain free of falls  Description: INTERVENTIONS:  - Educate patient/family on patient safety including physical limitations  - Instruct patient to call for assistance with activity   - Consult OT/PT to assist with strengthening/mobility   - Keep Call bell within reach  - Keep bed low and locked with side rails adjusted as appropriate  - Keep care items and personal belongings within reach  - Initiate and maintain comfort rounds  - Make Fall Risk Sign visible to staff  - Offer Toileting every 2 Hours, in advance of need  - Initiate/Maintain 2 alarms  - Obtain necessary fall risk management equipment: yellow bracelet and socks  - Apply yellow socks and bracelet for high fall risk patients  - Consider moving patient to room near nurses station  Outcome: Progressing  Goal: Maintain or return to baseline ADL function  Description: INTERVENTIONS:  -  Assess patient's ability to carry out ADLs; assess patient's baseline for ADL function and identify physical deficits which impact ability to perform ADLs (bathing, care of mouth/teeth, toileting, grooming, dressing, etc.)  - Assess/evaluate cause of self-care deficits   - Assess range of motion  - Assess patient's mobility; develop plan if impaired  - Assess patient's need for assistive devices and provide as appropriate  - Encourage maximum independence but intervene and supervise when necessary  - Involve family in performance of ADLs  - Assess for home care needs following discharge   - Consider OT consult to assist with ADL evaluation and planning for discharge  - Provide patient education as appropriate  Outcome: Progressing  Goal: Maintains/Returns to  pre admission functional level  Description: INTERVENTIONS:  - Perform AM-PAC 6 Click Basic Mobility/ Daily Activity assessment daily.  - Set and communicate daily mobility goal to care team and patient/family/caregiver.   - Collaborate with rehabilitation services on mobility goals if consulted  - Perform Range of Motion 2 times a day.  - Reposition patient every 2 hours.  - Dangle patient 2 times a day  - Stand patient 2 times a day  - Ambulate patient 2 times a day  - Out of bed to chair 2 times a day   - Out of bed for meals 2 times a day  - Out of bed for toileting  - Record patient progress and toleration of activity level   Outcome: Progressing     Problem: DISCHARGE PLANNING  Goal: Discharge to home or other facility with appropriate resources  Description: INTERVENTIONS:  - Identify barriers to discharge w/patient and caregiver  - Arrange for needed discharge resources and transportation as appropriate  - Identify discharge learning needs (meds, wound care, etc.)  - Arrange for interpretive services to assist at discharge as needed  - Refer to Case Management Department for coordinating discharge planning if the patient needs post-hospital services based on physician/advanced practitioner order or complex needs related to functional status, cognitive ability, or social support system  Outcome: Progressing     Problem: Knowledge Deficit  Goal: Patient/family/caregiver demonstrates understanding of disease process, treatment plan, medications, and discharge instructions  Description: Complete learning assessment and assess knowledge base.  Interventions:  - Provide teaching at level of understanding  - Provide teaching via preferred learning methods  Outcome: Progressing     Problem: Prexisting or High Potential for Compromised Skin Integrity  Goal: Skin integrity is maintained or improved  Description: INTERVENTIONS:  - Identify patients at risk for skin breakdown  - Assess and monitor skin integrity  -  Assess and monitor nutrition and hydration status  - Monitor labs   - Assess for incontinence   - Turn and reposition patient  - Assist with mobility/ambulation  - Relieve pressure over bony prominences  - Avoid friction and shearing  - Provide appropriate hygiene as needed including keeping skin clean and dry  - Evaluate need for skin moisturizer/barrier cream  - Collaborate with interdisciplinary team   - Patient/family teaching  - Consider wound care consult   Outcome: Progressing

## 2024-11-16 NOTE — ASSESSMENT & PLAN NOTE
See mgx above  Ua negative  Continue to trend fever curve and WBC  Pt with repeat + COVID infection   BC NGTD @ 24h  Initiated Decadron and remdesivir day 2  Urine antigens negative  MRSA culture pending  Dc'd azithromycin  Will continue vancomycin until MRSA cultures result

## 2024-11-16 NOTE — PLAN OF CARE
Problem: Potential for Falls  Goal: Patient will remain free of falls  Description: INTERVENTIONS:  - Educate patient/family on patient safety including physical limitations  - Instruct patient to call for assistance with activity   - Consult OT/PT to assist with strengthening/mobility   - Keep Call bell within reach  - Keep bed low and locked with side rails adjusted as appropriate  - Keep care items and personal belongings within reach  - Initiate and maintain comfort rounds  - Make Fall Risk Sign visible to staff  - Offer Toileting every 2 Hours, in advance of need  - Initiate/Maintain 2 alarms  - Obtain necessary fall risk management equipment: yellow bracelet and socks  - Apply yellow socks and bracelet for high fall risk patients  - Consider moving patient to room near nurses station  Outcome: Progressing     Problem: PAIN - ADULT  Goal: Verbalizes/displays adequate comfort level or baseline comfort level  Description: Interventions:  - Encourage patient to monitor pain and request assistance  - Assess pain using appropriate pain scale  - Administer analgesics based on type and severity of pain and evaluate response  - Implement non-pharmacological measures as appropriate and evaluate response  - Consider cultural and social influences on pain and pain management  - Notify physician/advanced practitioner if interventions unsuccessful or patient reports new pain  Outcome: Progressing     Problem: INFECTION - ADULT  Goal: Absence or prevention of progression during hospitalization  Description: INTERVENTIONS:  - Assess and monitor for signs and symptoms of infection  - Monitor lab/diagnostic results  - Monitor all insertion sites, i.e. indwelling lines, tubes, and drains  - Monitor endotracheal if appropriate and nasal secretions for changes in amount and color  - [place] appropriate cooling/warming therapies per order  - Administer medications as ordered  - Instruct and encourage patient and family to use  good hand hygiene technique  - Identify and instruct in appropriate isolation precautions for identified infection/condition  Outcome: Progressing  Goal: Absence of fever/infection during neutropenic period  Description: INTERVENTIONS:  - Monitor WBC    Outcome: Progressing

## 2024-11-17 LAB
ALBUMIN SERPL BCG-MCNC: 3.5 G/DL (ref 3.5–5)
ALP SERPL-CCNC: 61 U/L (ref 34–104)
ALT SERPL W P-5'-P-CCNC: 29 U/L (ref 7–52)
ANION GAP SERPL CALCULATED.3IONS-SCNC: 6 MMOL/L (ref 4–13)
AST SERPL W P-5'-P-CCNC: 23 U/L (ref 13–39)
BASOPHILS # BLD AUTO: 0 THOUSANDS/ÂΜL (ref 0–0.1)
BASOPHILS NFR BLD AUTO: 0 % (ref 0–1)
BILIRUB SERPL-MCNC: 0.31 MG/DL (ref 0.2–1)
BUN SERPL-MCNC: 41 MG/DL (ref 5–25)
CALCIUM SERPL-MCNC: 8 MG/DL (ref 8.4–10.2)
CHLORIDE SERPL-SCNC: 102 MMOL/L (ref 96–108)
CO2 SERPL-SCNC: 26 MMOL/L (ref 21–32)
CREAT SERPL-MCNC: 1.29 MG/DL (ref 0.6–1.3)
EOSINOPHIL # BLD AUTO: 0 THOUSAND/ÂΜL (ref 0–0.61)
EOSINOPHIL NFR BLD AUTO: 0 % (ref 0–6)
ERYTHROCYTE [DISTWIDTH] IN BLOOD BY AUTOMATED COUNT: 13.1 % (ref 11.6–15.1)
GFR SERPL CREATININE-BSD FRML MDRD: 47 ML/MIN/1.73SQ M
GLUCOSE SERPL-MCNC: 112 MG/DL (ref 65–140)
HCT VFR BLD AUTO: 29.2 % (ref 36.5–49.3)
HGB BLD-MCNC: 9.1 G/DL (ref 12–17)
IMM GRANULOCYTES # BLD AUTO: 0.09 THOUSAND/UL (ref 0–0.2)
IMM GRANULOCYTES NFR BLD AUTO: 1 % (ref 0–2)
LYMPHOCYTES # BLD AUTO: 0.64 THOUSANDS/ÂΜL (ref 0.6–4.47)
LYMPHOCYTES NFR BLD AUTO: 5 % (ref 14–44)
MCH RBC QN AUTO: 31.9 PG (ref 26.8–34.3)
MCHC RBC AUTO-ENTMCNC: 31.2 G/DL (ref 31.4–37.4)
MCV RBC AUTO: 103 FL (ref 82–98)
MONOCYTES # BLD AUTO: 0.52 THOUSAND/ÂΜL (ref 0.17–1.22)
MONOCYTES NFR BLD AUTO: 4 % (ref 4–12)
NEUTROPHILS # BLD AUTO: 11.82 THOUSANDS/ÂΜL (ref 1.85–7.62)
NEUTS SEG NFR BLD AUTO: 90 % (ref 43–75)
NRBC BLD AUTO-RTO: 0 /100 WBCS
PLATELET # BLD AUTO: 229 THOUSANDS/UL (ref 149–390)
PLATELET BLD QL SMEAR: ADEQUATE
PMV BLD AUTO: 10.2 FL (ref 8.9–12.7)
POTASSIUM SERPL-SCNC: 4.8 MMOL/L (ref 3.5–5.3)
PROT SERPL-MCNC: 6.8 G/DL (ref 6.4–8.4)
RBC # BLD AUTO: 2.85 MILLION/UL (ref 3.88–5.62)
RBC MORPH BLD: NORMAL
SODIUM SERPL-SCNC: 134 MMOL/L (ref 135–147)
VANCOMYCIN SERPL-MCNC: 18.5 UG/ML (ref 10–20)
WBC # BLD AUTO: 13.07 THOUSAND/UL (ref 4.31–10.16)

## 2024-11-17 PROCEDURE — 80202 ASSAY OF VANCOMYCIN: CPT | Performed by: STUDENT IN AN ORGANIZED HEALTH CARE EDUCATION/TRAINING PROGRAM

## 2024-11-17 PROCEDURE — 80053 COMPREHEN METABOLIC PANEL: CPT | Performed by: STUDENT IN AN ORGANIZED HEALTH CARE EDUCATION/TRAINING PROGRAM

## 2024-11-17 PROCEDURE — 85025 COMPLETE CBC W/AUTO DIFF WBC: CPT | Performed by: STUDENT IN AN ORGANIZED HEALTH CARE EDUCATION/TRAINING PROGRAM

## 2024-11-17 PROCEDURE — 99232 SBSQ HOSP IP/OBS MODERATE 35: CPT | Performed by: STUDENT IN AN ORGANIZED HEALTH CARE EDUCATION/TRAINING PROGRAM

## 2024-11-17 RX ADMIN — DORZOLAMIDE HYDROCHLORIDE AND TIMOLOL MALEATE 1 DROP: 20; 5 SOLUTION/ DROPS OPHTHALMIC at 09:45

## 2024-11-17 RX ADMIN — GABAPENTIN 100 MG: 100 CAPSULE ORAL at 09:48

## 2024-11-17 RX ADMIN — APIXABAN 2.5 MG: 2.5 TABLET, FILM COATED ORAL at 09:49

## 2024-11-17 RX ADMIN — BRIMONIDINE TARTRATE 1 DROP: 2 SOLUTION/ DROPS OPHTHALMIC at 18:50

## 2024-11-17 RX ADMIN — LATANOPROST 1 DROP: 50 SOLUTION OPHTHALMIC at 22:40

## 2024-11-17 RX ADMIN — EMPAGLIFLOZIN 10 MG: 10 TABLET, FILM COATED ORAL at 09:46

## 2024-11-17 RX ADMIN — BISOPROLOL FUMARATE 2.5 MG: 5 TABLET, FILM COATED ORAL at 09:46

## 2024-11-17 RX ADMIN — DORZOLAMIDE HYDROCHLORIDE AND TIMOLOL MALEATE 1 DROP: 20; 5 SOLUTION/ DROPS OPHTHALMIC at 18:50

## 2024-11-17 RX ADMIN — APIXABAN 2.5 MG: 2.5 TABLET, FILM COATED ORAL at 18:50

## 2024-11-17 RX ADMIN — BRIMONIDINE TARTRATE 1 DROP: 2 SOLUTION/ DROPS OPHTHALMIC at 09:45

## 2024-11-17 RX ADMIN — AMLODIPINE BESYLATE 5 MG: 5 TABLET ORAL at 22:39

## 2024-11-17 RX ADMIN — AMITRIPTYLINE HYDROCHLORIDE 30 MG: 10 TABLET, FILM COATED ORAL at 22:39

## 2024-11-17 RX ADMIN — GABAPENTIN 100 MG: 100 CAPSULE ORAL at 18:50

## 2024-11-17 RX ADMIN — FINASTERIDE 5 MG: 5 TABLET, FILM COATED ORAL at 09:46

## 2024-11-17 RX ADMIN — SPIRONOLACTONE 25 MG: 25 TABLET ORAL at 09:48

## 2024-11-17 RX ADMIN — DEXAMETHASONE SODIUM PHOSPHATE 6 MG: 10 INJECTION, SOLUTION INTRAMUSCULAR; INTRAVENOUS at 13:49

## 2024-11-17 RX ADMIN — ALLOPURINOL 100 MG: 100 TABLET ORAL at 09:46

## 2024-11-17 RX ADMIN — PANTOPRAZOLE SODIUM 20 MG: 20 TABLET, DELAYED RELEASE ORAL at 06:19

## 2024-11-17 RX ADMIN — Medication 3 MG: at 22:40

## 2024-11-17 RX ADMIN — REMDESIVIR 100 MG: 100 INJECTION, POWDER, LYOPHILIZED, FOR SOLUTION INTRAVENOUS at 22:36

## 2024-11-17 NOTE — ASSESSMENT & PLAN NOTE
Lab Results   Component Value Date    EGFR 47 11/17/2024    EGFR 45 11/16/2024    EGFR 52 11/15/2024    CREATININE 1.29 11/17/2024    CREATININE 1.34 (H) 11/16/2024    CREATININE 1.19 11/15/2024     Creatinine on admission 1.46  Baseline appears to be around 1.2-1.4  Not meeting KDIGO criteria  Continue to trend  Avoid nephrotoxic agents hypotension and contrast  I's and O's

## 2024-11-17 NOTE — PROGRESS NOTES
Jose Mullins is a 89 y.o. male who is currently ordered Vancomycin IV with management by the Pharmacy Consult service.  Relevant clinical data and objective / subjective history reviewed.  Vancomycin Assessment:  Indication and Goal AUC/Trough: Pneumonia (goal -600, trough >10), -600, trough >10  Clinical Status: stable  Micro:     Renal Function:  SCr: 1.29 mg/dL  CrCl: 39.2 mL/min  Renal replacement: Not on dialysis  Days of Therapy: 4  Current Dose: 750mg IV Q24H  Vancomycin Plan: Patient is currently on vancomycin 750mg IV Q24H scheduled dosing and most recent random vancomycin level drawn with AM labs today is 18.5 ug/ml (extrapolated trough ~12 ug/ml). Based on today's assessment will continue same dose as follows   New Dosing: No change  Estimated AUC: 415 mcg*hr/mL  Estimated Trough: 13.1 mcg/mL  Next Level: With AM labs at 0600 on 11/20/24  Renal Function Monitoring: Daily BMP and UOP  Pharmacy will continue to follow closely for s/sx of nephrotoxicity, infusion reactions and appropriateness of therapy.  BMP and CBC will be ordered per protocol. We will continue to follow the patient’s culture results and clinical progress daily.    Maureen Jacobo, Pharmacist

## 2024-11-17 NOTE — ASSESSMENT & PLAN NOTE
Since previous admission patient has not been able to be weaned off of oxygen  Now requiring 2 to 3 L nasal cannula at baseline  Patient requiring 3.5 L nasal cannula on admission  Continue to wean as tolerated  Pt back to baseline of 2L

## 2024-11-17 NOTE — PLAN OF CARE
Problem: Potential for Falls  Goal: Patient will remain free of falls  Description: INTERVENTIONS:  - Educate patient/family on patient safety including physical limitations  - Instruct patient to call for assistance with activity   - Consult OT/PT to assist with strengthening/mobility   - Keep Call bell within reach  - Keep bed low and locked with side rails adjusted as appropriate  - Keep care items and personal belongings within reach  - Initiate and maintain comfort rounds  - Make Fall Risk Sign visible to staff  - Offer Toileting every 2 Hours, in advance of need  - Initiate/Maintain 2 alarms  - Obtain necessary fall risk management equipment: yellow bracelet and socks  - Apply yellow socks and bracelet for high fall risk patients  - Consider moving patient to room near nurses station  Outcome: Progressing     Problem: PAIN - ADULT  Goal: Verbalizes/displays adequate comfort level or baseline comfort level  Description: Interventions:  - Encourage patient to monitor pain and request assistance  - Assess pain using appropriate pain scale  - Administer analgesics based on type and severity of pain and evaluate response  - Implement non-pharmacological measures as appropriate and evaluate response  - Consider cultural and social influences on pain and pain management  - Notify physician/advanced practitioner if interventions unsuccessful or patient reports new pain  Outcome: Progressing     Problem: INFECTION - ADULT  Goal: Absence or prevention of progression during hospitalization  Description: INTERVENTIONS:  - Assess and monitor for signs and symptoms of infection  - Monitor lab/diagnostic results  - Monitor all insertion sites, i.e. indwelling lines, tubes, and drains  - Monitor endotracheal if appropriate and nasal secretions for changes in amount and color  - [place] appropriate cooling/warming therapies per order  - Administer medications as ordered  - Instruct and encourage patient and family to use  good hand hygiene technique  - Identify and instruct in appropriate isolation precautions for identified infection/condition  Outcome: Progressing  Goal: Absence of fever/infection during neutropenic period  Description: INTERVENTIONS:  - Monitor WBC    Outcome: Progressing

## 2024-11-17 NOTE — ASSESSMENT & PLAN NOTE
See mgx above  Ua negative  Continue to trend fever curve and WBC  Pt with repeat + COVID infection   BC NGTD @ 24h  Initiated Decadron and remdesivir day 3/5  Urine antigens negative  MRSA culture negative dc'd vanc  Dc'd azithromycin

## 2024-11-17 NOTE — PLAN OF CARE
Problem: Potential for Falls  Goal: Patient will remain free of falls  Description: INTERVENTIONS:  - Educate patient/family on patient safety including physical limitations  - Instruct patient to call for assistance with activity   - Consult OT/PT to assist with strengthening/mobility   - Keep Call bell within reach  - Keep bed low and locked with side rails adjusted as appropriate  - Keep care items and personal belongings within reach  - Initiate and maintain comfort rounds  - Make Fall Risk Sign visible to staff  - Offer Toileting every 2 Hours, in advance of need  - Initiate/Maintain 2 alarms  - Obtain necessary fall risk management equipment: yellow bracelet and socks  - Apply yellow socks and bracelet for high fall risk patients  - Consider moving patient to room near nurses station  Outcome: Progressing     Problem: PAIN - ADULT  Goal: Verbalizes/displays adequate comfort level or baseline comfort level  Description: Interventions:  - Encourage patient to monitor pain and request assistance  - Assess pain using appropriate pain scale  - Administer analgesics based on type and severity of pain and evaluate response  - Implement non-pharmacological measures as appropriate and evaluate response  - Consider cultural and social influences on pain and pain management  - Notify physician/advanced practitioner if interventions unsuccessful or patient reports new pain  Outcome: Progressing     Problem: INFECTION - ADULT  Goal: Absence or prevention of progression during hospitalization  Description: INTERVENTIONS:  - Assess and monitor for signs and symptoms of infection  - Monitor lab/diagnostic results  - Monitor all insertion sites, i.e. indwelling lines, tubes, and drains  - Monitor endotracheal if appropriate and nasal secretions for changes in amount and color  - [place] appropriate cooling/warming therapies per order  - Administer medications as ordered  - Instruct and encourage patient and family to use  good hand hygiene technique  - Identify and instruct in appropriate isolation precautions for identified infection/condition  Outcome: Progressing  Goal: Absence of fever/infection during neutropenic period  Description: INTERVENTIONS:  - Monitor WBC    Outcome: Progressing     Problem: SAFETY ADULT  Goal: Patient will remain free of falls  Description: INTERVENTIONS:  - Educate patient/family on patient safety including physical limitations  - Instruct patient to call for assistance with activity   - Consult OT/PT to assist with strengthening/mobility   - Keep Call bell within reach  - Keep bed low and locked with side rails adjusted as appropriate  - Keep care items and personal belongings within reach  - Initiate and maintain comfort rounds  - Make Fall Risk Sign visible to staff  - Offer Toileting every 2 Hours, in advance of need  - Initiate/Maintain 2 alarms  - Obtain necessary fall risk management equipment: yellow bracelet and socks  - Apply yellow socks and bracelet for high fall risk patients  - Consider moving patient to room near nurses station  Outcome: Progressing  Goal: Maintain or return to baseline ADL function  Description: INTERVENTIONS:  -  Assess patient's ability to carry out ADLs; assess patient's baseline for ADL function and identify physical deficits which impact ability to perform ADLs (bathing, care of mouth/teeth, toileting, grooming, dressing, etc.)  - Assess/evaluate cause of self-care deficits   - Assess range of motion  - Assess patient's mobility; develop plan if impaired  - Assess patient's need for assistive devices and provide as appropriate  - Encourage maximum independence but intervene and supervise when necessary  - Involve family in performance of ADLs  - Assess for home care needs following discharge   - Consider OT consult to assist with ADL evaluation and planning for discharge  - Provide patient education as appropriate  Outcome: Progressing  Goal: Maintains/Returns to  pre admission functional level  Description: INTERVENTIONS:  - Perform AM-PAC 6 Click Basic Mobility/ Daily Activity assessment daily.  - Set and communicate daily mobility goal to care team and patient/family/caregiver.   - Collaborate with rehabilitation services on mobility goals if consulted  - Perform Range of Motion 2 times a day.  - Reposition patient every 2 hours.  - Dangle patient 2 times a day  - Stand patient 2 times a day  - Ambulate patient 2 times a day  - Out of bed to chair 2 times a day   - Out of bed for meals 2 times a day  - Out of bed for toileting  - Record patient progress and toleration of activity level   Outcome: Progressing     Problem: DISCHARGE PLANNING  Goal: Discharge to home or other facility with appropriate resources  Description: INTERVENTIONS:  - Identify barriers to discharge w/patient and caregiver  - Arrange for needed discharge resources and transportation as appropriate  - Identify discharge learning needs (meds, wound care, etc.)  - Arrange for interpretive services to assist at discharge as needed  - Refer to Case Management Department for coordinating discharge planning if the patient needs post-hospital services based on physician/advanced practitioner order or complex needs related to functional status, cognitive ability, or social support system  Outcome: Progressing     Problem: Knowledge Deficit  Goal: Patient/family/caregiver demonstrates understanding of disease process, treatment plan, medications, and discharge instructions  Description: Complete learning assessment and assess knowledge base.  Interventions:  - Provide teaching at level of understanding  - Provide teaching via preferred learning methods  Outcome: Progressing     Problem: Prexisting or High Potential for Compromised Skin Integrity  Goal: Skin integrity is maintained or improved  Description: INTERVENTIONS:  - Identify patients at risk for skin breakdown  - Assess and monitor skin integrity  -  Assess and monitor nutrition and hydration status  - Monitor labs   - Assess for incontinence   - Turn and reposition patient  - Assist with mobility/ambulation  - Relieve pressure over bony prominences  - Avoid friction and shearing  - Provide appropriate hygiene as needed including keeping skin clean and dry  - Evaluate need for skin moisturizer/barrier cream  - Collaborate with interdisciplinary team   - Patient/family teaching  - Consider wound care consult   Outcome: Progressing

## 2024-11-17 NOTE — PROGRESS NOTES
Progress Note - Hospitalist   Name: Jose Mullins 89 y.o. male I MRN: [id number]  Unit/Bed#: -01 I Date of Admission: 11/14/2024   Date of Service: 11/17/2024 I Hospital Day: 3    Assessment & Plan  Chronic diastolic congestive heart failure (HCC)  Wt Readings from Last 3 Encounters:   11/17/24 92.6 kg (204 lb 1.6 oz)   11/11/24 89.8 kg (198 lb)   11/02/24 89.9 kg (198 lb 3.2 oz)     Patient presenting due to shortness of breath  Patient recently admitted due to viral sepsis secondary to COVID and flu    On admission patient meeting SIRS criteria with tachycardia and tachypnea  No source of infection on admission  Troponins negative.  Lactic acid and procalcitonin negative  No other endorgan dysfunction noted  elevated D-dimer of 1.24 CTA negative for PE and BL LE dopplers negative for DVT  Chest x-ray on admission showing pulmonary edema  BNP on admission 257 which is slightly above baseline   Echo done on 12/21/2023 showed an LVEF of 55 to 60% with mild diastolic dysfunction mildly dilated right ventricle mildly reduced right ventricle systolic function mild bilateral enlargement mild pulmonary hypertension moderate aortic sclerosis  updated echo 11/15/24 LVEF of 55% normal systolic function wall motion is normal.  Right ventricle moderately dilated systolic function moderately reduced.  Right atrium moderately dilated.  Aortic valve sclerosis, severe tricuspid regurg  Resumed spironolactone  I's and O's  Daily weights  Decreased Fluid restriction to 1.8 L    Hyponatremia  Pt with a Na of 128 on admission  Most likely d/t hypervolemia  Dc'd gentle ivf  Fl restriction of 1.8  Continue to trend  Deferring hyponatremic labs at this time  Improved to 134    Sepsis due to COVID-19 (HCC)  See mgx above  Ua negative  Continue to trend fever curve and WBC  Pt with repeat + COVID infection   BC NGTD @ 24h  Initiated Decadron and remdesivir day 3/5  Urine antigens negative  MRSA culture negative dc'd vanc  Dc'd  azithromycin  Anemia of chronic disease  Hemoglobin on admission 9.9  Baseline appears to be between 10 and 11  Most likely secondary to CKD  Continue to trend  Transfuse when less than 7  Benign essential hypertension  Resumed amlodipine and Aldactone at higher parameters   Continue bisoprolol   Stage 3a chronic kidney disease (HCC)  Lab Results   Component Value Date    EGFR 47 11/17/2024    EGFR 45 11/16/2024    EGFR 52 11/15/2024    CREATININE 1.29 11/17/2024    CREATININE 1.34 (H) 11/16/2024    CREATININE 1.19 11/15/2024     Creatinine on admission 1.46  Baseline appears to be around 1.2-1.4  Not meeting KDIGO criteria  Continue to trend  Avoid nephrotoxic agents hypotension and contrast  I's and O's    Permanent atrial fibrillation (HCC)  RC bisoprolol  AC Eliquis  Continue  Chronic respiratory failure (HCC)  Since previous admission patient has not been able to be weaned off of oxygen  Now requiring 2 to 3 L nasal cannula at baseline  Patient requiring 3.5 L nasal cannula on admission  Continue to wean as tolerated  Pt back to baseline of 2L    VTE Pharmacologic Prophylaxis: VTE Score: 4 Moderate Risk (Score 3-4) - Pharmacological DVT Prophylaxis Ordered: apixaban (Eliquis).    Mobility:   Basic Mobility Inpatient Raw Score: 16  JH-HLM Goal: 5: Stand one or more mins  JH-HLM Achieved: 4: Move to chair/commode  JH-HLM Goal NOT achieved. Continue with multidisciplinary rounding and encourage appropriate mobility to improve upon JH-HLM goals.    Patient Centered Rounds: I performed bedside rounds with nursing staff today.   Discussions with Specialists or Other Care Team Provider: CM    Education and Discussions with Family / Patient: Updated  (daughter) at bedside.    Current Length of Stay: 3 day(s)  Current Patient Status: Inpatient   Certification Statement: The patient will continue to require additional inpatient hospital stay due to sepsis   Discharge Plan: Anticipate discharge in 48 hrs to  discharge location to be determined pending rehab evaluations.    Code Status: Level 3 - DNAR and DNI    Subjective   Jose was seen and examined at bedside.  No acute events overnight.  Discussed plan of care.  All questions and concerns were answered and addressed.  Patient looks clinically much improved today.  Has no acute complaints at this time.  Is still very weak.  Will continue Decadron and remdesivir at this time.  Sodium stable at 134.  Creatinine back to baseline.  Vancomycin discontinued as MRSA culture was negative.    Objective :  Temp:  [97.5 °F (36.4 °C)-98 °F (36.7 °C)] 97.5 °F (36.4 °C)  HR:  [] 105  BP: (133-139)/(75-80) 139/80  Resp:  [17] 17  SpO2:  [95 %-97 %] 97 %  O2 Device: Nasal cannula  Nasal Cannula O2 Flow Rate (L/min):  [2 L/min] 2 L/min    Body mass index is 31.03 kg/m².     Input and Output Summary (last 24 hours):     Intake/Output Summary (Last 24 hours) at 11/17/2024 0740  Last data filed at 11/17/2024 0652  Gross per 24 hour   Intake 1420 ml   Output 1800 ml   Net -380 ml       Physical Exam  Vitals and nursing note reviewed.   Constitutional:       General: He is not in acute distress.     Appearance: He is ill-appearing.   HENT:      Head: Normocephalic and atraumatic.   Cardiovascular:      Rate and Rhythm: Normal rate. Rhythm irregular.      Pulses: Normal pulses.      Heart sounds: Murmur heard.   Pulmonary:      Effort: Pulmonary effort is normal.      Breath sounds: Rhonchi and rales present.      Comments: 2L  Abdominal:      General: Abdomen is flat. Bowel sounds are normal.      Palpations: Abdomen is soft.   Musculoskeletal:      Right lower leg: No edema.      Left lower leg: No edema.   Skin:     General: Skin is warm.   Neurological:      General: No focal deficit present.      Mental Status: He is alert and oriented to person, place, and time.           Lines/Drains:  Lines/Drains/Airways       Active Status       Name Placement date Placement time Site Days     External Urinary Catheter Medium 11/16/24  2300  -- less than 1                      Telemetry:  Telemetry Orders (From admission, onward)               24 Hour Telemetry Monitoring  Continuous x 24 Hours (Telem)        Question:  Reason for 24 Hour Telemetry  Answer:  Decompensated CHF- and any one of the following: continuous diuretic infusion or total diuretic dose >200 mg daily, associated electrolyte derangement (I.e. K < 3.0), ionotropic drip (continuous infusion), hx of ventricular arrhythmia, or new EF < 35%                                  Lab Results: I have reviewed the following results:   Results from last 7 days   Lab Units 11/17/24  0618   WBC Thousand/uL 13.07*   HEMOGLOBIN g/dL 9.1*   HEMATOCRIT % 29.2*   PLATELETS Thousands/uL 229   SEGS PCT % 90*   LYMPHO PCT % 5*   MONO PCT % 4   EOS PCT % 0     Results from last 7 days   Lab Units 11/17/24  0618   SODIUM mmol/L 134*   POTASSIUM mmol/L 4.8   CHLORIDE mmol/L 102   CO2 mmol/L 26   BUN mg/dL 41*   CREATININE mg/dL 1.29   ANION GAP mmol/L 6   CALCIUM mg/dL 8.0*   ALBUMIN g/dL 3.5   TOTAL BILIRUBIN mg/dL 0.31   ALK PHOS U/L 61   ALT U/L 29   AST U/L 23   GLUCOSE RANDOM mg/dL 112     Results from last 7 days   Lab Units 11/14/24  1510   INR  1.20*             Results from last 7 days   Lab Units 11/16/24  0536 11/14/24  1442 11/14/24  1412   LACTIC ACID mmol/L  --  0.5  --    PROCALCITONIN ng/ml 0.39*  --  0.25       Recent Cultures (last 7 days):   Results from last 7 days   Lab Units 11/15/24  0640 11/14/24  1442   BLOOD CULTURE   --  No Growth at 48 hrs.  No Growth at 48 hrs.   LEGIONELLA URINARY ANTIGEN  Negative  --        Imaging Results Review: No pertinent imaging studies reviewed.  Other Study Results Review: No additional pertinent studies reviewed.    Last 24 Hours Medication List:     Current Facility-Administered Medications:     acetaminophen (TYLENOL) tablet 650 mg, Q6H PRN    allopurinol (ZYLOPRIM) tablet 100 mg, Daily     amitriptyline (ELAVIL) tablet 30 mg, HS    amLODIPine (NORVASC) tablet 5 mg, HS    apixaban (ELIQUIS) tablet 2.5 mg, BID    bisoprolol (ZEBETA) tablet 2.5 mg, Daily    brimonidine tartrate 0.2 % ophthalmic solution 1 drop, BID    dexamethasone (PF) (DECADRON) injection 6 mg, Daily    dorzolamide-timolol (COSOPT) 2-0.5 % ophthalmic solution 1 drop, BID    Empagliflozin (JARDIANCE) tablet 10 mg, QAM    finasteride (PROSCAR) tablet 5 mg, Daily    gabapentin (NEURONTIN) capsule 100 mg, BID    latanoprost (XALATAN) 0.005 % ophthalmic solution 1 drop, HS    melatonin tablet 3 mg, HS    metoprolol (LOPRESSOR) injection 5 mg, Q6H PRN    Netarsudil Dimesylate 0.02 % SOLN, Daily    ondansetron (ZOFRAN) injection 4 mg, Q4H PRN    pantoprazole (PROTONIX) EC tablet 20 mg, Early Morning    polyethylene glycol (MIRALAX) packet 17 g, Daily PRN    [COMPLETED] remdesivir (Veklury) 200 mg in sodium chloride 0.9 % 290 mL IVPB, Q24H **FOLLOWED BY** remdesivir (Veklury) 100 mg in sodium chloride 0.9 % 270 mL IVPB, Q24H    Insert peripheral IV, Once **AND** sodium chloride (PF) 0.9 % injection 3 mL, Q1H PRN    spironolactone (ALDACTONE) tablet 25 mg, Daily    vancomycin (VANCOCIN) IVPB (premix in dextrose) 750 mg 150 mL, Q24H, Last Rate: 750 mg (11/16/24 2224)    Administrative Statements   Today, Patient Was Seen By: Helga Lester MD  I have spent a total time of 54 minutes in caring for this patient on the day of the visit/encounter including Diagnostic results, Prognosis, Risks and benefits of tx options, Instructions for management, Patient and family education, Importance of tx compliance, Risk factor reductions, Impressions, Counseling / Coordination of care, Documenting in the medical record, Reviewing / ordering tests, medicine, procedures  , Obtaining or reviewing history  , and Communicating with other healthcare professionals .    **Please Note: This note may have been constructed using a voice recognition system.**

## 2024-11-17 NOTE — ASSESSMENT & PLAN NOTE
Wt Readings from Last 3 Encounters:   11/17/24 92.6 kg (204 lb 1.6 oz)   11/11/24 89.8 kg (198 lb)   11/02/24 89.9 kg (198 lb 3.2 oz)     Patient presenting due to shortness of breath  Patient recently admitted due to viral sepsis secondary to COVID and flu    On admission patient meeting SIRS criteria with tachycardia and tachypnea  No source of infection on admission  Troponins negative.  Lactic acid and procalcitonin negative  No other endorgan dysfunction noted  elevated D-dimer of 1.24 CTA negative for PE and BL LE dopplers negative for DVT  Chest x-ray on admission showing pulmonary edema  BNP on admission 257 which is slightly above baseline   Echo done on 12/21/2023 showed an LVEF of 55 to 60% with mild diastolic dysfunction mildly dilated right ventricle mildly reduced right ventricle systolic function mild bilateral enlargement mild pulmonary hypertension moderate aortic sclerosis  updated echo 11/15/24 LVEF of 55% normal systolic function wall motion is normal.  Right ventricle moderately dilated systolic function moderately reduced.  Right atrium moderately dilated.  Aortic valve sclerosis, severe tricuspid regurg  Resumed spironolactone  I's and O's  Daily weights  Decreased Fluid restriction to 1.8 L

## 2024-11-17 NOTE — ASSESSMENT & PLAN NOTE
Pt with a Na of 128 on admission  Most likely d/t hypervolemia  Dc'd gentle ivf  Fl restriction of 1.8  Continue to trend  Deferring hyponatremic labs at this time  Improved to 134

## 2024-11-18 PROBLEM — A41.89 SEPSIS DUE TO COVID-19 (HCC): Status: RESOLVED | Noted: 2019-04-06 | Resolved: 2024-11-18

## 2024-11-18 PROBLEM — U07.1 SEPSIS DUE TO COVID-19 (HCC): Status: RESOLVED | Noted: 2019-04-06 | Resolved: 2024-11-18

## 2024-11-18 PROBLEM — J96.00 ACUTE RESPIRATORY FAILURE (HCC): Status: ACTIVE | Noted: 2024-11-14

## 2024-11-18 PROBLEM — E87.1 HYPONATREMIA: Status: RESOLVED | Noted: 2019-04-05 | Resolved: 2024-11-18

## 2024-11-18 LAB
ALBUMIN SERPL BCG-MCNC: 3.5 G/DL (ref 3.5–5)
ALP SERPL-CCNC: 59 U/L (ref 34–104)
ALT SERPL W P-5'-P-CCNC: 35 U/L (ref 7–52)
ANION GAP SERPL CALCULATED.3IONS-SCNC: 4 MMOL/L (ref 4–13)
AST SERPL W P-5'-P-CCNC: 21 U/L (ref 13–39)
BASOPHILS # BLD AUTO: 0.01 THOUSANDS/ÂΜL (ref 0–0.1)
BASOPHILS NFR BLD AUTO: 0 % (ref 0–1)
BILIRUB SERPL-MCNC: 0.37 MG/DL (ref 0.2–1)
BUN SERPL-MCNC: 41 MG/DL (ref 5–25)
CALCIUM SERPL-MCNC: 8.4 MG/DL (ref 8.4–10.2)
CHLORIDE SERPL-SCNC: 103 MMOL/L (ref 96–108)
CO2 SERPL-SCNC: 29 MMOL/L (ref 21–32)
CREAT SERPL-MCNC: 1.26 MG/DL (ref 0.6–1.3)
EOSINOPHIL # BLD AUTO: 0.03 THOUSAND/ÂΜL (ref 0–0.61)
EOSINOPHIL NFR BLD AUTO: 0 % (ref 0–6)
ERYTHROCYTE [DISTWIDTH] IN BLOOD BY AUTOMATED COUNT: 13.2 % (ref 11.6–15.1)
GFR SERPL CREATININE-BSD FRML MDRD: 48 ML/MIN/1.73SQ M
GLUCOSE SERPL-MCNC: 107 MG/DL (ref 65–140)
HCT VFR BLD AUTO: 30.2 % (ref 36.5–49.3)
HGB BLD-MCNC: 9.5 G/DL (ref 12–17)
IMM GRANULOCYTES # BLD AUTO: 0.1 THOUSAND/UL (ref 0–0.2)
IMM GRANULOCYTES NFR BLD AUTO: 1 % (ref 0–2)
LYMPHOCYTES # BLD AUTO: 0.65 THOUSANDS/ÂΜL (ref 0.6–4.47)
LYMPHOCYTES NFR BLD AUTO: 6 % (ref 14–44)
MCH RBC QN AUTO: 32.6 PG (ref 26.8–34.3)
MCHC RBC AUTO-ENTMCNC: 31.5 G/DL (ref 31.4–37.4)
MCV RBC AUTO: 104 FL (ref 82–98)
MONOCYTES # BLD AUTO: 0.64 THOUSAND/ÂΜL (ref 0.17–1.22)
MONOCYTES NFR BLD AUTO: 6 % (ref 4–12)
NEUTROPHILS # BLD AUTO: 9.98 THOUSANDS/ÂΜL (ref 1.85–7.62)
NEUTS SEG NFR BLD AUTO: 87 % (ref 43–75)
NRBC BLD AUTO-RTO: 0 /100 WBCS
PLATELET # BLD AUTO: 240 THOUSANDS/UL (ref 149–390)
PMV BLD AUTO: 10.1 FL (ref 8.9–12.7)
POTASSIUM SERPL-SCNC: 5.2 MMOL/L (ref 3.5–5.3)
PROT SERPL-MCNC: 6.9 G/DL (ref 6.4–8.4)
RBC # BLD AUTO: 2.91 MILLION/UL (ref 3.88–5.62)
SODIUM SERPL-SCNC: 136 MMOL/L (ref 135–147)
WBC # BLD AUTO: 11.41 THOUSAND/UL (ref 4.31–10.16)

## 2024-11-18 PROCEDURE — 85025 COMPLETE CBC W/AUTO DIFF WBC: CPT | Performed by: STUDENT IN AN ORGANIZED HEALTH CARE EDUCATION/TRAINING PROGRAM

## 2024-11-18 PROCEDURE — 99232 SBSQ HOSP IP/OBS MODERATE 35: CPT | Performed by: INTERNAL MEDICINE

## 2024-11-18 PROCEDURE — 97535 SELF CARE MNGMENT TRAINING: CPT

## 2024-11-18 PROCEDURE — 80053 COMPREHEN METABOLIC PANEL: CPT | Performed by: STUDENT IN AN ORGANIZED HEALTH CARE EDUCATION/TRAINING PROGRAM

## 2024-11-18 PROCEDURE — 97530 THERAPEUTIC ACTIVITIES: CPT

## 2024-11-18 RX ORDER — CEFTRIAXONE 1 G/50ML
1000 INJECTION, SOLUTION INTRAVENOUS EVERY 24 HOURS
Status: DISCONTINUED | OUTPATIENT
Start: 2024-11-18 | End: 2024-11-19 | Stop reason: HOSPADM

## 2024-11-18 RX ORDER — FUROSEMIDE 10 MG/ML
40 INJECTION INTRAMUSCULAR; INTRAVENOUS ONCE
Status: COMPLETED | OUTPATIENT
Start: 2024-11-18 | End: 2024-11-18

## 2024-11-18 RX ADMIN — APIXABAN 2.5 MG: 2.5 TABLET, FILM COATED ORAL at 10:04

## 2024-11-18 RX ADMIN — Medication 3 MG: at 22:27

## 2024-11-18 RX ADMIN — GABAPENTIN 100 MG: 100 CAPSULE ORAL at 10:04

## 2024-11-18 RX ADMIN — FUROSEMIDE 40 MG: 10 INJECTION, SOLUTION INTRAVENOUS at 09:59

## 2024-11-18 RX ADMIN — APIXABAN 2.5 MG: 2.5 TABLET, FILM COATED ORAL at 18:52

## 2024-11-18 RX ADMIN — BRIMONIDINE TARTRATE 1 DROP: 2 SOLUTION/ DROPS OPHTHALMIC at 18:53

## 2024-11-18 RX ADMIN — EMPAGLIFLOZIN 10 MG: 10 TABLET, FILM COATED ORAL at 11:41

## 2024-11-18 RX ADMIN — ALLOPURINOL 100 MG: 100 TABLET ORAL at 10:04

## 2024-11-18 RX ADMIN — CEFTRIAXONE 1000 MG: 1 INJECTION, SOLUTION INTRAVENOUS at 11:43

## 2024-11-18 RX ADMIN — LATANOPROST 1 DROP: 50 SOLUTION OPHTHALMIC at 22:32

## 2024-11-18 RX ADMIN — BRIMONIDINE TARTRATE 1 DROP: 2 SOLUTION/ DROPS OPHTHALMIC at 10:10

## 2024-11-18 RX ADMIN — DEXAMETHASONE SODIUM PHOSPHATE 6 MG: 10 INJECTION, SOLUTION INTRAMUSCULAR; INTRAVENOUS at 14:43

## 2024-11-18 RX ADMIN — FINASTERIDE 5 MG: 5 TABLET, FILM COATED ORAL at 10:04

## 2024-11-18 RX ADMIN — SPIRONOLACTONE 25 MG: 25 TABLET ORAL at 10:04

## 2024-11-18 RX ADMIN — AMITRIPTYLINE HYDROCHLORIDE 30 MG: 10 TABLET, FILM COATED ORAL at 22:27

## 2024-11-18 RX ADMIN — DORZOLAMIDE HYDROCHLORIDE AND TIMOLOL MALEATE 1 DROP: 20; 5 SOLUTION/ DROPS OPHTHALMIC at 18:53

## 2024-11-18 RX ADMIN — DORZOLAMIDE HYDROCHLORIDE AND TIMOLOL MALEATE 1 DROP: 20; 5 SOLUTION/ DROPS OPHTHALMIC at 11:35

## 2024-11-18 RX ADMIN — BISOPROLOL FUMARATE 2.5 MG: 5 TABLET, FILM COATED ORAL at 11:41

## 2024-11-18 RX ADMIN — GABAPENTIN 100 MG: 100 CAPSULE ORAL at 18:52

## 2024-11-18 RX ADMIN — REMDESIVIR 100 MG: 100 INJECTION, POWDER, LYOPHILIZED, FOR SOLUTION INTRAVENOUS at 20:49

## 2024-11-18 RX ADMIN — AMLODIPINE BESYLATE 5 MG: 5 TABLET ORAL at 22:27

## 2024-11-18 NOTE — OCCUPATIONAL THERAPY NOTE
"  Occupational Therapy Treatment Note     Patient Name: Jose Mullins  Today's Date: 11/18/2024  Problem List  Principal Problem:    Sepsis due to COVID-19 (HCC)  Active Problems:    Anemia of chronic disease    Benign essential hypertension    Stage 3a chronic kidney disease (HCC)    Permanent atrial fibrillation (HCC)    Hyponatremia    Chronic diastolic congestive heart failure (HCC)    Chronic respiratory failure (HCC)          11/18/24 1105   OT Last Visit   OT Visit Date 11/18/24   Note Type   Note Type Treatment   Pain Assessment   Pain Assessment Tool 0-10   Pain Score No Pain   Restrictions/Precautions   Weight Bearing Precautions Per Order No   Other Precautions Airborne/isolation;Contact/isolation;Cognitive;Chair Alarm;Bed Alarm;O2;Fall Risk  (1L O2 via NC; Pt take off airborne/contact precaustions as OT leaving the room)   Lifestyle   Reciprocal Relationships dtr present during tx session   ADL   Grooming Assistance 4  Minimal Assistance   Grooming Deficit Brushing hair;Teeth care;Increased time to complete;Verbal cueing   UB Bathing Assistance 4  Minimal Assistance   UB Bathing Deficit Right arm;Left arm;Chest;Increased time to complete   Bed Mobility   Additional Comments Pt OOB in recliner upon arrival for OT tx session   Transfers   Sit to Stand 5  Supervision   Additional items Assist x 1;Armrests;Increased time required;Verbal cues   Stand to Sit 5  Supervision   Additional items Assist x 1;Armrests;Increased time required;Verbal cues   Additional Comments Pt completed 2 sit <> stands; pt able to tolerate standing edge of recliner w/ [place] x3 minutes each trail   Functional Mobility   Functional Mobility 4  Minimal assistance   Additional Comments x1   Additional items Rolling walker   Therapeutic Excerise-Strength   UE Strength   (chair pushups completed; pt completed w/ supervision; noted fatique w/ increased activity)   Subjective   Subjective \"I haven't washed up since I've been here.\" "   Cognition   Overall Cognitive Status Impaired   Arousal/Participation Alert;Cooperative   Attention Attends with cues to redirect   Orientation Level Oriented X4   Memory Decreased short term memory   Following Commands Follows one step commands with increased time or repetition   Comments Pt willing to work with therapy; Circle w/ difficulty understanding commands/directions with PPE on. Pt pleasent to work with -- dtr present   Activity Tolerance   Activity Tolerance Patient tolerated treatment well;Patient limited by fatigue   Medical Staff Made Aware ILANA Huerta   Assessment   Assessment Pt seen for OT tx session focused on ADLs, transfers, UE strength, functional mobility, activity tolerance, and standing tolerance. Patient agreeable to OT treatment session. Pt received seated OOB to Recliner. Pt able to tolerate OOB mobility. Pt demonstrated good sitting balance and fair standing balance. Pt completed grooming activity of brushing hair and brushing teeth with [place]. Pt demonstrate the need for [place] with UB bathing. Pt requiring verbal cues for completion and increased time. Pt continues to require supervision with sit<>stand and stand <> sit transfers w/ VC's for hand placement. Pt demonstrated standing tolerance of 2-3 minutes before requiring rest break. Pt continues to demonstrate the need for Min Ax1 with ambulation; VC's required for safety. Pt engaged in UE strengthening; pt completed 2x3 w/ clearance of bottom from recliner with supervision. Pt w/ no c/o of SOB but appears fatiqued with activity - O2 sats remain stable.   Patient continues to be functioning below baseline level, occupational performance remains limited secondary to factors listed above, and pt at increased risk for falls and injury.  The patient's raw score on the AM-PAC Daily Activity inpatient short form is 19, standardized score is 40.22, greater than 39.4. Patients at this level are likely to benefit from DC to home. Please refer to  the recommendation of the Occupational Therapist for safe DC planning.  From OT standpoint, recommendation at time of D/C would be DC with Level III - Low Rehab Resource Intensity resources. Patient to benefit from continued Occupational Therapy treatment while in the hospital to address deficits as defined above and maximize level of functional independence with ADLs and functional mobility. Pt left seated OOB to Recliner with call bell in reach, tray table in reach, needs met, chair and RN informed.   Plan   Treatment Interventions ADL retraining;UE strengthening/ROM;Functional transfer training;Endurance training;Cognitive reorientation;Patient/family training;Equipment evaluation/education;Compensatory technique education;Continued evaluation   Goal Expiration Date 11/25/24   OT Treatment Day 1   OT Frequency 1-2x/wk   Discharge Recommendation   Rehab Resource Intensity Level, OT III (Minimum Resource Intensity)   AM-PAC Daily Activity Inpatient   Lower Body Dressing 2   Bathing 3   Toileting 3   Upper Body Dressing 3   Grooming 4   Eating 4   Daily Activity Raw Score 19   Daily Activity Standardized Score (Calc for Raw Score >=11) 40.22   AM-PAC Applied Cognition Inpatient   Following a Speech/Presentation 3   Understanding Ordinary Conversation 3   Taking Medications 3   Remembering Where Things Are Placed or Put Away 3   Remembering List of 4-5 Errands 3   Taking Care of Complicated Tasks 3   Applied Cognition Raw Score 18   Applied Cognition Standardized Score 38.07   End of Consult   Education Provided Yes;Family or social support of family present for education by provider   Patient Position at End of Consult Bedside chair;Bed/Chair alarm activated;All needs within reach   Nurse Communication Nurse aware of consult        Madelaine Jacome OTR/L

## 2024-11-18 NOTE — PROGRESS NOTES
Progress Note - Hospitalist   Name: Jose Mullins 89 y.o. male I MRN: [id number]  Unit/Bed#: -01 I Date of Admission: 11/14/2024   Date of Service: 11/18/2024 I Hospital Day: 4    Assessment & Plan  Chronic diastolic congestive heart failure (HCC)  Wt Readings from Last 3 Encounters:   11/18/24 93.1 kg (205 lb 3.2 oz)   11/11/24 89.8 kg (198 lb)   11/02/24 89.9 kg (198 lb 3.2 oz)     Patient presenting due to shortness of breath  Patient recently admitted due to viral sepsis secondary to COVID and flu  Chest x-ray on admission showing pulmonary edema  BNP on admission 257 which is slightly above baseline   updated echo 11/15/24 LVEF of 55% normal systolic function wall motion is normal.  Right ventricle moderately dilated systolic function moderately reduced.  Right atrium moderately dilated.  Aortic valve sclerosis, severe tricuspid regurg  Resumed spironolactone  Weight gain since admission, mild LE edema    Trial of 40 mg iv lasix x1 today   Monitor am     Hyponatremia (Resolved: 11/18/2024)  Recent Labs     11/16/24  0536 11/17/24  0618 11/18/24  0801   SODIUM 133* 134* 136       Pt with a Na of 128 on admission  Fl restriction of 1.8  Continue to trend      Sepsis due to COVID-19 (HCC) (Resolved: 11/18/2024)  See mgx above  Ua negative  Continue to trend fever curve and WBC  Pt with repeat + COVID infection   BC NGTD @ 24h  Initiated Decadron and remdesivir day 3/5  Urine antigens negative  MRSA culture negative dc'd vanc  Dc'd azithromycin  Anemia of chronic disease  Recent Labs     11/16/24  0536 11/17/24  0618 11/18/24  0801   HGB 8.9* 9.1* 9.5*        Hemoglobin on admission 9.9  Baseline appears to be between 10 and 11  Most likely secondary to CKD  Stable   Benign essential hypertension  Continue amlodipine,   Continue bisoprolol   Monitor   Stage 3a chronic kidney disease (HCC)  Lab Results   Component Value Date    EGFR 48 11/18/2024    EGFR 47 11/17/2024    EGFR 45 11/16/2024    CREATININE 1.26  11/18/2024    CREATININE 1.29 11/17/2024    CREATININE 1.34 (H) 11/16/2024     Creatinine on admission 1.46  Baseline appears to be around 1.2-1.4  Not meeting KDIGO criteria  Continue to trend  Stable     Permanent atrial fibrillation (HCC)  RC bisoprolol  AC Eliquis  Continue  Acute respiratory failure (HCC)  On 10/30 tested positive for covid. Was discharged on oxygen 2 L nc   Now requiring 1-2  nasal cannula, however drops with minimal exertion   CXR/CT chest showed diffuse patchy groundglass opacities throughout bilateral lungs favor infectious versus inflammatory in etiology.  Mild interstitial pulmonary edema.  Cardiomegaly and trace left pleural effusion  Trial of iv lasix 40 mg once  Start ceftriaxone, procalcitonin in increasing         VTE Pharmacologic Prophylaxis: VTE Score: 4 Moderate Risk (Score 3-4) - Pharmacological DVT Prophylaxis Ordered: apixaban (Eliquis).    Mobility:   Basic Mobility Inpatient Raw Score: 16  JH-HLM Goal: 5: Stand one or more mins  JH-HLM Achieved: 4: Move to chair/commode  JH-HLM Goal achieved. Continue to encourage appropriate mobility.    Patient Centered Rounds: I performed bedside rounds with nursing staff today.   Discussions with Specialists or Other Care Team Provider: cm    Education and Discussions with Family / Patient: Updated  (daughter) at bedside.    Current Length of Stay: 4 day(s)  Current Patient Status: Inpatient   Certification Statement: The patient will continue to require additional inpatient hospital stay due to chf, acute resp failure   Discharge Plan: Anticipate discharge tomorrow to home.    Code Status: Level 3 - DNAR and DNI    Subjective     Intermittent sob. No chest pain, nausea, vomiting., had bm     Objective :  Temp:  [97.3 °F (36.3 °C)-98.1 °F (36.7 °C)] 97.8 °F (36.6 °C)  HR:  [] 82  BP: (131-175)/() 131/72  Resp:  [14-20] 20  SpO2:  [94 %-98 %] 96 %  O2 Device: Nasal cannula  Nasal Cannula O2 Flow Rate (L/min):  [1  L/min-2 L/min] 1 L/min    Body mass index is 31.2 kg/m².     Input and Output Summary (last 24 hours):     Intake/Output Summary (Last 24 hours) at 11/18/2024 1556  Last data filed at 11/18/2024 1531  Gross per 24 hour   Intake 1259 ml   Output 3650 ml   Net -2391 ml       Physical Exam  Vitals and nursing note reviewed.   Constitutional:       General: He is not in acute distress.     Appearance: He is not diaphoretic.   HENT:      Head: Normocephalic.   Eyes:      General:         Right eye: No discharge.         Left eye: No discharge.   Cardiovascular:      Rate and Rhythm: Normal rate. Rhythm irregular.   Pulmonary:      Effort: Pulmonary effort is normal. No respiratory distress.      Breath sounds: Rales (basal bilateral) present. No wheezing or rhonchi.   Abdominal:      General: There is no distension.      Palpations: Abdomen is soft.      Tenderness: There is no abdominal tenderness. There is no guarding or rebound.   Musculoskeletal:      Cervical back: Normal range of motion.      Right lower leg: Edema present.      Left lower leg: Edema present.   Skin:     General: Skin is warm.   Neurological:      Mental Status: He is alert.   Psychiatric:         Mood and Affect: Mood normal.         Behavior: Behavior normal.           Lines/Drains:  Lines/Drains/Airways       Active Status       Name Placement date Placement time Site Days    External Urinary Catheter Medium 11/16/24  2300  -- 1                            Lab Results: I have reviewed the following results:   Results from last 7 days   Lab Units 11/18/24  0801   WBC Thousand/uL 11.41*   HEMOGLOBIN g/dL 9.5*   HEMATOCRIT % 30.2*   PLATELETS Thousands/uL 240   SEGS PCT % 87*   LYMPHO PCT % 6*   MONO PCT % 6   EOS PCT % 0     Results from last 7 days   Lab Units 11/18/24  0801   SODIUM mmol/L 136   POTASSIUM mmol/L 5.2   CHLORIDE mmol/L 103   CO2 mmol/L 29   BUN mg/dL 41*   CREATININE mg/dL 1.26   ANION GAP mmol/L 4   CALCIUM mg/dL 8.4   ALBUMIN  g/dL 3.5   TOTAL BILIRUBIN mg/dL 0.37   ALK PHOS U/L 59   ALT U/L 35   AST U/L 21   GLUCOSE RANDOM mg/dL 107     Results from last 7 days   Lab Units 11/14/24  1510   INR  1.20*             Results from last 7 days   Lab Units 11/16/24  0536 11/14/24  1442 11/14/24  1412   LACTIC ACID mmol/L  --  0.5  --    PROCALCITONIN ng/ml 0.39*  --  0.25       Recent Cultures (last 7 days):   Results from last 7 days   Lab Units 11/15/24  0640 11/14/24  1442   BLOOD CULTURE   --  No Growth at 72 hrs.  No Growth at 72 hrs.   LEGIONELLA URINARY ANTIGEN  Negative  --        Imaging Results Review: I reviewed radiology reports from this admission including: chest xray and CT chest.  Other Study Results Review: No additional pertinent studies reviewed.    Last 24 Hours Medication List:     Current Facility-Administered Medications:     acetaminophen (TYLENOL) tablet 650 mg, Q6H PRN    allopurinol (ZYLOPRIM) tablet 100 mg, Daily    amitriptyline (ELAVIL) tablet 30 mg, HS    amLODIPine (NORVASC) tablet 5 mg, HS    apixaban (ELIQUIS) tablet 2.5 mg, BID    bisoprolol (ZEBETA) tablet 2.5 mg, Daily    brimonidine tartrate 0.2 % ophthalmic solution 1 drop, BID    cefTRIAXone (ROCEPHIN) IVPB (premix in dextrose) 1,000 mg 50 mL, Q24H, Last Rate: 1,000 mg (11/18/24 1143)    dexamethasone (PF) (DECADRON) injection 6 mg, Daily    dorzolamide-timolol (COSOPT) 2-0.5 % ophthalmic solution 1 drop, BID    Empagliflozin (JARDIANCE) tablet 10 mg, QAM    finasteride (PROSCAR) tablet 5 mg, Daily    gabapentin (NEURONTIN) capsule 100 mg, BID    latanoprost (XALATAN) 0.005 % ophthalmic solution 1 drop, HS    melatonin tablet 3 mg, HS    metoprolol (LOPRESSOR) injection 5 mg, Q6H PRN    Netarsudil Dimesylate 0.02 % SOLN, Daily    ondansetron (ZOFRAN) injection 4 mg, Q4H PRN    pantoprazole (PROTONIX) EC tablet 20 mg, Early Morning    polyethylene glycol (MIRALAX) packet 17 g, Daily PRN    [COMPLETED] remdesivir (Veklury) 200 mg in sodium chloride 0.9 %  290 mL IVPB, Q24H **FOLLOWED BY** remdesivir (Veklury) 100 mg in sodium chloride 0.9 % 270 mL IVPB, Q24H    Insert peripheral IV, Once **AND** sodium chloride (PF) 0.9 % injection 3 mL, Q1H PRN    spironolactone (ALDACTONE) tablet 25 mg, Daily    Administrative Statements   Today, Patient Was Seen By: Karen Wall MD      **Please Note: This note may have been constructed using a voice recognition system.**

## 2024-11-18 NOTE — PLAN OF CARE
Problem: OCCUPATIONAL THERAPY ADULT  Goal: Performs self-care activities at highest level of function for planned discharge setting.  See evaluation for individualized goals.  Description: Treatment Interventions: ADL retraining, UE strengthening/ROM, Functional transfer training, Endurance training, Cognitive reorientation, Patient/family training, Equipment evaluation/education, Compensatory technique education, Continued evaluation          See flowsheet documentation for full assessment, interventions and recommendations.   Note: Limitation: Decreased ADL status, Decreased Safe judgement during ADL, Decreased cognition, Decreased endurance, Decreased self-care trans, Decreased high-level ADLs  Prognosis: Fair, Good  Assessment: Pt seen for OT tx session focused on ADLs, transfers, UE strength, functional mobility, activity tolerance, and standing tolerance. Patient agreeable to OT treatment session. Pt received seated OOB to Recliner. Pt able to tolerate OOB mobility. Pt demonstrated good sitting balance and fair standing balance. Pt completed grooming activity of brushing hair and brushing teeth with [place]. Pt demonstrate the need for [place] with UB bathing. Pt requiring verbal cues for completion and increased time. Pt continues to require supervision with sit<>stand and stand <> sit transfers w/ VC's for hand placement. Pt demonstrated standing tolerance of 2-3 minutes before requiring rest break. Pt continues to demonstrate the need for Min Ax1 with ambulation; VC's required for safety. Pt engaged in UE strengthening; pt completed 2x3 w/ clearance of bottom from recliner with supervision. Pt w/ no c/o of SOB but appears fatiqued with activity - O2 sats remain stable.   Patient continues to be functioning below baseline level, occupational performance remains limited secondary to factors listed above, and pt at increased risk for falls and injury.  The patient's raw score on the AM-PAC Daily Activity inpatient  short form is 19, standardized score is 40.22, greater than 39.4. Patients at this level are likely to benefit from DC to home. Please refer to the recommendation of the Occupational Therapist for safe DC planning.  From OT standpoint, recommendation at time of D/C would be DC with Level III - Low Rehab Resource Intensity resources. Patient to benefit from continued Occupational Therapy treatment while in the hospital to address deficits as defined above and maximize level of functional independence with ADLs and functional mobility. Pt left seated OOB to Recliner with call bell in reach, tray table in reach, needs met, chair and RN informed.     Rehab Resource Intensity Level, OT: III (Minimum Resource Intensity)

## 2024-11-18 NOTE — PLAN OF CARE
Problem: Potential for Falls  Goal: Patient will remain free of falls  Description: INTERVENTIONS:  - Educate patient/family on patient safety including physical limitations  - Instruct patient to call for assistance with activity   - Consult OT/PT to assist with strengthening/mobility   - Keep Call bell within reach  - Keep bed low and locked with side rails adjusted as appropriate  - Keep care items and personal belongings within reach  - Initiate and maintain comfort rounds  - Make Fall Risk Sign visible to staff  - Offer Toileting every 2 Hours, in advance of need  - Initiate/Maintain 2 alarms  - Obtain necessary fall risk management equipment: yellow bracelet and socks  - Apply yellow socks and bracelet for high fall risk patients  - Consider moving patient to room near nurses station  Outcome: Progressing     Problem: PAIN - ADULT  Goal: Verbalizes/displays adequate comfort level or baseline comfort level  Description: Interventions:  - Encourage patient to monitor pain and request assistance  - Assess pain using appropriate pain scale  - Administer analgesics based on type and severity of pain and evaluate response  - Implement non-pharmacological measures as appropriate and evaluate response  - Consider cultural and social influences on pain and pain management  - Notify physician/advanced practitioner if interventions unsuccessful or patient reports new pain  Outcome: Progressing     Problem: INFECTION - ADULT  Goal: Absence or prevention of progression during hospitalization  Description: INTERVENTIONS:  - Assess and monitor for signs and symptoms of infection  - Monitor lab/diagnostic results  - Monitor all insertion sites, i.e. indwelling lines, tubes, and drains  - Monitor endotracheal if appropriate and nasal secretions for changes in amount and color  - [place] appropriate cooling/warming therapies per order  - Administer medications as ordered  - Instruct and encourage patient and family to use  good hand hygiene technique  - Identify and instruct in appropriate isolation precautions for identified infection/condition  Outcome: Progressing  Goal: Absence of fever/infection during neutropenic period  Description: INTERVENTIONS:  - Monitor WBC    Outcome: Progressing     Problem: SAFETY ADULT  Goal: Patient will remain free of falls  Description: INTERVENTIONS:  - Educate patient/family on patient safety including physical limitations  - Instruct patient to call for assistance with activity   - Consult OT/PT to assist with strengthening/mobility   - Keep Call bell within reach  - Keep bed low and locked with side rails adjusted as appropriate  - Keep care items and personal belongings within reach  - Initiate and maintain comfort rounds  - Make Fall Risk Sign visible to staff  - Offer Toileting every 2 Hours, in advance of need  - Initiate/Maintain 2 alarms  - Obtain necessary fall risk management equipment: yellow bracelet and socks  - Apply yellow socks and bracelet for high fall risk patients  - Consider moving patient to room near nurses station  Outcome: Progressing  Goal: Maintain or return to baseline ADL function  Description: INTERVENTIONS:  -  Assess patient's ability to carry out ADLs; assess patient's baseline for ADL function and identify physical deficits which impact ability to perform ADLs (bathing, care of mouth/teeth, toileting, grooming, dressing, etc.)  - Assess/evaluate cause of self-care deficits   - Assess range of motion  - Assess patient's mobility; develop plan if impaired  - Assess patient's need for assistive devices and provide as appropriate  - Encourage maximum independence but intervene and supervise when necessary  - Involve family in performance of ADLs  - Assess for home care needs following discharge   - Consider OT consult to assist with ADL evaluation and planning for discharge  - Provide patient education as appropriate  Outcome: Progressing  Goal: Maintains/Returns to  pre admission functional level  Description: INTERVENTIONS:  - Perform AM-PAC 6 Click Basic Mobility/ Daily Activity assessment daily.  - Set and communicate daily mobility goal to care team and patient/family/caregiver.   - Collaborate with rehabilitation services on mobility goals if consulted  - Perform Range of Motion 2 times a day.  - Reposition patient every 2 hours.  - Dangle patient 2 times a day  - Stand patient 2 times a day  - Ambulate patient 2 times a day  - Out of bed to chair 2 times a day   - Out of bed for meals 2 times a day  - Out of bed for toileting  - Record patient progress and toleration of activity level   Outcome: Progressing     Problem: DISCHARGE PLANNING  Goal: Discharge to home or other facility with appropriate resources  Description: INTERVENTIONS:  - Identify barriers to discharge w/patient and caregiver  - Arrange for needed discharge resources and transportation as appropriate  - Identify discharge learning needs (meds, wound care, etc.)  - Arrange for interpretive services to assist at discharge as needed  - Refer to Case Management Department for coordinating discharge planning if the patient needs post-hospital services based on physician/advanced practitioner order or complex needs related to functional status, cognitive ability, or social support system  Outcome: Progressing     Problem: Knowledge Deficit  Goal: Patient/family/caregiver demonstrates understanding of disease process, treatment plan, medications, and discharge instructions  Description: Complete learning assessment and assess knowledge base.  Interventions:  - Provide teaching at level of understanding  - Provide teaching via preferred learning methods  Outcome: Progressing     Problem: Prexisting or High Potential for Compromised Skin Integrity  Goal: Skin integrity is maintained or improved  Description: INTERVENTIONS:  - Identify patients at risk for skin breakdown  - Assess and monitor skin integrity  -  Assess and monitor nutrition and hydration status  - Monitor labs   - Assess for incontinence   - Turn and reposition patient  - Assist with mobility/ambulation  - Relieve pressure over bony prominences  - Avoid friction and shearing  - Provide appropriate hygiene as needed including keeping skin clean and dry  - Evaluate need for skin moisturizer/barrier cream  - Collaborate with interdisciplinary team   - Patient/family teaching  - Consider wound care consult   Outcome: Progressing

## 2024-11-18 NOTE — ASSESSMENT & PLAN NOTE
Lab Results   Component Value Date    EGFR 48 11/18/2024    EGFR 47 11/17/2024    EGFR 45 11/16/2024    CREATININE 1.26 11/18/2024    CREATININE 1.29 11/17/2024    CREATININE 1.34 (H) 11/16/2024     Creatinine on admission 1.46  Baseline appears to be around 1.2-1.4  Not meeting KDIGO criteria  Continue to trend  Stable

## 2024-11-18 NOTE — ASSESSMENT & PLAN NOTE
On 10/30 tested positive for covid. Was discharged on oxygen 2 L nc   Now requiring 1-2  nasal cannula, however drops with minimal exertion   CXR/CT chest showed diffuse patchy groundglass opacities throughout bilateral lungs favor infectious versus inflammatory in etiology.  Mild interstitial pulmonary edema.  Cardiomegaly and trace left pleural effusion  Trial of iv lasix 40 mg once  Start ceftriaxone, procalcitonin in increasing

## 2024-11-18 NOTE — ASSESSMENT & PLAN NOTE
Recent Labs     11/16/24  0536 11/17/24  0618 11/18/24  0801   HGB 8.9* 9.1* 9.5*        Hemoglobin on admission 9.9  Baseline appears to be between 10 and 11  Most likely secondary to CKD  Stable

## 2024-11-18 NOTE — ASSESSMENT & PLAN NOTE
Wt Readings from Last 3 Encounters:   11/18/24 93.1 kg (205 lb 3.2 oz)   11/11/24 89.8 kg (198 lb)   11/02/24 89.9 kg (198 lb 3.2 oz)     Patient presenting due to shortness of breath  Patient recently admitted due to viral sepsis secondary to COVID and flu  Chest x-ray on admission showing pulmonary edema  BNP on admission 257 which is slightly above baseline   updated echo 11/15/24 LVEF of 55% normal systolic function wall motion is normal.  Right ventricle moderately dilated systolic function moderately reduced.  Right atrium moderately dilated.  Aortic valve sclerosis, severe tricuspid regurg  Resumed spironolactone  Weight gain since admission, mild LE edema    Trial of 40 mg iv lasix x1 today   Monitor am

## 2024-11-18 NOTE — ASSESSMENT & PLAN NOTE
Recent Labs     11/16/24  0536 11/17/24  0618 11/18/24  0801   SODIUM 133* 134* 136       Pt with a Na of 128 on admission  Fl restriction of 1.8  Continue to trend

## 2024-11-18 NOTE — PLAN OF CARE
Problem: Potential for Falls  Goal: Patient will remain free of falls  Description: INTERVENTIONS:  - Educate patient/family on patient safety including physical limitations  - Instruct patient to call for assistance with activity   - Consult OT/PT to assist with strengthening/mobility   - Keep Call bell within reach  - Keep bed low and locked with side rails adjusted as appropriate  - Keep care items and personal belongings within reach  - Initiate and maintain comfort rounds  - Make Fall Risk Sign visible to staff  - Offer Toileting every 2 Hours, in advance of need  - Initiate/Maintain 2 alarms  - Obtain necessary fall risk management equipment: yellow bracelet and socks  - Apply yellow socks and bracelet for high fall risk patients  - Consider moving patient to room near nurses station  Outcome: Progressing     Problem: PAIN - ADULT  Goal: Verbalizes/displays adequate comfort level or baseline comfort level  Description: Interventions:  - Encourage patient to monitor pain and request assistance  - Assess pain using appropriate pain scale  - Administer analgesics based on type and severity of pain and evaluate response  - Implement non-pharmacological measures as appropriate and evaluate response  - Consider cultural and social influences on pain and pain management  - Notify physician/advanced practitioner if interventions unsuccessful or patient reports new pain  Outcome: Progressing     Problem: INFECTION - ADULT  Goal: Absence or prevention of progression during hospitalization  Description: INTERVENTIONS:  - Assess and monitor for signs and symptoms of infection  - Monitor lab/diagnostic results  - Monitor all insertion sites, i.e. indwelling lines, tubes, and drains  - Monitor endotracheal if appropriate and nasal secretions for changes in amount and color  - [place] appropriate cooling/warming therapies per order  - Administer medications as ordered  - Instruct and encourage patient and family to use  good hand hygiene technique  - Identify and instruct in appropriate isolation precautions for identified infection/condition  Outcome: Progressing  Goal: Absence of fever/infection during neutropenic period  Description: INTERVENTIONS:  - Monitor WBC    Outcome: Progressing     Problem: SAFETY ADULT  Goal: Patient will remain free of falls  Description: INTERVENTIONS:  - Educate patient/family on patient safety including physical limitations  - Instruct patient to call for assistance with activity   - Consult OT/PT to assist with strengthening/mobility   - Keep Call bell within reach  - Keep bed low and locked with side rails adjusted as appropriate  - Keep care items and personal belongings within reach  - Initiate and maintain comfort rounds  - Make Fall Risk Sign visible to staff  - Offer Toileting every 2 Hours, in advance of need  - Initiate/Maintain 2 alarms  - Obtain necessary fall risk management equipment: yellow bracelet and socks  - Apply yellow socks and bracelet for high fall risk patients  - Consider moving patient to room near nurses station  Outcome: Progressing  Goal: Maintain or return to baseline ADL function  Description: INTERVENTIONS:  -  Assess patient's ability to carry out ADLs; assess patient's baseline for ADL function and identify physical deficits which impact ability to perform ADLs (bathing, care of mouth/teeth, toileting, grooming, dressing, etc.)  - Assess/evaluate cause of self-care deficits   - Assess range of motion  - Assess patient's mobility; develop plan if impaired  - Assess patient's need for assistive devices and provide as appropriate  - Encourage maximum independence but intervene and supervise when necessary  - Involve family in performance of ADLs  - Assess for home care needs following discharge   - Consider OT consult to assist with ADL evaluation and planning for discharge  - Provide patient education as appropriate  Outcome: Progressing  Goal: Maintains/Returns to  pre admission functional level  Description: INTERVENTIONS:  - Perform AM-PAC 6 Click Basic Mobility/ Daily Activity assessment daily.  - Set and communicate daily mobility goal to care team and patient/family/caregiver.   - Collaborate with rehabilitation services on mobility goals if consulted  - Perform Range of Motion 2 times a day.  - Reposition patient every 2 hours.  - Dangle patient 2 times a day  - Stand patient 2 times a day  - Ambulate patient 2 times a day  - Out of bed to chair 2 times a day   - Out of bed for meals 2 times a day  - Out of bed for toileting  - Record patient progress and toleration of activity level   Outcome: Progressing

## 2024-11-19 ENCOUNTER — TRANSITIONAL CARE MANAGEMENT (OUTPATIENT)
Dept: FAMILY MEDICINE CLINIC | Facility: CLINIC | Age: 89
End: 2024-11-19

## 2024-11-19 ENCOUNTER — TELEPHONE (OUTPATIENT)
Age: 89
End: 2024-11-19

## 2024-11-19 VITALS
BODY MASS INDEX: 30.91 KG/M2 | WEIGHT: 203.93 LBS | RESPIRATION RATE: 20 BRPM | SYSTOLIC BLOOD PRESSURE: 129 MMHG | DIASTOLIC BLOOD PRESSURE: 73 MMHG | HEART RATE: 99 BPM | TEMPERATURE: 97.4 F | OXYGEN SATURATION: 97 % | HEIGHT: 68 IN

## 2024-11-19 LAB
ANION GAP SERPL CALCULATED.3IONS-SCNC: 6 MMOL/L (ref 4–13)
BACTERIA BLD CULT: NORMAL
BACTERIA BLD CULT: NORMAL
BUN SERPL-MCNC: 45 MG/DL (ref 5–25)
CALCIUM SERPL-MCNC: 8.4 MG/DL (ref 8.4–10.2)
CHLORIDE SERPL-SCNC: 100 MMOL/L (ref 96–108)
CO2 SERPL-SCNC: 29 MMOL/L (ref 21–32)
CREAT SERPL-MCNC: 1.25 MG/DL (ref 0.6–1.3)
ERYTHROCYTE [DISTWIDTH] IN BLOOD BY AUTOMATED COUNT: 13.2 % (ref 11.6–15.1)
GFR SERPL CREATININE-BSD FRML MDRD: 49 ML/MIN/1.73SQ M
GLUCOSE SERPL-MCNC: 133 MG/DL (ref 65–140)
HCT VFR BLD AUTO: 30.1 % (ref 36.5–49.3)
HGB BLD-MCNC: 9.5 G/DL (ref 12–17)
MCH RBC QN AUTO: 32.8 PG (ref 26.8–34.3)
MCHC RBC AUTO-ENTMCNC: 31.6 G/DL (ref 31.4–37.4)
MCV RBC AUTO: 104 FL (ref 82–98)
PLATELET # BLD AUTO: 242 THOUSANDS/UL (ref 149–390)
PMV BLD AUTO: 10.6 FL (ref 8.9–12.7)
POTASSIUM SERPL-SCNC: 5.3 MMOL/L (ref 3.5–5.3)
RBC # BLD AUTO: 2.9 MILLION/UL (ref 3.88–5.62)
SODIUM SERPL-SCNC: 135 MMOL/L (ref 135–147)
WBC # BLD AUTO: 9.82 THOUSAND/UL (ref 4.31–10.16)

## 2024-11-19 PROCEDURE — 99239 HOSP IP/OBS DSCHRG MGMT >30: CPT | Performed by: INTERNAL MEDICINE

## 2024-11-19 PROCEDURE — 85027 COMPLETE CBC AUTOMATED: CPT | Performed by: INTERNAL MEDICINE

## 2024-11-19 PROCEDURE — 80048 BASIC METABOLIC PNL TOTAL CA: CPT | Performed by: INTERNAL MEDICINE

## 2024-11-19 PROCEDURE — 97116 GAIT TRAINING THERAPY: CPT

## 2024-11-19 RX ORDER — SPIRONOLACTONE 25 MG/1
12.5 TABLET ORAL DAILY
Qty: 90 TABLET | Refills: 0 | Status: SHIPPED | OUTPATIENT
Start: 2024-11-19

## 2024-11-19 RX ORDER — CEFADROXIL 1000 MG/1
1 TABLET ORAL 2 TIMES DAILY
Qty: 6 TABLET | Refills: 0 | Status: SHIPPED | OUTPATIENT
Start: 2024-11-20 | End: 2024-11-23

## 2024-11-19 RX ORDER — FUROSEMIDE 20 MG/1
20 TABLET ORAL DAILY
Qty: 30 TABLET | Refills: 0 | Status: SHIPPED | OUTPATIENT
Start: 2024-11-19

## 2024-11-19 RX ADMIN — FINASTERIDE 5 MG: 5 TABLET, FILM COATED ORAL at 10:37

## 2024-11-19 RX ADMIN — DORZOLAMIDE HYDROCHLORIDE AND TIMOLOL MALEATE 1 DROP: 20; 5 SOLUTION/ DROPS OPHTHALMIC at 10:37

## 2024-11-19 RX ADMIN — CEFTRIAXONE 1000 MG: 1 INJECTION, SOLUTION INTRAVENOUS at 10:18

## 2024-11-19 RX ADMIN — BRIMONIDINE TARTRATE 1 DROP: 2 SOLUTION/ DROPS OPHTHALMIC at 10:17

## 2024-11-19 RX ADMIN — GABAPENTIN 100 MG: 100 CAPSULE ORAL at 10:37

## 2024-11-19 RX ADMIN — EMPAGLIFLOZIN 10 MG: 10 TABLET, FILM COATED ORAL at 11:18

## 2024-11-19 RX ADMIN — ALLOPURINOL 100 MG: 100 TABLET ORAL at 10:37

## 2024-11-19 RX ADMIN — APIXABAN 2.5 MG: 2.5 TABLET, FILM COATED ORAL at 10:36

## 2024-11-19 RX ADMIN — BISOPROLOL FUMARATE 2.5 MG: 5 TABLET, FILM COATED ORAL at 11:18

## 2024-11-19 RX ADMIN — PANTOPRAZOLE SODIUM 20 MG: 20 TABLET, DELAYED RELEASE ORAL at 04:34

## 2024-11-19 NOTE — PLAN OF CARE
Problem: Potential for Falls  Goal: Patient will remain free of falls  Description: INTERVENTIONS:  - Educate patient/family on patient safety including physical limitations  - Instruct patient to call for assistance with activity   - Consult OT/PT to assist with strengthening/mobility   - Keep Call bell within reach  - Keep bed low and locked with side rails adjusted as appropriate  - Keep care items and personal belongings within reach  - Initiate and maintain comfort rounds  - Make Fall Risk Sign visible to staff  - Offer Toileting every 2 Hours, in advance of need  - Initiate/Maintain 2 alarms  - Obtain necessary fall risk management equipment: yellow bracelet and socks  - Apply yellow socks and bracelet for high fall risk patients  - Consider moving patient to room near nurses station  Outcome: Progressing     Problem: PAIN - ADULT  Goal: Verbalizes/displays adequate comfort level or baseline comfort level  Description: Interventions:  - Encourage patient to monitor pain and request assistance  - Assess pain using appropriate pain scale  - Administer analgesics based on type and severity of pain and evaluate response  - Implement non-pharmacological measures as appropriate and evaluate response  - Consider cultural and social influences on pain and pain management  - Notify physician/advanced practitioner if interventions unsuccessful or patient reports new pain  Outcome: Progressing     Problem: INFECTION - ADULT  Goal: Absence or prevention of progression during hospitalization  Description: INTERVENTIONS:  - Assess and monitor for signs and symptoms of infection  - Monitor lab/diagnostic results  - Monitor all insertion sites, i.e. indwelling lines, tubes, and drains  - Monitor endotracheal if appropriate and nasal secretions for changes in amount and color  - [place] appropriate cooling/warming therapies per order  - Administer medications as ordered  - Instruct and encourage patient and family to use  good hand hygiene technique  - Identify and instruct in appropriate isolation precautions for identified infection/condition  Outcome: Progressing  Goal: Absence of fever/infection during neutropenic period  Description: INTERVENTIONS:  - Monitor WBC    Outcome: Progressing     Problem: SAFETY ADULT  Goal: Patient will remain free of falls  Description: INTERVENTIONS:  - Educate patient/family on patient safety including physical limitations  - Instruct patient to call for assistance with activity   - Consult OT/PT to assist with strengthening/mobility   - Keep Call bell within reach  - Keep bed low and locked with side rails adjusted as appropriate  - Keep care items and personal belongings within reach  - Initiate and maintain comfort rounds  - Make Fall Risk Sign visible to staff  - Offer Toileting every 2 Hours, in advance of need  - Initiate/Maintain 2 alarms  - Obtain necessary fall risk management equipment: yellow bracelet and socks  - Apply yellow socks and bracelet for high fall risk patients  - Consider moving patient to room near nurses station  Outcome: Progressing  Goal: Maintain or return to baseline ADL function  Description: INTERVENTIONS:  -  Assess patient's ability to carry out ADLs; assess patient's baseline for ADL function and identify physical deficits which impact ability to perform ADLs (bathing, care of mouth/teeth, toileting, grooming, dressing, etc.)  - Assess/evaluate cause of self-care deficits   - Assess range of motion  - Assess patient's mobility; develop plan if impaired  - Assess patient's need for assistive devices and provide as appropriate  - Encourage maximum independence but intervene and supervise when necessary  - Involve family in performance of ADLs  - Assess for home care needs following discharge   - Consider OT consult to assist with ADL evaluation and planning for discharge  - Provide patient education as appropriate  Outcome: Progressing  Goal: Maintains/Returns to  pre admission functional level  Description: INTERVENTIONS:  - Perform AM-PAC 6 Click Basic Mobility/ Daily Activity assessment daily.  - Set and communicate daily mobility goal to care team and patient/family/caregiver.   - Collaborate with rehabilitation services on mobility goals if consulted  - Perform Range of Motion 2 times a day.  - Reposition patient every 2 hours.  - Dangle patient 2 times a day  - Stand patient 2 times a day  - Ambulate patient 2 times a day  - Out of bed to chair 2 times a day   - Out of bed for meals 2 times a day  - Out of bed for toileting  - Record patient progress and toleration of activity level   Outcome: Progressing

## 2024-11-19 NOTE — TELEPHONE ENCOUNTER
Radha from [facility] looking for home care orders that were sent 11/12/24. Please ask PCP to complete and fax back. Thank you

## 2024-11-19 NOTE — ASSESSMENT & PLAN NOTE
Recent Labs     11/17/24  0618 11/18/24  0801 11/19/24  0433   HGB 9.1* 9.5* 9.5*        Hemoglobin on admission 9.9  Baseline appears to be between 10 and 11  Most likely secondary to CKD  Stable

## 2024-11-19 NOTE — PLAN OF CARE
Problem: Potential for Falls  Goal: Patient will remain free of falls  Description: INTERVENTIONS:  - Educate patient/family on patient safety including physical limitations  - Instruct patient to call for assistance with activity   - Consult OT/PT to assist with strengthening/mobility   - Keep Call bell within reach  - Keep bed low and locked with side rails adjusted as appropriate  - Keep care items and personal belongings within reach  - Initiate and maintain comfort rounds  - Make Fall Risk Sign visible to staff  - Offer Toileting every 2 Hours, in advance of need  - Initiate/Maintain 2 alarms  - Obtain necessary fall risk management equipment: yellow bracelet and socks  - Apply yellow socks and bracelet for high fall risk patients  - Consider moving patient to room near nurses station  Outcome: Progressing     Problem: INFECTION - ADULT  Goal: Absence or prevention of progression during hospitalization  Description: INTERVENTIONS:  - Assess and monitor for signs and symptoms of infection  - Monitor lab/diagnostic results  - Monitor all insertion sites, i.e. indwelling lines, tubes, and drains  - Monitor endotracheal if appropriate and nasal secretions for changes in amount and color  - [place] appropriate cooling/warming therapies per order  - Administer medications as ordered  - Instruct and encourage patient and family to use good hand hygiene technique  - Identify and instruct in appropriate isolation precautions for identified infection/condition  Outcome: Progressing  Goal: Absence of fever/infection during neutropenic period  Description: INTERVENTIONS:  - Monitor WBC    Outcome: Progressing     Problem: PAIN - ADULT  Goal: Verbalizes/displays adequate comfort level or baseline comfort level  Description: Interventions:  - Encourage patient to monitor pain and request assistance  - Assess pain using appropriate pain scale  - Administer analgesics based on type and severity of pain and evaluate  response  - Implement non-pharmacological measures as appropriate and evaluate response  - Consider cultural and social influences on pain and pain management  - Notify physician/advanced practitioner if interventions unsuccessful or patient reports new pain  Outcome: Progressing     Problem: SAFETY ADULT  Goal: Patient will remain free of falls  Description: INTERVENTIONS:  - Educate patient/family on patient safety including physical limitations  - Instruct patient to call for assistance with activity   - Consult OT/PT to assist with strengthening/mobility   - Keep Call bell within reach  - Keep bed low and locked with side rails adjusted as appropriate  - Keep care items and personal belongings within reach  - Initiate and maintain comfort rounds  - Make Fall Risk Sign visible to staff  - Offer Toileting every 2 Hours, in advance of need  - Initiate/Maintain 2 alarms  - Obtain necessary fall risk management equipment: yellow bracelet and socks  - Apply yellow socks and bracelet for high fall risk patients  - Consider moving patient to room near nurses station  Outcome: Progressing  Goal: Maintain or return to baseline ADL function  Description: INTERVENTIONS:  -  Assess patient's ability to carry out ADLs; assess patient's baseline for ADL function and identify physical deficits which impact ability to perform ADLs (bathing, care of mouth/teeth, toileting, grooming, dressing, etc.)  - Assess/evaluate cause of self-care deficits   - Assess range of motion  - Assess patient's mobility; develop plan if impaired  - Assess patient's need for assistive devices and provide as appropriate  - Encourage maximum independence but intervene and supervise when necessary  - Involve family in performance of ADLs  - Assess for home care needs following discharge   - Consider OT consult to assist with ADL evaluation and planning for discharge  - Provide patient education as appropriate  Outcome: Progressing  Goal:  Maintains/Returns to pre admission functional level  Description: INTERVENTIONS:  - Perform AM-PAC 6 Click Basic Mobility/ Daily Activity assessment daily.  - Set and communicate daily mobility goal to care team and patient/family/caregiver.   - Collaborate with rehabilitation services on mobility goals if consulted  - Perform Range of Motion 2 times a day.  - Reposition patient every 2 hours.  - Dangle patient 2 times a day  - Stand patient 2 times a day  - Ambulate patient 2 times a day  - Out of bed to chair 2 times a day   - Out of bed for meals 2 times a day  - Out of bed for toileting  - Record patient progress and toleration of activity level   Outcome: Progressing

## 2024-11-19 NOTE — PLAN OF CARE
Problem: PHYSICAL THERAPY ADULT  Goal: Performs mobility at highest level of function for planned discharge setting.  See evaluation for individualized goals.  Description: Treatment/Interventions: Functional transfer training, LE strengthening/ROM, Therapeutic exercise, Endurance training, Cognitive reorientation, Patient/family training, Equipment eval/education, Bed mobility, Gait training  Equipment Recommended: Walker       See flowsheet documentation for full assessment, interventions and recommendations.  Outcome: Progressing  Note:    Problem List: Decreased strength, Impaired balance, Decreased endurance, Decreased mobility, Decreased cognition, Impaired hearing, Impaired vision  Assessment: Vasile was seen today for PT intervention. Overall he is demonstrating progress in mobility, as he requires supervision for all functional mobility. Provided close supervision throughout for safety and due to line management (enrique, O2, IV pole). He denies increased WOB and does not appear SOB throughout. He does benefit from VC for diaphragmatic breathing at rest to assist w/ expediting recovery. He is able to consistently perform STS w/ proper hand placement without cues needed to come to a stand. Once up, he does benefit from verbal cues to keep RW closer within his AC as he is a bit forward flexed and this can impact his endurance due to poor posturing of the respiratory muscles. He is able to ambulate 60 ft around the room w/ multiple turns without subjective change in gait speed, and no rest breaks. Pt reports needing to use the bathroom, assisted to the commode per dtr request due to multiple lines. Pt seated OOB on commode w/ call bell in hand, notified patient to ring. SpO2 maintained >90% throughout session, masimo unable to provide accurate feedback during ambulation due to gripping RW. Once wavelength was back to providing a consistent, quality reading, immediately SpO2 was at 93%. Discharge recommendation  continues to be for Level 3 resources upon DC.        Rehab Resource Intensity Level, PT: III (Minimum Resource Intensity)    See flowsheet documentation for full assessment.

## 2024-11-19 NOTE — DISCHARGE SUMMARY
Discharge Summary - Hospitalist   Name: Jose Mullins 89 y.o. male I MRN: [id number]  Unit/Bed#: -01 I Date of Admission: 11/14/2024   Date of Service: 11/19/2024 I Hospital Day: 5     Assessment & Plan  Chronic diastolic congestive heart failure (HCC)  Wt Readings from Last 3 Encounters:   11/19/24 92.5 kg (203 lb 14.8 oz)   11/11/24 89.8 kg (198 lb)   11/02/24 89.9 kg (198 lb 3.2 oz)     Patient presenting due to shortness of breath  Patient recently admitted due to viral sepsis secondary to COVID and flu  Chest x-ray on admission showing pulmonary edema  BNP on admission 257 which is slightly above baseline   updated echo 11/15/24 LVEF of 55% normal systolic function wall motion is normal.  Right ventricle moderately dilated systolic function moderately reduced.  Right atrium moderately dilated.  Aortic valve sclerosis, severe tricuspid regurgitation   Status post 40 IV Lasix yesterday.    Discussed with cardiology.  Will discharge patient on Lasix 20 mg daily, spironolactone 12.5 mg.  Cardiology will schedule the patient for a close follow-up next week.    Anemia of chronic disease  Recent Labs     11/17/24  0618 11/18/24  0801 11/19/24  0433   HGB 9.1* 9.5* 9.5*        Hemoglobin on admission 9.9  Baseline appears to be between 10 and 11  Most likely secondary to CKD  Stable   Benign essential hypertension  Continue amlodipine,   Continue bisoprolol   Stable  Stage 3a chronic kidney disease (HCC)  Lab Results   Component Value Date    EGFR 49 11/19/2024    EGFR 48 11/18/2024    EGFR 47 11/17/2024    CREATININE 1.25 11/19/2024    CREATININE 1.26 11/18/2024    CREATININE 1.29 11/17/2024     Creatinine on admission 1.46  Baseline appears to be around 1.2-1.4  Not meeting KDIGO criteria  Stable     Permanent atrial fibrillation (HCC)  RC bisoprolol  AC Eliquis  Continue  Acute respiratory failure (HCC)  On 10/30 tested positive for covid. Was discharged on oxygen 2 L nc   Now requiring 1-2  nasal cannula,  however drops with minimal exertion   CXR/CT chest showed diffuse patchy groundglass opacities throughout bilateral lungs favor infectious versus inflammatory in etiology.  Mild interstitial pulmonary edema.  Cardiomegaly and trace left pleural effusion  Status post 40 IV Lasix yesterday.  His oxygen saturation is stable on 1 L nasal cannula oxygen.  Started ceftriaxone as his procalcitonin was increasing     Discharged on oral antibiotics for another 3 days to complete 5 days of treatment.  Discharged on oral Lasix 20 mg daily.       Medical Problems       Resolved Problems  Date Reviewed: 10/24/2024          Resolved    Hyponatremia 11/18/2024     Resolved by  Karen Wall MD    * (Principal) Sepsis due to COVID-19 (HCC) 11/18/2024     Resolved by  Karen Wall MD        Discharging Physician / Practitioner: Karen Wall MD  PCP: Moises Ham MD  Admission Date:   Admission Orders (From admission, onward)       Ordered        11/14/24 1619  INPATIENT ADMISSION  Once                          Discharge Date: 11/19/24    Consultations During Hospital Stay:  PT/OT    Procedures Performed:   NONE    Significant Findings / Test Results:   CTA chest pe study  Result Date: 11/14/2024  Impression: Limited evaluation as above. No evidence of saddle/centrally obstructing, main or lobar pulmonary embolus. Segmental and subsegmental branches not adequately assessed. Diffuse patchy groundglass opacities throughout the bilateral lungs, favor infectious versus inflammatory in etiology including but not limited to viral/atypical pneumonia. Clinical correlation recommended. No focal airspace consolidation. Suspect mild interstitial pulmonary edema. Cardiomegaly and trace left pleural effusion.     X-ray chest 1 view portable  Result Date: 11/14/2024  Impression: Moderate pulmonary venous congestion.     Incidental Findings:   none     Test Results Pending at Discharge (will require follow up):   none    "  Outpatient Tests Requested:  none    Complications:  none    Reason for Admission: Shortness of breath    Hospital Course:   Jose Mullins is a 89 y.o. male patient with past medical history of CHF, hypertension, A-fib on Eliquis, anemia, CKD,  respiratory failure recently due to COVID-19 who originally presented to the hospital on 11/14/2024 due to months of breath.    Patient was started on Decadron and remdesivir due to recent COVID.  Oxygen saturation improved and he required only 1 L nasal cannula oxygen.     Received IV Lasix x 1.  Was also started on ceftriaxone for bacterial infection concern.    Patient stable for discharge.  He will follow-up with his PCP in 1 week.  Follow-up with cardiology next week.      Please see above list of diagnoses and related plan for additional information.     Condition at Discharge: good    Discharge Day Visit / Exam:   Subjective:  patient denies any symptoms   Vitals: Blood Pressure: 129/73 (11/19/24 1116)  Pulse: 99 (11/19/24 1116)  Temperature: (!) 97.4 °F (36.3 °C) (11/19/24 0825)  Temp Source: Oral (11/19/24 0825)  Respirations: 20 (11/19/24 0825)  Height: 5' 8\" (172.7 cm) (11/15/24 1041)  Weight - Scale: 92.5 kg (203 lb 14.8 oz) (11/19/24 0500)  SpO2: 97 % (11/19/24 1116)  Physical Exam  Vitals and nursing note reviewed.   Constitutional:       General: He is not in acute distress.     Appearance: He is not diaphoretic.   HENT:      Head: Normocephalic.   Eyes:      General:         Right eye: No discharge.         Left eye: No discharge.   Cardiovascular:      Rate and Rhythm: Normal rate. Rhythm irregular.   Pulmonary:      Effort: Pulmonary effort is normal. No respiratory distress.      Breath sounds: No wheezing, rhonchi or rales.   Abdominal:      General: There is no distension.      Palpations: Abdomen is soft.      Tenderness: There is no abdominal tenderness. There is no guarding or rebound.   Musculoskeletal:      Cervical back: Normal range of motion. "      Right lower leg: No edema.      Left lower leg: No edema.   Skin:     General: Skin is warm.   Neurological:      Mental Status: He is alert.   Psychiatric:         Mood and Affect: Mood normal.         Behavior: Behavior normal.         Thought Content: Thought content normal.         Judgment: Judgment normal.          Discussion with Family: Updated  (daughter) at bedside.    Discharge instructions/Information to patient and family:   See after visit summary for information provided to patient and family.      Provisions for Follow-Up Care:  See after visit summary for information related to follow-up care and any pertinent home health orders.      Mobility at time of Discharge:   Basic Mobility Inpatient Raw Score: 17  JH-HLM Goal: 5: Stand one or more mins  JH-HLM Achieved: 7: Walk 25 feet or more  HLM Goal achieved. Continue to encourage appropriate mobility.     Disposition:   Home with VNA Services (Reminder: Complete face to face encounter)    Planned Readmission: no    Discharge Medications:  See after visit summary for reconciled discharge medications provided to patient and/or family.      Administrative Statements   Discharge Statement:  I have spent a total time of 40 minutes in caring for this patient on the day of the visit/encounter. .    **Please Note: This note may have been constructed using a voice recognition system**

## 2024-11-19 NOTE — CASE MANAGEMENT
Case Management Discharge Planning Note    Patient name Jose Mullins  Location /-01 MRN [id number]  : 7/15/1931 Date 2024       Current Admission Date: 2024  Current Admission Diagnosis:Chronic diastolic congestive heart failure (HCC)   Patient Active Problem List    Diagnosis Date Noted Date Diagnosed    Acute respiratory failure (HCC) 2024     Acute respiratory insufficiency 10/30/2024     Chronic diastolic congestive heart failure (HCC) 10/24/2024     Primary osteoarthritis of left knee 2024     Other headache syndrome 2024     Episodic tension-type headache, not intractable 2024     Dermatitis 2023     Elevated TSH 2023     Corkscrew esophagus 2022     Other dysphagia 2022     Frontal headache 2022     Ambulatory dysfunction 2022     Irritable bowel syndrome with diarrhea 2022     Benign prostatic hyperplasia 2021     Pain in both feet 05/15/2020     Carpal tunnel syndrome 2019     Other insomnia 04/15/2019     Anxiety 04/15/2019     Paresthesia 2018     Arthritis of carpometacarpal (CMC) joint of right thumb 2018     Peripheral edema 2018     Stage 3a chronic kidney disease (HCC) 2018     Permanent atrial fibrillation (HCC) 2018     Left lumbar radiculopathy 01/10/2017     Cholecystitis with cholelithiasis 2016     Joint pain, knee 2014     Allergic rhinitis 2013     S/P ablation of ventricular arrhythmia 2013     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HCC) 2012     Anemia of chronic disease 2012     GERD (gastroesophageal reflux disease) 2012     Glaucoma 2012     Hiatal hernia 2012     Other hyperlipidemia 2012     Peripheral vascular disease (HCC) 2012     Benign essential hypertension 2012       LOS (days): 5  Geometric Mean LOS (GMLOS) (days): 2.6  Days to GMLOS:-2.2     OBJECTIVE:  Risk of Unplanned Readmission  Score: 22.01         Current admission status: Inpatient   Preferred Pharmacy:   [facility] #[id number] - [place] - [address]  [address]  [place] [address]  Phone: [phone] Fax: [phone]    [facility]  [address]  [place] [address]  Phone: [phone] Fax: [phone]    Primary Care Provider: Moises Ham MD    Primary Insurance: [facility]  Secondary Insurance:     DISCHARGE DETAILS:                                                                                        IMM Given (Date):: 11/19/24  IMM Given to:: Patient  Family notified:: Daughter Jennifer   IMM reviewed with daughter Jennifer and patient. Both are aware of right to appeal hospital discharge.

## 2024-11-19 NOTE — ASSESSMENT & PLAN NOTE
Wt Readings from Last 3 Encounters:   11/19/24 92.5 kg (203 lb 14.8 oz)   11/11/24 89.8 kg (198 lb)   11/02/24 89.9 kg (198 lb 3.2 oz)     Patient presenting due to shortness of breath  Patient recently admitted due to viral sepsis secondary to COVID and flu  Chest x-ray on admission showing pulmonary edema  BNP on admission 257 which is slightly above baseline   updated echo 11/15/24 LVEF of 55% normal systolic function wall motion is normal.  Right ventricle moderately dilated systolic function moderately reduced.  Right atrium moderately dilated.  Aortic valve sclerosis, severe tricuspid regurgitation   Status post 40 IV Lasix yesterday.    Discussed with cardiology.  Will discharge patient on Lasix 20 mg daily, spironolactone 12.5 mg.  Cardiology will schedule the patient for a close follow-up next week.

## 2024-11-19 NOTE — ASSESSMENT & PLAN NOTE
On 10/30 tested positive for covid. Was discharged on oxygen 2 L nc   Now requiring 1-2  nasal cannula, however drops with minimal exertion   CXR/CT chest showed diffuse patchy groundglass opacities throughout bilateral lungs favor infectious versus inflammatory in etiology.  Mild interstitial pulmonary edema.  Cardiomegaly and trace left pleural effusion  Status post 40 IV Lasix yesterday.  His oxygen saturation is stable on 1 L nasal cannula oxygen.  Started ceftriaxone as his procalcitonin was increasing     Discharged on oral antibiotics for another 3 days to complete 5 days of treatment.  Discharged on oral Lasix 20 mg daily.

## 2024-11-19 NOTE — ASSESSMENT & PLAN NOTE
Lab Results   Component Value Date    EGFR 49 11/19/2024    EGFR 48 11/18/2024    EGFR 47 11/17/2024    CREATININE 1.25 11/19/2024    CREATININE 1.26 11/18/2024    CREATININE 1.29 11/17/2024     Creatinine on admission 1.46  Baseline appears to be around 1.2-1.4  Not meeting KDIGO criteria  Stable

## 2024-11-19 NOTE — PHYSICAL THERAPY NOTE
PHYSICAL THERAPY TREATMENT NOTE      Patient Name: Jose Mullins  Today's Date: 11/19/2024 11/19/24 1100   PT Last Visit   PT Visit Date 11/19/24   Note Type   Note Type Treatment   Pain Assessment   Pain Assessment Tool 0-10   Pain Score No Pain   Restrictions/Precautions   Weight Bearing Precautions Per Order No   Other Precautions Cognitive;Chair Alarm;Bed Alarm;O2;Fall Risk;Hard of hearing   General   Chart Reviewed Yes   Additional Pertinent History Notified by Dr. Wall that daughter is requesting PT re-evaluation due to concerns w/ patients mobility. Upon arrival daughter reports concerns w/ keeping him upright at home as she is the only one there with him, and reports it took 2 people to mobilize him.   Response to Previous Treatment Patient with no complaints from previous session.  (Spoke w/ LURDES Corley and ILANA Huerta who confirm that this morning Vasile has been Ax1, ambulated around room w/ ILANA Huerta this AM as well. O2 requirements improving)   Family/Caregiver Present Yes  (daughter)   Cognition   Arousal/Participation Alert;Cooperative   Attention Attends with cues to redirect   Orientation Level Oriented X4   Memory Decreased short term memory   Following Commands Follows one step commands with increased time or repetition   Comments Vasile is very pleasant and agreeable to session. Denies SOB, BLE weakness throughout session when asked.   Bed Mobility   Additional Comments OOB in recliner chair upon arrival   Transfers   Sit to Stand 5  Supervision   Additional items Assist x 1;Armrests   Stand to Sit 5  Supervision   Additional items Assist x 1;Armrests   Stand pivot 5  Supervision   Additional items Assist x 1  (w/ RW to commode)   Toilet transfer 5  Supervision   Additional items Assist x 1;Increased time required;Commode   Additional Comments Performed STS x 2 w/ supervision for each    Ambulation/Elevation   Gait pattern Decreased foot clearance;Forward Flexion   Gait Assistance 5  Supervision  (CGA --> CS for safety)   Additional items Assist x 1   Assistive Device Rolling walker   Distance 60 ft   Ambulation/Elevation Additional Comments Vasile is able to ambulate 2 laps around the room. Limited distance due to multiple lines, O2 tubing restrictions. No LOB, unsteadiness noted.  (Per RN Radha, patient had just ambulated w/ her before)   Balance   Static Sitting Good   Static Standing Fair   Ambulatory Fair -   Activity Tolerance   Activity Tolerance Patient tolerated treatment well   Medical Staff Made Aware Dr. Wall   Nurse Made Aware ILANA Huerta, LURDES Corley   Assessment   Problem List Decreased strength;Impaired balance;Decreased endurance;Decreased mobility;Decreased cognition;Impaired hearing;Impaired vision   Assessment Vasile was seen today for PT intervention. Overall he is demonstrating progress in mobility, as he requires supervision for all functional mobility. Provided close supervision throughout for safety and due to line management (enrique, O2, IV pole). He denies increased WOB and does not appear SOB throughout. He does benefit from VC for diaphragmatic breathing at rest to assist w/ expediting recovery. He is able to consistently perform STS w/ proper hand placement without cues needed to come to a stand. Once up, he does benefit from verbal cues to keep RW closer within his AC as he is a bit forward flexed and this can impact his endurance due to poor posturing of the respiratory muscles. He is able to ambulate 60 ft around the room w/ multiple turns without subjective change in gait speed, and no rest breaks. Pt reports needing to use the bathroom, assisted to the commode per dtr request due to multiple lines. Pt seated OOB on commode w/ call bell in hand, notified patient to ring. SpO2 maintained >90% throughout session, masimo unable to provide accurate feedback  during ambulation due to gripping RW. Once wavelength was back to providing a consistent, quality reading, immediately SpO2 was at 93%. Discharge recommendation continues to be for Level 3 resources upon DC.   Goals   Patient Goals to use the bathroom   STG Expiration Date 11/25/24   Short Term Goal #1 Patient will: Perform all bed mobility tasks modified independent to improve pt's independence w/ repositioning for decrease risk of skin breakdown, Perform all transfers modified independent consistently from various height surfaces in order to improve I w/ engagement w/ real-world environments/situations, Ambulate at least 100 ft. with roller walker w/ supervision w/o LOB to facilitate return and engagement w/ previous living environment, and Tolerate 3 hr OOB to faciliate upright tolerance   PT Treatment Day 1   Plan   Treatment/Interventions Functional transfer training;LE strengthening/ROM;Therapeutic exercise;Endurance training;Cognitive reorientation;Patient/family training;Equipment eval/education   Progress Progressing toward goals   PT Frequency 2-3x/wk   Discharge Recommendation   Rehab Resource Intensity Level, PT III (Minimum Resource Intensity)   Equipment Recommended Walker   AM-PAC Basic Mobility Inpatient   Turning in Flat Bed Without Bedrails 3   Lying on Back to Sitting on Edge of Flat Bed Without Bedrails 3   Moving Bed to Chair 3   Standing Up From Chair Using Arms 3   Walk in Room 3   Climb 3-5 Stairs With Railing 2   Basic Mobility Inpatient Raw Score 17   Basic Mobility Standardized Score 39.67   [facility] Highest Level Of Mobility   -HLM Goal 5: Stand one or more mins   JH-HLM Achieved 7: Walk 25 feet or more   End of Consult   Patient Position at End of Consult   (on commode w/ ILANA herman Deanna jackson, daughter in room)       Recommend patient continues to mobilize w/ RN staff during day, at least 3x/day outside of getting up to use the bathroom. Would also benefit from ambulation  to/from bathroom rather than to commute to encourage further mobility throughout the day and better simulate return home when he will need to ambulate to the bathroom.    The patient's AM-PAC Basic Mobility Inpatient Short Form Raw Score is 17. A Raw score of greater than 16 suggests the patient may benefit from discharge to home. Please also refer to the recommendation of the Physical Therapist for safe discharge planning.    Pt would continue to benefit from skilled PT during this admission in order to progress patient towards goals to decrease risk of falls and maximize independence.       Kala Navas, PT, DPT

## 2024-11-20 ENCOUNTER — HOME CARE VISIT (OUTPATIENT)
Dept: HOME HEALTH SERVICES | Facility: HOME HEALTHCARE | Age: 89
End: 2024-11-20
Payer: COMMERCIAL

## 2024-11-20 ENCOUNTER — HOME CARE VISIT (OUTPATIENT)
Dept: HOME HEALTH SERVICES | Facility: HOME HEALTHCARE | Age: 89
End: 2024-11-20

## 2024-11-20 ENCOUNTER — PATIENT OUTREACH (OUTPATIENT)
Dept: CASE MANAGEMENT | Facility: OTHER | Age: 89
End: 2024-11-20

## 2024-11-20 VITALS
RESPIRATION RATE: 18 BRPM | OXYGEN SATURATION: 94 % | SYSTOLIC BLOOD PRESSURE: 140 MMHG | HEART RATE: 88 BPM | TEMPERATURE: 97.8 F | DIASTOLIC BLOOD PRESSURE: 80 MMHG

## 2024-11-20 PROCEDURE — G0299 HHS/HOSPICE OF RN EA 15 MIN: HCPCS

## 2024-11-20 PROCEDURE — 400013 VN SOC

## 2024-11-20 NOTE — PROGRESS NOTES
Outpatient Care Management note- ADT alert, hospital discharge.    Patient admitted to [facility] on 11/14/24 for SOB/CHF, weight gain and mile LE edema. CXR revealed pulmonary edema and BNP was 257. 11/15/24 Echo showed 55% EF. Patient's sodium level on admit was 128. Patient was also treated for +covid with steroid therapy and Remdesivir. His SOB improved and his use of O2 was weaned.     Jose was discharged on 11/19/24 with  VNA services, RN/OT/PT and prescriptions for Furosemide 20 mg daily, Spironolactone 12.5 mg daily and Duricef 1000 mg BID x 3 days.     Spoke with patient's daughter, Jennifer who states Jose is still SOB with exertion and this happens quickly. She reports he is still very tired and has difficulty standing to urinate and unsteady. Jennifer confirms receipt of the new medications/doses but is having difficulty splitting the Spironolactone as she has 25 mg tablets at home and his dose is 12.5 mg. Advised to  a pill cutter at the pharmacy.     Jennifer states she received a scale from the hospital but is unsure if it is a Smart scale. She is weighing him daily and will keep her log. She states she always follows a low sodium diet with Jose and confirms only having 1800 ml fluid intake.     Following low sodium diet:   Yes  Following fluid restriction:  Yes, 1800 ml fluid restriction daily  Hospital discharge weight:   203 lbs  Weighing daily:   Yes, per daughter, they received a scale from the hospital           Weight log: Yes, daughter is keeping a daily log  1st home weight:  204 lbs       Weight today: 204 lbs  Monitoring symptoms: Yes, aware to look for increased edema and SOB  Any current symptoms: Yes, SOB with exertion   When to call provider: Increased SOB, edema or weight gain  Medications reviewed:  Yes, full medication reconciliation done  Knows name of diuretic:  Yes, Furosemide 20 mg daily and Spironolactone 12.5 mg daily  Escalation plan: No  HF education  reviewed/reinforced including low sodium diet, fluid restriction, activity, symptoms of decompensation and when and who to call.   Cardiology follow up appointment: Daughter needs to make appt  PCP follow up appointment: Daughter needs to make appt  Transportation: Yes, daughter  Home health care agency:  [place]  Start of Care Date: 11/20/24     No follow up appts noted in chart for cardiology or PCP. Advised Jennifer to call both PCP and Cardiology to be seen in 1-2 weeks. She is agreeable to call.    Jennifer is agreeable for further outreach, will schedule next call.

## 2024-11-20 NOTE — UTILIZATION REVIEW
NOTIFICATION OF ADMISSION DISCHARGE   This is a Notification of Discharge from [facility]. Please be advised that this patient has been discharge from our facility. Below you will find the admission and discharge date and time including the patient’s disposition.   UTILIZATION REVIEW CONTACT:  Lynda Erwin  Utilization   Network Utilization Review Department  Phone: [phone] x carefully listen to the prompts. All voicemails are confidential.  Email: [email or web address]     ADMISSION INFORMATION  PRESENTATION DATE: 11/14/2024  1:49 PM  OBERVATION ADMISSION DATE: N/A  INPATIENT ADMISSION DATE: 11/14/24  4:19 PM   DISCHARGE DATE: 11/19/2024  3:01 PM   DISPOSITION:Home/Self Care    Network Utilization Review Department  ATTENTION: Please call with any questions or concerns to [phone] and carefully listen to the prompts so that you are directed to the right person. All voicemails are confidential.   For Discharge needs, contact Care Management DC Support Team at [phone] opt. 2  Send all requests for admission clinical reviews, approved or denied determinations and any other requests to dedicated fax number below belonging to the campus where the patient is receiving treatment. List of dedicated fax numbers for the Facilities:  FACILITY NAME UR FAX NUMBER   ADMISSION DENIALS (Administrative/Medical Necessity) [phone]   DISCHARGE SUPPORT TEAM ([facility]) [phone]   PARENT CHILD HEALTH (Maternity/NICU/Pediatrics) [phone]   [facility] [phone]   [facility] [phone]   [facility] [phone]   [facility] [phone]   Pending sale to [facility] [phone]   [facility] [phone]   [facility] [phone]   [facility]  [phone]   [facility] [phone]   [facility] [phone]   [facility] [phone]   [facility] [phone]

## 2024-11-20 NOTE — PROGRESS NOTES
It appears the patient was seen for exacerbation of his congestive heart failure therefore he should have follow-up with his cardiology office.  I do not really need to see him unless there were other issues they would need to discuss.

## 2024-11-21 ENCOUNTER — HOME CARE VISIT (OUTPATIENT)
Dept: HOME HEALTH SERVICES | Facility: HOME HEALTHCARE | Age: 89
End: 2024-11-21
Payer: COMMERCIAL

## 2024-11-21 ENCOUNTER — APPOINTMENT (EMERGENCY)
Dept: CT IMAGING | Facility: HOSPITAL | Age: 89
End: 2024-11-21
Payer: COMMERCIAL

## 2024-11-21 ENCOUNTER — TELEPHONE (OUTPATIENT)
Age: 89
End: 2024-11-21

## 2024-11-21 ENCOUNTER — APPOINTMENT (EMERGENCY)
Dept: RADIOLOGY | Facility: HOSPITAL | Age: 89
End: 2024-11-21
Payer: COMMERCIAL

## 2024-11-21 ENCOUNTER — HOSPITAL ENCOUNTER (EMERGENCY)
Facility: HOSPITAL | Age: 89
Discharge: HOME | End: 2024-11-21
Attending: EMERGENCY MEDICINE
Payer: COMMERCIAL

## 2024-11-21 VITALS
RESPIRATION RATE: 21 BRPM | OXYGEN SATURATION: 99 % | BODY MASS INDEX: 31.61 KG/M2 | WEIGHT: 208.56 LBS | TEMPERATURE: 98.4 F | DIASTOLIC BLOOD PRESSURE: 59 MMHG | HEART RATE: 86 BPM | HEIGHT: 68 IN | SYSTOLIC BLOOD PRESSURE: 124 MMHG

## 2024-11-21 DIAGNOSIS — U07.1 ACUTE COVID-19: Primary | ICD-10-CM

## 2024-11-21 DIAGNOSIS — R26.2 AMBULATORY DYSFUNCTION: ICD-10-CM

## 2024-11-21 DIAGNOSIS — R79.9 ELEVATED BUN: ICD-10-CM

## 2024-11-21 DIAGNOSIS — R30.0 DYSURIA: Primary | ICD-10-CM

## 2024-11-21 DIAGNOSIS — E86.0 DEHYDRATION: ICD-10-CM

## 2024-11-21 LAB
2HR DELTA HS TROPONIN: 0 NG/L
ALBUMIN SERPL BCG-MCNC: 3.1 G/DL (ref 3.5–5)
ALP SERPL-CCNC: 53 U/L (ref 34–104)
ALT SERPL W P-5'-P-CCNC: 19 U/L (ref 7–52)
AMORPH URATE CRY URNS QL MICRO: ABNORMAL
ANION GAP SERPL CALCULATED.3IONS-SCNC: 7 MMOL/L (ref 4–13)
APTT PPP: 44 SECONDS (ref 23–34)
AST SERPL W P-5'-P-CCNC: 13 U/L (ref 13–39)
BACTERIA UR QL AUTO: ABNORMAL /HPF
BASOPHILS # BLD AUTO: 0.01 THOUSANDS/ÂΜL (ref 0–0.1)
BASOPHILS NFR BLD AUTO: 0 % (ref 0–1)
BILIRUB SERPL-MCNC: 0.43 MG/DL (ref 0.2–1)
BILIRUB UR QL STRIP: NEGATIVE
BNP SERPL-MCNC: 242 PG/ML (ref 0–100)
BUN SERPL-MCNC: 39 MG/DL (ref 5–25)
CALCIUM ALBUM COR SERPL-MCNC: 9.1 MG/DL (ref 8.3–10.1)
CALCIUM SERPL-MCNC: 8.4 MG/DL (ref 8.4–10.2)
CARDIAC TROPONIN I PNL SERPL HS: 19 NG/L (ref ?–50)
CARDIAC TROPONIN I PNL SERPL HS: 19 NG/L (ref ?–50)
CHLORIDE SERPL-SCNC: 96 MMOL/L (ref 96–108)
CLARITY UR: CLEAR
CO2 SERPL-SCNC: 28 MMOL/L (ref 21–32)
COLOR UR: YELLOW
CREAT SERPL-MCNC: 1.35 MG/DL (ref 0.6–1.3)
EOSINOPHIL # BLD AUTO: 0.94 THOUSAND/ÂΜL (ref 0–0.61)
EOSINOPHIL NFR BLD AUTO: 15 % (ref 0–6)
ERYTHROCYTE [DISTWIDTH] IN BLOOD BY AUTOMATED COUNT: 13 % (ref 11.6–15.1)
GFR SERPL CREATININE-BSD FRML MDRD: 44 ML/MIN/1.73SQ M
GLUCOSE SERPL-MCNC: 137 MG/DL (ref 65–140)
GLUCOSE UR STRIP-MCNC: ABNORMAL MG/DL
GRAN CASTS #/AREA URNS LPF: ABNORMAL /[LPF]
HCT VFR BLD AUTO: 28.4 % (ref 36.5–49.3)
HGB BLD-MCNC: 9.2 G/DL (ref 12–17)
HGB UR QL STRIP.AUTO: NEGATIVE
IMM GRANULOCYTES # BLD AUTO: 0.09 THOUSAND/UL (ref 0–0.2)
IMM GRANULOCYTES NFR BLD AUTO: 1 % (ref 0–2)
INR PPP: 1.4 (ref 0.85–1.19)
KETONES UR STRIP-MCNC: NEGATIVE MG/DL
LACTATE SERPL-SCNC: 1.1 MMOL/L (ref 0.5–2)
LEUKOCYTE ESTERASE UR QL STRIP: NEGATIVE
LYMPHOCYTES # BLD AUTO: 0.6 THOUSANDS/ÂΜL (ref 0.6–4.47)
LYMPHOCYTES NFR BLD AUTO: 9 % (ref 14–44)
MCH RBC QN AUTO: 33.2 PG (ref 26.8–34.3)
MCHC RBC AUTO-ENTMCNC: 32.4 G/DL (ref 31.4–37.4)
MCV RBC AUTO: 103 FL (ref 82–98)
MONOCYTES # BLD AUTO: 0.64 THOUSAND/ÂΜL (ref 0.17–1.22)
MONOCYTES NFR BLD AUTO: 10 % (ref 4–12)
NEUTROPHILS # BLD AUTO: 4.17 THOUSANDS/ÂΜL (ref 1.85–7.62)
NEUTS SEG NFR BLD AUTO: 65 % (ref 43–75)
NITRITE UR QL STRIP: NEGATIVE
NON-SQ EPI CELLS URNS QL MICRO: ABNORMAL /HPF
NRBC BLD AUTO-RTO: 0 /100 WBCS
PH UR STRIP.AUTO: 5.5 [PH]
PLATELET # BLD AUTO: 230 THOUSANDS/UL (ref 149–390)
PMV BLD AUTO: 10.4 FL (ref 8.9–12.7)
POTASSIUM SERPL-SCNC: 4.4 MMOL/L (ref 3.5–5.3)
PROCALCITONIN SERPL-MCNC: 0.17 NG/ML
PROT SERPL-MCNC: 6.2 G/DL (ref 6.4–8.4)
PROT UR STRIP-MCNC: ABNORMAL MG/DL
PROTHROMBIN TIME: 17.7 SECONDS (ref 12.3–15)
RBC # BLD AUTO: 2.77 MILLION/UL (ref 3.88–5.62)
RBC #/AREA URNS AUTO: ABNORMAL /HPF
SODIUM SERPL-SCNC: 131 MMOL/L (ref 135–147)
SP GR UR STRIP.AUTO: 1.01 (ref 1–1.03)
UROBILINOGEN UR STRIP-ACNC: <2 MG/DL
WBC # BLD AUTO: 6.45 THOUSAND/UL (ref 4.31–10.16)
WBC #/AREA URNS AUTO: ABNORMAL /HPF

## 2024-11-21 PROCEDURE — 99285 EMERGENCY DEPT VISIT HI MDM: CPT

## 2024-11-21 PROCEDURE — 70450 CT HEAD/BRAIN W/O DYE: CPT

## 2024-11-21 PROCEDURE — G0152 HHCP-SERV OF OT,EA 15 MIN: HCPCS

## 2024-11-21 PROCEDURE — 36415 COLL VENOUS BLD VENIPUNCTURE: CPT | Performed by: EMERGENCY MEDICINE

## 2024-11-21 PROCEDURE — 81001 URINALYSIS AUTO W/SCOPE: CPT | Performed by: EMERGENCY MEDICINE

## 2024-11-21 PROCEDURE — 87040 BLOOD CULTURE FOR BACTERIA: CPT | Performed by: EMERGENCY MEDICINE

## 2024-11-21 PROCEDURE — 93005 ELECTROCARDIOGRAM TRACING: CPT

## 2024-11-21 PROCEDURE — 83880 ASSAY OF NATRIURETIC PEPTIDE: CPT | Performed by: EMERGENCY MEDICINE

## 2024-11-21 PROCEDURE — 83605 ASSAY OF LACTIC ACID: CPT | Performed by: EMERGENCY MEDICINE

## 2024-11-21 PROCEDURE — 85730 THROMBOPLASTIN TIME PARTIAL: CPT | Performed by: EMERGENCY MEDICINE

## 2024-11-21 PROCEDURE — 80053 COMPREHEN METABOLIC PANEL: CPT | Performed by: EMERGENCY MEDICINE

## 2024-11-21 PROCEDURE — 71045 X-RAY EXAM CHEST 1 VIEW: CPT

## 2024-11-21 PROCEDURE — 96360 HYDRATION IV INFUSION INIT: CPT

## 2024-11-21 PROCEDURE — 85025 COMPLETE CBC W/AUTO DIFF WBC: CPT | Performed by: EMERGENCY MEDICINE

## 2024-11-21 PROCEDURE — 99285 EMERGENCY DEPT VISIT HI MDM: CPT | Performed by: EMERGENCY MEDICINE

## 2024-11-21 PROCEDURE — 85610 PROTHROMBIN TIME: CPT | Performed by: EMERGENCY MEDICINE

## 2024-11-21 PROCEDURE — 84484 ASSAY OF TROPONIN QUANT: CPT | Performed by: EMERGENCY MEDICINE

## 2024-11-21 PROCEDURE — 84145 PROCALCITONIN (PCT): CPT | Performed by: EMERGENCY MEDICINE

## 2024-11-21 RX ADMIN — SODIUM CHLORIDE 250 ML: 0.9 INJECTION, SOLUTION INTRAVENOUS at 20:38

## 2024-11-21 NOTE — TELEPHONE ENCOUNTER
Please advise,   Latoya called to give a report on mutual patient today, where she reported patient being very lethargic, c/o burning when urinating, temp was 97.9 this morning around ten per daughter temp is now 99.8.  Vitals   /65     P-ox 96% with 2 L O2    Gloria is requesting recommendations for pt care, should he go to the ED if so patient would need transport. VN will be there tomorrow morning if a urinalysis and culture are ordered . Please consult with provider and follow up with patients gilda and Gloria   Thank you

## 2024-11-21 NOTE — PROGRESS NOTES
Heart Failure Outpatient Progress Note - Jose Mullins 89 y.o. 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    Other dysphagia 07/20/2022    Frontal headache 07/04/2022    Ambulatory dysfunction 01/26/2022    Irritable bowel syndrome with diarrhea 01/07/2022    Benign prostatic hyperplasia 02/12/2021    Pain in both feet 05/15/2020    Carpal tunnel syndrome 04/24/2019    Other insomnia 04/15/2019    Anxiety 04/15/2019    Paresthesia 11/28/2018    Arthritis of carpometacarpal (CMC) joint of right thumb 09/18/2018    Peripheral edema 08/31/2018    Stage 3a chronic kidney disease (HCC) 03/12/2018    Permanent atrial fibrillation (HCC) 03/12/2018    Left lumbar radiculopathy 01/10/2017    Cholecystitis with cholelithiasis 09/14/2016    Joint pain, knee 09/23/2014    Allergic rhinitis 05/30/2013    S/P ablation of ventricular arrhythmia 03/04/2013    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HCC) 11/12/2012    Anemia of chronic disease 08/18/2012    GERD (gastroesophageal reflux disease) 08/18/2012    Glaucoma 08/18/2012    Hiatal hernia 08/18/2012    Other hyperlipidemia 08/18/2012    Peripheral vascular disease (HCC) 08/18/2012    Benign essential hypertension 08/09/2012       Chronic HFpEF  -Appears compensated on exam, weight stable  -Continue current diuretic regimen for now.  Consider increasing his MRA to full pill next visit  -Encouraged up and OOB/OOC with ambulation  -Rx Lasix 20 mg daily, Spironolactone 12.5 mg daily, Jardiance 10 mg daily  -weights 203, today,  198 lbs.     -TTE 11/15/24 LVEF of 55% normal systolic function wall motion is normal.  Right ventricle moderately dilated systolic function moderately reduced.  Right atrium moderately dilated.  Aortic valve sclerosis, severe tricuspid regurgitation     HTN  -Well-controlled for age  -Rx Amlodipine, Bisoprolol,   Stage III CKD  -Baseline creat around 1-1.2    Chronic atrial fib  -Rate:-Bisoprolol 2.5 mg daily  -Rhythm: N/A  -OAC: Apixaban 2.5 mg twice daily        HPI:   89-year-old male with past medical history described above who presents for hospital follow-up with his daughter.  Had 2 recent admissions.  First admitted on 10/30/2024 with influenza and COVID-19.  Was treated accordingly and then readmitted on 1114 with shortness of breath.  Felt to be due to volume overload and as such was given a one-time dose of IV Lasix.  Treatment for his prior infections was intensified.  He is feeling much better today.  He is wearing oxygen for now around-the-clock.  Daughter is concerned that he is sleeping a lot throughout the day and feels this has been the case since having COVID.  His appetite is excellent.  He is not really getting up to move enough despite her encouragement.  They have been weighing patient since discharge  and his readings have been stable.    Past Medical History:   Diagnosis Date    A-fib (HCC)     Anemia     Carpal tunnel syndrome, unspecified upper limb     Cataract     last assessed: 8/18/2012    COVID-19 09/06/2023    GERD (gastroesophageal reflux disease)     Glaucoma     Hypertension     last assessed: 8/23/2012    Intervertebral disc disease     PVD (peripheral vascular disease) ([facility])     Stomach disorder 11/2021    Syncope 03/12/2018       12 point ROS negative other than that stated in HPI    Allergies   Allergen Reactions    Bempedoic Acid Other (See Comments)     Gout    Atorvastatin GI Intolerance     Other reaction(s): GI upset    Ezetimibe Other (See Comments)     myalgia  Other  reaction(s): Other (See Comments)  Myalgia  Other reaction(s): Myalgia  myalgia    Statins Rash     .    Current Outpatient Medications:     allopurinol (ZYLOPRIM) 100 mg tablet, Take 1 tablet (100 mg total) by mouth daily, Disp: 90 tablet, Rfl: 1    amitriptyline (ELAVIL) 10 mg tablet, TAKE 3 TABLETS BY MOUTH AT BEDTIME, Disp: 270 tablet, Rfl: 1    amLODIPine (NORVASC) 5 mg tablet, Take 1 tablet (5 mg total) by mouth daily at bedtime, Disp: 90 tablet, Rfl: 3    apixaban (ELIQUIS) 2.5 mg, Take 1 tablet (2.5 mg total) by mouth 2 (two) times a day, Disp: 180 tablet, Rfl: 1    bisoprolol (ZEBETA) 5 mg tablet, Take 0.5 tablets (2.5 mg total) by mouth daily, Disp: 45 tablet, Rfl: 3    brimonidine tartrate 0.2 % ophthalmic solution, INSTILL 1 DROP TWICE DAILY IN THE RIGHT EYE, Disp: 15 mL, Rfl: 1    cefadroxil (DURICEF) 1000 mg tablet, Take 1 tablet (1,000 mg total) by mouth 2 (two) times a day for 3 days Do not start before November 20, 2024., Disp: 6 tablet, Rfl: 0    cholecalciferol (VITAMIN D3) 1,000 units tablet, Take 2,000 Units by mouth daily , Disp: , Rfl:     co-enzyme Q-10 30 MG capsule, Take 100 mg by mouth daily  , Disp: , Rfl:     diphenhydrAMINE-acetaminophen (TYLENOL PM)  MG TABS, Take 2 tablets by mouth daily at bedtime as needed for sleep (Patient not taking: Reported on 11/20/2024), Disp: , Rfl:     Dorzolamide HCl-Timolol Mal PF 2-0.5 % SOLN, ONE DROP INTO EACH EYE TWICE DAILY, Disp: , Rfl:     Empagliflozin (JARDIANCE) 10 MG TABS tablet, Take 1 tablet (10 mg total) by mouth every morning, Disp: 30 tablet, Rfl: 11    finasteride (PROSCAR) 5 mg tablet, TAKE 1 TABLET (5 MG TOTAL) BY MOUTH DAILY., Disp: 90 tablet, Rfl: 1    furosemide (LASIX) 20 mg tablet, Take 1 tablet (20 mg total) by mouth daily, Disp: 30 tablet, Rfl: 0    gabapentin (NEURONTIN) 100 mg capsule, Take 1 capsule (100 mg total) by mouth 2 (two) times a day, Disp: , Rfl:     Garlic 1000 MG CAPS, Take 1,000 mg by mouth in the morning,  Disp: , Rfl:     latanoprost (XALATAN) 0.005 % ophthalmic solution, Administer 1 drop to both eyes daily at bedtime, Disp: , Rfl:     multivitamin-iron-minerals-folic acid (CENTRUM) chewable tablet, Chew 1 tablet daily., Disp: , Rfl:     omeprazole (PriLOSEC) 20 mg delayed release capsule, TAKE 1 CAPSULE BY MOUTH EVERY DAY, Disp: 90 capsule, Rfl: 1    oxygen gas, Inhale 2 L/min continuous., Disp: , Rfl:     Rhopressa 0.02 % SOLN, INSTILL 1 DROP INTO RIGHT EYE ONCE A DAY, Disp: , Rfl:     spironolactone (ALDACTONE) 25 mg tablet, Take 0.5 tablets (12.5 mg total) by mouth daily, Disp: 90 tablet, Rfl: 0    Social History     Socioeconomic History    Marital status:      Spouse name: Not on file    Number of children: Not on file    Years of education: Not on file    Highest education level: Not on file   Occupational History    Occupation: retired   Tobacco Use    Smoking status: Former     Current packs/day: 0.50     Average packs/day: 0.5 packs/day for 35.0 years (17.5 ttl pk-yrs)     Types: Cigarettes    Smokeless tobacco: Former     Types: Snuff    Tobacco comments:     quit 30 years ago    Vaping Use    Vaping status: Former   Substance and Sexual Activity    Alcohol use: Not Currently    Drug use: Never    Sexual activity: Not Currently     Partners: Female   Other Topics Concern    Not on file   Social History Narrative    Morning person     Social Drivers of Health     Financial Resource Strain: Low Risk  (7/26/2023)    Overall Financial Resource Strain (CARDIA)     Difficulty of Paying Living Expenses: Not hard at all   Food Insecurity: No Food Insecurity (11/14/2024)    Nursing - Inadequate Food Risk Classification     Worried About Running Out of Food in the Last Year: Not on file     Ran Out of Food in the Last Year: Not on file     Ran Out of Food in the Last Year: 1   Transportation Needs: No Transportation Needs (11/20/2024)    OASIS : Transportation     Lack of Transportation (Medical): No     " Lack of Transportation (Non-Medical): No     Patient Unable or Declines to Respond: No   Physical Activity: Not on file   Stress: Not on file   Social Connections: Not on file   Intimate Partner Violence: Unknown (2024)    Nursing IPS     Feels Physically and Emotionally Safe: Not on file     Physically Hurt by Someone: Not on file     Humiliated or Emotionally Abused by Someone: Not on file     Physically Hurt by Someone: 2     Hurt or Threatened by Someone: 2   Housing Stability: Unknown (2024)    Nursing: Inadequate Housing Risk Classification     Has Housing: Not on file     Worried About Losing Housing: Not on file     Unable to Get Utilities: Not on file     Unable to Pay for Housing in the Last Year: 2     Has Housin       Family History   Problem Relation Age of Onset    Glaucoma Mother     Cancer Father     Heart disease Father     Glaucoma Father     Alzheimer's disease Family     Heart attack Family        Physical Exam:    Vitals: /68 (BP Location: Left arm, Patient Position: Sitting, Cuff Size: Standard)   Pulse 97   Ht 5' 8\" (1.727 m)   Wt 90.1 kg (198 lb 9.6 oz)   SpO2 99%   BMI 30.20 kg/m²     Wt Readings from Last 3 Encounters:   24 92.5 kg (203 lb 14.8 oz)   24 89.8 kg (198 lb)   24 89.9 kg (198 lb 3.2 oz)       GEN: Joseramos Mullins appears well, alert and oriented x 3, pleasant and cooperative   HEENT: pupils equal, round, and reactive to light; extraocular muscles intact  NECK: supple, no carotid bruits   HEART: irregular rhythm, normal S1 and S2, no murmurs, clicks, gallops or rubs, JVP is  down  LUNGS:crackles at left base  ABDOMEN: normal bowel sounds, soft, no tenderness, no distention  EXTREMITIES: peripheral pulses normal; no clubbing, cyanosis, or edema  NEURO: no focal findings   SKIN: normal without suspicious lesions on exposed skin    Labs & Results:  Lab Results   Component Value Date     10/31/2017    SODIUM 135 2024    K 5.3 " 11/19/2024     11/19/2024    CO2 29 11/19/2024    AGAP 6 11/19/2024    BUN 45 (H) 11/19/2024    CREATININE 1.25 11/19/2024    GLUC 133 11/19/2024    GLUF 85 11/08/2024    CALCIUM 8.4 11/19/2024    AST 21 11/18/2024    ALT 35 11/18/2024    ALKPHOS 59 11/18/2024    PROT 7.0 10/31/2017    TP 6.9 11/18/2024    BILITOT 0.5 10/31/2017    TBILI 0.37 11/18/2024    EGFR 49 11/19/2024     Lab Results   Component Value Date    WBC 9.82 11/19/2024    HGB 9.5 (L) 11/19/2024    HCT 30.1 (L) 11/19/2024     (H) 11/19/2024     11/19/2024     Lab Results   Component Value Date    LDLCALC 108 (H) 04/24/2019     Lab Results   Component Value Date     (H) 11/14/2024      Lab Results   Component Value Date    MPV1TIZXGSAK 2.34 10/31/2017    TSH 4.65 (H) 06/30/2023     Lab Results   Component Value Date    HGBA1C 5.4 12/03/2018         EKG personally reviewed by MARGARITA Neff.   No results found for this visit on 11/25/24.     Counseling / Coordination of Care  Total face to face time spent with patient 25 minutes.  An additional 10 minutes was spent for chart/data review and visit preparation.       Thank you for the opportunity to participate in the care of this patient.    MARGARITA Neff

## 2024-11-21 NOTE — TELEPHONE ENCOUNTER
Orders for urinalysis and urine culture placed in epic.  Visiting nurse may obtain sample to send to laboratory

## 2024-11-21 NOTE — TELEPHONE ENCOUNTER
Spoke with Jennifer and stated that we cannot tell her if he would be ok with the slight fever and if she does the urine tomorrow we will not have results until Monday.  I explained to her to use her better judgement since she knows him best and if he seems worse or not with it she should take him back to the ED.  She agreed and said she will if she feels he needs to go. No other questions at this time

## 2024-11-21 NOTE — TELEPHONE ENCOUNTER
Patients daughter called back with update to temp. Patients temp is now 100.4, should she wait for the VN to come tomorrow or have addressed sooner. Transferred call to office who is currently speaking with patients daughter.

## 2024-11-22 ENCOUNTER — HOME CARE VISIT (OUTPATIENT)
Dept: HOME HEALTH SERVICES | Facility: HOME HEALTHCARE | Age: 89
End: 2024-11-22
Payer: COMMERCIAL

## 2024-11-22 ENCOUNTER — PATIENT OUTREACH (OUTPATIENT)
Dept: CASE MANAGEMENT | Facility: OTHER | Age: 89
End: 2024-11-22

## 2024-11-22 VITALS
TEMPERATURE: 98.1 F | OXYGEN SATURATION: 98 % | HEART RATE: 84 BPM | DIASTOLIC BLOOD PRESSURE: 60 MMHG | RESPIRATION RATE: 16 BRPM | SYSTOLIC BLOOD PRESSURE: 108 MMHG

## 2024-11-22 VITALS
DIASTOLIC BLOOD PRESSURE: 65 MMHG | OXYGEN SATURATION: 96 % | SYSTOLIC BLOOD PRESSURE: 120 MMHG | HEART RATE: 108 BPM | TEMPERATURE: 99.8 F

## 2024-11-22 PROCEDURE — G0299 HHS/HOSPICE OF RN EA 15 MIN: HCPCS

## 2024-11-22 NOTE — PROGRESS NOTES
Outpatient Care Management note- ADT alert.    Received notification that patient was seen in ED 11/21/24 and discharged same day back to home with family care/support; the patient declined rehab.    PCP office received call from  OT stating the patient was lethargic and c/o urinary burning. His temp. earlier in the morning was 97.9 but repeat temp was 99.8. PCP ordered UA and culture to be obtained by  nurse scheduled to see the patient 11/22/24. The daughter then called the PCP office giving an update on Jose's temp stating it was 100.4. She requested advise on waiting for HH next day (for urine sample) or ED visit. Office staff advised daughter to use her judgment.     Patient brought to ED via EMS 11/21/24 for SOB- SOB increases with exertion limiting his ambulation. Patient was also noted to be confused. Patient chronically uses 1-2L O2.     Workup completed- labs, EKG, CT head and CXR. No medication changes were made and the patient was discharged with recommendations to follow up with PCP.    The patient is scheduled to be seen by  nursing and PT today, 11/22/24.    Patient is scheduled with cardiology 11/25/24 and PCP on 12/3/24.     Will schedule outreach call.

## 2024-11-22 NOTE — ED PROVIDER NOTES
Time reflects when diagnosis was documented in both MDM as applicable and the Disposition within this note       Time User Action Codes Description Comment    11/21/2024  8:59 PM Marquez Nuñez [U07.1] Acute COVID-19     11/21/2024  8:59 PM Marquez Nuñez [R26.2] Ambulatory dysfunction     11/21/2024  8:59 PM Marquez Nuñez [R30.0] Dysuria     11/21/2024  8:59 PM Marquez Nuñez Remove [R30.0] Dysuria     11/21/2024  8:59 PM Marquez Nuñez [E86.0] Dehydration     11/21/2024  8:59 PM Marquez Nuñez [R79.9] Elevated BUN           ED Disposition       ED Disposition   Discharge    Condition   Stable    Date/Time   Thu Nov 21, 2024  8:58 PM    Comment   Jose Mullins discharge to home/self care.                   Assessment & Plan       Medical Decision Making  Symptomatic covid, uti, failure to thrive    Amount and/or Complexity of Data Reviewed  Labs: ordered.  Radiology: ordered and independent interpretation performed.        ED Course as of 11/21/24 2227   Thu Nov 21, 2024 2104 S/o to [facility] - recent hospitalization for covid-19, generalized weakness difficulty ambulating at  home with family and visiting nurse today.  VSS chronic 2 liters oxygen, delta trop pending, doesn't want rehab, family ok taking him back home.       Medications   sodium chloride 0.9 % bolus 250 mL (0 mL Intravenous Stopped 11/21/24 2145)       ED Risk Strat Scores                           SBIRT 20yo+      Flowsheet Row Most Recent Value   Initial Alcohol Screen: US AUDIT-C     1. How often do you have a drink containing alcohol? 0 Filed at: 11/21/2024 1827   2. How many drinks containing alcohol do you have on a typical day you are drinking?  0 Filed at: 11/21/2024 1827   3a. Male UNDER 65: How often do you have five or more drinks on one occasion? 0 Filed at: 11/21/2024 1827   3b. FEMALE Any Age, or MALE 65+: How often do you have 4 or more drinks on one occassion? 0 Filed at: 11/21/2024 1827   Audit-C Score 0 Filed at: 11/21/2024  1827   ROSSY: How many times in the past year have you...    Used an illegal drug or used a prescription medication for non-medical reasons? Never Filed at: 11/21/2024 1827                            History of Present Illness       Chief Complaint   Patient presents with    Shortness of Breath     Pt to er via ems from home with reports from homecare nurse that he has been sob. Was recently hospitalized for covid. Patient has dx of CHF, but does not know - family tells him he has fluid around his lungs. Chronically wears 2L NC. Patient states he has been having a hard time walking around due to sob        Past Medical History:   Diagnosis Date    A-fib (HCC)     Anemia     Carpal tunnel syndrome, unspecified upper limb     Cataract     last assessed: 8/18/2012    COVID-19 09/06/2023    GERD (gastroesophageal reflux disease)     Glaucoma     Hypertension     last assessed: 8/23/2012    Intervertebral disc disease     PVD (peripheral vascular disease) ([facility])     Stomach disorder 11/2021    Syncope 03/12/2018      Past Surgical History:   Procedure Laterality Date    CATARACT EXTRACTION      COLONOSCOPY      HERNIA REPAIR      NEUROPLASTY / TRANSPOSITION MEDIAN NERVE AT CARPAL TUNNEL      OK LAPAROSCOPY SURG CHOLECYSTECTOMY N/A 09/14/2016    Procedure: LAPAROSCOPIC CHOLECYSTECTOMY ;  Surgeon: Christo Cristina MD;  Location: [facility];  Service: General    UPPER GASTROINTESTINAL ENDOSCOPY        Family History   Problem Relation Age of Onset    Glaucoma Mother     Cancer Father     Heart disease Father     Glaucoma Father     Alzheimer's disease Family     Heart attack Family       Social History     Tobacco Use    Smoking status: Former     Current packs/day: 0.50     Average packs/day: 0.5 packs/day for 35.0 years (17.5 ttl pk-yrs)     Types: Cigarettes    Smokeless tobacco: Former     Types: Snuff    Tobacco comments:     quit 30 years ago    Vaping Use    Vaping status: Former   Substance Use Topics    Alcohol use: Not  Currently    Drug use: Never      E-Cigarette/Vaping    E-Cigarette Use Former User       E-Cigarette/Vaping Substances    Nicotine No     THC No     CBD No     Flavoring No     Other No     Unknown No       I have reviewed and agree with the history as documented.     Le from home lives alone but daughter cares for him, chronic 2 liters oxygen for chf.  Recent hospitalization for covid, now concerned for possible UTI - sob worse with exertion limiting ambulation.  Patient more confused.  I could not leave message at emergency contact - line was busy.        Review of Systems   Constitutional:  Negative for chills and fever.   HENT:  Negative for rhinorrhea and sore throat.    Respiratory:  Positive for shortness of breath.    Cardiovascular:  Negative for chest pain.   Gastrointestinal:  Negative for constipation, diarrhea, nausea and vomiting.   Genitourinary:  Negative for dysuria and frequency.   Skin:  Negative for rash.   Neurological:  Positive for weakness.   Psychiatric/Behavioral:  Positive for confusion.    All other systems reviewed and are negative.          Objective       ED Triage Vitals   Temperature Pulse Blood Pressure Respirations SpO2 Patient Position - Orthostatic VS   11/21/24 2000 11/21/24 1827 11/21/24 1827 11/21/24 1827 11/21/24 1827 11/21/24 1827   98.4 °F (36.9 °C) 98 112/57 20 98 % Lying      Temp Source Heart Rate Source BP Location FiO2 (%) Pain Score    11/21/24 2000 11/21/24 1827 11/21/24 1827 -- --    Temporal Monitor Left arm        Vitals      Date and Time Temp Pulse SpO2 Resp BP Pain Score FACES Pain Rating User   11/21/24 2130 -- 86 99 % 21 124/59 -- --    11/21/24 2030 -- 89 99 % 22 123/61 -- --    11/21/24 2015 -- 89 99 % 22 109/58 -- --    11/21/24 2000 98.4 °F (36.9 °C) -- -- -- -- -- --    11/21/24 1827 -- 98 98 % 20 112/57 -- -- HR            Physical Exam  Vitals and nursing note reviewed.   Constitutional:       Appearance: He is well-developed.   HENT:       Head: Normocephalic and atraumatic.      Right Ear: External ear normal.      Left Ear: External ear normal.      Nose: Nose normal.   Eyes:      Conjunctiva/sclera: Conjunctivae normal.      Pupils: Pupils are equal, round, and reactive to light.   Cardiovascular:      Rate and Rhythm: Normal rate and regular rhythm.      Heart sounds: Normal heart sounds.   Pulmonary:      Effort: Pulmonary effort is normal. No respiratory distress.      Breath sounds: Normal breath sounds. No wheezing.   Abdominal:      General: Bowel sounds are normal. There is no distension.      Palpations: Abdomen is soft.      Tenderness: There is no abdominal tenderness.   Musculoskeletal:         General: No deformity. Normal range of motion.      Cervical back: Normal range of motion and neck supple. No spinous process tenderness.   Skin:     General: Skin is warm and dry.      Findings: No rash.   Neurological:      General: No focal deficit present.      Mental Status: He is alert. Mental status is at baseline. He is disoriented.      GCS: GCS eye subscore is 4. GCS verbal subscore is 5. GCS motor subscore is 6.      Cranial Nerves: Cranial nerves 2-12 are intact.      Sensory: Sensation is intact. No sensory deficit.      Motor: Motor function is intact.      Coordination: Coordination is intact.      Comments: Generalized weakness   Psychiatric:         Mood and Affect: Mood normal.         Results Reviewed       Procedure Component Value Units Date/Time    HS Troponin I 2hr [[id number]]  (Normal) Collected: 11/21/24 2113    Lab Status: Final result Specimen: Blood from Arm, Right Updated: 11/21/24 2139     hs TnI 2hr 19 ng/L      Delta 2hr hsTnI 0 ng/L     B-Type Natriuretic Peptide(BNP) [[id number]]  (Abnormal) Collected: 11/21/24 1910    Lab Status: Final result Specimen: Blood from Arm, Right Updated: 11/21/24 2000      pg/mL     Procalcitonin [[id number]]  (Normal) Collected: 11/21/24 1910    Lab Status: Final result  Specimen: Blood from Arm, Right Updated: 11/21/24 1953     Procalcitonin 0.17 ng/ml     HS Troponin 0hr (reflex protocol) [[id number]]  (Normal) Collected: 11/21/24 1916    Lab Status: Final result Specimen: Blood from Arm, Right Updated: 11/21/24 1951     hs TnI 0hr 19 ng/L     Urine Microscopic [[id number]]  (Abnormal) Collected: 11/21/24 1923    Lab Status: Final result Specimen: Urine, Other Updated: 11/21/24 1950     RBC, UA None Seen /hpf      WBC, UA None Seen /hpf      Epithelial Cells Occasional /hpf      Bacteria, UA Occasional /hpf      Granular Casts, UA 0-3     Amorphous Crystals, UA Occasional    Protime-INR [[id number]]  (Abnormal) Collected: 11/21/24 1910    Lab Status: Final result Specimen: Blood from Arm, Right Updated: 11/21/24 1945     Protime 17.7 seconds      INR 1.40    Narrative:      INR Therapeutic Range    Indication                                             INR Range      Atrial Fibrillation                                               2.0-3.0  Hypercoagulable State                                    2.0.2.3  Left Ventricular Asist Device                            2.0-3.0  Mechanical Heart Valve                                  -    Aortic(with afib, MI, embolism, HF, LA enlargement,    and/or coagulopathy)                                     2.0-3.0 (2.5-3.5)     Mitral                                                             2.5-3.5  Prosthetic/Bioprosthetic Heart Valve               2.0-3.0  Venous thromboembolism (VTE: VT, PE        2.0-3.0    APTT [[id number]]  (Abnormal) Collected: 11/21/24 1910    Lab Status: Final result Specimen: Blood from Arm, Right Updated: 11/21/24 1945     PTT 44 seconds     Comprehensive metabolic panel [[id number]]  (Abnormal) Collected: 11/21/24 1910    Lab Status: Final result Specimen: Blood from Arm, Right Updated: 11/21/24 1943     Sodium 131 mmol/L      Potassium 4.4 mmol/L      Chloride 96 mmol/L      CO2 28 mmol/L      ANION GAP 7 mmol/L       BUN 39 mg/dL      Creatinine 1.35 mg/dL      Glucose 137 mg/dL      Calcium 8.4 mg/dL      Corrected Calcium 9.1 mg/dL      AST 13 U/L      ALT 19 U/L      Alkaline Phosphatase 53 U/L      Total Protein 6.2 g/dL      Albumin 3.1 g/dL      Total Bilirubin 0.43 mg/dL      eGFR 44 ml/min/1.73sq m     Narrative:      National Kid[employer] Foundation guidelines for Chronic Kidney Disease (CKD):     Stage 1 with normal or high GFR (GFR > 90 mL/min/1.73 square meters)    Stage 2 Mild CKD (GFR = 60-89 mL/min/1.73 square meters)    Stage 3A Moderate CKD (GFR = 45-59 mL/min/1.73 square meters)    Stage 3B Moderate CKD (GFR = 30-44 mL/min/1.73 square meters)    Stage 4 Severe CKD (GFR = 15-29 mL/min/1.73 square meters)    Stage 5 End Stage CKD (GFR <15 mL/min/1.73 square meters)  Note: GFR calculation is accurate only with a steady state creatinine    Lactic acid [[id number]]  (Normal) Collected: 11/21/24 1910    Lab Status: Final result Specimen: Blood from Arm, Right Updated: 11/21/24 1942     LACTIC ACID 1.1 mmol/L     Narrative:      Result may be elevated if tourniquet was used during collection.    UA w Reflex to Microscopic w Reflex to Culture [[id number]]  (Abnormal) Collected: 11/21/24 1923    Lab Status: Final result Specimen: Urine, Other Updated: 11/21/24 1938     Color, UA Yellow     Clarity, UA Clear     Specific Gravity, UA 1.010     pH, UA 5.5     Leukocytes, UA Negative     Nitrite, UA Negative     Protein, UA Trace mg/dl      Glucose, UA 1000 (1%) mg/dl      Ketones, UA Negative mg/dl      Urobilinogen, UA <2.0 mg/dl      Bilirubin, UA Negative     Occult Blood, UA Negative    CBC and differential [[id number]]  (Abnormal) Collected: 11/21/24 1910    Lab Status: Final result Specimen: Blood from Arm, Right Updated: 11/21/24 1928     WBC 6.45 Thousand/uL      RBC 2.77 Million/uL      Hemoglobin 9.2 g/dL      Hematocrit 28.4 %       fL      MCH 33.2 pg      MCHC 32.4 g/dL      RDW 13.0 %      MPV 10.4 fL       Platelets 230 Thousands/uL      nRBC 0 /100 WBCs      Segmented % 65 %      Immature Grans % 1 %      Lymphocytes % 9 %      Monocytes % 10 %      Eosinophils Relative 15 %      Basophils Relative 0 %      Absolute Neutrophils 4.17 Thousands/µL      Absolute Immature Grans 0.09 Thousand/uL      Absolute Lymphocytes 0.60 Thousands/µL      Absolute Monocytes 0.64 Thousand/µL      Eosinophils Absolute 0.94 Thousand/µL      Basophils Absolute 0.01 Thousands/µL     Blood culture #1 [[id number]] Collected: 11/21/24 1910    Lab Status: In process Specimen: Blood from Arm, Right Updated: 11/21/24 1925    Blood culture #2 [[id number]] Collected: 11/21/24 1910    Lab Status: In process Specimen: Blood from Arm, Right Updated: 11/21/24 1925            CT head without contrast   Final Interpretation by Sage Jacob DO (11/21 2048)      No acute intracranial abnormality.  Chronic microangiopathic changes.                  Workstation performed: [id number]         XR chest portable   ED Interpretation by Marquez Nuñez DO (11/21 2035)   No sig change, bilateral patchy infiltrates consistent with covid, interpreted by me          Procedures    ED Medication and Procedure Management   Prior to Admission Medications   Prescriptions Last Dose Informant Patient Reported? Taking?   Dorzolamide HCl-Timolol Mal PF 2-0.5 % SOLN  Child Yes No   Sig: ONE DROP INTO EACH EYE TWICE DAILY   Empagliflozin (JARDIANCE) 10 MG TABS tablet  Child No No   Sig: Take 1 tablet (10 mg total) by mouth every morning   Garlic 1000 MG CAPS  Child Yes No   Sig: Take 1,000 mg by mouth in the morning   Rhopressa 0.02 % SOLN  Child Yes No   Sig: INSTILL 1 DROP INTO RIGHT EYE ONCE A DAY   allopurinol (ZYLOPRIM) 100 mg tablet  Child No No   Sig: Take 1 tablet (100 mg total) by mouth daily   amLODIPine (NORVASC) 5 mg tablet  Child No No   Sig: Take 1 tablet (5 mg total) by mouth daily at bedtime   amitriptyline (ELAVIL) 10 mg tablet   No No   Sig: TAKE 3  TABLETS BY MOUTH AT BEDTIME   apixaban (ELIQUIS) 2.5 mg  Child No No   Sig: Take 1 tablet (2.5 mg total) by mouth 2 (two) times a day   bisoprolol (ZEBETA) 5 mg tablet  Child No No   Sig: Take 0.5 tablets (2.5 mg total) by mouth daily   brimonidine tartrate 0.2 % ophthalmic solution  Child No No   Sig: INSTILL 1 DROP TWICE DAILY IN THE RIGHT EYE   cefadroxil (DURICEF) 1000 mg tablet   No No   Sig: Take 1 tablet (1,000 mg total) by mouth 2 (two) times a day for 3 days Do not start before November 20, 2024.   cholecalciferol (VITAMIN D3) 1,000 units tablet  Child Yes No   Sig: Take 2,000 Units by mouth daily    co-enzyme Q-10 30 MG capsule  Child Yes No   Sig: Take 100 mg by mouth daily     diphenhydrAMINE-acetaminophen (TYLENOL PM)  MG TABS  Child Yes No   Sig: Take 2 tablets by mouth daily at bedtime as needed for sleep   Patient not taking: Reported on 11/20/2024   finasteride (PROSCAR) 5 mg tablet   No No   Sig: TAKE 1 TABLET (5 MG TOTAL) BY MOUTH DAILY.   furosemide (LASIX) 20 mg tablet   No No   Sig: Take 1 tablet (20 mg total) by mouth daily   gabapentin (NEURONTIN) 100 mg capsule   No No   Sig: Take 1 capsule (100 mg total) by mouth 2 (two) times a day   latanoprost (XALATAN) 0.005 % ophthalmic solution  Child Yes No   Sig: Administer 1 drop to both eyes daily at bedtime   multivitamin-iron-minerals-folic acid (CENTRUM) chewable tablet  Child Yes No   Sig: Chew 1 tablet daily.   omeprazole (PriLOSEC) 20 mg delayed release capsule   No No   Sig: TAKE 1 CAPSULE BY MOUTH EVERY DAY   oxygen gas   Yes No   Sig: Inhale 2 L/min continuous.   spironolactone (ALDACTONE) 25 mg tablet   No No   Sig: Take 0.5 tablets (12.5 mg total) by mouth daily      Facility-Administered Medications: None     Discharge Medication List as of 11/21/2024  9:44 PM        CONTINUE these medications which have NOT CHANGED    Details   allopurinol (ZYLOPRIM) 100 mg tablet Take 1 tablet (100 mg total) by mouth daily, Starting Thu  10/10/2024, Normal      amitriptyline (ELAVIL) 10 mg tablet TAKE 3 TABLETS BY MOUTH AT BEDTIME, Normal      amLODIPine (NORVASC) 5 mg tablet Take 1 tablet (5 mg total) by mouth daily at bedtime, Starting Tue 10/1/2024, Normal      apixaban (ELIQUIS) 2.5 mg Take 1 tablet (2.5 mg total) by mouth 2 (two) times a day, Starting u 6/27/2024, Normal      bisoprolol (ZEBETA) 5 mg tablet Take 0.5 tablets (2.5 mg total) by mouth daily, Starting Tue 10/1/2024, Normal      brimonidine tartrate 0.2 % ophthalmic solution INSTILL 1 DROP TWICE DAILY IN THE RIGHT EYE, Normal      cefadroxil (DURICEF) 1000 mg tablet Take 1 tablet (1,000 mg total) by mouth 2 (two) times a day for 3 days Do not start before November 20, 2024., Starting Wed 11/20/2024, Until Sat 11/23/2024, Normal      cholecalciferol (VITAMIN D3) 1,000 units tablet Take 2,000 Units by mouth daily , Historical Med      co-enzyme Q-10 30 MG capsule Take 100 mg by mouth daily  , Historical Med      diphenhydrAMINE-acetaminophen (TYLENOL PM)  MG TABS Take 2 tablets by mouth daily at bedtime as needed for sleep, Historical Med      Dorzolamide HCl-Timolol Mal PF 2-0.5 % SOLN ONE DROP INTO EACH EYE TWICE DAILY, Historical Med      Empagliflozin (JARDIANCE) 10 MG TABS tablet Take 1 tablet (10 mg total) by mouth every morning, Starting Tue 10/1/2024, Normal      finasteride (PROSCAR) 5 mg tablet TAKE 1 TABLET (5 MG TOTAL) BY MOUTH DAILY., Starting Wed 11/13/2024, Normal      furosemide (LASIX) 20 mg tablet Take 1 tablet (20 mg total) by mouth daily, Starting Tue 11/19/2024, Normal      gabapentin (NEURONTIN) 100 mg capsule Take 1 capsule (100 mg total) by mouth 2 (two) times a day, Starting Sat 11/2/2024, No Print      Garlic 1000 MG CAPS Take 1,000 mg by mouth in the morning, Historical Med      latanoprost (XALATAN) 0.005 % ophthalmic solution Administer 1 drop to both eyes daily at bedtime, Historical Med      multivitamin-iron-minerals-folic acid (CENTRUM) chewable  tablet Chew 1 tablet daily., Historical Med      omeprazole (PriLOSEC) 20 mg delayed release capsule TAKE 1 CAPSULE BY MOUTH EVERY DAY, Starting Fri 11/1/2024, Normal      oxygen gas Inhale 2 L/min continuous., Historical Med      Rhopressa 0.02 % SOLN INSTILL 1 DROP INTO RIGHT EYE ONCE A DAY, Historical Med      spironolactone (ALDACTONE) 25 mg tablet Take 0.5 tablets (12.5 mg total) by mouth daily, Starting Tue 11/19/2024, Normal           No discharge procedures on file.  ED SEPSIS DOCUMENTATION   Time reflects when diagnosis was documented in both MDM as applicable and the Disposition within this note       Time User Action Codes Description Comment    11/21/2024  8:59 PM Marquez Nuñez [U07.1] Acute COVID-19     11/21/2024  8:59 PM Marquez Nuñez [R26.2] Ambulatory dysfunction     11/21/2024  8:59 PM Marquez Nuñez [R30.0] Dysuria     11/21/2024  8:59 PM Marquez Nuñez [R30.0] Dysuria     11/21/2024  8:59 PM Marquez Nuñez [E86.0] Dehydration     11/21/2024  8:59 PM Marquez Nuñez [R79.9] Elevated BUN           family at bedside - would like to take patient home if no acute treatment necessary, does not want to go to rehab.       Marquez Nuñez, DO  11/21/24 [id number]

## 2024-11-23 LAB
ATRIAL RATE: 108 BPM
QRS AXIS: 15 DEGREES
QRSD INTERVAL: 90 MS
QT INTERVAL: 318 MS
QTC INTERVAL: 424 MS
T WAVE AXIS: 47 DEGREES
VENTRICULAR RATE: 107 BPM

## 2024-11-23 PROCEDURE — 93010 ELECTROCARDIOGRAM REPORT: CPT | Performed by: INTERNAL MEDICINE

## 2024-11-24 LAB
BACTERIA BLD CULT: NORMAL
BACTERIA BLD CULT: NORMAL

## 2024-11-25 ENCOUNTER — RA CDI HCC (OUTPATIENT)
Dept: OTHER | Facility: HOSPITAL | Age: 89
End: 2024-11-25

## 2024-11-25 ENCOUNTER — HOME CARE VISIT (OUTPATIENT)
Dept: HOME HEALTH SERVICES | Facility: HOME HEALTHCARE | Age: 89
End: 2024-11-25
Payer: COMMERCIAL

## 2024-11-25 ENCOUNTER — OFFICE VISIT (OUTPATIENT)
Dept: CARDIOLOGY CLINIC | Facility: CLINIC | Age: 89
End: 2024-11-25
Payer: COMMERCIAL

## 2024-11-25 VITALS — SYSTOLIC BLOOD PRESSURE: 130 MMHG | DIASTOLIC BLOOD PRESSURE: 62 MMHG | HEART RATE: 82 BPM | OXYGEN SATURATION: 92 %

## 2024-11-25 VITALS
BODY MASS INDEX: 30.1 KG/M2 | DIASTOLIC BLOOD PRESSURE: 68 MMHG | SYSTOLIC BLOOD PRESSURE: 124 MMHG | WEIGHT: 198.6 LBS | HEART RATE: 97 BPM | HEIGHT: 68 IN | OXYGEN SATURATION: 99 %

## 2024-11-25 DIAGNOSIS — I50.32 CHRONIC DIASTOLIC CONGESTIVE HEART FAILURE (HCC): ICD-10-CM

## 2024-11-25 DIAGNOSIS — E08.49 DIABETES DUE TO UNDRL CONDITION W OTH DIABETIC NEURO COMP (HCC): Primary | ICD-10-CM

## 2024-11-25 PROCEDURE — G0151 HHCP-SERV OF PT,EA 15 MIN: HCPCS

## 2024-11-25 PROCEDURE — 99214 OFFICE O/P EST MOD 30 MIN: CPT | Performed by: NURSE PRACTITIONER

## 2024-11-25 RX ORDER — SPIRONOLACTONE 25 MG/1
12.5 TABLET ORAL DAILY
Qty: 90 TABLET | Refills: 3 | Status: SHIPPED | OUTPATIENT
Start: 2024-11-25

## 2024-11-25 RX ORDER — FUROSEMIDE 20 MG/1
20 TABLET ORAL DAILY
Qty: 30 TABLET | Refills: 3 | Status: SHIPPED | OUTPATIENT
Start: 2024-11-25 | End: 2024-12-02 | Stop reason: SDUPTHER

## 2024-11-25 NOTE — PATIENT INSTRUCTIONS
Weigh yourself daily  If you gain 3 lbs in one day or 5 lbs in one week, please call the office at [phone] and ask for a nurse or the heart failure nurse  Keep your sodium intake to <2 grams, (2000 mg) per day, and fluids <2 Liters (2000 ml) per day. This is around 6-7, 8 oz glasses of fluid per day    Continue medications as you are  Try to get up and take little walks throughout the day

## 2024-11-27 ENCOUNTER — TELEPHONE (OUTPATIENT)
Age: 89
End: 2024-11-27

## 2024-11-27 ENCOUNTER — HOME CARE VISIT (OUTPATIENT)
Dept: HOME HEALTH SERVICES | Facility: HOME HEALTHCARE | Age: 89
End: 2024-11-27
Payer: COMMERCIAL

## 2024-11-27 ENCOUNTER — PATIENT OUTREACH (OUTPATIENT)
Dept: CASE MANAGEMENT | Facility: OTHER | Age: 89
End: 2024-11-27

## 2024-11-27 VITALS — SYSTOLIC BLOOD PRESSURE: 110 MMHG | DIASTOLIC BLOOD PRESSURE: 64 MMHG

## 2024-11-27 VITALS
HEART RATE: 68 BPM | OXYGEN SATURATION: 99 % | DIASTOLIC BLOOD PRESSURE: 66 MMHG | SYSTOLIC BLOOD PRESSURE: 130 MMHG | TEMPERATURE: 96.8 F | RESPIRATION RATE: 16 BRPM

## 2024-11-27 LAB
BACTERIA BLD CULT: NORMAL
BACTERIA BLD CULT: NORMAL

## 2024-11-27 PROCEDURE — G0299 HHS/HOSPICE OF RN EA 15 MIN: HCPCS

## 2024-11-27 PROCEDURE — G0151 HHCP-SERV OF PT,EA 15 MIN: HCPCS

## 2024-11-27 NOTE — PROGRESS NOTES
Outpatient Care Management note- follow up call.    Patient had cardiology follow up on 11/25/24 where his current medication regimen continues with Lasix 20 mg daily, Spironolactone 12.5 mg daily. He should also be following a 2gm sodium diet and 2000 ml fluid restriction with daily weights. He was also recommended small walks daily and to get OOB or OOC often. Jose has HH services.    Spoke with Jennifer who states Jose was currently doing HH PT and requested a call back in 1 hour.     Returned call to Jennifer but had to leave a voicemail.     Await call back. Will schedule next outreach attempt.

## 2024-11-27 NOTE — CASE COMMUNICATION
TC to cardiology notified Francisca  of weight loss dark urine fine crackles bilateral lower lobes and fatigue sleeping except for meals and bathroom. Weight log uploaded into media. 11/27 196.4lb  11/20 200.8 lb. Requested if BMP wanted.

## 2024-11-27 NOTE — PROGRESS NOTES
"Outpatient Care Management note- follow up call.     Spoke with Jennifer who states Jose continues to be tired during the day. Jennifer reports that she did discuss this with cardiology and HH and was told it is from his recent covid illness. She states she is trying to encourage him to walk more every day, even on days he has HH therapy.     11/25/24 cardiology note states his weight is stable. Noted that weight had been 203 lbs but was 198 lbs at visit. Jennifer states they did not weigh him at the visit but used her log number. instead    Jennifer states Jose had a 7.6 lb weight loss over the last week. He was 204 lbs and is now 196.4 lbs. She states he is eating well, a \"bit more picky since covid\" but eats 3 meals/day and sometimes a snack. Advised to continue to monitor and report any concerns to cardiology since it sounds like he is getting good nutrition and his appetite has not decreased.     Jennifer will continue the current medication regimen with Lasix 20 mg daily and 12.5 mg of Spironolactone daily. As previously mentioned, advised Jennifer to get a pill cutter to make splitting pill in half easier. She states she has not been out to get one. Jennifer will continue 2g sodium diet and 2000 ml fluid restriction and daily weights.     Will follow next week, call scheduled.   "

## 2024-11-27 NOTE — TELEPHONE ENCOUNTER
Received call from Seamus the pt's home health nurse to report a 7lb weight loss this past week (see log below).  He states his assessment of the pt included bibasilar crackles and dark urine.  Pt denies any increased O2 requirement from his baseline of 2L NC or any increased shortness of breath.  Pt is denying any lightheadedness or dizziness and no LE edema.  Pt is sleeping a lot per his daughter. Vitals WNL.      11/20 204lbs  11/26 197 lbs  11/27 196.4 lbs    /66 HR 68 sats 98% on 2L NC    Please review and advise, thank you.  Call back pt's daughter Jennifer at [phone] with any recommendations.

## 2024-11-28 ENCOUNTER — HOME CARE VISIT (OUTPATIENT)
Dept: HOME HEALTH SERVICES | Facility: HOME HEALTHCARE | Age: 89
End: 2024-11-28
Payer: COMMERCIAL

## 2024-11-28 NOTE — CASE COMMUNICATION
Pt camryn Jennifer Simms called in to report a status update on pt.  Jennifer reporting pt gained 2 lbs from yesterday.  With further questioning, she reports pt with no other issues;  no ankle edema, no increase in dyspnea. Jennifer stated she was only calling because she was told to call with 2lb weight gain.  Informed her to call back if any issues arrise, otherwise SN was scheduled to visit tomorrow. Verbalized understanding.

## 2024-11-28 NOTE — Clinical Note
Noted, Thank you  ----- Message -----  From: Griselda Cha RN  Sent: 11/28/2024   1:25 PM EST  To: Fidelina Almendarez RN; Nadia Soto, PT; *      Pt camryn Jennifer Simms called in to report a status update on pt.  Jennifer reporting pt gained 2 lbs from yesterday.  With further questioning, she reports pt with no other issues;  no ankle edema, no increase in dyspnea. Jennifer stated she was only calling because she was told to call with 2lb weight gain.  Informed her to call back if any issues arrise, otherwise SN was scheduled to visit tomorrow. Verbalized understanding.

## 2024-11-29 ENCOUNTER — HOME CARE VISIT (OUTPATIENT)
Dept: HOME HEALTH SERVICES | Facility: HOME HEALTHCARE | Age: 89
End: 2024-11-29
Payer: COMMERCIAL

## 2024-11-29 VITALS
TEMPERATURE: 98.4 F | DIASTOLIC BLOOD PRESSURE: 62 MMHG | OXYGEN SATURATION: 100 % | HEART RATE: 79 BPM | RESPIRATION RATE: 18 BRPM | SYSTOLIC BLOOD PRESSURE: 128 MMHG

## 2024-11-29 VITALS — DIASTOLIC BLOOD PRESSURE: 60 MMHG | SYSTOLIC BLOOD PRESSURE: 110 MMHG | HEART RATE: 94 BPM | OXYGEN SATURATION: 100 %

## 2024-11-29 PROCEDURE — G0300 HHS/HOSPICE OF LPN EA 15 MIN: HCPCS

## 2024-11-29 PROCEDURE — G0152 HHCP-SERV OF OT,EA 15 MIN: HCPCS | Performed by: OCCUPATIONAL THERAPIST

## 2024-12-02 ENCOUNTER — HOME CARE VISIT (OUTPATIENT)
Dept: HOME HEALTH SERVICES | Facility: HOME HEALTHCARE | Age: 89
End: 2024-12-02
Payer: COMMERCIAL

## 2024-12-02 ENCOUNTER — TELEPHONE (OUTPATIENT)
Age: 89
End: 2024-12-02

## 2024-12-02 VITALS
DIASTOLIC BLOOD PRESSURE: 58 MMHG | HEART RATE: 80 BPM | OXYGEN SATURATION: 99 % | BODY MASS INDEX: 29.86 KG/M2 | WEIGHT: 196.38 LBS | SYSTOLIC BLOOD PRESSURE: 108 MMHG | TEMPERATURE: 98.4 F

## 2024-12-02 VITALS — HEART RATE: 86 BPM | OXYGEN SATURATION: 98 % | DIASTOLIC BLOOD PRESSURE: 64 MMHG | SYSTOLIC BLOOD PRESSURE: 112 MMHG

## 2024-12-02 DIAGNOSIS — I50.32 CHRONIC DIASTOLIC CONGESTIVE HEART FAILURE (HCC): ICD-10-CM

## 2024-12-02 DIAGNOSIS — M25.569 ARTHRALGIA OF KNEE, UNSPECIFIED LATERALITY: ICD-10-CM

## 2024-12-02 PROCEDURE — G0299 HHS/HOSPICE OF RN EA 15 MIN: HCPCS

## 2024-12-02 PROCEDURE — G0151 HHCP-SERV OF PT,EA 15 MIN: HCPCS

## 2024-12-02 RX ORDER — FUROSEMIDE 20 MG/1
20 TABLET ORAL DAILY
Qty: 30 TABLET | Refills: 3 | Status: SHIPPED | OUTPATIENT
Start: 2024-12-02 | End: 2024-12-03 | Stop reason: SDUPTHER

## 2024-12-02 RX ORDER — ALLOPURINOL 100 MG/1
100 TABLET ORAL DAILY
Qty: 90 TABLET | Refills: 1 | Status: SHIPPED | OUTPATIENT
Start: 2024-12-02

## 2024-12-02 NOTE — TELEPHONE ENCOUNTER
Do you want him to go back to Lasix 20 mg qd of do 20 mg bid since his wt has been 196-204 since 11/20.    Dr. Shaw is off.      Please advise

## 2024-12-02 NOTE — TELEPHONE ENCOUNTER
"Called and spoke with patient's daughter Jennifer and relay providers message which she replied \" Thank you so much\"    Dr. Ham  Patient's daughter Jennifer is asking if you can send Rx:  -Allopurinol  100 mg tab-His former Cardiologist retired and new provider told them this medication   -Furosemide 20 mg tab  To patient's pharmacy.    Please review, Thank you  "

## 2024-12-02 NOTE — TELEPHONE ENCOUNTER
Lasix 20 mg BID seems reasonable for now especially given the recent weight fluctuations. Will need to monitor closely for any further significant weight loss or signs of dehydration. Thanks

## 2024-12-02 NOTE — TELEPHONE ENCOUNTER
Pt's daughter Jennifer calling about medication allopurinol (ZYLOPRIM) 100 mg tablet [[id number]   furosemide (LASIX) 20 mg tablet  She wants these prescribed by PCP now were from hospital (furosemide) and retired cardiologist (allopurinol).    Also wants TCM 12/3 to be virtual due to visiting nurses coming out and very hard to move him. Please call her back to confirm it can be virtual.

## 2024-12-02 NOTE — TELEPHONE ENCOUNTER
Daughter Jennifer called this AM.  She said she called on Friday, 11/29 regarding a 4 lb weight gain in 2 days and patient was advised to increase furosemide to 40 mg BID from 20 mg daily, for 2 days.  Jennifer said she was to call today with an update on weight and for further instructions.    Weight today is 196.6 lbs, a loss of 3.4 lbs with the 2 day dose increase.    Please advise Jennifer if patient is to return to previous dose of lasix.

## 2024-12-03 ENCOUNTER — TELEMEDICINE (OUTPATIENT)
Dept: FAMILY MEDICINE CLINIC | Facility: CLINIC | Age: 89
End: 2024-12-03
Payer: COMMERCIAL

## 2024-12-03 VITALS
SYSTOLIC BLOOD PRESSURE: 103 MMHG | BODY MASS INDEX: 29.71 KG/M2 | HEART RATE: 87 BPM | OXYGEN SATURATION: 100 % | TEMPERATURE: 97.6 F | WEIGHT: 195.38 LBS | DIASTOLIC BLOOD PRESSURE: 66 MMHG

## 2024-12-03 DIAGNOSIS — R06.89 ACUTE RESPIRATORY INSUFFICIENCY: ICD-10-CM

## 2024-12-03 DIAGNOSIS — I10 BENIGN ESSENTIAL HYPERTENSION: Primary | Chronic | ICD-10-CM

## 2024-12-03 DIAGNOSIS — I50.32 CHRONIC DIASTOLIC CONGESTIVE HEART FAILURE (HCC): ICD-10-CM

## 2024-12-03 PROCEDURE — G0439 PPPS, SUBSEQ VISIT: HCPCS | Performed by: FAMILY MEDICINE

## 2024-12-03 PROCEDURE — 99212 OFFICE O/P EST SF 10 MIN: CPT | Performed by: FAMILY MEDICINE

## 2024-12-03 RX ORDER — FUROSEMIDE 20 MG/1
20 TABLET ORAL 2 TIMES DAILY
Qty: 60 TABLET | Refills: 5 | Status: SHIPPED | OUTPATIENT
Start: 2024-12-03

## 2024-12-03 NOTE — PROGRESS NOTES
Name: Jose Mullins      : 7/15/1931      MRN: [id number]  Encounter Provider: Moises Ham MD  Encounter Date: 12/3/2024   Encounter department: [facility]    Assessment & Plan       Preventive health issues were discussed with patient, and age appropriate screening tests were ordered as noted in patient's After Visit Summary. Personalized health advice and appropriate referrals for health education or preventive services given if needed, as noted in patient's After Visit Summary.    History of Present Illness     Fatigue  Associated symptoms include fatigue.      Patient Care Team:  Moises Ham MD as PCP - General  MD José Miguel Campbell MD Gbolabo Sokunbi, MD Houman Ahdieh, MD (Ophthalmology)  Dimitri Gibson MD (Gastroenterology)  Cathie Salgado MD (Orthopedic Surgery)  Aida Dutton RN as RN Care Manager (Care Coordination)    Review of Systems   Constitutional:  Positive for fatigue.     Medical History Reviewed by provider this encounter:       Annual Wellness Visit Questionnaire   Jose is here for his Subsequent Wellness visit.     Health Risk Assessment:   Patient rates overall health as fair. Patient feels that their physical health rating is slightly worse. Patient is satisfied with their life. Eyesight was rated as same. Hearing was rated as slightly worse. Patient feels that their emotional and mental health rating is same. Patients states they are never, rarely angry. Patient states they are sometimes unusually tired/fatigued. Pain experienced in the last 7 days has been some. Patient's pain rating has been 5/10. Patient states that he has experienced weight loss or gain in last 6 months. gained 5 lbs...eating and not moving...    Depression Screening:   PHQ-2 Score: 0      Fall Risk Screening:   In the past year, patient has experienced: no history of falling in past year      Home Safety:  Patient does not have trouble with stairs inside or outside of  their home. Patient has working smoke alarms and has working carbon monoxide detector. Home safety hazards include: none.     Nutrition:   Current diet is No Added Salt.     Medications:   Patient is currently taking over-the-counter supplements. OTC medications include: Centrum Men's Silver, ZrT95-288 mg, Garlic-1000 mg, Vitamin D3-2000IU. Patient is able to manage medications. managed by daughter    Activities of Daily Living (ADLs)/Instrumental Activities of Daily Living (IADLs):   Walk and transfer into and out of bed and chair?: Yes  Dress and groom yourself?: Yes    Bathe or shower yourself?: Yes    Feed yourself? Yes  Do your laundry/housekeeping?: No  Manage your money, pay your bills and track your expenses?: No  Make your own meals?: Yes    Do your own shopping?: No    ADL comments: Can do some of these things, but daughter does them for me.  If I had to, I could make do my laundry/housekeeping.  No longer drive, so daughter brings groceries.    Durable Medical Equipment Suppliers  none    Previous Hospitalizations:   Any hospitalizations or ED visits within the last 12 months?: Yes    How many hospitalizations have you had in the last year?: 1-2    Hospitalization Comments: excessive eye drops to lower pressure in eye caused extremely low bp    Advance Care Planning:   Living will: Yes    Durable POA for healthcare: Yes    Advanced directive: Yes      PREVENTIVE SCREENINGS      Cardiovascular Screening:    General: Screening Not Indicated and History Lipid Disorder      Diabetes Screening:     General: Screening Not Indicated and History Diabetes      Colorectal Cancer Screening:     General: Screening Not Indicated      Prostate Cancer Screening:    General: Screening Not Indicated      Abdominal Aortic Aneurysm (AAA) Screening:    Risk factors include: tobacco use        Lung Cancer Screening:     General: Screening Not Indicated      Hepatitis C Screening:    General: Screening Current    Screening,  Brief Intervention, and Referral to Treatment (SBIRT)    Screening  Typical number of drinks in a day: 0  Typical number of drinks in a week: 0  Interpretation: Low risk drinking behavior.    Single Item Drug Screening:  How often have you used an illegal drug (including marijuana) or a prescription medication for non-medical reasons in the past year? never    Single Item Drug Screen Score: 0  Interpretation: Negative screen for possible drug use disorder    Social Drivers of Health     Financial Resource Strain: Low Risk  (2023)    Overall Financial Resource Strain (CARDIA)     Difficulty of Paying Living Expenses: Not hard at all   Food Insecurity: No Food Insecurity (2024)    Nursing - Inadequate Food Risk Classification     Ran Out of Food in the Last Year: 1   Transportation Needs: No Transportation Needs (2024)    OASIS : Transportation     Lack of Transportation (Medical): No     Lack of Transportation (Non-Medical): No     Patient Unable or Declines to Respond: No   Housing Stability: Unknown (2024)    Nursing: Inadequate Housing Risk Classification     Unable to Pay for Housing in the Last Year: 2     Has Housin   Utilities: Not At Risk (2024)    Nursing - Utilities Risk Classification     Threatened with loss of utilities: 2     No results found.    Objective   /66 (Patient Position: Sitting, Cuff Size: Adult)   Pulse 87   Temp 97.6 °F (36.4 °C)   Wt 88.6 kg (195 lb 6 oz)   SpO2 100%   BMI 29.71 kg/m²     Physical Exam

## 2024-12-04 ENCOUNTER — PATIENT OUTREACH (OUTPATIENT)
Dept: CASE MANAGEMENT | Facility: OTHER | Age: 89
End: 2024-12-04

## 2024-12-04 NOTE — PROGRESS NOTES
Virtual Regular Visit  Name: Jose Mullins      : 7/15/1931      MRN: [id number]  Encounter Provider: Moises Ham MD  Encounter Date: 12/3/2024   Encounter department: [facility]      Verification of patient location:  Patient is located at Home in the following state in which I hold an active license [place] :  Assessment & Plan  Benign essential hypertension  Hypertension.  Patient blood pressure has been stabilized per results of blood pressure checks by visiting nurses         Chronic diastolic congestive heart failure (HCC)  Wt Readings from Last 3 Encounters:   24 88.6 kg (195 lb 6 oz)   24 89.1 kg (196 lb 6 oz)   24 90.1 kg (198 lb 9.6 oz)     History of congestive heart failure.  Patient recently saw cardiologist who increase his diuretic therapy.  He will have follow-up with cardiologist in the near future               Acute respiratory insufficiency  Respiratory insufficiency.  Patient continues with home oxygen at 2 L/min.  We discussed the possibility of patient still being affected by long COVID symptoms.  Daughter will call if he develops any new symptoms or worsening shortness of breath.  We discussed the possibility of obtaining hospice consult in the future should symptoms worsen             Encounter provider Moises Ham MD    The patient was identified by name and date of birth. Jose Mullins was informed that this is a telemedicine visit and that the visit is being conducted through the [employer] platform. He agrees to proceed..  My office door was closed. No one else was in the room.  He acknowledged consent and understanding of privacy and security of the video platform. The patient has agreed to participate and understands they can discontinue the visit at any time.    Patient is aware this is a billable service.     History of Present Illness     Patient having telemedicine visit after being hospitalized over the past couple months.  Patient  initially was diagnosed with COVID-19 infection and subsequently with pneumonia.  Since he has been home he continues to use around-the-clock oxygen via nasal cannula at 2 L/min.  His Lasix was increased to twice daily dosing by cardiologist recently to 2 volume overload and history of CHF.  Fortunately patient continues to have a good appetite with meals.  However his daughter states that in between meals patient is primarily fatigued and sleeping in a reclining chair or watching television.  He does need some assistance with ambulating due to shortness of breath where his pulse oximeter shows hypoxia at 79% however patient recovers quickly with his oxygen when he is resting.    Fatigue  Associated symptoms include fatigue. Pertinent negatives include no fever.     Review of Systems   Constitutional:  Positive for fatigue. Negative for fever.   HENT: Negative.     Respiratory:  Positive for shortness of breath.    Cardiovascular: Negative.    Gastrointestinal: Negative.    Neurological: Negative.    Psychiatric/Behavioral: Negative.         Objective   /66 (Patient Position: Sitting, Cuff Size: Adult)   Pulse 87   Temp 97.6 °F (36.4 °C)   Wt 88.6 kg (195 lb 6 oz)   SpO2 100%   BMI 29.71 kg/m²     Physical Exam  Constitutional:       General: He is not in acute distress.     Appearance: He is not ill-appearing.   Pulmonary:      Effort: No respiratory distress.   Neurological:      General: No focal deficit present.      Mental Status: He is oriented to person, place, and time.   Psychiatric:         Mood and Affect: Mood normal.         Behavior: Behavior normal.         Thought Content: Thought content normal.         Judgment: Judgment normal.         Visit Time  Total Visit Duration: 15 min

## 2024-12-04 NOTE — ASSESSMENT & PLAN NOTE
Respiratory insufficiency.  Patient continues with home oxygen at 2 L/min.  We discussed the possibility of patient still being affected by long COVID symptoms.  Daughter will call if he develops any new symptoms or worsening shortness of breath.  We discussed the possibility of obtaining hospice consult in the future should symptoms worsen

## 2024-12-04 NOTE — ASSESSMENT & PLAN NOTE
Hypertension.  Patient blood pressure has been stabilized per results of blood pressure checks by visiting nurses

## 2024-12-04 NOTE — PROGRESS NOTES
Outpatient Care Management note- follow up call.    Chart review completed. Noted that daughter, Jennifer called cardiology office on 11/29/24 to report a 4 lb weight gain and was advised to increase Jose's Lasix from 20 mg daily to 40 mg BID for 2 days. Jennifer made a follow up phone call to cardiology on 12/2/24 stating he lost 3.4 lbs and needed further guidance on the Lasix dose to continue. She was advised to give Lasix 20 mg BID since there was so much weight fluctuations.     Spoke with Jennifer who states Jose is doing well. No new increase in SOB, he still remains BENITEZ. He is using 2L O2. There is no new swelling; she states he never has ankle swelling and she assesses his stomach for fluid but none to report. He remains fatigued as previously noted. His weight today was 197 lbs. He had a virtual visit with PCP 12/3/24 and no new orders or medication changes.  OT has signed off and the patient will be seen by  nursing and PT on 12/6/24. Jennifer reports Jose remains on his 0682-8005 ml fluid restrictions. No variation in his eating habits. Reminded Jennifer to avoid canned or processed foods and she states she doesn't add additional salt to his meals.     Jose has a follow up appt with cardiology on 12/9/24.     Will schedule next outreach call with Jennifer.

## 2024-12-04 NOTE — ASSESSMENT & PLAN NOTE
Wt Readings from Last 3 Encounters:   12/03/24 88.6 kg (195 lb 6 oz)   12/02/24 89.1 kg (196 lb 6 oz)   11/25/24 90.1 kg (198 lb 9.6 oz)     History of congestive heart failure.  Patient recently saw cardiologist who increase his diuretic therapy.  He will have follow-up with cardiologist in the near future

## 2024-12-06 ENCOUNTER — HOME CARE VISIT (OUTPATIENT)
Dept: HOME HEALTH SERVICES | Facility: HOME HEALTHCARE | Age: 89
End: 2024-12-06
Payer: COMMERCIAL

## 2024-12-06 VITALS
SYSTOLIC BLOOD PRESSURE: 104 MMHG | TEMPERATURE: 98.6 F | OXYGEN SATURATION: 100 % | DIASTOLIC BLOOD PRESSURE: 56 MMHG | HEART RATE: 50 BPM

## 2024-12-06 VITALS — OXYGEN SATURATION: 98 %

## 2024-12-06 PROCEDURE — G0299 HHS/HOSPICE OF RN EA 15 MIN: HCPCS

## 2024-12-06 PROCEDURE — G0151 HHCP-SERV OF PT,EA 15 MIN: HCPCS

## 2024-12-06 NOTE — PROGRESS NOTES
Heart Failure Outpatient Progress Note - Jose Mullins 89 y.o. 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 ([facility]) 11/25/2024    Acute respiratory failure ([facility]) 11/14/2024    Acute respiratory insufficiency 10/30/2024    Chronic diastolic congestive heart failure ([facility]) 10/24/2024    Primary osteoarthritis of left knee 06/18/2024    Other headache syndrome 04/26/2024    Episodic tension-type headache, not intractable 02/14/2024    Dermatitis 07/28/2023    Elevated TSH 07/28/2023    Corkscrew esophagus 09/21/2022    Other dysphagia 07/20/2022    Frontal headache 07/04/2022    Ambulatory dysfunction 01/26/2022    Irritable bowel syndrome with diarrhea 01/07/2022    Benign prostatic hyperplasia 02/12/2021    Pain in both feet 05/15/2020    Carpal tunnel syndrome 04/24/2019    Other insomnia 04/15/2019    Anxiety 04/15/2019    Paresthesia 11/28/2018    Arthritis of carpometacarpal (CMC) joint of right thumb 09/18/2018    Peripheral edema 08/31/2018    Stage 3a chronic kidney disease (HCC) 03/12/2018    Permanent atrial fibrillation ([facility]) 03/12/2018    Left lumbar radiculopathy 01/10/2017    Cholecystitis with cholelithiasis 09/14/2016    Joint pain, knee 09/23/2014    Allergic rhinitis 05/30/2013    S/P ablation of ventricular arrhythmia 03/04/2013    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HCC) 11/12/2012    Anemia of chronic disease 08/18/2012    GERD (gastroesophageal reflux disease) 08/18/2012    Glaucoma 08/18/2012    Hiatal hernia 08/18/2012    Other hyperlipidemia 08/18/2012    Peripheral vascular disease (HCC) 08/18/2012    Benign essential hypertension 08/09/2012       Chronic HFpEF  -Appears compensated on exam, weight stable now on BID Lasix  -Continue up and OOB/OOC with ambulation  -Rx Lasix 20 mg  twice daily, Spironolactone 12.5 mg daily, Jardiance 10 mg daily  -weights 203, 198  lbs, today 198 lbs.     -TTE 11/15/24 LVEF of 55% normal systolic function wall motion is normal.  Right ventricle moderately dilated systolic function moderately reduced.  Right atrium moderately dilated.  Aortic valve sclerosis, severe tricuspid regurgitation     HTN  -Well-controlled for age  -Rx Amlodipine, Bisoprolol, Spironolactone.     Stage III CKD  -Baseline creat around 1-1.2  -needs post hospital BMP    Chronic atrial fib  -Rate:-Bisoprolol 2.5 mg daily  -Rhythm: N/A  -OAC: Apixaban 2.5 mg twice daily        HPI:   89-year-old male with past medical history described above who presents for hospital follow-up with his daughter.  Had 2 recent admissions.  First admitted on 10/30/2024 with influenza and COVID-19.  Was treated accordingly and then readmitted on 1114 with shortness of breath.  Felt to be due to volume overload and as such was given a one-time dose of IV Lasix.  Treatment for his prior infections was intensified.  He is feeling much better today.  He is wearing oxygen for now around-the-clock.  Daughter is concerned that he is sleeping a lot throughout the day and feels this has been the case since having COVID.  His appetite is excellent.  He is not really getting up to move enough despite her encouragement.  They have been weighing patient since discharge  and his readings have been stable.    12/9/2024 patient presents again with his daughter for 2-week follow-up.  History again being obtained from the daughter.  States he is feeling fairly well.  Daughter happy that he is been up and moving more, sleeping less.  Still wearing oxygen and occasionally has to increase amount with activities.  Otherwise denies any worsening shortness of breath, chest pain, orthopnea or PND.  Between last visit and today we increase his Lasix to twice daily due to persistent weight gain.  His weights have now stabilized    Past Medical History:   Diagnosis Date    A-fib (HCC)     Anemia     Carpal tunnel  syndrome, unspecified upper limb     Cataract     last assessed: 8/18/2012    COVID-19 09/06/2023    GERD (gastroesophageal reflux disease)     Glaucoma     Hypertension     last assessed: 8/23/2012    Intervertebral disc disease     PVD (peripheral vascular disease) ([facility])     Stomach disorder 11/2021    Syncope 03/12/2018       12 point ROS negative other than that stated in HPI    Allergies   Allergen Reactions    Bempedoic Acid Other (See Comments)     Gout    Atorvastatin GI Intolerance     Other reaction(s): GI upset    Ezetimibe Other (See Comments)     myalgia  Other reaction(s): Other (See Comments)  Myalgia  Other reaction(s): Myalgia  myalgia    Statins Rash     .    Current Outpatient Medications:     allopurinol (ZYLOPRIM) 100 mg tablet, Take 1 tablet (100 mg total) by mouth daily, Disp: 90 tablet, Rfl: 1    amitriptyline (ELAVIL) 10 mg tablet, TAKE 3 TABLETS BY MOUTH AT BEDTIME, Disp: 270 tablet, Rfl: 1    amLODIPine (NORVASC) 5 mg tablet, Take 1 tablet (5 mg total) by mouth daily at bedtime, Disp: 90 tablet, Rfl: 3    apixaban (ELIQUIS) 2.5 mg, Take 1 tablet (2.5 mg total) by mouth 2 (two) times a day, Disp: 180 tablet, Rfl: 1    bisoprolol (ZEBETA) 5 mg tablet, Take 0.5 tablets (2.5 mg total) by mouth daily, Disp: 45 tablet, Rfl: 3    brimonidine tartrate 0.2 % ophthalmic solution, INSTILL 1 DROP TWICE DAILY IN THE RIGHT EYE, Disp: 15 mL, Rfl: 1    cholecalciferol (VITAMIN D3) 1,000 units tablet, Take 2,000 Units by mouth daily , Disp: , Rfl:     co-enzyme Q-10 30 MG capsule, Take 100 mg by mouth daily  , Disp: , Rfl:     diphenhydrAMINE-acetaminophen (TYLENOL PM)  MG TABS, Take 2 tablets by mouth daily at bedtime as needed for sleep, Disp: , Rfl:     Dorzolamide HCl-Timolol Mal PF 2-0.5 % SOLN, ONE DROP INTO EACH EYE TWICE DAILY, Disp: , Rfl:     Empagliflozin (JARDIANCE) 10 MG TABS tablet, Take 1 tablet (10 mg total) by mouth every morning, Disp: 30 tablet, Rfl: 11    finasteride (PROSCAR) 5  mg tablet, TAKE 1 TABLET (5 MG TOTAL) BY MOUTH DAILY., Disp: 90 tablet, Rfl: 1    furosemide (LASIX) 20 mg tablet, Take 1 tablet (20 mg total) by mouth 2 (two) times a day, Disp: 60 tablet, Rfl: 5    gabapentin (NEURONTIN) 100 mg capsule, Take 1 capsule (100 mg total) by mouth 2 (two) times a day, Disp: , Rfl:     Garlic 1000 MG CAPS, Take 1,000 mg by mouth in the morning, Disp: , Rfl:     latanoprost (XALATAN) 0.005 % ophthalmic solution, Administer 1 drop to both eyes daily at bedtime, Disp: , Rfl:     multivitamin-iron-minerals-folic acid (CENTRUM) chewable tablet, Chew 1 tablet daily., Disp: , Rfl:     omeprazole (PriLOSEC) 20 mg delayed release capsule, TAKE 1 CAPSULE BY MOUTH EVERY DAY, Disp: 90 capsule, Rfl: 1    oxygen gas, Inhale 2 L/min continuous., Disp: , Rfl:     Rhopressa 0.02 % SOLN, INSTILL 1 DROP INTO RIGHT EYE ONCE A DAY, Disp: , Rfl:     spironolactone (ALDACTONE) 25 mg tablet, Take 0.5 tablets (12.5 mg total) by mouth daily, Disp: 90 tablet, Rfl: 3    Social History     Socioeconomic History    Marital status:      Spouse name: Not on file    Number of children: Not on file    Years of education: Not on file    Highest education level: Not on file   Occupational History    Occupation: retired   Tobacco Use    Smoking status: Former     Current packs/day: 0.50     Average packs/day: 0.5 packs/day for 35.0 years (17.5 ttl pk-yrs)     Types: Cigarettes    Smokeless tobacco: Former     Types: Snuff    Tobacco comments:     quit 30 years ago    Vaping Use    Vaping status: Former   Substance and Sexual Activity    Alcohol use: Not Currently    Drug use: Never    Sexual activity: Not Currently     Partners: Female   Other Topics Concern    Not on file   Social History Narrative    Morning person     Social Drivers of Health     Financial Resource Strain: Low Risk  (7/26/2023)    Overall Financial Resource Strain (CARDIA)     Difficulty of Paying Living Expenses: Not hard at all   Food  "Insecurity: No Food Insecurity (2024)    Nursing - Inadequate Food Risk Classification     Worried About Running Out of Food in the Last Year: Not on file     Ran Out of Food in the Last Year: Not on file     Ran Out of Food in the Last Year: 1   Transportation Needs: No Transportation Needs (2024)    OASIS : Transportation     Lack of Transportation (Medical): No     Lack of Transportation (Non-Medical): No     Patient Unable or Declines to Respond: No   Physical Activity: Not on file   Stress: Not on file   Social Connections: Not on file   Intimate Partner Violence: Unknown (2024)    Nursing IPS     Feels Physically and Emotionally Safe: Not on file     Physically Hurt by Someone: Not on file     Humiliated or Emotionally Abused by Someone: Not on file     Physically Hurt by Someone: 2     Hurt or Threatened by Someone: 2   Housing Stability: Unknown (2024)    Nursing: Inadequate Housing Risk Classification     Has Housing: Not on file     Worried About Losing Housing: Not on file     Unable to Get Utilities: Not on file     Unable to Pay for Housing in the Last Year: 2     Has Housin       Family History   Problem Relation Age of Onset    Glaucoma Mother     Cancer Father     Heart disease Father     Glaucoma Father     Alzheimer's disease Family     Heart attack Family        Physical Exam:    Vitals: /78 (BP Location: Left arm, Patient Position: Sitting, Cuff Size: Standard)   Pulse 82   Ht 5' 8\" (1.727 m)   Wt 89.9 kg (198 lb 3.2 oz) Comment: 197.6lb at home this AM  SpO2 97%   BMI 30.14 kg/m²       Wt Readings from Last 3 Encounters:   24 88.6 kg (195 lb 6 oz)   24 89.1 kg (196 lb 6 oz)   24 90.1 kg (198 lb 9.6 oz)       GEN: Jose ANT Mullins appears well, alert and oriented x 3, pleasant and cooperative   HEENT: pupils equal, round, and reactive to light; extraocular muscles intact  NECK: supple, no carotid bruits   HEART: irregular rhythm, normal " S1 and S2, no murmurs, clicks, gallops or rubs, JVP is  down  LUNGS:crackles at both bases  ABDOMEN: normal bowel sounds, soft, no tenderness, no distention  EXTREMITIES: peripheral pulses normal; no clubbing, cyanosis, or edema  NEURO: no focal findings   SKIN: normal without suspicious lesions on exposed skin    Labs & Results:  Lab Results   Component Value Date     10/31/2017    SODIUM 131 (L) 11/21/2024    K 4.4 11/21/2024    CL 96 11/21/2024    CO2 28 11/21/2024    AGAP 7 11/21/2024    BUN 39 (H) 11/21/2024    CREATININE 1.35 (H) 11/21/2024    GLUC 137 11/21/2024    GLUF 85 11/08/2024    CALCIUM 8.4 11/21/2024    AST 13 11/21/2024    ALT 19 11/21/2024    ALKPHOS 53 11/21/2024    PROT 7.0 10/31/2017    TP 6.2 (L) 11/21/2024    BILITOT 0.5 10/31/2017    TBILI 0.43 11/21/2024    EGFR 44 11/21/2024     Lab Results   Component Value Date    WBC 6.45 11/21/2024    HGB 9.2 (L) 11/21/2024    HCT 28.4 (L) 11/21/2024     (H) 11/21/2024     11/21/2024     Lab Results   Component Value Date    LDLCALC 108 (H) 04/24/2019     Lab Results   Component Value Date     (H) 11/21/2024      Lab Results   Component Value Date    HHH5YNFDEMKN 2.34 10/31/2017    TSH 4.65 (H) 06/30/2023     Lab Results   Component Value Date    HGBA1C 5.4 12/03/2018         EKG personally reviewed by MARGARITA Neff.   No results found for this visit on 12/09/24.     Counseling / Coordination of Care  Total face to face time spent with patient 25 minutes.  An additional 10 minutes was spent for chart/data review and visit preparation.       Thank you for the opportunity to participate in the care of this patient.    MARGARITA Neff

## 2024-12-09 ENCOUNTER — OFFICE VISIT (OUTPATIENT)
Dept: CARDIOLOGY CLINIC | Facility: CLINIC | Age: 89
End: 2024-12-09
Payer: COMMERCIAL

## 2024-12-09 VITALS
OXYGEN SATURATION: 97 % | HEART RATE: 82 BPM | WEIGHT: 198.2 LBS | HEIGHT: 68 IN | DIASTOLIC BLOOD PRESSURE: 78 MMHG | BODY MASS INDEX: 30.04 KG/M2 | SYSTOLIC BLOOD PRESSURE: 122 MMHG

## 2024-12-09 DIAGNOSIS — I50.30 DIASTOLIC CONGESTIVE HEART FAILURE, UNSPECIFIED HF CHRONICITY (HCC): Primary | ICD-10-CM

## 2024-12-09 PROCEDURE — 99214 OFFICE O/P EST MOD 30 MIN: CPT | Performed by: NURSE PRACTITIONER

## 2024-12-09 NOTE — PATIENT INSTRUCTIONS
Weigh yourself daily  If you gain 3 lbs in one day or 5 lbs in one week, please call the office at [phone] and ask for a nurse or the heart failure nurse  Keep your sodium intake to <2 grams, (2000 mg) per day, and fluids <2 Liters (2000 ml) per day. This is around 6-7, 8 oz glasses of fluid per day    Keep using the incentive spirometer to open up those little sacs at the bottom of the lungs  Keep walking !  Continue medications as you are.

## 2024-12-10 ENCOUNTER — PATIENT OUTREACH (OUTPATIENT)
Dept: CASE MANAGEMENT | Facility: OTHER | Age: 89
End: 2024-12-10

## 2024-12-10 NOTE — PROGRESS NOTES
Outpatient Care Management note- follow up call.    Chart review completed. Patient was discharged from  PT and nursing on 12/6/24. Patient had a cardiology follow up appt on 12/9/24 where his weight remained stable at 198 lbs and he is to continue his current medication regimen, Lasix 20 mg BID and Spironolactone 12.5 mg daily. Cardiology doctor noted that patient's daughter reported he was up and moving more and sleeping less. Doctor recommended continued fluid restriction, daily weights and low sodium diet. They are also recommending incentive spirometer use.     Spoke with daughter, Jennifer who states Jose's weight today is 197.6 lbs. She reports that he is not really sleeping less but she limits his sleeping in bed to a maximum of 12 hours. She continues to encourage his IS use. She reports she feels confident in her daily routine to care for her father and continue the current medications, diet, weights and fluid monitoring.     No further RN CM need identified. Will close referral.

## 2024-12-25 DIAGNOSIS — I50.32 CHRONIC DIASTOLIC CONGESTIVE HEART FAILURE (HCC): ICD-10-CM

## 2024-12-26 RX ORDER — FUROSEMIDE 20 MG/1
20 TABLET ORAL 2 TIMES DAILY
Qty: 180 TABLET | Refills: 1 | Status: SHIPPED | OUTPATIENT
Start: 2024-12-26

## 2024-12-30 ENCOUNTER — RESULTS FOLLOW-UP (OUTPATIENT)
Dept: NON INVASIVE DIAGNOSTICS | Facility: HOSPITAL | Age: 89
End: 2024-12-30

## 2024-12-30 LAB
BNP SERPL-MCNC: 265 PG/ML
BUN SERPL-MCNC: 36 MG/DL (ref 7–25)
BUN/CREAT SERPL: 19 (CALC) (ref 6–22)
CALCIUM SERPL-MCNC: 9.5 MG/DL (ref 8.6–10.3)
CHLORIDE SERPL-SCNC: 98 MMOL/L (ref 98–110)
CO2 SERPL-SCNC: 28 MMOL/L (ref 20–32)
CREAT SERPL-MCNC: 1.87 MG/DL (ref 0.7–1.22)
GFR/BSA.PRED SERPLBLD CYS-BASED-ARV: 33 ML/MIN/1.73M2
GLUCOSE SERPL-MCNC: 101 MG/DL (ref 65–99)
POTASSIUM SERPL-SCNC: 4.6 MMOL/L (ref 3.5–5.3)
SODIUM SERPL-SCNC: 136 MMOL/L (ref 135–146)

## 2025-01-03 ENCOUNTER — OFFICE VISIT (OUTPATIENT)
Dept: OBGYN CLINIC | Facility: CLINIC | Age: OVER 89
End: 2025-01-03
Payer: COMMERCIAL

## 2025-01-03 VITALS — WEIGHT: 198 LBS | BODY MASS INDEX: 30.01 KG/M2 | HEIGHT: 68 IN

## 2025-01-03 DIAGNOSIS — M17.12 PRIMARY OSTEOARTHRITIS OF LEFT KNEE: Primary | ICD-10-CM

## 2025-01-03 PROCEDURE — 99213 OFFICE O/P EST LOW 20 MIN: CPT | Performed by: ORTHOPAEDIC SURGERY

## 2025-01-03 NOTE — PROGRESS NOTES
Assessment:  1. Primary osteoarthritis of left knee            Plan:    Patient with left knee pain due to osteoarthritis   Weightbearing as tolerated with use of walker   Continue wearing short hinged knee brace as needed for comfort   Continue with home exercises from home therapy   Follow up PRN         The above stated was discussed in layman's terms and the patient expressed understanding.  All questions were answered to the patient's satisfaction.         Subjective:   Jose Mullins is a 89 y.o. male who presents today follow up for his left knee and hip. Patient is present in the room today with his daughter. Patient states he is doing well and deneis any pain. He has been home health physical therapy for strengthening and gait training. He has been wearing short hinged knee brace for comfort. He has been weightbearing as tolerated with use of walker.       Review of systems negative unless otherwise specified in HPI    Past Medical History:   Diagnosis Date    A-fib (HCC)     Anemia     Carpal tunnel syndrome, unspecified upper limb     Cataract     last assessed: 8/18/2012    COVID-19 09/06/2023    GERD (gastroesophageal reflux disease)     Glaucoma     Hypertension     last assessed: 8/23/2012    Intervertebral disc disease     PVD (peripheral vascular disease) ([facility])     Stomach disorder 11/2021    Syncope 03/12/2018       Past Surgical History:   Procedure Laterality Date    CATARACT EXTRACTION      COLONOSCOPY      HERNIA REPAIR      NEUROPLASTY / TRANSPOSITION MEDIAN NERVE AT CARPAL TUNNEL      KS LAPAROSCOPY SURG CHOLECYSTECTOMY N/A 09/14/2016    Procedure: LAPAROSCOPIC CHOLECYSTECTOMY ;  Surgeon: Christo Cristina MD;  Location: [facility];  Service: General    UPPER GASTROINTESTINAL ENDOSCOPY         Family History   Problem Relation Age of Onset    Glaucoma Mother     Cancer Father     Heart disease Father     Glaucoma Father     Alzheimer's disease Family     Heart attack Family        Social History      Occupational History    Occupation: retired   Tobacco Use    Smoking status: Former     Current packs/day: 0.50     Average packs/day: 0.5 packs/day for 35.0 years (17.5 ttl pk-yrs)     Types: Cigarettes    Smokeless tobacco: Former     Types: Snuff    Tobacco comments:     quit 30 years ago    Vaping Use    Vaping status: Former   Substance and Sexual Activity    Alcohol use: Not Currently    Drug use: Never    Sexual activity: Not Currently     Partners: Female         Current Outpatient Medications:     furosemide (LASIX) 20 mg tablet, TAKE 1 TABLET BY MOUTH TWICE A DAY (Patient taking differently: Take 20 mg by mouth daily), Disp: 180 tablet, Rfl: 1    allopurinol (ZYLOPRIM) 100 mg tablet, Take 1 tablet (100 mg total) by mouth daily, Disp: 90 tablet, Rfl: 1    amitriptyline (ELAVIL) 10 mg tablet, TAKE 3 TABLETS BY MOUTH AT BEDTIME, Disp: 270 tablet, Rfl: 1    amLODIPine (NORVASC) 5 mg tablet, Take 1 tablet (5 mg total) by mouth daily at bedtime, Disp: 90 tablet, Rfl: 3    apixaban (ELIQUIS) 2.5 mg, Take 1 tablet (2.5 mg total) by mouth 2 (two) times a day, Disp: 180 tablet, Rfl: 1    bisoprolol (ZEBETA) 5 mg tablet, Take 0.5 tablets (2.5 mg total) by mouth daily, Disp: 45 tablet, Rfl: 3    brimonidine tartrate 0.2 % ophthalmic solution, INSTILL 1 DROP TWICE DAILY IN THE RIGHT EYE, Disp: 15 mL, Rfl: 1    cholecalciferol (VITAMIN D3) 1,000 units tablet, Take 2,000 Units by mouth daily, Disp: , Rfl:     co-enzyme Q-10 30 MG capsule, Take 100 mg by mouth daily, Disp: , Rfl:     diphenhydrAMINE-acetaminophen (TYLENOL PM)  MG TABS, Take 2 tablets by mouth daily at bedtime as needed for sleep, Disp: , Rfl:     Dorzolamide HCl-Timolol Mal PF 2-0.5 % SOLN, ONE DROP INTO EACH EYE TWICE DAILY, Disp: , Rfl:     Empagliflozin (JARDIANCE) 10 MG TABS tablet, Take 1 tablet (10 mg total) by mouth every morning, Disp: 30 tablet, Rfl: 11    finasteride (PROSCAR) 5 mg tablet, TAKE 1 TABLET (5 MG TOTAL) BY MOUTH DAILY.,  Disp: 90 tablet, Rfl: 1    gabapentin (NEURONTIN) 100 mg capsule, Take 1 capsule (100 mg total) by mouth 2 (two) times a day, Disp: , Rfl:     Garlic 1000 MG CAPS, Take 1,000 mg by mouth in the morning, Disp: , Rfl:     latanoprost (XALATAN) 0.005 % ophthalmic solution, Administer 1 drop to both eyes daily at bedtime, Disp: , Rfl:     multivitamin-iron-minerals-folic acid (CENTRUM) chewable tablet, Chew 1 tablet daily, Disp: , Rfl:     omeprazole (PriLOSEC) 20 mg delayed release capsule, TAKE 1 CAPSULE BY MOUTH EVERY DAY, Disp: 90 capsule, Rfl: 1    oxygen gas, Inhale 2 L/min continuous. Indications: COVID, Disp: , Rfl:     Rhopressa 0.02 % SOLN, INSTILL 1 DROP INTO RIGHT EYE ONCE A DAY, Disp: , Rfl:     spironolactone (ALDACTONE) 25 mg tablet, Take 0.5 tablets (12.5 mg total) by mouth daily, Disp: 90 tablet, Rfl: 3    Allergies   Allergen Reactions    Bempedoic Acid Other (See Comments)     Gout    Atorvastatin GI Intolerance     Other reaction(s): GI upset    Ezetimibe Other (See Comments)     myalgia  Other reaction(s): Other (See Comments)  Myalgia  Other reaction(s): Myalgia  myalgia    Statins Rash          There were no vitals filed for this visit.  Body mass index is 30.11 kg/m².  Wt Readings from Last 3 Encounters:   01/03/25 89.8 kg (198 lb)   12/09/24 89.9 kg (198 lb 3.2 oz)   12/03/24 88.6 kg (195 lb 6 oz)       Objective:            Physical Exam  Physical Exam:      General Appearance:    Alert, cooperative, no distress, appears stated age   Head:    Normocephalic, without obvious abnormality, atraumatic   Eyes:    conjunctiva/corneas clear, both eyes         Nose:   Nares normal, septum midline, no drainage    Throat:   Lips normal; teeth and gums normal   Neck:    symmetrical, trachea midline, ;     thyroid:  no enlargement/   Back:     Symmetric, no curvature, ROM normal   Lungs:   No audible wheezing or labored breathing   Chest Wall:    No tenderness or deformity    Heart:    Regular rate and  "rhythm                         Pulses:   2+ and symmetric all extremities   Skin:   Skin color, texture, turgor normal, no rashes or lesions   Neurologic:   normal strength, sensation and reflexes     throughout                       Ortho Exam  Left Knee  Skin intact  No erythema or open wounds  Mild effusion  No Tenderness palpation of medial joint line  No lateral joint line tenderness  Near full extension  Full flexion  Patellar grind test -   Stable to varus and valgus stress  Negative posterior drawer  Negative Lachman test  Neurovascular intact distally       Diagnostics, reviewed and taken today if performed as documented:    None performed        Procedures, if performed today:    Procedures    None performed      Scribe Attestation      I,:  Isac Norwood MA am acting as a scribe while in the presence of the attending physician.:       I,:  Cathie Salgado MD personally performed the services described in this documentation    as scribed in my presence.:               Portions of the record may have been created with voice recognition software.  Occasional wrong word or \"sound a like\" substitutions may have occurred due to the inherent limitations of voice recognition software.  Read the chart carefully and recognize, using context, where substitutions have occurred.  "

## 2025-01-09 ENCOUNTER — TELEPHONE (OUTPATIENT)
Dept: CARDIOLOGY CLINIC | Facility: CLINIC | Age: OVER 89
End: 2025-01-09

## 2025-01-09 NOTE — TELEPHONE ENCOUNTER
Spoke to daughter Jennifer. PT is doing better Walks the hallway 5xper day. O2 as needed. Staying within range of 2000mL and adhering to low salt diet. Appointment with Humberto 01/10/2025

## 2025-01-12 NOTE — PROGRESS NOTES
Heart Failure Outpatient Visit    Jose Mullins 89 y.o. 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 (Formerly [facility]) 11/25/2024    Acute respiratory failure (Formerly [facility]) 11/14/2024    Acute respiratory insufficiency 10/30/2024    Acute on chronic diastolic (congestive) heart failure (Formerly [facility]) 10/24/2024    Primary osteoarthritis of left knee 06/18/2024    Other headache syndrome 04/26/2024    Episodic tension-type headache, not intractable 02/14/2024    Dermatitis 07/28/2023    Elevated TSH 07/28/2023    Corkscrew esophagus 09/21/2022    Other dysphagia 07/20/2022    Frontal headache 07/04/2022    Ambulatory dysfunction 01/26/2022    Irritable bowel syndrome with diarrhea 01/07/2022    Benign prostatic hyperplasia 02/12/2021    Pain in both feet 05/15/2020    Carpal tunnel syndrome 04/24/2019    Other insomnia 04/15/2019    Anxiety 04/15/2019    Paresthesia 11/28/2018    Arthritis of carpometacarpal (CMC) joint of right thumb 09/18/2018    Peripheral edema 08/31/2018    Stage 3a chronic kidney disease (HCC) 03/12/2018    Permanent atrial fibrillation (HCC) 03/12/2018    Left lumbar radiculopathy 01/10/2017    Cholecystitis with cholelithiasis 09/14/2016    Joint pain, knee 09/23/2014    Allergic rhinitis 05/30/2013    S/P ablation of ventricular arrhythmia 03/04/2013    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HCC) 11/12/2012    Anemia of chronic disease 08/18/2012    GERD (gastroesophageal reflux disease) 08/18/2012    Glaucoma 08/18/2012    Hiatal hernia 08/18/2012    Other hyperlipidemia 08/18/2012    Peripheral vascular disease (HCC) 08/18/2012    Benign essential hypertension 08/09/2012       Today's Plan:  Warm, mildly volume up. Advised to increase Lasix back to 20 mg twice daily for the next 3 days, then resume 20 mg daily.  Reviewed indications for as needed afternoon dose of Lasix 20 mg with patient and  daughter.  Repeat BMP this week.  Follow-up with Dr. Shin as scheduled later this month.  2 g sodium restriction, 2 L fluid restriction, daily weights.    Plan:  Chronic HFpEF  -mildly volume up on exam.   -Continue up and OOB/OOC with ambulation  -Rx Lasix 20 mg daily, Spironolactone 12.5 mg daily, Jardiance 10 mg daily  -weights 203, 198 lbs, 198 lbs, today 203 lbs.      -TTE 11/15/24 LVEF of 55% normal systolic function wall motion is normal.  Right ventricle moderately dilated systolic function moderately reduced.  Right atrium moderately dilated.  Aortic valve sclerosis, severe tricuspid regurgitation      HTN  -Well-controlled for age  -Rx Amlodipine, Bisoprolol, Spironolactone.      Stage III CKD  -Baseline creat around 1-1.2  -needs post hospital BMP     Chronic atrial fib  -Rate:-Bisoprolol 2.5 mg daily  -Rhythm: N/A  -OAC: Apixaban 2.5 mg twice daily           HPI:   89-year-old male with past medical history described above who presents for hospital follow-up with his daughter.  Had 2 recent admissions.  First admitted on 10/30/2024 with influenza and COVID-19.  Was treated accordingly and then readmitted on 1114 with shortness of breath.  Felt to be due to volume overload and as such was given a one-time dose of IV Lasix.  Treatment for his prior infections was intensified.  He is feeling much better today.  He is wearing oxygen for now around-the-clock.  Daughter is concerned that he is sleeping a lot throughout the day and feels this has been the case since having COVID.  His appetite is excellent.  He is not really getting up to move enough despite her encouragement.  They have been weighing patient since discharge  and his readings have been stable.     12/9/2024 patient presents again with his daughter for 2-week follow-up.  History again being obtained from the daughter.  States he is feeling fairly well.  Daughter happy that he is been up and moving more, sleeping less.  Still wearing oxygen  "and occasionally has to increase amount with activities.  Otherwise denies any worsening shortness of breath, chest pain, orthopnea or PND.  Between last visit and today we increase his Lasix to twice daily due to persistent weight gain.  His weights have now stabilized.    BMP 12/28 with uptrending creatinine; Lasix decreased to daily on 12/30.    1/13/25: Presents today for follow-up. Daughter providing majority of history at visit today.  He has gained about 5 pounds since last visit, starting when he decreased his Lasix down to 20 mg daily as instructed.  No noted lower extremity swelling, but daughter has noticed some audible \"wheezing\".  We reviewed fluid restrictions and all questions answered.  Somewhat short of breath with exertion, still using oxygen but down to about 1 L.    Past Medical History:   Diagnosis Date    A-fib ([facility])     Anemia     Carpal tunnel syndrome, unspecified upper limb     Cataract     last assessed: 8/18/2012    COVID-19 09/06/2023    GERD (gastroesophageal reflux disease)     Glaucoma     Hypertension     last assessed: 8/23/2012    Intervertebral disc disease     PVD (peripheral vascular disease) ([facility])     Stomach disorder 11/2021    Syncope 03/12/2018     Review of Systems   Constitutional:  Positive for unexpected weight change. Negative for chills and fever.   HENT:  Negative for ear pain and sore throat.    Eyes:  Negative for pain and visual disturbance.   Respiratory:  Positive for shortness of breath. Negative for cough.    Cardiovascular:  Negative for chest pain, palpitations and leg swelling.   Gastrointestinal:  Negative for abdominal pain and vomiting.   Genitourinary:  Negative for dysuria and hematuria.   Musculoskeletal:  Negative for arthralgias and back pain.   Skin:  Negative for color change and rash.   Neurological:  Negative for seizures and syncope.   All other systems reviewed and are negative.  14-point ROS completed and negative except as stated above and/or " in the HPI.      Allergies   Allergen Reactions    Bempedoic Acid Other (See Comments)     Gout    Atorvastatin GI Intolerance     Other reaction(s): GI upset    Ezetimibe Other (See Comments)     myalgia  Other reaction(s): Other (See Comments)  Myalgia  Other reaction(s): Myalgia  myalgia    Statins Rash       Current Outpatient Medications:     allopurinol (ZYLOPRIM) 100 mg tablet, Take 1 tablet (100 mg total) by mouth daily, Disp: 90 tablet, Rfl: 1    amitriptyline (ELAVIL) 10 mg tablet, TAKE 3 TABLETS BY MOUTH AT BEDTIME, Disp: 270 tablet, Rfl: 1    amLODIPine (NORVASC) 5 mg tablet, Take 1 tablet (5 mg total) by mouth daily at bedtime, Disp: 90 tablet, Rfl: 3    apixaban (ELIQUIS) 2.5 mg, Take 1 tablet (2.5 mg total) by mouth 2 (two) times a day, Disp: 180 tablet, Rfl: 1    bisoprolol (ZEBETA) 5 mg tablet, Take 0.5 tablets (2.5 mg total) by mouth daily, Disp: 45 tablet, Rfl: 3    brimonidine tartrate 0.2 % ophthalmic solution, INSTILL 1 DROP TWICE DAILY IN THE RIGHT EYE, Disp: 15 mL, Rfl: 1    cholecalciferol (VITAMIN D3) 1,000 units tablet, Take 2,000 Units by mouth daily, Disp: , Rfl:     co-enzyme Q-10 30 MG capsule, Take 100 mg by mouth daily, Disp: , Rfl:     diphenhydrAMINE-acetaminophen (TYLENOL PM)  MG TABS, Take 2 tablets by mouth daily at bedtime as needed for sleep, Disp: , Rfl:     Dorzolamide HCl-Timolol Mal PF 2-0.5 % SOLN, ONE DROP INTO EACH EYE TWICE DAILY, Disp: , Rfl:     Empagliflozin (JARDIANCE) 10 MG TABS tablet, Take 1 tablet (10 mg total) by mouth every morning, Disp: 30 tablet, Rfl: 11    finasteride (PROSCAR) 5 mg tablet, TAKE 1 TABLET (5 MG TOTAL) BY MOUTH DAILY., Disp: 90 tablet, Rfl: 1    furosemide (LASIX) 20 mg tablet, TAKE 1 TABLET BY MOUTH TWICE A DAY (Patient taking differently: Take 20 mg by mouth daily), Disp: 180 tablet, Rfl: 1    gabapentin (NEURONTIN) 100 mg capsule, Take 1 capsule (100 mg total) by mouth 2 (two) times a day, Disp: , Rfl:     Garlic 1000 MG CAPS, Take  1,000 mg by mouth in the morning, Disp: , Rfl:     latanoprost (XALATAN) 0.005 % ophthalmic solution, Administer 1 drop to both eyes daily at bedtime, Disp: , Rfl:     multivitamin-iron-minerals-folic acid (CENTRUM) chewable tablet, Chew 1 tablet daily, Disp: , Rfl:     omeprazole (PriLOSEC) 20 mg delayed release capsule, TAKE 1 CAPSULE BY MOUTH EVERY DAY, Disp: 90 capsule, Rfl: 1    oxygen gas, Inhale 2 L/min continuous. Indications: COVID, Disp: , Rfl:     Rhopressa 0.02 % SOLN, INSTILL 1 DROP INTO RIGHT EYE ONCE A DAY, Disp: , Rfl:     spironolactone (ALDACTONE) 25 mg tablet, Take 0.5 tablets (12.5 mg total) by mouth daily, Disp: 90 tablet, Rfl: 3    Social History     Socioeconomic History    Marital status:      Spouse name: Not on file    Number of children: Not on file    Years of education: Not on file    Highest education level: Not on file   Occupational History    Occupation: retired   Tobacco Use    Smoking status: Former     Current packs/day: 0.50     Average packs/day: 0.5 packs/day for 35.0 years (17.5 ttl pk-yrs)     Types: Cigarettes    Smokeless tobacco: Former     Types: Snuff    Tobacco comments:     quit 30 years ago    Vaping Use    Vaping status: Former   Substance and Sexual Activity    Alcohol use: Not Currently    Drug use: Never    Sexual activity: Not Currently     Partners: Female   Other Topics Concern    Not on file   Social History Narrative    Morning person     Social Drivers of Health     Financial Resource Strain: Low Risk  (7/26/2023)    Overall Financial Resource Strain (CARDIA)     Difficulty of Paying Living Expenses: Not hard at all   Food Insecurity: No Food Insecurity (11/14/2024)    Nursing - Inadequate Food Risk Classification     Worried About Running Out of Food in the Last Year: Not on file     Ran Out of Food in the Last Year: Not on file     Ran Out of Food in the Last Year: Never true   Transportation Needs: No Transportation Needs (12/6/2024)    OASIS  ": Transportation     Lack of Transportation (Medical): No     Lack of Transportation (Non-Medical): No     Patient Unable or Declines to Respond: No   Physical Activity: Not on file   Stress: Not on file   Social Connections: Not on file   Intimate Partner Violence: Unknown (2024)    Nursing IPS     Feels Physically and Emotionally Safe: Not on file     Physically Hurt by Someone: Not on file     Humiliated or Emotionally Abused by Someone: Not on file     Physically Hurt by Someone: No     Hurt or Threatened by Someone: No   Housing Stability: Unknown (2024)    Nursing: Inadequate Housing Risk Classification     Has Housing: Not on file     Worried About Losing Housing: Not on file     Unable to Get Utilities: Not on file     Unable to Pay for Housing in the Last Year: No     Has Housin     Family History   Problem Relation Age of Onset    Glaucoma Mother     Cancer Father     Heart disease Father     Glaucoma Father     Alzheimer's disease Family     Heart attack Family        Vitals:  Blood pressure 118/62, pulse 88, height 5' 8\" (1.727 m), weight 92.1 kg (203 lb), SpO2 99%.  Body mass index is 30.87 kg/m².  Wt Readings from Last 10 Encounters:   25 92.1 kg (203 lb)   25 89.8 kg (198 lb)   24 89.9 kg (198 lb 3.2 oz)   24 88.6 kg (195 lb 6 oz)   24 89.1 kg (196 lb 6 oz)   24 90.1 kg (198 lb 9.6 oz)   24 94.6 kg (208 lb 8.9 oz)   24 92.5 kg (203 lb 14.8 oz)   24 89.8 kg (198 lb)   24 89.9 kg (198 lb 3.2 oz)     Vitals:    25 1324   BP: 118/62   BP Location: Left arm   Patient Position: Sitting   Cuff Size: Standard   Pulse: 88   SpO2: 99%   Weight: 92.1 kg (203 lb)   Height: 5' 8\" (1.727 m)       Physical Exam  Constitutional:       General: He is not in acute distress.     Appearance: He is ill-appearing.   HENT:      Head: Normocephalic.      Nose: Nose normal.      Mouth/Throat:      Mouth: Mucous membranes are moist.   Eyes: " "     Conjunctiva/sclera: Conjunctivae normal.   Neck:      Comments: JVP is elevated  Cardiovascular:      Rate and Rhythm: Normal rate. Rhythm irregularly irregular.   Pulmonary:      Effort: Pulmonary effort is normal.      Breath sounds: Rales present.   Musculoskeletal:      Cervical back: Neck supple.      Right lower leg: No edema.      Left lower leg: No edema.   Skin:     General: Skin is warm and dry.   Neurological:      General: No focal deficit present.      Mental Status: He is alert and oriented to person, place, and time.   Psychiatric:         Mood and Affect: Mood normal.         Behavior: Behavior normal.         Labs & Results:  Lab Results   Component Value Date    WBC 6.45 11/21/2024    HGB 9.2 (L) 11/21/2024    HCT 28.4 (L) 11/21/2024     (H) 11/21/2024     11/21/2024     Lab Results   Component Value Date    SODIUM 136 12/28/2024    K 4.6 12/28/2024    CL 98 12/28/2024    CO2 28 12/28/2024    BUN 36 (H) 12/28/2024    CREATININE 1.87 (H) 12/28/2024    GLUC 101 (H) 12/28/2024    CALCIUM 9.5 12/28/2024     Lab Results   Component Value Date    INR 1.40 (H) 11/21/2024    INR 1.20 (H) 11/14/2024    INR 1.21 (H) 10/30/2024    PROTIME 17.7 (H) 11/21/2024    PROTIME 15.7 (H) 11/14/2024    PROTIME 15.8 (H) 10/30/2024     Lab Results   Component Value Date     (H) 12/28/2024      No results found for: \"NTBNP\"       Thank you for the opportunity to participate in the care of this patient.    Kaycee Butler PA-C   "

## 2025-01-13 ENCOUNTER — OFFICE VISIT (OUTPATIENT)
Dept: CARDIOLOGY CLINIC | Facility: CLINIC | Age: OVER 89
End: 2025-01-13
Payer: COMMERCIAL

## 2025-01-13 VITALS
HEART RATE: 88 BPM | BODY MASS INDEX: 30.77 KG/M2 | SYSTOLIC BLOOD PRESSURE: 118 MMHG | DIASTOLIC BLOOD PRESSURE: 62 MMHG | OXYGEN SATURATION: 99 % | HEIGHT: 68 IN | WEIGHT: 203 LBS

## 2025-01-13 DIAGNOSIS — N18.31 CHRONIC KIDNEY DISEASE, STAGE 3A (HCC): ICD-10-CM

## 2025-01-13 DIAGNOSIS — I48.21 PERMANENT ATRIAL FIBRILLATION (HCC): ICD-10-CM

## 2025-01-13 DIAGNOSIS — I10 ESSENTIAL (PRIMARY) HYPERTENSION: ICD-10-CM

## 2025-01-13 DIAGNOSIS — I50.32 CHRONIC DIASTOLIC CONGESTIVE HEART FAILURE (HCC): ICD-10-CM

## 2025-01-13 DIAGNOSIS — I50.33 ACUTE ON CHRONIC DIASTOLIC (CONGESTIVE) HEART FAILURE (HCC): Primary | ICD-10-CM

## 2025-01-13 DIAGNOSIS — R06.09 DYSPNEA ON EXERTION: ICD-10-CM

## 2025-01-13 PROCEDURE — 99214 OFFICE O/P EST MOD 30 MIN: CPT | Performed by: PHYSICIAN ASSISTANT

## 2025-01-13 NOTE — PATIENT INSTRUCTIONS
Please weigh yourself every day (after emptying your bladder) and keep a detailed log of weights.   Contact the Heart Failure program at [phone] if you gain 3+ lbs overnight or 5+ lbs in 5-7 days.  Limit daily sodium/salt intake to 2000 mg daily to prevent fluid retention.  Avoid canned foods, fast food/Chinese food, and processed meats (hot dogs, lunch meat, and sausage etc.). Caution with condiments.  Limit fluid intake to 2000 mL or 2 liters (about 60-65 ounces) daily.  Avoid electrolyte replacement drinks (such as Gatorade, Pedialyte, Propel, Liquid IV, etc.).  Bring complete list of medications and log of daily weights to your follow-up appointment.    Increase your Lasix to 20 mg twice a day for the next 3 days - ideally looking for your weight to come back down to 198-199 lbs. You can keep your normal daily dose of Lasix 20 mg daily, with an extra dose of 20 mg in the afternoon if needed for weight gain or worsening shortness of breath.   Please get labs as ordered in the next 3-5 days.

## 2025-01-17 LAB
BUN SERPL-MCNC: 29 MG/DL (ref 7–25)
BUN/CREAT SERPL: 17 (CALC) (ref 6–22)
CALCIUM SERPL-MCNC: 9.6 MG/DL (ref 8.6–10.3)
CHLORIDE SERPL-SCNC: 98 MMOL/L (ref 98–110)
CO2 SERPL-SCNC: 30 MMOL/L (ref 20–32)
CREAT SERPL-MCNC: 1.69 MG/DL (ref 0.7–1.22)
GFR/BSA.PRED SERPLBLD CYS-BASED-ARV: 37 ML/MIN/1.73M2
GLUCOSE SERPL-MCNC: 113 MG/DL (ref 65–99)
POTASSIUM SERPL-SCNC: 4.3 MMOL/L (ref 3.5–5.3)
SODIUM SERPL-SCNC: 137 MMOL/L (ref 135–146)

## 2025-01-20 ENCOUNTER — NURSE TRIAGE (OUTPATIENT)
Age: OVER 89
End: 2025-01-20

## 2025-01-20 DIAGNOSIS — I50.32 CHRONIC DIASTOLIC CONGESTIVE HEART FAILURE (HCC): Primary | ICD-10-CM

## 2025-01-20 NOTE — TELEPHONE ENCOUNTER
Spoke with daughter. She will increase the lasix. And take him to get blood work before next appt.

## 2025-01-20 NOTE — TELEPHONE ENCOUNTER
"Reason for Conversation: received call from pt's daughter, Jennifer, reporting a gradual wt gain for pt since seeing Kaycee on 1/13. AT the appt on 1/13, pt was told to \"\"increase Lasix back to 20 mg twice daily for the next 3 days, then resume 20 mg daily\" which he has. Jennifer stated it was working for a little bit but now his weight is slowly going back up. Please see log below. She stated he is not having any new s/s since being seen by Kaycee.     Advised will send a message to the provider to review and advise.     VS/Weight: (Note: Please include date/time vitals/weight were measured)       1/13: 203lbs - saw Kaycee who told him to  \"increase Lasix back to 20 mg twice daily for the next 3 days, then resume 20 mg daily\"     1/14: 202lbs     1/15: 201.2;bs     1/16: 201.2lbs     1/17: 201.6lbs     1/18: 201.8lbs     1/19: 202.2lbs     Today: 202.8lbs    Pain: No    Risk Factors: Heart Failure    Recent relevant testing and date of testing: BMP done on 1/16    Medication: Lasix 20 mg daily, Spironolactone 12.5 mg daily,    Upcoming Office Visit: Yes 1/30    Last Office Visit: 1/13        Answer Assessment - Initial Assessment Questions  1. MAIN CONCERN OR SYMPTOM: \"What is your main concern right now?\" \"What's the main symptom you're worried about?\" (e.g., breathing difficulty, ankle swelling, weight gain).      Weight gain   2. ONSET: \"When did the  wt gain  start?\"      Slowly increases over the weekend   3. BREATHING DIFFICULTY: \"Are you having any difficulty breathing?\" If Yes, ask: \"How bad is it?\"  (e.g., none, mild, moderate, severe)  \"Is this worse than usual for you?\"      Does wheeze at times, did tell Kaycee about this on 1/16. Oxygen mid to high 90s at rest.  Will drop into 86 range with exertion. Does wear 1L of oxygen occasionally   4. EDEMA - FOOT-LEG SWELLING: \"Do you have swelling of your ankles, feet or legs?\" If Yes, ask: \"How bad is the swelling?\" (e.g., localized; mild, moderate, " "severe)      Denies swelling in legs. Unsure if swelling is present in abdomen   5. WEIGHT - CURRENT: \"What is your weight today?\"      202.8lbs  6. WEIGHT - TARGET RANGE: \"Do you try to keep your weight in a target (goal) range?\" If Yes, ask: \"What is that range?\"      198lbs  7. WEIGHT - CHANGE: \"Have you gained (or lost) weight in the past 24 hours? Past week (7 days)?\" If Yes, ask:  \"How much weight?\"      1/13: 203lbs - saw Kaycee who told him to  \"increase Lasix back to 20 mg twice daily for the next 3 days, then resume 20 mg daily\"     1/14: 202lbs     1/15: 201.2;bs     1/16: 201.2lbs     1/17: 201.6lbs     1/18: 201.8lbs     1/19: 202.2lbs     Today: 202.8lbs  8. OTHER SYMPTOMS: \"Do you have any other symptoms?\" (e.g., depression, weakness or fatigue, abdomen bloating, hacky cough)      Does have a little cough but is not new.   9. DIURETICS: \"Are you currently taking water pills?\" (e.g., furosemide [Lasix], hydrochlorothiazide [HCTZ], bumetanide [Bumex], metolazone [Zaroxolyn]) If Yes, ask: \"What medicine are you taking, and how often?\"  \"Any recent change in dose?\"        Lasix 20 mg daily, Spironolactone 12.5 mg daily,  10. O2 SATURATION MONITOR: \"Do you use an oxygen saturation monitor (pulse oximeter) at home?\" If Yes, ask: \"What is your reading (oxygen level) today?\" \"What is your usual oxygen saturation reading?\" (e.g., 95%)        Oxygen mid to high 90s at rest.  Will drop into 86 range with exertion. Does wear 1L of oxygen occasionally     11. HEART FAILURE HCP: \"Who treats your heart failure?\"  (e.g., cardiologist or heart specialist, heart failure clinic or center, primary care doctor)        Cardiology   12. FLUID and SODIUM RESTRICTIONS: \"Have you been instructed to restrict your daily fluid intake or sodium (salt) intake?\" If Yes, ask: \"What are your daily limits?\"        Does abide by fluid restriction. He had one hard pretzel with salt on it but that is it.       Wear sweats and a t-shirt " when being weighed every am at the same time and same scale.    Protocols used: Heart Failure on Treatment Follow-up Call-Adult-[place]

## 2025-01-20 NOTE — TELEPHONE ENCOUNTER
Caller: Jennifer    Doctor: Carrie    Reason for call: Jennifer returning call but said she spoke with Sun when Tristan called & left mssge.  No need to call back if Sun took care of it.    Call back#: [phone]

## 2025-01-25 LAB
BUN SERPL-MCNC: 27 MG/DL (ref 7–25)
BUN/CREAT SERPL: 16 (CALC) (ref 6–22)
CALCIUM SERPL-MCNC: 9.3 MG/DL (ref 8.6–10.3)
CHLORIDE SERPL-SCNC: 99 MMOL/L (ref 98–110)
CO2 SERPL-SCNC: 29 MMOL/L (ref 20–32)
CREAT SERPL-MCNC: 1.73 MG/DL (ref 0.7–1.22)
GFR/BSA.PRED SERPLBLD CYS-BASED-ARV: 36 ML/MIN/1.73M2
GLUCOSE SERPL-MCNC: 93 MG/DL (ref 65–99)
POTASSIUM SERPL-SCNC: 4.6 MMOL/L (ref 3.5–5.3)
SODIUM SERPL-SCNC: 137 MMOL/L (ref 135–146)

## 2025-01-29 ENCOUNTER — OFFICE VISIT (OUTPATIENT)
Dept: OBGYN CLINIC | Facility: CLINIC | Age: OVER 89
End: 2025-01-29
Payer: COMMERCIAL

## 2025-01-29 VITALS — BODY MASS INDEX: 30.77 KG/M2 | HEIGHT: 68 IN | WEIGHT: 203 LBS

## 2025-01-29 DIAGNOSIS — G56.01 CARPAL TUNNEL SYNDROME OF RIGHT WRIST: Primary | ICD-10-CM

## 2025-01-29 PROCEDURE — 99213 OFFICE O/P EST LOW 20 MIN: CPT | Performed by: SURGERY

## 2025-01-29 PROCEDURE — 20526 THER INJECTION CARP TUNNEL: CPT | Performed by: SURGERY

## 2025-01-29 RX ORDER — DEXAMETHASONE SODIUM PHOSPHATE 10 MG/ML
40 INJECTION, SOLUTION INTRAMUSCULAR; INTRAVENOUS
Status: COMPLETED | OUTPATIENT
Start: 2025-01-29 | End: 2025-01-29

## 2025-01-29 RX ORDER — LIDOCAINE HYDROCHLORIDE 10 MG/ML
1 INJECTION, SOLUTION INFILTRATION; PERINEURAL
Status: COMPLETED | OUTPATIENT
Start: 2025-01-29 | End: 2025-01-29

## 2025-01-29 RX ADMIN — DEXAMETHASONE SODIUM PHOSPHATE 40 MG: 10 INJECTION, SOLUTION INTRAMUSCULAR; INTRAVENOUS at 09:30

## 2025-01-29 RX ADMIN — LIDOCAINE HYDROCHLORIDE 1 ML: 10 INJECTION, SOLUTION INFILTRATION; PERINEURAL at 09:30

## 2025-01-29 NOTE — PROGRESS NOTES
ASSESSMENT/PLAN:      Assessment & Plan  Carpal tunnel syndrome of right wrist  Follow up PRN  Orders:    Hand/upper extremity injection: R carpal tunnel        89 y.o. male with right SLAC wrist and right carpal tunnel syndrome     Right carpal tunnel CSI was performed in the office without complication. Post injection protocol/expectations were reviewed. I will see the patient back in the office on an as needed basis.     The patient verbalized understanding of exam findings and treatment plan. We engaged in the shared decision-making process and treatment options were discussed at length with the patient. Surgical and conservative management discussed today along with risks and benefits.    Diagnoses and all orders for this visit:    Carpal tunnel syndrome of right wrist  -     Hand/upper extremity injection: R carpal tunnel      Follow Up:  Return if symptoms worsen or fail to improve.      To Do Next Visit:  Re-evaluation of current issue    ____________________________________________________________________________________________________________________________________________      CHIEF COMPLAINT:  Chief Complaint   Patient presents with    Follow-up     Patient would like an injection today. Last injection was on 9/18/24       SUBJECTIVE:  Joseramos Mullins is a 89 y.o. year old RHD male who presents to the office for a follow up regarding right carpal tunnel syndrome. He underwent a right carpal tunnel CSI on 7/8/24, which he notes resolves his radial numbness and tingling for a while. He now notes pins and needles or numbness and tingling to his radial 3 digits. He would like a repeat CSI today. He has also treated for right SLAC wrist and denies pain.         I have personally reviewed all the relevant PMH, PSH, SH, FH, Medications and allergies.     PAST MEDICAL HISTORY:  Past Medical History:   Diagnosis Date    A-fib (HCC)     Anemia     Carpal tunnel syndrome, unspecified upper limb     Cataract      last assessed: 8/18/2012    COVID-19 09/06/2023    GERD (gastroesophageal reflux disease)     Glaucoma     Hypertension     last assessed: 8/23/2012    Intervertebral disc disease     PVD (peripheral vascular disease) (HCC)     Stomach disorder 11/2021    Syncope 03/12/2018       PAST SURGICAL HISTORY:  Past Surgical History:   Procedure Laterality Date    CATARACT EXTRACTION      COLONOSCOPY      HERNIA REPAIR      NEUROPLASTY / TRANSPOSITION MEDIAN NERVE AT CARPAL TUNNEL      [place] LAPAROSCOPY SURG CHOLECYSTECTOMY N/A 09/14/2016    Procedure: LAPAROSCOPIC CHOLECYSTECTOMY ;  Surgeon: Christo Cristina MD;  Location: [facility];  Service: General    UPPER GASTROINTESTINAL ENDOSCOPY         FAMILY HISTORY:  Family History   Problem Relation Age of Onset    Glaucoma Mother     Cancer Father     Heart disease Father     Glaucoma Father     Alzheimer's disease Family     Heart attack Family        SOCIAL HISTORY:  Social History     Tobacco Use    Smoking status: Former     Current packs/day: 0.50     Average packs/day: 0.5 packs/day for 35.0 years (17.5 ttl pk-yrs)     Types: Cigarettes    Smokeless tobacco: Former     Types: Snuff    Tobacco comments:     quit 30 years ago    Vaping Use    Vaping status: Former   Substance Use Topics    Alcohol use: Not Currently    Drug use: Never       MEDICATIONS:    Current Outpatient Medications:     allopurinol (ZYLOPRIM) 100 mg tablet, Take 1 tablet (100 mg total) by mouth daily, Disp: 90 tablet, Rfl: 1    amitriptyline (ELAVIL) 10 mg tablet, TAKE 3 TABLETS BY MOUTH AT BEDTIME, Disp: 270 tablet, Rfl: 1    amLODIPine (NORVASC) 5 mg tablet, Take 1 tablet (5 mg total) by mouth daily at bedtime, Disp: 90 tablet, Rfl: 3    apixaban (ELIQUIS) 2.5 mg, Take 1 tablet (2.5 mg total) by mouth 2 (two) times a day, Disp: 180 tablet, Rfl: 1    bisoprolol (ZEBETA) 5 mg tablet, Take 0.5 tablets (2.5 mg total) by mouth daily, Disp: 45 tablet, Rfl: 3    brimonidine tartrate 0.2 % ophthalmic solution,  INSTILL 1 DROP TWICE DAILY IN THE RIGHT EYE, Disp: 15 mL, Rfl: 1    cholecalciferol (VITAMIN D3) 1,000 units tablet, Take 2,000 Units by mouth daily, Disp: , Rfl:     co-enzyme Q-10 30 MG capsule, Take 100 mg by mouth daily, Disp: , Rfl:     diphenhydrAMINE-acetaminophen (TYLENOL PM)  MG TABS, Take 2 tablets by mouth daily at bedtime as needed for sleep, Disp: , Rfl:     Dorzolamide HCl-Timolol Mal PF 2-0.5 % SOLN, ONE DROP INTO EACH EYE TWICE DAILY, Disp: , Rfl:     Empagliflozin (JARDIANCE) 10 MG TABS tablet, Take 1 tablet (10 mg total) by mouth every morning, Disp: 30 tablet, Rfl: 11    finasteride (PROSCAR) 5 mg tablet, TAKE 1 TABLET (5 MG TOTAL) BY MOUTH DAILY., Disp: 90 tablet, Rfl: 1    furosemide (LASIX) 20 mg tablet, TAKE 1 TABLET BY MOUTH TWICE A DAY, Disp: 180 tablet, Rfl: 1    gabapentin (NEURONTIN) 100 mg capsule, Take 1 capsule (100 mg total) by mouth 2 (two) times a day, Disp: , Rfl:     Garlic 1000 MG CAPS, Take 1,000 mg by mouth in the morning, Disp: , Rfl:     latanoprost (XALATAN) 0.005 % ophthalmic solution, Administer 1 drop to both eyes daily at bedtime, Disp: , Rfl:     multivitamin-iron-minerals-folic acid (CENTRUM) chewable tablet, Chew 1 tablet daily, Disp: , Rfl:     omeprazole (PriLOSEC) 20 mg delayed release capsule, TAKE 1 CAPSULE BY MOUTH EVERY DAY, Disp: 90 capsule, Rfl: 1    oxygen gas, Inhale 2 L/min continuous. Indications: COVID, Disp: , Rfl:     Rhopressa 0.02 % SOLN, INSTILL 1 DROP INTO RIGHT EYE ONCE A DAY, Disp: , Rfl:     spironolactone (ALDACTONE) 25 mg tablet, Take 0.5 tablets (12.5 mg total) by mouth daily, Disp: 90 tablet, Rfl: 3    ALLERGIES:  Allergies   Allergen Reactions    Bempedoic Acid Other (See Comments)     Gout    Atorvastatin GI Intolerance     Other reaction(s): GI upset    Ezetimibe Other (See Comments)     myalgia  Other reaction(s): Other (See Comments)  Myalgia  Other reaction(s): Myalgia  myalgia    Statins Rash       REVIEW OF SYSTEMS:  Review of  Systems    VITALS:  There were no vitals filed for this visit.      _____________________________________________________  PHYSICAL EXAMINATION:  General: well developed and well nourished, alert, oriented times 3, and appears comfortable  Psychiatric: Normal  HEENT: Normocephalic, Atraumatic Trachea Midline, No torticollis  Pulmonary: No audible wheezing or respiratory distress   Cardiovascular: No pitting edema, 2+ radial pulse   Abdominal/GI: abdomen non tender, non distended   Skin: No masses, erythema, lacerations, fluctation, ulcerations  Neurovascular: Sensation Intact to the Median, Ulnar, Radial Nerve, Motor Intact to the Median, Ulnar, Radial Nerve, and Pulses Intact  Musculoskeletal: Normal, except as noted in detailed exam and in HPI.      MUSCULOSKELETAL EXAMINATION:    Right Carpal Tunnel Exam:     Negative thenar atrophy. Negative phalen's test. Positive carpal tunnel compression. Positive tinels over median nerve at the wrist.  Opposition strength 5/5.  Abduction strength 5/5.      ___________________________________________________    STUDIES REVIEWED:  No new imaging to review     LABS REVIEWED:    HgA1c:   Lab Results   Component Value Date    HGBA1C 5.4 12/03/2018     BMP:   Lab Results   Component Value Date    GLUCOSE 102 11/14/2024    CALCIUM 9.3 01/24/2025     10/31/2017    K 4.6 01/24/2025    CO2 29 01/24/2025    CL 99 01/24/2025    BUN 27 (H) 01/24/2025    CREATININE 1.73 (H) 01/24/2025             PROCEDURES PERFORMED:  Hand/upper extremity injection: R carpal tunnel  [place] Protocol:  procedure performed by consultantConsent: Verbal consent obtained. Written consent not obtained.  Risks and benefits: risks, benefits and alternatives were discussed  Consent given by: patient  Patient understanding: patient states understanding of the procedure being performed  Site marked: the operative site was marked  Patient identity confirmed: verbally with patient  Supporting  Documentation  Indications: pain   Procedure Details  Condition:carpal tunnel syndrome Site: R carpal tunnel   Needle size: 25 G  Ultrasound guidance: no  Medications administered: 1 mL lidocaine 1 %; 40 mg dexamethasone 100 mg/10 mL  Patient tolerance: patient tolerated the procedure well with no immediate complications  Dressing:  Sterile dressing applied            _____________________________________________________      Scribe Attestation      I,:  Radha Delgado MA am acting as a scribe while in the presence of the attending physician.:       I,:  Nathan Mishra MD personally performed the services described in this documentation    as scribed in my presence.:

## 2025-01-30 ENCOUNTER — OFFICE VISIT (OUTPATIENT)
Dept: CARDIOLOGY CLINIC | Facility: CLINIC | Age: OVER 89
End: 2025-01-30
Payer: COMMERCIAL

## 2025-01-30 VITALS
HEIGHT: 68 IN | BODY MASS INDEX: 30.55 KG/M2 | DIASTOLIC BLOOD PRESSURE: 70 MMHG | WEIGHT: 201.6 LBS | SYSTOLIC BLOOD PRESSURE: 130 MMHG | HEART RATE: 106 BPM

## 2025-01-30 DIAGNOSIS — I50.32 CHRONIC DIASTOLIC CONGESTIVE HEART FAILURE (HCC): Primary | ICD-10-CM

## 2025-01-30 DIAGNOSIS — E08.49 DIABETES DUE TO UNDRL CONDITION W OTH DIABETIC NEURO COMP (HCC): ICD-10-CM

## 2025-01-30 DIAGNOSIS — I73.9 PERIPHERAL VASCULAR DISEASE (HCC): ICD-10-CM

## 2025-01-30 PROCEDURE — 99214 OFFICE O/P EST MOD 30 MIN: CPT | Performed by: INTERNAL MEDICINE

## 2025-01-30 PROCEDURE — G2211 COMPLEX E/M VISIT ADD ON: HCPCS | Performed by: INTERNAL MEDICINE

## 2025-01-30 NOTE — PROGRESS NOTES
Cardiology Outpatient Follow-Up Note - Jose Mullins 89 y.o. male MRN: [id number]      Assessment/Plan:    1. Chronic diastolic congestive heart failure (HCC)  On Jardiance 10 mg daily, spironolactone 12.5 mg daily  Has been taking lasix 20 mg BID since 1/20. Cr is slightly elevated, urine has been darker. Return to lasix 20 my once daily.     2. Permanent atrial fibrillation (HCC)  Stable, continue current regimen.   - Basic metabolic panel; Future      We will see Jose Mullins back in 6 months for routine follow-up.    Subjective:     HPI: Jose Mullins is a 89 y.o. 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     Seen by cardiology BRANDON 1/13/2025 noted to be mildly volume overloaded.  Lasix was increased to 20 mg daily for 3 days then reduced back to 20 mg daily.    Patient has no complaints today  Weights have been stable floating around 201 lbs +/1 lb the last several weeks.       Cardiac Testing:    Echo 12/21/23  Narrative    This result has an attachment that is not available.  Mild left ventricular hypertrophy with basal septal hypertrophy LV  systolic function is normal, EF~55-60% and mild diastolic dysfunction  Mildly dilated right ventricle with mildly reduced RV systolic function  Mild biatrial enlargement  Mildly ectatic thoracic aorta  Mild pulmonary hypertension, PA ~35-40 mmHg  Moderate aortic sclerosis with trivial to mild aortic insufficiency  Mild mitral sclerosis with mild mitral regurgitation  Compared to March 8, 2022, there have been no major changes.       EKGs, personally reviewed:      Relevant Labs & Results:  BMP 10/18/24 - Cr 1.40, K 4.6  BNP 10/4/23 - 223.    BMP 1/24/2025--creatinine 1.73, potassium 4.6, sodium 137    ROS:  Review of Systems:  Review of Systems    Objective:     Vitals:   Vitals:    01/30/25 1316   BP: 130/70   BP Location: Left arm   Patient Position: Sitting   Cuff Size:  "Standard   Pulse: (!) 106   Weight: 91.4 kg (201 lb 9.6 oz)   Height: 5' 8\" (1.727 m)    Body surface area is 2.05 meters squared.  Wt Readings from Last 3 Encounters:   01/30/25 91.4 kg (201 lb 9.6 oz)   01/29/25 92.1 kg (203 lb)   01/13/25 92.1 kg (203 lb)       Physical Exam:  General: Jose Mullins is a well appearing elderly male, in no acute distress, sitting comfortably with a rolling walker  HEENT: moist mucous membranes, EOMI  Neck:  No JVD, supple, trachea midline  Cardiovascular: unremarkable S1/S2, irregular, normal rate, 2/6 SM  Pulmonary: normal respiratory effort, faint basilar rales  Abdomen: soft and nondistended  Extremities: trace lower extremity edema. Warm and well perfused extremities.  Neuro:deferred  Psych: Normal mood and affect, cooperative      Medications (at the START of this encounter):  Outpatient Medications Prior to Visit   Medication Sig Dispense Refill    allopurinol (ZYLOPRIM) 100 mg tablet Take 1 tablet (100 mg total) by mouth daily 90 tablet 1    amitriptyline (ELAVIL) 10 mg tablet TAKE 3 TABLETS BY MOUTH AT BEDTIME 270 tablet 1    amLODIPine (NORVASC) 5 mg tablet Take 1 tablet (5 mg total) by mouth daily at bedtime 90 tablet 3    apixaban (ELIQUIS) 2.5 mg Take 1 tablet (2.5 mg total) by mouth 2 (two) times a day 180 tablet 1    bisoprolol (ZEBETA) 5 mg tablet Take 0.5 tablets (2.5 mg total) by mouth daily 45 tablet 3    brimonidine tartrate 0.2 % ophthalmic solution INSTILL 1 DROP TWICE DAILY IN THE RIGHT EYE 15 mL 1    cholecalciferol (VITAMIN D3) 1,000 units tablet Take 2,000 Units by mouth daily      co-enzyme Q-10 30 MG capsule Take 100 mg by mouth daily      diphenhydrAMINE-acetaminophen (TYLENOL PM)  MG TABS Take 2 tablets by mouth daily at bedtime as needed for sleep      Dorzolamide HCl-Timolol Mal PF 2-0.5 % SOLN ONE DROP INTO EACH EYE TWICE DAILY      Empagliflozin (JARDIANCE) 10 MG TABS tablet Take 1 tablet (10 mg total) by mouth every morning 30 tablet 11    " "finasteride (PROSCAR) 5 mg tablet TAKE 1 TABLET (5 MG TOTAL) BY MOUTH DAILY. 90 tablet 1    furosemide (LASIX) 20 mg tablet TAKE 1 TABLET BY MOUTH TWICE A  tablet 1    gabapentin (NEURONTIN) 100 mg capsule Take 1 capsule (100 mg total) by mouth 2 (two) times a day      Garlic 1000 MG CAPS Take 1,000 mg by mouth in the morning      latanoprost (XALATAN) 0.005 % ophthalmic solution Administer 1 drop to both eyes daily at bedtime      multivitamin-iron-minerals-folic acid (CENTRUM) chewable tablet Chew 1 tablet daily      omeprazole (PriLOSEC) 20 mg delayed release capsule TAKE 1 CAPSULE BY MOUTH EVERY DAY 90 capsule 1    oxygen gas Inhale 2 L/min continuous. Indications: COVID      Rhopressa 0.02 % SOLN INSTILL 1 DROP INTO RIGHT EYE ONCE A DAY      spironolactone (ALDACTONE) 25 mg tablet Take 0.5 tablets (12.5 mg total) by mouth daily 90 tablet 3     No facility-administered medications prior to visit.             This note was completed in part utilizing Dragon [employer] One voice recognition software. Grammatical errors, random word insertion, spelling mistakes, occasional wrong word or \"sound-alike\" substitutions and incomplete sentences may be an occasional consequence of the system secondary to software limitations, ambient noise and hardware issues. At the time of dictation, efforts were made to edit, clarify and /or correct errors.  Please read the chart carefully and recognize, using context, where substitutions have occurred.  If you have any questions or concerns about the context, text or information contained within the body of this dictation, please contact myself, the provider, for further clarification.    "

## 2025-02-15 DIAGNOSIS — K58.0 IRRITABLE BOWEL SYNDROME WITH DIARRHEA: ICD-10-CM

## 2025-02-15 RX ORDER — AMITRIPTYLINE HYDROCHLORIDE 10 MG/1
TABLET ORAL
Qty: 270 TABLET | Refills: 0 | Status: SHIPPED | OUTPATIENT
Start: 2025-02-15

## 2025-02-17 DIAGNOSIS — K21.9 GASTROESOPHAGEAL REFLUX DISEASE, UNSPECIFIED WHETHER ESOPHAGITIS PRESENT: ICD-10-CM

## 2025-02-18 DIAGNOSIS — I48.21 PERMANENT ATRIAL FIBRILLATION (HCC): ICD-10-CM

## 2025-02-20 RX ORDER — APIXABAN 2.5 MG/1
2.5 TABLET, FILM COATED ORAL 2 TIMES DAILY
Qty: 180 TABLET | Refills: 1 | Status: SHIPPED | OUTPATIENT
Start: 2025-02-20

## 2025-03-06 ENCOUNTER — TELEPHONE (OUTPATIENT)
Age: OVER 89
End: 2025-03-06

## 2025-03-06 NOTE — TELEPHONE ENCOUNTER
Caller: Daughter-Jennifer    Doctor: Dr. Salgado    Reason for call: Daughter calling stating that patient received a short hinged knee brace from the [facility] fitter and since the swelling has subsided the brace no longer fits.  She attempted to find brace on [employer] and she just returned her sixth one.  Daughter is wondering if a brace a size down can be ordered and if needed she will pay out of pocket.  Megan on current brace is not making contact at this point.      Call back#: [phone]

## 2025-03-11 NOTE — TELEPHONE ENCOUNTER
Caller: Jennifer- Daughter     Doctor: Dr. Salgado     Reason for call: Asked for status of brace     Call back#: [phone]

## 2025-03-26 DIAGNOSIS — H40.9 GLAUCOMA OF RIGHT EYE, UNSPECIFIED GLAUCOMA TYPE: ICD-10-CM

## 2025-03-26 RX ORDER — BRIMONIDINE TARTRATE 2 MG/ML
SOLUTION/ DROPS OPHTHALMIC
Qty: 15 ML | Refills: 1 | Status: SHIPPED | OUTPATIENT
Start: 2025-03-26

## 2025-03-28 DIAGNOSIS — I48.21 PERMANENT ATRIAL FIBRILLATION (HCC): ICD-10-CM

## 2025-04-17 DIAGNOSIS — M25.569 ARTHRALGIA OF KNEE, UNSPECIFIED LATERALITY: ICD-10-CM

## 2025-04-17 DIAGNOSIS — M79.672 PAIN IN BOTH FEET: ICD-10-CM

## 2025-04-17 DIAGNOSIS — R30.0 DYSURIA: ICD-10-CM

## 2025-04-17 DIAGNOSIS — M79.671 PAIN IN BOTH FEET: ICD-10-CM

## 2025-04-18 RX ORDER — FINASTERIDE 5 MG/1
5 TABLET, FILM COATED ORAL DAILY
Qty: 90 TABLET | Refills: 1 | Status: SHIPPED | OUTPATIENT
Start: 2025-04-18

## 2025-04-18 RX ORDER — GABAPENTIN 100 MG/1
100 CAPSULE ORAL 3 TIMES DAILY
Qty: 90 CAPSULE | Refills: 5 | Status: SHIPPED | OUTPATIENT
Start: 2025-04-18

## 2025-04-18 RX ORDER — ALLOPURINOL 100 MG/1
100 TABLET ORAL DAILY
Qty: 90 TABLET | Refills: 1 | Status: SHIPPED | OUTPATIENT
Start: 2025-04-18

## 2025-05-14 DIAGNOSIS — I50.32 CHRONIC DIASTOLIC CONGESTIVE HEART FAILURE (HCC): ICD-10-CM

## 2025-05-15 RX ORDER — FUROSEMIDE 20 MG/1
20 TABLET ORAL 2 TIMES DAILY
Qty: 180 TABLET | Refills: 1 | Status: SHIPPED | OUTPATIENT
Start: 2025-05-15 | End: 2025-05-16

## 2025-05-16 ENCOUNTER — OFFICE VISIT (OUTPATIENT)
Dept: CARDIOLOGY CLINIC | Facility: CLINIC | Age: OVER 89
End: 2025-05-16
Payer: COMMERCIAL

## 2025-05-16 VITALS
HEART RATE: 94 BPM | WEIGHT: 197.4 LBS | DIASTOLIC BLOOD PRESSURE: 64 MMHG | SYSTOLIC BLOOD PRESSURE: 128 MMHG | HEIGHT: 68 IN | BODY MASS INDEX: 29.92 KG/M2

## 2025-05-16 DIAGNOSIS — I50.32 CHRONIC DIASTOLIC CONGESTIVE HEART FAILURE (HCC): ICD-10-CM

## 2025-05-16 DIAGNOSIS — I10 BENIGN ESSENTIAL HYPERTENSION: Chronic | ICD-10-CM

## 2025-05-16 DIAGNOSIS — I48.21 PERMANENT ATRIAL FIBRILLATION (HCC): Primary | ICD-10-CM

## 2025-05-16 PROCEDURE — G2211 COMPLEX E/M VISIT ADD ON: HCPCS | Performed by: INTERNAL MEDICINE

## 2025-05-16 PROCEDURE — 99214 OFFICE O/P EST MOD 30 MIN: CPT | Performed by: INTERNAL MEDICINE

## 2025-05-16 RX ORDER — SPIRONOLACTONE 25 MG/1
12.5 TABLET ORAL DAILY
Qty: 45 TABLET | Refills: 3 | Status: SHIPPED | OUTPATIENT
Start: 2025-05-16

## 2025-05-16 RX ORDER — FUROSEMIDE 20 MG/1
20 TABLET ORAL DAILY
Qty: 90 TABLET | Refills: 3 | Status: SHIPPED | OUTPATIENT
Start: 2025-05-16

## 2025-05-16 NOTE — PROGRESS NOTES
"Cardiology Outpatient Follow-Up Note - Jose Mullins 89 y.o. male MRN: [id number]      Assessment/Plan:    1. Chronic diastolic congestive heart failure (HCC)  On Jardiance 10 mg daily, spironolactone 12.5 mg daily  Continue lasix 20 mg daily    2. Permanent atrial fibrillation (HCC)  Stable, continue current regimen.   Rate controlled on bisoprolol 2.5 mg daily  Continue reduced dose Eliquis    3. Benign essential hypertension  Controlled on current regimen      We will see Jose Mullins back in 6 months for routine follow-up.    Subjective:     HPI: Jose Mullins is a 89 y.o. 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     No new complaints        Cardiac Testing:    Echo 12/21/23  Narrative    This result has an attachment that is not available.  Mild left ventricular hypertrophy with basal septal hypertrophy LV  systolic function is normal, EF~55-60% and mild diastolic dysfunction  Mildly dilated right ventricle with mildly reduced RV systolic function  Mild biatrial enlargement  Mildly ectatic thoracic aorta  Mild pulmonary hypertension, PA ~35-40 mmHg  Moderate aortic sclerosis with trivial to mild aortic insufficiency  Mild mitral sclerosis with mild mitral regurgitation  Compared to March 8, 2022, there have been no major changes.       EKGs, personally reviewed:      Relevant Labs & Results:  BMP 10/18/24 - Cr 1.40, K 4.6  BNP 10/4/23 - 223.    BMP 1/24/2025--creatinine 1.73, potassium 4.6, sodium 137    ROS:  Review of Systems:  Review of Systems    Objective:     Vitals:   Vitals:    05/16/25 0829   BP: 128/64   BP Location: Left arm   Patient Position: Sitting   Cuff Size: Standard   Pulse: 94   Weight: 89.5 kg (197 lb 6.4 oz)   Height: 5' 8\" (1.727 m)    Body surface area is 2.03 meters squared.  Wt Readings from Last 3 Encounters:   05/16/25 89.5 kg (197 lb 6.4 oz)   01/30/25 91.4 kg (201 lb 9.6 oz)   01/29/25 " 92.1 kg (203 lb)       Physical Exam:  General: Jose Mullins is a well appearing elderly male, in no acute distress, sitting comfortably  HEENT: moist mucous membranes, EOMI  Neck:  No JVD, supple, trachea midline  Cardiovascular: unremarkable S1/S2, regular rate and rhythm, no murmurs, rubs or gallops  Pulmonary: normal respiratory effort, diminished LLL, otherwise CTA  Abdomen: soft and nondistended  Extremities: No lower extremity edema. Warm and well perfused extremities.  Neuro: deferred  Psych: Normal mood and affect, cooperative      Medications (at the START of this encounter):  Outpatient Medications Prior to Visit   Medication Sig Dispense Refill    allopurinol (ZYLOPRIM) 100 mg tablet TAKE 1 TABLET BY MOUTH EVERY DAY 90 tablet 1    amitriptyline (ELAVIL) 10 mg tablet TAKE 3 TABLETS BY MOUTH AT BEDTIME 270 tablet 0    amLODIPine (NORVASC) 5 mg tablet Take 1 tablet (5 mg total) by mouth daily at bedtime 90 tablet 3    apixaban (Eliquis) 2.5 mg Take 1 tablet (2.5 mg total) by mouth 2 (two) times a day 180 tablet 1    bisoprolol (ZEBETA) 5 mg tablet Take 0.5 tablets (2.5 mg total) by mouth daily 45 tablet 3    brimonidine tartrate 0.2 % ophthalmic solution INSTILL 1 DROP TWICE DAILY IN THE RIGHT EYE 15 mL 1    cholecalciferol (VITAMIN D3) 1,000 units tablet Take 2,000 Units by mouth in the morning.      co-enzyme Q-10 30 MG capsule Take 100 mg by mouth in the morning.      diphenhydrAMINE-acetaminophen (TYLENOL PM)  MG TABS Take 2 tablets by mouth daily at bedtime as needed for sleep      Dorzolamide HCl-Timolol Mal PF 2-0.5 % SOLN       Empagliflozin (JARDIANCE) 10 MG TABS tablet Take 1 tablet (10 mg total) by mouth every morning 30 tablet 11    finasteride (PROSCAR) 5 mg tablet TAKE 1 TABLET (5 MG TOTAL) BY MOUTH DAILY. 90 tablet 1    furosemide (LASIX) 20 mg tablet TAKE 1 TABLET BY MOUTH TWICE A DAY (Patient taking differently: Take 20 mg by mouth daily) 180 tablet 1    gabapentin (NEURONTIN) 100  "mg capsule TAKE 1 CAPSULE BY MOUTH THREE TIMES A DAY (Patient taking differently: Take 100 mg by mouth 2 (two) times a day) 90 capsule 5    Garlic 1000 MG CAPS Take 1,000 mg by mouth in the morning      latanoprost (XALATAN) 0.005 % ophthalmic solution Administer 1 drop to both eyes daily at bedtime      multivitamin-iron-minerals-folic acid (CENTRUM) chewable tablet Chew 1 tablet in the morning.      omeprazole (PriLOSEC) 20 mg delayed release capsule TAKE 1 CAPSULE BY MOUTH EVERY DAY 90 capsule 1    oxygen gas Inhale 2 L/min continuous      Rhopressa 0.02 % SOLN       spironolactone (ALDACTONE) 25 mg tablet Take 0.5 tablets (12.5 mg total) by mouth daily 90 tablet 3     No facility-administered medications prior to visit.             This note was completed in part utilizing Dragon Medical One voice recognition software. Grammatical errors, random word insertion, spelling mistakes, occasional wrong word or \"sound-alike\" substitutions and incomplete sentences may be an occasional consequence of the system secondary to software limitations, ambient noise and hardware issues. At the time of dictation, efforts were made to edit, clarify and /or correct errors.  Please read the chart carefully and recognize, using context, where substitutions have occurred.  If you have any questions or concerns about the context, text or information contained within the body of this dictation, please contact myself, the provider, for further clarification.    "

## 2025-06-02 DIAGNOSIS — K58.0 IRRITABLE BOWEL SYNDROME WITH DIARRHEA: ICD-10-CM

## 2025-06-03 RX ORDER — AMITRIPTYLINE HYDROCHLORIDE 10 MG/1
30 TABLET ORAL
Qty: 270 TABLET | Refills: 1 | Status: SHIPPED | OUTPATIENT
Start: 2025-06-03

## 2025-07-08 LAB
BUN SERPL-MCNC: 24 MG/DL (ref 7–25)
BUN/CREAT SERPL: 14 (CALC) (ref 6–22)
CALCIUM SERPL-MCNC: 9.3 MG/DL (ref 8.6–10.3)
CHLORIDE SERPL-SCNC: 100 MMOL/L (ref 98–110)
CO2 SERPL-SCNC: 29 MMOL/L (ref 20–32)
CREAT SERPL-MCNC: 1.77 MG/DL (ref 0.7–1.22)
GFR/BSA.PRED SERPLBLD CYS-BASED-ARV: 35 ML/MIN/1.73M2
GLUCOSE SERPL-MCNC: 96 MG/DL (ref 65–99)
POTASSIUM SERPL-SCNC: 4.4 MMOL/L (ref 3.5–5.3)
SODIUM SERPL-SCNC: 137 MMOL/L (ref 135–146)

## 2025-07-26 DIAGNOSIS — R30.0 DYSURIA: ICD-10-CM

## 2025-07-27 DIAGNOSIS — K21.9 GASTROESOPHAGEAL REFLUX DISEASE, UNSPECIFIED WHETHER ESOPHAGITIS PRESENT: ICD-10-CM

## 2025-07-27 DIAGNOSIS — H40.9 GLAUCOMA OF RIGHT EYE, UNSPECIFIED GLAUCOMA TYPE: ICD-10-CM

## 2025-07-29 ENCOUNTER — TELEPHONE (OUTPATIENT)
Age: OVER 89
End: 2025-07-29

## 2025-07-29 RX ORDER — OMEPRAZOLE 20 MG/1
20 CAPSULE, DELAYED RELEASE ORAL DAILY
Qty: 90 CAPSULE | Refills: 1 | Status: SHIPPED | OUTPATIENT
Start: 2025-07-29

## 2025-07-29 RX ORDER — FINASTERIDE 5 MG/1
5 TABLET, FILM COATED ORAL DAILY
Qty: 90 TABLET | Refills: 0 | Status: SHIPPED | OUTPATIENT
Start: 2025-07-29

## 2025-07-29 RX ORDER — BRIMONIDINE TARTRATE 2 MG/ML
SOLUTION/ DROPS OPHTHALMIC
Qty: 15 ML | Refills: 1 | Status: SHIPPED | OUTPATIENT
Start: 2025-07-29

## 2025-07-31 DIAGNOSIS — I50.32 CHRONIC DIASTOLIC CONGESTIVE HEART FAILURE (HCC): Primary | ICD-10-CM

## 2025-08-06 ENCOUNTER — TELEPHONE (OUTPATIENT)
Age: OVER 89
End: 2025-08-06

## 2025-08-12 ENCOUNTER — TELEMEDICINE (OUTPATIENT)
Dept: FAMILY MEDICINE CLINIC | Facility: CLINIC | Age: OVER 89
End: 2025-08-12
Payer: COMMERCIAL

## 2025-08-13 PROBLEM — F05 SUNDOWNING: Status: ACTIVE | Noted: 2025-08-13

## 2025-08-15 ENCOUNTER — TELEPHONE (OUTPATIENT)
Dept: OTHER | Facility: HOSPITAL | Age: OVER 89
End: 2025-08-15